# Patient Record
Sex: MALE | Race: WHITE | NOT HISPANIC OR LATINO | Employment: OTHER | URBAN - METROPOLITAN AREA
[De-identification: names, ages, dates, MRNs, and addresses within clinical notes are randomized per-mention and may not be internally consistent; named-entity substitution may affect disease eponyms.]

---

## 2018-07-31 ENCOUNTER — OFFICE VISIT (OUTPATIENT)
Dept: INTERNAL MEDICINE | Facility: CLINIC | Age: 73
End: 2018-07-31
Payer: MEDICARE

## 2018-07-31 VITALS
TEMPERATURE: 98 F | OXYGEN SATURATION: 97 % | HEIGHT: 68 IN | BODY MASS INDEX: 35.04 KG/M2 | DIASTOLIC BLOOD PRESSURE: 92 MMHG | RESPIRATION RATE: 16 BRPM | SYSTOLIC BLOOD PRESSURE: 140 MMHG | HEART RATE: 66 BPM | WEIGHT: 231.19 LBS

## 2018-07-31 DIAGNOSIS — I10 HYPERTENSION, UNSPECIFIED TYPE: Primary | ICD-10-CM

## 2018-07-31 DIAGNOSIS — E78.5 HYPERLIPIDEMIA, UNSPECIFIED HYPERLIPIDEMIA TYPE: ICD-10-CM

## 2018-07-31 LAB
ALBUMIN SERPL-MCNC: 4 G/DL (ref 3.1–4.7)
ALP SERPL-CCNC: 58 IU/L (ref 40–104)
ALT (SGPT): 18 IU/L (ref 3–33)
AST SERPL-CCNC: 20 IU/L (ref 10–40)
BILIRUB SERPL-MCNC: 1.1 MG/DL (ref 0.3–1)
BUN SERPL-MCNC: 20 MG/DL (ref 8–20)
CALCIUM SERPL-MCNC: 8.9 MG/DL (ref 7.7–10.4)
CHLORIDE: 102 MMOL/L (ref 98–110)
CO2 SERPL-SCNC: 28.7 MMOL/L (ref 22.8–31.6)
CREATININE: 1.46 MG/DL (ref 0.6–1.4)
GLUCOSE: 110 MG/DL (ref 70–99)
POTASSIUM SERPL-SCNC: 3.4 MMOL/L (ref 3.5–5)
PROT SERPL-MCNC: 7.3 G/DL (ref 6–8.2)
SODIUM: 141 MMOL/L (ref 134–144)

## 2018-07-31 PROCEDURE — 99203 OFFICE O/P NEW LOW 30 MIN: CPT | Mod: ,,, | Performed by: FAMILY MEDICINE

## 2018-07-31 RX ORDER — UBIQUINOL 100 MG
150 CAPSULE ORAL DAILY
COMMUNITY

## 2018-07-31 RX ORDER — MAGNESIUM 200 MG
TABLET ORAL ONCE
COMMUNITY
End: 2019-07-30

## 2018-07-31 RX ORDER — IBUPROFEN 100 MG/5ML
1000 SUSPENSION, ORAL (FINAL DOSE FORM) ORAL DAILY
COMMUNITY

## 2018-07-31 RX ORDER — DOXAZOSIN 8 MG/1
8 TABLET ORAL DAILY
COMMUNITY
End: 2018-09-05 | Stop reason: SDUPTHER

## 2018-07-31 RX ORDER — FUROSEMIDE 40 MG/1
40 TABLET ORAL DAILY
COMMUNITY
End: 2018-07-31

## 2018-07-31 RX ORDER — METOPROLOL SUCCINATE 25 MG/1
25 TABLET, EXTENDED RELEASE ORAL DAILY
COMMUNITY
End: 2018-07-31

## 2018-07-31 RX ORDER — CLOPIDOGREL BISULFATE 75 MG/1
75 TABLET ORAL DAILY
COMMUNITY
End: 2018-09-05 | Stop reason: SDUPTHER

## 2018-07-31 RX ORDER — ELECTROLYTES/DEXTROSE
SOLUTION, ORAL ORAL ONCE
COMMUNITY
End: 2018-09-05 | Stop reason: SDUPTHER

## 2018-07-31 RX ORDER — GARLIC 1000 MG
1 CAPSULE ORAL DAILY
COMMUNITY

## 2018-07-31 RX ORDER — PYRIDOXINE HCL (VITAMIN B6) 25 MG
25 TABLET ORAL DAILY
COMMUNITY
End: 2018-10-23

## 2018-07-31 RX ORDER — NITROGLYCERIN 0.4 MG/1
0.4 TABLET SUBLINGUAL EVERY 5 MIN PRN
COMMUNITY
End: 2019-04-30 | Stop reason: SDUPTHER

## 2018-07-31 RX ORDER — NAPROXEN SODIUM 220 MG/1
81 TABLET, FILM COATED ORAL DAILY
Status: ON HOLD | COMMUNITY
End: 2020-08-19 | Stop reason: HOSPADM

## 2018-07-31 RX ORDER — LISINOPRIL 20 MG/1
20 TABLET ORAL 2 TIMES DAILY
COMMUNITY
End: 2018-09-05 | Stop reason: SDUPTHER

## 2018-07-31 NOTE — PROGRESS NOTES
SUBJECTIVE:    Patient ID: Tong Ford is a 73 y.o. male.    Chief Complaint: Establish Care and Foot Swelling (bilateral. mostly in the evenings)    Mr. Brewer is a 73-year-old male new to this provider no past medical records with him, patient has no new complaints today and is not needing medication refills.  Past medical history significant for hypertension not very well controlled on 3 medications (Cardura lisinopril metoprolol) patient states he is not taking his lisinopril as directed, he is only taking 1 tablet a day he supposed to be taking 2 tablets a day. His blood pressure today is not very well controlled.  He has a history of coronary artery disease status post CABG, he has a cardiologist in [facility] we will request his last note, patient has discontinued his statin on his own (no side effects).  He also has a history of GERD and arthritis that both seem to be asymptomatic today.      Hypertension   This is a chronic problem. The current episode started more than 1 year ago. The problem has been waxing and waning since onset. The problem is uncontrolled. Pertinent negatives include no anxiety, blurred vision, chest pain, headaches, malaise/fatigue, neck pain, orthopnea, palpitations, peripheral edema, PND, shortness of breath or sweats. There are no associated agents to hypertension. Risk factors for coronary artery disease include dyslipidemia, male gender and sedentary lifestyle. Past treatments include ACE inhibitors and beta blockers. Compliance problems include exercise and diet.  Hypertensive end-organ damage includes CAD/MI.         Past Medical History:   Diagnosis Date    Arthritis     Cataract     Clotting disorder     Coronary artery disease     Encounter for blood transfusion     GERD (gastroesophageal reflux disease)     Hypertension     Neuromuscular disorder      Social History     Social History    Marital status:      Spouse name: N/A    Number of  children: N/A    Years of education: N/A     Occupational History    Not on file.     Social History Main Topics    Smoking status: Never Smoker    Smokeless tobacco: Never Used    Alcohol use Yes      Comment: rare social occasions    Drug use: No    Sexual activity: No     Other Topics Concern    Not on file     Social History Narrative    No narrative on file     Past Surgical History:   Procedure Laterality Date    COLONOSCOPY      CORONARY ARTERY BYPASS GRAFT      EYE SURGERY      VASECTOMY       Family History   Problem Relation Age of Onset    Cancer Mother     Cancer Father     Hypertension Father     Cancer Maternal Grandmother     COPD Paternal Grandmother      Current Outpatient Prescriptions   Medication Sig Dispense Refill    doxazosin (CARDURA) 8 MG Tab Take 8 mg by mouth once daily.      furosemide (LASIX) 40 MG tablet Take 40 mg by mouth once daily.      lisinopril (PRINIVIL,ZESTRIL) 20 MG tablet Take 20 mg by mouth 2 (two) times daily.      metoprolol succinate (TOPROL-XL) 25 MG 24 hr tablet Take 25 mg by mouth once daily.       No current facility-administered medications for this visit.      Review of patient's allergies indicates:  No Known Allergies    Review of Systems   Constitutional: Negative for activity change, appetite change, fatigue, fever and malaise/fatigue.   HENT: Negative for congestion, ear pain, hearing loss, postnasal drip, sinus pain, sinus pressure, sneezing and sore throat.    Eyes: Negative for blurred vision, photophobia and pain.   Respiratory: Negative for cough, chest tightness, shortness of breath and wheezing.    Cardiovascular: Negative for chest pain, palpitations, orthopnea, leg swelling and PND.   Gastrointestinal: Negative for abdominal distention, abdominal pain, blood in stool, constipation, diarrhea, nausea and vomiting.   Endocrine: Negative for cold intolerance, heat intolerance, polydipsia and polyuria.   Genitourinary: Negative for  "difficulty urinating, dysuria, flank pain, frequency, hematuria and urgency.   Musculoskeletal: Negative for arthralgias, back pain, joint swelling, myalgias and neck pain.   Skin: Negative for pallor and rash.   Allergic/Immunologic: Negative for environmental allergies and food allergies.   Neurological: Negative for dizziness, weakness, light-headedness and headaches.   Hematological: Does not bruise/bleed easily.   Psychiatric/Behavioral: Negative for confusion, decreased concentration and sleep disturbance. The patient is not nervous/anxious.           Blood pressure (!) 140/92, pulse 66, temperature 97.7 °F (36.5 °C), temperature source Oral, resp. rate 16, height 5' 8" (1.727 m), weight 104.9 kg (231 lb 3.2 oz), SpO2 97 %. Body mass index is 35.15 kg/m².   Objective:      Physical Exam   Constitutional: He is oriented to person, place, and time. He appears well-developed and well-nourished.   HENT:   Head: Normocephalic and atraumatic.   Eyes: EOM are normal. Pupils are equal, round, and reactive to light. Right eye exhibits no discharge. Left eye exhibits no discharge.   Cardiovascular: Normal rate, regular rhythm and normal heart sounds.    Pulmonary/Chest: Effort normal and breath sounds normal. No respiratory distress.   Neurological: He is alert and oriented to person, place, and time.           Assessment:       1. Hypertension, unspecified type    2. Hyperlipidemia, unspecified hyperlipidemia type         Plan:           Hypertension, unspecified type  -     Comprehensive metabolic panel; Future; Expected date: 07/31/2018  Patient with chronic hypertension not very well controlled today patient admittedly does not take his medication as prescribed instructed patient to take medications as prescribed, decrease his salt intake to less than 2500 mg, decrease alcohol intake, and begin regularly scheduled exercise. Patient has a history of coronary artery disease with CABG we'll attempt to get previous " cardiology notes with echocardiogram. I suspect he may have mild congestive heart failure due to his complaints of lower extremity edema and because someone has placed him on Lasix. I recommended that he begin daily weight checks to monitor his fluids. We'll check a comprehensive metabolic panel today to evaluate his potassium since he is on daily Lasix.     Hyperlipidemia, unspecified hyperlipidemia type  -     Lipid panel; Future; Expected date: 07/31/2018 patient has known cardiovascular disease with CABG he should be on a statin for life. We'll check cholesterol at this visit and try to encourage the patient to go back on a statin.

## 2018-09-06 RX ORDER — ELECTROLYTES/DEXTROSE
1 SOLUTION, ORAL ORAL ONCE
COMMUNITY
Start: 2018-09-06 | End: 2018-09-06

## 2018-09-06 RX ORDER — DOXAZOSIN 8 MG/1
8 TABLET ORAL DAILY
Qty: 90 TABLET | Refills: 1 | Status: SHIPPED | OUTPATIENT
Start: 2018-09-06 | End: 2019-04-02 | Stop reason: SDUPTHER

## 2018-09-06 RX ORDER — CLOPIDOGREL BISULFATE 75 MG/1
75 TABLET ORAL DAILY
Qty: 90 TABLET | Refills: 1 | Status: SHIPPED | OUTPATIENT
Start: 2018-09-06 | End: 2019-10-14 | Stop reason: SDUPTHER

## 2018-09-06 RX ORDER — LISINOPRIL 20 MG/1
20 TABLET ORAL 2 TIMES DAILY
Qty: 180 TABLET | Refills: 1 | Status: SHIPPED | OUTPATIENT
Start: 2018-09-06 | End: 2019-04-02 | Stop reason: SDUPTHER

## 2018-09-10 DIAGNOSIS — R60.9 EDEMA, UNSPECIFIED TYPE: Primary | ICD-10-CM

## 2018-09-10 RX ORDER — FUROSEMIDE 40 MG/1
40 TABLET ORAL 2 TIMES DAILY
Qty: 60 TABLET | Refills: 11 | Status: SHIPPED | OUTPATIENT
Start: 2018-09-10 | End: 2018-09-13 | Stop reason: SDUPTHER

## 2018-09-10 NOTE — TELEPHONE ENCOUNTER
Pt states he is still taking furosemide 40 mg but chart shows it was discontinued. He states he needs refills

## 2018-09-13 ENCOUNTER — OFFICE VISIT (OUTPATIENT)
Dept: FAMILY MEDICINE | Facility: CLINIC | Age: 73
End: 2018-09-13
Payer: MEDICARE

## 2018-09-13 VITALS
WEIGHT: 231.63 LBS | HEIGHT: 68 IN | DIASTOLIC BLOOD PRESSURE: 60 MMHG | BODY MASS INDEX: 35.11 KG/M2 | HEART RATE: 58 BPM | TEMPERATURE: 99 F | SYSTOLIC BLOOD PRESSURE: 130 MMHG | OXYGEN SATURATION: 95 %

## 2018-09-13 DIAGNOSIS — E03.9 HYPOTHYROIDISM, UNSPECIFIED TYPE: ICD-10-CM

## 2018-09-13 DIAGNOSIS — R60.9 EDEMA, UNSPECIFIED TYPE: ICD-10-CM

## 2018-09-13 DIAGNOSIS — I25.10 CORONARY ARTERY DISEASE, ANGINA PRESENCE UNSPECIFIED, UNSPECIFIED VESSEL OR LESION TYPE, UNSPECIFIED WHETHER NATIVE OR TRANSPLANTED HEART: ICD-10-CM

## 2018-09-13 DIAGNOSIS — I10 ESSENTIAL HYPERTENSION: Primary | ICD-10-CM

## 2018-09-13 PROCEDURE — 99213 OFFICE O/P EST LOW 20 MIN: CPT | Mod: ,,, | Performed by: FAMILY MEDICINE

## 2018-09-13 RX ORDER — METOPROLOL SUCCINATE 50 MG/1
50 TABLET, EXTENDED RELEASE ORAL DAILY
Qty: 90 TABLET | Refills: 1 | Status: SHIPPED | OUTPATIENT
Start: 2018-09-13 | End: 2019-04-02 | Stop reason: SDUPTHER

## 2018-09-13 RX ORDER — FUROSEMIDE 40 MG/1
40 TABLET ORAL 2 TIMES DAILY
Qty: 180 TABLET | Refills: 1 | Status: SHIPPED | OUTPATIENT
Start: 2018-09-13 | End: 2018-10-11 | Stop reason: SDUPTHER

## 2018-09-13 RX ORDER — METOPROLOL SUCCINATE 50 MG/1
50 TABLET, EXTENDED RELEASE ORAL DAILY
COMMUNITY
End: 2018-09-13 | Stop reason: SDUPTHER

## 2018-09-13 NOTE — PROGRESS NOTES
SUBJECTIVE:    Patient ID: Tong Ford is a 73 y.o. male.    Chief Complaint: potassium and off balance    HPI  73-year-old  male previously seen by me one month ago for  Hypertension, patient's blood pressure today is doing well. His a history of coronary artery disease patient does not want to go on a statin medication, he has 3 stents placed, was being followed by cardiology [facility] I reviewed the notes there he had echo in 2016 with ejection fraction of 66%. He's continued to need Lasix twice a day for continued lower shimmery swelling despite limiting his salt intake.    At last visit patient had elevated blood pressures his blood pressures are slightly improved this morning, he denies any chest discomfort suggestive of ischemia, patient denies any orthopnea PND, GOODWIN, but does have lower extremity edema.     Review of the [facility] notes it looks like his last ejection fraction was 66%, he does not have the diagnosis of congestive heart failure. He is a diagnosis of coronary artery disease status post CABG ×3 in 1988, hypertension, hypercholesterolemia, and mitral regurgitation nonrheumatic.    Patient was a decreased potassium 3.4 discussed with him he's not currently on a past potassium supplementation have asked him to increase foods that are rich in potassium DEUCE bananas Green leafy vegetables.    Patient's lipid panel with LDL of 223 not currently on statin patient does not want to take cholesterol medication, will have patient consult with cardiology. Patient has known coronary artery disease unsure why he will not take cholesterol medications. He is currently not establish with a cardiologist here in town his last cardiology visit was 2017 in [place].     110 Abnormally high      BUN, Bld 8 - 20 mg/dL 20    Creatinine 0.60 - 1.40 mg/dL 1.46 Abnormally high     Calcium 7.7 - 10.4 mg/dL 8.9    Sodium 134 - 144 mmol/L 141    Potassium 3.5 - 5.0 mmol/L 3.4  Abnormally low     Chloride 98 - 110 mmol/L 102    CO2 22.8 - 31.6 mmol/L 28.7    Albumin 3.1 - 4.7 g/dL 4.0    Total Bilirubin 0.3 - 1.0 mg/dL 1.1 Abnormally high     Alkaline Phosphatase 40 - 104 IU/L 58    Total Protein 6.0 - 8.2 g/dL 7.3    ALT (SGPT) 3 - 33 IU/L 18    AST 10 - 40 IU/L 20      Cholesterol                   287 H                       mg/dL       Triglycerides                 134                         mg/dL       HDL Cholesterol                37                        23-75  mg/dL       LDL Cholesterol               223 H                      0-100  mg/dL       VLDL Cholesterol               27                        12-27  mg/dL       Cholesterol Ratio            8.00                                             Past Medical History:   Diagnosis Date    Arthritis     Cataract     Clotting disorder     Coronary artery disease     Encounter for blood transfusion     GERD (gastroesophageal reflux disease)     Hypertension     Neuromuscular disorder      Social History     Socioeconomic History    Marital status:      Spouse name: Not on file    Number of children: Not on file    Years of education: Not on file    Highest education level: Not on file   Social Needs    Financial resource strain: Not on file    Food insecurity - worry: Not on file    Food insecurity - inability: Not on file    Transportation needs - medical: Not on file    Transportation needs - non-medical: Not on file   Occupational History    Not on file   Tobacco Use    Smoking status: Never Smoker    Smokeless tobacco: Never Used   Substance and Sexual Activity    Alcohol use: Yes     Comment: rare social occasions    Drug use: No    Sexual activity: No   Other Topics Concern    Not on file   Social History Narrative    Not on file     Past Surgical History:   Procedure Laterality Date    COLONOSCOPY      CORONARY ARTERY BYPASS GRAFT      EYE SURGERY      VASECTOMY       Family  History   Problem Relation Age of Onset    Cancer Mother     Cancer Father     Hypertension Father     Cancer Maternal Grandmother     COPD Paternal Grandmother      Current Outpatient Medications   Medication Sig Dispense Refill    ascorbic acid, vitamin C, (VITAMIN C) 1000 MG tablet Take 1,000 mg by mouth once daily.      aspirin 81 MG Chew Take 81 mg by mouth once daily.      clopidogrel (PLAVIX) 75 mg tablet Take 1 tablet (75 mg total) by mouth once daily. 90 tablet 1    coQ10, ubiquinol, 100 mg Cap Take 150 mg by mouth 2 (two) times daily.      doxazosin (CARDURA) 8 MG Tab Take 1 tablet (8 mg total) by mouth once daily. 90 tablet 1    ergocalciferol, vitamin D2, (VITAMIN D ORAL) Take 500 Units by mouth Daily.      furosemide (LASIX) 40 MG tablet Take 1 tablet (40 mg total) by mouth 2 (two) times daily. for 1 day 180 tablet 1    garlic 1,000 mg Cap Take by mouth.      krill-om-3-dha-epa-phospho-ast (KRILL OIL) 1,996-444-48-80 mg Cap Take by mouth.      lisinopril (PRINIVIL,ZESTRIL) 20 MG tablet Take 1 tablet (20 mg total) by mouth 2 (two) times daily. 180 tablet 1    magnesium 200 mg Tab Take by mouth once.      metoprolol succinate (TOPROL-XL) 50 MG 24 hr tablet Take 1 tablet (50 mg total) by mouth once daily. 90 tablet 1    nitroGLYCERIN (NITROSTAT) 0.4 MG SL tablet Place 0.4 mg under the tongue every 5 (five) minutes as needed for Chest pain.      pyridoxine, vitamin B6, (VITAMIN B-6) 25 MG Tab Take 25 mg by mouth once daily.      TURMERIC ROOT EXTRACT ORAL Take 1,200 mg by mouth Daily.      UNABLE TO FIND Benfotiamine 300mg      UNABLE TO FIND R-Alpha Lipoic Acid 150mg      UNABLE TO FIND Oat straw extract,passion flower, and skullcap root extract      UNABLE TO FIND Arterin      UNABLE TO FIND Niachinamide 250mg       No current facility-administered medications for this visit.      Review of patient's allergies indicates:  No Known Allergies    Review of Systems   Constitutional:  "Negative for activity change, appetite change, fatigue and fever.   HENT: Negative for congestion, ear pain, hearing loss, postnasal drip, sinus pressure, sinus pain, sneezing and sore throat.    Eyes: Negative for photophobia and pain.   Respiratory: Negative for cough, chest tightness, shortness of breath and wheezing.    Cardiovascular: Positive for leg swelling. Negative for chest pain and palpitations.   Gastrointestinal: Negative for abdominal distention, abdominal pain, blood in stool, constipation, diarrhea, nausea and vomiting.   Endocrine: Negative for cold intolerance, heat intolerance, polydipsia and polyuria.   Allergic/Immunologic: Negative for environmental allergies and food allergies.   Neurological: Negative for dizziness, weakness, light-headedness and headaches.   Hematological: Does not bruise/bleed easily.   Psychiatric/Behavioral: Negative for confusion, decreased concentration and sleep disturbance. The patient is not nervous/anxious.           Blood pressure 130/60, pulse (!) 58, temperature 98.7 °F (37.1 °C), height 5' 8" (1.727 m), weight 105.1 kg (231 lb 9.6 oz), SpO2 95 %. Body mass index is 35.21 kg/m².   Objective:      Physical Exam   Constitutional: He appears well-developed and well-nourished. No distress.   HENT:   Head: Normocephalic.   Eyes: Conjunctivae and EOM are normal. Pupils are equal, round, and reactive to light.   Neck: Normal range of motion.   Cardiovascular: Normal rate and regular rhythm.   Murmur heard.  Pulmonary/Chest: Effort normal and breath sounds normal. No respiratory distress.   Skin: He is not diaphoretic.           Assessment:       1. Essential hypertension    2. Edema, unspecified type    3. Coronary artery disease, angina presence unspecified, unspecified vessel or lesion type, unspecified whether native or transplanted heart    4. Hypothyroidism, unspecified type         Plan:           Essential hypertension  -     metoprolol succinate (TOPROL-XL) 50 " MG 24 hr tablet; Take 1 tablet (50 mg total) by mouth once daily.  Dispense: 90 tablet; Refill: 1  -     Ambulatory referral to Cardiology  Patient currently on metoprolol lisinopril and oxytocin, blood pressure within normal limits today will have patient follow with cardiology for the history of coronary artery disease with bypass. He's been chest pain-free he does have a bottle nitroglycerin but has not had the use any.    Edema, unspecified type  -     furosemide (LASIX) 40 MG tablet; Take 1 tablet (40 mg total) by mouth 2 (two) times daily. for 1 day  Dispense: 180 tablet; Refill: 1  Patient initially came to me with Lasix as a prescription, I do not see this as one of his medications from his cardiologist's note, he does not have a history of congestive heart failure, slightly decreased potassium at last lab will refer to cardiology for possible repeat echo to evaluate ejection fraction to determine if he needs to stay on Lasix    Coronary artery disease, angina presence unspecified, unspecified vessel or lesion type, unspecified whether native or transplanted heart  -     Ambulatory referral to Cardiology  Known coronary artery disease, not currently on statin has elevated cholesterol patient does not want to start any new medications have counseled extensively. Hoping cardiology can convince this patient is status probably the best appropriate therapy for him at this point.    Hypothyroidism, unspecified type  -     TSH; Future; Expected date: 09/13/2018  Issue of hypothyroidism last TSH is unknown currently on Synthroid will continue his current dose and check his TSH prior to our next visit December.

## 2018-09-17 LAB — TSH SERPL DL<=0.005 MIU/L-ACNC: 2.48 ULU/ML (ref 0.3–5.6)

## 2018-10-11 DIAGNOSIS — R60.9 EDEMA, UNSPECIFIED TYPE: ICD-10-CM

## 2018-10-11 RX ORDER — FUROSEMIDE 40 MG/1
40 TABLET ORAL 2 TIMES DAILY
Qty: 180 TABLET | Refills: 1 | Status: SHIPPED | OUTPATIENT
Start: 2018-10-11 | End: 2019-10-14 | Stop reason: SDUPTHER

## 2018-10-23 ENCOUNTER — TELEPHONE (OUTPATIENT)
Dept: PULMONOLOGY | Facility: CLINIC | Age: 73
End: 2018-10-23

## 2018-10-23 ENCOUNTER — DOCUMENTATION ONLY (OUTPATIENT)
Dept: PULMONOLOGY | Facility: CLINIC | Age: 73
End: 2018-10-23

## 2018-10-23 ENCOUNTER — OFFICE VISIT (OUTPATIENT)
Dept: PULMONOLOGY | Facility: CLINIC | Age: 73
End: 2018-10-23
Payer: MEDICARE

## 2018-10-23 VITALS
SYSTOLIC BLOOD PRESSURE: 120 MMHG | WEIGHT: 237 LBS | OXYGEN SATURATION: 97 % | HEIGHT: 68 IN | BODY MASS INDEX: 35.92 KG/M2 | DIASTOLIC BLOOD PRESSURE: 80 MMHG | HEART RATE: 57 BPM

## 2018-10-23 DIAGNOSIS — R06.02 SHORTNESS OF BREATH: ICD-10-CM

## 2018-10-23 DIAGNOSIS — G47.33 OSA (OBSTRUCTIVE SLEEP APNEA): Primary | ICD-10-CM

## 2018-10-23 PROCEDURE — 99204 OFFICE O/P NEW MOD 45 MIN: CPT | Mod: ,,, | Performed by: INTERNAL MEDICINE

## 2018-10-23 NOTE — PROGRESS NOTES
SUBJECTIVE:    Patient ID: Tong Ford is a 73 y.o. male.    Chief Complaint: Sleep Apnea (dr dowd referred )    HPIThe patient had a split night sleep study secondary to poor sleep with multiple episodes of nocturia a night.  He is fatigued during the day.  He has mild CESAR and responds to 10cm CPAP.  He doesn't have his equipment.  Past Medical History:   Diagnosis Date    Arthritis     Cataract     Coronary artery disease     Encounter for blood transfusion     Hypercholesterolemia     Hypertension      Past Surgical History:   Procedure Laterality Date    CORONARY ARTERY BYPASS GRAFT      EYE SURGERY      VASECTOMY       Social History     Tobacco Use    Smoking status: Never Smoker    Smokeless tobacco: Never Used   Substance Use Topics    Alcohol use: Yes     Comment: rare social occasions     Family History   Problem Relation Age of Onset    Cancer Mother     Cancer Father     Hypertension Father     Cancer Maternal Grandmother     COPD Paternal Grandmother       Review of patient's allergies indicates:  No Known Allergies    Current Outpatient Medications   Medication Sig Dispense Refill    ascorbic acid, vitamin C, (VITAMIN C) 1000 MG tablet Take 1,000 mg by mouth once daily.      aspirin 81 MG Chew Take 81 mg by mouth once daily.      clopidogrel (PLAVIX) 75 mg tablet Take 1 tablet (75 mg total) by mouth once daily. 90 tablet 1    coQ10, ubiquinol, 100 mg Cap Take 150 mg by mouth 2 (two) times daily.      doxazosin (CARDURA) 8 MG Tab Take 1 tablet (8 mg total) by mouth once daily. 90 tablet 1    ergocalciferol, vitamin D2, (VITAMIN D ORAL) Take 500 Units by mouth Daily.      furosemide (LASIX) 40 MG tablet Take 1 tablet (40 mg total) by mouth 2 (two) times daily. for 1 day 180 tablet 1    garlic 1,000 mg Cap Take by mouth.      krill-om-3-dha-epa-phospho-ast (KRILL OIL) 1,499-593-22-80 mg Cap Take by mouth.      lisinopril (PRINIVIL,ZESTRIL) 20 MG tablet Take 1 tablet (20 mg  "total) by mouth 2 (two) times daily. 180 tablet 1    magnesium 200 mg Tab Take by mouth once.      metoprolol succinate (TOPROL-XL) 50 MG 24 hr tablet Take 1 tablet (50 mg total) by mouth once daily. 90 tablet 1    nitroGLYCERIN (NITROSTAT) 0.4 MG SL tablet Place 0.4 mg under the tongue every 5 (five) minutes as needed for Chest pain.      TURMERIC ROOT EXTRACT ORAL Take 1,200 mg by mouth Daily.      UNABLE TO FIND R-Alpha Lipoic Acid 150mg      UNABLE TO FIND Oat straw extract,passion flower, and skullcap root extract      UNABLE TO FIND Arterin      UNABLE TO FIND Niachinamide 250mg       No current facility-administered medications for this visit.      Review of Systems  General: Feeling Well. Falls asleep doing anything.    Eyes: Wears glasses, has to.  ENT:  No sinusitis or pharyngitis.   Heart:: No chest pain or palpitations.  Lungs: Gets out of breath easily with exertion.  Doesn't know EF.  GI: No Nausea, vomiting, constipation, diarrhea, or reflux.  Skin: new skin cancer to R arm  Musculoskeletal: some arthritis noticed when outside working  Neuro: No headaches or neuropathy.  Lymph: No edema or adenopathy.  Psych: No anxiety or depression.  Endo: No weight change.    OBJECTIVE:      /80 (BP Location: Right arm, Patient Position: Sitting)   Pulse (!) 57   Ht 5' 8" (1.727 m)   Wt 107.5 kg (237 lb)   SpO2 97%   BMI 36.04 kg/m²     Physical Exam  GENERAL: Older patient in no distress.  HEENT: Pupils equal and reactive. Extraocular movements intact. Nose intact. Pharynx moist. Malampati 4. Neck circumference 18".  NECK: Supple.   HEART: Regular rate and rhythm. No murmur or gallop auscultated.  LUNGS: Clear to auscultation and percussion. Lung excursion symmetrical. No change in fremitus. No adventitial noises.  ABDOMEN: Bowel sounds present. Non-tender, no masses palpated. Obese.  EXTREMITIES: Normal muscle tone and joint movement, no cyanosis or clubbing.   LYMPHATICS: No adenopathy " palpated, no edema.  SKIN: Dry, intact, no lesions.   NEURO: Cranial nerves II-XII intact. Motor strength 5/5 bilaterally, upper and lower extremities.  PSYCH: Appropriate affect.    Assessment:       1. CESAR (obstructive sleep apnea)    2. Shortness of breath          Plan:       CESAR (obstructive sleep apnea)    Shortness of breath    Order CPAP equipment: CPAP 10cm with a medium Resmed F-20 full face mask and heated humdity and ramp time per patient preference.  Use CPAP nightly, call if you have any problems  Bring chip in for next visit  PFT's  CXR  Last Echo from Dr. Carmona     Follow-up in about 2 months (around 12/23/2018).

## 2018-10-23 NOTE — PATIENT INSTRUCTIONS
Obstructive Sleep Apnea  Obstructive sleep apnea is a condition that causes your air passages to become narrowed or blocked during sleep. As a result, breathing stops for short periods. Your body wakes up enough for breathing to begin again, though you don't remember it. The cycle of stopped breathing and brief awakenings can repeat dozens of times a night. This prevents the body from getting to the deeper stages of sleep that are needed for good rest and may cause your body's oxygen level to fall.  Signs of sleep apnea include loud snoring, noisy breathing, and gasping sounds during sleep. Daytime symptoms include waking up tired after a full night's sleep, waking up with headaches, feeling very sleepy or falling asleep during the day, and having problems with memory or concentration.  Risk factors for sleep apnea include:  · Being overweight  · Being a man, or a woman in menopause  · Smoking  · Using alcohol or sedating medicines  · Having enlarged structures in the nose or throat  Home care  Lifestyle changes that can help treat snoring and sleep apnea include the following:  · If you are overweight, lose weight. Talk to your healthcare provider about a weight-loss plan for you.  · Avoid alcohol for 3 to 4 hours before bedtime. Avoid sedating medications. Ask your healthcare provider about the medicines you take.  · If you smoke, talk to your healthcare provider about ways to quit.  · Sleep on your side. This can help prevent gravity from pulling relaxed throat tissues into your breathing passages.  · If you have allergies or sinus problems that block your nose, ask your healthcare provider for help.  Follow-up care  Follow up with your healthcare provider, or as advised. A diagnosis of sleep apnea is made with a sleep study. Your healthcare provider can tell you more about this test.  When to seek medical advice  Sleep apnea can make you more likely to have certain health problems. These include high blood  pressure, heart attack, stroke, and sexual dysfunction. If you have sleep apnea, talk to your healthcare provider about the best treatments for you.  Date Last Reviewed: 4/1/2017  © 4837-0236 [employer]. [address] [place] [address], [place] [address]. All rights reserved. This information is not intended as a substitute for professional medical care. Always follow your healthcare professional's instructions.

## 2018-10-25 ENCOUNTER — PATIENT MESSAGE (OUTPATIENT)
Dept: PULMONOLOGY | Facility: CLINIC | Age: 73
End: 2018-10-25

## 2018-11-05 ENCOUNTER — DOCUMENTATION ONLY (OUTPATIENT)
Dept: PULMONOLOGY | Facility: CLINIC | Age: 73
End: 2018-11-05

## 2018-11-05 NOTE — PROGRESS NOTES
Erica Thomson M.D., F.C.C.P.  Pulmonary & Critical Care Medicine    [address], [address]  [place] [address]  [phone]      PFT's / Spirometry Results    Patient Name: Tong Ford  YOB: 1945    Date of Study: 11/05/2018    Spirometry: No obstruction    Lung Volume: No restriction    Diffusion Capacity: No diffusion defect    Response to Bronchodilators: No bronchodilator used    Comments:     AUDI Burton

## 2018-11-14 ENCOUNTER — PATIENT MESSAGE (OUTPATIENT)
Dept: PULMONOLOGY | Facility: CLINIC | Age: 73
End: 2018-11-14

## 2018-11-14 ENCOUNTER — TELEPHONE (OUTPATIENT)
Dept: PULMONOLOGY | Facility: CLINIC | Age: 73
End: 2018-11-14

## 2018-11-14 NOTE — TELEPHONE ENCOUNTER
[facility] FAXED US ON 11/12/18 STATING THEY HAVE TRIED MULTIPLE TIMES TO CONTACT MR BELTRAN TO SET HIS CPAP UP BUT THEY ARE UNABLE TO REACH HIM STILL.

## 2018-11-29 ENCOUNTER — PATIENT MESSAGE (OUTPATIENT)
Dept: PULMONOLOGY | Facility: CLINIC | Age: 73
End: 2018-11-29

## 2018-12-26 ENCOUNTER — PATIENT MESSAGE (OUTPATIENT)
Dept: PULMONOLOGY | Facility: CLINIC | Age: 73
End: 2018-12-26

## 2018-12-31 ENCOUNTER — TELEPHONE (OUTPATIENT)
Dept: PULMONOLOGY | Facility: CLINIC | Age: 73
End: 2018-12-31

## 2018-12-31 NOTE — TELEPHONE ENCOUNTER
Received compliance report for patients CPAP.  He is only 57% compliant.  He needs to be sleeping on his CPAP every night for at least 4 hours.

## 2019-01-07 ENCOUNTER — OFFICE VISIT (OUTPATIENT)
Dept: PULMONOLOGY | Facility: CLINIC | Age: 74
End: 2019-01-07
Payer: MEDICARE

## 2019-01-07 VITALS
HEART RATE: 79 BPM | HEIGHT: 68 IN | OXYGEN SATURATION: 97 % | SYSTOLIC BLOOD PRESSURE: 140 MMHG | BODY MASS INDEX: 35.46 KG/M2 | DIASTOLIC BLOOD PRESSURE: 80 MMHG | WEIGHT: 234 LBS

## 2019-01-07 DIAGNOSIS — G47.33 OSA (OBSTRUCTIVE SLEEP APNEA): Primary | ICD-10-CM

## 2019-01-07 PROCEDURE — 99214 OFFICE O/P EST MOD 30 MIN: CPT | Mod: ,,, | Performed by: NURSE PRACTITIONER

## 2019-01-07 PROCEDURE — 99214 PR OFFICE/OUTPT VISIT, EST, LEVL IV, 30-39 MIN: ICD-10-PCS | Mod: ,,, | Performed by: NURSE PRACTITIONER

## 2019-01-07 RX ORDER — RANOLAZINE 500 MG/1
1 TABLET, FILM COATED, EXTENDED RELEASE ORAL DAILY
COMMUNITY
Start: 2018-12-18 | End: 2019-07-30

## 2019-01-07 RX ORDER — AMLODIPINE BESYLATE 10 MG/1
1 TABLET ORAL DAILY
COMMUNITY
Start: 2018-12-31 | End: 2019-07-30

## 2019-01-07 NOTE — PROGRESS NOTES
SUBJECTIVE:    Patient ID: Tong Ford is a 73 y.o. male.    Chief Complaint: Follow-up (pt said he is doing fine)    HPI.  Patient here today feeling well.  His PFTs were normal.  He feels his shortness of breath is related to deconditioning.  He is sleeping on his CPAP more.  His compliance was only at 57% at end of December.  He is aware that he needs to wear more now.  He has noticed a difference when he wears it.  He is not waking up all night to urinate, swelling is better and his is not falling asleep all day.    Past Medical History:   Diagnosis Date    Arthritis     Cataract     Coronary artery disease     Encounter for blood transfusion     Hypercholesterolemia     Hypertension      Past Surgical History:   Procedure Laterality Date    CORONARY ARTERY BYPASS GRAFT      EYE SURGERY      VASECTOMY       Social History     Tobacco Use    Smoking status: Never Smoker    Smokeless tobacco: Never Used   Substance Use Topics    Alcohol use: Yes     Comment: rare social occasions     Family History   Problem Relation Age of Onset    Cancer Mother     Cancer Father     Hypertension Father     Cancer Maternal Grandmother     COPD Paternal Grandmother       Review of patient's allergies indicates:  No Known Allergies    Current Outpatient Medications   Medication Sig Dispense Refill    ascorbic acid, vitamin C, (VITAMIN C) 1000 MG tablet Take 1,000 mg by mouth once daily.      aspirin 81 MG Chew Take 81 mg by mouth once daily.      clopidogrel (PLAVIX) 75 mg tablet Take 1 tablet (75 mg total) by mouth once daily. 90 tablet 1    coQ10, ubiquinol, 100 mg Cap Take 150 mg by mouth 2 (two) times daily.      doxazosin (CARDURA) 8 MG Tab Take 1 tablet (8 mg total) by mouth once daily. 90 tablet 1    ergocalciferol, vitamin D2, (VITAMIN D ORAL) Take 500 Units by mouth Daily.      furosemide (LASIX) 40 MG tablet Take 1 tablet (40 mg total) by mouth 2 (two) times daily. for 1 day 180 tablet 1     "garlic 1,000 mg Cap Take by mouth.      krill-om-3-dha-epa-phospho-ast (KRILL OIL) 1,533-440-30-80 mg Cap Take by mouth.      lisinopril (PRINIVIL,ZESTRIL) 20 MG tablet Take 1 tablet (20 mg total) by mouth 2 (two) times daily. 180 tablet 1    magnesium 200 mg Tab Take by mouth once.      metoprolol succinate (TOPROL-XL) 50 MG 24 hr tablet Take 1 tablet (50 mg total) by mouth once daily. 90 tablet 1    nitroGLYCERIN (NITROSTAT) 0.4 MG SL tablet Place 0.4 mg under the tongue every 5 (five) minutes as needed for Chest pain.      UNABLE TO FIND R-Alpha Lipoic Acid 150mg      UNABLE TO FIND Oat straw extract,passion flower, and skullcap root extract      UNABLE TO FIND Arterin      UNABLE TO FIND Niachinamide 250mg      amLODIPine (NORVASC) 10 MG tablet 1 tablet once daily.      RANEXA 500 mg Tb12 1 tablet once daily.       No current facility-administered medications for this visit.      Review of Systems  General: Feeling Well. Not sleeping all during the day   Eyes: Wears glasses, has to.  ENT:  No sinusitis or pharyngitis.   Heart:: No chest pain or palpitations.  Lungs: shortness of breath with exertion   GI: No Nausea, vomiting, constipation, diarrhea, or reflux.  Gu: not waking up as much to urinate at night   Skin: new skin cancer to R arm  Musculoskeletal: some arthritis noticed when outside working  Neuro: numbness and tingling to legs occasionally  Lymph: swelling better .  Psych: No anxiety or depression.  Endo: weight gain     OBJECTIVE:      BP (!) 140/80   Pulse 79   Ht 5' 8" (1.727 m)   Wt 106.1 kg (234 lb)   SpO2 97%   BMI 35.58 kg/m²     Physical Exam  GENERAL: Older patient in no distress.  HEENT: Pupils equal and reactive. Extraocular movements intact. Nose intact. Pharynx moist. Malampati 4. Neck circumference 18".  NECK: Supple.   HEART: Regular rate and rhythm. No murmur or gallop auscultated.  LUNGS: Clear to auscultation and percussion. Lung excursion symmetrical. No change in " fremitus. No adventitial noises.  ABDOMEN: Bowel sounds present. Non-tender, no masses palpated. Obese.  EXTREMITIES: Normal muscle tone and joint movement, no cyanosis or clubbing.   LYMPHATICS: left leg with +1 pitting edema, right leg with trace   SKIN: Dry, intact, no lesions.   NEURO: Cranial nerves II-XII intact. Motor strength 5/5 bilaterally, upper and lower extremities.  PSYCH: Appropriate affect.    Assessment:       1. CESAR (obstructive sleep apnea)          Plan:          Wear you CPAP every night for at least 4 hours a night  Less salt, elevated legs   Need new compliance down load in 3 week

## 2019-01-07 NOTE — PATIENT INSTRUCTIONS
Obstructive Sleep Apnea  Obstructive sleep apnea is a condition that causes your air passages to become narrowed or blocked during sleep. As a result, breathing stops for short periods. Your body wakes up enough for breathing to begin again, though you don't remember it. The cycle of stopped breathing and brief awakenings can repeat dozens of times a night. This prevents the body from getting to the deeper stages of sleep that are needed for good rest and may cause your body's oxygen level to fall.  Signs of sleep apnea include loud snoring, noisy breathing, and gasping sounds during sleep. Daytime symptoms include waking up tired after a full night's sleep, waking up with headaches, feeling very sleepy or falling asleep during the day, and having problems with memory or concentration.  Risk factors for sleep apnea include:  · Being overweight  · Being a man, or a woman in menopause  · Smoking  · Using alcohol or sedating medicines  · Having enlarged structures in the nose or throat  Home care  Lifestyle changes that can help treat snoring and sleep apnea include the following:  · If you are overweight, lose weight. Talk to your healthcare provider about a weight-loss plan for you.  · Avoid alcohol for 3 to 4 hours before bedtime. Avoid sedating medications. Ask your healthcare provider about the medicines you take.  · If you smoke, talk to your healthcare provider about ways to quit.  · Sleep on your side. This can help prevent gravity from pulling relaxed throat tissues into your breathing passages.  · If you have allergies or sinus problems that block your nose, ask your healthcare provider for help.  Follow-up care  Follow up with your healthcare provider, or as advised. A diagnosis of sleep apnea is made with a sleep study. Your healthcare provider can tell you more about this test.  When to seek medical advice  Sleep apnea can make you more likely to have certain health problems. These include high blood  pressure, heart attack, stroke, and sexual dysfunction. If you have sleep apnea, talk to your healthcare provider about the best treatments for you.  Date Last Reviewed: 4/1/2017  © 6932-4885 [employer]. [address] [place] [address], [place] [address]. All rights reserved. This information is not intended as a substitute for professional medical care. Always follow your healthcare professional's instructions.         Wear you CPAP every night for at least 4 hours a night  Less salt, elevated legs   Need new compliance down load in 3 week

## 2019-01-25 ENCOUNTER — TELEPHONE (OUTPATIENT)
Dept: PULMONOLOGY | Facility: CLINIC | Age: 74
End: 2019-01-25

## 2019-01-25 NOTE — TELEPHONE ENCOUNTER
Patient compliance report shows that he is 83% compliant with his CPAP.  He is sleeping on his device 6 hours a night on average.  His AHI is 5.0.

## 2019-01-31 ENCOUNTER — OFFICE VISIT (OUTPATIENT)
Dept: FAMILY MEDICINE | Facility: CLINIC | Age: 74
End: 2019-01-31
Payer: MEDICARE

## 2019-01-31 VITALS
HEIGHT: 68 IN | SYSTOLIC BLOOD PRESSURE: 120 MMHG | OXYGEN SATURATION: 99 % | RESPIRATION RATE: 18 BRPM | BODY MASS INDEX: 35.24 KG/M2 | TEMPERATURE: 98 F | WEIGHT: 232.5 LBS | HEART RATE: 58 BPM | DIASTOLIC BLOOD PRESSURE: 80 MMHG

## 2019-01-31 DIAGNOSIS — Z12.5 PROSTATE CANCER SCREENING: ICD-10-CM

## 2019-01-31 DIAGNOSIS — E55.9 VITAMIN D DEFICIENCY: ICD-10-CM

## 2019-01-31 DIAGNOSIS — R73.03 PREDIABETES: ICD-10-CM

## 2019-01-31 DIAGNOSIS — E03.8 OTHER SPECIFIED HYPOTHYROIDISM: ICD-10-CM

## 2019-01-31 DIAGNOSIS — I10 ESSENTIAL HYPERTENSION: Primary | ICD-10-CM

## 2019-01-31 DIAGNOSIS — Z13.5 SCREENING FOR EYE CONDITION: ICD-10-CM

## 2019-01-31 LAB
ALBUMIN SERPL-MCNC: 3.9 G/DL (ref 3.1–4.7)
ALP SERPL-CCNC: 64 IU/L (ref 40–104)
ALT (SGPT): 19 IU/L (ref 3–33)
AST SERPL-CCNC: 20 IU/L (ref 10–40)
BILIRUB SERPL-MCNC: 0.9 MG/DL (ref 0.3–1)
BUN SERPL-MCNC: 29 MG/DL (ref 8–20)
CALCIUM SERPL-MCNC: 9 MG/DL (ref 7.7–10.4)
CHLORIDE: 99 MMOL/L (ref 98–110)
CO2 SERPL-SCNC: 26.6 MMOL/L (ref 22.8–31.6)
COMPLEXED PSA SERPL-MCNC: 1.5 NG/ML (ref 0–3)
CREATININE: 1.71 MG/DL (ref 0.6–1.4)
GLUCOSE: 113 MG/DL (ref 70–99)
POTASSIUM SERPL-SCNC: 3.4 MMOL/L (ref 3.5–5)
PROT SERPL-MCNC: 7.1 G/DL (ref 6–8.2)
SODIUM: 137 MMOL/L (ref 134–144)
TSH SERPL DL<=0.005 MIU/L-ACNC: 4.1 ULU/ML (ref 0.3–5.6)
VITAMIN D, 1,25 (OH)2: 73.9 NG/ML (ref 30–100)

## 2019-01-31 PROCEDURE — 99214 OFFICE O/P EST MOD 30 MIN: CPT | Mod: ,,, | Performed by: FAMILY MEDICINE

## 2019-01-31 PROCEDURE — 99214 PR OFFICE/OUTPT VISIT, EST, LEVL IV, 30-39 MIN: ICD-10-PCS | Mod: ,,, | Performed by: FAMILY MEDICINE

## 2019-01-31 NOTE — PROGRESS NOTES
SUBJECTIVE:    Patient ID: Tong Ford is a 73 y.o. male.    Chief Complaint: Hypertension and Thyroid Problem    72 yo male presents today to follow up on his HTN, pt has a hx of CVD and has been referred to cardiology, I have been unable to get a copy of his last cardiology visit or cardiac testing. He fells well today his BP is WNL, he has a hx of hypothyroidism but at last check his TSH was normal while not taking medications.     Last 2 previous labs pt has had elevated blood glucose but not yet in the diabetic range.    Currently on medications for Vit D deficiency.    Pt has recently started an aerobic exercise program, and is doing well without complaints    Hypertension   This is a chronic problem. The current episode started more than 1 year ago. The problem is unchanged. The problem is controlled. Pertinent negatives include no anxiety, blurred vision, chest pain, headaches, malaise/fatigue, neck pain, orthopnea, palpitations, peripheral edema, PND, shortness of breath or sweats. Agents associated with hypertension include thyroid hormones. Past treatments include diuretics, beta blockers and calcium channel blockers. The current treatment provides moderate improvement. There are no compliance problems.  Hypertensive end-organ damage includes CAD/MI. Identifiable causes of hypertension include a thyroid problem.   Thyroid Problem   Presents for follow-up visit. Patient reports no anxiety, constipation, depressed mood, diarrhea, fatigue, hair loss, heat intolerance, hoarse voice, palpitations, weight gain or weight loss. The symptoms have been stable.         Past Medical History:   Diagnosis Date    Arthritis     Cataract     Coronary artery disease     Encounter for blood transfusion     Hypercholesterolemia     Hypertension      Social History     Socioeconomic History    Marital status:      Spouse name: Not on file    Number of children: Not on file    Years of education: Not on  file    Highest education level: Not on file   Social Needs    Financial resource strain: Not on file    Food insecurity - worry: Not on file    Food insecurity - inability: Not on file    Transportation needs - medical: Not on file    Transportation needs - non-medical: Not on file   Occupational History    Occupation:    Tobacco Use    Smoking status: Never Smoker    Smokeless tobacco: Never Used   Substance and Sexual Activity    Alcohol use: Yes     Comment: rare social occasions    Drug use: No    Sexual activity: No   Other Topics Concern    Not on file   Social History Narrative    Not on file     Past Surgical History:   Procedure Laterality Date    CORONARY ARTERY BYPASS GRAFT      EYE SURGERY      VASECTOMY       Family History   Problem Relation Age of Onset    Cancer Mother     Cancer Father     Hypertension Father     Cancer Maternal Grandmother     COPD Paternal Grandmother      Current Outpatient Medications   Medication Sig Dispense Refill    amLODIPine (NORVASC) 10 MG tablet 1 tablet once daily.      ascorbic acid, vitamin C, (VITAMIN C) 1000 MG tablet Take 1,000 mg by mouth once daily.      aspirin 81 MG Chew Take 81 mg by mouth once daily.      clopidogrel (PLAVIX) 75 mg tablet Take 1 tablet (75 mg total) by mouth once daily. 90 tablet 1    coQ10, ubiquinol, 100 mg Cap Take 150 mg by mouth 2 (two) times daily.      doxazosin (CARDURA) 8 MG Tab Take 1 tablet (8 mg total) by mouth once daily. 90 tablet 1    ergocalciferol, vitamin D2, (VITAMIN D ORAL) Take 500 Units by mouth Daily.      furosemide (LASIX) 40 MG tablet Take 1 tablet (40 mg total) by mouth 2 (two) times daily. for 1 day 180 tablet 1    garlic 1,000 mg Cap Take by mouth.      krill-om-3-dha-epa-phospho-ast (KRILL OIL) 1,160-903-83-80 mg Cap Take by mouth.      lisinopril (PRINIVIL,ZESTRIL) 20 MG tablet Take 1 tablet (20 mg total) by mouth 2 (two) times daily. 180 tablet 1    magnesium 200 mg  "Tab Take by mouth once.      metoprolol succinate (TOPROL-XL) 50 MG 24 hr tablet Take 1 tablet (50 mg total) by mouth once daily. 90 tablet 1    nitroGLYCERIN (NITROSTAT) 0.4 MG SL tablet Place 0.4 mg under the tongue every 5 (five) minutes as needed for Chest pain.      RANEXA 500 mg Tb12 1 tablet once daily.      UNABLE TO FIND R-Alpha Lipoic Acid 150mg      UNABLE TO FIND Oat straw extract,passion flower, and skullcap root extract      UNABLE TO FIND Arterin      UNABLE TO FIND Niachinamide 250mg       No current facility-administered medications for this visit.      Review of patient's allergies indicates:  No Known Allergies    Review of Systems   Constitutional: Negative for activity change, appetite change, fatigue, fever, malaise/fatigue, unexpected weight change, weight gain and weight loss.   HENT: Negative for congestion, ear pain, hearing loss, hoarse voice, rhinorrhea, sinus pressure and sinus pain.    Eyes: Positive for visual disturbance (Pt is having to sqint to read, difficulty with night driving.). Negative for blurred vision.   Respiratory: Negative for cough, chest tightness, shortness of breath and wheezing.    Cardiovascular: Negative for chest pain, palpitations, orthopnea, leg swelling and PND.   Gastrointestinal: Negative for abdominal pain, constipation, diarrhea, rectal pain and vomiting.   Endocrine: Negative for heat intolerance.   Genitourinary: Negative for dysuria, flank pain, frequency and urgency.   Musculoskeletal: Negative for neck pain.   Neurological: Negative for headaches.   Psychiatric/Behavioral: The patient is not nervous/anxious.           Blood pressure 120/80, pulse (!) 58, temperature 97.9 °F (36.6 °C), temperature source Oral, resp. rate 18, height 5' 8" (1.727 m), weight 105.5 kg (232 lb 8 oz), SpO2 99 %. Body mass index is 35.35 kg/m².   Objective:      Physical Exam   Constitutional: He is oriented to person, place, and time. He appears well-developed and " well-nourished. No distress.   HENT:   Head: Normocephalic and atraumatic.   Bilateral cerumin impaction.   Eyes: Conjunctivae and EOM are normal. Pupils are equal, round, and reactive to light. No scleral icterus.   Cardiovascular: Normal rate and regular rhythm.   Murmur heard.  Pulmonary/Chest: Effort normal and breath sounds normal. No respiratory distress. He has no wheezes.   Neurological: He is alert and oriented to person, place, and time.   Skin: Skin is warm and dry. Capillary refill takes less than 2 seconds. He is not diaphoretic.           Assessment:       1. Essential hypertension    2. Other specified hypothyroidism    3. Vitamin D deficiency    4. Prediabetes    5. Screening for eye condition    6. Prostate cancer screening         Plan:           Essential hypertension  -     Comprehensive metabolic panel; Future; Expected date: 01/31/2019  -     Lipid panel; Future; Expected date: 01/31/2019  Well controlled would like to get results from the cardiologist, to find out if he has had an echo and the results.    Other specified hypothyroidism  -     TSH; Future; Expected date: 01/31/2019  If his TSH normal for second lab will no longer consider him as having hypothyroidism.    Vitamin D deficiency  -     Vitamin D; Future; Expected date: 01/31/2019  Currently taking Vit D, will screen to make sure replacement is working.    Prediabetes  -     Comprehensive metabolic panel; Future; Expected date: 01/31/2019  Pt with fasting  at last check pt may need to start medications if >126.    Screening for eye condition  -     Ambulatory referral to Ophthalmology    Prostate cancer screening  -     PSA, Screening; Future; Expected date: 01/31/2019

## 2019-02-26 ENCOUNTER — TELEPHONE (OUTPATIENT)
Dept: PULMONOLOGY | Facility: CLINIC | Age: 74
End: 2019-02-26

## 2019-02-26 NOTE — TELEPHONE ENCOUNTER
Compliance report reviewed with patient. He is 100% compliant with his CPAP machine.  His AHI is 2.7.  He sleeps a minimum of 6 hours and 49 minutes a night on his machine.  He feels great benefit from the machine, feeling more well rested.

## 2019-04-02 DIAGNOSIS — I10 ESSENTIAL HYPERTENSION: ICD-10-CM

## 2019-04-02 RX ORDER — DOXAZOSIN 8 MG/1
TABLET ORAL
Qty: 90 TABLET | Refills: 1 | Status: SHIPPED | OUTPATIENT
Start: 2019-04-02 | End: 2019-10-14 | Stop reason: SDUPTHER

## 2019-04-02 RX ORDER — METOPROLOL SUCCINATE 50 MG/1
TABLET, EXTENDED RELEASE ORAL
Qty: 90 TABLET | Refills: 1 | Status: SHIPPED | OUTPATIENT
Start: 2019-04-02 | End: 2019-10-14 | Stop reason: SDUPTHER

## 2019-04-02 RX ORDER — LISINOPRIL 20 MG/1
TABLET ORAL
Qty: 180 TABLET | Refills: 1 | Status: SHIPPED | OUTPATIENT
Start: 2019-04-02 | End: 2020-09-10 | Stop reason: SDUPTHER

## 2019-04-30 ENCOUNTER — OFFICE VISIT (OUTPATIENT)
Dept: FAMILY MEDICINE | Facility: CLINIC | Age: 74
End: 2019-04-30
Payer: MEDICARE

## 2019-04-30 ENCOUNTER — PATIENT MESSAGE (OUTPATIENT)
Dept: FAMILY MEDICINE | Facility: CLINIC | Age: 74
End: 2019-04-30

## 2019-04-30 VITALS
TEMPERATURE: 98 F | OXYGEN SATURATION: 98 % | RESPIRATION RATE: 16 BRPM | DIASTOLIC BLOOD PRESSURE: 74 MMHG | SYSTOLIC BLOOD PRESSURE: 136 MMHG | HEIGHT: 68 IN | BODY MASS INDEX: 35.34 KG/M2 | HEART RATE: 56 BPM | WEIGHT: 233.19 LBS

## 2019-04-30 DIAGNOSIS — I10 ESSENTIAL HYPERTENSION: Primary | ICD-10-CM

## 2019-04-30 PROCEDURE — 99213 OFFICE O/P EST LOW 20 MIN: CPT | Mod: ,,, | Performed by: FAMILY MEDICINE

## 2019-04-30 PROCEDURE — 99213 PR OFFICE/OUTPT VISIT, EST, LEVL III, 20-29 MIN: ICD-10-PCS | Mod: ,,, | Performed by: FAMILY MEDICINE

## 2019-04-30 RX ORDER — NITROGLYCERIN 0.4 MG/1
0.4 TABLET SUBLINGUAL EVERY 5 MIN PRN
Qty: 30 TABLET | Refills: 1 | Status: SHIPPED | OUTPATIENT
Start: 2019-04-30 | End: 2019-07-30 | Stop reason: SDUPTHER

## 2019-04-30 NOTE — PROGRESS NOTES
SUBJECTIVE:    Patient ID: Tong Ford is a 73 y.o. male.    Chief Complaint: Hypertension    74 yo male presents to [facility] to follow up on his HTN, he is follow by Dr dowd for his heart disease, and has a follow up appt in June, he has been using his CPAP for > 4 hours a night.  His feet swelling has improved.    Hypertension   This is a chronic problem. The current episode started more than 1 year ago. The problem is unchanged. The problem is controlled. Associated symptoms include peripheral edema. Pertinent negatives include no anxiety, blurred vision, chest pain, headaches, malaise/fatigue, neck pain, orthopnea, palpitations, PND, shortness of breath or sweats. There are no associated agents to hypertension. Risk factors for coronary artery disease include dyslipidemia, male gender, obesity and sedentary lifestyle. Past treatments include alpha 1 blockers, calcium channel blockers and diuretics. The current treatment provides moderate improvement. Compliance problems include diet and exercise.          Past Medical History:   Diagnosis Date    Arthritis     Cataract     Coronary artery disease     Encounter for blood transfusion     Hypercholesterolemia     Hypertension      Social History     Socioeconomic History    Marital status:      Spouse name: Not on file    Number of children: Not on file    Years of education: Not on file    Highest education level: Not on file   Occupational History    Occupation: professor   Social Needs    Financial resource strain: Not on file    Food insecurity:     Worry: Not on file     Inability: Not on file    Transportation needs:     Medical: Not on file     Non-medical: Not on file   Tobacco Use    Smoking status: Never Smoker    Smokeless tobacco: Never Used   Substance and Sexual Activity    Alcohol use: Yes     Comment: rare social occasions    Drug use: No    Sexual activity: Never   Lifestyle    Physical activity:     Days per week: Not  on file     Minutes per session: Not on file    Stress: Only a little   Relationships    Social connections:     Talks on phone: Not on file     Gets together: Not on file     Attends [employer] service: Not on file     Active member of club or organization: Not on file     Attends meetings of clubs or organizations: Not on file     Relationship status: Not on file   Other Topics Concern    Not on file   Social History Narrative    Not on file     Past Surgical History:   Procedure Laterality Date    CORONARY ARTERY BYPASS GRAFT      EYE SURGERY      VASECTOMY       Family History   Problem Relation Age of Onset    Cancer Mother     Cancer Father     Hypertension Father     Cancer Maternal Grandmother     COPD Paternal Grandmother      Current Outpatient Medications   Medication Sig Dispense Refill    amLODIPine (NORVASC) 10 MG tablet 1 tablet once daily.      ascorbic acid, vitamin C, (VITAMIN C) 1000 MG tablet Take 1,000 mg by mouth once daily.      aspirin 81 MG Chew Take 81 mg by mouth once daily.      clopidogrel (PLAVIX) 75 mg tablet Take 1 tablet (75 mg total) by mouth once daily. 90 tablet 1    coQ10, ubiquinol, 100 mg Cap Take 150 mg by mouth 2 (two) times daily.      doxazosin (CARDURA) 8 MG Tab TAKE 1 TABLET DAILY 90 tablet 1    ergocalciferol, vitamin D2, (VITAMIN D ORAL) Take 500 Units by mouth Daily.      furosemide (LASIX) 40 MG tablet Take 1 tablet (40 mg total) by mouth 2 (two) times daily. for 1 day 180 tablet 1    garlic 1,000 mg Cap Take by mouth.      krill-om-3-dha-epa-phospho-ast (KRILL OIL) 1,943-575-40-80 mg Cap Take by mouth.      lisinopril (PRINIVIL,ZESTRIL) 20 MG tablet TAKE 1 TABLET TWICE A  tablet 1    magnesium 200 mg Tab Take by mouth once.      metoprolol succinate (TOPROL-XL) 50 MG 24 hr tablet TAKE 1 TABLET DAILY 90 tablet 1    nitroGLYCERIN (NITROSTAT) 0.4 MG SL tablet Place 1 tablet (0.4 mg total) under the tongue every 5 (five) minutes as needed  "for Chest pain. 30 tablet 1    RANEXA 500 mg Tb12 1 tablet once daily.      UNABLE TO FIND R-Alpha Lipoic Acid 150mg      UNABLE TO FIND Oat straw extract,passion flower, and skullcap root extract      UNABLE TO FIND Arterin      UNABLE TO FIND Niachinamide 250mg      UNABLE TO FIND Blood sugar ultra (supplement)       No current facility-administered medications for this visit.      Review of patient's allergies indicates:  No Known Allergies    Review of Systems   Constitutional: Negative for activity change, appetite change, diaphoresis, fatigue, fever, malaise/fatigue and unexpected weight change.   HENT: Negative for congestion, rhinorrhea, sinus pressure, sinus pain and sneezing.    Eyes: Negative for blurred vision.   Respiratory: Negative for cough, chest tightness, shortness of breath and wheezing.    Cardiovascular: Positive for leg swelling. Negative for chest pain, palpitations, orthopnea and PND.   Gastrointestinal: Negative for constipation, diarrhea, nausea and vomiting.   Musculoskeletal: Negative for neck pain.   Neurological: Negative for headaches.          Blood pressure 136/74, pulse (!) 56, temperature 97.7 °F (36.5 °C), temperature source Oral, resp. rate 16, height 5' 8" (1.727 m), weight 105.8 kg (233 lb 3.2 oz), SpO2 98 %. Body mass index is 35.46 kg/m².   Objective:      Physical Exam   Constitutional: He is oriented to person, place, and time. He appears well-developed and well-nourished. No distress.   HENT:   Head: Normocephalic and atraumatic.   Right Ear: External ear normal.   Left Ear: External ear normal.   Mouth/Throat: Oropharynx is clear and moist.   Eyes: Pupils are equal, round, and reactive to light. Conjunctivae are normal. Right eye exhibits no discharge. Left eye exhibits no discharge.   Cardiovascular: Normal rate, regular rhythm and normal heart sounds.   No murmur heard.  Pulmonary/Chest: Effort normal and breath sounds normal. No respiratory distress. He has no " wheezes.   Neurological: He is alert and oriented to person, place, and time.   Skin: Skin is warm and dry. Capillary refill takes less than 2 seconds. He is not diaphoretic. No pallor.           Assessment:       1. Essential hypertension         Plan:           Essential hypertension  -     nitroGLYCERIN (NITROSTAT) 0.4 MG SL tablet; Place 1 tablet (0.4 mg total) under the tongue every 5 (five) minutes as needed for Chest pain.  Dispense: 30 tablet; Refill: 1  -     Lipid panel; Future; Expected date: 04/30/2019  -     Comprehensive metabolic panel; Future; Expected date: 04/30/2019      Pt has follow up in 2 months with cardiology, he is currently on lasix daily, I have asked him to attempt to restrict his sodium as much as possible, and to increase his physical activity. Pt has had increasing Creatinine which I suspect this to be secondary to the lasix and lisinopril, I have asked that he bring this to the attention of his cardiologist.  Will refill his Nitro and repeat blood work before his next follow up.

## 2019-05-29 ENCOUNTER — TELEPHONE (OUTPATIENT)
Dept: FAMILY MEDICINE | Facility: CLINIC | Age: 74
End: 2019-05-29

## 2019-05-29 NOTE — TELEPHONE ENCOUNTER
Unable to speak with patient in regards about his colonoscopy. The phone was turned off. Left a message asking for a return call in regards to see if he plans on getting it, wants it or is refusing it.

## 2019-06-04 ENCOUNTER — TELEPHONE (OUTPATIENT)
Dept: FAMILY MEDICINE | Facility: CLINIC | Age: 74
End: 2019-06-04

## 2019-06-10 ENCOUNTER — TELEPHONE (OUTPATIENT)
Dept: PULMONOLOGY | Facility: CLINIC | Age: 74
End: 2019-06-10

## 2019-06-10 ENCOUNTER — PATIENT MESSAGE (OUTPATIENT)
Dept: FAMILY MEDICINE | Facility: CLINIC | Age: 74
End: 2019-06-10

## 2019-06-10 NOTE — TELEPHONE ENCOUNTER
Compliance report shows that patient is 100% compliant with his CPAP.  He sleeps an average of 5 hours and 21 minutes. AHI is 3.5

## 2019-07-08 ENCOUNTER — OFFICE VISIT (OUTPATIENT)
Dept: PULMONOLOGY | Facility: CLINIC | Age: 74
End: 2019-07-08
Payer: MEDICARE

## 2019-07-08 VITALS
OXYGEN SATURATION: 96 % | WEIGHT: 228 LBS | DIASTOLIC BLOOD PRESSURE: 90 MMHG | HEIGHT: 68 IN | SYSTOLIC BLOOD PRESSURE: 160 MMHG | HEART RATE: 59 BPM | BODY MASS INDEX: 34.56 KG/M2

## 2019-07-08 DIAGNOSIS — G47.33 OSA (OBSTRUCTIVE SLEEP APNEA): Primary | ICD-10-CM

## 2019-07-08 PROCEDURE — 99214 PR OFFICE/OUTPT VISIT, EST, LEVL IV, 30-39 MIN: ICD-10-PCS | Mod: ,,, | Performed by: NURSE PRACTITIONER

## 2019-07-08 PROCEDURE — 99214 OFFICE O/P EST MOD 30 MIN: CPT | Mod: ,,, | Performed by: NURSE PRACTITIONER

## 2019-07-08 NOTE — PATIENT INSTRUCTIONS
Obstructive Sleep Apnea  Obstructive sleep apnea is a condition that causes your air passages to become narrowed or blocked during sleep. As a result, breathing stops for short periods. Your body wakes up enough for breathing to begin again, though you don't remember it. The cycle of stopped breathing and brief awakenings can repeat dozens of times a night. This prevents the body from getting to the deeper stages of sleep that are needed for good rest and may cause your body's oxygen level to fall.  Signs of sleep apnea include loud snoring, noisy breathing, and gasping sounds during sleep. Daytime symptoms include waking up tired after a full night's sleep, waking up with headaches, feeling very sleepy or falling asleep during the day, and having problems with memory or concentration.  Risk factors for sleep apnea include:  · Being overweight  · Being a man, or a woman in menopause  · Smoking  · Using alcohol or sedating medicines  · Having enlarged structures in the nose or throat  Home care  Lifestyle changes that can help treat snoring and sleep apnea include the following:  · If you are overweight, lose weight. Talk to your healthcare provider about a weight-loss plan for you.  · Avoid alcohol for 3 to 4 hours before bedtime. Avoid sedating medications. Ask your healthcare provider about the medicines you take.  · If you smoke, talk to your healthcare provider about ways to quit.  · Sleep on your side. This can help prevent gravity from pulling relaxed throat tissues into your breathing passages.  · If you have allergies or sinus problems that block your nose, ask your healthcare provider for help.  Follow-up care  Follow up with your healthcare provider, or as advised. A diagnosis of sleep apnea is made with a sleep study. Your healthcare provider can tell you more about this test.  When to seek medical advice  Sleep apnea can make you more likely to have certain health problems. These include high blood  pressure, heart attack, stroke, and sexual dysfunction. If you have sleep apnea, talk to your healthcare provider about the best treatments for you.  Date Last Reviewed: 4/1/2017  © 9029-6104 [employer]. [address] [place] [address], [place] [address]. All rights reserved. This information is not intended as a substitute for professional medical care. Always follow your healthcare professional's instructions.      Talk to your PCP about the Norvasc and the swelling to your feet  Keep sleeping on your CPAP whenever you sleep  Decrease your salt intake  Follow up in 6 months

## 2019-07-08 NOTE — PROGRESS NOTES
SUBJECTIVE:    Patient ID: Tong Ford is a 74 y.o. male.    Chief Complaint: Apnea    HPI.  Patient here today sleeping on his CPAP every night.  His compliance report shows that he is 100% compliant.  He sleeps an average of 5 hours and 22 minutes a night.  His AHI is 2.1.  He has noticed a difference when he wears it.  He feels better in the mornings after wearing it.  He is not waking up all night to urinate, swelling is better and his is not falling asleep all day.    Past Medical History:   Diagnosis Date    Arthritis     Cataract     Coronary artery disease     Encounter for blood transfusion     Hypercholesterolemia     Hypertension      Past Surgical History:   Procedure Laterality Date    CORONARY ARTERY BYPASS GRAFT      EYE SURGERY      VASECTOMY       Social History     Tobacco Use    Smoking status: Never Smoker    Smokeless tobacco: Never Used   Substance Use Topics    Alcohol use: Yes     Comment: rare social occasions     Family History   Problem Relation Age of Onset    Cancer Mother     Cancer Father     Hypertension Father     Cancer Maternal Grandmother     COPD Paternal Grandmother       Review of patient's allergies indicates:  No Known Allergies    Current Outpatient Medications   Medication Sig Dispense Refill    amLODIPine (NORVASC) 10 MG tablet 1 tablet once daily.      ascorbic acid, vitamin C, (VITAMIN C) 1000 MG tablet Take 1,000 mg by mouth once daily.      aspirin 81 MG Chew Take 81 mg by mouth once daily.      clopidogrel (PLAVIX) 75 mg tablet Take 1 tablet (75 mg total) by mouth once daily. 90 tablet 1    coQ10, ubiquinol, 100 mg Cap Take 150 mg by mouth 2 (two) times daily.      doxazosin (CARDURA) 8 MG Tab TAKE 1 TABLET DAILY 90 tablet 1    ergocalciferol, vitamin D2, (VITAMIN D ORAL) Take 500 Units by mouth Daily.      furosemide (LASIX) 40 MG tablet Take 1 tablet (40 mg total) by mouth 2 (two) times daily. for 1 day 180 tablet 1    garlic 1,000 mg  "Cap Take by mouth.      krill-om-3-dha-epa-phospho-ast (KRILL OIL) 1,777-099-89-80 mg Cap Take by mouth.      lisinopril (PRINIVIL,ZESTRIL) 20 MG tablet TAKE 1 TABLET TWICE A  tablet 1    magnesium 200 mg Tab Take by mouth once.      metoprolol succinate (TOPROL-XL) 50 MG 24 hr tablet TAKE 1 TABLET DAILY 90 tablet 1    nitroGLYCERIN (NITROSTAT) 0.4 MG SL tablet Place 1 tablet (0.4 mg total) under the tongue every 5 (five) minutes as needed for Chest pain. 30 tablet 1    UNABLE TO FIND R-Alpha Lipoic Acid 150mg      UNABLE TO FIND Oat straw extract,passion flower, and skullcap root extract      UNABLE TO FIND Arterin      UNABLE TO FIND Niachinamide 250mg      RANEXA 500 mg Tb12 1 tablet once daily.      UNABLE TO FIND Blood sugar ultra (supplement)       No current facility-administered medications for this visit.      Review of Systems  General: Feeling Well. He does still feel fatigued but not like he used to    Eyes: Wears glasses, has to.  ENT:  No sinusitis or pharyngitis.   Heart:: No chest pain or palpitations.  Lungs: shortness of breath with exertion   GI: No Nausea, vomiting, constipation, diarrhea, or reflux.  Gu: not waking up frequently during the night to urinate   Skin: no issues at present   Musculoskeletal: some arthritis noticed when outside working  Neuro: neuropathy to feet   Lymph: swelling to legs   Psych: No anxiety or depression.  Endo: weight gain     OBJECTIVE:      BP (!) 160/90 (BP Location: Left arm, Patient Position: Sitting, BP Method: Medium (Manual))   Pulse (!) 59   Ht 5' 8" (1.727 m)   Wt 103.4 kg (228 lb)   SpO2 96%   BMI 34.67 kg/m²     Physical Exam  GENERAL: Older patient in no distress.  HEENT: Pupils equal and reactive. Extraocular movements intact. Nose intact. Pharynx moist.   NECK: Supple.   HEART: Regular rate and rhythm. No murmur or gallop auscultated.  LUNGS: Clear to auscultation and percussion. Lung excursion symmetrical. No change in fremitus. " No adventitial noises.  ABDOMEN: Bowel sounds present. Non-tender, no masses palpated. Obese.  EXTREMITIES: Normal muscle tone and joint movement, no cyanosis or clubbing.   LYMPHATICS: 2+ pitting edema to legs    SKIN: Dry, intact, no lesions.   NEURO: Cranial nerves II-XII intact. Motor strength 5/5 bilaterally, upper and lower extremities.  PSYCH: Appropriate affect.    Assessment:       1. CESAR (obstructive sleep apnea)          Plan:       Talk to your PCP about the Norvasc and the swelling to your feet  Keep sleeping on your CPAP whenever you sleep  Decrease your salt intake  Follow up in 6 months

## 2019-07-23 ENCOUNTER — TELEPHONE (OUTPATIENT)
Dept: FAMILY MEDICINE | Facility: CLINIC | Age: 74
End: 2019-07-23

## 2019-07-24 ENCOUNTER — PATIENT MESSAGE (OUTPATIENT)
Dept: FAMILY MEDICINE | Facility: CLINIC | Age: 74
End: 2019-07-24

## 2019-07-25 LAB
ALBUMIN SERPL-MCNC: 3.9 G/DL (ref 3.1–4.7)
ALP SERPL-CCNC: 67 IU/L (ref 40–104)
ALT (SGPT): 18 IU/L (ref 3–33)
AST SERPL-CCNC: 20 IU/L (ref 10–40)
BILIRUB SERPL-MCNC: 1.2 MG/DL (ref 0.3–1)
BUN SERPL-MCNC: 15 MG/DL (ref 8–20)
CALCIUM SERPL-MCNC: 8.8 MG/DL (ref 7.7–10.4)
CHLORIDE: 104 MMOL/L (ref 98–110)
CO2 SERPL-SCNC: 26.2 MMOL/L (ref 22.8–31.6)
CREATININE: 1.15 MG/DL (ref 0.6–1.4)
GLUCOSE: 105 MG/DL (ref 70–99)
POTASSIUM SERPL-SCNC: 3.7 MMOL/L (ref 3.5–5)
PROT SERPL-MCNC: 6.9 G/DL (ref 6–8.2)
SODIUM: 138 MMOL/L (ref 134–144)

## 2019-07-30 ENCOUNTER — OFFICE VISIT (OUTPATIENT)
Dept: FAMILY MEDICINE | Facility: CLINIC | Age: 74
End: 2019-07-30
Payer: MEDICARE

## 2019-07-30 VITALS
TEMPERATURE: 97 F | BODY MASS INDEX: 34.68 KG/M2 | DIASTOLIC BLOOD PRESSURE: 82 MMHG | SYSTOLIC BLOOD PRESSURE: 134 MMHG | WEIGHT: 228.81 LBS | RESPIRATION RATE: 18 BRPM | HEART RATE: 53 BPM | OXYGEN SATURATION: 96 % | HEIGHT: 68 IN

## 2019-07-30 DIAGNOSIS — N40.0 BENIGN PROSTATIC HYPERPLASIA, UNSPECIFIED WHETHER LOWER URINARY TRACT SYMPTOMS PRESENT: ICD-10-CM

## 2019-07-30 DIAGNOSIS — R53.82 CHRONIC FATIGUE: ICD-10-CM

## 2019-07-30 DIAGNOSIS — I10 ESSENTIAL HYPERTENSION: Primary | ICD-10-CM

## 2019-07-30 PROCEDURE — 99999 PR PBB SHADOW E&M-EST. PATIENT-LVL V: CPT | Mod: PBBFAC,,, | Performed by: FAMILY MEDICINE

## 2019-07-30 PROCEDURE — 99999 PR PBB SHADOW E&M-EST. PATIENT-LVL V: ICD-10-PCS | Mod: PBBFAC,,, | Performed by: FAMILY MEDICINE

## 2019-07-30 PROCEDURE — 99213 PR OFFICE/OUTPT VISIT, EST, LEVL III, 20-29 MIN: ICD-10-PCS | Mod: S$PBB,,, | Performed by: FAMILY MEDICINE

## 2019-07-30 PROCEDURE — 99213 OFFICE O/P EST LOW 20 MIN: CPT | Mod: S$PBB,,, | Performed by: FAMILY MEDICINE

## 2019-07-30 PROCEDURE — 99215 OFFICE O/P EST HI 40 MIN: CPT | Mod: PBBFAC | Performed by: FAMILY MEDICINE

## 2019-07-30 RX ORDER — NITROGLYCERIN 0.4 MG/1
0.4 TABLET SUBLINGUAL EVERY 5 MIN PRN
Qty: 90 TABLET | Refills: 1 | Status: SHIPPED | OUTPATIENT
Start: 2019-07-30 | End: 2020-01-06

## 2019-07-30 RX ORDER — AMLODIPINE BESYLATE 5 MG/1
5 TABLET ORAL DAILY
Qty: 90 TABLET | Refills: 11 | Status: ON HOLD | OUTPATIENT
Start: 2019-07-30 | End: 2020-08-19 | Stop reason: HOSPADM

## 2019-07-30 RX ORDER — TAMSULOSIN HYDROCHLORIDE 0.4 MG/1
0.4 CAPSULE ORAL DAILY
Qty: 90 CAPSULE | Refills: 11 | Status: SHIPPED | OUTPATIENT
Start: 2019-07-30 | End: 2019-10-23

## 2019-07-30 NOTE — PROGRESS NOTES
SUBJECTIVE:    Patient ID: Tong Ford is a 74 y.o. male.    Chief Complaint: Hypertension  75 yo male with known CAD presents to clinic to follow up on his HTN and hyperlipidemia, pt has known CAD and is not currently on a statin secondary to patient prefrence. We have discussed the recommendations that show improved patient outcomes when started on cholesterol lowering medication are added to patient with known CAD, pt does not agree. Pt has recently followed up with Dr Carmona: he had a ECHO and a Intravenous Lexiscan. The results appear normal.      Benign Prostatic Hypertrophy   Irritative symptoms include frequency, nocturia and urgency. Obstructive symptoms include incomplete emptying and a weak stream. Obstructive symptoms do not include straining. Pertinent negatives include no dysuria, hematuria or hesitancy. Nothing aggravates the symptoms. Past treatments include nothing.   Hypertension   This is a chronic problem. The current episode started more than 1 year ago. The problem is unchanged. The problem is controlled. Pertinent negatives include no chest pain, palpitations or shortness of breath. There are no associated agents to hypertension. Risk factors for coronary artery disease include obesity, sedentary lifestyle, male gender and dyslipidemia. Past treatments include ACE inhibitors, alpha 1 blockers, beta blockers and calcium channel blockers. Compliance problems include exercise.            Glucose 70 - 99 mg/dL 105High     BUN, Bld 8 - 20 mg/dL 15    Creatinine 0.60 - 1.40 mg/dL 1.15    Calcium 7.7 - 10.4 mg/dL 8.8    Sodium 134 - 144 mmol/L 138    Potassium 3.5 - 5.0 mmol/L 3.7    Chloride 98 - 110 mmol/L 104    CO2 22.8 - 31.6 mmol/L 26.2    Albumin 3.1 - 4.7 g/dL 3.9    Total Bilirubin 0.3 - 1.0 mg/dL 1.2High     Alkaline Phosphatase 40 - 104 IU/L 67    Total Protein 6.0 - 8.2 g/dL 6.9    ALT (SGPT) 3 - 33 IU/L 18    AST 10 - 40 IU/L 20      Cholesterol                   231 H                        mg/dL       Triglycerides                 104                         mg/dL       HDL Cholesterol                39                        23-75  mg/dL       LDL Cholesterol               171 H                      0-100  mg/dL       VLDL Cholesterol               21                        12-27  mg/dL       Cholesterol Ratio            6.00    Past Medical History:   Diagnosis Date    Arthritis     Cataract     Coronary artery disease     Encounter for blood transfusion     Hypercholesterolemia     Hypertension      Social History     Socioeconomic History    Marital status:      Spouse name: Not on file    Number of children: Not on file    Years of education: Not on file    Highest education level: Not on file   Occupational History    Occupation: professor   Social Needs    Financial resource strain: Not on file    Food insecurity:     Worry: Not on file     Inability: Not on file    Transportation needs:     Medical: Not on file     Non-medical: Not on file   Tobacco Use    Smoking status: Never Smoker    Smokeless tobacco: Never Used   Substance and Sexual Activity    Alcohol use: Yes     Comment: rare social occasions    Drug use: No    Sexual activity: Never   Lifestyle    Physical activity:     Days per week: Not on file     Minutes per session: Not on file    Stress: Only a little   Relationships    Social connections:     Talks on phone: Not on file     Gets together: Not on file     Attends [employer]oration[employer] service: Not on file     Active member of club or organization: Not on file     Attends meetings of clubs or organizations: Not on file     Relationship status: Not on file   Other Topics Concern    Not on file   Social History Narrative    Not on file     Past Surgical History:   Procedure Laterality Date    CORONARY ARTERY BYPASS GRAFT      EYE SURGERY      VASECTOMY       Family History   Problem Relation Age of Onset    Cancer Mother     Cancer  Father     Hypertension Father     Cancer Maternal Grandmother     COPD Paternal Grandmother      Current Outpatient Medications   Medication Sig Dispense Refill    ascorbic acid, vitamin C, (VITAMIN C) 1000 MG tablet Take 1,000 mg by mouth once daily.      aspirin 81 MG Chew Take 81 mg by mouth once daily.      clopidogrel (PLAVIX) 75 mg tablet Take 1 tablet (75 mg total) by mouth once daily. 90 tablet 1    coQ10, ubiquinol, 100 mg Cap Take 150 mg by mouth 2 (two) times daily.      doxazosin (CARDURA) 8 MG Tab TAKE 1 TABLET DAILY 90 tablet 1    ergocalciferol, vitamin D2, (VITAMIN D ORAL) Take 500 Units by mouth Daily.      furosemide (LASIX) 40 MG tablet Take 1 tablet (40 mg total) by mouth 2 (two) times daily. for 1 day 180 tablet 1    garlic 1,000 mg Cap Take by mouth.      krill-om-3-dha-epa-phospho-ast (KRILL OIL) 1,589-315-88-80 mg Cap Take by mouth.      lisinopril (PRINIVIL,ZESTRIL) 20 MG tablet TAKE 1 TABLET TWICE A  tablet 1    metoprolol succinate (TOPROL-XL) 50 MG 24 hr tablet TAKE 1 TABLET DAILY 90 tablet 1    nitroGLYCERIN (NITROSTAT) 0.4 MG SL tablet Place 1 tablet (0.4 mg total) under the tongue every 5 (five) minutes as needed for Chest pain. 90 tablet 1    UNABLE TO FIND R-Alpha Lipoic Acid 150mg      UNABLE TO FIND Oat straw extract,passion flower, and skullcap root extract      UNABLE TO FIND Arterin      UNABLE TO FIND Niachinamide 250mg      UNABLE TO FIND Blood sugar ultra (supplement)      amLODIPine (NORVASC) 5 MG tablet Take 1 tablet (5 mg total) by mouth once daily. 90 tablet 11    tamsulosin (FLOMAX) 0.4 mg Cap Take 1 capsule (0.4 mg total) by mouth once daily. 90 capsule 11     No current facility-administered medications for this visit.      Review of patient's allergies indicates:  No Known Allergies    Review of Systems   Constitutional: Negative for activity change, appetite change, diaphoresis, fatigue, fever and unexpected weight change.   Respiratory:  "Negative for cough, chest tightness, shortness of breath and wheezing.    Cardiovascular: Positive for leg swelling. Negative for chest pain and palpitations.   Gastrointestinal: Negative for abdominal distention, constipation, diarrhea and rectal pain.   Genitourinary: Positive for frequency, incomplete emptying, nocturia and urgency. Negative for dysuria, flank pain, hematuria and hesitancy.          Blood pressure 134/82, pulse (!) 53, temperature 97.4 °F (36.3 °C), temperature source Oral, resp. rate 18, height 5' 8" (1.727 m), weight 103.8 kg (228 lb 12.8 oz), SpO2 96 %. Body mass index is 34.79 kg/m².   Objective:      Physical Exam   Constitutional: He is oriented to person, place, and time. He appears well-developed and well-nourished. No distress.   HENT:   Head: Normocephalic and atraumatic.   Right Ear: External ear normal.   Left Ear: External ear normal.   Mouth/Throat: Oropharynx is clear and moist. No oropharyngeal exudate.   Eyes: Pupils are equal, round, and reactive to light. Conjunctivae and EOM are normal. No scleral icterus.   Cardiovascular: Normal rate and regular rhythm.   Murmur (systolic murmur) heard.  Pulmonary/Chest: Effort normal and breath sounds normal. No respiratory distress. He has no wheezes.   Neurological: He is alert and oriented to person, place, and time.   Skin: Skin is warm and dry. Capillary refill takes less than 2 seconds. Rash noted. He is not diaphoretic.           Assessment:       1. Essential hypertension    2. Benign prostatic hyperplasia, unspecified whether lower urinary tract symptoms present    3. Chronic fatigue         Plan:           Essential hypertension  -     amLODIPine (NORVASC) 5 MG tablet; Take 1 tablet (5 mg total) by mouth once daily.  Dispense: 90 tablet; Refill: 11  -     nitroGLYCERIN (NITROSTAT) 0.4 MG SL tablet; Place 1 tablet (0.4 mg total) under the tongue every 5 (five) minutes as needed for Chest pain.  Dispense: 90 tablet; Refill: " 1    Benign prostatic hyperplasia, unspecified whether lower urinary tract symptoms present  -     tamsulosin (FLOMAX) 0.4 mg Cap; Take 1 capsule (0.4 mg total) by mouth once daily.  Dispense: 90 capsule; Refill: 11    Chronic fatigue  -     CBC auto differential; Future; Expected date: 07/30/2019

## 2019-08-07 ENCOUNTER — PATIENT MESSAGE (OUTPATIENT)
Dept: FAMILY MEDICINE | Facility: CLINIC | Age: 74
End: 2019-08-07

## 2019-08-09 ENCOUNTER — PATIENT MESSAGE (OUTPATIENT)
Dept: FAMILY MEDICINE | Facility: CLINIC | Age: 74
End: 2019-08-09

## 2019-08-09 NOTE — TELEPHONE ENCOUNTER
You can go back to the Amlodipine 10mg, I will ask you to see if your cardiologist has any suggestions.    If I am correct you are currently taking  Amlodipine 5mg once a day  Lisinopril 20 mg twice a day  Doxazosin 8mg a day  Lasix 40 mg a day  Metoprolol 50mg a day    And your blood pressure is still not controlled, your pulse is too low to increase the Metoprolol, You are on the max dose of Lisinopril, you are on the max dose of Amlodipine, So maybe Dr Carmona might have a suggestion.

## 2019-08-26 ENCOUNTER — LAB VISIT (OUTPATIENT)
Dept: LAB | Facility: HOSPITAL | Age: 74
End: 2019-08-26
Attending: FAMILY MEDICINE
Payer: MEDICARE

## 2019-08-26 DIAGNOSIS — R53.82 CHRONIC FATIGUE: ICD-10-CM

## 2019-08-26 LAB
BASOPHILS # BLD AUTO: 0.08 K/UL (ref 0–0.2)
BASOPHILS NFR BLD: 0.9 % (ref 0–1.9)
DIFFERENTIAL METHOD: ABNORMAL
EOSINOPHIL # BLD AUTO: 0.2 K/UL (ref 0–0.5)
EOSINOPHIL NFR BLD: 2.2 % (ref 0–8)
ERYTHROCYTE [DISTWIDTH] IN BLOOD BY AUTOMATED COUNT: 14.6 % (ref 11.5–14.5)
HCT VFR BLD AUTO: 41.5 % (ref 40–54)
HGB BLD-MCNC: 13.8 G/DL (ref 14–18)
IMM GRANULOCYTES # BLD AUTO: 0.04 K/UL (ref 0–0.04)
IMM GRANULOCYTES NFR BLD AUTO: 0.4 % (ref 0–0.5)
LYMPHOCYTES # BLD AUTO: 3 K/UL (ref 1–4.8)
LYMPHOCYTES NFR BLD: 33.3 % (ref 18–48)
MCH RBC QN AUTO: 32.5 PG (ref 27–31)
MCHC RBC AUTO-ENTMCNC: 33.3 G/DL (ref 32–36)
MCV RBC AUTO: 98 FL (ref 82–98)
MONOCYTES # BLD AUTO: 0.8 K/UL (ref 0.3–1)
MONOCYTES NFR BLD: 9.1 % (ref 4–15)
NEUTROPHILS # BLD AUTO: 4.9 K/UL (ref 1.8–7.7)
NEUTROPHILS NFR BLD: 54.1 % (ref 38–73)
NRBC BLD-RTO: 0 /100 WBC
PLATELET # BLD AUTO: 236 K/UL (ref 150–350)
PMV BLD AUTO: 11.1 FL (ref 9.2–12.9)
RBC # BLD AUTO: 4.24 M/UL (ref 4.6–6.2)
WBC # BLD AUTO: 9.05 K/UL (ref 3.9–12.7)

## 2019-08-26 PROCEDURE — 85025 COMPLETE CBC W/AUTO DIFF WBC: CPT

## 2019-08-26 PROCEDURE — 36415 COLL VENOUS BLD VENIPUNCTURE: CPT

## 2019-08-29 ENCOUNTER — OFFICE VISIT (OUTPATIENT)
Dept: FAMILY MEDICINE | Facility: CLINIC | Age: 74
End: 2019-08-29
Payer: MEDICARE

## 2019-08-29 VITALS
HEART RATE: 59 BPM | TEMPERATURE: 98 F | WEIGHT: 227.38 LBS | SYSTOLIC BLOOD PRESSURE: 120 MMHG | OXYGEN SATURATION: 96 % | BODY MASS INDEX: 34.46 KG/M2 | RESPIRATION RATE: 18 BRPM | DIASTOLIC BLOOD PRESSURE: 70 MMHG | HEIGHT: 68 IN

## 2019-08-29 DIAGNOSIS — Z01.818 PRE-OP EXAM: Primary | ICD-10-CM

## 2019-08-29 PROCEDURE — 99213 PR OFFICE/OUTPT VISIT, EST, LEVL III, 20-29 MIN: ICD-10-PCS | Mod: S$PBB,,, | Performed by: FAMILY MEDICINE

## 2019-08-29 PROCEDURE — 99999 PR PBB SHADOW E&M-EST. PATIENT-LVL IV: ICD-10-PCS | Mod: PBBFAC,,, | Performed by: FAMILY MEDICINE

## 2019-08-29 PROCEDURE — 99214 OFFICE O/P EST MOD 30 MIN: CPT | Mod: PBBFAC | Performed by: FAMILY MEDICINE

## 2019-08-29 PROCEDURE — 99999 PR PBB SHADOW E&M-EST. PATIENT-LVL IV: CPT | Mod: PBBFAC,,, | Performed by: FAMILY MEDICINE

## 2019-08-29 PROCEDURE — 99213 OFFICE O/P EST LOW 20 MIN: CPT | Mod: S$PBB,,, | Performed by: FAMILY MEDICINE

## 2019-08-29 NOTE — PROGRESS NOTES
Preop Note      SUBJECTIVE:         Patient Id: Tong Ford is a 74 y.o. male who presents to the office today for a preoperative consultation at the request of  who plans on performing cataract surgery on September 4. This consultation is requested for routine preoperative evaluation.    Chief Complaint: Pre-op Exam    No Chest Pain   Mild Lower extremity edema   No Dyspnea   No Hx Claudication   No Wheezing   Exercise tolerance (ability to perform 4 METS of Exercise)  Yes Obstructive Sleep Apnea on CPAP  No  Chronic Cough Upper respiratory symptoms in the last 2 weeks  No Bleeding Disorders (personal or Family History)    Blood thinner use (e.g. Aspirin, NSAIDS, Warfarin) Anemia History (or took Iron Supplementation)  Stopped Asprin and Plavix 23Aug  No Anesthesia problems in the past  Can walk a mile with out stopping on flat level ground  Pt has had surgery in the past without difficulties with anesthesia    ECHO EF 70%  Stress test Oct 2018  No wall motion abnromalities noted on rest and stress images.  Stress test negative for ischemia by EKG criteria  HPI    Review of Systems   Constitutional: Negative for activity change, appetite change, fatigue, fever and unexpected weight change.   HENT: Negative.    Eyes: Negative.    Respiratory: Negative for cough, chest tightness, shortness of breath and wheezing.    Cardiovascular: Negative for chest pain and palpitations.   Gastrointestinal: Negative.    Genitourinary: Negative for difficulty urinating, flank pain, frequency and urgency.       Past Medical History:   Diagnosis Date    Arthritis     Cataract     Coronary artery disease     Encounter for blood transfusion     Hypercholesterolemia     Hypertension       Past Surgical History:   Procedure Laterality Date    CORONARY ARTERY BYPASS GRAFT      EYE SURGERY      VASECTOMY         Family History   Problem Relation Age of Onset    Cancer Mother     Cancer Father     Hypertension Father      Cancer Maternal Grandmother     COPD Paternal Grandmother        Social History     Socioeconomic History    Marital status:      Spouse name: Not on file    Number of children: Not on file    Years of education: Not on file    Highest education level: Not on file   Occupational History    Occupation: professor   Social Needs    Financial resource strain: Not on file    Food insecurity:     Worry: Not on file     Inability: Not on file    Transportation needs:     Medical: Not on file     Non-medical: Not on file   Tobacco Use    Smoking status: Never Smoker    Smokeless tobacco: Never Used   Substance and Sexual Activity    Alcohol use: Yes     Comment: rare social occasions    Drug use: No    Sexual activity: Never   Lifestyle    Physical activity:     Days per week: Not on file     Minutes per session: Not on file    Stress: Only a little   Relationships    Social connections:     Talks on phone: Not on file     Gets together: Not on file     Attends Latter day service: Not on file     Active member of club or organization: Not on file     Attends meetings of clubs or organizations: Not on file     Relationship status: Not on file   Other Topics Concern    Not on file   Social History Narrative    Not on file       Current Outpatient Medications   Medication Sig Dispense Refill    amLODIPine (NORVASC) 5 MG tablet Take 1 tablet (5 mg total) by mouth once daily. 90 tablet 11    ascorbic acid, vitamin C, (VITAMIN C) 1000 MG tablet Take 1,000 mg by mouth once daily.      aspirin 81 MG Chew Take 81 mg by mouth once daily.      clopidogrel (PLAVIX) 75 mg tablet Take 1 tablet (75 mg total) by mouth once daily. 90 tablet 1    coQ10, ubiquinol, 100 mg Cap Take 150 mg by mouth 2 (two) times daily.      doxazosin (CARDURA) 8 MG Tab TAKE 1 TABLET DAILY 90 tablet 1    ergocalciferol, vitamin D2, (VITAMIN D ORAL) Take 500 Units by mouth Daily.      furosemide (LASIX) 40 MG tablet Take 1  "tablet (40 mg total) by mouth 2 (two) times daily. for 1 day 180 tablet 1    garlic 1,000 mg Cap Take by mouth.      krill-om-3-dha-epa-phospho-ast (KRILL OIL) 1,290-193-03-80 mg Cap Take by mouth.      lisinopril (PRINIVIL,ZESTRIL) 20 MG tablet TAKE 1 TABLET TWICE A  tablet 1    metoprolol succinate (TOPROL-XL) 50 MG 24 hr tablet TAKE 1 TABLET DAILY 90 tablet 1    nitroGLYCERIN (NITROSTAT) 0.4 MG SL tablet Place 1 tablet (0.4 mg total) under the tongue every 5 (five) minutes as needed for Chest pain. 90 tablet 1    tamsulosin (FLOMAX) 0.4 mg Cap Take 1 capsule (0.4 mg total) by mouth once daily. 90 capsule 11    UNABLE TO FIND R-Alpha Lipoic Acid 150mg      UNABLE TO FIND Oat straw extract,passion flower, and skullcap root extract      UNABLE TO FIND Arterin      UNABLE TO FIND Niachinamide 250mg      UNABLE TO FIND Blood sugar ultra (supplement)       No current facility-administered medications for this visit.        Review of patient's allergies indicates:  No Known Allergies        OBJECTIVE:     Vital Signs (Most Recent)  Temp: 97.6 °F (36.4 °C) (08/29/19 1332)  Pulse: (!) 59 (08/29/19 1332)  Resp: 18 (08/29/19 1332)  BP: 120/70 (08/29/19 1332)  SpO2: 96 % (08/29/19 1332)       Blood pressure 120/70, pulse (!) 59, temperature 97.6 °F (36.4 °C), temperature source Oral, resp. rate 18, height 5' 8" (1.727 m), weight 103.1 kg (227 lb 6.4 oz), SpO2 96 %. Body mass index is 34.58 kg/m².   Physical Exam   Constitutional: He is oriented to person, place, and time. He appears well-developed and well-nourished. No distress.   HENT:   Head: Normocephalic and atraumatic.   Right Ear: External ear normal.   Left Ear: External ear normal.   Mouth/Throat: Oropharynx is clear and moist.   Eyes: Pupils are equal, round, and reactive to light. Conjunctivae and EOM are normal. No scleral icterus.   Cardiovascular: Normal rate, regular rhythm and normal heart sounds.   No murmur heard.  Pulmonary/Chest: Effort " normal and breath sounds normal. No respiratory distress. He has no wheezes.   Neurological: He is alert and oriented to person, place, and time.   Skin: Skin is warm and dry. He is not diaphoretic.           Diagnostic Results:  ECG: Reviewed  Sinus bradycardia with a first degree AV block      ASSESSMENT/PLAN:     Pre-op exam  -     POCT EKG 12-LEAD (NOT FOR [facility] USE); Future    Pt with first degree av block and bradycardia, hx of CHF but normal EF on his last echo, pt has already stopped his anticoagulants. His BP is well controlled today but has had a hx of difficult to control Blood Pressure. Pt cleared for this low risk surgery.    Junior Score      HISTORY  Age >70 years (5 points)  Myocardial infarction within 6 months (10 points).    CARDIAC EXAM  Signs of CHF: ventricular gallop or JVD (11 points)  Significant aortic stenosis (3 points)    EKG  Arrhythmia other than sinus or premature atrial contractions (7 points)  5 or more PVCs per minute (7 points)    GENERAL MED CONDITIONS  PO2 <60 mmHg; PCO2 >50 mmHg; K <3 mEq/L; HCO3 <20 mEq/L; BUN >50 mg/dL; Creatinine >3 mg/dL; elevated SGOT; chronic liver disease; bedridden (3 points)    OPERATION  Emergency (4 points)  Intraperitoneal, intrathoracic or aortic (3 points)        0 to 5 Points: Class I 1% Complications   6 to 12 Points: Class II 7% Complications   13 to 25 Points: Class III 14% Complications   26 to 53 Points: Class IV 78% Complications     Junior Score:       Surgery-Related Predictors for Risk of Perioperative Cardiac Complications  High risk   Emergency surgery   Anticipated increased blood loss   Aortic or peripheral vascular surgery     Intermediate risk   Abdominal or thoracic surgery   Head and neck surgery   Carotid endarterectomy   Orthopedic surgery   Prostate surgery     Low risk   Breast surgery   Cataract surgery   Superficial surgery   Endoscopy         Summary of Recommended Preoperative Laboratory Tests Depending on the History  and Physical Findings  Condition Indicated testing and other measures*   Healthy patient    ? 40 years Hemoglobin, urine screening for pregnancy in women of childbearing potential    > 40 years Add ECG and blood glucose (age ? 45 years)   Cardiovascular disease ECG, chest radiographs, hemoglobin, electrolytes, BUN, creatinine, glucose (age ? 45 years or history of diabetes)    Recent MI (?6 weeks), unstable angina, decompensated CHF, significant arrhythmias, severe valvular disease Cardiology consultation    Previous MI (> 6 weeks ago), mild stable angina, compensated CHF, diabetes mellitus Stress test if high-risk procedure or patient has low functional capacity; consider assessment of left ventricular function (i.e., echocardiography)    Rhythm other than normal sinus rhythm, abnormal ECG, history of stroke, advanced age, low functional capacity Stress test if high-risk procedure and patient has low functional capacity   Pulmonary disease Chest radiographs, hemoglobin, glucose (age ? 45 years), ECG (age > 40 years); provide patient with instructions for incentive spirometry or deep-breathing exercises    Asthma Pulmonary function testing or peak flow rate to assess disease status    COPD Consider pulmonary function testing and arterial blood gas analysis for assessment of disease severity    Cough Evaluate for etiology    Dyspnea Evaluate for etiology    Smoking  patient to stop smoking 4 to 8 weeks before surgery   Obesity Provide patient with instructions for incentive spirometry or deep-breathing exercises   Abdominal or thoracic surgery Provide patient with instructions for incentive spirometry or deep-breathing exercises   Malnutrition Laboratory tests based on primary disease, plus albumin and lymphocyte count; if malnutrition is severe, consider postponing surgery and providing preoperative supplementation          As with all procedures, there is inherent risk of multiple complications including  those related to bleeding, infectious, cardiac, and pulmonary    Please stop Aspirin and NSAIDs one week prior to surgery.      All smokers should quit smoking eight or more weeks prior to procedure to minimize complications associated with smoking. Reduction or cessation of smoking for less than four to eight weeks before surgery is of questionable benefit, and has actually been shown in some studies to result in higher complication rates.  Alcohol should be used in moderation.  Any pt with cardiopulmonary disease may warrant repeat examination prior to procedure along with directions for deep breathing exercises and incentive spirometry.    Patients at high risk for complications usually warrant cardiology consultation and possibly angiography. Cardiac stress testing should be performed in patients at intermediate risk and with poor functional capacity or who are undergoing high-risk procedures, such as vascular surgery. For patients with minor clinical predictors, only patients who have poor functional capacity and are undergoing a high-risk procedure require stress testing. Patients with positive stress test results warrant cardiology consultation before proceeding with surgery.    Predisposing risk factors for pulmonary complications include cough, dyspnea, smoking, a history of lung disease, obesity and abdominal or thoracic surgery.    Any pts > 70 years old may be at risk for delirium or cardiac complications.  Furthermore, diabetic patients may be at risk for infection or prolonged healing.      Preop Evaluation      8/29/2019 Kris Zavala MD  1:41 PM

## 2019-09-05 ENCOUNTER — PATIENT MESSAGE (OUTPATIENT)
Dept: FAMILY MEDICINE | Facility: CLINIC | Age: 74
End: 2019-09-05

## 2019-10-14 DIAGNOSIS — I10 ESSENTIAL HYPERTENSION: ICD-10-CM

## 2019-10-14 DIAGNOSIS — R60.9 EDEMA, UNSPECIFIED TYPE: ICD-10-CM

## 2019-10-18 ENCOUNTER — TELEPHONE (OUTPATIENT)
Dept: FAMILY MEDICINE | Facility: CLINIC | Age: 74
End: 2019-10-18

## 2019-10-18 ENCOUNTER — PATIENT MESSAGE (OUTPATIENT)
Dept: FAMILY MEDICINE | Facility: CLINIC | Age: 74
End: 2019-10-18

## 2019-10-23 RX ORDER — FUROSEMIDE 40 MG/1
TABLET ORAL
Qty: 180 TABLET | Refills: 4 | Status: SHIPPED | OUTPATIENT
Start: 2019-10-23 | End: 2020-10-27

## 2019-10-23 RX ORDER — METOPROLOL SUCCINATE 50 MG/1
TABLET, EXTENDED RELEASE ORAL
Qty: 90 TABLET | Refills: 4 | Status: ON HOLD | OUTPATIENT
Start: 2019-10-23 | End: 2021-01-29 | Stop reason: SDUPTHER

## 2019-10-23 RX ORDER — DOXAZOSIN 8 MG/1
TABLET ORAL
Qty: 90 TABLET | Refills: 4 | Status: ON HOLD | OUTPATIENT
Start: 2019-10-23 | End: 2021-04-02 | Stop reason: HOSPADM

## 2019-10-23 RX ORDER — CLOPIDOGREL BISULFATE 75 MG/1
TABLET ORAL
Qty: 90 TABLET | Refills: 4 | Status: ON HOLD | OUTPATIENT
Start: 2019-10-23 | End: 2020-08-19 | Stop reason: HOSPADM

## 2019-11-19 ENCOUNTER — PATIENT MESSAGE (OUTPATIENT)
Dept: PULMONOLOGY | Facility: CLINIC | Age: 74
End: 2019-11-19

## 2019-11-19 ENCOUNTER — TELEPHONE (OUTPATIENT)
Dept: PULMONOLOGY | Facility: CLINIC | Age: 74
End: 2019-11-19

## 2019-11-19 NOTE — TELEPHONE ENCOUNTER
----- Message from Zuleyka Shanks sent at 11/19/2019  9:50 AM CST -----  Going to [place]. He uses a Cpap and he can't fit in his carry on. Wants to know how bad it will mess him up with medicare if he does not use it for couple weeks. Please call

## 2020-01-06 DIAGNOSIS — I10 ESSENTIAL HYPERTENSION: ICD-10-CM

## 2020-01-06 RX ORDER — NITROGLYCERIN 0.4 MG/1
TABLET SUBLINGUAL
Qty: 75 TABLET | Refills: 15 | Status: SHIPPED | OUTPATIENT
Start: 2020-01-06 | End: 2021-03-02

## 2020-01-07 ENCOUNTER — TELEPHONE (OUTPATIENT)
Dept: FAMILY MEDICINE | Facility: CLINIC | Age: 75
End: 2020-01-07

## 2020-01-07 DIAGNOSIS — I10 ESSENTIAL HYPERTENSION: Primary | ICD-10-CM

## 2020-01-07 DIAGNOSIS — E03.8 OTHER SPECIFIED HYPOTHYROIDISM: ICD-10-CM

## 2020-01-07 DIAGNOSIS — R73.03 PREDIABETES: ICD-10-CM

## 2020-01-07 DIAGNOSIS — I25.10 CORONARY ARTERY DISEASE, ANGINA PRESENCE UNSPECIFIED, UNSPECIFIED VESSEL OR LESION TYPE, UNSPECIFIED WHETHER NATIVE OR TRANSPLANTED HEART: ICD-10-CM

## 2020-01-07 NOTE — TELEPHONE ENCOUNTER
----- Message from Jane Chery sent at 1/7/2020 11:18 AM CST -----  Regarding: Lab Orders prior to next appt  Contact: [phone]  Please send lab orders to the following lab prior to the PTs next appointment....    Lab: [facility]    Next Appt: 1/15

## 2020-01-07 NOTE — TELEPHONE ENCOUNTER
----- Message from Jane Chery sent at 1/7/2020 11:18 AM CST -----  Regarding: Lab Orders prior to next appt  Contact: [phone]  Please send lab orders to the following lab prior to the PTs next appointment....    Lab: [facility]    Next Appt: 1/15

## 2020-01-10 ENCOUNTER — LAB VISIT (OUTPATIENT)
Dept: LAB | Facility: HOSPITAL | Age: 75
End: 2020-01-10
Attending: FAMILY MEDICINE
Payer: MEDICARE

## 2020-01-10 DIAGNOSIS — I25.10 CORONARY ARTERY DISEASE, ANGINA PRESENCE UNSPECIFIED, UNSPECIFIED VESSEL OR LESION TYPE, UNSPECIFIED WHETHER NATIVE OR TRANSPLANTED HEART: ICD-10-CM

## 2020-01-10 DIAGNOSIS — E03.8 OTHER SPECIFIED HYPOTHYROIDISM: ICD-10-CM

## 2020-01-10 DIAGNOSIS — I10 ESSENTIAL HYPERTENSION: ICD-10-CM

## 2020-01-10 LAB
ALBUMIN SERPL BCP-MCNC: 3.7 G/DL (ref 3.5–5.2)
ALP SERPL-CCNC: 68 U/L (ref 55–135)
ALT SERPL W/O P-5'-P-CCNC: 18 U/L (ref 10–44)
ANION GAP SERPL CALC-SCNC: 9 MMOL/L (ref 8–16)
AST SERPL-CCNC: 21 U/L (ref 10–40)
BASOPHILS # BLD AUTO: 0.08 K/UL (ref 0–0.2)
BASOPHILS NFR BLD: 0.9 % (ref 0–1.9)
BILIRUB SERPL-MCNC: 1.2 MG/DL (ref 0.1–1)
BUN SERPL-MCNC: 17 MG/DL (ref 8–23)
CALCIUM SERPL-MCNC: 9.1 MG/DL (ref 8.7–10.5)
CHLORIDE SERPL-SCNC: 103 MMOL/L (ref 95–110)
CHOLEST SERPL-MCNC: 274 MG/DL (ref 120–199)
CHOLEST/HDLC SERPL: 6.1 {RATIO} (ref 2–5)
CO2 SERPL-SCNC: 28 MMOL/L (ref 23–29)
CREAT SERPL-MCNC: 1.1 MG/DL (ref 0.5–1.4)
DIFFERENTIAL METHOD: ABNORMAL
EOSINOPHIL # BLD AUTO: 0.4 K/UL (ref 0–0.5)
EOSINOPHIL NFR BLD: 4.4 % (ref 0–8)
ERYTHROCYTE [DISTWIDTH] IN BLOOD BY AUTOMATED COUNT: 14.9 % (ref 11.5–14.5)
EST. GFR  (AFRICAN AMERICAN): >60 ML/MIN/1.73 M^2
EST. GFR  (NON AFRICAN AMERICAN): >60 ML/MIN/1.73 M^2
GLUCOSE SERPL-MCNC: 109 MG/DL (ref 70–110)
HCT VFR BLD AUTO: 41.5 % (ref 40–54)
HDLC SERPL-MCNC: 45 MG/DL (ref 40–75)
HDLC SERPL: 16.4 % (ref 20–50)
HGB BLD-MCNC: 13.6 G/DL (ref 14–18)
IMM GRANULOCYTES # BLD AUTO: 0.03 K/UL (ref 0–0.04)
IMM GRANULOCYTES NFR BLD AUTO: 0.3 % (ref 0–0.5)
LDLC SERPL CALC-MCNC: 210 MG/DL (ref 63–159)
LYMPHOCYTES # BLD AUTO: 3 K/UL (ref 1–4.8)
LYMPHOCYTES NFR BLD: 32.1 % (ref 18–48)
MCH RBC QN AUTO: 32.2 PG (ref 27–31)
MCHC RBC AUTO-ENTMCNC: 32.8 G/DL (ref 32–36)
MCV RBC AUTO: 98 FL (ref 82–98)
MONOCYTES # BLD AUTO: 0.9 K/UL (ref 0.3–1)
MONOCYTES NFR BLD: 9.9 % (ref 4–15)
NEUTROPHILS # BLD AUTO: 4.8 K/UL (ref 1.8–7.7)
NEUTROPHILS NFR BLD: 52.4 % (ref 38–73)
NONHDLC SERPL-MCNC: 229 MG/DL
NRBC BLD-RTO: 0 /100 WBC
PLATELET # BLD AUTO: 249 K/UL (ref 150–350)
PMV BLD AUTO: 10.7 FL (ref 9.2–12.9)
POTASSIUM SERPL-SCNC: 4.1 MMOL/L (ref 3.5–5.1)
PROT SERPL-MCNC: 7.1 G/DL (ref 6–8.4)
RBC # BLD AUTO: 4.22 M/UL (ref 4.6–6.2)
SODIUM SERPL-SCNC: 140 MMOL/L (ref 136–145)
TRIGL SERPL-MCNC: 95 MG/DL (ref 30–150)
TSH SERPL DL<=0.005 MIU/L-ACNC: 4.58 UIU/ML (ref 0.34–5.6)
WBC # BLD AUTO: 9.19 K/UL (ref 3.9–12.7)

## 2020-01-10 PROCEDURE — 80053 COMPREHEN METABOLIC PANEL: CPT

## 2020-01-10 PROCEDURE — 80061 LIPID PANEL: CPT

## 2020-01-10 PROCEDURE — 36415 COLL VENOUS BLD VENIPUNCTURE: CPT

## 2020-01-10 PROCEDURE — 85025 COMPLETE CBC W/AUTO DIFF WBC: CPT

## 2020-01-10 PROCEDURE — 84443 ASSAY THYROID STIM HORMONE: CPT

## 2020-01-15 ENCOUNTER — OFFICE VISIT (OUTPATIENT)
Dept: PULMONOLOGY | Facility: CLINIC | Age: 75
End: 2020-01-15
Payer: MEDICARE

## 2020-01-15 VITALS
SYSTOLIC BLOOD PRESSURE: 130 MMHG | DIASTOLIC BLOOD PRESSURE: 89 MMHG | HEART RATE: 54 BPM | OXYGEN SATURATION: 97 % | BODY MASS INDEX: 35.43 KG/M2 | WEIGHT: 233 LBS

## 2020-01-15 DIAGNOSIS — G47.33 OSA (OBSTRUCTIVE SLEEP APNEA): Primary | ICD-10-CM

## 2020-01-15 PROCEDURE — 1126F PR PAIN SEVERITY QUANTIFIED, NO PAIN PRESENT: ICD-10-PCS | Mod: S$GLB,,, | Performed by: NURSE PRACTITIONER

## 2020-01-15 PROCEDURE — 99214 OFFICE O/P EST MOD 30 MIN: CPT | Mod: S$GLB,,, | Performed by: NURSE PRACTITIONER

## 2020-01-15 PROCEDURE — 1159F MED LIST DOCD IN RCRD: CPT | Mod: S$GLB,,, | Performed by: NURSE PRACTITIONER

## 2020-01-15 PROCEDURE — 99214 PR OFFICE/OUTPT VISIT, EST, LEVL IV, 30-39 MIN: ICD-10-PCS | Mod: S$GLB,,, | Performed by: NURSE PRACTITIONER

## 2020-01-15 PROCEDURE — 1126F AMNT PAIN NOTED NONE PRSNT: CPT | Mod: S$GLB,,, | Performed by: NURSE PRACTITIONER

## 2020-01-15 PROCEDURE — 1159F PR MEDICATION LIST DOCUMENTED IN MEDICAL RECORD: ICD-10-PCS | Mod: S$GLB,,, | Performed by: NURSE PRACTITIONER

## 2020-01-15 NOTE — PROGRESS NOTES
SUBJECTIVE:    Patient ID: Tong Ford is a 74 y.o. male.    Chief Complaint: Apnea (6 month follow up )      Patient here today feeling well.  He is sleeping on his CPAP every night. He did go out of town for 2 weeks and did not bring it.  His compliance report shows he is 83% compliant, sleeps an average of 5 hours and 51 minutes on it.  His AHI is 4.2.  He does feel the benefit of sleeping on his machine.    Past Medical History:   Diagnosis Date    Arthritis     Cataract     Coronary artery disease     Encounter for blood transfusion     Hypercholesterolemia     Hypertension      Past Surgical History:   Procedure Laterality Date    CORONARY ARTERY BYPASS GRAFT      EYE SURGERY      VASECTOMY       Social History     Tobacco Use    Smoking status: Never Smoker    Smokeless tobacco: Never Used   Substance Use Topics    Alcohol use: Yes     Comment: rare social occasions     Family History   Problem Relation Age of Onset    Cancer Mother     Cancer Father     Hypertension Father     Cancer Maternal Grandmother     COPD Paternal Grandmother       Review of patient's allergies indicates:  No Known Allergies    Current Outpatient Medications   Medication Sig Dispense Refill    amLODIPine (NORVASC) 5 MG tablet Take 1 tablet (5 mg total) by mouth once daily. 90 tablet 11    ascorbic acid, vitamin C, (VITAMIN C) 1000 MG tablet Take 1,000 mg by mouth once daily.      aspirin 81 MG Chew Take 81 mg by mouth once daily.      clopidogrel (PLAVIX) 75 mg tablet TAKE 1 TABLET DAILY 90 tablet 4    coQ10, ubiquinol, 100 mg Cap Take 150 mg by mouth 2 (two) times daily.      doxazosin (CARDURA) 8 MG Tab TAKE 1 TABLET DAILY 90 tablet 4    ergocalciferol, vitamin D2, (VITAMIN D ORAL) Take 500 Units by mouth Daily.      garlic 1,000 mg Cap Take by mouth.      krill-om-3-dha-epa-phospho-ast (KRILL OIL) 1,359-995-84-80 mg Cap Take by mouth.      lisinopril (PRINIVIL,ZESTRIL) 20 MG tablet TAKE 1 TABLET TWICE  A DAY (Patient taking differently: 40 mg. ) 180 tablet 1    metoprolol succinate (TOPROL-XL) 50 MG 24 hr tablet TAKE 1 TABLET DAILY 90 tablet 4    nitroGLYCERIN (NITROSTAT) 0.4 MG SL tablet DISSOLVE 1 TABLET UNDER THE TONGUE EVERY 5 MINUTES AS NEEDED FOR CHEST PAIN 75 tablet 15    UNABLE TO FIND R-Alpha Lipoic Acid 150mg      UNABLE TO FIND Oat straw extract,passion flower, and skullcap root extract      UNABLE TO FIND Arterin      UNABLE TO FIND Niachinamide 250mg      UNABLE TO FIND Blood sugar ultra (supplement)      furosemide (LASIX) 40 MG tablet TAKE 1 TABLET TWICE A DAY (Patient not taking: Reported on 1/15/2020) 180 tablet 4     No current facility-administered medications for this visit.      Review of Systems  General: Feeling Well.   Eyes: Wears glasses  ENT:  No sinusitis or pharyngitis.   Heart:: No chest pain or palpitations.  Lungs: shortness of breath with exertion   GI: No Nausea, vomiting, constipation, diarrhea, or reflux.  Gu: not waking up frequently during the night to urinate   Skin: no issues at present   Musculoskeletal:  arthritis   Neuro: neuropathy to feet   Lymph: swelling to legs   Psych: No anxiety or depression.  Endo: weight gain     OBJECTIVE:      /89 (BP Location: Left arm, Patient Position: Sitting)   Pulse (!) 54   Wt 105.7 kg (233 lb)   SpO2 97%   BMI 35.43 kg/m²     Physical Exam  GENERAL: Older patient in no distress.  HEENT: Pupils equal and reactive. Extraocular movements intact. Nose intact. Pharynx moist.   NECK: Supple.   HEART: Regular rate and rhythm. No murmur or gallop auscultated.  LUNGS: Clear to auscultation and percussion. Lung excursion symmetrical. No change in fremitus. No adventitial noises.  ABDOMEN: Bowel sounds present. Non-tender, no masses palpated. Obese.  EXTREMITIES: Normal muscle tone and joint movement, no cyanosis or clubbing.   LYMPHATICS: 1+ pitting edema to legs    SKIN: Dry, intact, no lesions.   NEURO: Cranial nerves II-XII  intact. Motor strength 5/5 bilaterally, upper and lower extremities.  PSYCH: Appropriate affect.    Assessment:       1. CESAR (obstructive sleep apnea)          Plan:         Keep sleeping on your CPAP whenever you sleep  Refuses flu shot   Follow up in a year

## 2020-01-15 NOTE — PATIENT INSTRUCTIONS
Obstructive Sleep Apnea  Obstructive sleep apnea is a condition that causes your air passages to become narrowed or blocked during sleep. As a result, breathing stops for short periods. Your body wakes up enough for breathing to begin again, though you don't remember it. The cycle of stopped breathing and brief awakenings can repeat dozens of times a night. This prevents the body from getting to the deeper stages of sleep that are needed for good rest and may cause your body's oxygen level to fall.  Signs of sleep apnea include loud snoring, noisy breathing, and gasping sounds during sleep. Daytime symptoms include waking up tired after a full night's sleep, waking up with headaches, feeling very sleepy or falling asleep during the day, and having problems with memory or concentration.  Risk factors for sleep apnea include:  · Being overweight  · Being a man, or a woman in menopause  · Smoking  · Using alcohol or sedating medicines  · Having enlarged structures in the nose or throat  Home care  Lifestyle changes that can help treat snoring and sleep apnea include the following:  · If you are overweight, lose weight. Talk to your healthcare provider about a weight-loss plan for you.  · Avoid alcohol for 3 to 4 hours before bedtime. Avoid sedating medications. Ask your healthcare provider about the medicines you take.  · If you smoke, talk to your healthcare provider about ways to quit.  · Sleep on your side. This can help prevent gravity from pulling relaxed throat tissues into your breathing passages.  · If you have allergies or sinus problems that block your nose, ask your healthcare provider for help.  Follow-up care  Follow up with your healthcare provider, or as advised. A diagnosis of sleep apnea is made with a sleep study. Your healthcare provider can tell you more about this test.  When to seek medical advice  Sleep apnea can make you more likely to have certain health problems. These include high blood  pressure, heart attack, stroke, and sexual dysfunction. If you have sleep apnea, talk to your healthcare provider about the best treatments for you.  Date Last Reviewed: 4/1/2017  © 3484-3005 [employer]. [address] [place] [address], [place] [address]. All rights reserved. This information is not intended as a substitute for professional medical care. Always follow your healthcare professional's instructions.      Keep sleeping on your CPAP whenever you sleep  Refuses flu shot   Follow up in a year

## 2020-01-16 ENCOUNTER — OFFICE VISIT (OUTPATIENT)
Dept: FAMILY MEDICINE | Facility: CLINIC | Age: 75
End: 2020-01-16
Payer: MEDICARE

## 2020-01-16 VITALS
SYSTOLIC BLOOD PRESSURE: 128 MMHG | HEART RATE: 62 BPM | BODY MASS INDEX: 34.61 KG/M2 | HEIGHT: 68 IN | RESPIRATION RATE: 18 BRPM | TEMPERATURE: 98 F | WEIGHT: 228.38 LBS | OXYGEN SATURATION: 98 % | DIASTOLIC BLOOD PRESSURE: 78 MMHG

## 2020-01-16 DIAGNOSIS — E78.49 OTHER HYPERLIPIDEMIA: ICD-10-CM

## 2020-01-16 DIAGNOSIS — L98.9 SKIN LESION: Primary | ICD-10-CM

## 2020-01-16 DIAGNOSIS — I10 ESSENTIAL HYPERTENSION: ICD-10-CM

## 2020-01-16 PROCEDURE — 99214 PR OFFICE/OUTPT VISIT, EST, LEVL IV, 30-39 MIN: ICD-10-PCS | Mod: S$PBB,,, | Performed by: FAMILY MEDICINE

## 2020-01-16 PROCEDURE — 1159F MED LIST DOCD IN RCRD: CPT | Mod: ,,, | Performed by: FAMILY MEDICINE

## 2020-01-16 PROCEDURE — 1126F AMNT PAIN NOTED NONE PRSNT: CPT | Mod: ,,, | Performed by: FAMILY MEDICINE

## 2020-01-16 PROCEDURE — 1159F PR MEDICATION LIST DOCUMENTED IN MEDICAL RECORD: ICD-10-PCS | Mod: ,,, | Performed by: FAMILY MEDICINE

## 2020-01-16 PROCEDURE — 1170F FXNL STATUS ASSESSED: CPT | Mod: ,,, | Performed by: FAMILY MEDICINE

## 2020-01-16 PROCEDURE — 99215 OFFICE O/P EST HI 40 MIN: CPT | Performed by: FAMILY MEDICINE

## 2020-01-16 PROCEDURE — 99214 OFFICE O/P EST MOD 30 MIN: CPT | Mod: S$PBB,,, | Performed by: FAMILY MEDICINE

## 2020-01-16 PROCEDURE — 1126F PR PAIN SEVERITY QUANTIFIED, NO PAIN PRESENT: ICD-10-PCS | Mod: ,,, | Performed by: FAMILY MEDICINE

## 2020-01-16 PROCEDURE — 1170F PR FUNCTIONAL STATUS ASSESSED: ICD-10-PCS | Mod: ,,, | Performed by: FAMILY MEDICINE

## 2020-01-16 RX ORDER — TAMSULOSIN HYDROCHLORIDE 0.4 MG/1
0.4 CAPSULE ORAL DAILY
COMMUNITY
End: 2020-04-13 | Stop reason: SDUPTHER

## 2020-01-16 RX ORDER — EZETIMIBE 10 MG/1
10 TABLET ORAL DAILY
Qty: 90 TABLET | Refills: 3 | Status: SHIPPED | OUTPATIENT
Start: 2020-01-16 | End: 2021-02-13

## 2020-01-16 NOTE — PROGRESS NOTES
SUBJECTIVE:    Patient ID: Tong Ford is a 74 y.o. male.    Chief Complaint: Hypertension  74 male male with several choric medical issues, he is doing well today. Recently had Cataract surgery without complications.    He has on new complaint, pt has a non-healing rash on the nape of his neck.    SPMHX:  CAD: Nitroglycerine, Plavix 75mg, Last echo  EF 70% normal stress test 2019.  Hyperlipidemia: Not on medications,   HTN: Amlodipine 5mg, Metoprolol XL 50mg, Doxazosin 8mg, Lisinopril 40mg, his blood pressure is well controlled today.  Arthritis:  BPH: On Tamsulsosin 0.4mg a day his symptoms have improved.  CESAR: On CPAP wearing 85%    Specialists:  Cardiology: Dr Carmona has seen in Dec  Pulmonary: Monserrat Teixeira    Smoke: None  ETOH: None  Exercise: None    Glucose 70 - 110 mg/dL 109      Creatinine 0.5 - 1.4 mg/dL 1.1        TSH 0.340 - 5.600 uIU/mL 4.580      Lipids  Chol: 274  Tri  HDL: 45  LDL: 210    eGFR if non African American >60 mL/min/1.73 m^2 >60.0      Hemoglobin 14.0 - 18.0 g/dL 13.6Low     Hematocrit 40.0 - 54.0 % 41.5          Hypertension   This is a chronic problem. The current episode started more than 1 year ago. The problem is unchanged. The problem is controlled. Pertinent negatives include no chest pain, neck pain, orthopnea, palpitations, PND or shortness of breath. There are no associated agents to hypertension. Risk factors for coronary artery disease include dyslipidemia, male gender, obesity and sedentary lifestyle. Past treatments include ACE inhibitors, alpha 1 blockers, calcium channel blockers and beta blockers. The current treatment provides moderate improvement. Compliance problems include exercise and diet.    Hyperlipidemia   This is a chronic problem. The problem is uncontrolled. Recent lipid tests were reviewed and are high. Exacerbating diseases include obesity. There are no known factors aggravating his hyperlipidemia. Pertinent negatives include no chest  pain or shortness of breath. Risk factors for coronary artery disease include dyslipidemia, hypertension, male sex and a sedentary lifestyle.   Rash   This is a recurrent problem. The current episode started more than 1 year ago. The problem has been waxing and waning since onset. The affected locations include the scalp. The rash is characterized by dryness, peeling, redness and scaling. He was exposed to nothing. Pertinent negatives include no congestion, cough, diarrhea, fatigue, rhinorrhea or shortness of breath. Past treatments include antibiotic cream. The treatment provided no relief.         Past Medical History:   Diagnosis Date    Arthritis     Cataract     Coronary artery disease     Encounter for blood transfusion     Hypercholesterolemia     Hypertension      Social History     Socioeconomic History    Marital status:      Spouse name: Not on file    Number of children: Not on file    Years of education: Not on file    Highest education level: Not on file   Occupational History    Occupation: professor   Social Needs    Financial resource strain: Not on file    Food insecurity:     Worry: Not on file     Inability: Not on file    Transportation needs:     Medical: Not on file     Non-medical: Not on file   Tobacco Use    Smoking status: Never Smoker    Smokeless tobacco: Never Used   Substance and Sexual Activity    Alcohol use: Yes     Comment: rare social occasions    Drug use: No    Sexual activity: Never   Lifestyle    Physical activity:     Days per week: Not on file     Minutes per session: Not on file    Stress: Only a little   Relationships    Social connections:     Talks on phone: Not on file     Gets together: Not on file     Attends [employer] service: Not on file     Active member of club or organization: Not on file     Attends meetings of clubs or organizations: Not on file     Relationship status: Not on file   Other Topics Concern    Not on file   Social  History Narrative    Not on file     Past Surgical History:   Procedure Laterality Date    CORONARY ARTERY BYPASS GRAFT      EYE SURGERY      VASECTOMY       Family History   Problem Relation Age of Onset    Cancer Mother     Cancer Father     Hypertension Father     Cancer Maternal Grandmother     COPD Paternal Grandmother      Current Outpatient Medications   Medication Sig Dispense Refill    amLODIPine (NORVASC) 5 MG tablet Take 1 tablet (5 mg total) by mouth once daily. 90 tablet 11    ascorbic acid, vitamin C, (VITAMIN C) 1000 MG tablet Take 1,000 mg by mouth once daily.      aspirin 81 MG Chew Take 81 mg by mouth once daily.      clopidogrel (PLAVIX) 75 mg tablet TAKE 1 TABLET DAILY 90 tablet 4    coQ10, ubiquinol, 100 mg Cap Take 150 mg by mouth 2 (two) times daily.      doxazosin (CARDURA) 8 MG Tab TAKE 1 TABLET DAILY 90 tablet 4    ergocalciferol, vitamin D2, (VITAMIN D ORAL) Take 500 Units by mouth Daily.      furosemide (LASIX) 40 MG tablet TAKE 1 TABLET TWICE A  tablet 4    garlic 1,000 mg Cap Take by mouth.      krill-om-3-dha-epa-phospho-ast (KRILL OIL) 1,565-100-97-80 mg Cap Take by mouth.      lisinopril (PRINIVIL,ZESTRIL) 20 MG tablet TAKE 1 TABLET TWICE A DAY (Patient taking differently: 40 mg. ) 180 tablet 1    metoprolol succinate (TOPROL-XL) 50 MG 24 hr tablet TAKE 1 TABLET DAILY 90 tablet 4    nitroGLYCERIN (NITROSTAT) 0.4 MG SL tablet DISSOLVE 1 TABLET UNDER THE TONGUE EVERY 5 MINUTES AS NEEDED FOR CHEST PAIN 75 tablet 15    tamsulosin (FLOMAX) 0.4 mg Cap Take 0.4 mg by mouth once daily.      UNABLE TO FIND R-Alpha Lipoic Acid 150mg      UNABLE TO FIND Oat straw extract,passion flower, and skullcap root extract      UNABLE TO FIND Arterin      UNABLE TO FIND Niachinamide 250mg      UNABLE TO FIND Blood sugar ultra (supplement)      ezetimibe (ZETIA) 10 mg tablet Take 1 tablet (10 mg total) by mouth once daily. 90 tablet 3     No current facility-administered  "medications for this visit.      Review of patient's allergies indicates:  No Known Allergies    Review of Systems   Constitutional: Negative for activity change, appetite change, diaphoresis and fatigue.   HENT: Negative for congestion, postnasal drip, rhinorrhea, sinus pressure and sinus pain.    Eyes: Negative.    Respiratory: Negative for cough, chest tightness, shortness of breath and wheezing.    Cardiovascular: Negative for chest pain, palpitations, orthopnea and PND.   Gastrointestinal: Negative for abdominal pain, anal bleeding, blood in stool, constipation and diarrhea.   Genitourinary: Negative for dysuria, flank pain, frequency and urgency.   Musculoskeletal: Negative for neck pain.   Skin: Positive for rash.          Blood pressure 128/78, pulse 62, temperature 98.1 °F (36.7 °C), temperature source Oral, resp. rate 18, height 5' 8" (1.727 m), weight 103.6 kg (228 lb 6.4 oz), SpO2 98 %. Body mass index is 34.73 kg/m².   Objective:      Physical Exam   Constitutional: He is oriented to person, place, and time. He appears well-developed and well-nourished.   HENT:   Head: Normocephalic and atraumatic.   Nose: Nose normal.   Mouth/Throat: Oropharynx is clear and moist.   Eyes: Pupils are equal, round, and reactive to light. Conjunctivae and EOM are normal. No scleral icterus.   Cardiovascular: Normal rate, regular rhythm and normal heart sounds.   No murmur heard.  Pulmonary/Chest: Effort normal and breath sounds normal. No respiratory distress. He has no wheezes.   Neurological: He is alert and oriented to person, place, and time.   Skin: He is not diaphoretic.   Irregularly shaped dry scaly flat lesion on his neck. (picture in media file)           Assessment:       1. Skin lesion    2. Essential hypertension    3. Other hyperlipidemia         Plan:           Skin lesion  -     Ambulatory referral to Dermatology  Due to appearance and location concerned for possible SCC, will place referral to " DERM    Essential hypertension  BP is well controlled on multiple medications, will continue and be hesitant about adjusting.    Other hyperlipidemia  -     ezetimibe (ZETIA) 10 mg tablet; Take 1 tablet (10 mg total) by mouth once daily.  Dispense: 90 tablet; Refill: 3  I have convienced pt to start medication to treat his cholesterol, I explained that his ASCVD risk was 33% risk of stroke or heart disease in the next 10 years.

## 2020-01-23 ENCOUNTER — OFFICE VISIT (OUTPATIENT)
Dept: DERMATOLOGY | Facility: CLINIC | Age: 75
End: 2020-01-23
Payer: MEDICARE

## 2020-01-23 VITALS — HEIGHT: 68 IN | BODY MASS INDEX: 34.56 KG/M2 | WEIGHT: 228 LBS

## 2020-01-23 DIAGNOSIS — L82.1 SK (SEBORRHEIC KERATOSIS): ICD-10-CM

## 2020-01-23 DIAGNOSIS — L57.0 AK (ACTINIC KERATOSIS): Primary | ICD-10-CM

## 2020-01-23 PROCEDURE — 17000 DESTRUCT PREMALG LESION: CPT | Mod: PBBFAC,PO | Performed by: DERMATOLOGY

## 2020-01-23 PROCEDURE — 1126F AMNT PAIN NOTED NONE PRSNT: CPT | Mod: ,,, | Performed by: DERMATOLOGY

## 2020-01-23 PROCEDURE — 17000 PR DESTRUCTION(LASER SURGERY,CRYOSURGERY,CHEMOSURGERY),PREMALIGNANT LESIONS,FIRST LESION: ICD-10-PCS | Mod: S$PBB,,, | Performed by: DERMATOLOGY

## 2020-01-23 PROCEDURE — 99999 PR PBB SHADOW E&M-EST. PATIENT-LVL III: CPT | Mod: PBBFAC,,, | Performed by: DERMATOLOGY

## 2020-01-23 PROCEDURE — 99213 OFFICE O/P EST LOW 20 MIN: CPT | Mod: PBBFAC,PO | Performed by: DERMATOLOGY

## 2020-01-23 PROCEDURE — 99999 PR PBB SHADOW E&M-EST. PATIENT-LVL III: ICD-10-PCS | Mod: PBBFAC,,, | Performed by: DERMATOLOGY

## 2020-01-23 PROCEDURE — 1159F PR MEDICATION LIST DOCUMENTED IN MEDICAL RECORD: ICD-10-PCS | Mod: ,,, | Performed by: DERMATOLOGY

## 2020-01-23 PROCEDURE — 1159F MED LIST DOCD IN RCRD: CPT | Mod: ,,, | Performed by: DERMATOLOGY

## 2020-01-23 PROCEDURE — 99202 PR OFFICE/OUTPT VISIT, NEW, LEVL II, 15-29 MIN: ICD-10-PCS | Mod: 25,S$PBB,, | Performed by: DERMATOLOGY

## 2020-01-23 PROCEDURE — 1126F PR PAIN SEVERITY QUANTIFIED, NO PAIN PRESENT: ICD-10-PCS | Mod: ,,, | Performed by: DERMATOLOGY

## 2020-01-23 PROCEDURE — 99202 OFFICE O/P NEW SF 15 MIN: CPT | Mod: 25,S$PBB,, | Performed by: DERMATOLOGY

## 2020-01-23 PROCEDURE — 17000 DESTRUCT PREMALG LESION: CPT | Mod: S$PBB,,, | Performed by: DERMATOLOGY

## 2020-01-23 NOTE — LETTER
January 23, 2020      Kris Zavala MD  [address] [place] [address]  [address]  [facility] [address]           [facility]  [address] [place] [address], [address]  [facility] [address]  Phone: [phone]          Patient: Tong Ford   MR Number: [id number]   YOB: 1945   Date of Visit: 1/23/2020       Dear Dr. Kris Zavala:    Thank you for referring Tong Ford to me for evaluation. Attached you will find relevant portions of my assessment and plan of care.    If you have questions, please do not hesitate to call me. I look forward to following Tong Ford along with you.    Sincerely,    Carrillo Hedrick MD    Enclosure  CC:  No Recipients    If you would like to receive this communication electronically, please contact [email or web address] or [phone] to request more information on [facility] access.    For providers and/or their staff who would like to refer a patient to [facility], please contact us through our one-stop-shop provider referral line, [facility], at [phone].    If you feel you have received this communication in error or would no longer like to receive these types of communications, please e-mail [email or web address]

## 2020-01-23 NOTE — PROGRESS NOTES
Subjective:       Patient ID:  Tong Ford is a 74 y.o. male who presents for   Chief Complaint   Patient presents with    Spot     back of neck     Patient complains of lesion(s)  Location: f head  Duration: months  Symptoms: bumpy  Relieving factors/Previous treatments: baking soda and apple cider vinegar        New patient  No phx or fhx of skin cancer    Patient presents today with a spot on the back of the neck, x 1 year, scaly and scabs off, applying neosporin    Review of Systems   Constitutional: Negative for fever, chills and fatigue.   Musculoskeletal: Negative for joint swelling and arthralgias.   Skin: Positive for dry skin. Negative for daily sunscreen use, activity-related sunscreen use and wears hat.   Hematologic/Lymphatic: Bruises/bleeds easily.        Objective:    Physical Exam   Constitutional: He appears well-developed and well-nourished. No distress.   HENT:   Mouth/Throat: Lips normal.    Eyes: No conjunctival no injection.   Neurological: He is alert and oriented to person, place, and time. He is not disoriented.   Psychiatric: He has a normal mood and affect.   Skin:   Areas Examined (abnormalities noted in diagram):   Scalp / Hair Palpated and Inspected  Head / Face Inspection Performed  Neck Inspection Performed  Nails and Digits Inspection Performed                   Diagram Legend     Erythematous scaling macule/papule c/w actinic keratosis       Vascular papule c/w angioma      Pigmented verrucoid papule/plaque c/w seborrheic keratosis      Yellow umbilicated papule c/w sebaceous hyperplasia      Irregularly shaped tan macule c/w lentigo     1-2 mm smooth white papules consistent with Milia      Movable subcutaneous cyst with punctum c/w epidermal inclusion cyst      Subcutaneous movable cyst c/w pilar cyst      Firm pink to brown papule c/w dermatofibroma      Pedunculated fleshy papule(s) c/w skin tag(s)      Evenly pigmented macule c/w junctional nevus     Mildly variegated  pigmented, slightly irregular-bordered macule c/w mildly atypical nevus      Flesh colored to evenly pigmented papule c/w intradermal nevus       Pink pearly papule/plaque c/w basal cell carcinoma      Erythematous hyperkeratotic cursted plaque c/w SCC      Surgical scar with no sign of skin cancer recurrence      Open and closed comedones      Inflammatory papules and pustules      Verrucoid papule consistent consistent with wart     Erythematous eczematous patches and plaques     Dystrophic onycholytic nail with subungual debris c/w onychomycosis     Umbilicated papule    Erythematous-base heme-crusted tan verrucoid plaque consistent with inflamed seborrheic keratosis     Erythematous Silvery Scaling Plaque c/w Psoriasis     See annotation      Assessment / Plan:        AK (actinic keratosis)  Discussed all of the following:  Actinic keratosis is a skin growth caused by sun damage. These growths are not cancer, but they may develop into skin cancer (precancerous). Many people get these growths, especially as they age. This is because of cumulative sun exposure over years. Multiple growths are called actinic keratoses.    Patient instructed in importance in daily sun protection. Sun avoidance and topical protection discussed.     Patient encouraged to wear hat for all outdoor exposure.     Also discussed sun protective clothing.  Discussed with patient the need to wear hats and keep covered from the sun, even on partly rina days.  Reviewed how ongoing sun exposure propagates actinic keratoses.  Cryosurgery Procedure Note    Verbal consent from the patient is obtained including, but not limited to, risk of hypopigmentation/hyperpigmentation, scar, recurrence of lesion. The patient is aware of the precancerous quality and need for treatment of these lesions. Liquid nitrogen cryosurgery is applied to the 1 actinic keratoses, as detailed in the physical exam, to produce a freeze injury. The patient is aware that  blisters may form and is instructed on wound care with gentle cleansing and use of vaseline ointment to keep moist until healed. The patient is supplied a handout on cryosurgery and is instructed to call if lesions do not completely resolve.    We will recheck the post neck at our follow up appointment.^^^^  Consider biopsy of any suspicious moles or lesions later.      SK (seborrheic keratosis)  Discussed with patient the benign nature of these lesions and that no treatment is indicated.    Discussed with patient the etiology and pathogenesis of the disease or skin lesion(s) and possible treatments and aggravators.               Follow up in about 6 weeks (around 3/5/2020).

## 2020-01-23 NOTE — PATIENT INSTRUCTIONS
CRYOSURGERY      Your doctor has used a method called cryosurgery to treat your skin condition. Cryosurgery refers to the use of very cold substances to treat a variety of skin conditions such as warts, pre-skin cancers, molluscum contagiosum, sun spots, and several benign growths. The substance we use in cryosurgery is liquid nitrogen and is so cold (-195 degrees Celsius) that is burns when administered.     Following treatment in the office, the skin may immediately burn and become red. You may find the area around the lesion is affected as well. It is sometimes necessary to treat not only the lesion, but a small area of the surrounding normal skin to achieve a good response.     A blister, and even a blood filled blister, may form after treatment.   This is a normal response. If the blister is painful, it is acceptable to sterilize a needle and with rubbing alcohol and gently pop the blister. It is important that you gently wash the area with soap and warm water as the blister fluid may contain wart virus if a wart was treated. Do no remove the roof of the blister.     The area treated can take anywhere from 1-3 weeks to heal. Healing time depends on the kind skin lesion treated, the location, and how aggressively the lesion was treated. It is recommended that the areas treated are covered with Vaseline or bacitracin ointment and a band-aid. If a band-aid is not practical, just ointment applied several times per day will do. Keeping these areas moist will speed the healing time.    Treatment with liquid nitrogen can leave a scar. In dark skin, it may be a light or dark scar, in light skin it may be a white or pink scar. These will generally fade with time, but may never go away completely.     If you have any concerns after your treatment, please feel free to call the office.       [address] [facility] [place] [address]/ [phone] FAX/ [email or web address]    SEBORRHEIC KERATOSES        What  causes seborrheic keratoses?    Seborrheic keratoses are harmless, common skin growths that first appear during adult life.  As time goes by, more growths appear.  Some persons have a very large number of them.  Seborrheic keratoses appear on both covered and uncovered parts of the body; they are not caused by sunlight.  The tendency to develop seborrheic keratoses is inherited.    Seborrheic keratoses are harmless and never become malignant.  They begin as slightly raised, light brown spots.  Gradually they thicken and take on a rough wartlike surface.  They slowly darken and may turn black.  These color changes are harmless.  Seborrheic keratoses are superficial and look as if they were stuck on the skin.  Persons who have had several seborrheic keratoses can usually recognize this type of benign growth.  However, if you are concerned or unsure about any growth, consult me.    Treatment    Seborrheic keratoses can easily be removed in the office.  The only reason for removing a seborrheic keratosis is your wish to get rid of it.

## 2020-03-05 ENCOUNTER — OFFICE VISIT (OUTPATIENT)
Dept: DERMATOLOGY | Facility: CLINIC | Age: 75
End: 2020-03-05
Payer: MEDICARE

## 2020-03-05 VITALS — BODY MASS INDEX: 34.56 KG/M2 | WEIGHT: 228 LBS | HEIGHT: 68 IN

## 2020-03-05 DIAGNOSIS — L82.1 SK (SEBORRHEIC KERATOSIS): ICD-10-CM

## 2020-03-05 DIAGNOSIS — L57.0 AK (ACTINIC KERATOSIS): ICD-10-CM

## 2020-03-05 DIAGNOSIS — L90.5 SCAR: Primary | ICD-10-CM

## 2020-03-05 PROCEDURE — 99212 PR OFFICE/OUTPT VISIT, EST, LEVL II, 10-19 MIN: ICD-10-PCS | Mod: S$PBB,,, | Performed by: DERMATOLOGY

## 2020-03-05 PROCEDURE — 99999 PR PBB SHADOW E&M-EST. PATIENT-LVL III: ICD-10-PCS | Mod: PBBFAC,,, | Performed by: DERMATOLOGY

## 2020-03-05 PROCEDURE — 99213 OFFICE O/P EST LOW 20 MIN: CPT | Mod: PBBFAC,PO | Performed by: DERMATOLOGY

## 2020-03-05 PROCEDURE — 99212 OFFICE O/P EST SF 10 MIN: CPT | Mod: S$PBB,,, | Performed by: DERMATOLOGY

## 2020-03-05 PROCEDURE — 99999 PR PBB SHADOW E&M-EST. PATIENT-LVL III: CPT | Mod: PBBFAC,,, | Performed by: DERMATOLOGY

## 2020-03-05 NOTE — PROGRESS NOTES
LOV 1/2020    AK (actinic keratosis)  Discussed all of the following:  Actinic keratosis is a skin growth caused by sun damage. These growths are not cancer, but they may develop into skin cancer (precancerous). Many people get these growths, especially as they age. This is because of cumulative sun exposure over years. Multiple growths are called actinic keratoses.     Patient instructed in importance in daily sun protection. Sun avoidance and topical protection discussed.      Patient encouraged to wear hat for all outdoor exposure.      Also discussed sun protective clothing.  Discussed with patient the need to wear hats and keep covered from the sun, even on partly rina days.  Reviewed how ongoing sun exposure propagates actinic keratoses.  Cryosurgery Procedure Note     Verbal consent from the patient is obtained including, but not limited to, risk of hypopigmentation/hyperpigmentation, scar, recurrence of lesion. The patient is aware of the precancerous quality and need for treatment of these lesions. Liquid nitrogen cryosurgery is applied to the 1 actinic keratoses, as detailed in the physical exam, to produce a freeze injury. The patient is aware that blisters may form and is instructed on wound care with gentle cleansing and use of vaseline ointment to keep moist until healed. The patient is supplied a handout on cryosurgery and is instructed to call if lesions do not completely resolve.     We will recheck the post neck at our follow up appointment.^^^^  Consider biopsy of any suspicious moles or lesions later.        SK (seborrheic keratosis)  Discussed with patient the benign nature of these lesions and that no treatment is indicated.     Discussed with patient the etiology and pathogenesis of the disease or skin lesion(s) and possible treatments and aggravators.                Follow up in about 6 weeks (around 3/5/2020).  Subjective:       Patient ID:  Tong Ford is a 74 y.o. male who presents for    Chief Complaint   Patient presents with    Actinic Keratosis     follow up     HPI    Review of Systems   Constitutional: Negative for fever, chills and fatigue.   Musculoskeletal: Negative for joint swelling and arthralgias.   Skin: Positive for dry skin. Negative for daily sunscreen use, activity-related sunscreen use and wears hat.   Hematologic/Lymphatic: Bruises/bleeds easily.        Objective:    Physical Exam   Constitutional: He appears well-developed and well-nourished. No distress.   HENT:   Mouth/Throat: Lips normal.    Eyes: No conjunctival no injection.   Neurological: He is alert and oriented to person, place, and time. He is not disoriented.   Psychiatric: He has a normal mood and affect.   Skin:   Areas Examined (abnormalities noted in diagram):   Scalp / Hair Palpated and Inspected  Head / Face Inspection Performed  Neck Inspection Performed  Nails and Digits Inspection Performed                   Diagram Legend     Erythematous scaling macule/papule c/w actinic keratosis       Vascular papule c/w angioma      Pigmented verrucoid papule/plaque c/w seborrheic keratosis      Yellow umbilicated papule c/w sebaceous hyperplasia      Irregularly shaped tan macule c/w lentigo     1-2 mm smooth white papules consistent with Milia      Movable subcutaneous cyst with punctum c/w epidermal inclusion cyst      Subcutaneous movable cyst c/w pilar cyst      Firm pink to brown papule c/w dermatofibroma      Pedunculated fleshy papule(s) c/w skin tag(s)      Evenly pigmented macule c/w junctional nevus     Mildly variegated pigmented, slightly irregular-bordered macule c/w mildly atypical nevus      Flesh colored to evenly pigmented papule c/w intradermal nevus       Pink pearly papule/plaque c/w basal cell carcinoma      Erythematous hyperkeratotic cursted plaque c/w SCC      Surgical scar with no sign of skin cancer recurrence      Open and closed comedones      Inflammatory papules and pustules       Verrucoid papule consistent consistent with wart     Erythematous eczematous patches and plaques     Dystrophic onycholytic nail with subungual debris c/w onychomycosis     Umbilicated papule    Erythematous-base heme-crusted tan verrucoid plaque consistent with inflamed seborrheic keratosis     Erythematous Silvery Scaling Plaque c/w Psoriasis     See annotation      Assessment / Plan:        Scar  Patient to watch for recurrence or flares or worsening and to call the clinic for a follow up appointment for such.  Discussed with patient the etiology and pathogenesis of the disease or skin lesion(s) and possible treatments and aggravators.    Discussed with patient the benign nature of these lesions and that no treatment is indicated.      AK (actinic keratosis)  Resolved.  Patient to contact us for earlier follow up if anything recurs.    SK (seborrheic keratosis)  Discussed with patient the benign nature of these lesions and that no treatment is indicated.               Follow up in about 6 months (around 9/5/2020).

## 2020-04-12 ENCOUNTER — PATIENT MESSAGE (OUTPATIENT)
Dept: FAMILY MEDICINE | Facility: CLINIC | Age: 75
End: 2020-04-12

## 2020-04-13 RX ORDER — TAMSULOSIN HYDROCHLORIDE 0.4 MG/1
0.4 CAPSULE ORAL DAILY
Qty: 90 CAPSULE | Refills: 3 | Status: SHIPPED | OUTPATIENT
Start: 2020-04-13 | End: 2020-07-17 | Stop reason: SDUPTHER

## 2020-05-25 ENCOUNTER — PATIENT MESSAGE (OUTPATIENT)
Dept: FAMILY MEDICINE | Facility: CLINIC | Age: 75
End: 2020-05-25

## 2020-06-07 ENCOUNTER — PATIENT MESSAGE (OUTPATIENT)
Dept: FAMILY MEDICINE | Facility: CLINIC | Age: 75
End: 2020-06-07

## 2020-06-07 DIAGNOSIS — Z20.828 VIRAL DISEASE EXPOSURE: Primary | ICD-10-CM

## 2020-07-17 ENCOUNTER — OFFICE VISIT (OUTPATIENT)
Dept: FAMILY MEDICINE | Facility: CLINIC | Age: 75
End: 2020-07-17
Payer: MEDICARE

## 2020-07-17 ENCOUNTER — LAB VISIT (OUTPATIENT)
Dept: LAB | Facility: HOSPITAL | Age: 75
End: 2020-07-17
Attending: FAMILY MEDICINE
Payer: MEDICARE

## 2020-07-17 VITALS
DIASTOLIC BLOOD PRESSURE: 78 MMHG | OXYGEN SATURATION: 96 % | SYSTOLIC BLOOD PRESSURE: 120 MMHG | HEIGHT: 68 IN | TEMPERATURE: 99 F | HEART RATE: 59 BPM | RESPIRATION RATE: 16 BRPM | WEIGHT: 210 LBS | BODY MASS INDEX: 31.83 KG/M2

## 2020-07-17 DIAGNOSIS — Z12.5 PROSTATE CANCER SCREENING: ICD-10-CM

## 2020-07-17 DIAGNOSIS — I10 ESSENTIAL HYPERTENSION: ICD-10-CM

## 2020-07-17 DIAGNOSIS — Z12.5 PROSTATE CANCER SCREENING: Primary | ICD-10-CM

## 2020-07-17 DIAGNOSIS — N40.0 BENIGN PROSTATIC HYPERPLASIA, UNSPECIFIED WHETHER LOWER URINARY TRACT SYMPTOMS PRESENT: ICD-10-CM

## 2020-07-17 DIAGNOSIS — Z20.828 VIRAL DISEASE EXPOSURE: ICD-10-CM

## 2020-07-17 LAB
ALBUMIN SERPL BCP-MCNC: 4 G/DL (ref 3.5–5.2)
ALP SERPL-CCNC: 69 U/L (ref 55–135)
ALT SERPL W/O P-5'-P-CCNC: 16 U/L (ref 10–44)
ANION GAP SERPL CALC-SCNC: 10 MMOL/L (ref 8–16)
AST SERPL-CCNC: 18 U/L (ref 10–40)
BILIRUB SERPL-MCNC: 1.3 MG/DL (ref 0.1–1)
BUN SERPL-MCNC: 17 MG/DL (ref 8–23)
CALCIUM SERPL-MCNC: 9.1 MG/DL (ref 8.7–10.5)
CHLORIDE SERPL-SCNC: 101 MMOL/L (ref 95–110)
CO2 SERPL-SCNC: 27 MMOL/L (ref 23–29)
COMPLEXED PSA SERPL-MCNC: 1.7 NG/ML (ref 0–4)
CREAT SERPL-MCNC: 1.4 MG/DL (ref 0.5–1.4)
EST. GFR  (AFRICAN AMERICAN): 55.6 ML/MIN/1.73 M^2
EST. GFR  (NON AFRICAN AMERICAN): 48.1 ML/MIN/1.73 M^2
GLUCOSE SERPL-MCNC: 96 MG/DL (ref 70–110)
POTASSIUM SERPL-SCNC: 3.1 MMOL/L (ref 3.5–5.1)
PROT SERPL-MCNC: 7.1 G/DL (ref 6–8.4)
SODIUM SERPL-SCNC: 138 MMOL/L (ref 136–145)

## 2020-07-17 PROCEDURE — 80053 COMPREHEN METABOLIC PANEL: CPT

## 2020-07-17 PROCEDURE — 99213 PR OFFICE/OUTPT VISIT, EST, LEVL III, 20-29 MIN: ICD-10-PCS | Mod: S$PBB,,, | Performed by: FAMILY MEDICINE

## 2020-07-17 PROCEDURE — 36415 COLL VENOUS BLD VENIPUNCTURE: CPT

## 2020-07-17 PROCEDURE — 99213 OFFICE O/P EST LOW 20 MIN: CPT | Mod: S$PBB,,, | Performed by: FAMILY MEDICINE

## 2020-07-17 PROCEDURE — 99214 OFFICE O/P EST MOD 30 MIN: CPT | Performed by: FAMILY MEDICINE

## 2020-07-17 PROCEDURE — 86769 SARS-COV-2 COVID-19 ANTIBODY: CPT

## 2020-07-17 PROCEDURE — 84153 ASSAY OF PSA TOTAL: CPT

## 2020-07-17 RX ORDER — TAMSULOSIN HYDROCHLORIDE 0.4 MG/1
0.4 CAPSULE ORAL DAILY
Qty: 90 CAPSULE | Refills: 3 | Status: SHIPPED | OUTPATIENT
Start: 2020-07-17 | End: 2021-08-10

## 2020-07-17 NOTE — PROGRESS NOTES
SUBJECTIVE:    Patient ID: Tong Ford is a 75 y.o. male.    Chief Complaint: Hypertension  74 male male here to follow up on his HTN,  pt states that he is doing well he has no specific complaints today. He has been getting all of his follow up appts with Dr Carmona, was recently seen by derm and Luis Enrique, and has been using his CPAP for his CESAR.     SPMHX:  CAD: Nitroglycerine, Plavix 75mg, Last echo Jun/2019 EF 70% normal stress test 2019.  Hyperlipidemia: Not on medications,   HTN: Amlodipine 5mg, Metoprolol XL 50mg, Doxazosin 8mg, Lisinopril 40mg, his blood pressure is well controlled today.  Arthritis:  BPH: On Tamsulsosin 0.4mg a day his symptoms have improved.  CESAR: On CPAP wearing 85%       Specialists:  Cardiology: Dr Carmona has seen in Dec  Pulmonary: Monserrat Vera  Derm: Dr Ryley Dudley: Dr Baron    Smoke: None  ETOH: None  Exercise: None    HPI      Past Medical History:   Diagnosis Date    Arthritis     Cataract     Coronary artery disease     Encounter for blood transfusion     Hypercholesterolemia     Hypertension      Social History     Socioeconomic History    Marital status:      Spouse name: Not on file    Number of children: Not on file    Years of education: Not on file    Highest education level: Not on file   Occupational History    Occupation: professor   Social Needs    Financial resource strain: Not on file    Food insecurity     Worry: Not on file     Inability: Not on file    Transportation needs     Medical: Not on file     Non-medical: Not on file   Tobacco Use    Smoking status: Never Smoker    Smokeless tobacco: Never Used   Substance and Sexual Activity    Alcohol use: Yes     Comment: rare social occasions    Drug use: No    Sexual activity: Never   Lifestyle    Physical activity     Days per week: Not on file     Minutes per session: Not on file    Stress: Only a little   Relationships    Social connections     Talks on phone: Not on file      Gets together: Not on file     Attends [employer] service: Not on file     Active member of club or organization: Not on file     Attends meetings of clubs or organizations: Not on file     Relationship status: Not on file   Other Topics Concern    Not on file   Social History Narrative    Not on file     Past Surgical History:   Procedure Laterality Date    CORONARY ARTERY BYPASS GRAFT      EYE SURGERY      VASECTOMY       Family History   Problem Relation Age of Onset    Cancer Mother     Cancer Father     Hypertension Father     Cancer Maternal Grandmother     COPD Paternal Grandmother      Current Outpatient Medications   Medication Sig Dispense Refill    amLODIPine (NORVASC) 5 MG tablet Take 1 tablet (5 mg total) by mouth once daily. 90 tablet 11    ascorbic acid, vitamin C, (VITAMIN C) 1000 MG tablet Take 1,000 mg by mouth once daily.      aspirin 81 MG Chew Take 81 mg by mouth once daily.      clopidogrel (PLAVIX) 75 mg tablet TAKE 1 TABLET DAILY 90 tablet 4    coQ10, ubiquinol, 100 mg Cap Take 150 mg by mouth 2 (two) times daily.      doxazosin (CARDURA) 8 MG Tab TAKE 1 TABLET DAILY 90 tablet 4    ergocalciferol, vitamin D2, (VITAMIN D ORAL) Take 500 Units by mouth Daily.      ezetimibe (ZETIA) 10 mg tablet Take 1 tablet (10 mg total) by mouth once daily. 90 tablet 3    furosemide (LASIX) 40 MG tablet TAKE 1 TABLET TWICE A  tablet 4    garlic 1,000 mg Cap Take by mouth.      krill-om-3-dha-epa-phospho-ast (KRILL OIL) 1,954-275-81-80 mg Cap Take by mouth.      lisinopril (PRINIVIL,ZESTRIL) 20 MG tablet TAKE 1 TABLET TWICE A DAY (Patient taking differently: 40 mg. ) 180 tablet 1    metoprolol succinate (TOPROL-XL) 50 MG 24 hr tablet TAKE 1 TABLET DAILY 90 tablet 4    nitroGLYCERIN (NITROSTAT) 0.4 MG SL tablet DISSOLVE 1 TABLET UNDER THE TONGUE EVERY 5 MINUTES AS NEEDED FOR CHEST PAIN 75 tablet 15    tamsulosin (FLOMAX) 0.4 mg Cap Take 1 capsule (0.4 mg total) by mouth once  "daily. 90 capsule 3    UNABLE TO FIND R-Alpha Lipoic Acid 150mg      UNABLE TO FIND Oat straw extract,passion flower, and skullcap root extract      UNABLE TO FIND Arterin      UNABLE TO FIND Niachinamide 250mg      UNABLE TO FIND Blood sugar ultra (supplement)       No current facility-administered medications for this visit.      Review of patient's allergies indicates:  No Known Allergies    Review of Systems   Constitutional: Negative for activity change, appetite change, diaphoresis, fatigue, fever and unexpected weight change.   HENT: Negative for congestion, sinus pressure, sinus pain and sore throat.    Respiratory: Negative for cough, chest tightness, shortness of breath and wheezing.    Cardiovascular: Negative for chest pain and palpitations.   Gastrointestinal: Negative for abdominal distention, abdominal pain, blood in stool, constipation, diarrhea and nausea.   Genitourinary: Negative for dysuria, flank pain, frequency and urgency.   Neurological: Negative for dizziness, syncope, speech difficulty and weakness.          Blood pressure 120/78, pulse (!) 59, temperature 98.6 °F (37 °C), temperature source Temporal, resp. rate 16, height 5' 8" (1.727 m), weight 95.3 kg (210 lb), SpO2 96 %. Body mass index is 31.93 kg/m².   Objective:      Physical Exam  Vitals signs reviewed.   Constitutional:       General: He is not in acute distress.     Appearance: Normal appearance. He is obese. He is not ill-appearing or toxic-appearing.   HENT:      Head: Normocephalic and atraumatic.      Right Ear: There is impacted cerumen.      Left Ear: Tympanic membrane, ear canal and external ear normal.      Nose: Nose normal. No congestion or rhinorrhea.   Cardiovascular:      Rate and Rhythm: Normal rate and regular rhythm.      Heart sounds: Normal heart sounds. No murmur.   Pulmonary:      Effort: Pulmonary effort is normal. No respiratory distress.      Breath sounds: Normal breath sounds. No wheezing.   Skin:     " General: Skin is warm and dry.   Neurological:      Mental Status: He is alert and oriented to person, place, and time.             Assessment:       1. Prostate cancer screening    2. Essential hypertension    3. Benign prostatic hyperplasia, unspecified whether lower urinary tract symptoms present         Plan:           Prostate cancer screening  -     PSA, Screening; Future; Expected date: 07/17/2020    Essential hypertension  -     Comprehensive metabolic panel; Future; Expected date: 07/17/2020    Benign prostatic hyperplasia, unspecified whether lower urinary tract symptoms present  -     tamsulosin (FLOMAX) 0.4 mg Cap; Take 1 capsule (0.4 mg total) by mouth once daily.  Dispense: 90 capsule; Refill: 3

## 2020-07-18 LAB — SARS-COV-2 IGG SERPLBLD QL IA.RAPID: NEGATIVE

## 2020-08-05 ENCOUNTER — OFFICE VISIT (OUTPATIENT)
Dept: FAMILY MEDICINE | Facility: CLINIC | Age: 75
End: 2020-08-05
Payer: MEDICARE

## 2020-08-05 VITALS
TEMPERATURE: 98 F | HEIGHT: 68 IN | SYSTOLIC BLOOD PRESSURE: 110 MMHG | HEART RATE: 68 BPM | OXYGEN SATURATION: 96 % | DIASTOLIC BLOOD PRESSURE: 70 MMHG | BODY MASS INDEX: 32.28 KG/M2 | WEIGHT: 213 LBS | RESPIRATION RATE: 16 BRPM

## 2020-08-05 DIAGNOSIS — S79.821A OTHER SPECIFIED INJURIES OF RIGHT THIGH, INITIAL ENCOUNTER: ICD-10-CM

## 2020-08-05 DIAGNOSIS — R10.31 RIGHT INGUINAL PAIN: Primary | ICD-10-CM

## 2020-08-05 PROCEDURE — 99213 OFFICE O/P EST LOW 20 MIN: CPT | Mod: S$PBB,,, | Performed by: FAMILY MEDICINE

## 2020-08-05 PROCEDURE — 99213 PR OFFICE/OUTPT VISIT, EST, LEVL III, 20-29 MIN: ICD-10-PCS | Mod: S$PBB,,, | Performed by: FAMILY MEDICINE

## 2020-08-05 PROCEDURE — 99215 OFFICE O/P EST HI 40 MIN: CPT | Performed by: FAMILY MEDICINE

## 2020-08-05 NOTE — PROGRESS NOTES
SUBJECTIVE:    Patient ID: Tong Ford is a 75 y.o. male.    Chief Complaint: Hernia (groin, only hurts when he is walking around and moving alot)  74 male male here complaining of right groin pain, pt is concerned that he has a hernia, he first noticed the pain 1 weeks ago.     SPMHX:  CAD: Nitroglycerine, Plavix 75mg, Last echo Jun/2019 EF 70% normal stress test 2019.  Hyperlipidemia: Not on medications,   HTN: Amlodipine 5mg, Metoprolol XL 50mg, Doxazosin 8mg, Lisinopril 40mg, his blood pressure is well controlled today.  Arthritis:  BPH: On Tamsulsosin 0.4mg a day his symptoms have improved.  CESAR: On CPAP wearing 85%        Specialists:  Cardiology: Dr Carmona has seen in Dec  Pulmonary: Monserrat Vera  Derm: Dr Ryley Dudley: Dr Baron     Smoke: None  ETOH: None  Exercise: None    Groin Pain  The patient's pertinent negatives include no genital injury, genital itching, genital lesions, pelvic pain, penile discharge, penile pain, priapism, scrotal swelling or testicular pain. This is a new problem. The current episode started in the past 7 days. The problem occurs daily. The problem has been unchanged. The pain is mild. Pertinent negatives include no abdominal pain, anorexia, chest pain, chills, constipation, coughing, diarrhea, discolored urine, dysuria, fever, flank pain, frequency, headaches, hematuria, hesitancy, joint pain, joint swelling, nausea, rash, shortness of breath, urgency or urinary retention.         Past Medical History:   Diagnosis Date    Arthritis     Cataract     Coronary artery disease     Encounter for blood transfusion     Hypercholesterolemia     Hypertension      Social History     Socioeconomic History    Marital status:      Spouse name: Not on file    Number of children: Not on file    Years of education: Not on file    Highest education level: Not on file   Occupational History    Occupation: professor   Social Needs    Financial resource strain: Not on  file    Food insecurity     Worry: Not on file     Inability: Not on file    Transportation needs     Medical: Not on file     Non-medical: Not on file   Tobacco Use    Smoking status: Never Smoker    Smokeless tobacco: Never Used   Substance and Sexual Activity    Alcohol use: Yes     Comment: rare social occasions    Drug use: No    Sexual activity: Never   Lifestyle    Physical activity     Days per week: Not on file     Minutes per session: Not on file    Stress: Only a little   Relationships    Social connections     Talks on phone: Not on file     Gets together: Not on file     Attends [employer] service: Not on file     Active member of club or organization: Not on file     Attends meetings of clubs or organizations: Not on file     Relationship status: Not on file   Other Topics Concern    Not on file   Social History Narrative    Not on file     Past Surgical History:   Procedure Laterality Date    CORONARY ARTERY BYPASS GRAFT      EYE SURGERY      VASECTOMY       Family History   Problem Relation Age of Onset    Cancer Mother     Cancer Father     Hypertension Father     Cancer Maternal Grandmother     COPD Paternal Grandmother      Current Outpatient Medications   Medication Sig Dispense Refill    amLODIPine (NORVASC) 5 MG tablet Take 1 tablet (5 mg total) by mouth once daily. 90 tablet 11    ascorbic acid, vitamin C, (VITAMIN C) 1000 MG tablet Take 1,000 mg by mouth once daily.      aspirin 81 MG Chew Take 81 mg by mouth once daily.      clopidogrel (PLAVIX) 75 mg tablet TAKE 1 TABLET DAILY 90 tablet 4    coQ10, ubiquinol, 100 mg Cap Take 150 mg by mouth 2 (two) times daily.      doxazosin (CARDURA) 8 MG Tab TAKE 1 TABLET DAILY 90 tablet 4    ergocalciferol, vitamin D2, (VITAMIN D ORAL) Take 500 Units by mouth Daily.      ezetimibe (ZETIA) 10 mg tablet Take 1 tablet (10 mg total) by mouth once daily. 90 tablet 3    furosemide (LASIX) 40 MG tablet TAKE 1 TABLET TWICE A   "tablet 4    garlic 1,000 mg Cap Take by mouth.      krill-om-3-dha-epa-phospho-ast (KRILL OIL) 1,339-596-80-80 mg Cap Take by mouth.      lisinopril (PRINIVIL,ZESTRIL) 20 MG tablet TAKE 1 TABLET TWICE A DAY (Patient taking differently: 40 mg. ) 180 tablet 1    metoprolol succinate (TOPROL-XL) 50 MG 24 hr tablet TAKE 1 TABLET DAILY 90 tablet 4    nitroGLYCERIN (NITROSTAT) 0.4 MG SL tablet DISSOLVE 1 TABLET UNDER THE TONGUE EVERY 5 MINUTES AS NEEDED FOR CHEST PAIN 75 tablet 15    tamsulosin (FLOMAX) 0.4 mg Cap Take 1 capsule (0.4 mg total) by mouth once daily. 90 capsule 3    UNABLE TO FIND R-Alpha Lipoic Acid 150mg      UNABLE TO FIND Oat straw extract,passion flower, and skullcap root extract      UNABLE TO FIND Arterin      UNABLE TO FIND Niachinamide 250mg      UNABLE TO FIND Blood sugar ultra (supplement)       No current facility-administered medications for this visit.      Review of patient's allergies indicates:  No Known Allergies    Review of Systems   Constitutional: Negative for activity change, chills, fatigue and fever.   Respiratory: Negative for cough and shortness of breath.    Cardiovascular: Negative for chest pain.   Gastrointestinal: Negative for abdominal pain, anorexia, constipation, diarrhea and nausea.   Genitourinary: Negative for discharge, dysuria, flank pain, frequency, hesitancy, pelvic pain, penile pain, scrotal swelling, testicular pain and urgency.   Musculoskeletal: Negative for joint pain.   Skin: Negative for rash.   Neurological: Negative for headaches.          Blood pressure 110/70, pulse 68, temperature 97.6 °F (36.4 °C), temperature source Temporal, resp. rate 16, height 5' 8" (1.727 m), weight 96.6 kg (213 lb), SpO2 96 %. Body mass index is 32.39 kg/m².   Objective:      Physical Exam  Vitals signs reviewed.   Constitutional:       General: He is not in acute distress.     Appearance: Normal appearance. He is obese. He is not ill-appearing or toxic-appearing. "   HENT:      Head: Normocephalic and atraumatic.   Cardiovascular:      Rate and Rhythm: Normal rate.   Abdominal:      Tenderness: There is no abdominal tenderness. There is no right CVA tenderness or guarding.      Hernia: No hernia is present.      Comments: No hernia was identified but exam was not adequate due to body habitus.   Skin:     Capillary Refill: Capillary refill takes more than 3 seconds.   Neurological:      Mental Status: He is alert and oriented to person, place, and time.             Assessment:       1. Right inguinal pain    2. Other specified injuries of right thigh, initial encounter          Plan:           Right inguinal pain  -     US Soft Tissue, Groin Right; Future; Expected date: 08/05/2020  A hernia was not identified on exam will get US to ensure there is no hernia.  Other specified injuries of right thigh, initial encounter   -     US Soft Tissue, Groin Right; Future; Expected date: 08/05/2020

## 2020-08-11 ENCOUNTER — TELEPHONE (OUTPATIENT)
Dept: DERMATOLOGY | Facility: CLINIC | Age: 75
End: 2020-08-11

## 2020-08-11 NOTE — TELEPHONE ENCOUNTER
Returned pt call and scheduled his 6 month f/u.    ----- Message from Norberto Jackson sent at 8/10/2020  4:53 PM CDT -----  Regarding: pt  Type: Needs Medical Advice    Who Called:  pt    Best Call Back Number: [phone]    Additional Information: pt got letter stating time for follow up. Did not have date or time or reason for follow up. Please call to let know if needs to be scheduled.

## 2020-08-13 ENCOUNTER — HOSPITAL ENCOUNTER (OUTPATIENT)
Dept: RADIOLOGY | Facility: HOSPITAL | Age: 75
Discharge: HOME | End: 2020-08-13
Attending: FAMILY MEDICINE
Payer: MEDICARE

## 2020-08-13 DIAGNOSIS — R10.31 RIGHT INGUINAL PAIN: ICD-10-CM

## 2020-08-13 DIAGNOSIS — S79.821A OTHER SPECIFIED INJURIES OF RIGHT THIGH, INITIAL ENCOUNTER: ICD-10-CM

## 2020-08-13 PROCEDURE — 76882 US LMTD JT/FCL EVL NVASC XTR: CPT | Mod: TC,PO,RT

## 2020-08-17 ENCOUNTER — HOSPITAL ENCOUNTER (OUTPATIENT)
Facility: HOSPITAL | Age: 75
Discharge: HOME | End: 2020-08-19
Attending: EMERGENCY MEDICINE | Admitting: HOSPITALIST
Payer: MEDICARE

## 2020-08-17 DIAGNOSIS — G45.9 TIA (TRANSIENT ISCHEMIC ATTACK): ICD-10-CM

## 2020-08-17 DIAGNOSIS — I48.91 A-FIB: ICD-10-CM

## 2020-08-17 DIAGNOSIS — R29.898 LEFT ARM WEAKNESS: Primary | ICD-10-CM

## 2020-08-17 DIAGNOSIS — I48.91 NEW ONSET A-FIB: ICD-10-CM

## 2020-08-17 DIAGNOSIS — I63.9 CVA (CEREBRAL VASCULAR ACCIDENT): ICD-10-CM

## 2020-08-17 DIAGNOSIS — R47.1 DYSARTHRIA: ICD-10-CM

## 2020-08-17 PROBLEM — E83.51 HYPOCALCEMIA: Status: ACTIVE | Noted: 2020-08-17

## 2020-08-17 PROBLEM — N18.9 CKD (CHRONIC KIDNEY DISEASE): Status: ACTIVE | Noted: 2020-08-17

## 2020-08-17 PROBLEM — E78.00 HYPERCHOLESTEREMIA: Status: ACTIVE | Noted: 2020-08-17

## 2020-08-17 LAB
ALBUMIN SERPL BCP-MCNC: 3.7 G/DL (ref 3.5–5.2)
ALP SERPL-CCNC: 72 U/L (ref 55–135)
ALT SERPL W/O P-5'-P-CCNC: 15 U/L (ref 10–44)
ANION GAP SERPL CALC-SCNC: 13 MMOL/L (ref 8–16)
AST SERPL-CCNC: 15 U/L (ref 10–40)
BASOPHILS # BLD AUTO: 0.08 K/UL (ref 0–0.2)
BASOPHILS NFR BLD: 0.9 % (ref 0–1.9)
BILIRUB SERPL-MCNC: 0.6 MG/DL (ref 0.1–1)
BILIRUB UR QL STRIP: NEGATIVE
BUN SERPL-MCNC: 13 MG/DL (ref 8–23)
CALCIUM SERPL-MCNC: 8.5 MG/DL (ref 8.7–10.5)
CHLORIDE SERPL-SCNC: 107 MMOL/L (ref 95–110)
CHOLEST SERPL-MCNC: 164 MG/DL (ref 120–199)
CHOLEST/HDLC SERPL: 4.8 {RATIO} (ref 2–5)
CLARITY UR: CLEAR
CO2 SERPL-SCNC: 22 MMOL/L (ref 23–29)
COLOR UR: YELLOW
CREAT SERPL-MCNC: 1.3 MG/DL (ref 0.5–1.4)
DIFFERENTIAL METHOD: ABNORMAL
EOSINOPHIL # BLD AUTO: 0.4 K/UL (ref 0–0.5)
EOSINOPHIL NFR BLD: 4.6 % (ref 0–8)
ERYTHROCYTE [DISTWIDTH] IN BLOOD BY AUTOMATED COUNT: 15.1 % (ref 11.5–14.5)
EST. GFR  (AFRICAN AMERICAN): >60 ML/MIN/1.73 M^2
EST. GFR  (NON AFRICAN AMERICAN): 53 ML/MIN/1.73 M^2
GLUCOSE SERPL-MCNC: 110 MG/DL (ref 70–110)
GLUCOSE UR QL STRIP: NEGATIVE
HCT VFR BLD AUTO: 38.6 % (ref 40–54)
HDLC SERPL-MCNC: 34 MG/DL (ref 40–75)
HDLC SERPL: 20.7 % (ref 20–50)
HGB BLD-MCNC: 12.9 G/DL (ref 14–18)
HGB UR QL STRIP: NEGATIVE
IMM GRANULOCYTES # BLD AUTO: 0.02 K/UL (ref 0–0.04)
IMM GRANULOCYTES NFR BLD AUTO: 0.2 % (ref 0–0.5)
KETONES UR QL STRIP: NEGATIVE
LDLC SERPL CALC-MCNC: 106.2 MG/DL (ref 63–159)
LEUKOCYTE ESTERASE UR QL STRIP: NEGATIVE
LYMPHOCYTES # BLD AUTO: 2 K/UL (ref 1–4.8)
LYMPHOCYTES NFR BLD: 23.2 % (ref 18–48)
MCH RBC QN AUTO: 32.6 PG (ref 27–31)
MCHC RBC AUTO-ENTMCNC: 33.4 G/DL (ref 32–36)
MCV RBC AUTO: 98 FL (ref 82–98)
MONOCYTES # BLD AUTO: 0.9 K/UL (ref 0.3–1)
MONOCYTES NFR BLD: 10.2 % (ref 4–15)
NEUTROPHILS # BLD AUTO: 5.3 K/UL (ref 1.8–7.7)
NEUTROPHILS NFR BLD: 60.9 % (ref 38–73)
NITRITE UR QL STRIP: NEGATIVE
NONHDLC SERPL-MCNC: 130 MG/DL
NRBC BLD-RTO: 0 /100 WBC
PH UR STRIP: 8 [PH] (ref 5–8)
PLATELET # BLD AUTO: 211 K/UL (ref 150–350)
PMV BLD AUTO: 10.8 FL (ref 9.2–12.9)
POCT GLUCOSE: 111 MG/DL (ref 70–110)
POTASSIUM SERPL-SCNC: 3.7 MMOL/L (ref 3.5–5.1)
PROT SERPL-MCNC: 6.9 G/DL (ref 6–8.4)
PROT UR QL STRIP: ABNORMAL
RBC # BLD AUTO: 3.96 M/UL (ref 4.6–6.2)
SARS-COV-2 RDRP RESP QL NAA+PROBE: NEGATIVE
SODIUM SERPL-SCNC: 142 MMOL/L (ref 136–145)
SP GR UR STRIP: 1.02 (ref 1–1.03)
TRIGL SERPL-MCNC: 119 MG/DL (ref 30–150)
TSH SERPL DL<=0.005 MIU/L-ACNC: 3.71 UIU/ML (ref 0.4–4)
URN SPEC COLLECT METH UR: ABNORMAL
UROBILINOGEN UR STRIP-ACNC: 1 EU/DL
WBC # BLD AUTO: 8.63 K/UL (ref 3.9–12.7)

## 2020-08-17 PROCEDURE — 80053 COMPREHEN METABOLIC PANEL: CPT

## 2020-08-17 PROCEDURE — G0378 HOSPITAL OBSERVATION PER HR: HCPCS

## 2020-08-17 PROCEDURE — U0002 COVID-19 LAB TEST NON-CDC: HCPCS

## 2020-08-17 PROCEDURE — 84443 ASSAY THYROID STIM HORMONE: CPT

## 2020-08-17 PROCEDURE — 84300 ASSAY OF URINE SODIUM: CPT

## 2020-08-17 PROCEDURE — 93005 ELECTROCARDIOGRAM TRACING: CPT

## 2020-08-17 PROCEDURE — 81003 URINALYSIS AUTO W/O SCOPE: CPT

## 2020-08-17 PROCEDURE — 82962 GLUCOSE BLOOD TEST: CPT

## 2020-08-17 PROCEDURE — 80061 LIPID PANEL: CPT

## 2020-08-17 PROCEDURE — 36415 COLL VENOUS BLD VENIPUNCTURE: CPT

## 2020-08-17 PROCEDURE — 99285 EMERGENCY DEPT VISIT HI MDM: CPT | Mod: 25

## 2020-08-17 PROCEDURE — 85025 COMPLETE CBC W/AUTO DIFF WBC: CPT

## 2020-08-17 PROCEDURE — 84156 ASSAY OF PROTEIN URINE: CPT

## 2020-08-17 PROCEDURE — 82570 ASSAY OF URINE CREATININE: CPT

## 2020-08-18 ENCOUNTER — ANESTHESIA EVENT (OUTPATIENT)
Dept: CARDIOLOGY | Facility: HOSPITAL | Age: 75
End: 2020-08-18
Payer: MEDICARE

## 2020-08-18 ENCOUNTER — ANESTHESIA (OUTPATIENT)
Dept: CARDIOLOGY | Facility: HOSPITAL | Age: 75
End: 2020-08-18
Payer: MEDICARE

## 2020-08-18 ENCOUNTER — TELEPHONE (OUTPATIENT)
Dept: DERMATOLOGY | Facility: CLINIC | Age: 75
End: 2020-08-18

## 2020-08-18 LAB
ALBUMIN SERPL BCP-MCNC: 3.5 G/DL (ref 3.5–5.2)
ALP SERPL-CCNC: 70 U/L (ref 55–135)
ALT SERPL W/O P-5'-P-CCNC: 14 U/L (ref 10–44)
ANION GAP SERPL CALC-SCNC: 9 MMOL/L (ref 8–16)
APTT BLDCRRT: 31.2 SEC (ref 21–32)
AST SERPL-CCNC: 15 U/L (ref 10–40)
BASOPHILS # BLD AUTO: 0.07 K/UL (ref 0–0.2)
BASOPHILS NFR BLD: 0.7 % (ref 0–1.9)
BILIRUB SERPL-MCNC: 0.6 MG/DL (ref 0.1–1)
BNP SERPL-MCNC: 320 PG/ML (ref 0–99)
BUN SERPL-MCNC: 12 MG/DL (ref 8–23)
CALCIUM SERPL-MCNC: 8.4 MG/DL (ref 8.7–10.5)
CHLORIDE SERPL-SCNC: 105 MMOL/L (ref 95–110)
CK MB SERPL-MCNC: 2.4 NG/ML (ref 0.1–6.5)
CK MB SERPL-RTO: 3.3 % (ref 0–5)
CK SERPL-CCNC: 72 U/L (ref 20–200)
CO2 SERPL-SCNC: 27 MMOL/L (ref 23–29)
CREAT SERPL-MCNC: 1.3 MG/DL (ref 0.5–1.4)
CREAT UR-MCNC: 61 MG/DL (ref 23–375)
DIFFERENTIAL METHOD: ABNORMAL
EOSINOPHIL # BLD AUTO: 0.6 K/UL (ref 0–0.5)
EOSINOPHIL NFR BLD: 6.5 % (ref 0–8)
ERYTHROCYTE [DISTWIDTH] IN BLOOD BY AUTOMATED COUNT: 15 % (ref 11.5–14.5)
EST. GFR  (AFRICAN AMERICAN): >60 ML/MIN/1.73 M^2
EST. GFR  (NON AFRICAN AMERICAN): 53 ML/MIN/1.73 M^2
ESTIMATED AVG GLUCOSE: 114 MG/DL (ref 68–131)
GLUCOSE SERPL-MCNC: 96 MG/DL (ref 70–110)
HBA1C MFR BLD HPLC: 5.6 % (ref 4–5.6)
HCT VFR BLD AUTO: 38.4 % (ref 40–54)
HGB BLD-MCNC: 12.8 G/DL (ref 14–18)
IMM GRANULOCYTES # BLD AUTO: 0.03 K/UL (ref 0–0.04)
IMM GRANULOCYTES NFR BLD AUTO: 0.3 % (ref 0–0.5)
INR PPP: 1.1 (ref 0.8–1.2)
LYMPHOCYTES # BLD AUTO: 3.2 K/UL (ref 1–4.8)
LYMPHOCYTES NFR BLD: 33.4 % (ref 18–48)
MAGNESIUM SERPL-MCNC: 2.4 MG/DL (ref 1.6–2.6)
MCH RBC QN AUTO: 32.6 PG (ref 27–31)
MCHC RBC AUTO-ENTMCNC: 33.3 G/DL (ref 32–36)
MCV RBC AUTO: 98 FL (ref 82–98)
MONOCYTES # BLD AUTO: 1 K/UL (ref 0.3–1)
MONOCYTES NFR BLD: 10.1 % (ref 4–15)
NEUTROPHILS # BLD AUTO: 4.7 K/UL (ref 1.8–7.7)
NEUTROPHILS NFR BLD: 49 % (ref 38–73)
NRBC BLD-RTO: 0 /100 WBC
PHOSPHATE SERPL-MCNC: 3.3 MG/DL (ref 2.7–4.5)
PLATELET # BLD AUTO: 185 K/UL (ref 150–350)
PMV BLD AUTO: 10.8 FL (ref 9.2–12.9)
POTASSIUM SERPL-SCNC: 3.5 MMOL/L (ref 3.5–5.1)
PROT SERPL-MCNC: 6.4 G/DL (ref 6–8.4)
PROT UR-MCNC: 23 MG/DL (ref 0–15)
PROTHROMBIN TIME: 11.8 SEC (ref 9–12.5)
RBC # BLD AUTO: 3.93 M/UL (ref 4.6–6.2)
SODIUM SERPL-SCNC: 141 MMOL/L (ref 136–145)
SODIUM UR-SCNC: 114 MMOL/L (ref 20–250)
TROPONIN I SERPL DL<=0.01 NG/ML-MCNC: 0.01 NG/ML (ref 0–0.03)
WBC # BLD AUTO: 9.57 K/UL (ref 3.9–12.7)

## 2020-08-18 PROCEDURE — 80053 COMPREHEN METABOLIC PANEL: CPT

## 2020-08-18 PROCEDURE — 92610 EVALUATE SWALLOWING FUNCTION: CPT

## 2020-08-18 PROCEDURE — 97802 MEDICAL NUTRITION INDIV IN: CPT

## 2020-08-18 PROCEDURE — 85730 THROMBOPLASTIN TIME PARTIAL: CPT

## 2020-08-18 PROCEDURE — G0378 HOSPITAL OBSERVATION PER HR: HCPCS | Mod: CS

## 2020-08-18 PROCEDURE — 83880 ASSAY OF NATRIURETIC PEPTIDE: CPT

## 2020-08-18 PROCEDURE — 83735 ASSAY OF MAGNESIUM: CPT

## 2020-08-18 PROCEDURE — 85025 COMPLETE CBC W/AUTO DIFF WBC: CPT

## 2020-08-18 PROCEDURE — 94761 N-INVAS EAR/PLS OXIMETRY MLT: CPT

## 2020-08-18 PROCEDURE — 99900039 HC SLP GENERIC THERAPY SCREENING (STAT)

## 2020-08-18 PROCEDURE — 84100 ASSAY OF PHOSPHORUS: CPT

## 2020-08-18 PROCEDURE — 97165 OT EVAL LOW COMPLEX 30 MIN: CPT

## 2020-08-18 PROCEDURE — 97161 PT EVAL LOW COMPLEX 20 MIN: CPT

## 2020-08-18 PROCEDURE — G0378 HOSPITAL OBSERVATION PER HR: HCPCS

## 2020-08-18 PROCEDURE — 82550 ASSAY OF CK (CPK): CPT

## 2020-08-18 PROCEDURE — 83036 HEMOGLOBIN GLYCOSYLATED A1C: CPT

## 2020-08-18 PROCEDURE — 25000003 PHARM REV CODE 250: Performed by: NURSE PRACTITIONER

## 2020-08-18 PROCEDURE — 97116 GAIT TRAINING THERAPY: CPT

## 2020-08-18 PROCEDURE — 82553 CREATINE MB FRACTION: CPT

## 2020-08-18 PROCEDURE — 84484 ASSAY OF TROPONIN QUANT: CPT

## 2020-08-18 PROCEDURE — 36415 COLL VENOUS BLD VENIPUNCTURE: CPT

## 2020-08-18 PROCEDURE — 85610 PROTHROMBIN TIME: CPT

## 2020-08-18 RX ORDER — SODIUM CHLORIDE 0.9 % (FLUSH) 0.9 %
10 SYRINGE (ML) INJECTION
Status: DISCONTINUED | OUTPATIENT
Start: 2020-08-18 | End: 2020-08-19 | Stop reason: HOSPADM

## 2020-08-18 RX ORDER — CHOLECALCIFEROL (VITAMIN D3) 25 MCG
1000 TABLET ORAL DAILY
Status: DISCONTINUED | OUTPATIENT
Start: 2020-08-18 | End: 2020-08-19 | Stop reason: HOSPADM

## 2020-08-18 RX ORDER — CLOPIDOGREL BISULFATE 75 MG/1
75 TABLET ORAL DAILY
Status: DISCONTINUED | OUTPATIENT
Start: 2020-08-18 | End: 2020-08-19 | Stop reason: HOSPADM

## 2020-08-18 RX ORDER — LABETALOL HYDROCHLORIDE 5 MG/ML
10 INJECTION, SOLUTION INTRAVENOUS
Status: DISCONTINUED | OUTPATIENT
Start: 2020-08-18 | End: 2020-08-19 | Stop reason: HOSPADM

## 2020-08-18 RX ORDER — EZETIMIBE 10 MG/1
10 TABLET ORAL DAILY
Status: DISCONTINUED | OUTPATIENT
Start: 2020-08-18 | End: 2020-08-19 | Stop reason: HOSPADM

## 2020-08-18 RX ORDER — ONDANSETRON 2 MG/ML
4 INJECTION INTRAMUSCULAR; INTRAVENOUS EVERY 12 HOURS PRN
Status: DISCONTINUED | OUTPATIENT
Start: 2020-08-18 | End: 2020-08-19 | Stop reason: HOSPADM

## 2020-08-18 RX ORDER — FAMOTIDINE 20 MG/1
20 TABLET, FILM COATED ORAL 2 TIMES DAILY
Status: DISCONTINUED | OUTPATIENT
Start: 2020-08-18 | End: 2020-08-19 | Stop reason: HOSPADM

## 2020-08-18 RX ORDER — ATORVASTATIN CALCIUM 40 MG/1
40 TABLET, FILM COATED ORAL DAILY
Status: DISCONTINUED | OUTPATIENT
Start: 2020-08-18 | End: 2020-08-19 | Stop reason: HOSPADM

## 2020-08-18 RX ORDER — TAMSULOSIN HYDROCHLORIDE 0.4 MG/1
0.4 CAPSULE ORAL DAILY
Status: DISCONTINUED | OUTPATIENT
Start: 2020-08-18 | End: 2020-08-19 | Stop reason: HOSPADM

## 2020-08-18 RX ORDER — NAPROXEN SODIUM 220 MG/1
81 TABLET, FILM COATED ORAL DAILY
Status: DISCONTINUED | OUTPATIENT
Start: 2020-08-18 | End: 2020-08-19 | Stop reason: HOSPADM

## 2020-08-18 RX ADMIN — FAMOTIDINE 20 MG: 20 TABLET ORAL at 10:08

## 2020-08-18 RX ADMIN — CHOLECALCIFEROL TAB 25 MCG (1000 UNIT) 1000 UNITS: 25 TAB at 10:08

## 2020-08-18 RX ADMIN — EZETIMIBE 10 MG: 10 TABLET ORAL at 10:08

## 2020-08-18 RX ADMIN — FAMOTIDINE 20 MG: 20 TABLET ORAL at 08:08

## 2020-08-18 RX ADMIN — CLOPIDOGREL BISULFATE 75 MG: 75 TABLET, FILM COATED ORAL at 10:08

## 2020-08-18 RX ADMIN — TAMSULOSIN HYDROCHLORIDE 0.4 MG: 0.4 CAPSULE ORAL at 10:08

## 2020-08-18 RX ADMIN — ASPIRIN 81 MG 81 MG: 81 TABLET ORAL at 10:08

## 2020-08-18 RX ADMIN — ATORVASTATIN CALCIUM 40 MG: 40 TABLET, FILM COATED ORAL at 10:08

## 2020-08-18 NOTE — TELEPHONE ENCOUNTER
Left detailed message that your appointment with Dr. Hedrick on 9/8/2020 has been cancelled. Dr. Hedrick will no longer be in our [place] office after 8/28/2020. If you choose to follow him to [place], your appointment can be rescheduled or can reschedule with another provider if you to stay in our [place] [facility]. Please call the Dermatology clinic at [phone]. Thank you. Marisela Monique RN-JERONIMO

## 2020-08-18 NOTE — ASSESSMENT & PLAN NOTE
CT head negative for acute process. Multiple remote lacunar infarcts. Findings compatible with microvascular ischemic changes.   Patient given ASA in ED, continue ASA daily.  MRI/MRA head ordered.  Carotid U/S ordered.  [facility] for evaluation of intra-cardiac thrombus or valvular heart disease.  Lipid panel and Hemoglobin A1C ordered.  Consult neurology.  Spoke with Dr. Steve Dotson who recommends waiting for MRI results before initiating anticoagulation.  Neuro checks q4 hrs.  Fall precautions.   Aspiration precautions.  SLP/PT/OT  NPO until bedside FLORENTIN passed or SLP evaulation

## 2020-08-18 NOTE — ASSESSMENT & PLAN NOTE
Appears patient likely has CKD based on previous lab values.  Urinalysis ordered.  Creatine stable for now. BMP reviewed- noted Estimated Creatinine Clearance: 53 mL/min (based on SCr of 1.3 mg/dL). according to latest data. Monitor UOP and serial BMP and adjust therapy as needed. Renally dose meds.

## 2020-08-18 NOTE — PT/OT/SLP EVAL
Physical Therapy Evaluation and Discharge Note    Patient Name:  Tong Ford   MRN:  [id number]    Recommendations:     Discharge Recommendations:  home   Discharge Equipment Recommendations: none   Barriers to discharge: None    Assessment:     Tong Ford is a 75 y.o. male admitted with a medical diagnosis of CVA (cerebral vascular accident). Patient is agreeable to participation with PT evaluation. He reports 100% resolution of symptoms today. He lives alone in [place], but his daughter lives in [place] and can help as needed. He does not use an AD for gait. Today he requires supervision for transfers. He ambulated 250' with no AD and SBA with no LOB. He reports gait is at baseline. At this time, patient is functioning at their prior level of function and does not require further acute PT services.     Recent Surgery: Procedure(s) (LRB):  Transesophageal echo ([facility]) intra-procedure log documentation (N/A)      Plan:     During this hospitalization, patient does not require further acute PT services.  Please re-consult if situation changes.      Subjective     Chief Complaint: needs to use bathroom  Patient/Family Comments/goals: use bathroom   Pain/Comfort:  · Pain Rating 1: 0/10    Patients cultural, spiritual, Ep[employer]alian conflicts given the current situation:      Living Environment:  Patient lives alone with 14 stairs to enter and holds on to the right wall   Prior to admission, patients level of function was independent. He reports a fall ~1 month ago stepping off of a boat on to land. He denies any recent PT. Equipment used at home: CPAP.  DME owned (not currently used): none.  Upon discharge, patient will have assistance from daughter.    Objective:     Communicated with GILDA Cano prior to session.  Patient found HOB elevated with telemetry, SCD upon PT entry to room.    General Precautions: Standard, fall, NPO   Orthopedic Precautions:N/A   Braces: N/A     Exams:  · Cognitive Exam:  Patient is oriented to  Person, Place, Time and Situation  · Fine Motor Coordination:    · -       Intact  Left hand, finger to nose, Right hand, finger to nose, Left hand thumb/finger opposition skills, Right hand thumb/finger opposition skills, Left hand, diadochokinesis skill  and Right hand, diadochokinesis skill   · RUE ROM: WFL  · LUE ROM: WFL  · RLE Strength: 4/5  · LLE Strength: 4/5    Functional Mobility:  · Bed Mobility:     · Supine to Sit: supervision  · Transfers:     · Sit to Stand:  supervision with no AD  · Gait: 250' no AD, SBA, no LOB  · Balance: Good    AM-PAC 6 CLICK MOBILITY  Total Score:24       Therapeutic Activities and Exercises:   Patient was educated on the importance of OOB activity and functional mobility to negate negative effects of prolonged bed rest during hospitalization, safe transfers and ambulation, and D/C planning     AM-PAC 6 CLICK MOBILITY  Total Score:24     Patient left up in chair with all lines intact, call button in reach, bed alarm on and RN notified.    GOALS:   Multidisciplinary Problems     Physical Therapy Goals     Not on file                History:     Past Medical History:   Diagnosis Date    Arthritis     Cataract     Coronary artery disease     Encounter for blood transfusion     Hypercholesterolemia     Hypertension        Past Surgical History:   Procedure Laterality Date    CORONARY ARTERY BYPASS GRAFT      EYE SURGERY      VASECTOMY         Time Tracking:     PT Received On: 08/18/20  PT Start Time: 1012     PT Stop Time: 1030  PT Total Time (min): 18 min     Billable Minutes: Evaluation 8 and Gait Training 10      Frida Mcclellan, PT  08/18/2020

## 2020-08-18 NOTE — PLAN OF CARE
Patient alert and oriented. Denies any needs at this time.      08/18/20 0929   Discharge Assessment   Assessment Type Discharge Planning Assessment   Confirmed/corrected address and phone number on facesheet? Yes   Assessment information obtained from? Patient   Expected Length of Stay (days) 2   Communicated expected length of stay with patient/caregiver yes   Prior to hospitilization cognitive status: Alert/Oriented   Prior to hospitalization functional status: Independent   Current cognitive status: Alert/Oriented   Current Functional Status: Independent   Lives With alone   Able to Return to Prior Arrangements yes   Is patient able to care for self after discharge? Unable to determine at this time (comments)   Who are your caregiver(s) and their phone number(s)? Daniella Mendoza, daughter [phone]   Patient's perception of discharge disposition home or selfcare   Readmission Within the Last 30 Days no previous admission in last 30 days   Patient currently being followed by outpatient case management? No   Patient currently receives any other outside agency services? No   Equipment Currently Used at Home CPAP   Do you have any problems affording any of your prescribed medications? No   Is the patient taking medications as prescribed? yes   Does the patient have transportation home? Yes   Transportation Anticipated family or friend will provide   Dialysis Name and Scheduled days na   Does the patient receive services at the [facility]mad[facility] Clinic? No   Discharge Plan A Home   Discharge Plan B Home

## 2020-08-18 NOTE — SUBJECTIVE & OBJECTIVE
Past Medical History:   Diagnosis Date    Arthritis     Cataract     Coronary artery disease     Encounter for blood transfusion     Hypercholesterolemia     Hypertension        Past Surgical History:   Procedure Laterality Date    CORONARY ARTERY BYPASS GRAFT      EYE SURGERY      VASECTOMY         Review of patient's allergies indicates:  No Known Allergies    No current facility-administered medications on file prior to encounter.      Current Outpatient Medications on File Prior to Encounter   Medication Sig    amLODIPine (NORVASC) 5 MG tablet Take 1 tablet (5 mg total) by mouth once daily.    ascorbic acid, vitamin C, (VITAMIN C) 1000 MG tablet Take 1,000 mg by mouth once daily.    aspirin 81 MG Chew Take 81 mg by mouth once daily.    clopidogrel (PLAVIX) 75 mg tablet TAKE 1 TABLET DAILY    coQ10, ubiquinol, 100 mg Cap Take 150 mg by mouth 2 (two) times daily.    doxazosin (CARDURA) 8 MG Tab TAKE 1 TABLET DAILY    ergocalciferol, vitamin D2, (VITAMIN D ORAL) Take 500 Units by mouth Daily.    ezetimibe (ZETIA) 10 mg tablet Take 1 tablet (10 mg total) by mouth once daily.    furosemide (LASIX) 40 MG tablet TAKE 1 TABLET TWICE A DAY    garlic 1,000 mg Cap Take by mouth.    krill-om-3-dha-epa-phospho-ast (KRILL OIL) 1,023-370-61-80 mg Cap Take by mouth.    lisinopril (PRINIVIL,ZESTRIL) 20 MG tablet TAKE 1 TABLET TWICE A DAY (Patient taking differently: 40 mg. )    metoprolol succinate (TOPROL-XL) 50 MG 24 hr tablet TAKE 1 TABLET DAILY    tamsulosin (FLOMAX) 0.4 mg Cap Take 1 capsule (0.4 mg total) by mouth once daily.    UNABLE TO FIND R-Alpha Lipoic Acid 150mg    UNABLE TO FIND Oat straw extract,passion flower, and skullcap root extract    UNABLE TO FIND Arterin    UNABLE TO FIND Niachinamide 250mg    UNABLE TO FIND Blood sugar ultra (supplement)    nitroGLYCERIN (NITROSTAT) 0.4 MG SL tablet DISSOLVE 1 TABLET UNDER THE TONGUE EVERY 5 MINUTES AS NEEDED FOR CHEST PAIN     Family History      Problem Relation (Age of Onset)    COPD Paternal Grandmother    Cancer Mother, Father, Maternal Grandmother    Hypertension Father        Tobacco Use    Smoking status: Never Smoker    Smokeless tobacco: Never Used   Substance and Sexual Activity    Alcohol use: Yes     Comment: rare social occasions    Drug use: No    Sexual activity: Never     Review of Systems   Constitutional: Negative for activity change, appetite change, chills, fatigue and fever.   HENT: Negative for congestion, ear pain, rhinorrhea, sneezing and sore throat.    Eyes: Negative for photophobia and visual disturbance.   Respiratory: Positive for shortness of breath (Chronic). Negative for cough and wheezing.    Cardiovascular: Positive for leg swelling. Negative for chest pain and palpitations.   Gastrointestinal: Negative for abdominal distention, abdominal pain, constipation, diarrhea, nausea and vomiting.   Genitourinary: Negative for dysuria, flank pain, frequency and hematuria.   Musculoskeletal: Negative for arthralgias, gait problem, myalgias and neck pain.   Skin: Negative for color change, rash and wound.   Neurological: Positive for speech difficulty, weakness and numbness ( tingling). Negative for dizziness, facial asymmetry and headaches.   Hematological: Bruises/bleeds easily.   Psychiatric/Behavioral: Negative for agitation, confusion and hallucinations. The patient is not nervous/anxious.      Objective:     Vital Signs (Most Recent):  Temp: 97.1 °F (36.2 °C) (08/18/20 0008)  Pulse: 64 (08/18/20 0008)  Resp: 20 (08/18/20 0008)  BP: (!) 166/81 (08/18/20 0008)  SpO2: 96 % (08/18/20 0008) Vital Signs (24h Range):  Temp:  [97.1 °F (36.2 °C)] 97.1 °F (36.2 °C)  Pulse:  [64-77] 64  Resp:  [16-20] 20  SpO2:  [95 %-97 %] 96 %  BP: (150-177)/(79-83) 166/81     Weight: 96.2 kg (212 lb 1.3 oz)  Body mass index is 32.25 kg/m².    Physical Exam  Vitals signs and nursing note reviewed.   Constitutional:       General: He is not in  acute distress.     Appearance: He is well-developed. He is not diaphoretic.   HENT:      Head: Normocephalic and atraumatic.      Right Ear: External ear normal.      Left Ear: External ear normal.   Eyes:      General: No visual field deficit.     Pupils: Pupils are equal, round, and reactive to light.   Neck:      Musculoskeletal: Normal range of motion.      Vascular: No JVD.   Cardiovascular:      Rate and Rhythm: Normal rate. Rhythm irregularly irregular.      Heart sounds: No murmur.   Pulmonary:      Effort: Pulmonary effort is normal. No respiratory distress.      Breath sounds: Normal breath sounds. No wheezing.   Abdominal:      General: Bowel sounds are normal. There is no distension.      Palpations: Abdomen is soft.      Tenderness: There is no abdominal tenderness.   Genitourinary:     Comments: Not examined  Musculoskeletal: Normal range of motion.         General: No deformity.      Right lower leg: Edema present.      Left lower leg: Edema present.   Skin:     General: Skin is warm and dry.      Capillary Refill: Capillary refill takes less than 2 seconds.   Neurological:      General: No focal deficit present.      Mental Status: He is alert and oriented to person, place, and time.      Cranial Nerves: No dysarthria or facial asymmetry.      Sensory: No sensory deficit.      Motor: No weakness, tremor or seizure activity.      Coordination: Coordination normal.      Comments: Patient following all commands   Psychiatric:         Mood and Affect: Mood normal.         Behavior: Behavior normal.         Thought Content: Thought content normal.         Judgment: Judgment normal.           CRANIAL NERVES     CN III, IV, VI   Pupils are equal, round, and reactive to light.       Significant Labs:   CBC:   Recent Labs   Lab 08/17/20  2155   WBC 8.63   HGB 12.9*   HCT 38.6*        CMP:   Recent Labs   Lab 08/17/20  2155      K 3.7      CO2 22*      BUN 13   CREATININE 1.3    CALCIUM 8.5*   PROT 6.9   ALBUMIN 3.7   BILITOT 0.6   ALKPHOS 72   AST 15   ALT 15   ANIONGAP 13   EGFRNONAA 53*     Lipid Panel:   Recent Labs   Lab 08/17/20  2155   CHOL 164   HDL 34*   LDLCALC 106.2   TRIG 119   CHOLHDL 20.7     Urine Studies:   Recent Labs   Lab 08/17/20  2330   COLORU Yellow   APPEARANCEUA Clear   PHUR 8.0   SPECGRAV 1.020   PROTEINUA Trace*   GLUCUA Negative   KETONESU Negative   BILIRUBINUA Negative   OCCULTUA Negative   NITRITE Negative   UROBILINOGEN 1.0   LEUKOCYTESUR Negative     All pertinent labs within the past 24 hours have been reviewed.    Significant Imaging: I have reviewed and interpreted all pertinent imaging results/findings within the past 24 hours.

## 2020-08-18 NOTE — ED NOTES
Assumed care:  Tong Ford is awake, alert and oriented x 3, skin warm and dry, in NAD.  Patient states that he was at home and bent over.  States that his left arm would not move and had weakness to left arm.  States that he was confused for a couple of minutes.  Patient states that all symptoms have resolved by the time EMS arrived.      Patient identifiers for Tong Ford checked and correct.  LOC:  Tong Ford is awake, alert, and aware of environment with an appropriate affect. He is oriented x 3 and speaking appropriately.  APPEARANCE:  He is resting comfortably and in no acute distress. He is clean and well groomed, patient's clothing is properly fastened.  SKIN:  The skin is warm and dry. He has normal skin turgor and moist mucus membranes. Skin is intact; no bruising or breakdown noted.  MUSCULOSKELETAL:  He is moving all extremities well, no obvious deformities noted. Pulses intact.   RESPIRATORY:  Airway is open and patent. Respirations are spontaneous and non-labored with normal effort and rate.  CARDIAC:  He has a normal rate and rhythm. No peripheral edema noted. Capillary refill < 3 seconds.  ABDOMEN:  No distention noted.  Soft and non-tender upon palpation.  NEUROLOGICAL:  PERRL. Facial expression is symmetrical. Hand grasps are equal bilaterally. Normal sensation in all extremities when touched with finger.  Allergies reported:  Review of patient's allergies indicates:  No Known Allergies  OTHER NOTES:

## 2020-08-18 NOTE — PLAN OF CARE
Problem: SLP Goal  Goal: SLP Goal  Description:   1) Participate in clinical swallow evaluation  Outcome: Ongoing, Progressing     Clinical swallow eval completed this afternoon. All PO trials consumed with no overt s/s swallow dysfunction. He presents AAOx4, speech clear and fluent. MoCA completed with pt achieving score of 26/30 suggesting cognitive linguistic skills WFL. No follow up indicated at this time.

## 2020-08-18 NOTE — CONSULTS
"  [facility]  Adult Nutrition  Consult Note    SUMMARY   Intervention: collaboration with care providers    Recommendation:    1) Advance PO diet to goal of cardiac   2) Weigh pt weekly   3) nutrition education and handouts given    Goals: 1) PO diet advanced in < 4 days  Nutrition Goal Status: new  Communication of RD Recs: (POC, sticky note, second sign)    Reason for Assessment    Reason For Assessment: consult  Diagnosis: (CVA)  Relevant Medical History: CKD, AFIB, HTN, CAD, arthritis, gout, hypercholesterolemia  Interdisciplinary Rounds: attended    General Information Comments: 76 y/o male admitted for stroke work up. NPO x 1 day. SLP rec. regular texture/thin liquids. NFPE done 20, no wasting seen. Eating well PTA. HDL depleted, educated pt on diet s/p stroke and ways to raise HDL. Insignificant wt loss per chart review.    Nutrition Discharge Planning: to be determined- cardiac diet    Nutrition Risk Screen    Nutrition Risk Screen: no indicators present    Nutrition/Diet History    Patient Reported Diet/Restrictions/Preferences: general  Food Preferences: uses olive oil to cook  Spiritual, Cultural Beliefs, Mormon Practices, Values that Affect Care: no  Food Allergies: NKFA  Factors Affecting Nutritional Intake: NPO    Anthropometrics    Temp: 98.3 °F (36.8 °C)  Height Method: Measured(office 20)  Height: 5' 8"  Height (inches): 68 in  Weight Method: Bed Scale  Weight: 96.2 kg (212 lb 1.3 oz)  Weight (lb): 212.08 lb  Ideal Body Weight (IBW), Male: 154 lb  % Ideal Body Weight, Male (lb): 137.71 %  BMI (Calculated): 32.3  BMI Grade: 30 - 34.9- obesity - grade I  Weight Loss: unintentional  Usual Body Weight (UBW), k.6 kg(20)  % Usual Body Weight: 93.05  % Weight Change From Usual Weight: -7.14 %       Lab/Procedures/Meds    Pertinent Labs Reviewed: reviewed  BMP  Lab Results   Component Value Date     2020    K 3.5 2020     2020    CO2 " 27 08/18/2020    BUN 12 08/18/2020    CREATININE 1.3 08/18/2020    CALCIUM 8.4 (L) 08/18/2020    ANIONGAP 9 08/18/2020    ESTGFRAFRICA >60 08/18/2020    EGFRNONAA 53 (A) 08/18/2020     Lab Results   Component Value Date    CHOL 164 08/17/2020    CHOL 274 (H) 01/10/2020     Lab Results   Component Value Date    HDL 34 (L) 08/17/2020    HDL 45 01/10/2020     Lab Results   Component Value Date    LDLCALC 106.2 08/17/2020    LDLCALC 210.0 (H) 01/10/2020     Lab Results   Component Value Date    TRIG 119 08/17/2020    TRIG 95 01/10/2020     Lab Results   Component Value Date    CHOLHDL 20.7 08/17/2020    CHOLHDL 16.4 (L) 01/10/2020     No results found for: IRON, TIBC, FERRITIN, SATURATEDIRO    Pertinent Medications Reviewed: reviewed  Pertinent Medications Comments: statin, Vitamin D, zofran    Estimated/Assessed Needs    Weight Used For Calorie Calculations: 77 kg (169 lb 12.1 oz)(NHANES standard wt)  Energy Calorie Requirements (kcal): 25 kcal/kg ( CKD vs. obesity) = 1925 kcal  Energy Need Method: Kcal/kg  Protein Requirements: 0.8-1.0 g protein/kg ( CKD) = 61-77 g protein  Weight Used For Protein Calculations: 77 kg (169 lb 12.1 oz)  Fluid Requirements (mL): 1900 ml or per MD  Estimated Fluid Requirement Method: RDA Method  CHO Requirement: N/A      Nutrition Prescription Ordered    Current Diet Order: NPO x 1 day    Evaluation of Received Nutrient/Fluid Intake    Energy Calories Required: not meeting needs  Protein Required: not meeting needs  Fluid Required: not meeting needs  Tolerance: other (see comments)(NPO)  % Intake of Estimated Energy Needs: 0%  % Meal Intake: 0%    Nutrition Risk    Level of Risk/Frequency of Follow-up: moderate 2 x weekly    Assessment and Plan    Inadequate energy intake  R/t NPO  As evidenced by PO intakes < 50% EEN x 1 day  Intervention: above  new     Monitor and Evaluation    Food and Nutrient Intake: energy intake, food and beverage intake  Food and Nutrient Adminstration: diet  order  Anthropometric Measurements: weight  Biochemical Data, Medical Tests and Procedures: electrolyte and renal panel, lipid profile  Nutrition-Focused Physical Findings: overall appearance     Malnutrition Assessment     Skin (Micronutrient): (Reagan = 22)   Micronutrient Evaluation: suspected deficiency  Micronutrient Evaluation Comments: check iron               Edema (Fluid Accumulation): 1-->trace(bilateral foot edema)   Subcutaneous Fat Loss (Final Summary): well nourished  Muscle Loss Evaluation (Final Summary): well nourished         Nutrition Follow-Up    RD Follow-up?: Yes

## 2020-08-18 NOTE — ASSESSMENT & PLAN NOTE
Atrial Fibrillation controlled currently with Beta Blocker. [id number] score 6. Anticoagulation indicated currently.  Will await MRI results before initiating anticoagulation.  Continue aspirin and Plavix for now.  Cardiology consulted. MACRINA ordered.  EKG reviewed.

## 2020-08-18 NOTE — ED PROVIDER NOTES
Encounter Date: 8/17/2020    SCRIBE #1 NOTE: I, Rosanna Noble, am scribing for, and in the presence of, Dr. Allen.       History     Chief Complaint   Patient presents with    Weakness     weakness and numbness to left arm with inability to raise that arm. Pt reports it lasting only a few minutes. Completely resolved by the time EMS arrived     8/17/2020  10:27 PM     The patient is a 75 y.o. male  who presents with weakness. Patient reports sudden onset of left arm weakness while reaching over to pick something off of the ground from his computer chair 90 minutes ago. Pt was unable to bring his left arm upwards from the ground nor was he able to  his left hand. Pt also had difficulty forming words and developed tingling sensation in the legs and feet. This lasted for 5-10 minutes. His weakness completely resolved by the time EMS arrived. He denies any headache, dizziness, chest pain, sob or cough. Pt takes a baby asa and plavix daily. PMHx of arthritis, CAD, HTN and hypercholesterolemia. SHx of AAA bypass in 1998, eye surgery, vasectomy.    The history is provided by the patient.     Review of patient's allergies indicates:  No Known Allergies  Past Medical History:   Diagnosis Date    Arthritis     Cataract     Coronary artery disease     Encounter for blood transfusion     Hypercholesterolemia     Hypertension      Past Surgical History:   Procedure Laterality Date    CORONARY ARTERY BYPASS GRAFT      EYE SURGERY      VASECTOMY       Family History   Problem Relation Age of Onset    Cancer Mother     Cancer Father     Hypertension Father     Cancer Maternal Grandmother     COPD Paternal Grandmother      Social History     Tobacco Use    Smoking status: Never Smoker    Smokeless tobacco: Never Used   Substance Use Topics    Alcohol use: Yes     Comment: rare social occasions    Drug use: No     Review of Systems   Constitutional: Negative for appetite change, chills and fever.   HENT:  Negative for congestion, rhinorrhea and sore throat.    Respiratory: Negative for cough and shortness of breath.    Cardiovascular: Negative for chest pain.   Gastrointestinal: Negative for abdominal pain, diarrhea, nausea and vomiting.   Genitourinary: Negative for dysuria.   Musculoskeletal: Negative for back pain and myalgias.   Skin: Negative for rash.   Neurological: Positive for speech difficulty and weakness. Negative for dizziness, numbness and headaches.   Hematological: Bruises/bleeds easily (on plavix).       Physical Exam     Initial Vitals   BP Pulse Resp Temp SpO2   08/17/20 2201 08/17/20 2152 08/17/20 2152 -- 08/17/20 2152   (!) 150/83 73 16  97 %      MAP       --                Physical Exam    Nursing note and vitals reviewed.  Constitutional: No distress.   HENT:   Head: Normocephalic and atraumatic.   Mouth/Throat: Mucous membranes are normal.   Eyes: EOM are normal. Pupils are equal, round, and reactive to light.   Neck: Normal range of motion. Carotid bruit is not present.   Cardiovascular: Normal rate, regular rhythm, intact distal pulses and normal pulses. Exam reveals no gallop and no friction rub.    Murmur heard.   Systolic murmur is present.  Pulses:       Radial pulses are 2+ on the right side and 2+ on the left side.   Pulmonary/Chest: Breath sounds normal. He has no wheezes. He has no rhonchi. He has no rales.   Abdominal: Soft. He exhibits no distension. There is no abdominal tenderness.   Musculoskeletal: Normal range of motion. Edema present.      Comments: + 2 pitting edema to the lower extremities bilaterally.    Neurological: He is alert and oriented to person, place, and time. He has normal strength.   Skin: Skin is dry. No rash noted. No erythema.   Psychiatric: He has a normal mood and affect.         ED Course   Procedures  Labs Reviewed   CBC W/ AUTO DIFFERENTIAL - Abnormal; Notable for the following components:       Result Value    RBC 3.96 (*)     Hemoglobin 12.9 (*)      Hematocrit 38.6 (*)     Mean Corpuscular Hemoglobin 32.6 (*)     RDW 15.1 (*)     All other components within normal limits   COMPREHENSIVE METABOLIC PANEL - Abnormal; Notable for the following components:    CO2 22 (*)     Calcium 8.5 (*)     eGFR if non  53 (*)     All other components within normal limits   LIPID PANEL - Abnormal; Notable for the following components:    HDL 34 (*)     All other components within normal limits   POCT GLUCOSE - Abnormal; Notable for the following components:    POCT Glucose 111 (*)     All other components within normal limits   TSH   URINALYSIS, REFLEX TO URINE CULTURE   SARS-COV-2 RNA AMPLIFICATION, QUAL     EKG Readings: (Independently Interpreted)   Atrial fibrillation with rate of 60. Left anterior fascicular block. Abnormal ECG. No STEMI.       Imaging Results          CT Head Without Contrast (In process)  Result time 08/17/20 23:07:58                 Medical Decision Making:   History:   Old Medical Records: I decided to obtain old medical records.  Initial Assessment:   75-year-old man with dysarthria, left arm weakness and left arm numbness that resolved just prior to arrival presents for evaluation.  Symptoms highly suspicious for TIA.  He is found to have new onset atrial fibrillation.  CT head negative. Discussed with Hospital Medicine who agrees to admit the patient for stroke/tia workup.  Independently Interpreted Test(s):   I have ordered and independently interpreted EKG Reading(s) - see prior notes  Clinical Tests:   Lab Tests: Reviewed and Ordered  Radiological Study: Reviewed and Ordered  Medical Tests: Reviewed and Ordered  Other:   I have discussed this case with another health care provider.       <> Summary of the Discussion: 11:06 - Zeenat APC to admit.            Isaiahibe Attestation:   Scribe #1: I performed the above scribed service and the documentation accurately describes the services I performed. I attest to the accuracy of the  note.     I, Alejandro Marsh, personally performed the services described in this documentation. All medical record entries made by the scribe were at my direction and in my presence.  I have reviewed the chart and agree that the record reflects my personal performance and is accurate and complete. Adolfo Allen MD.                      Clinical Impression:       ICD-10-CM ICD-9-CM   1. Left arm weakness  R29.898 729.89   2. Dysarthria  R47.1 784.51         Disposition:   Disposition: Admitted  Condition: Stable     ED Disposition Condition    Observation                           Adolfo Allen MD  08/18/20 0506

## 2020-08-18 NOTE — ASSESSMENT & PLAN NOTE
Chronic, controlled.  Latest blood pressure and vitals reviewed-   Pulse:  [73-74]   Resp:  [16]   BP: (150)/(83)   SpO2:  [97 %] .   Home meds for hypertension were reviewed and noted below. Hospital anti-hypertensive changes were made as shown below.  Hypertension Medications             amLODIPine (NORVASC) 5 MG tablet Take 1 tablet (5 mg total) by mouth once daily.    doxazosin (CARDURA) 8 MG Tab TAKE 1 TABLET DAILY    furosemide (LASIX) 40 MG tablet TAKE 1 TABLET TWICE A DAY    lisinopril (PRINIVIL,ZESTRIL) 20 MG tablet TAKE 1 TABLET TWICE A DAY    metoprolol succinate (TOPROL-XL) 50 MG 24 hr tablet TAKE 1 TABLET DAILY    nitroGLYCERIN (NITROSTAT) 0.4 MG SL tablet DISSOLVE 1 TABLET UNDER THE TONGUE EVERY 5 MINUTES AS NEEDED FOR CHEST PAIN      Hold home antihypertensives to allow permissive hypertension in the presence of possible CVA, will restart when clinically indicated.  Will utilize p.r.n. blood pressure medication only if patient's blood pressure greater than  180/110 and he develops symptoms such as worsening chest pain or shortness of breath.

## 2020-08-18 NOTE — ASSESSMENT & PLAN NOTE
Patient is chronically on statin.will continue for now. Monitor clinically. Last LDL was   Lab Results   Component Value Date    LDLCALC 106.2 08/17/2020

## 2020-08-18 NOTE — CONSULTS
[facility]  Neurology  Consult Note    Patient Name: Tong Ford  MRN: [id number]  Admission Date: 8/17/2020  Hospital Length of Stay: 0 days  Code Status: Full Code   Attending Provider: Dr Dial  Consulting Provider: Dr. Steve Dotson  Primary Care Physician: Kris Zavala MD  Principal Problem:CVA (cerebral vascular accident)      Subjective:     Chief Complaint:   Weakness       weakness and numbness to left arm with inability to raise that arm. Pt reports it lasting only a few minutes. Completely resolved by the time EMS arrived         HPI per EMR: Tong Ford is a 75 y.o. male with a PMHx of HTN, CAD, arthritis, gout, and hypercholesteremia who presents to the ED with complaints of left arm weakness and dysarthria.  The patient states around 8:30 or 9:00 p.m. he was at his computer and leaned down to pick something up when his left arm became weak and he was unable to move it or open and close his hand.  He states he wanted to call out for help but was unable to form words. He also endorses developing a tingling sensation in his legs and feet. He states the symptoms lasted about 5-10 min and resolved completely by the time EMS arrived. The patient currently denies any numbness/tingling, weakness, or difficulty speaking.  Patient denies any chest pain, abdominal pain, fevers/chills, nausea, vomiting, diarrhea, dysuria, dizziness, weakness, dysphagia, or headache. His ED workup is significant for EKG revealing new onset atrial fib with a rate of 62 and CT head showing no acute intracranial abnormality but multiple remote lacunar infarcts noted.  The patient will be placed in observation under Hospital Medicine for further workup and evaluation.  Neurological Consult:  Patient seen and examined with Dr. Steve Dotson.  Patient reports he was bending down to get some of the floor and had weakness to his left arm.  He was unable to move his left arm and could make sounds but not complete words to  speak.  Patient also reports numbness, tingling, and weakness to his left lower extremity.  Patient reports that lasted between 5-10 minutes, no more than 10 min.  The symptoms resolved.  Patient was taking aspirin and Plavix at home.  Currently his speech is clear, he moves all extremities, and he denies numbness and tingling.  Strength equal on both sides.  Stroke workup in progress.  Patient has scheduled MRI MRA brain.  He was ordered a MACRINA in the ED due to new onset AFib.  Patient also has a carotid ultrasound alert.  His CT head showed remote lacunar infarcts. NIHSS 0.    Past Medical History:   Diagnosis Date    Arthritis     Cataract     Coronary artery disease     Encounter for blood transfusion     Hypercholesterolemia     Hypertension        Past Surgical History:   Procedure Laterality Date    CORONARY ARTERY BYPASS GRAFT      EYE SURGERY      VASECTOMY         Review of patient's allergies indicates:  No Known Allergies    Current Neurological Medications:  Aspirin, Plavix    No current facility-administered medications on file prior to encounter.      Current Outpatient Medications on File Prior to Encounter   Medication Sig    amLODIPine (NORVASC) 5 MG tablet Take 1 tablet (5 mg total) by mouth once daily.    ascorbic acid, vitamin C, (VITAMIN C) 1000 MG tablet Take 1,000 mg by mouth once daily.    aspirin 81 MG Chew Take 81 mg by mouth once daily.    clopidogrel (PLAVIX) 75 mg tablet TAKE 1 TABLET DAILY    coQ10, ubiquinol, 100 mg Cap Take 150 mg by mouth 2 (two) times daily.    doxazosin (CARDURA) 8 MG Tab TAKE 1 TABLET DAILY    ergocalciferol, vitamin D2, (VITAMIN D ORAL) Take 500 Units by mouth Daily.    ezetimibe (ZETIA) 10 mg tablet Take 1 tablet (10 mg total) by mouth once daily.    furosemide (LASIX) 40 MG tablet TAKE 1 TABLET TWICE A DAY    garlic 1,000 mg Cap Take by mouth.    krill-om-3-dha-epa-phospho-ast (KRILL OIL) 1,155-519-50-80 mg Cap Take by mouth.    lisinopril  (PRINIVIL,ZESTRIL) 20 MG tablet TAKE 1 TABLET TWICE A DAY (Patient taking differently: 40 mg. )    metoprolol succinate (TOPROL-XL) 50 MG 24 hr tablet TAKE 1 TABLET DAILY    tamsulosin (FLOMAX) 0.4 mg Cap Take 1 capsule (0.4 mg total) by mouth once daily.    UNABLE TO FIND R-Alpha Lipoic Acid 150mg    UNABLE TO FIND Oat straw extract,passion flower, and skullcap root extract    UNABLE TO FIND Arterin    UNABLE TO FIND Niachinamide 250mg    UNABLE TO FIND Blood sugar ultra (supplement)    nitroGLYCERIN (NITROSTAT) 0.4 MG SL tablet DISSOLVE 1 TABLET UNDER THE TONGUE EVERY 5 MINUTES AS NEEDED FOR CHEST PAIN      Family History     Problem Relation (Age of Onset)    COPD Paternal Grandmother    Cancer Mother, Father, Maternal Grandmother    Hypertension Father        Tobacco Use    Smoking status: Never Smoker    Smokeless tobacco: Never Used   Substance and Sexual Activity    Alcohol use: Yes     Comment: rare social occasions    Drug use: No    Sexual activity: Never     Review of Systems   Constitutional: Negative.    HENT: Negative.    Eyes: Negative.    Respiratory: Negative.    Cardiovascular: Negative.    Endocrine: Negative.    Genitourinary: Negative.    Musculoskeletal: Negative.    Skin: Negative.    Allergic/Immunologic: Negative.    Neurological: Positive for speech difficulty, weakness and numbness.        Resolved   Hematological: Negative.    Psychiatric/Behavioral: Negative.      Objective:     Vital Signs (Most Recent):  Temp: 98.3 °F (36.8 °C) (08/18/20 1156)  Pulse: 73 (08/18/20 1156)  Resp: 18 (08/18/20 1156)  BP: (!) 182/87 (08/18/20 1156)  SpO2: 98 % (08/18/20 1156) Vital Signs (24h Range):  Temp:  [97.1 °F (36.2 °C)-98.3 °F (36.8 °C)] 98.3 °F (36.8 °C)  Pulse:  [59-77] 73  Resp:  [16-20] 18  SpO2:  [95 %-98 %] 98 %  BP: (141-182)/(72-87) 182/87     Weight: 96.2 kg (212 lb 1.3 oz)  Body mass index is 32.25 kg/m².    Physical Exam  HENT:      Head: Normocephalic.      Nose: Nose  normal.      Mouth/Throat:      Mouth: Mucous membranes are moist.   Eyes:      Extraocular Movements: Extraocular movements intact.      Pupils: Pupils are equal, round, and reactive to light.   Neck:      Musculoskeletal: Normal range of motion and neck supple.   Cardiovascular:      Rate and Rhythm: Normal rate.   Musculoskeletal: Normal range of motion.   Skin:     General: Skin is warm and dry.      Capillary Refill: Capillary refill takes less than 2 seconds.   Neurological:      General: No focal deficit present.      Mental Status: He is alert and oriented to person, place, and time.      GCS: GCS eye subscore is 4. GCS verbal subscore is 5.      Coordination: Finger-Nose-Finger Test normal.      Gait: Gait is intact.      Deep Tendon Reflexes: Strength normal.   Psychiatric:         Mood and Affect: Mood normal.         Speech: Speech normal.         Behavior: Behavior normal.         NEUROLOGICAL EXAMINATION:     MENTAL STATUS   Oriented to person, place, and time.   Follows 1 step commands.   Attention: normal. Concentration: normal.   Speech: speech is normal   Level of consciousness: alert  Able to name object. Able to repeat.     CRANIAL NERVES     CN II   Visual fields full to confrontation.   Visual acuity: normal with correction    CN III, IV, VI   Pupils are equal, round, and reactive to light.  Nystagmus: none     CN V   Facial sensation intact.     CN VII   Facial expression full, symmetric.     CN VIII   CN VIII normal.   Hearing: intact    CN IX, X   CN IX normal.   CN X normal.     CN XI   CN XI normal.     MOTOR EXAM   Muscle bulk: normal  Overall muscle tone: normal  Right arm tone: normal  Left arm tone: normal    Strength   Strength 5/5 throughout.     SENSORY EXAM   Light touch normal.     GAIT AND COORDINATION     Gait  Gait: normal     Coordination   Finger to nose coordination: normal    Tremor   Resting tremor: absent      Significant Labs:   Lab Results   Component Value Date    WBC  9.57 08/18/2020    HGB 12.8 (L) 08/18/2020    HCT 38.4 (L) 08/18/2020    MCV 98 08/18/2020     08/18/2020       CMP  Sodium   Date Value Ref Range Status   08/18/2020 141 136 - 145 mmol/L Final   07/25/2019 138 134 - 144 mmol/L      Potassium   Date Value Ref Range Status   08/18/2020 3.5 3.5 - 5.1 mmol/L Final     Chloride   Date Value Ref Range Status   08/18/2020 105 95 - 110 mmol/L Final   07/25/2019 104 98 - 110 mmol/L      CO2   Date Value Ref Range Status   08/18/2020 27 23 - 29 mmol/L Final     Glucose   Date Value Ref Range Status   08/18/2020 96 70 - 110 mg/dL Final   07/25/2019 105 (H) 70 - 99 mg/dL      BUN, Bld   Date Value Ref Range Status   08/18/2020 12 8 - 23 mg/dL Final     Creatinine   Date Value Ref Range Status   08/18/2020 1.3 0.5 - 1.4 mg/dL Final   07/25/2019 1.15 0.60 - 1.40 mg/dL      Calcium   Date Value Ref Range Status   08/18/2020 8.4 (L) 8.7 - 10.5 mg/dL Final     Total Protein   Date Value Ref Range Status   08/18/2020 6.4 6.0 - 8.4 g/dL Final     Albumin   Date Value Ref Range Status   08/18/2020 3.5 3.5 - 5.2 g/dL Final   07/25/2019 3.9 3.1 - 4.7 g/dL      Total Bilirubin   Date Value Ref Range Status   08/18/2020 0.6 0.1 - 1.0 mg/dL Final     Comment:     For infants and newborns, interpretation of results should be based  on gestational age, weight and in agreement with clinical  observations.  Premature Infant recommended reference ranges:  Up to 24 hours.............<8.0 mg/dL  Up to 48 hours............<12.0 mg/dL  3-5 days..................<15.0 mg/dL  6-29 days.................<15.0 mg/dL       Alkaline Phosphatase   Date Value Ref Range Status   08/18/2020 70 55 - 135 U/L Final     AST   Date Value Ref Range Status   08/18/2020 15 10 - 40 U/L Final     ALT   Date Value Ref Range Status   08/18/2020 14 10 - 44 U/L Final     Anion Gap   Date Value Ref Range Status   08/18/2020 9 8 - 16 mmol/L Final     eGFR if    Date Value Ref Range Status   08/18/2020  >60 >60 mL/min/1.73 m^2 Final     eGFR if non    Date Value Ref Range Status   08/18/2020 53 (A) >60 mL/min/1.73 m^2 Final     Comment:     Calculation used to obtain the estimated glomerular filtration  rate (eGFR) is the CKD-EPI equation.            Significant Imaging: MRA Brain without contrast  Narrative: EXAMINATION:  MRA BRAIN WITHOUT CONTRAST    CLINICAL HISTORY:  Stroke, follow up; .    TECHNIQUE:  Non-contrast 3-D time-of-flight intracranial MR angiography was performed through the [place] [facility] with MIP reformatting.    COMPARISON:  None.    FINDINGS:  The internal carotid arteries are of normal caliber and grossly patent.  The anterior and middle cerebral arteries are patent.  Anterior communicating artery is present.  The vertebral arteries are patent. The basilar artery is normal.  The P1 segment of the right posterior cerebral artery is hypoplastic or aplastic, a normal congenital variant.  A prominent right sided posterior communicating artery is present, which fills the P2 segment of the right PCA.  Mild multifocal irregularity of the intracranial vasculature without hemodynamically significant stenosis suggestive of atherosclerotic disease.  There is no aneurysm or vascular malformation identified.  Although MRA is a screening examination, catheter angiography remains the definitive study for small aneurysms, vasculitis, and other vascular abnormalities.  Impression: Intracranial atherosclerotic disease without evidence of a high-grade stenosis, large vessel occlusion, or aneurysm.    Electronically signed by: Kris Chan  Date:    08/18/2020  Time:    11:56  MRI BRAIN WITHOUT CONTRAST  Narrative: EXAMINATION:  MRI BRAIN WITHOUT CONTRAST    CLINICAL HISTORY:  Stroke, follow up;.    TECHNIQUE:  Multiplanar multisequence MR imaging of the brain was performed without the administration of intravenous contrast.    COMPARISON:  None.    FINDINGS:  Intracranial  compartment:    Prominence of the ventricles and sulci compatible with mild generalized cerebral volume loss.  No hydrocephalus. No extra-axial blood or fluid collections.    Confluent and scattered areas of T2/FLAIR hyperintense signal involving the periventricular and deep cerebral white matter, which are nonspecific and may represent a moderate degree of chronic microvascular ischemic change.  Small remote bilateral basal ganglia and left periventricular chronic lacunar-type infarcts again demonstrated.  Diffusion-weighted images demonstrate no evidence of an acute infarct.   Susceptibility weighted images demonstrate no evidence of acute or chronic hemorrhage.    Normal vascular flow voids are preserved.    Skull/extracranial contents (limited evaluation): Bone marrow signal intensity is normal. Trace right mastoid effusion.  Mild mucosal thickening within the ethmoid sinuses.  Orbits are unremarkable.  Impression: 1. No evidence of acute intracranial abnormality.  2.  Generalized cerebral volume loss with scattered areas of supratentorial white matter T2/FLAIR intensity, which are nonspecific and may represent a moderate degree of chronic microvascular ischemic change.    Electronically signed by: Kris Chan  Date:    08/18/2020  Time:    11:45  US Carotid Bilateral  Narrative: EXAMINATION:  US CAROTID BILATERAL    CLINICAL HISTORY:  Velocities evaluation;    TECHNIQUE:  Grayscale and color Doppler ultrasound examination of the carotid and vertebral artery systems bilaterally.  Stenosis estimates are per the NASCET measurement criteria.    COMPARISON:  None.    FINDINGS:  Right:    Internal Carotid Artery (ICA) peak systolic velocity 89 cm/sec    ICA/CCA peak systolic velocity ratio: 1.8    Plaque formation: Heterogenous    Vertebral artery: Antegrade flow and normal waveform.    Left:    Internal Carotid Artery (ICA)  peak systolic velocity 66 cm/sec    ICA/CCA peak systolic velocity ratio: 1.1    Plaque  formation: Homogeneous    Vertebral artery: Antegrade flow and normal waveform.  Impression: No evidence of a hemodynamically significant carotid bifurcation stenosis.  Atherosclerotic plaque is heterogenous on the right.  Abnormal cardiac rhythm.  Correlate with EKG.    Electronically signed by: Ab Stanford  Date:    08/18/2020  Time:    11:44        Assessment and Plan:    TIA  -CT head: negative acute  -MRI brain: negative acute; details above  -MRA brain: intracranial atherosclerotic disease w/o high grade stenosis, LVO, or aneurysm  -CUS:no significant stenosis  -MACRINA: due to new onset afib: ordered  -lipids:  Cholesterol 164, .2  -A1c: pending  -TSH:  3.706    New Onset Afib  -previously controlled with BB  -MACRINA scheduled for today 8/18  -[id number] score: 6    PLAN: new onset afib. MACRINA pending to evaluate heart for cardiac amboli. Continue asa, statin, and plavix secondary stroke prevention, as well as for his mild intracranial atherosclerosis.  Anticoagulation recommended due to TIA and [id number] score.       [phone] within 3 days from discharge.     Stroke education was provided including stroke risk factors modification and any acute neurological changes including weakness, confusion, visual changes to come straight to the ER.     All questions were answered.                                Active Diagnoses:    Diagnosis Date Noted POA    PRINCIPAL PROBLEM:  CVA (cerebral vascular accident) [I63.9] 08/17/2020 Yes    Hypercholesteremia [E78.00] 08/17/2020 Yes    New onset atrial fibrillation [I48.91] 08/17/2020 Yes    Hypocalcemia [E83.51] 08/17/2020 Yes    CKD (chronic kidney disease) [N18.9] 08/17/2020 Yes    Essential hypertension [I10] 01/31/2019 Yes      Problems Resolved During this Admission:       VTE Risk Mitigation (From admission, onward)         Ordered     Place sequential compression device  Until discontinued       08/18/20 0512     IP VTE HIGH RISK PATIENT  Once      08/18/20 0007     Reason for No Pharmacological VTE Prophylaxis  Once     Question:  Reasons:  Answer:  Physician Provided (leave comment)  Comment:  Neurology recs awaiting MRI brain    08/18/20 0007                Thank you for your consult. I will follow-up with patient. Please contact us if you have any additional questions.    Jennifer Perkins NP  Neurology  [facility]

## 2020-08-18 NOTE — PLAN OF CARE
I updated Mary with inpt rehab that the pt has a consult. However the pt walked 250 feet with supervision and therapy is recommending home . The pt will discharge home. Mallory Veronica LCSW     08/18/20 [id number]   Discharge Assessment   Assessment Type Discharge Planning Reassessment

## 2020-08-18 NOTE — PLAN OF CARE
Intervention: collaboration with care providers    Recommendation:    1) Advance PO diet to goal of cardiac   2) Weigh pt weekly   3) nutrition education and handouts given    Goals: 1) PO diet advanced in < 4 days  Nutrition Goal Status: new  Communication of RD Recs: (POC, sticky note, second sign)

## 2020-08-18 NOTE — H&P
[facility] Medicine  History & Physical    Patient Name: Tong Ford  MRN: [id number]  Admission Date: 8/17/2020  Attending Physician: Dory Perez MD   Primary Care Provider: Kris Zavala MD         Patient information was obtained from patient, past medical records and ER records.     Subjective:     Principal Problem:CVA (cerebral vascular accident)    Chief Complaint:   Chief Complaint   Patient presents with    Weakness     weakness and numbness to left arm with inability to raise that arm. Pt reports it lasting only a few minutes. Completely resolved by the time EMS arrived        HPI: Tong Ford is a 75 y.o. male with a PMHx of HTN, CAD, arthritis, gout, and hypercholesteremia who presents to the ED with complaints of left arm weakness and dysarthria.  The patient states around 8:30 or 9:00 p.m. he was at his computer and leaned down to pick something up when his left arm became weak and he was unable to move it or open and close his hand.  He states he wanted to call out for help but was unable to form words. He also endorses developing a tingling sensation in his legs and feet. He states the symptoms lasted about 5-10 min and resolved completely by the time EMS arrived. The patient currently denies any numbness/tingling, weakness, or difficulty speaking.  Patient denies any chest pain, abdominal pain, fevers/chills, nausea, vomiting, diarrhea, dysuria, dizziness, weakness, dysphagia, or headache. His ED workup is significant for EKG revealing new onset atrial fib with a rate of 62 and CT head showing no acute intracranial abnormality but multiple remote lacunar infarcts noted.  The patient will be placed in observation under Hospital Medicine for further workup and evaluation.    Past Medical History:   Diagnosis Date    Arthritis     Cataract     Coronary artery disease     Encounter for blood transfusion     Hypercholesterolemia     Hypertension        Past  Surgical History:   Procedure Laterality Date    CORONARY ARTERY BYPASS GRAFT      EYE SURGERY      VASECTOMY         Review of patient's allergies indicates:  No Known Allergies    No current facility-administered medications on file prior to encounter.      Current Outpatient Medications on File Prior to Encounter   Medication Sig    amLODIPine (NORVASC) 5 MG tablet Take 1 tablet (5 mg total) by mouth once daily.    ascorbic acid, vitamin C, (VITAMIN C) 1000 MG tablet Take 1,000 mg by mouth once daily.    aspirin 81 MG Chew Take 81 mg by mouth once daily.    clopidogrel (PLAVIX) 75 mg tablet TAKE 1 TABLET DAILY    coQ10, ubiquinol, 100 mg Cap Take 150 mg by mouth 2 (two) times daily.    doxazosin (CARDURA) 8 MG Tab TAKE 1 TABLET DAILY    ergocalciferol, vitamin D2, (VITAMIN D ORAL) Take 500 Units by mouth Daily.    ezetimibe (ZETIA) 10 mg tablet Take 1 tablet (10 mg total) by mouth once daily.    furosemide (LASIX) 40 MG tablet TAKE 1 TABLET TWICE A DAY    garlic 1,000 mg Cap Take by mouth.    krill-om-3-dha-epa-phospho-ast (KRILL OIL) 1,838-869-03-80 mg Cap Take by mouth.    lisinopril (PRINIVIL,ZESTRIL) 20 MG tablet TAKE 1 TABLET TWICE A DAY (Patient taking differently: 40 mg. )    metoprolol succinate (TOPROL-XL) 50 MG 24 hr tablet TAKE 1 TABLET DAILY    tamsulosin (FLOMAX) 0.4 mg Cap Take 1 capsule (0.4 mg total) by mouth once daily.    UNABLE TO FIND R-Alpha Lipoic Acid 150mg    UNABLE TO FIND Oat straw extract,passion flower, and skullcap root extract    UNABLE TO FIND Arterin    UNABLE TO FIND Niachinamide 250mg    UNABLE TO FIND Blood sugar ultra (supplement)    nitroGLYCERIN (NITROSTAT) 0.4 MG SL tablet DISSOLVE 1 TABLET UNDER THE TONGUE EVERY 5 MINUTES AS NEEDED FOR CHEST PAIN     Family History     Problem Relation (Age of Onset)    COPD Paternal Grandmother    Cancer Mother, Father, Maternal Grandmother    Hypertension Father        Tobacco Use    Smoking status: Never Smoker     Smokeless tobacco: Never Used   Substance and Sexual Activity    Alcohol use: Yes     Comment: rare social occasions    Drug use: No    Sexual activity: Never     Review of Systems   Constitutional: Negative for activity change, appetite change, chills, fatigue and fever.   HENT: Negative for congestion, ear pain, rhinorrhea, sneezing and sore throat.    Eyes: Negative for photophobia and visual disturbance.   Respiratory: Positive for shortness of breath (Chronic). Negative for cough and wheezing.    Cardiovascular: Positive for leg swelling. Negative for chest pain and palpitations.   Gastrointestinal: Negative for abdominal distention, abdominal pain, constipation, diarrhea, nausea and vomiting.   Genitourinary: Negative for dysuria, flank pain, frequency and hematuria.   Musculoskeletal: Negative for arthralgias, gait problem, myalgias and neck pain.   Skin: Negative for color change, rash and wound.   Neurological: Positive for speech difficulty, weakness and numbness ( tingling). Negative for dizziness, facial asymmetry and headaches.   Hematological: Bruises/bleeds easily.   Psychiatric/Behavioral: Negative for agitation, confusion and hallucinations. The patient is not nervous/anxious.      Objective:     Vital Signs (Most Recent):  Temp: 97.1 °F (36.2 °C) (08/18/20 0008)  Pulse: 64 (08/18/20 0008)  Resp: 20 (08/18/20 0008)  BP: (!) 166/81 (08/18/20 0008)  SpO2: 96 % (08/18/20 0008) Vital Signs (24h Range):  Temp:  [97.1 °F (36.2 °C)] 97.1 °F (36.2 °C)  Pulse:  [64-77] 64  Resp:  [16-20] 20  SpO2:  [95 %-97 %] 96 %  BP: (150-177)/(79-83) 166/81     Weight: 96.2 kg (212 lb 1.3 oz)  Body mass index is 32.25 kg/m².    Physical Exam  Vitals signs and nursing note reviewed.   Constitutional:       General: He is not in acute distress.     Appearance: He is well-developed. He is not diaphoretic.   HENT:      Head: Normocephalic and atraumatic.      Right Ear: External ear normal.      Left Ear: External ear  normal.   Eyes:      General: No visual field deficit.     Pupils: Pupils are equal, round, and reactive to light.   Neck:      Musculoskeletal: Normal range of motion.      Vascular: No JVD.   Cardiovascular:      Rate and Rhythm: Normal rate. Rhythm irregularly irregular.      Heart sounds: No murmur.   Pulmonary:      Effort: Pulmonary effort is normal. No respiratory distress.      Breath sounds: Normal breath sounds. No wheezing.   Abdominal:      General: Bowel sounds are normal. There is no distension.      Palpations: Abdomen is soft.      Tenderness: There is no abdominal tenderness.   Genitourinary:     Comments: Not examined  Musculoskeletal: Normal range of motion.         General: No deformity.      Right lower leg: Edema present.      Left lower leg: Edema present.   Skin:     General: Skin is warm and dry.      Capillary Refill: Capillary refill takes less than 2 seconds.   Neurological:      General: No focal deficit present.      Mental Status: He is alert and oriented to person, place, and time.      Cranial Nerves: No dysarthria or facial asymmetry.      Sensory: No sensory deficit.      Motor: No weakness, tremor or seizure activity.      Coordination: Coordination normal.      Comments: Patient following all commands   Psychiatric:         Mood and Affect: Mood normal.         Behavior: Behavior normal.         Thought Content: Thought content normal.         Judgment: Judgment normal.           CRANIAL NERVES     CN III, IV, VI   Pupils are equal, round, and reactive to light.       Significant Labs:   CBC:   Recent Labs   Lab 08/17/20  2155   WBC 8.63   HGB 12.9*   HCT 38.6*        CMP:   Recent Labs   Lab 08/17/20  2155      K 3.7      CO2 22*      BUN 13   CREATININE 1.3   CALCIUM 8.5*   PROT 6.9   ALBUMIN 3.7   BILITOT 0.6   ALKPHOS 72   AST 15   ALT 15   ANIONGAP 13   EGFRNONAA 53*     Lipid Panel:   Recent Labs   Lab 08/17/20  2155   CHOL 164   HDL 34*   LDLCALC  106.2   TRIG 119   CHOLHDL 20.7     Urine Studies:   Recent Labs   Lab 08/17/20  2330   COLORU Yellow   APPEARANCEUA Clear   PHUR 8.0   SPECGRAV 1.020   PROTEINUA Trace*   GLUCUA Negative   KETONESU Negative   BILIRUBINUA Negative   OCCULTUA Negative   NITRITE Negative   UROBILINOGEN 1.0   LEUKOCYTESUR Negative     All pertinent labs within the past 24 hours have been reviewed.    Significant Imaging: I have reviewed and interpreted all pertinent imaging results/findings within the past 24 hours.    Assessment/Plan:     * CVA (cerebral vascular accident)  CT head negative for acute process. Multiple remote lacunar infarcts. Findings compatible with microvascular ischemic changes.   Patient given ASA in ED, continue ASA daily.  MRI/MRA head ordered.  Carotid U/S ordered.  MACRINA for evaluation of intra-cardiac thrombus or valvular heart disease.  Lipid panel and Hemoglobin A1C ordered.  Consult neurology.  Spoke with Dr. Steve Dotson who recommends waiting for MRI results before initiating anticoagulation.  Neuro checks q4 hrs.  Fall precautions.   Aspiration precautions.  SLP/PT/OT  NPO until bedside FLORENTIN passed or SLP evaulation    CKD (chronic kidney disease)  Appears patient likely has CKD based on previous lab values.  Urinalysis ordered.  Creatine stable for now. BMP reviewed- noted Estimated Creatinine Clearance: 53 mL/min (based on SCr of 1.3 mg/dL). according to latest data. Monitor UOP and serial BMP and adjust therapy as needed. Renally dose meds.    Hypocalcemia  Corrected calcium 8.7.  Continue to monitor.    New onset atrial fibrillation  Atrial Fibrillation controlled currently with Beta Blocker. [id number] score 6. Anticoagulation indicated currently.  Will await MRI results before initiating anticoagulation.  Continue aspirin and Plavix for now.  Cardiology consulted. MACRINA ordered.  EKG reviewed.    Hypercholesteremia   Patient is chronically on statin.will continue for now. Monitor clinically. Last LDL was    Lab Results   Component Value Date    LDLCALC 106.2 08/17/2020       Essential hypertension  Chronic, controlled.  Latest blood pressure and vitals reviewed-   Pulse:  [73-74]   Resp:  [16]   BP: (150)/(83)   SpO2:  [97 %] .   Home meds for hypertension were reviewed and noted below. Hospital anti-hypertensive changes were made as shown below.  Hypertension Medications             amLODIPine (NORVASC) 5 MG tablet Take 1 tablet (5 mg total) by mouth once daily.    doxazosin (CARDURA) 8 MG Tab TAKE 1 TABLET DAILY    furosemide (LASIX) 40 MG tablet TAKE 1 TABLET TWICE A DAY    lisinopril (PRINIVIL,ZESTRIL) 20 MG tablet TAKE 1 TABLET TWICE A DAY    metoprolol succinate (TOPROL-XL) 50 MG 24 hr tablet TAKE 1 TABLET DAILY    nitroGLYCERIN (NITROSTAT) 0.4 MG SL tablet DISSOLVE 1 TABLET UNDER THE TONGUE EVERY 5 MINUTES AS NEEDED FOR CHEST PAIN      Hold home antihypertensives to allow permissive hypertension in the presence of possible CVA, will restart when clinically indicated.  Will utilize p.r.n. blood pressure medication only if patient's blood pressure greater than  180/110 and he develops symptoms such as worsening chest pain or shortness of breath.      VTE Risk Mitigation (From admission, onward)         Ordered     IP VTE HIGH RISK PATIENT  Once      08/18/20 0007     Place sequential compression device  Until discontinued      08/18/20 0007     Reason for No Pharmacological VTE Prophylaxis  Once     Question:  Reasons:  Answer:  Physician Provided (leave comment)  Comment:  Neurology recs awaiting MRI brain    08/18/20 0007                   Wendy Epperson NP  Department of Hospital Medicine   [facility]    Addendum:  Patient seen and examined. I agree with the findings, assessment and plan as outlined in the note by the nursing practitioner.  Patient is a pleasant 75-year-old  male with past medical history significant for hypertension, hyperlipidemia, coronary artery disease  status post coronary artery bypass graft surgery in 1989 who has been admitted to Hospital Medicine from [facility] Emergency Room with complaint of sudden onset left upper extremity weakness, numbness and difficulty speaking.  Patient states he was in usual state of health while sitting, when he attempted to  out object from the floor, his left upper extremity went limp with numbness and tingling and patient had difficulty speaking words.  Symptoms lasted between 5-10 minutes.  Patient denied any prior history of stroke, stroke-like symptom or any cardiac dysrhythmia.  In the emergency room patient was noted to be in atrial fibrillation which was rate controlled.    On exam, vital signs are stable.  Patient has no speech dysfunction.    Patient is alert oriented in times space and person.  Patient is answering questions appropriately.  No facial asymmetry present.  No sensory or motor deficits noted    On exam in upper and lower extremities.  No gait problems reported.    CT scan of the head did not report acute intracranial process.  Old multiple Lacunar infarcts reported.    Patient is scheduled for transesophageal echocardiography in a.m. by Dr. Serna who has been consulted from Cardiology for evaluation of new onset atrial fibrillation.  Decision regarding long-term anticoagulation in the setting of TIA is deferred to cardiology and neurology teams.  Continue aspirin and Plavix along with statin therapy with target LDL goal of under 70.  Patient to be evaluated by physical therapy, occupational therapy and speech therapy.  Observe fall incision precautions.  Continue patient's routine medications as before.  Maintain patient on gastric ulcer prophylaxis and deep venous thrombosis prophylaxis during this hospitalization.

## 2020-08-18 NOTE — PLAN OF CARE
Patient is alert and oriented, takes meds whole,no co of pain at.this time, able to walk to bathroom without difficulty, no deficits noted from TIA, dx,  continent of bowel and bladder, passed FLORENTIN/lees with no issues. Edema noted to bilateral feet, and lower legs, reminded to use call light, safety and neuro intact

## 2020-08-18 NOTE — PT/OT/SLP EVAL
Speech Language Pathology   Bedside Swallow Evaluation/DISCHARGE     Patient Name:  Tong Ford   MRN:  [id number]  Admitting Diagnosis: CVA (cerebral vascular accident)    Recommendations:                 General Recommendations:  Follow-up not indicated  Diet recommendations:  Regular, Thin   Aspiration Precautions: Standard aspiration precautions   General Precautions: Standard,    Communication strategies:  none    History:     Past Medical History:   Diagnosis Date    Arthritis     Cataract     Coronary artery disease     Encounter for blood transfusion     Hypercholesterolemia     Hypertension        Past Surgical History:   Procedure Laterality Date    CORONARY ARTERY BYPASS GRAFT      EYE SURGERY      VASECTOMY         Social History: Patient lives alone.    Prior Intubation HX:  None this admit    Modified Barium Swallow: None    Imaging:  MRA Brain without contrast   Final Result      Intracranial atherosclerotic disease without evidence of a high-grade stenosis, large vessel occlusion, or aneurysm.         Electronically signed by: Kris Chan   Date:    08/18/2020   Time:    11:56      MRI BRAIN WITHOUT CONTRAST   Final Result      1. No evidence of acute intracranial abnormality.   2.  Generalized cerebral volume loss with scattered areas of supratentorial white matter T2/FLAIR intensity, which are nonspecific and may represent a moderate degree of chronic microvascular ischemic change.         Electronically signed by: Kris Chan   Date:    08/18/2020   Time:    11:45      US Carotid Bilateral   Final Result      No evidence of a hemodynamically significant carotid bifurcation stenosis.  Atherosclerotic plaque is heterogenous on the right.  Abnormal cardiac rhythm.  Correlate with EKG.         Electronically signed by: Ab Stanford   Date:    08/18/2020   Time:    11:44      CT Head Without Contrast   Final Result      1. No acute intracranial abnormality.   2. Multiple remote lacunar  "infarcts.   3. Findings compatible with microvascular ischemic changes.         Electronically signed by: Kian Lezama   Date:    08/17/2020   Time:    23:07           Prior diet: Regular/thin.    Occupation/hobbies/homemaking: PLOF : independent with ADLs, ambulates without assistance. Manages own medication/finances. +Driving.     Subjective   "I've been instructed not to eat." (MACRINA)    Objective:   Pt seen for clinical swallow evaluation and administration of MoCA. Pt NPO this AM for MACRINA. Returned this afternoon to complete swallow evaluation. Pt is AAOx4, sitting up in chair. Speech is clear, appropriate and fluent.     Oral Musculature Evaluation  · Oral Musculature: WFL  · Dentition: present and adequate  · Secretion Management: adequate  · Mucosal Quality: good  · Mandibular Strength and Mobility: WFL  · Oral Labial Strength and Mobility: WFL  · Lingual Strength and Mobility: WFL  · Buccal Strength and Mobility: WFL  · Volitional Cough: adequate  · Volitional Swallow: able to palpate laryngeal rise  · Voice Prior to PO Intake: clear    Bedside Swallow Eval:   Consistencies Assessed:  · Thin liquids --via cup and straw  · Puree --applesauce  · Mixed consistencies --diced pears  · Solids --cookie     Oral Phase:   · WFL    Pharyngeal Phase:   · no overt clinical signs/symptoms of aspiration    Compensatory Strategies  · None    Treatment: MoCA (Version 8.2) administered with pt achieving a score of 26/30 suggesting cognitive-linguistic skills WFL. Pt earned 4 of 5 points on visuospatial/executive functions, 3 of 3 points on naming, 4 of 6 points for attention, 3 of 3 points for language, 2 of 2 points for abstraction, 4 of 5 points for delayed recall and 6 of 6 points for orientation.      Assessment:   Clinical swallow eval completed this afternoon. All PO trials consumed with no overt s/s swallow dysfunction. He presents AAOx4, speech clear and fluent. MoCA completed with pt achieving score of 26/30 " suggesting cognitive linguistic skills WFL. No follow up indicated at this time.       Plan:     · Patient to be seen:      · Plan of Care expires:     · Plan of Care reviewed with:  patient   · SLP Follow-Up:  No       Discharge recommendations:  home   Barriers to Discharge:  None    Time Tracking:     SLP Treatment Date:   08/18/20  Speech Start Time:  1035(1259)  Speech Stop Time:  1047(1307)     Speech Total Time (min):  12 min    Billable Minutes: Eval Swallow and Oral Function 8 and Total Time 20    Rach Dang CCC-SLP  08/18/2020

## 2020-08-18 NOTE — HPI
Tong Ford is a 75 y.o. male with a PMHx of HTN, CAD, arthritis, gout, and hypercholesteremia who presents to the ED with complaints of left arm weakness and dysarthria.  The patient states around 8:30 or 9:00 p.m. he was at his computer and leaned down to pick something up when his left arm became weak and he was unable to move it or open and close his hand.  He states he wanted to call out for help but was unable to form words. He also endorses developing a tingling sensation in his legs and feet. He states the symptoms lasted about 5-10 min and resolved completely by the time EMS arrived. The patient currently denies any numbness/tingling, weakness, or difficulty speaking.  Patient denies any chest pain, abdominal pain, fevers/chills, nausea, vomiting, diarrhea, dysuria, dizziness, weakness, dysphagia, or headache. His ED workup is significant for EKG revealing new onset atrial fib with a rate of 62 and CT head showing no acute intracranial abnormality but multiple remote lacunar infarcts noted.  The patient will be placed in observation under Hospital Medicine for further workup and evaluation.

## 2020-08-18 NOTE — PT/OT/SLP EVAL
"Occupational Therapy   Evaluation and Discharge Note    Name: Tong Ford  MRN: [id number]  Admitting Diagnosis:  CVA (cerebral vascular accident)      Recommendations:     Discharge Recommendations: home  Discharge Equipment Recommendations:  none  Barriers to discharge:  None    Assessment:     Tong Ford is a 75 y.o. male with a medical diagnosis of CVA (cerebral vascular accident). No neurological deficits were noted. B UE strength and coordination was WNL and sensation intact. At this time, patient is functioning at their prior level of function and does not require further acute OT services.     Plan:     During this hospitalization, patient does not require further acute OT services.  Please re-consult if situation changes.    · Plan of Care Reviewed with: patient    Subjective     Chief Complaint: " My left hand wasn't working yesterday."  Patient/Family Comments/goals: I am back to normal now. I hope I can leave soon.    Occupational Profile:  Living Environment: Pt lives alone in a raised house with 15 LONI.   Previous level of function: Patient was Independent with all I/ADL's at home and in the community and driving.  Roles and Routines: Pt stated that he has been spending too much time seated at his desk on the computer and needs to be more active.  Equipment Used at home:  CPAP  Assistance upon Discharge: Family will assist pt at home.    Pain/Comfort:  · Pain Rating 1: 0/10  · Pain Rating Post-Intervention 1: 0/10    Patients cultural, spiritual, Mormonism conflicts given the current situation:      Objective:     Communicated with: [place], charge nurse prior to session.  Patient found HOB elevated with telemetry, SCD, bed alarm upon OT entry to room.    General Precautions: Standard, fall   Orthopedic Precautions:N/A   Braces: N/A     Occupational Performance:    Bed Mobility:    · Patient completed Scooting/Bridging with independence  · Patient completed Supine to Sit with independence  · Patient " completed Sit to Supine with independence    Functional Mobility/Transfers:  · Patient completed Sit <> Stand Transfer with independence  with  no assistive device   · Patient completed Toilet Transfer Stand Pivot technique with independence with  no AD  · Functional Mobility: Pt walked independently in room and bathroom with good balance and safety without an assistive device.    Activities of Daily Living:  · Feeding:  independence    · Grooming: independence standing at sink    · Upper Body Dressing: independence    · Lower Body Dressing: independence to don/doff socks seated EOB  · Toileting: independence at toilet for all tasks    Cognitive/Visual Perceptual:  Cognitive/Psychosocial Skills:     -       Oriented to: Person, Place, Time and Situation   -       Follows Commands/attention:Follows multistep  commands  -       Communication: clear/fluent  -       Memory: No Deficits noted  -       Safety awareness/insight to disability: intact   -       Mood/Affect/Coping skills/emotional control: Appropriate to situation, Cooperative and Pleasant  Visual/Perceptual:      -Intact  tracking, acuity, R/L discrimination, visual field and motor planning praxis      Physical Exam:  Balance:    -       Normal dynamic and static standing  Postural examination/scapula alignment:    -       Rounded shoulders  -       Forward head  Skin integrity: Visible skin intact  Edema:  Mild B lower legs  Sensation:    -       Intact  Dominant hand:    -       right  Upper Extremity Range of Motion:     -       Right Upper Extremity: WFL  -       Left Upper Extremity: WFL  Upper Extremity Strength:    -       Right Upper Extremity: WNL  -       Left Upper Extremity: WNL   Strength:    -       Right Upper Extremity: WNL  -       Left Upper Extremity: WNL  Fine Motor Coordination:    -       Intact  Left hand, finger to nose, Right hand, finger to nose, Left hand thumb/finger opposition skills, Right hand thumb/finger opposition  skills, Left hand, manipulation of objects and Right hand, manipulation of objects  Gross motor coordination:   WFL    AMPAC 6 Click ADL:  AMPAC Total Score: 24    Treatment & Education:  OT ed pt on OT role & discharge recommendations.  OT ed pt on the importance of engaging in frequent activity to maintain his strength, endurance and ADL independence.  Pt verbalized understanding.  OT ed pt on fall risk and strongly advised pt to call for help for all OOB mobility.    Education:    Patient left supine with all lines intact, call button in reach and radiology technician present    GOALS:   Multidisciplinary Problems     Occupational Therapy Goals     Not on file                History:     Past Medical History:   Diagnosis Date    Arthritis     Cataract     Coronary artery disease     Encounter for blood transfusion     Hypercholesterolemia     Hypertension        Past Surgical History:   Procedure Laterality Date    CORONARY ARTERY BYPASS GRAFT      EYE SURGERY      VASECTOMY         Time Tracking:     OT Date of Treatment: 08/18/20  OT Start Time: 0930  OT Stop Time: 0952  OT Total Time (min): 22 min    Billable Minutes:Evaluation 22    LONG Matthews  8/18/2020

## 2020-08-18 NOTE — NURSING
Shift report given at bedside, pt was informed to not eat or drink anything for MACRINA today. Pt stated understanding. Whiteboard updated with this information.

## 2020-08-18 NOTE — NURSING
Cardiology torin ESCALANTE informed me that pt told her he ate 2 crackers and that MACIRNA would have to be pushed back.

## 2020-08-19 VITALS
OXYGEN SATURATION: 97 % | HEART RATE: 79 BPM | BODY MASS INDEX: 32.13 KG/M2 | WEIGHT: 212 LBS | RESPIRATION RATE: 18 BRPM | TEMPERATURE: 97 F | HEIGHT: 68 IN | DIASTOLIC BLOOD PRESSURE: 81 MMHG | SYSTOLIC BLOOD PRESSURE: 154 MMHG

## 2020-08-19 PROBLEM — G45.9 TIA (TRANSIENT ISCHEMIC ATTACK): Status: ACTIVE | Noted: 2020-08-19

## 2020-08-19 PROBLEM — D64.9 NORMOCYTIC ANEMIA: Status: ACTIVE | Noted: 2020-08-19

## 2020-08-19 PROBLEM — I77.9 BILATERAL CAROTID ARTERY DISEASE: Status: ACTIVE | Noted: 2020-08-19

## 2020-08-19 PROBLEM — Z86.73 HISTORY OF CVA IN ADULTHOOD: Status: ACTIVE | Noted: 2020-08-17

## 2020-08-19 PROBLEM — N18.2 STAGE 2 CHRONIC KIDNEY DISEASE: Status: ACTIVE | Noted: 2020-08-17

## 2020-08-19 LAB
ALBUMIN SERPL BCP-MCNC: 3.4 G/DL (ref 3.5–5.2)
ALP SERPL-CCNC: 79 U/L (ref 55–135)
ALT SERPL W/O P-5'-P-CCNC: 13 U/L (ref 10–44)
ANION GAP SERPL CALC-SCNC: 9 MMOL/L (ref 8–16)
AST SERPL-CCNC: 17 U/L (ref 10–40)
BASOPHILS # BLD AUTO: 0.08 K/UL (ref 0–0.2)
BASOPHILS NFR BLD: 0.7 % (ref 0–1.9)
BILIRUB SERPL-MCNC: 1 MG/DL (ref 0.1–1)
BSA FOR ECHO PROCEDURE: 2.15 M2
BUN SERPL-MCNC: 13 MG/DL (ref 8–23)
CALCIUM SERPL-MCNC: 8.5 MG/DL (ref 8.7–10.5)
CHLORIDE SERPL-SCNC: 106 MMOL/L (ref 95–110)
CO2 SERPL-SCNC: 24 MMOL/L (ref 23–29)
CREAT SERPL-MCNC: 1.2 MG/DL (ref 0.5–1.4)
DIFFERENTIAL METHOD: ABNORMAL
EOSINOPHIL # BLD AUTO: 0.4 K/UL (ref 0–0.5)
EOSINOPHIL NFR BLD: 3.7 % (ref 0–8)
ERYTHROCYTE [DISTWIDTH] IN BLOOD BY AUTOMATED COUNT: 14.8 % (ref 11.5–14.5)
EST. GFR  (AFRICAN AMERICAN): >60 ML/MIN/1.73 M^2
EST. GFR  (NON AFRICAN AMERICAN): 58 ML/MIN/1.73 M^2
GLUCOSE SERPL-MCNC: 95 MG/DL (ref 70–110)
HCT VFR BLD AUTO: 38.9 % (ref 40–54)
HGB BLD-MCNC: 12.8 G/DL (ref 14–18)
IMM GRANULOCYTES # BLD AUTO: 0.03 K/UL (ref 0–0.04)
IMM GRANULOCYTES NFR BLD AUTO: 0.3 % (ref 0–0.5)
LYMPHOCYTES # BLD AUTO: 2.1 K/UL (ref 1–4.8)
LYMPHOCYTES NFR BLD: 19.2 % (ref 18–48)
MCH RBC QN AUTO: 32.2 PG (ref 27–31)
MCHC RBC AUTO-ENTMCNC: 32.9 G/DL (ref 32–36)
MCV RBC AUTO: 98 FL (ref 82–98)
MONOCYTES # BLD AUTO: 1.1 K/UL (ref 0.3–1)
MONOCYTES NFR BLD: 10.2 % (ref 4–15)
NEUTROPHILS # BLD AUTO: 7.2 K/UL (ref 1.8–7.7)
NEUTROPHILS NFR BLD: 65.9 % (ref 38–73)
NRBC BLD-RTO: 0 /100 WBC
PLATELET # BLD AUTO: 200 K/UL (ref 150–350)
PMV BLD AUTO: 10.9 FL (ref 9.2–12.9)
POTASSIUM SERPL-SCNC: 3.5 MMOL/L (ref 3.5–5.1)
PROT SERPL-MCNC: 6.5 G/DL (ref 6–8.4)
RBC # BLD AUTO: 3.97 M/UL (ref 4.6–6.2)
SODIUM SERPL-SCNC: 139 MMOL/L (ref 136–145)
WBC # BLD AUTO: 10.94 K/UL (ref 3.9–12.7)

## 2020-08-19 PROCEDURE — 80053 COMPREHEN METABOLIC PANEL: CPT

## 2020-08-19 PROCEDURE — 37000008 HC ANESTHESIA 1ST 15 MINUTES: Performed by: SPECIALIST

## 2020-08-19 PROCEDURE — G0378 HOSPITAL OBSERVATION PER HR: HCPCS

## 2020-08-19 PROCEDURE — D9220A PRA ANESTHESIA: ICD-10-PCS | Mod: CRNA,,, | Performed by: NURSE ANESTHETIST, CERTIFIED REGISTERED

## 2020-08-19 PROCEDURE — D9220A PRA ANESTHESIA: ICD-10-PCS | Mod: ANES,,, | Performed by: ANESTHESIOLOGY

## 2020-08-19 PROCEDURE — 94760 N-INVAS EAR/PLS OXIMETRY 1: CPT

## 2020-08-19 PROCEDURE — 25000003 PHARM REV CODE 250: Performed by: NURSE PRACTITIONER

## 2020-08-19 PROCEDURE — 63600175 PHARM REV CODE 636 W HCPCS: Performed by: NURSE ANESTHETIST, CERTIFIED REGISTERED

## 2020-08-19 PROCEDURE — 85025 COMPLETE CBC W/AUTO DIFF WBC: CPT

## 2020-08-19 PROCEDURE — 37000009 HC ANESTHESIA EA ADD 15 MINS: Performed by: SPECIALIST

## 2020-08-19 PROCEDURE — D9220A PRA ANESTHESIA: Mod: ANES,,, | Performed by: ANESTHESIOLOGY

## 2020-08-19 PROCEDURE — 36415 COLL VENOUS BLD VENIPUNCTURE: CPT

## 2020-08-19 PROCEDURE — 25000003 PHARM REV CODE 250: Performed by: NURSE ANESTHETIST, CERTIFIED REGISTERED

## 2020-08-19 PROCEDURE — 94761 N-INVAS EAR/PLS OXIMETRY MLT: CPT

## 2020-08-19 PROCEDURE — D9220A PRA ANESTHESIA: Mod: CRNA,,, | Performed by: NURSE ANESTHETIST, CERTIFIED REGISTERED

## 2020-08-19 RX ORDER — POTASSIUM CHLORIDE 20 MEQ/1
20 TABLET, EXTENDED RELEASE ORAL ONCE
Status: COMPLETED | OUTPATIENT
Start: 2020-08-19 | End: 2020-08-19

## 2020-08-19 RX ORDER — FAMOTIDINE 20 MG/1
20 TABLET, FILM COATED ORAL 2 TIMES DAILY
Qty: 60 TABLET | Refills: 0 | Status: SHIPPED | OUTPATIENT
Start: 2020-08-19 | End: 2020-08-19

## 2020-08-19 RX ORDER — PROPOFOL 10 MG/ML
VIAL (ML) INTRAVENOUS
Status: DISCONTINUED | OUTPATIENT
Start: 2020-08-19 | End: 2020-08-19

## 2020-08-19 RX ORDER — KETAMINE HYDROCHLORIDE 100 MG/ML
INJECTION, SOLUTION INTRAMUSCULAR; INTRAVENOUS
Status: DISCONTINUED | OUTPATIENT
Start: 2020-08-19 | End: 2020-08-19

## 2020-08-19 RX ORDER — ASPIRIN 81 MG/1
81 TABLET ORAL DAILY
Qty: 30 TABLET | Refills: 0
Start: 2020-08-19 | End: 2021-04-06 | Stop reason: SDUPTHER

## 2020-08-19 RX ORDER — ASPIRIN 81 MG/1
81 TABLET ORAL DAILY
Qty: 30 TABLET | Refills: 0 | Status: SHIPPED | OUTPATIENT
Start: 2020-08-19 | End: 2020-08-19 | Stop reason: SDUPTHER

## 2020-08-19 RX ORDER — ATORVASTATIN CALCIUM 40 MG/1
40 TABLET, FILM COATED ORAL DAILY
Qty: 30 TABLET | Refills: 0 | Status: SHIPPED | OUTPATIENT
Start: 2020-08-20 | End: 2020-09-10 | Stop reason: SDUPTHER

## 2020-08-19 RX ORDER — SODIUM CHLORIDE 9 MG/ML
INJECTION, SOLUTION INTRAVENOUS CONTINUOUS PRN
Status: DISCONTINUED | OUTPATIENT
Start: 2020-08-19 | End: 2020-08-19

## 2020-08-19 RX ORDER — MIDAZOLAM HYDROCHLORIDE 1 MG/ML
INJECTION, SOLUTION INTRAMUSCULAR; INTRAVENOUS
Status: DISCONTINUED | OUTPATIENT
Start: 2020-08-19 | End: 2020-08-19

## 2020-08-19 RX ORDER — ATORVASTATIN CALCIUM 40 MG/1
40 TABLET, FILM COATED ORAL DAILY
Qty: 30 TABLET | Refills: 0 | Status: SHIPPED | OUTPATIENT
Start: 2020-08-20 | End: 2020-08-19

## 2020-08-19 RX ORDER — FAMOTIDINE 20 MG/1
20 TABLET, FILM COATED ORAL 2 TIMES DAILY
Qty: 60 TABLET | Refills: 0 | Status: SHIPPED | OUTPATIENT
Start: 2020-08-19 | End: 2020-10-21

## 2020-08-19 RX ORDER — LIDOCAINE HYDROCHLORIDE 20 MG/ML
INJECTION INTRAVENOUS
Status: DISCONTINUED | OUTPATIENT
Start: 2020-08-19 | End: 2020-08-19

## 2020-08-19 RX ADMIN — CHOLECALCIFEROL TAB 25 MCG (1000 UNIT) 1000 UNITS: 25 TAB at 01:08

## 2020-08-19 RX ADMIN — POTASSIUM CHLORIDE 20 MEQ: 1500 TABLET, EXTENDED RELEASE ORAL at 01:08

## 2020-08-19 RX ADMIN — FAMOTIDINE 20 MG: 20 TABLET ORAL at 01:08

## 2020-08-19 RX ADMIN — ATORVASTATIN CALCIUM 40 MG: 40 TABLET, FILM COATED ORAL at 01:08

## 2020-08-19 RX ADMIN — KETAMINE HYDROCHLORIDE 25 MG: 100 INJECTION, SOLUTION, CONCENTRATE INTRAMUSCULAR; INTRAVENOUS at 10:08

## 2020-08-19 RX ADMIN — PROPOFOL 20 MG: 10 INJECTION, EMULSION INTRAVENOUS at 10:08

## 2020-08-19 RX ADMIN — MIDAZOLAM HYDROCHLORIDE 2 MG: 1 INJECTION, SOLUTION INTRAMUSCULAR; INTRAVENOUS at 10:08

## 2020-08-19 RX ADMIN — CLOPIDOGREL BISULFATE 75 MG: 75 TABLET, FILM COATED ORAL at 01:08

## 2020-08-19 RX ADMIN — EZETIMIBE 10 MG: 10 TABLET ORAL at 01:08

## 2020-08-19 RX ADMIN — LIDOCAINE HYDROCHLORIDE 75 MG: 20 INJECTION, SOLUTION INTRAVENOUS at 10:08

## 2020-08-19 RX ADMIN — SODIUM CHLORIDE: 0.9 INJECTION, SOLUTION INTRAVENOUS at 10:08

## 2020-08-19 RX ADMIN — THERA TABS 1 TABLET: TAB at 01:08

## 2020-08-19 RX ADMIN — ASPIRIN 81 MG 81 MG: 81 TABLET ORAL at 01:08

## 2020-08-19 RX ADMIN — TAMSULOSIN HYDROCHLORIDE 0.4 MG: 0.4 CAPSULE ORAL at 01:08

## 2020-08-19 NOTE — DISCHARGE SUMMARY
[facility] Hospital Medicine  Discharge Summary    Patient Name: Tong Ford  MRN: [id number]  Admission Date: 8/17/2020  Hospital Length of Stay: 0 days  Discharge Date and Time: No discharge date for patient encounter.  Attending Physician: Natividad Dial MD   Discharging Provider: AUDI Vallejo  Primary Care Provider: Kris Zavala MD    HPI:   Tong Ford is a 75 y.o. male with a PMHx of HTN, CAD, arthritis, gout, and hypercholesteremia who presents to the ED with complaints of left arm weakness and dysarthria.  The patient states around 8:30 or 9:00 p.m. he was at his computer and leaned down to pick something up when his left arm became weak and he was unable to move it or open and close his hand.  He states he wanted to call out for help but was unable to form words. He also endorses developing a tingling sensation in his legs and feet. He states the symptoms lasted about 5-10 min and resolved completely by the time EMS arrived. The patient currently denies any numbness/tingling, weakness, or difficulty speaking.  Patient denies any chest pain, abdominal pain, fevers/chills, nausea, vomiting, diarrhea, dysuria, dizziness, weakness, dysphagia, or headache. His ED workup is significant for EKG revealing new onset atrial fib with a rate of 62 and CT head showing no acute intracranial abnormality but multiple remote lacunar infarcts noted.  The patient will be placed in observation under Hospital Medicine for further workup and evaluation.    Procedure(s) (LRB):  Transesophageal echo (MACRINA) intra-procedure log documentation (N/A)      Hospital Course:   The patient was monitored closely during his stay.  He was placed on continuous telemetry monitoring and neurochecks q.4 hours.  Initially he received aspirin, plavix, and statin.  Neurology was consulted.  Physical therapy, occupational therapy, and speech therapy were consulted.  Patient underwent CVA workup with imaging.  Patient was  found to have new onset of atrial fibrillation.  Cardiology was consulted.  Patient underwent a MACRINA and no clots were noted.  His Plavix was discontinued and he was started on Eliquis 5 mg p.o. b.i.d..  Acute CVA was excluded however imaging did show history of prior CVAs.  It was felt that the patient had a TIA.  His symptoms completely resolved and did not recur.  It was not felt that he required any further physical therapy, occupational therapy, or speech therapy.  The patient's overall condition remained stable and he was discharged to home once cleared by Neurology and Cardiology.     Consults:   Consults (From admission, onward)        Status Ordering Provider     Inpatient consult to Anesthesiology  Once     Provider:  Tomi Goodwin MD    Acknowledged IRON SERNA     Inpatient consult to Cardiology  Once     Provider:  Iron Serna MD    Acknowledged ALEX SCHWARTZ     Inpatient consult to Neurology  Once     Provider:  Trung Dotson MD    Acknowledged ALEX SCHWARTZ     Inpatient consult to Registered Dietitian/Nutritionist          Completed ALEX SCHWARTZ     IP consult to case management/social work        Completed ALEX SCHWARTZ          Final Active Diagnoses:    Diagnosis Date Noted POA    PRINCIPAL PROBLEM:  TIA (transient ischemic attack) [G45.9] 08/19/2020 Yes    Normocytic anemia [D64.9] 08/19/2020 Yes    Bilateral carotid artery disease [I73.9] 08/19/2020 Yes    Hypercholesteremia [E78.00] 08/17/2020 Yes    History of CVA in adulthood [Z86.73] 08/17/2020 Not Applicable    New onset atrial fibrillation [I48.91] 08/17/2020 Yes    Hypocalcemia [E83.51] 08/17/2020 Yes    Stage 2 chronic kidney disease [N18.2] 08/17/2020 Yes    Essential hypertension [I10] 01/31/2019 Yes      Problems Resolved During this Admission:       Discharged Condition: stable    Disposition: Home or Self Care    Follow Up:  Follow-up Information     Iron Serna MD In 2 weeks.     Specialties: Cardiovascular Disease, Cardiology  Why: Take blood pressure and pulse 2 times a day and keep log for follow-up  Contact information:  [address] [facility]  [address]  [place] [address]  [phone]             Trung Dotson MD On 8/27/2020.    Specialties: Vascular Neurology, Neurology  Why: 9:00 am [facility]  [place]  Contact information:  [address] [facility]  [address]  [place] [address]  [phone]             Kris Zavala MD.    Specialty: Family Medicine  Contact information:  [address] [facility]  [address]  [place] [address]  [phone]                 Patient Instructions:      Diet Cardiac     Notify your health care provider if you experience any of the following:  severe persistent headache     Notify your health care provider if you experience any of the following:  persistent dizziness, light-headedness, or visual disturbances     Notify your health care provider if you experience any of the following:  increased confusion or weakness     Notify your health care provider if you experience any of the following:   Order Comments: Any decline in condition     Activity as tolerated   Order Comments: No heavy lifting, no strenuous activity, no driving till cleared by Neurology       Significant Diagnostic Studies:    CMP   Recent Labs   Lab 08/17/20 2155 08/18/20  0512 08/19/20  0528    141 139   K 3.7 3.5 3.5    105 106   CO2 22* 27 24    96 95   BUN 13 12 13   CREATININE 1.3 1.3 1.2   CALCIUM 8.5* 8.4* 8.5*   PROT 6.9 6.4 6.5   ALBUMIN 3.7 3.5 3.4*   BILITOT 0.6 0.6 1.0   ALKPHOS 72 70 79   AST 15 15 17   ALT 15 14 13   ANIONGAP 13 9 9   ESTGFRAFRICA >60 >60 >60   EGFRNONAA 53* 53* 58*    CVA patient CBC   Recent Labs   Lab 08/17/20 2155 08/18/20  0512 08/19/20  0528   WBC 8.63 9.57 10.94   HGB 12.9* 12.8* 12.8*   HCT 38.6* 38.4* 38.9*    185 200   INR   Lab Results   Component Value Date    INR 1.1 08/18/2020     PTT 31.2    Phosphorus 3.3    Magnesium  2.4        CK 72  CK MB 2.4  MB% 3.3  Troponin 0.008    HGB A1c 3.706    COVID-19 negative    Contains abnormal data Urinalysis, Reflex to Urine Culture Urine, Clean Catch  Status:  Final result   Ref Range & Units 08/17/20 2330   Specimen UA  Urine, Clean Catch    Color, UA Yellow, Straw, Emma Yellow    Appearance, UA Clear Clear    pH, UA 5.0 - 8.0 8.0    Specific Gravity, UA 1.005 - 1.030 1.020    Protein, UA Negative TraceAbnormal     Comment: Recommend a 24 hour urine protein or a urine   protein/creatinine ratio if globulin induced proteinuria is   clinically suspected.    Glucose, UA Negative Negative    Ketones, UA Negative Negative    Bilirubin (UA) Negative Negative    Occult Blood UA Negative Negative    Nitrite, UA Negative Negative    Urobilinogen, UA <2.0 EU/dL 1.0    Leukocytes, UA Negative Negative    Resulting Agency  [facility]   Narrative    Specimen Source->Urine        Specimen Collected: 08/17/20 2330 Last Resulted: 08/17/20 2342         Urine random sodium 114    Urine random protein 23    Urine random creatinine 61.0    Lipid Panel   Lab Results   Component Value Date    CHOL 164 08/17/2020    HDL 34 (L) 08/17/2020    LDLCALC 106.2 08/17/2020    TRIG 119 08/17/2020    CHOLHDL 20.7 08/17/2020   , Troponin   Recent Labs   Lab 08/18/20  0512   TROPONINI 0.008    and A1C:   Recent Labs   Lab 08/18/20  0512   HGBA1C 5.6       Transesophageal echo (MACRINA) complete-  Order# [id number]  Reading physician: Iron Serna MD Ordering physician: Iron Serna MD Study date: 8/19/20   Reason for Exam  Priority: Routine  Dx: A-fib [I48.91 (ICD-10-CM)]   Comments:    Staff    Performing Sonographer   Quiana Willard    Vitals    Height Weight BMI (Calculated) BSA (Calculated - sq m) BP             Conclusion    · Normal left ventricular systolic function. The estimated ejection fraction is 60%.  · Normal right ventricular systolic function.  · No interatrial septal defect present. By bubble  study  · Normal appearing left atrial appendage. No thrombus is present in the appendage. Normal appendage velocities.  · Mild mitral regurgitation.  · Mild tricuspid regurgitation.  · No abnormal a cavitary echoes or clots within left atrium or left atrial appendage      Study Details    A limited echo was performed using limited 2D, color flow Doppler and limited spectral Doppler. During the study the esophageal view was captured. Saline (bubble) contrast was used during the study.   Echocardiography Findings    Left Ventricle Normal ejection fraction at 60%. The quantitatively dervived ejection fraction by 2D .  Normal left ventricular cavity size. Normal wall thickness observed.   Right Ventricle Normal cavity size, wall thickness and systolic function. Wall motion normal.   Left Atrium The left atrial volume index is normal. Patent foramen ovale not present. Bubble study for PFO negative No thrombus present. No mass present. No spontaneous echo contrast. No interatrial septal defect present. Normal appearing left atrial appendage. No thrombus is present in the appendage. Normal appendage velocities.   Right Atrium There is normal right atrial size.   Aortic Valve The valve is trileaflet.  Aortic valve sclerosis is mild.   Mitral Valve The mitral valve appears structurally normal. Mild regurgitation. Regurgitant jet between A2 and P2 No stenosis.   Tricuspid Valve The tricuspid valve appears structurally normal. Mild regurgitation.   Pulmonic Valve The pulmonic valve is structurally normal.   IVC/SVC Inferior vena cava is not well visualized. Inferior vena cava not visualized.   Result Image Hyperlink     Show images for Transesophageal echo (MACRINA) with possible cardioversion       Medications  (Filter: Administrations occurring from 08/19/20 1015 to 08/19/20 1023)  None   Signed    Electronically signed by Iron Serna MD on 8/19/20 at 1239 CDT       Radiology Results (last 7 days)    Procedure Component  Value Units Date/Time   MRA Brain without contrast [[id number]] Resulted: 08/18/20 1156   Order Status: Completed Updated: 08/18/20 1158   Narrative:     EXAMINATION:   MRA BRAIN WITHOUT CONTRAST-    CLINICAL HISTORY:   Stroke, follow up; .     TECHNIQUE:   Non-contrast 3-D time-of-flight intracranial MR angiography was performed through the [place] [employer] with MIP reformatting.     COMPARISON:   None.     FINDINGS:   The internal carotid arteries are of normal caliber and grossly patent.  The anterior and middle cerebral arteries are patent.  Anterior communicating artery is present.  The vertebral arteries are patent. The basilar artery is normal.  The P1 segment of the right posterior cerebral artery is hypoplastic or aplastic, a normal congenital variant.  A prominent right sided posterior communicating artery is present, which fills the P2 segment of the right PCA.  Mild multifocal irregularity of the intracranial vasculature without hemodynamically significant stenosis suggestive of atherosclerotic disease.  There is no aneurysm or vascular malformation identified.  Although MRA is a screening examination, catheter angiography remains the definitive study for small aneurysms, vasculitis, and other vascular abnormalities.    Impression:       Intracranial atherosclerotic disease without evidence of a high-grade stenosis, large vessel occlusion, or aneurysm.       Electronically signed by: Kris Chan   Date: 08/18/2020   Time: 11:56   MRI BRAIN WITHOUT CONTRAST [[id number]] Resulted: 08/18/20 1145   Order Status: Completed Updated: 08/18/20 1147   Narrative:     EXAMINATION:   MRI BRAIN WITHOUT CONTRAST     CLINICAL HISTORY:   Stroke, follow up;.     TECHNIQUE:   Multiplanar multisequence MR imaging of the brain was performed without the administration of intravenous contrast.     COMPARISON:   None.     FINDINGS:   Intracranial compartment:     Prominence of the ventricles and sulci compatible with mild  generalized cerebral volume loss.  No hydrocephalus. No extra-axial blood or fluid collections.     Confluent and scattered areas of T2/FLAIR hyperintense signal involving the periventricular and deep cerebral white matter, which are nonspecific and may represent a moderate degree of chronic microvascular ischemic change.  Small remote bilateral basal ganglia and left periventricular chronic lacunar-type infarcts again demonstrated.  Diffusion-weighted images demonstrate no evidence of an acute infarct.   Susceptibility weighted images demonstrate no evidence of acute or chronic hemorrhage.     Normal vascular flow voids are preserved.     Skull/extracranial contents (limited evaluation): Bone marrow signal intensity is normal. Trace right mastoid effusion.  Mild mucosal thickening within the ethmoid sinuses.  Orbits are unremarkable.    Impression:       1. No evidence of acute intracranial abnormality.   2.  Generalized cerebral volume loss with scattered areas of supratentorial white matter T2/FLAIR intensity, which are nonspecific and may represent a moderate degree of chronic microvascular ischemic change.       Electronically signed by: Kris Chan   Date: 08/18/2020   Time: 11:45   US Carotid Bilateral [[id number]] Resulted: 08/18/20 1144   Order Status: Completed Updated: 08/18/20 1147   Narrative:     EXAMINATION:   US CAROTID BILATERAL     CLINICAL HISTORY:   Velocities evaluation;     TECHNIQUE:   Grayscale and color Doppler ultrasound examination of the carotid and vertebral artery systems bilaterally.  Stenosis estimates are per the NASCET measurement criteria.     COMPARISON:   None.     FINDINGS:   Right:     Internal Carotid Artery (ICA) peak systolic velocity 89 cm/sec     ICA/CCA peak systolic velocity ratio: 1.8     Plaque formation: Heterogenous     Vertebral artery: Antegrade flow and normal waveform.     Left:     Internal Carotid Artery (ICA)  peak systolic velocity 66 cm/sec     ICA/CCA peak  systolic velocity ratio: 1.1     Plaque formation: Homogeneous     Vertebral artery: Antegrade flow and normal waveform.    Impression:       No evidence of a hemodynamically significant carotid bifurcation stenosis.  Atherosclerotic plaque is heterogenous on the right.  Abnormal cardiac rhythm.  Correlate with EKG.       Electronically signed by: Ab Stanford   Date: 08/18/2020   Time: 11:44   CT Head Without Contrast [[id number]] Resulted: 08/17/20 2307   Order Status: Completed Updated: 08/17/20 2310   Narrative:     EXAMINATION:   CT HEAD WITHOUT CONTRAST     CLINICAL HISTORY:   TIA, initial exam;     TECHNIQUE:   Low dose axial CT images obtained throughout the head without intravenous contrast. Sagittal and coronal reconstructions were performed.     COMPARISON:   None available.     FINDINGS:   Intracranial compartment: There is diffuse brain parenchymal volume loss and prominence of the CSF spaces.  There are remote lacunar infarcts within the bilateral basal ganglia and right anterior limb of the internal capsule.  There are hypodensities within the supratentorial white matter compatible with chronic microvascular ischemic changes. No extra-axial blood or fluid collections. No parenchymal mass, hemorrhage, edema or major vascular distribution infarct. There are calcifications of the carotid siphons.     Skull/extracranial contents: No calvarial fracture.  The scalp is unremarkable.  Mucosal thickening of a right posterior ethmoid air cell. Bilateral paranasal sinuses and mastoid air cells are clear.    Impression:       1. No acute intracranial abnormality.   2. Multiple remote lacunar infarcts.   3. Findings compatible with microvascular ischemic changes.       Electronically signed by: Kian Lezama   Date: 08/17/2020   Time: 23:07       Pending Diagnostic Studies:     Procedure Component Value Units Date/Time    Transesophageal echo (MACRINA) [[id number]]     Order Status: Sent Lab Status: No result           Medications:  Reconciled Home Medications:      Medication List      START taking these medications    apixaban 5 mg Tab  Commonly known as: ELIQUIS  Take 1 tablet (5 mg total) by mouth 2 (two) times daily.     aspirin 81 MG EC tablet  Commonly known as: ECOTRIN  Take 1 tablet (81 mg total) by mouth once daily.  Replaces: aspirin 81 MG Chew     atorvastatin 40 MG tablet  Commonly known as: LIPITOR  Take 1 tablet (40 mg total) by mouth once daily.  Start taking on: August 20, 2020     famotidine 20 MG tablet  Commonly known as: PEPCID  Take 1 tablet (20 mg total) by mouth 2 (two) times daily.     multivitamin Tab  Take 1 tablet by mouth once daily.  Start taking on: August 20, 2020        CHANGE how you take these medications    lisinopriL 20 MG tablet  Commonly known as: PRINIVIL,ZESTRIL  TAKE 1 TABLET TWICE A DAY  What changed:   · how much to take  · how to take this  · when to take this        CONTINUE taking these medications    coQ10 (ubiquinol) 100 mg Cap  Take 150 mg by mouth 2 (two) times daily.     doxazosin 8 MG Tab  Commonly known as: CARDURA  TAKE 1 TABLET DAILY     ezetimibe 10 mg tablet  Commonly known as: ZETIA  Take 1 tablet (10 mg total) by mouth once daily.     furosemide 40 MG tablet  Commonly known as: LASIX  TAKE 1 TABLET TWICE A DAY     garlic 1,000 mg Cap  Take by mouth.     KRILL OIL 1,272-566-20-80 mg Cap  Generic drug: krill-om-3-dha-epa-phospho-ast  Take by mouth.     metoprolol succinate 50 MG 24 hr tablet  Commonly known as: TOPROL-XL  TAKE 1 TABLET DAILY     nitroGLYCERIN 0.4 MG SL tablet  Commonly known as: NITROSTAT  DISSOLVE 1 TABLET UNDER THE TONGUE EVERY 5 MINUTES AS NEEDED FOR CHEST PAIN     tamsulosin 0.4 mg Cap  Commonly known as: FLOMAX  Take 1 capsule (0.4 mg total) by mouth once daily.     VITAMIN C 1000 MG tablet  Generic drug: ascorbic acid (vitamin C)  Take 1,000 mg by mouth once daily.     VITAMIN D ORAL  Take 500 Units by mouth Daily.        STOP taking these  medications    amLODIPine 5 MG tablet  Commonly known as: NORVASC     aspirin 81 MG Chew  Replaced by: aspirin 81 MG EC tablet     clopidogreL 75 mg tablet  Commonly known as: PLAVIX     UNABLE TO FIND            Indwelling Lines/Drains at time of discharge:   Lines/Drains/Airways     None                 Time spent on the discharge of patient: 58 minutes  Patient was seen and examined on the date of discharge and determined to be suitable for discharge.      Shira Salguero, AUDI  Department of Hospital Medicine  [facility]

## 2020-08-19 NOTE — PROGRESS NOTES
[facility]  Neurology  Consult Note    Patient Name: Tong Ford  MRN: [id number]  Admission Date: 8/17/2020  Hospital Length of Stay: 0 days  Code Status: Full Code   Attending Provider: Dr Dial  Consulting Provider: Dr. Steve Dotson  Primary Care Physician: Kris Zavala MD  Principal Problem:CVA (cerebral vascular accident)      Subjective:     Chief Complaint:   Weakness       weakness and numbness to left arm with inability to raise that arm. Pt reports it lasting only a few minutes. Completely resolved by the time EMS arrived         HPI per EMR: Tong Ford is a 75 y.o. male with a PMHx of HTN, CAD, arthritis, gout, and hypercholesteremia who presents to the ED with complaints of left arm weakness and dysarthria.  The patient states around 8:30 or 9:00 p.m. he was at his computer and leaned down to pick something up when his left arm became weak and he was unable to move it or open and close his hand.  He states he wanted to call out for help but was unable to form words. He also endorses developing a tingling sensation in his legs and feet. He states the symptoms lasted about 5-10 min and resolved completely by the time EMS arrived. The patient currently denies any numbness/tingling, weakness, or difficulty speaking.  Patient denies any chest pain, abdominal pain, fevers/chills, nausea, vomiting, diarrhea, dysuria, dizziness, weakness, dysphagia, or headache. His ED workup is significant for EKG revealing new onset atrial fib with a rate of 62 and CT head showing no acute intracranial abnormality but multiple remote lacunar infarcts noted.  The patient will be placed in observation under Hospital Medicine for further workup and evaluation.  Neurological Consult:  Patient seen and examined with Dr. Steve Dotson.  Patient reports he was bending down to get some of the floor and had weakness to his left arm.  He was unable to move his left arm and could make sounds but not complete words to  "speak.  Patient also reports numbness, tingling, and weakness to his left lower extremity.  Patient reports that lasted between 5-10 minutes, no more than 10 min.  The symptoms resolved.  Patient was taking aspirin and Plavix at home.  Currently his speech is clear, he moves all extremities, and he denies numbness and tingling.  Strength equal on both sides.  Stroke workup in progress.  Patient has scheduled MRI MRA brain.  He was ordered a MACRINA in the ED due to new onset AFib.  Patient also has a carotid ultrasound alert.  His CT head showed remote lacunar infarcts. NIHSS 0.    8/19:  Pt awake, alert. POC discussed with Dr GABBY Dotson. No focal neuro deficits on exam. Pt oriented, follows command.s Reports mild tension frontal HA, that is "barely there". Pt awaiting MACRINA this AM.     Past Medical History:   Diagnosis Date    Arthritis     Cataract     Coronary artery disease     Encounter for blood transfusion     Hypercholesterolemia     Hypertension        Past Surgical History:   Procedure Laterality Date    CORONARY ARTERY BYPASS GRAFT      EYE SURGERY      VASECTOMY         Review of patient's allergies indicates:  No Known Allergies    Current Neurological Medications:  Aspirin, Plavix    No current facility-administered medications on file prior to encounter.      Current Outpatient Medications on File Prior to Encounter   Medication Sig    amLODIPine (NORVASC) 5 MG tablet Take 1 tablet (5 mg total) by mouth once daily.    ascorbic acid, vitamin C, (VITAMIN C) 1000 MG tablet Take 1,000 mg by mouth once daily.    aspirin 81 MG Chew Take 81 mg by mouth once daily.    clopidogrel (PLAVIX) 75 mg tablet TAKE 1 TABLET DAILY    coQ10, ubiquinol, 100 mg Cap Take 150 mg by mouth 2 (two) times daily.    doxazosin (CARDURA) 8 MG Tab TAKE 1 TABLET DAILY    ergocalciferol, vitamin D2, (VITAMIN D ORAL) Take 500 Units by mouth Daily.    ezetimibe (ZETIA) 10 mg tablet Take 1 tablet (10 mg total) by mouth once " daily.    furosemide (LASIX) 40 MG tablet TAKE 1 TABLET TWICE A DAY    garlic 1,000 mg Cap Take by mouth.    krill-om-3-dha-epa-phospho-ast (KRILL OIL) 1,227-815-12-80 mg Cap Take by mouth.    lisinopril (PRINIVIL,ZESTRIL) 20 MG tablet TAKE 1 TABLET TWICE A DAY (Patient taking differently: 40 mg. )    metoprolol succinate (TOPROL-XL) 50 MG 24 hr tablet TAKE 1 TABLET DAILY    tamsulosin (FLOMAX) 0.4 mg Cap Take 1 capsule (0.4 mg total) by mouth once daily.    UNABLE TO FIND R-Alpha Lipoic Acid 150mg    UNABLE TO FIND Oat straw extract,passion flower, and skullcap root extract    UNABLE TO FIND Arterin    UNABLE TO FIND Niachinamide 250mg    UNABLE TO FIND Blood sugar ultra (supplement)    nitroGLYCERIN (NITROSTAT) 0.4 MG SL tablet DISSOLVE 1 TABLET UNDER THE TONGUE EVERY 5 MINUTES AS NEEDED FOR CHEST PAIN      Family History     Problem Relation (Age of Onset)    COPD Paternal Grandmother    Cancer Mother, Father, Maternal Grandmother    Hypertension Father        Tobacco Use    Smoking status: Never Smoker    Smokeless tobacco: Never Used   Substance and Sexual Activity    Alcohol use: Yes     Comment: rare social occasions    Drug use: No    Sexual activity: Never     Review of Systems   Constitutional: Negative.    HENT: Negative.    Eyes: Negative.    Respiratory: Negative.    Cardiovascular: Negative.    Endocrine: Negative.    Genitourinary: Negative.    Musculoskeletal: Negative.    Skin: Negative.    Allergic/Immunologic: Negative.    Neurological:        Resolved   Hematological: Negative.    Psychiatric/Behavioral: Negative.      Objective:     Vital Signs (Most Recent):  Temp: 97.8 °F (36.6 °C) (08/19/20 0805)  Pulse: 71 (08/19/20 1052)  Resp: 18 (08/19/20 1052)  BP: (!) 158/74 (08/19/20 1052)  SpO2: 98 % (08/19/20 1052) Vital Signs (24h Range):  Temp:  [97.7 °F (36.5 °C)-98.5 °F (36.9 °C)] 97.8 °F (36.6 °C)  Pulse:  [64-82] 71  Resp:  [17-18] 18  SpO2:  [95 %-98 %] 98 %  BP:  (141-191)/(73-90) 158/74     Weight: 96.2 kg (212 lb)  Body mass index is 32.23 kg/m².    Physical Exam  HENT:      Head: Normocephalic.      Nose: Nose normal.      Mouth/Throat:      Mouth: Mucous membranes are moist.   Eyes:      Extraocular Movements: Extraocular movements intact.      Pupils: Pupils are equal, round, and reactive to light.   Neck:      Musculoskeletal: Normal range of motion and neck supple.   Cardiovascular:      Rate and Rhythm: Normal rate.   Musculoskeletal: Normal range of motion.   Skin:     General: Skin is warm and dry.      Capillary Refill: Capillary refill takes less than 2 seconds.   Neurological:      General: No focal deficit present.      Mental Status: He is alert and oriented to person, place, and time.      GCS: GCS eye subscore is 4. GCS verbal subscore is 5.      Coordination: Finger-Nose-Finger Test normal.      Gait: Gait is intact.      Deep Tendon Reflexes: Strength normal.   Psychiatric:         Mood and Affect: Mood normal.         Speech: Speech normal.         Behavior: Behavior normal.         NEUROLOGICAL EXAMINATION:     MENTAL STATUS   Oriented to person, place, and time.   Follows 1 step commands.   Attention: normal. Concentration: normal.   Speech: speech is normal   Level of consciousness: alert  Able to name object. Able to repeat.     CRANIAL NERVES     CN II   Visual fields full to confrontation.   Visual acuity: normal with correction    CN III, IV, VI   Pupils are equal, round, and reactive to light.  Nystagmus: none     CN V   Facial sensation intact.     CN VII   Facial expression full, symmetric.     CN VIII   CN VIII normal.   Hearing: intact    CN IX, X   CN IX normal.   CN X normal.     CN XI   CN XI normal.     MOTOR EXAM   Muscle bulk: normal  Overall muscle tone: normal  Right arm tone: normal  Left arm tone: normal    Strength   Strength 5/5 throughout.     SENSORY EXAM   Light touch normal.     GAIT AND COORDINATION     Gait  Gait: normal      Coordination   Finger to nose coordination: normal    Tremor   Resting tremor: absent      Significant Labs:   Lab Results   Component Value Date    WBC 10.94 08/19/2020    HGB 12.8 (L) 08/19/2020    HCT 38.9 (L) 08/19/2020    MCV 98 08/19/2020     08/19/2020       CMP  Sodium   Date Value Ref Range Status   08/19/2020 139 136 - 145 mmol/L Final   07/25/2019 138 134 - 144 mmol/L      Potassium   Date Value Ref Range Status   08/19/2020 3.5 3.5 - 5.1 mmol/L Final     Chloride   Date Value Ref Range Status   08/19/2020 106 95 - 110 mmol/L Final   07/25/2019 104 98 - 110 mmol/L      CO2   Date Value Ref Range Status   08/19/2020 24 23 - 29 mmol/L Final     Glucose   Date Value Ref Range Status   08/19/2020 95 70 - 110 mg/dL Final   07/25/2019 105 (H) 70 - 99 mg/dL      BUN, Bld   Date Value Ref Range Status   08/19/2020 13 8 - 23 mg/dL Final     Creatinine   Date Value Ref Range Status   08/19/2020 1.2 0.5 - 1.4 mg/dL Final   07/25/2019 1.15 0.60 - 1.40 mg/dL      Calcium   Date Value Ref Range Status   08/19/2020 8.5 (L) 8.7 - 10.5 mg/dL Final     Total Protein   Date Value Ref Range Status   08/19/2020 6.5 6.0 - 8.4 g/dL Final     Albumin   Date Value Ref Range Status   08/19/2020 3.4 (L) 3.5 - 5.2 g/dL Final   07/25/2019 3.9 3.1 - 4.7 g/dL      Total Bilirubin   Date Value Ref Range Status   08/19/2020 1.0 0.1 - 1.0 mg/dL Final     Comment:     For infants and newborns, interpretation of results should be based  on gestational age, weight and in agreement with clinical  observations.  Premature Infant recommended reference ranges:  Up to 24 hours.............<8.0 mg/dL  Up to 48 hours............<12.0 mg/dL  3-5 days..................<15.0 mg/dL  6-29 days.................<15.0 mg/dL       Alkaline Phosphatase   Date Value Ref Range Status   08/19/2020 79 55 - 135 U/L Final     AST   Date Value Ref Range Status   08/19/2020 17 10 - 40 U/L Final     ALT   Date Value Ref Range Status   08/19/2020 13 10 - 44 U/L  Final     Anion Gap   Date Value Ref Range Status   08/19/2020 9 8 - 16 mmol/L Final     eGFR if    Date Value Ref Range Status   08/19/2020 >60 >60 mL/min/1.73 m^2 Final     eGFR if non    Date Value Ref Range Status   08/19/2020 58 (A) >60 mL/min/1.73 m^2 Final     Comment:     Calculation used to obtain the estimated glomerular filtration  rate (eGFR) is the CKD-EPI equation.            Significant Imaging: MRA Brain without contrast  Narrative: EXAMINATION:  MRA BRAIN WITHOUT CONTRAST    CLINICAL HISTORY:  Stroke, follow up; .    TECHNIQUE:  Non-contrast 3-D time-of-flight intracranial MR angiography was performed through the [facility] with MIP reformatting.    COMPARISON:  None.    FINDINGS:  The internal carotid arteries are of normal caliber and grossly patent.  The anterior and middle cerebral arteries are patent.  Anterior communicating artery is present.  The vertebral arteries are patent. The basilar artery is normal.  The P1 segment of the right posterior cerebral artery is hypoplastic or aplastic, a normal congenital variant.  A prominent right sided posterior communicating artery is present, which fills the P2 segment of the right PCA.  Mild multifocal irregularity of the intracranial vasculature without hemodynamically significant stenosis suggestive of atherosclerotic disease.  There is no aneurysm or vascular malformation identified.  Although MRA is a screening examination, catheter angiography remains the definitive study for small aneurysms, vasculitis, and other vascular abnormalities.  Impression: Intracranial atherosclerotic disease without evidence of a high-grade stenosis, large vessel occlusion, or aneurysm.    Electronically signed by: Kris Chan  Date:    08/18/2020  Time:    11:56  MRI BRAIN WITHOUT CONTRAST  Narrative: EXAMINATION:  MRI BRAIN WITHOUT CONTRAST    CLINICAL HISTORY:  Stroke, follow up;.    TECHNIQUE:  Multiplanar multisequence MR  imaging of the brain was performed without the administration of intravenous contrast.    COMPARISON:  None.    FINDINGS:  Intracranial compartment:    Prominence of the ventricles and sulci compatible with mild generalized cerebral volume loss.  No hydrocephalus. No extra-axial blood or fluid collections.    Confluent and scattered areas of T2/FLAIR hyperintense signal involving the periventricular and deep cerebral white matter, which are nonspecific and may represent a moderate degree of chronic microvascular ischemic change.  Small remote bilateral basal ganglia and left periventricular chronic lacunar-type infarcts again demonstrated.  Diffusion-weighted images demonstrate no evidence of an acute infarct.   Susceptibility weighted images demonstrate no evidence of acute or chronic hemorrhage.    Normal vascular flow voids are preserved.    Skull/extracranial contents (limited evaluation): Bone marrow signal intensity is normal. Trace right mastoid effusion.  Mild mucosal thickening within the ethmoid sinuses.  Orbits are unremarkable.  Impression: 1. No evidence of acute intracranial abnormality.  2.  Generalized cerebral volume loss with scattered areas of supratentorial white matter T2/FLAIR intensity, which are nonspecific and may represent a moderate degree of chronic microvascular ischemic change.    Electronically signed by: Kris Chan  Date:    08/18/2020  Time:    11:45  US Carotid Bilateral  Narrative: EXAMINATION:  US CAROTID BILATERAL    CLINICAL HISTORY:  Velocities evaluation;    TECHNIQUE:  Grayscale and color Doppler ultrasound examination of the carotid and vertebral artery systems bilaterally.  Stenosis estimates are per the NASCET measurement criteria.    COMPARISON:  None.    FINDINGS:  Right:    Internal Carotid Artery (ICA) peak systolic velocity 89 cm/sec    ICA/CCA peak systolic velocity ratio: 1.8    Plaque formation: Heterogenous    Vertebral artery: Antegrade flow and normal  waveform.    Left:    Internal Carotid Artery (ICA)  peak systolic velocity 66 cm/sec    ICA/CCA peak systolic velocity ratio: 1.1    Plaque formation: Homogeneous    Vertebral artery: Antegrade flow and normal waveform.  Impression: No evidence of a hemodynamically significant carotid bifurcation stenosis.  Atherosclerotic plaque is heterogenous on the right.  Abnormal cardiac rhythm.  Correlate with EKG.    Electronically signed by: Ab Stanford  Date:    08/18/2020  Time:    11:44        Assessment and Plan:    TIA  -CT head: negative acute  -MRI brain: negative acute; details above  -MRA brain: intracranial atherosclerotic disease w/o high grade stenosis, LVO, or aneurysm  -CUS:no significant stenosis  -MACRINA: due to new onset afib: ordered  -lipids:  Cholesterol 164, .2  -A1c: pending  -TSH:  3.706  -currently taking asa, plavix, statin    New Onset Afib  -previously controlled with BB  -MACRINA scheduled for today 8/18  -[id number] score: 6    PLAN: new onset afib. MACRINA pending to evaluate heart for cardiac amboli. Preliminary MACRINA report negative for PFO, negative for clots, left atrial appendage ok.  Anticoagulation with Eliquis recommended due to TIA and [id number] score.       [phone] 3 days from discharge.     Stroke education was provided including stroke risk factors modification and any acute neurological changes including weakness, confusion, visual changes to come straight to the ER.     All questions were answered.                                Active Diagnoses:    Diagnosis Date Noted POA    PRINCIPAL PROBLEM:  CVA (cerebral vascular accident) [I63.9] 08/17/2020 Yes    Normocytic anemia [D64.9] 08/19/2020 Yes    Bilateral carotid artery disease [I73.9] 08/19/2020 Yes    Hypercholesteremia [E78.00] 08/17/2020 Yes    New onset atrial fibrillation [I48.91] 08/17/2020 Yes    Hypocalcemia [E83.51] 08/17/2020 Yes    Stage 2  chronic kidney disease [N18.2] 08/17/2020 Yes    Essential hypertension [I10] 01/31/2019 Yes      Problems Resolved During this Admission:       VTE Risk Mitigation (From admission, onward)         Ordered     Place sequential compression device  Until discontinued      08/18/20 0512     IP VTE HIGH RISK PATIENT  Once      08/18/20 0007     Reason for No Pharmacological VTE Prophylaxis  Once     Question:  Reasons:  Answer:  Physician Provided (leave comment)  Comment:  Neurology recs awaiting MRI brain    08/18/20 0007                Thank you for your consult. I will follow-up with patient. Please contact us if you have any additional questions.    Jennifer Perkins NP  Neurology  [facility]

## 2020-08-19 NOTE — PLAN OF CARE
POC reviewed with patient, patient verbalized understanding, AAOX4, VSS, RA, no c/o pain or sob, NPO since MN for MACRINA, Q4 neuro checks maintained, VTE maintained,  tele and safety maintained, bed in lowest position, side rails up X2, call light and table within reach, no distress noted, will continue to monitor.

## 2020-08-19 NOTE — CONSULTS
[facility]  Cardiology  Consult Note    Patient Name: Tong Ford  MRN: [id number]  Admission Date: 8/17/2020  Hospital Length of Stay: 0 days  Code Status: Full Code   Attending Provider: Natividad Dial MD   Consulting Provider: Iron Serna MD  Primary Care Physician: Kris Zavala MD  Principal Problem:CVA (cerebral vascular accident)    Patient information was obtained from patient and ER records.     Consults  Subjective:     Chief Complaint:  Weakness left arm      HPI: pt had weakness left arm 8/17 but symptoms improved by time he got to  ER   He has hx  cabg 1980 and stents 2005 and has been on  plavix since - he was told by his cardiologist several weeks or months ago that  He might have a fib - he has not had syncope-  Just recently some mild chest discomfort \  He stopped statin > 1 year ago   No  DM + leg swelling while on  amlodipine and doxazosin     Past Medical History:   Diagnosis Date    Arthritis     Cataract     Coronary artery disease     Encounter for blood transfusion     Hypercholesterolemia     Hypertension        Past Surgical History:   Procedure Laterality Date    CORONARY ARTERY BYPASS GRAFT      EYE SURGERY      VASECTOMY         Review of patient's allergies indicates:  No Known Allergies    No current facility-administered medications on file prior to encounter.      Current Outpatient Medications on File Prior to Encounter   Medication Sig    amLODIPine (NORVASC) 5 MG tablet Take 1 tablet (5 mg total) by mouth once daily.    ascorbic acid, vitamin C, (VITAMIN C) 1000 MG tablet Take 1,000 mg by mouth once daily.    coQ10, ubiquinol, 100 mg Cap Take 150 mg by mouth 2 (two) times daily.    doxazosin (CARDURA) 8 MG Tab TAKE 1 TABLET DAILY    ergocalciferol, vitamin D2, (VITAMIN D ORAL) Take 500 Units by mouth Daily.    ezetimibe (ZETIA) 10 mg tablet Take 1 tablet (10 mg total) by mouth once daily.    furosemide (LASIX) 40 MG tablet TAKE 1 TABLET TWICE  A DAY    garlic 1,000 mg Cap Take by mouth.    krill-om-3-dha-epa-phospho-ast (KRILL OIL) 1,889-584-85-80 mg Cap Take by mouth.    lisinopril (PRINIVIL,ZESTRIL) 20 MG tablet TAKE 1 TABLET TWICE A DAY (Patient taking differently: 40 mg. )    metoprolol succinate (TOPROL-XL) 50 MG 24 hr tablet TAKE 1 TABLET DAILY    tamsulosin (FLOMAX) 0.4 mg Cap Take 1 capsule (0.4 mg total) by mouth once daily.    [DISCONTINUED] aspirin 81 MG Chew Take 81 mg by mouth once daily.    [DISCONTINUED] clopidogrel (PLAVIX) 75 mg tablet TAKE 1 TABLET DAILY    [DISCONTINUED] UNABLE TO FIND R-Alpha Lipoic Acid 150mg    [DISCONTINUED] UNABLE TO FIND Oat straw extract,passion flower, and skullcap root extract    [DISCONTINUED] UNABLE TO FIND Arterin    [DISCONTINUED] UNABLE TO FIND Niachinamide 250mg    [DISCONTINUED] UNABLE TO FIND Blood sugar ultra (supplement)    nitroGLYCERIN (NITROSTAT) 0.4 MG SL tablet DISSOLVE 1 TABLET UNDER THE TONGUE EVERY 5 MINUTES AS NEEDED FOR CHEST PAIN     Family History     Problem Relation (Age of Onset)    COPD Paternal Grandmother    Cancer Mother, Father, Maternal Grandmother    Hypertension Father        Tobacco Use    Smoking status: Never Smoker    Smokeless tobacco: Never Used   Substance and Sexual Activity    Alcohol use: Yes     Comment: rare social occasions    Drug use: No    Sexual activity: Never     Review of Systems   Constitution: Negative.   HENT: Negative.    Eyes:        Has had significant taste change lately but covid -    Cardiovascular: Positive for chest pain.   Respiratory: Positive for shortness of breath.    Skin: Negative.    Musculoskeletal: Negative.    Neurological:        No  Prior hx cva      Objective:     Vital Signs (Most Recent):  Temp: 96.7 °F (35.9 °C) (08/19/20 1125)  Pulse: 79 (08/19/20 1125)  Resp: 18 (08/19/20 1125)  BP: (!) 154/81 (08/19/20 1125)  SpO2: 97 % (08/19/20 1125) Vital Signs (24h Range):  Temp:  [96.7 °F (35.9 °C)-98.5 °F (36.9 °C)] 96.7  °F (35.9 °C)  Pulse:  [64-82] 79  Resp:  [17-18] 18  SpO2:  [95 %-98 %] 97 %  BP: (141-191)/(73-90) 154/81     Weight: 96.2 kg (212 lb)  Body mass index is 32.23 kg/m².    SpO2: 97 %  O2 Device (Oxygen Therapy): nasal cannula      Intake/Output Summary (Last 24 hours) at 8/19/2020 1403  Last data filed at 8/19/2020 1025  Gross per 24 hour   Intake 100 ml   Output 1175 ml   Net -1075 ml       Lines/Drains/Airways     Peripheral Intravenous Line                 Peripheral IV - Single Lumen 08/17/20 Distal;Left;Posterior Forearm 2 days                Physical Exam 150/81   p 70  Lungs clear    Cor irreg irreg 2,6 LEANN  ao area   abd ok  Legs good pulses   + 1 edema     Significant Labs:   CMP   Recent Labs   Lab 08/17/20 2155 08/18/20  0512 08/19/20  0528    141 139   K 3.7 3.5 3.5    105 106   CO2 22* 27 24    96 95   BUN 13 12 13   CREATININE 1.3 1.3 1.2   CALCIUM 8.5* 8.4* 8.5*   PROT 6.9 6.4 6.5   ALBUMIN 3.7 3.5 3.4*   BILITOT 0.6 0.6 1.0   ALKPHOS 72 70 79   AST 15 15 17   ALT 15 14 13   ANIONGAP 13 9 9   ESTGFRAFRICA >60 >60 >60   EGFRNONAA 53* 53* 58*   , CBC   Recent Labs   Lab 08/17/20 2155 08/18/20  0512 08/19/20  0528   WBC 8.63 9.57 10.94   HGB 12.9* 12.8* 12.8*   HCT 38.6* 38.4* 38.9*    185 200    and Troponin   Recent Labs   Lab 08/18/20  0512   TROPONINI 0.008       · Significant Imaging:Normal left ventricular systolic function. The estimated ejection fraction is 60%.  · Normal right ventricular systolic function.  · No interatrial septal defect present. By bubble study  · Normal appearing left atrial appendage. No thrombus is present in the appendage. Normal appendage velocities.  · Mild mitral regurgitation.  · Mild tricuspid regurgitation.  No abnormal a cavitary echoes or clots within left atrium  Assessment and Plan:   1) tia - while on   plavix and afib at least several weeks old present   rec -  Dc plavix,  rx eliquis 5 bid + asa and possible attempt at cardiovert in  4-6 weeks , rec trying to  Get back on lipitor 10 qd with zetia     2) edema-  Dc amlodopine and stay on  BB +  Lisinopril as at home   F/u  With  Dr Carmona or ill be glad to  See  In several weeks     Active Diagnoses:    Diagnosis Date Noted POA    PRINCIPAL PROBLEM:  CVA (cerebral vascular accident) [I63.9] 08/17/2020 Yes    Normocytic anemia [D64.9] 08/19/2020 Yes    Bilateral carotid artery disease [I73.9] 08/19/2020 Yes    Hypercholesteremia [E78.00] 08/17/2020 Yes    New onset atrial fibrillation [I48.91] 08/17/2020 Yes    Hypocalcemia [E83.51] 08/17/2020 Yes    Stage 2 chronic kidney disease [N18.2] 08/17/2020 Yes    Essential hypertension [I10] 01/31/2019 Yes      Problems Resolved During this Admission:   I personally reviewed chart and imaging studies , performed a detailed review of systems ,examined patient, and discussed with the patient her illness and treatment including diet . This consult was prolonged and required approximately 50% time for review and 50% time examining patient and writing /dictating notes and orders       VTE Risk Mitigation (From admission, onward)         Ordered     Place sequential compression device  Until discontinued      08/18/20 0512     IP VTE HIGH RISK PATIENT  Once      08/18/20 0007     Reason for No Pharmacological VTE Prophylaxis  Once     Question:  Reasons:  Answer:  Physician Provided (leave comment)  Comment:  Neurology recs awaiting MRI brain    08/18/20 0007                Thank you for your consult.     Iron Serna MD  Cardiology   [facility]

## 2020-08-19 NOTE — PROGRESS NOTES
Patient discussed with Jennifer Perkins, Neurology NP and it was felt the patient had had a TIA.  Imaging also showed history of prior CVAs.  Patient to be discharged home on aspirin and Eliquis.

## 2020-08-19 NOTE — PLAN OF CARE
Alert, oriented, npo for stormy today. Fall prevention ongoing. Bp management ongoing. Safety maintained

## 2020-08-19 NOTE — ANESTHESIA PREPROCEDURE EVALUATION
08/19/2020  Tong Ford is a 75 y.o., male.    Anesthesia Evaluation    I have reviewed the Patient Summary Reports.    I have reviewed the Nursing Notes.    I have reviewed the Medications.     Review of Systems  Anesthesia Hx:  No problems with previous Anesthesia    Social:  Non-Smoker    Hematology/Oncology:         -- Anemia:   Cardiovascular:   Hypertension, well controlled CAD asymptomatic Dysrhythmias atrial fibrillation hyperlipidemia    Pulmonary:   Shortness of breath Sleep Apnea    Education provided regarding risk of obstructive sleep apnea     Renal/:   Chronic Renal Disease, CRI    Musculoskeletal:   Arthritis     Neurological:   CVA    Endocrine:   Hypothyroidism        Physical Exam  General:  Well nourished, Obesity    Airway/Jaw/Neck:  Airway Findings: Mouth Opening: Normal Tongue: Normal  General Airway Assessment: Adult  Oropharynx Findings:  Mallampati: II  Jaw/Neck Findings:  Neck ROM: Normal ROM     Eyes/Ears/Nose:  Eyes/Ears/Nose Findings:    Dental:  Dental Findings:   Chest/Lungs:  Chest/Lungs Findings: Normal Respiratory Rate     Heart/Vascular:  Heart Findings: Rate: Normal  Rhythm: Regular Rhythm        Mental Status:  Mental Status Findings:  Cooperative, Alert and Oriented         Anesthesia Plan  Type of Anesthesia, risks & benefits discussed:  Anesthesia Type:  general  Patient's Preference:   Intra-op Monitoring Plan: standard ASA monitors  Intra-op Monitoring Plan Comments:   Post Op Pain Control Plan: multimodal analgesia  Post Op Pain Control Plan Comments:   Induction:   IV  Beta Blocker:  Patient is on a Beta-Blocker and has received one dose within the past 24 hours (No further documentation required).       Informed Consent: Patient understands risks and agrees with Anesthesia plan.  Questions answered. Anesthesia consent signed with patient.  ASA Score: 3     Day  of Surgery Review of History & Physical:            Ready For Surgery From Anesthesia Perspective.

## 2020-08-19 NOTE — TRANSFER OF CARE
"Anesthesia Transfer of Care Note    Patient: Tong Ford    Procedure(s) Performed: Procedure(s) (LRB):  Transesophageal echo (MACRINA) intra-procedure log documentation (N/A)    Patient location: PACU    Anesthesia Type: general    Transport from OR: Transported from OR on 2-3 L/min O2 by NC with adequate spontaneous ventilation    Post pain: adequate analgesia    Post assessment: no apparent anesthetic complications and tolerated procedure well    Post vital signs: stable    Level of consciousness: awake, alert and oriented    Nausea/Vomiting: no nausea/vomiting    Complications: none    Transfer of care protocol was followed      Last vitals:   Visit Vitals  BP (!) 176/86 (BP Location: Left arm, Patient Position: Lying)   Pulse 77   Temp 36.6 °C (97.8 °F) (Oral)   Resp 18   Ht 5' 8" (1.727 m)   Wt 96.2 kg (212 lb 1.3 oz)   SpO2 97%   BMI 32.25 kg/m²     "

## 2020-08-19 NOTE — HOSPITAL COURSE
The patient was monitored closely during his stay.  He was placed on continuous telemetry monitoring and neurochecks q.4 hours.  Initially he received aspirin, plavix, and statin.  Neurology was consulted.  Physical therapy, occupational therapy, and speech therapy were consulted.  Patient underwent CVA workup with imaging.  Patient was found to have new onset of atrial fibrillation.  Cardiology was consulted.  Patient underwent a MACRINA and no clots were noted.  His Plavix was discontinued and he was started on Eliquis 5 mg p.o. b.i.d..  Acute CVA was excluded however imaging did show history of prior CVAs.  It was felt that the patient had a TIA.  His symptoms completely resolved and did not recur.  It was not felt that he required any further physical therapy, occupational therapy, or speech therapy.  The patient's overall condition remained stable and he was discharged to home once cleared by Neurology and Cardiology.

## 2020-08-19 NOTE — PROCEDURES
"Tong Ford is a 75 y.o. male patient.    Temp: 97.8 °F (36.6 °C) (08/19/20 0805)  Pulse: 77 (08/19/20 0805)  Resp: 18 (08/19/20 0805)  BP: (!) 176/86 (08/19/20 0805)  SpO2: 97 % (08/19/20 0805)  Weight: 96.2 kg (212 lb 1.3 oz) (08/18/20 1429)  Height: 5' 8" (172.7 cm) (08/18/20 1433)       Procedures  M'allampati score *2  Patient's  throat was sprayed  with  Hurricane ( Benzocaine) and then patient received conscious sedation with   anasthesia   MACRINA probe then passed  via  transesophageal route and  imaging performed in the  standard 3 views ie  Upper esophageal, ,mid esophageal, and transgastric views . Saline bubbles were injected IV for PFO imaging of   Intraatrial septum.   At end of procedure, MACRINA probe removed. The patient tolerated procedure well .      Preliminary-  No   Clots in   Left atrium  Or abnormalities left atrial appendage ok    No  PFO     Iron Serna  8/19/2020  "

## 2020-08-19 NOTE — ANESTHESIA POSTPROCEDURE EVALUATION
Anesthesia Post Evaluation    Patient: Tong Ford    Procedure(s) Performed: Procedure(s) (LRB):  Transesophageal echo (MACRINA) intra-procedure log documentation (N/A)    Final Anesthesia Type: general    Patient location during evaluation: PACU  Patient participation: Yes- Able to Participate  Level of consciousness: awake and alert and oriented  Post-procedure vital signs: reviewed and stable  Pain management: adequate  Airway patency: patent    PONV status at discharge: No PONV  Anesthetic complications: no      Cardiovascular status: blood pressure returned to baseline and stable  Respiratory status: unassisted and spontaneous ventilation  Hydration status: euvolemic  Follow-up not needed.          Vitals Value Taken Time   /81 08/19/20 1125   Temp 35.9 °C (96.7 °F) 08/19/20 1125   Pulse 79 08/19/20 1125   Resp 18 08/19/20 1125   SpO2 97 % 08/19/20 1125         No case tracking events are documented in the log.      Pain/Evelyne Score: No data recorded

## 2020-08-19 NOTE — PLAN OF CARE
I spoke to Rach with Dr. Steve Dotson's office at [phone] and made the pt a 2 week follow up in the [place] office for 9:00 am on 8/26/20. Pts AVS updated. Mallory Veronica, JOSE GUADALUPEW

## 2020-08-19 NOTE — PLAN OF CARE
The pt is discharging home and is clear for discharge from case management. Mallory Veronica, [facility]     08/19/20 141   Final Note   Assessment Type Final Discharge Note   Anticipated Discharge Disposition Home   Hospital Follow Up  Appt(s) scheduled? Yes

## 2020-08-19 NOTE — PLAN OF CARE
08/19/20 1420   Final Note   Assessment Type Final Discharge Note   Anticipated Discharge Disposition Home     Left message for 2 week follow-up appointment with Dr. Serna. His office staff are out for training.

## 2020-08-19 NOTE — PROGRESS NOTES
Patient discussed with Dr. Serna.  Plan to discontinue Plavix and discharged home on aspirin and Eliquis.  Patient to follow-up with Dr. Serna as outpatient.

## 2020-08-19 NOTE — CARE UPDATE
08/19/20 0806   Patient Assessment/Suction   Level of Consciousness (AVPU) alert   PRE-TX-O2   O2 Device (Oxygen Therapy) room air   SpO2 97 %   Pulse Oximetry Type Intermittent   $ Pulse Oximetry - Multiple Charge Pulse Oximetry - Multiple   Pulse 76   Resp 18

## 2020-08-20 ENCOUNTER — TELEPHONE (OUTPATIENT)
Dept: MEDSURG UNIT | Facility: HOSPITAL | Age: 75
End: 2020-08-20

## 2020-09-08 ENCOUNTER — PATIENT MESSAGE (OUTPATIENT)
Dept: FAMILY MEDICINE | Facility: CLINIC | Age: 75
End: 2020-09-08

## 2020-09-11 RX ORDER — LISINOPRIL 20 MG/1
20 TABLET ORAL 2 TIMES DAILY
Qty: 180 TABLET | Refills: 1 | Status: ON HOLD | OUTPATIENT
Start: 2020-09-11 | End: 2021-04-01 | Stop reason: HOSPADM

## 2020-09-15 RX ORDER — ATORVASTATIN CALCIUM 40 MG/1
40 TABLET, FILM COATED ORAL DAILY
Qty: 90 TABLET | Refills: 1 | Status: SHIPPED | OUTPATIENT
Start: 2020-09-15 | End: 2020-09-21 | Stop reason: SDUPTHER

## 2020-09-21 ENCOUNTER — PATIENT MESSAGE (OUTPATIENT)
Dept: CARDIOLOGY | Facility: CLINIC | Age: 75
End: 2020-09-21

## 2020-09-21 RX ORDER — ATORVASTATIN CALCIUM 40 MG/1
40 TABLET, FILM COATED ORAL DAILY
Qty: 90 TABLET | Refills: 1 | Status: SHIPPED | OUTPATIENT
Start: 2020-09-21 | End: 2020-09-29 | Stop reason: SDUPTHER

## 2020-09-29 ENCOUNTER — OFFICE VISIT (OUTPATIENT)
Dept: CARDIOLOGY | Facility: CLINIC | Age: 75
End: 2020-09-29
Payer: MEDICARE

## 2020-09-29 VITALS
WEIGHT: 204 LBS | HEART RATE: 72 BPM | HEIGHT: 68 IN | DIASTOLIC BLOOD PRESSURE: 70 MMHG | SYSTOLIC BLOOD PRESSURE: 130 MMHG | BODY MASS INDEX: 30.92 KG/M2 | OXYGEN SATURATION: 97 %

## 2020-09-29 DIAGNOSIS — I63.9 CEREBROVASCULAR ACCIDENT (CVA), UNSPECIFIED MECHANISM: ICD-10-CM

## 2020-09-29 DIAGNOSIS — I25.10 ASCVD (ARTERIOSCLEROTIC CARDIOVASCULAR DISEASE): Primary | ICD-10-CM

## 2020-09-29 DIAGNOSIS — E78.49 OTHER HYPERLIPIDEMIA: ICD-10-CM

## 2020-09-29 DIAGNOSIS — I50.32 CHRONIC HEART FAILURE WITH PRESERVED EJECTION FRACTION: ICD-10-CM

## 2020-09-29 DIAGNOSIS — E03.9 HYPOTHYROIDISM, UNSPECIFIED TYPE: ICD-10-CM

## 2020-09-29 DIAGNOSIS — G45.9 TIA (TRANSIENT ISCHEMIC ATTACK): ICD-10-CM

## 2020-09-29 DIAGNOSIS — I10 ESSENTIAL HYPERTENSION: ICD-10-CM

## 2020-09-29 DIAGNOSIS — R60.9 EDEMA, UNSPECIFIED TYPE: ICD-10-CM

## 2020-09-29 PROCEDURE — 99214 OFFICE O/P EST MOD 30 MIN: CPT | Mod: S$GLB,,, | Performed by: SPECIALIST

## 2020-09-29 PROCEDURE — 99214 PR OFFICE/OUTPT VISIT, EST, LEVL IV, 30-39 MIN: ICD-10-PCS | Mod: S$GLB,,, | Performed by: SPECIALIST

## 2020-09-29 RX ORDER — EZETIMIBE 10 MG/1
10 TABLET ORAL DAILY
Qty: 90 TABLET | Refills: 3 | Status: SHIPPED | OUTPATIENT
Start: 2020-09-29 | End: 2020-10-21 | Stop reason: SDUPTHER

## 2020-09-29 RX ORDER — ATORVASTATIN CALCIUM 40 MG/1
40 TABLET, FILM COATED ORAL DAILY
Qty: 10 TABLET | Refills: 0 | Status: SHIPPED | OUTPATIENT
Start: 2020-09-29 | End: 2021-03-04 | Stop reason: SDUPTHER

## 2020-09-29 NOTE — PROGRESS NOTES
Subjective:    Patient ID:  Tong Ford is a 75 y.o. male who presents for   Hospital Follow Up, Atrial Fibrillation, Hypertension, Hyperlipidemia, and Carotid Artery Disease    HPI  8/19 =  afib and  cva ( aphasia)  sppech ok   Prior  Stented  Cor in  2005 ( 3 placed then ) episodes of chest pain relieved with nitroglycerin  He has had no recent treadmills    Review of patient's allergies indicates:  No Known Allergies    Past Medical History:   Diagnosis Date    Arthritis     Atrial fibrillation     Carotid artery occlusion     bilateral    Cataract     CKD (chronic kidney disease), stage II     Coronary artery disease     Encounter for blood transfusion     Hypercholesterolemia     Hypertension     Normocytic anemia     Sleep apnea     CESAR    TIA (transient ischemic attack)      Past Surgical History:   Procedure Laterality Date    CORONARY ARTERY BYPASS GRAFT      EYE SURGERY      VASECTOMY       Social History     Tobacco Use    Smoking status: Never Smoker    Smokeless tobacco: Never Used   Substance Use Topics    Alcohol use: Yes     Comment: rare social occasions    Drug use: No        Review of Systems     Review of Systems   Constitution: Negative for fever and weight loss.   HENT: Positive for congestion.         Few mos ago  Lost taste-  Found covid martinez    Cardiovascular: Positive for irregular heartbeat and leg swelling. Negative for chest pain.        Last week  Little cp-  tng relieved     If less than  80   Mg  Lasix qd - legs swell       1989   cabg    3 vessel   Respiratory: Positive for shortness of breath.    Skin: Negative.    Musculoskeletal: Positive for arthritis and back pain.        Sciatica  With walk    Gastrointestinal: Negative for abdominal pain, constipation and diarrhea.   Genitourinary: Negative.         Bph  Symptoms    Neurological: Positive for focal weakness and loss of balance.   Psychiatric/Behavioral: Negative.            Objective:        Vitals:     09/29/20 1418   BP: 130/70   Pulse: 72       Lab Results   Component Value Date    WBC 10.94 08/19/2020    HGB 12.8 (L) 08/19/2020    HCT 38.9 (L) 08/19/2020     08/19/2020    CHOL 164 08/17/2020    TRIG 119 08/17/2020    HDL 34 (L) 08/17/2020    ALT 13 08/19/2020    AST 17 08/19/2020     08/19/2020    K 3.5 08/19/2020     08/19/2020    CREATININE 1.2 08/19/2020    BUN 13 08/19/2020    CO2 24 08/19/2020    TSH 3.706 08/17/2020    PSA 1.7 07/17/2020    INR 1.1 08/18/2020    HGBA1C 5.6 08/18/2020        ECHOCARDIOGRAM RESULTS  Results for orders placed during the hospital encounter of 08/17/20   Transesophageal echo (MACRINA) with possible cardioversion    Narrative · Normal left ventricular systolic function. The estimated ejection   fraction is 60%.  · Normal right ventricular systolic function.  · No interatrial septal defect present. By bubble study  · Normal appearing left atrial appendage. No thrombus is present in the   appendage. Normal appendage velocities.  · Mild mitral regurgitation.  · Mild tricuspid regurgitation.  · No abnormal a cavitary echoes or clots within left atrium or left atrial   appendage          CURRENT/PREVIOUS VISIT EKG  Results for orders placed or performed during the hospital encounter of 08/17/20   EKG 12-lead    Collection Time: 08/17/20 10:54 PM    Narrative    Test Reason : R29.898,    Vent. Rate : 060 BPM     Atrial Rate : 250 BPM     P-R Int : 000 ms          QRS Dur : 114 ms      QT Int : 432 ms       P-R-T Axes : 000 -55 006 degrees     QTc Int : 432 ms    Atrial fibrillation  Left anterior fascicular block  Abnormal ECG  No previous ECGs available  Confirmed by Kareem BAIG, Iron CROUCH ([id number]) on 8/18/2020 9:28:26 PM    Referred By: DEON   SELF           Confirmed By:Iron Serna MD     No results found for this or any previous visit.  No results found for this or any previous visit.    PHYSICAL EXAM    Physical Exam blood1 10/80 pulse is 70  Head neck no  carotid bruit  Lungs are clear  Cardiac irregular regular  Abdomen is okay  Extremities minimal edema    Medication List with Changes/Refills   Current Medications    APIXABAN (ELIQUIS) 5 MG TAB    Take 1 tablet (5 mg total) by mouth 2 (two) times daily.    ASCORBIC ACID, VITAMIN C, (VITAMIN C) 1000 MG TABLET    Take 1,000 mg by mouth once daily.    ASPIRIN (ECOTRIN) 81 MG EC TABLET    Take 1 tablet (81 mg total) by mouth once daily.    ATORVASTATIN (LIPITOR) 40 MG TABLET    Take 1 tablet (40 mg total) by mouth once daily.    COQ10, UBIQUINOL, 100 MG CAP    Take 150 mg by mouth 2 (two) times daily.    DOXAZOSIN (CARDURA) 8 MG TAB    TAKE 1 TABLET DAILY    ERGOCALCIFEROL, VITAMIN D2, (VITAMIN D ORAL)    Take 500 Units by mouth Daily.    EZETIMIBE (ZETIA) 10 MG TABLET    Take 1 tablet (10 mg total) by mouth once daily.    FAMOTIDINE (PEPCID) 20 MG TABLET    Take 1 tablet (20 mg total) by mouth 2 (two) times daily.    FUROSEMIDE (LASIX) 40 MG TABLET    TAKE 1 TABLET TWICE A DAY    GARLIC 1,000 MG CAP    Take by mouth.    KRILL-OM-3-DHA-EPA-PHOSPHO-AST (KRILL OIL) 1,006-946-86-80 MG CAP    Take by mouth.    LISINOPRIL (PRINIVIL,ZESTRIL) 20 MG TABLET    Take 1 tablet (20 mg total) by mouth 2 (two) times daily.    METOPROLOL SUCCINATE (TOPROL-XL) 50 MG 24 HR TABLET    TAKE 1 TABLET DAILY    MULTIVITAMIN TAB    Take 1 tablet by mouth once daily.    NITROGLYCERIN (NITROSTAT) 0.4 MG SL TABLET    DISSOLVE 1 TABLET UNDER THE TONGUE EVERY 5 MINUTES AS NEEDED FOR CHEST PAIN    TAMSULOSIN (FLOMAX) 0.4 MG CAP    Take 1 capsule (0.4 mg total) by mouth once daily.           Assessment:     is atrial fibrillation with small expressive aphasia due to stroke patient is on Eliquis  2.  Status post CABG in the 1990s and 3 stents 2005 with no cardiac evaluation the past 15 years       Plan:   Lab nuclear stress thyroid echo  Return to clinic approximately 4 weeks  Consider MACRINA cardioversion at that time    Problem List Items Addressed  This Visit     None           No follow-ups on file.

## 2020-09-29 NOTE — LETTER
September 29, 2020      Kris Zavala MD  [address]  [address]  [place] [address]           [facility] [place] [address] [address]  [place] [address]  Phone: [phone]  Fax: [phone]          Patient: Tong Ford   MR Number: [id number]   YOB: 1945   Date of Visit: 9/29/2020       Dear Dr. Kris Zavala:    Thank you for referring Tong Ford to me for evaluation. Attached you will find relevant portions of my assessment and plan of care.    If you have questions, please do not hesitate to call me. I look forward to following Tong Ford along with you.    Sincerely,    Iron Serna MD    Enclosure  CC:  No Recipients    If you would like to receive this communication electronically, please contact [email or web address] or [phone] to request more information on [employer] Link access.    For providers and/or their staff who would like to refer a patient to [facility], please contact us through our one-stop-shop provider referral line, [facility], at [phone].    If you feel you have received this communication in error or would no longer like to receive these types of communications, please e-mail [email or web address]

## 2020-10-14 ENCOUNTER — LAB VISIT (OUTPATIENT)
Dept: LAB | Facility: HOSPITAL | Age: 75
End: 2020-10-14
Attending: SPECIALIST
Payer: MEDICARE

## 2020-10-14 DIAGNOSIS — E03.9 HYPOTHYROIDISM, UNSPECIFIED TYPE: ICD-10-CM

## 2020-10-14 DIAGNOSIS — G45.9 TIA (TRANSIENT ISCHEMIC ATTACK): ICD-10-CM

## 2020-10-14 LAB — TSH SERPL DL<=0.005 MIU/L-ACNC: 2.72 UIU/ML (ref 0.34–5.6)

## 2020-10-14 PROCEDURE — 36415 COLL VENOUS BLD VENIPUNCTURE: CPT

## 2020-10-14 PROCEDURE — 84443 ASSAY THYROID STIM HORMONE: CPT

## 2020-10-19 ENCOUNTER — HOSPITAL ENCOUNTER (OUTPATIENT)
Dept: CARDIOLOGY | Facility: CLINIC | Age: 75
Discharge: HOME | End: 2020-10-19
Attending: SPECIALIST
Payer: MEDICARE

## 2020-10-19 ENCOUNTER — HOSPITAL ENCOUNTER (OUTPATIENT)
Dept: RADIOLOGY | Facility: CLINIC | Age: 75
Discharge: HOME | End: 2020-10-19
Attending: SPECIALIST
Payer: MEDICARE

## 2020-10-19 ENCOUNTER — PATIENT MESSAGE (OUTPATIENT)
Dept: CARDIOLOGY | Facility: CLINIC | Age: 75
End: 2020-10-19

## 2020-10-19 ENCOUNTER — PATIENT MESSAGE (OUTPATIENT)
Dept: FAMILY MEDICINE | Facility: CLINIC | Age: 75
End: 2020-10-19

## 2020-10-19 VITALS — HEIGHT: 68 IN | WEIGHT: 204 LBS | BODY MASS INDEX: 30.92 KG/M2

## 2020-10-19 DIAGNOSIS — R60.9 EDEMA, UNSPECIFIED TYPE: ICD-10-CM

## 2020-10-19 DIAGNOSIS — I25.10 ASCVD (ARTERIOSCLEROTIC CARDIOVASCULAR DISEASE): ICD-10-CM

## 2020-10-19 PROCEDURE — 78452 STRESS TEST WITH MYOCARDIAL PERFUSION (CUPID ONLY): ICD-10-PCS | Mod: S$GLB,,, | Performed by: SPECIALIST

## 2020-10-19 PROCEDURE — 93306 TTE W/DOPPLER COMPLETE: CPT | Mod: S$GLB,,, | Performed by: SPECIALIST

## 2020-10-19 PROCEDURE — 78452 HT MUSCLE IMAGE SPECT MULT: CPT | Mod: S$GLB,,, | Performed by: SPECIALIST

## 2020-10-19 PROCEDURE — 93015 STRESS TEST WITH MYOCARDIAL PERFUSION (CUPID ONLY): ICD-10-PCS | Mod: S$GLB,,, | Performed by: SPECIALIST

## 2020-10-19 PROCEDURE — A9502 STRESS TEST WITH MYOCARDIAL PERFUSION (CUPID ONLY): ICD-10-PCS | Mod: S$GLB,,, | Performed by: SPECIALIST

## 2020-10-19 PROCEDURE — A9502 TC99M TETROFOSMIN: HCPCS | Mod: S$GLB,,, | Performed by: SPECIALIST

## 2020-10-19 PROCEDURE — 93306 ECHO (CUPID ONLY): ICD-10-PCS | Mod: S$GLB,,, | Performed by: SPECIALIST

## 2020-10-19 PROCEDURE — 93015 CV STRESS TEST SUPVJ I&R: CPT | Mod: S$GLB,,, | Performed by: SPECIALIST

## 2020-10-20 ENCOUNTER — PATIENT MESSAGE (OUTPATIENT)
Dept: CARDIOLOGY | Facility: CLINIC | Age: 75
End: 2020-10-20

## 2020-10-21 ENCOUNTER — OFFICE VISIT (OUTPATIENT)
Dept: FAMILY MEDICINE | Facility: CLINIC | Age: 75
End: 2020-10-21
Payer: MEDICARE

## 2020-10-21 VITALS
BODY MASS INDEX: 30.37 KG/M2 | WEIGHT: 200.38 LBS | TEMPERATURE: 98 F | HEIGHT: 68 IN | SYSTOLIC BLOOD PRESSURE: 140 MMHG | DIASTOLIC BLOOD PRESSURE: 88 MMHG | OXYGEN SATURATION: 98 % | HEART RATE: 51 BPM

## 2020-10-21 DIAGNOSIS — L03.116 CELLULITIS OF LEFT LEG: Primary | ICD-10-CM

## 2020-10-21 DIAGNOSIS — Z23 IMMUNIZATION DUE: ICD-10-CM

## 2020-10-21 DIAGNOSIS — T14.8XXA BRUISING: ICD-10-CM

## 2020-10-21 PROCEDURE — 99213 OFFICE O/P EST LOW 20 MIN: CPT | Mod: S$PBB,,, | Performed by: NURSE PRACTITIONER

## 2020-10-21 PROCEDURE — 99215 OFFICE O/P EST HI 40 MIN: CPT | Mod: 25 | Performed by: NURSE PRACTITIONER

## 2020-10-21 PROCEDURE — 90471 IMMUNIZATION ADMIN: CPT | Mod: PBBFAC | Performed by: NURSE PRACTITIONER

## 2020-10-21 PROCEDURE — 99213 PR OFFICE/OUTPT VISIT, EST, LEVL III, 20-29 MIN: ICD-10-PCS | Mod: S$PBB,,, | Performed by: NURSE PRACTITIONER

## 2020-10-21 RX ORDER — CEPHALEXIN 500 MG/1
500 CAPSULE ORAL 4 TIMES DAILY
Qty: 28 CAPSULE | Refills: 0 | Status: SHIPPED | OUTPATIENT
Start: 2020-10-21 | End: 2020-10-28

## 2020-10-21 NOTE — PATIENT INSTRUCTIONS
Discharge Instructions for Cellulitis  You have been diagnosed with cellulitis. This is an infection in the deepest layer of the skin. In some cases, the infection also affects the muscle. Cellulitis is caused by bacteria. The bacteria can enter the body through broken skin. This can happen with a cut, scratch, animal bite, or an insect bite that has been scratched. You may have been treated in the hospital with antibiotics and fluids. You will likely be given a prescription for antibiotics to take at home. This sheet will help you take care of yourself at home.  Home care  When you are home:  · Take the prescribed antibiotic medicine you are given as directed until it is gone. Take it even if you feel better. It treats the infection and stops it from returning. Not taking all the medicine can make future infections hard to treat.  · Keep the infected area clean.  · When possible, raise the infected area above the level of your heart. This helps keep swelling down.  · Talk with your healthcare provider if you are in pain. Ask what kind of over-the-counter medicine you can take for pain.  · Apply clean bandages as advised.  · Take your temperature once a day for a week.  · Wash your hands often to prevent spreading the infection.  In the future, wash your hands before and after you touch cuts, scratches, or bandages. This will help prevent infection.   When to call your healthcare provider  Call your healthcare provider immediately if you have any of the following:  · Difficulty or pain when moving the joints above or below the infected area  · Discharge or pus draining from the area  · Fever of 100.4°F (38°C) or higher, or as directed by your healthcare provider  · Pain that gets worse in or around the infected   · Redness that gets worse in or around the infected area, particularly if the area of redness expands to a wider area  · Shaking chills  · Swelling of the infected area  · Vomiting   Date Last Reviewed:  8/1/2016 © 2000-2017 [employer]. [address] [place] [address], [place] [address]. All rights reserved. This information is not intended as a substitute for professional medical care. Always follow your healthcare professional's instructions.        Cellulitis  Cellulitis is an infection of the deep layers of skin. A break in the skin, such as a cut or scratch, can let bacteria under the skin. If the bacteria get to deep layers of the skin, it can be serious. If not treated, cellulitis can get into the bloodstream and lymph nodes. The infection can then spread throughout the body. This causes serious illness.  Cellulitis causes the affected skin to become red, swollen, warm, and sore. The reddened areas have a visible border. An open sore may leak fluid (pus). You may have a fever, chills, and pain.  Cellulitis is treated with antibiotics taken for 7 to 10 days. An open sore may be cleaned and covered with cool wet gauze. Symptoms should get better 1 to 2 days after treatment is started. Make sure to take all the antibiotics for the full number of days until they are gone. Keep taking the medicine even if your symptoms go away.  Home care  Follow these tips:  · Limit the use of the part of your body with cellulitis.   · If the infection is on your leg, keep your leg raised while sitting. This will help to reduce swelling.  · Take all of the antibiotic medicine exactly as directed until it is gone. Do not miss any doses, especially during the first 7 days. Dont stop taking the medicine when your symptoms get better.  · Keep the affected area clean and dry.  · Wash your hands with soap and warm water before and after touching your skin. Anyone else who touches your skin should also wash his or her hands. Don't share towels.  Follow-up care  Follow up with your healthcare provider, or as advised. If your infection does not go away on the first antibiotic, your healthcare provider will prescribe a different  one.  When to seek medical advice  Call your healthcare provider right away if any of these occur:  · Red areas that spread  · Swelling or pain that gets worse  · Fluid leaking from the skin (pus)  · Fever higher of 100.4º F (38.0º C) or higher after 2 days on antibiotics  Date Last Reviewed: 9/1/2016  © 1971-9448 [employer]. [address] [place] [address], [place] [address]. All rights reserved. This information is not intended as a substitute for professional medical care. Always follow your healthcare professional's instructions.

## 2020-10-21 NOTE — PROGRESS NOTES
SUBJECTIVE:      Patient ID: Tong Ford is a 75 y.o. male.    Chief Complaint: Leg Pain (left ankle bruising swollen tender noticed sunday night)    Established patient of Dr. Zavala here for complaints of left lower leg bruising, warmth, and slight redness over the last 3 days.  The symptoms seem to be worsening and he felt like he needed to be checked out.  He denies any known trauma or injury, but states he is very clumsy and it is possible he bumped his leg and did not realize it at the time.  He denies any fever or chills.  He denies any calf pain- he has a hx of edema in lower extremities and is taking Lasix at this time for this.     Leg Pain   The incident occurred 3 to 5 days ago. The injury mechanism is unknown. The pain is present in the left leg. The quality of the pain is described as aching. The pain is mild. The pain has been fluctuating since onset. Pertinent negatives include no inability to bear weight, loss of motion, loss of sensation, muscle weakness, numbness or tingling. He reports no foreign bodies present. The symptoms are aggravated by weight bearing. He has tried rest for the symptoms. The treatment provided no relief.       Family History   Problem Relation Age of Onset    Cancer Mother     Cancer Father     Hypertension Father     Cancer Maternal Grandmother     COPD Paternal Grandmother       Social History     Socioeconomic History    Marital status:      Spouse name: Not on file    Number of children: Not on file    Years of education: Not on file    Highest education level: Not on file   Occupational History    Occupation: professor   Social Needs    Financial resource strain: Not on file    Food insecurity     Worry: Not on file     Inability: Not on file    Transportation needs     Medical: Not on file     Non-medical: Not on file   Tobacco Use    Smoking status: Never Smoker    Smokeless tobacco: Never Used   Substance and Sexual Activity    Alcohol  use: Yes     Comment: rare social occasions    Drug use: No    Sexual activity: Never   Lifestyle    Physical activity     Days per week: Not on file     Minutes per session: Not on file    Stress: Only a little   Relationships    Social connections     Talks on phone: Not on file     Gets together: Not on file     Attends Confucianism service: Not on file     Active member of club or organization: Not on file     Attends meetings of clubs or organizations: Not on file     Relationship status: Not on file   Other Topics Concern    Not on file   Social History Narrative    Not on file     Current Outpatient Medications   Medication Sig Dispense Refill    apixaban (ELIQUIS) 5 mg Tab Take 1 tablet (5 mg total) by mouth 2 (two) times daily. 20 tablet 0    ascorbic acid, vitamin C, (VITAMIN C) 1000 MG tablet Take 1,000 mg by mouth once daily.      aspirin (ECOTRIN) 81 MG EC tablet Take 1 tablet (81 mg total) by mouth once daily. 30 tablet 0    atorvastatin (LIPITOR) 40 MG tablet Take 1 tablet (40 mg total) by mouth once daily. 10 tablet 0    coQ10, ubiquinol, 100 mg Cap Take 150 mg by mouth 2 (two) times daily.      doxazosin (CARDURA) 8 MG Tab TAKE 1 TABLET DAILY 90 tablet 4    ergocalciferol, vitamin D2, (VITAMIN D ORAL) Take 500 Units by mouth Daily.      ezetimibe (ZETIA) 10 mg tablet Take 1 tablet (10 mg total) by mouth once daily. 90 tablet 3    furosemide (LASIX) 40 MG tablet TAKE 1 TABLET TWICE A  tablet 4    garlic 1,000 mg Cap Take by mouth.      lisinopriL (PRINIVIL,ZESTRIL) 20 MG tablet Take 1 tablet (20 mg total) by mouth 2 (two) times daily. 180 tablet 1    metoprolol succinate (TOPROL-XL) 50 MG 24 hr tablet TAKE 1 TABLET DAILY 90 tablet 4    multivitamin Tab Take 1 tablet by mouth once daily.      nitroGLYCERIN (NITROSTAT) 0.4 MG SL tablet DISSOLVE 1 TABLET UNDER THE TONGUE EVERY 5 MINUTES AS NEEDED FOR CHEST PAIN 75 tablet 15    tamsulosin (FLOMAX) 0.4 mg Cap Take 1 capsule (0.4  "mg total) by mouth once daily. 90 capsule 3    cephALEXin (KEFLEX) 500 MG capsule Take 1 capsule (500 mg total) by mouth 4 (four) times daily. for 7 days 28 capsule 0    krill-om-3-dha-epa-phospho-ast (KRILL OIL) 1,904-680-55-80 mg Cap Take by mouth.       No current facility-administered medications for this visit.      Review of patient's allergies indicates:  No Known Allergies   Past Medical History:   Diagnosis Date    Arthritis     Atrial fibrillation     Carotid artery occlusion     bilateral    Cataract     CKD (chronic kidney disease), stage II     Coronary artery disease     Encounter for blood transfusion     Hypercholesterolemia     Hypertension     Normocytic anemia     Sleep apnea     CESAR    TIA (transient ischemic attack)      Past Surgical History:   Procedure Laterality Date    CORONARY ARTERY BYPASS GRAFT      EYE SURGERY      VASECTOMY         Review of Systems   Constitutional: Negative for appetite change, chills, diaphoresis and fever.   HENT: Negative for congestion and sore throat.    Respiratory: Negative for cough and shortness of breath.    Cardiovascular: Positive for leg swelling. Negative for chest pain and palpitations.   Gastrointestinal: Negative for abdominal pain, diarrhea, nausea and vomiting.   Genitourinary: Negative for dysuria and frequency.   Musculoskeletal: Positive for arthralgias. Negative for back pain, gait problem, joint swelling and myalgias.   Skin: Positive for color change. Negative for pallor, rash and wound.   Neurological: Negative for dizziness, tingling, numbness and headaches.   Hematological: Bruises/bleeds easily.      OBJECTIVE:      Vitals:    10/21/20 0927   BP: (!) 140/88   BP Location: Left arm   Patient Position: Sitting   BP Method: Large (Manual)   Pulse: (!) 51   Temp: 98 °F (36.7 °C)   TempSrc: Oral   SpO2: 98%   Weight: 90.9 kg (200 lb 6.4 oz)   Height: 5' 8" (1.727 m)     Physical Exam  Vitals signs and nursing note reviewed. "   Constitutional:       General: He is not in acute distress.     Appearance: Normal appearance. He is not diaphoretic.   HENT:      Head: Normocephalic.      Mouth/Throat:      Mouth: Mucous membranes are moist.   Eyes:      Conjunctiva/sclera: Conjunctivae normal.      Pupils: Pupils are equal, round, and reactive to light.   Neck:      Musculoskeletal: Normal range of motion.   Cardiovascular:      Rate and Rhythm: Normal rate and regular rhythm.      Pulses: Normal pulses.      Heart sounds: Normal heart sounds.   Pulmonary:      Effort: Pulmonary effort is normal.      Breath sounds: Normal breath sounds.   Abdominal:      General: Bowel sounds are normal.      Palpations: Abdomen is soft.   Musculoskeletal: Normal range of motion.         General: No deformity.      Right lower leg: No edema.      Left lower leg: He exhibits no tenderness, no bony tenderness, no swelling, no deformity and no laceration. Edema (1+ at sockline down to foot ) present.      Comments: Neg Jessica's sign LLE    Skin:     General: Skin is warm and dry.      Coloration: Skin is not jaundiced or pale.      Findings: Bruising and erythema present. No lesion or rash (slight erythema and warmth with bruising-see pic  ).   Neurological:      Mental Status: He is alert and oriented to person, place, and time.   Psychiatric:         Mood and Affect: Mood normal.         Behavior: Behavior normal.            Assessment:       1. Cellulitis of left leg    2. Bruising    3. Immunization due        Plan:       Cellulitis of left leg  -     cephALEXin (KEFLEX) 500 MG capsule; Take 1 capsule (500 mg total) by mouth 4 (four) times daily. for 7 days  Dispense: 28 capsule; Refill: 0    Bruising    Immunization due  -     Tdap Vaccine    * advised antibiotics as prescribed, elevation, and ice p.r.n. at home; may need ultrasound if no improvement in 2-3 days; patient instructed to contact office or proceed to urgent care for worsening symptoms    Follow  up if symptoms worsen or fail to improve.      10/21/2020 CHERI Song, FNP

## 2020-10-22 LAB
CV PHARM DOSE: 0.4 MG
CV STRESS BASE HR: 60 BPM
DIASTOLIC BLOOD PRESSURE: 82 MMHG
NUC STRESS EJECTION FRACTION: 69 %
OHS CV CPX 1 MINUTE RECOVERY HEART RATE: 69 BPM
OHS CV CPX 85 PERCENT MAX PREDICTED HEART RATE MALE: 122
OHS CV CPX MAX PREDICTED HEART RATE: 143
OHS CV CPX PATIENT IS FEMALE: 0
OHS CV CPX PATIENT IS MALE: 1
OHS CV CPX PEAK DIASTOLIC BLOOD PRESSURE: 82 MMHG
OHS CV CPX PEAK HEAR RATE: 81 BPM
OHS CV CPX PEAK RATE PRESSURE PRODUCT: NORMAL
OHS CV CPX PEAK SYSTOLIC BLOOD PRESSURE: 134 MMHG
OHS CV CPX PERCENT MAX PREDICTED HEART RATE ACHIEVED: 57
OHS CV CPX RATE PRESSURE PRODUCT PRESENTING: 7200
STRESS ECHO POST EXERCISE DUR MIN: 3 MINUTES
STRESS ECHO POST EXERCISE DUR SEC: 0 SECONDS
SYSTOLIC BLOOD PRESSURE: 120 MMHG

## 2020-10-23 LAB
AORTIC ROOT ANNULUS: 3.3 CM
AORTIC VALVE CUSP SEPERATION: 1.3 CM
AV INDEX (PROSTH): 0.39
AV MEAN GRADIENT: 7 MMHG
AV PEAK GRADIENT: 12 MMHG
AV REGURGITATION PRESSURE HALF TIME: 602 MS
AV VALVE AREA: 1.63 CM2
AV VELOCITY RATIO: 0.39
BSA FOR ECHO PROCEDURE: 2.11 M2
CV ECHO LV RWT: 0.47 CM
DOP CALC AO PEAK VEL: 1.76 M/S
DOP CALC AO VTI: 39.6 CM
DOP CALC LVOT AREA: 4.2 CM2
DOP CALC LVOT DIAMETER: 2.3 CM
DOP CALC LVOT PEAK VEL: 0.69 M/S
DOP CALC LVOT STROKE VOLUME: 64.37 CM3
DOP CALCLVOT PEAK VEL VTI: 15.5 CM
E WAVE DECELERATION TIME: 253 MS
E/E' RATIO: 14.44 M/S
ECHO LV POSTERIOR WALL: 1.15 CM (ref 0.6–1.1)
FRACTIONAL SHORTENING: 33 % (ref 28–44)
INTERVENTRICULAR SEPTUM: 1.16 CM (ref 0.6–1.1)
IVRT: 95 MS
LA MAJOR: 4.8 CM
LEFT ATRIUM SIZE: 5.6 CM
LEFT INTERNAL DIMENSION IN SYSTOLE: 3.29 CM (ref 2.1–4)
LEFT VENTRICLE MASS INDEX: 103 G/M2
LEFT VENTRICULAR INTERNAL DIMENSION IN DIASTOLE: 4.88 CM (ref 3.5–6)
LEFT VENTRICULAR MASS: 213.16 G
LV LATERAL E/E' RATIO: 13 M/S
LV SEPTAL E/E' RATIO: 16.25 M/S
MV PEAK E VEL: 1.3 M/S
MV STENOSIS PRESSURE HALF TIME: 68 MS
MV VALVE AREA P 1/2 METHOD: 3.24 CM2
PISA TR MAX VEL: 2.24 M/S
RA PRESSURE: 3 MMHG
RIGHT VENTRICULAR END-DIASTOLIC DIMENSION: 2.93 CM
TDI LATERAL: 0.1 M/S
TDI SEPTAL: 0.08 M/S
TDI: 0.09 M/S
TR MAX PG: 20 MMHG
TV REST PULMONARY ARTERY PRESSURE: 23 MMHG

## 2020-10-27 ENCOUNTER — OFFICE VISIT (OUTPATIENT)
Dept: CARDIOLOGY | Facility: CLINIC | Age: 75
End: 2020-10-27
Payer: MEDICARE

## 2020-10-27 VITALS
HEART RATE: 73 BPM | DIASTOLIC BLOOD PRESSURE: 80 MMHG | WEIGHT: 200 LBS | SYSTOLIC BLOOD PRESSURE: 130 MMHG | HEIGHT: 68 IN | BODY MASS INDEX: 30.31 KG/M2 | OXYGEN SATURATION: 98 %

## 2020-10-27 DIAGNOSIS — R07.9 CHEST PAIN, UNSPECIFIED TYPE: ICD-10-CM

## 2020-10-27 DIAGNOSIS — R60.9 EDEMA, UNSPECIFIED TYPE: ICD-10-CM

## 2020-10-27 DIAGNOSIS — I48.91 NEW ONSET ATRIAL FIBRILLATION: Primary | ICD-10-CM

## 2020-10-27 DIAGNOSIS — I25.10 ASCVD (ARTERIOSCLEROTIC CARDIOVASCULAR DISEASE): ICD-10-CM

## 2020-10-27 DIAGNOSIS — I35.0 AORTIC VALVE STENOSIS, ETIOLOGY OF CARDIAC VALVE DISEASE UNSPECIFIED: ICD-10-CM

## 2020-10-27 PROCEDURE — 99214 OFFICE O/P EST MOD 30 MIN: CPT | Mod: S$GLB,,, | Performed by: SPECIALIST

## 2020-10-27 PROCEDURE — 99214 PR OFFICE/OUTPT VISIT, EST, LEVL IV, 30-39 MIN: ICD-10-PCS | Mod: S$GLB,,, | Performed by: SPECIALIST

## 2020-10-27 RX ORDER — FUROSEMIDE 40 MG/1
60 TABLET ORAL DAILY
Qty: 180 TABLET | Refills: 4 | Status: ON HOLD | OUTPATIENT
Start: 2020-10-27 | End: 2021-04-01 | Stop reason: HOSPADM

## 2020-10-27 NOTE — PROGRESS NOTES
Subjective:    Patient ID:  Tong Ford is a 75 y.o. male who presents for   Results (stress & echo), Coronary Artery Disease, Carotid Artery Disease, and Atrial Fibrillation    HPI  cabg 1989   Stented post cabg    Just recently getting cp  Again  ,  No  Sob or sycope   2005 stents  3.5- 25 x 2 in  rca  3.5- 25 in   cx   svg closed  patient has had atrial fibrillation for least 3 years P he has never been cardioverted  He has some mild shortness of breath and has had 1 or 2 episodes a nitroglycerin relieved chest pain he does have some swelling in the left  He has been taking Lasix 60 mg a day instead of 80  Has chronic kidney disease 2  Stress test showed some attenuation anteriorly  Echo demonstrates LVH and moderate aortic stenosis  Review of patient's allergies indicates:  No Known Allergies    Past Medical History:   Diagnosis Date    Arthritis     Atrial fibrillation     Carotid artery occlusion     bilateral    Cataract     CKD (chronic kidney disease), stage II     Coronary artery disease     Encounter for blood transfusion     Hypercholesterolemia     Hypertension     Normocytic anemia     Sleep apnea     CESAR    TIA (transient ischemic attack)      Past Surgical History:   Procedure Laterality Date    CORONARY ARTERY BYPASS GRAFT      EYE SURGERY      VASECTOMY       Social History     Tobacco Use    Smoking status: Never Smoker    Smokeless tobacco: Never Used   Substance Use Topics    Alcohol use: Yes     Comment: rare social occasions    Drug use: No        Review of Systems     Review of Systems   HENT: Negative.    Eyes: Positive for blurred vision.   Cardiovascular: Positive for chest pain. Negative for dyspnea on exertion.   Respiratory: Positive for shortness of breath and sleep disturbances due to breathing.         + cesar            Objective:        Vitals:    10/27/20 1428   BP: 130/80   Pulse: 73       Lab Results   Component Value Date    WBC 10.94 08/19/2020    HGB 12.8  (L) 08/19/2020    HCT 38.9 (L) 08/19/2020     08/19/2020    CHOL 164 08/17/2020    TRIG 119 08/17/2020    HDL 34 (L) 08/17/2020    ALT 13 08/19/2020    AST 17 08/19/2020     08/19/2020    K 3.5 08/19/2020     08/19/2020    CREATININE 1.2 08/19/2020    BUN 13 08/19/2020    CO2 24 08/19/2020    TSH 2.720 10/14/2020    PSA 1.7 07/17/2020    INR 1.1 08/18/2020    HGBA1C 5.6 08/18/2020        ECHOCARDIOGRAM RESULTS  Results for orders placed during the hospital encounter of 10/19/20   Echo Color Flow Doppler? Yes; Bubble Contrast? No    Narrative · There is mild left ventricular concentric hypertrophy.  · The left ventricle is normal in size with normal systolic function. The   estimated ejection fraction is 65%.  · Atrial fibrillation observed with restrictive filling pattern present.  · With normal left atrial pressure.  · Normal right ventricular systolic function.  · Moderate left atrial enlargement.  · Mild right atrial enlargement.  · Moderate aortic valve stenosis.  · Aortic valve area is 1.63 cm2; peak velocity is 1.76 m/s; mean gradient   is 7 mmHg.  · Moderate mitral regurgitation.  · No pulmonary hypertension  · Mild tricuspid regurgitation.  · Mild pulmonic regurgitation.  · Normal central venous pressure (3 mmHg).  · The estimated PA systolic pressure is 23 mmHg.     Left ventricle hypertrophy, moderate calcific aortic valve stenosis   plainimetered d valve area is 1.6 centimeters squared  Plus two mitral regurgitation  No pulmonary hypertension         CURRENT/PREVIOUS VISIT EKG  Results for orders placed or performed during the hospital encounter of 08/17/20   EKG 12-lead    Collection Time: 08/17/20 10:54 PM    Narrative    Test Reason : R29.898,    Vent. Rate : 060 BPM     Atrial Rate : 250 BPM     P-R Int : 000 ms          QRS Dur : 114 ms      QT Int : 432 ms       P-R-T Axes : 000 -55 006 degrees     QTc Int : 432 ms    Atrial fibrillation  Left anterior fascicular block  Abnormal  ECG  No previous ECGs available  Confirmed by Kareem BAIG, Iron CROUCH ([id number]) on 8/18/2020 9:28:26 PM    Referred By: [email or web address]   SELF           Confirmed By:Iron Serna MD     Results for orders placed during the hospital encounter of 10/19/20   Echo Color Flow Doppler? Yes; Bubble Contrast? No    Narrative · There is mild left ventricular concentric hypertrophy.  · The left ventricle is normal in size with normal systolic function. The   estimated ejection fraction is 65%.  · Atrial fibrillation observed with restrictive filling pattern present.  · With normal left atrial pressure.  · Normal right ventricular systolic function.  · Moderate left atrial enlargement.  · Mild right atrial enlargement.  · Moderate aortic valve stenosis.  · Aortic valve area is 1.63 cm2; peak velocity is 1.76 m/s; mean gradient   is 7 mmHg.  · Moderate mitral regurgitation.  · No pulmonary hypertension  · Mild tricuspid regurgitation.  · Mild pulmonic regurgitation.  · Normal central venous pressure (3 mmHg).  · The estimated PA systolic pressure is 23 mmHg.     Left ventricle hypertrophy, moderate calcific aortic valve stenosis   plainimetered d valve area is 1.6 centimeters squared  Plus two mitral regurgitation  No pulmonary hypertension       Results for orders placed during the hospital encounter of 10/19/20   Nuclear Stress - Cardiology Interpreted    Narrative   There is a trivial intensity defect in the  wall of the left ventricle,   secondary to breast attenuation.    Gated perfusion images showed an ejection fraction of % at rest and 69%   post stress. Normal ejection fraction is greater than %.    There is normal wall motion at rest and post stress.    LV cavity size is normal at rest and normal at stress.    The EKG portion of this study is negative for ischemia.    Arrhythmias during stress: atrial fibrillation.    No Evidence of myocardial ischemia       PHYSICAL EXAM    Physical Exam   Blood pressure is  130/80  Pulses  Lungs clear  Cardiac irregular rate 1/6 systolic murmur  Abdomen is okay  Legs reveal +1 edema  Medication List with Changes/Refills   Current Medications    APIXABAN (ELIQUIS) 5 MG TAB    Take 1 tablet (5 mg total) by mouth 2 (two) times daily.    ASCORBIC ACID, VITAMIN C, (VITAMIN C) 1000 MG TABLET    Take 1,000 mg by mouth once daily.    ASPIRIN (ECOTRIN) 81 MG EC TABLET    Take 1 tablet (81 mg total) by mouth once daily.    ATORVASTATIN (LIPITOR) 40 MG TABLET    Take 1 tablet (40 mg total) by mouth once daily.    CEPHALEXIN (KEFLEX) 500 MG CAPSULE    Take 1 capsule (500 mg total) by mouth 4 (four) times daily. for 7 days    COQ10, UBIQUINOL, 100 MG CAP    Take 150 mg by mouth 2 (two) times daily.    DOXAZOSIN (CARDURA) 8 MG TAB    TAKE 1 TABLET DAILY    ERGOCALCIFEROL, VITAMIN D2, (VITAMIN D ORAL)    Take 500 Units by mouth Daily.    EZETIMIBE (ZETIA) 10 MG TABLET    Take 1 tablet (10 mg total) by mouth once daily.    FUROSEMIDE (LASIX) 40 MG TABLET    TAKE 1 TABLET TWICE A DAY    GARLIC 1,000 MG CAP    Take by mouth.    KRILL-OM-3-DHA-EPA-PHOSPHO-AST (KRILL OIL) 1,581-134-24-80 MG CAP    Take by mouth.    LISINOPRIL (PRINIVIL,ZESTRIL) 20 MG TABLET    Take 1 tablet (20 mg total) by mouth 2 (two) times daily.    METOPROLOL SUCCINATE (TOPROL-XL) 50 MG 24 HR TABLET    TAKE 1 TABLET DAILY    MULTIVITAMIN TAB    Take 1 tablet by mouth once daily.    NITROGLYCERIN (NITROSTAT) 0.4 MG SL TABLET    DISSOLVE 1 TABLET UNDER THE TONGUE EVERY 5 MINUTES AS NEEDED FOR CHEST PAIN    TAMSULOSIN (FLOMAX) 0.4 MG CAP    Take 1 capsule (0.4 mg total) by mouth once daily.           Assessment:     chronic atrial fibrillation  Moderate aortic stenosis with mild heart failure preserved ejection fraction and LVH       Plan:   Discussed with patient possibility of cardioversion and because there may not be a lot of benefit to be gained from and at this point wants to continue on the current course  Continue to treat  medically  11/2 he elects to stay rate controlled refill meds return in 2 months  Problem List Items Addressed This Visit     None           No follow-ups on file.

## 2020-11-24 ENCOUNTER — TELEPHONE (OUTPATIENT)
Dept: CARDIOLOGY | Facility: CLINIC | Age: 75
End: 2020-11-24

## 2020-11-24 DIAGNOSIS — I25.10 ASCVD (ARTERIOSCLEROTIC CARDIOVASCULAR DISEASE): ICD-10-CM

## 2020-11-24 DIAGNOSIS — I50.32 CHRONIC HEART FAILURE WITH PRESERVED EJECTION FRACTION: Primary | ICD-10-CM

## 2020-11-24 NOTE — TELEPHONE ENCOUNTER
----- Message from Jessica Ventura sent at 11/24/2020 10:58 AM CST -----  Regarding: lab orders  please call patient he need lab orders put in for his upcoming appt [phone]

## 2020-12-03 ENCOUNTER — PATIENT MESSAGE (OUTPATIENT)
Dept: FAMILY MEDICINE | Facility: CLINIC | Age: 75
End: 2020-12-03

## 2020-12-05 ENCOUNTER — PATIENT MESSAGE (OUTPATIENT)
Dept: FAMILY MEDICINE | Facility: CLINIC | Age: 75
End: 2020-12-05

## 2020-12-23 ENCOUNTER — LAB VISIT (OUTPATIENT)
Dept: LAB | Facility: HOSPITAL | Age: 75
End: 2020-12-23
Attending: SPECIALIST
Payer: MEDICARE

## 2020-12-23 DIAGNOSIS — I25.10 ASCVD (ARTERIOSCLEROTIC CARDIOVASCULAR DISEASE): ICD-10-CM

## 2020-12-23 DIAGNOSIS — I50.32 CHRONIC HEART FAILURE WITH PRESERVED EJECTION FRACTION: ICD-10-CM

## 2020-12-23 LAB
ALBUMIN SERPL BCP-MCNC: 3.7 G/DL (ref 3.5–5.2)
ALP SERPL-CCNC: 72 U/L (ref 55–135)
ALT SERPL W/O P-5'-P-CCNC: 20 U/L (ref 10–44)
ANION GAP SERPL CALC-SCNC: 12 MMOL/L (ref 8–16)
AST SERPL-CCNC: 21 U/L (ref 10–40)
BILIRUB SERPL-MCNC: 1.2 MG/DL (ref 0.1–1)
BNP SERPL-MCNC: 380 PG/ML (ref 0–99)
BUN SERPL-MCNC: 22 MG/DL (ref 8–23)
CALCIUM SERPL-MCNC: 9 MG/DL (ref 8.7–10.5)
CHLORIDE SERPL-SCNC: 101 MMOL/L (ref 95–110)
CO2 SERPL-SCNC: 29 MMOL/L (ref 23–29)
CREAT SERPL-MCNC: 1.5 MG/DL (ref 0.5–1.4)
EST. GFR  (AFRICAN AMERICAN): 51.2 ML/MIN/1.73 M^2
EST. GFR  (NON AFRICAN AMERICAN): 44.3 ML/MIN/1.73 M^2
GLUCOSE SERPL-MCNC: 104 MG/DL (ref 70–110)
POTASSIUM SERPL-SCNC: 3.5 MMOL/L (ref 3.5–5.1)
PROT SERPL-MCNC: 7 G/DL (ref 6–8.4)
SODIUM SERPL-SCNC: 142 MMOL/L (ref 136–145)

## 2020-12-23 PROCEDURE — 80053 COMPREHEN METABOLIC PANEL: CPT

## 2020-12-23 PROCEDURE — 36415 COLL VENOUS BLD VENIPUNCTURE: CPT

## 2020-12-23 PROCEDURE — 83880 ASSAY OF NATRIURETIC PEPTIDE: CPT

## 2020-12-29 ENCOUNTER — OFFICE VISIT (OUTPATIENT)
Dept: CARDIOLOGY | Facility: CLINIC | Age: 75
End: 2020-12-29
Payer: MEDICARE

## 2020-12-29 VITALS
HEART RATE: 69 BPM | BODY MASS INDEX: 31.67 KG/M2 | DIASTOLIC BLOOD PRESSURE: 70 MMHG | OXYGEN SATURATION: 98 % | WEIGHT: 209 LBS | SYSTOLIC BLOOD PRESSURE: 120 MMHG | HEIGHT: 68 IN

## 2020-12-29 DIAGNOSIS — I48.11 LONGSTANDING PERSISTENT ATRIAL FIBRILLATION: ICD-10-CM

## 2020-12-29 DIAGNOSIS — N18.31 STAGE 3A CHRONIC KIDNEY DISEASE: ICD-10-CM

## 2020-12-29 DIAGNOSIS — I10 ESSENTIAL HYPERTENSION: Primary | ICD-10-CM

## 2020-12-29 DIAGNOSIS — I35.0 AORTIC VALVE STENOSIS, ETIOLOGY OF CARDIAC VALVE DISEASE UNSPECIFIED: ICD-10-CM

## 2020-12-29 DIAGNOSIS — I50.30 HEART FAILURE WITH PRESERVED EJECTION FRACTION, UNSPECIFIED HF CHRONICITY: ICD-10-CM

## 2020-12-29 DIAGNOSIS — R60.9 EDEMA, UNSPECIFIED TYPE: ICD-10-CM

## 2020-12-29 PROCEDURE — 99214 OFFICE O/P EST MOD 30 MIN: CPT | Mod: S$GLB,,, | Performed by: SPECIALIST

## 2020-12-29 PROCEDURE — 99214 PR OFFICE/OUTPT VISIT, EST, LEVL IV, 30-39 MIN: ICD-10-PCS | Mod: S$GLB,,, | Performed by: SPECIALIST

## 2020-12-29 RX ORDER — SPIRONOLACTONE 25 MG/1
12.5 TABLET ORAL DAILY
Qty: 90 TABLET | Refills: 3 | Status: ON HOLD | OUTPATIENT
Start: 2020-12-29 | End: 2021-04-01 | Stop reason: HOSPADM

## 2020-12-29 NOTE — PROGRESS NOTES
Subjective:    Patient ID:  Tong Ford is a 75 y.o. male who presents for   Atrial Fibrillation, Carotid Artery Disease, Hyperlipidemia, Coronary Artery Disease, and Results (labs)    160/80     bp   Lower here    no  Sob  No  palplatations  Asymptomatic   lasix makes urinate a lot ;  80  Lasix qd now -  Pt wants more to  Get rid feet swell   flomax and cardura  Hs   Review of patient's allergies indicates:  No Known Allergies    Past Medical History:   Diagnosis Date    Arthritis     Atrial fibrillation     Carotid artery occlusion     bilateral    Cataract     CKD (chronic kidney disease), stage II     Coronary artery disease     Encounter for blood transfusion     Hypercholesterolemia     Hypertension     Normocytic anemia     Sleep apnea     CESAR    TIA (transient ischemic attack)      Past Surgical History:   Procedure Laterality Date    CORONARY ARTERY BYPASS GRAFT      EYE SURGERY      VASECTOMY       Social History     Tobacco Use    Smoking status: Never Smoker    Smokeless tobacco: Never Used   Substance Use Topics    Alcohol use: Yes     Comment: rare social occasions    Drug use: No        Review of Systems     Review of Systems   Constitution: Positive for weight gain. Negative for decreased appetite, malaise/fatigue and weight loss.   HENT: Negative for congestion, hearing loss and tinnitus.    Eyes: Negative for blurred vision, vision loss in left eye, vision loss in right eye, visual disturbance and visual halos.   Cardiovascular: Positive for dyspnea on exertion, irregular heartbeat, leg swelling, orthopnea and palpitations. Negative for claudication, near-syncope, paroxysmal nocturnal dyspnea and syncope.   Respiratory: Positive for shortness of breath. Negative for cough, hemoptysis, sleep disturbances due to breathing, snoring, sputum production and wheezing.    Endocrine: Negative for polydipsia, polyphagia and polyuria.   Hematologic/Lymphatic: Negative for adenopathy. Does  not bruise/bleed easily.   Skin: Negative for dry skin, itching, poor wound healing, rash, skin cancer and suspicious lesions.   Musculoskeletal: Positive for arthritis. Negative for back pain, falls, gout, joint pain, joint swelling, muscle cramps, muscle weakness, neck pain and stiffness.   Gastrointestinal: Negative for abdominal pain, change in bowel habit, constipation, diarrhea, heartburn, hematemesis, hemorrhoids, melena, nausea and vomiting.   Genitourinary: Negative for bladder incontinence, dysuria, flank pain, frequency, hematuria, nocturia and non-menstrual bleeding.   Neurological: Negative for brief paralysis, difficulty with concentration, disturbances in coordination, dizziness, focal weakness, headaches, loss of balance, numbness, paresthesias and tremors.   Psychiatric/Behavioral: Negative for altered mental status, depression, hallucinations, memory loss, substance abuse, suicidal ideas and thoughts of violence. The patient does not have insomnia and is not nervous/anxious.    Allergic/Immunologic: Negative for environmental allergies and hives.           Objective:        Vitals:    12/29/20 1413   BP: 120/70   Pulse: 69       Lab Results   Component Value Date    WBC 10.94 08/19/2020    HGB 12.8 (L) 08/19/2020    HCT 38.9 (L) 08/19/2020     08/19/2020    CHOL 164 08/17/2020    TRIG 119 08/17/2020    HDL 34 (L) 08/17/2020    ALT 20 12/23/2020    AST 21 12/23/2020     12/23/2020    K 3.5 12/23/2020     12/23/2020    CREATININE 1.5 (H) 12/23/2020    BUN 22 12/23/2020    CO2 29 12/23/2020    TSH 2.720 10/14/2020    PSA 1.7 07/17/2020    INR 1.1 08/18/2020    HGBA1C 5.6 08/18/2020        ECHOCARDIOGRAM RESULTS  Results for orders placed during the hospital encounter of 10/19/20   Echo Color Flow Doppler? Yes; Bubble Contrast? No    Narrative · There is mild left ventricular concentric hypertrophy.  · The left ventricle is normal in size with normal systolic function. The    estimated ejection fraction is 65%.  · Atrial fibrillation observed with restrictive filling pattern present.  · With normal left atrial pressure.  · Normal right ventricular systolic function.  · Moderate left atrial enlargement.  · Mild right atrial enlargement.  · Moderate aortic valve stenosis.  · Aortic valve area is 1.63 cm2; peak velocity is 1.76 m/s; mean gradient   is 7 mmHg.  · Moderate mitral regurgitation.  · No pulmonary hypertension  · Mild tricuspid regurgitation.  · Mild pulmonic regurgitation.  · Normal central venous pressure (3 mmHg).  · The estimated PA systolic pressure is 23 mmHg.     Left ventricle hypertrophy, moderate calcific aortic valve stenosis   plainimetered d valve area is 1.6 centimeters squared  Plus two mitral regurgitation  No pulmonary hypertension         CURRENT/PREVIOUS VISIT EKG  Results for orders placed or performed during the hospital encounter of 08/17/20   EKG 12-lead    Collection Time: 08/17/20 10:54 PM    Narrative    Test Reason : R29.898,    Vent. Rate : 060 BPM     Atrial Rate : 250 BPM     P-R Int : 000 ms          QRS Dur : 114 ms      QT Int : 432 ms       P-R-T Axes : 000 -55 006 degrees     QTc Int : 432 ms    Atrial fibrillation  Left anterior fascicular block  Abnormal ECG  No previous ECGs available  Confirmed by Kareem BAIG, Iron CROUCH ([id number]) on 8/18/2020 9:28:26 PM    Referred By: [email or web address]   SELF           Confirmed By:Iron Serna MD     Results for orders placed during the hospital encounter of 10/19/20   Echo Color Flow Doppler? Yes; Bubble Contrast? No    Narrative · There is mild left ventricular concentric hypertrophy.  · The left ventricle is normal in size with normal systolic function. The   estimated ejection fraction is 65%.  · Atrial fibrillation observed with restrictive filling pattern present.  · With normal left atrial pressure.  · Normal right ventricular systolic function.  · Moderate left atrial enlargement.  · Mild right atrial  enlargement.  · Moderate aortic valve stenosis.  · Aortic valve area is 1.63 cm2; peak velocity is 1.76 m/s; mean gradient   is 7 mmHg.  · Moderate mitral regurgitation.  · No pulmonary hypertension  · Mild tricuspid regurgitation.  · Mild pulmonic regurgitation.  · Normal central venous pressure (3 mmHg).  · The estimated PA systolic pressure is 23 mmHg.     Left ventricle hypertrophy, moderate calcific aortic valve stenosis   plainimetered d valve area is 1.6 centimeters squared  Plus two mitral regurgitation  No pulmonary hypertension       Results for orders placed during the hospital encounter of 10/19/20   Nuclear Stress - Cardiology Interpreted    Narrative   There is a trivial intensity defect in the  wall of the left ventricle,   secondary to breast attenuation.    Gated perfusion images showed an ejection fraction of % at rest and 69%   post stress. Normal ejection fraction is greater than %.    There is normal wall motion at rest and post stress.    LV cavity size is normal at rest and normal at stress.    The EKG portion of this study is negative for ischemia.    Arrhythmias during stress: atrial fibrillation.    No Evidence of myocardial ischemia       PHYSICAL EXAM    Physical Exam blood pressure is 120/80  Lungs are clear  Cardiac 3/6 systolic ejection murmur  Abdomen slight enlarged  Legs reveal mild edema    Medication List with Changes/Refills   Current Medications    APIXABAN (ELIQUIS) 5 MG TAB    Take 1 tablet (5 mg total) by mouth 2 (two) times daily.    ASCORBIC ACID, VITAMIN C, (VITAMIN C) 1000 MG TABLET    Take 1,000 mg by mouth once daily.    ASPIRIN (ECOTRIN) 81 MG EC TABLET    Take 1 tablet (81 mg total) by mouth once daily.    ATORVASTATIN (LIPITOR) 40 MG TABLET    Take 1 tablet (40 mg total) by mouth once daily.    COQ10, UBIQUINOL, 100 MG CAP    Take 150 mg by mouth 2 (two) times daily.    DOXAZOSIN (CARDURA) 8 MG TAB    TAKE 1 TABLET DAILY    ERGOCALCIFEROL, VITAMIN D2, (VITAMIN  D ORAL)    Take 500 Units by mouth Daily.    EZETIMIBE (ZETIA) 10 MG TABLET    Take 1 tablet (10 mg total) by mouth once daily.    FUROSEMIDE (LASIX) 40 MG TABLET    Take 1.5 tablets (60 mg total) by mouth once daily.    GARLIC 1,000 MG CAP    Take by mouth.    KRILL-OM-3-DHA-EPA-PHOSPHO-AST (KRILL OIL) 1,886-925-29-80 MG CAP    Take by mouth.    LISINOPRIL (PRINIVIL,ZESTRIL) 20 MG TABLET    Take 1 tablet (20 mg total) by mouth 2 (two) times daily.    METOPROLOL SUCCINATE (TOPROL-XL) 50 MG 24 HR TABLET    TAKE 1 TABLET DAILY    MULTIVITAMIN TAB    Take 1 tablet by mouth once daily.    NITROGLYCERIN (NITROSTAT) 0.4 MG SL TABLET    DISSOLVE 1 TABLET UNDER THE TONGUE EVERY 5 MINUTES AS NEEDED FOR CHEST PAIN    TAMSULOSIN (FLOMAX) 0.4 MG CAP    Take 1 capsule (0.4 mg total) by mouth once daily.           Assessment:     a fib   Edema  chf hfpef - add aldactone 12.5   No diagnosis found.   rtc 1/18    For cardioversion   Plan:   Of will see patient back January 18th and set him up for cardioverter  Going to checked on 12 and half q.d. instead of increasing Lasix  Renal function panel 1 month  Problem List Items Addressed This Visit     None           No follow-ups on file.

## 2021-01-01 ENCOUNTER — CLINICAL SUPPORT (OUTPATIENT)
Dept: REHABILITATION | Facility: HOSPITAL | Age: 76
End: 2021-01-01
Payer: MEDICARE

## 2021-01-01 ENCOUNTER — PATIENT MESSAGE (OUTPATIENT)
Dept: FAMILY MEDICINE | Facility: CLINIC | Age: 76
End: 2021-01-01
Payer: MEDICARE

## 2021-01-01 ENCOUNTER — TELEPHONE (OUTPATIENT)
Dept: SURGERY | Facility: CLINIC | Age: 76
End: 2021-01-01
Payer: MEDICARE

## 2021-01-01 ENCOUNTER — TELEPHONE (OUTPATIENT)
Dept: REHABILITATION | Facility: HOSPITAL | Age: 76
End: 2021-01-01
Payer: MEDICARE

## 2021-01-01 ENCOUNTER — PATIENT MESSAGE (OUTPATIENT)
Dept: SURGERY | Facility: CLINIC | Age: 76
End: 2021-01-01
Payer: MEDICARE

## 2021-01-01 ENCOUNTER — LAB VISIT (OUTPATIENT)
Dept: LAB | Facility: HOSPITAL | Age: 76
End: 2021-01-01
Attending: INTERNAL MEDICINE
Payer: MEDICARE

## 2021-01-01 ENCOUNTER — OFFICE VISIT (OUTPATIENT)
Dept: CARDIOLOGY | Facility: CLINIC | Age: 76
End: 2021-01-01
Payer: MEDICARE

## 2021-01-01 ENCOUNTER — PATIENT MESSAGE (OUTPATIENT)
Dept: CARDIOLOGY | Facility: CLINIC | Age: 76
End: 2021-01-01

## 2021-01-01 ENCOUNTER — OFFICE VISIT (OUTPATIENT)
Dept: SURGERY | Facility: CLINIC | Age: 76
End: 2021-01-01
Payer: MEDICARE

## 2021-01-01 ENCOUNTER — PATIENT MESSAGE (OUTPATIENT)
Dept: CARDIOLOGY | Facility: CLINIC | Age: 76
End: 2021-01-01
Payer: MEDICARE

## 2021-01-01 ENCOUNTER — TELEPHONE (OUTPATIENT)
Dept: CARDIOLOGY | Facility: CLINIC | Age: 76
End: 2021-01-01
Payer: MEDICARE

## 2021-01-01 ENCOUNTER — OFFICE VISIT (OUTPATIENT)
Dept: FAMILY MEDICINE | Facility: CLINIC | Age: 76
End: 2021-01-01
Payer: MEDICARE

## 2021-01-01 VITALS
HEIGHT: 68 IN | HEART RATE: 65 BPM | WEIGHT: 193 LBS | DIASTOLIC BLOOD PRESSURE: 80 MMHG | OXYGEN SATURATION: 98 % | SYSTOLIC BLOOD PRESSURE: 130 MMHG | BODY MASS INDEX: 29.25 KG/M2

## 2021-01-01 VITALS
BODY MASS INDEX: 30.21 KG/M2 | SYSTOLIC BLOOD PRESSURE: 130 MMHG | DIASTOLIC BLOOD PRESSURE: 76 MMHG | HEART RATE: 67 BPM | TEMPERATURE: 98 F | HEIGHT: 68 IN | WEIGHT: 199.31 LBS

## 2021-01-01 VITALS
TEMPERATURE: 98 F | BODY MASS INDEX: 30.29 KG/M2 | OXYGEN SATURATION: 99 % | WEIGHT: 199.19 LBS | HEART RATE: 62 BPM | RESPIRATION RATE: 16 BRPM | DIASTOLIC BLOOD PRESSURE: 82 MMHG | SYSTOLIC BLOOD PRESSURE: 130 MMHG

## 2021-01-01 VITALS
OXYGEN SATURATION: 100 % | BODY MASS INDEX: 29.4 KG/M2 | DIASTOLIC BLOOD PRESSURE: 96 MMHG | HEIGHT: 68 IN | WEIGHT: 194 LBS | HEART RATE: 68 BPM | SYSTOLIC BLOOD PRESSURE: 170 MMHG

## 2021-01-01 DIAGNOSIS — M54.50 CHRONIC BILATERAL LOW BACK PAIN WITHOUT SCIATICA: Primary | ICD-10-CM

## 2021-01-01 DIAGNOSIS — I48.19 PERSISTENT ATRIAL FIBRILLATION: ICD-10-CM

## 2021-01-01 DIAGNOSIS — I10 ESSENTIAL HYPERTENSION: ICD-10-CM

## 2021-01-01 DIAGNOSIS — G89.29 CHRONIC BILATERAL LOW BACK PAIN WITHOUT SCIATICA: Primary | ICD-10-CM

## 2021-01-01 DIAGNOSIS — I50.33 ACUTE ON CHRONIC HEART FAILURE WITH PRESERVED EJECTION FRACTION: ICD-10-CM

## 2021-01-01 DIAGNOSIS — I50.32 CHRONIC HEART FAILURE WITH PRESERVED EJECTION FRACTION: Chronic | ICD-10-CM

## 2021-01-01 DIAGNOSIS — G89.29 CHRONIC BILATERAL LOW BACK PAIN WITHOUT SCIATICA: ICD-10-CM

## 2021-01-01 DIAGNOSIS — E78.00 HYPERCHOLESTEREMIA: ICD-10-CM

## 2021-01-01 DIAGNOSIS — M54.50 CHRONIC BILATERAL LOW BACK PAIN WITHOUT SCIATICA: ICD-10-CM

## 2021-01-01 DIAGNOSIS — Z95.0 S/P PLACEMENT OF CARDIAC PACEMAKER: ICD-10-CM

## 2021-01-01 DIAGNOSIS — I77.9 BILATERAL CAROTID ARTERY DISEASE, UNSPECIFIED TYPE: ICD-10-CM

## 2021-01-01 DIAGNOSIS — I50.33 ACUTE ON CHRONIC HEART FAILURE WITH PRESERVED EJECTION FRACTION: Chronic | ICD-10-CM

## 2021-01-01 DIAGNOSIS — K40.90 RIGHT INGUINAL HERNIA: ICD-10-CM

## 2021-01-01 DIAGNOSIS — K40.90 RIGHT INGUINAL HERNIA: Primary | ICD-10-CM

## 2021-01-01 DIAGNOSIS — E03.8 OTHER SPECIFIED HYPOTHYROIDISM: ICD-10-CM

## 2021-01-01 DIAGNOSIS — E07.9 THYROID DISEASE: ICD-10-CM

## 2021-01-01 DIAGNOSIS — I50.33 ACUTE ON CHRONIC HEART FAILURE WITH PRESERVED EJECTION FRACTION: Primary | ICD-10-CM

## 2021-01-01 DIAGNOSIS — I25.10 CORONARY ARTERY DISEASE INVOLVING NATIVE CORONARY ARTERY OF NATIVE HEART WITHOUT ANGINA PECTORIS: ICD-10-CM

## 2021-01-01 DIAGNOSIS — E89.0 POSTABLATIVE HYPOTHYROIDISM: ICD-10-CM

## 2021-01-01 LAB
ANION GAP SERPL CALC-SCNC: 8 MMOL/L (ref 8–16)
ANION GAP SERPL CALC-SCNC: 9 MMOL/L (ref 8–16)
BNP SERPL-MCNC: 461 PG/ML (ref 0–99)
BNP SERPL-MCNC: 532 PG/ML (ref 0–99)
BUN SERPL-MCNC: 22 MG/DL (ref 8–23)
BUN SERPL-MCNC: 35 MG/DL (ref 8–23)
CALCIUM SERPL-MCNC: 8.7 MG/DL (ref 8.7–10.5)
CALCIUM SERPL-MCNC: 9 MG/DL (ref 8.7–10.5)
CHLORIDE SERPL-SCNC: 104 MMOL/L (ref 95–110)
CHLORIDE SERPL-SCNC: 104 MMOL/L (ref 95–110)
CO2 SERPL-SCNC: 26 MMOL/L (ref 23–29)
CO2 SERPL-SCNC: 29 MMOL/L (ref 23–29)
CREAT SERPL-MCNC: 1.4 MG/DL (ref 0.5–1.4)
CREAT SERPL-MCNC: 1.8 MG/DL (ref 0.5–1.4)
ERYTHROCYTE [DISTWIDTH] IN BLOOD BY AUTOMATED COUNT: 15.3 % (ref 11.5–14.5)
EST. GFR  (AFRICAN AMERICAN): 40.8 ML/MIN/1.73 M^2
EST. GFR  (AFRICAN AMERICAN): 55.2 ML/MIN/1.73 M^2
EST. GFR  (NON AFRICAN AMERICAN): 35.3 ML/MIN/1.73 M^2
EST. GFR  (NON AFRICAN AMERICAN): 47.8 ML/MIN/1.73 M^2
GLUCOSE SERPL-MCNC: 117 MG/DL (ref 70–110)
GLUCOSE SERPL-MCNC: 94 MG/DL (ref 70–110)
HCT VFR BLD AUTO: 37.8 % (ref 40–54)
HGB BLD-MCNC: 12.4 G/DL (ref 14–18)
MCH RBC QN AUTO: 32.2 PG (ref 27–31)
MCHC RBC AUTO-ENTMCNC: 32.8 G/DL (ref 32–36)
MCV RBC AUTO: 98 FL (ref 82–98)
PLATELET # BLD AUTO: 228 K/UL (ref 150–450)
PMV BLD AUTO: 10.1 FL (ref 9.2–12.9)
POTASSIUM SERPL-SCNC: 3.6 MMOL/L (ref 3.5–5.1)
POTASSIUM SERPL-SCNC: 4.6 MMOL/L (ref 3.5–5.1)
RBC # BLD AUTO: 3.85 M/UL (ref 4.6–6.2)
SODIUM SERPL-SCNC: 138 MMOL/L (ref 136–145)
SODIUM SERPL-SCNC: 142 MMOL/L (ref 136–145)
TSH SERPL DL<=0.005 MIU/L-ACNC: 2.85 UIU/ML (ref 0.34–5.6)
WBC # BLD AUTO: 9.07 K/UL (ref 3.9–12.7)

## 2021-01-01 PROCEDURE — 97110 THERAPEUTIC EXERCISES: CPT | Mod: PN,CQ

## 2021-01-01 PROCEDURE — 97112 NEUROMUSCULAR REEDUCATION: CPT | Mod: PN

## 2021-01-01 PROCEDURE — 97110 THERAPEUTIC EXERCISES: CPT | Mod: PN

## 2021-01-01 PROCEDURE — 99203 PR OFFICE/OUTPT VISIT, NEW, LEVL III, 30-44 MIN: ICD-10-PCS | Mod: S$GLB,,, | Performed by: SURGERY

## 2021-01-01 PROCEDURE — 83880 ASSAY OF NATRIURETIC PEPTIDE: CPT | Performed by: INTERNAL MEDICINE

## 2021-01-01 PROCEDURE — 99214 PR OFFICE/OUTPT VISIT, EST, LEVL IV, 30-39 MIN: ICD-10-PCS | Mod: S$GLB,,, | Performed by: INTERNAL MEDICINE

## 2021-01-01 PROCEDURE — 99213 PR OFFICE/OUTPT VISIT, EST, LEVL III, 20-29 MIN: ICD-10-PCS | Mod: S$PBB,,, | Performed by: FAMILY MEDICINE

## 2021-01-01 PROCEDURE — 99214 OFFICE O/P EST MOD 30 MIN: CPT | Mod: S$GLB,,, | Performed by: INTERNAL MEDICINE

## 2021-01-01 PROCEDURE — 99213 OFFICE O/P EST LOW 20 MIN: CPT | Mod: S$PBB,,, | Performed by: FAMILY MEDICINE

## 2021-01-01 PROCEDURE — 36415 COLL VENOUS BLD VENIPUNCTURE: CPT | Performed by: INTERNAL MEDICINE

## 2021-01-01 PROCEDURE — 97010 HOT OR COLD PACKS THERAPY: CPT | Mod: PN

## 2021-01-01 PROCEDURE — 84443 ASSAY THYROID STIM HORMONE: CPT | Performed by: INTERNAL MEDICINE

## 2021-01-01 PROCEDURE — 99203 OFFICE O/P NEW LOW 30 MIN: CPT | Mod: S$GLB,,, | Performed by: SURGERY

## 2021-01-01 PROCEDURE — 80048 BASIC METABOLIC PNL TOTAL CA: CPT | Performed by: INTERNAL MEDICINE

## 2021-01-01 PROCEDURE — 99214 OFFICE O/P EST MOD 30 MIN: CPT | Performed by: FAMILY MEDICINE

## 2021-01-01 PROCEDURE — 85027 COMPLETE CBC AUTOMATED: CPT | Performed by: INTERNAL MEDICINE

## 2021-01-01 RX ORDER — METOPROLOL SUCCINATE 50 MG/1
50 TABLET, EXTENDED RELEASE ORAL DAILY
Qty: 90 TABLET | Refills: 3 | Status: ON HOLD | OUTPATIENT
Start: 2021-01-01 | End: 2022-01-01 | Stop reason: HOSPADM

## 2021-01-01 RX ORDER — AMLODIPINE BESYLATE 2.5 MG/1
2.5 TABLET ORAL NIGHTLY
Qty: 90 TABLET | Refills: 2 | Status: SHIPPED | OUTPATIENT
Start: 2021-01-01 | End: 2021-01-01 | Stop reason: SDUPTHER

## 2021-01-01 RX ORDER — SPIRONOLACTONE 25 MG/1
25 TABLET ORAL DAILY
Qty: 90 TABLET | Refills: 3 | Status: SHIPPED | OUTPATIENT
Start: 2021-01-01 | End: 2022-01-01

## 2021-01-01 RX ORDER — AMIODARONE HYDROCHLORIDE 200 MG/1
200 TABLET ORAL DAILY
Qty: 90 TABLET | Refills: 3 | Status: SHIPPED | OUTPATIENT
Start: 2021-01-01 | End: 2022-01-01 | Stop reason: SDUPTHER

## 2021-01-01 RX ORDER — LEVOTHYROXINE SODIUM 100 UG/1
100 TABLET ORAL
Qty: 90 TABLET | Refills: 2 | Status: SHIPPED | OUTPATIENT
Start: 2021-01-01 | End: 2022-01-01 | Stop reason: SDUPTHER

## 2021-01-01 RX ORDER — AMLODIPINE BESYLATE 5 MG/1
5 TABLET ORAL NIGHTLY
Qty: 90 TABLET | Refills: 2 | Status: ON HOLD | OUTPATIENT
Start: 2021-01-01 | End: 2022-01-01 | Stop reason: HOSPADM

## 2021-01-01 RX ORDER — FUROSEMIDE 40 MG/1
60 TABLET ORAL DAILY
Qty: 135 TABLET | Refills: 2 | Status: ON HOLD | OUTPATIENT
Start: 2021-01-01 | End: 2022-01-01 | Stop reason: SDUPTHER

## 2021-01-11 ENCOUNTER — OFFICE VISIT (OUTPATIENT)
Dept: PULMONOLOGY | Facility: CLINIC | Age: 76
End: 2021-01-11
Payer: MEDICARE

## 2021-01-11 VITALS
HEART RATE: 71 BPM | HEIGHT: 68 IN | WEIGHT: 202 LBS | TEMPERATURE: 97 F | BODY MASS INDEX: 30.62 KG/M2 | SYSTOLIC BLOOD PRESSURE: 120 MMHG | DIASTOLIC BLOOD PRESSURE: 80 MMHG | OXYGEN SATURATION: 98 %

## 2021-01-11 DIAGNOSIS — G47.33 OSA (OBSTRUCTIVE SLEEP APNEA): Primary | ICD-10-CM

## 2021-01-11 PROCEDURE — 99213 OFFICE O/P EST LOW 20 MIN: CPT | Mod: S$GLB,,, | Performed by: NURSE PRACTITIONER

## 2021-01-11 PROCEDURE — 99213 PR OFFICE/OUTPT VISIT, EST, LEVL III, 20-29 MIN: ICD-10-PCS | Mod: S$GLB,,, | Performed by: NURSE PRACTITIONER

## 2021-01-19 ENCOUNTER — TELEPHONE (OUTPATIENT)
Dept: CARDIOLOGY | Facility: CLINIC | Age: 76
End: 2021-01-19

## 2021-01-19 DIAGNOSIS — N18.31 STAGE 3A CHRONIC KIDNEY DISEASE: Primary | ICD-10-CM

## 2021-01-20 ENCOUNTER — PATIENT MESSAGE (OUTPATIENT)
Dept: FAMILY MEDICINE | Facility: CLINIC | Age: 76
End: 2021-01-20

## 2021-01-22 ENCOUNTER — LAB VISIT (OUTPATIENT)
Dept: LAB | Facility: HOSPITAL | Age: 76
End: 2021-01-22
Attending: SPECIALIST
Payer: MEDICARE

## 2021-01-22 DIAGNOSIS — N18.31 STAGE 3A CHRONIC KIDNEY DISEASE: ICD-10-CM

## 2021-01-22 LAB
ALBUMIN SERPL BCP-MCNC: 3.8 G/DL (ref 3.5–5.2)
ANION GAP SERPL CALC-SCNC: 9 MMOL/L (ref 8–16)
BUN SERPL-MCNC: 24 MG/DL (ref 8–23)
CALCIUM SERPL-MCNC: 8.9 MG/DL (ref 8.7–10.5)
CHLORIDE SERPL-SCNC: 103 MMOL/L (ref 95–110)
CO2 SERPL-SCNC: 30 MMOL/L (ref 23–29)
CREAT SERPL-MCNC: 1.5 MG/DL (ref 0.5–1.4)
EST. GFR  (AFRICAN AMERICAN): 51.2 ML/MIN/1.73 M^2
EST. GFR  (NON AFRICAN AMERICAN): 44.3 ML/MIN/1.73 M^2
GLUCOSE SERPL-MCNC: 96 MG/DL (ref 70–110)
PHOSPHATE SERPL-MCNC: 4.5 MG/DL (ref 2.7–4.5)
POTASSIUM SERPL-SCNC: 4.3 MMOL/L (ref 3.5–5.1)
SODIUM SERPL-SCNC: 142 MMOL/L (ref 136–145)

## 2021-01-22 PROCEDURE — 36415 COLL VENOUS BLD VENIPUNCTURE: CPT

## 2021-01-22 PROCEDURE — 80069 RENAL FUNCTION PANEL: CPT

## 2021-01-26 ENCOUNTER — OFFICE VISIT (OUTPATIENT)
Dept: FAMILY MEDICINE | Facility: CLINIC | Age: 76
End: 2021-01-26
Payer: MEDICARE

## 2021-01-26 ENCOUNTER — OFFICE VISIT (OUTPATIENT)
Dept: CARDIOLOGY | Facility: CLINIC | Age: 76
End: 2021-01-26
Payer: MEDICARE

## 2021-01-26 VITALS
TEMPERATURE: 98 F | HEART RATE: 70 BPM | HEIGHT: 68 IN | SYSTOLIC BLOOD PRESSURE: 122 MMHG | DIASTOLIC BLOOD PRESSURE: 82 MMHG | OXYGEN SATURATION: 99 % | RESPIRATION RATE: 17 BRPM | BODY MASS INDEX: 31.14 KG/M2 | WEIGHT: 205.5 LBS

## 2021-01-26 VITALS
WEIGHT: 206 LBS | HEART RATE: 78 BPM | SYSTOLIC BLOOD PRESSURE: 122 MMHG | HEIGHT: 68 IN | DIASTOLIC BLOOD PRESSURE: 70 MMHG | BODY MASS INDEX: 31.22 KG/M2 | OXYGEN SATURATION: 100 %

## 2021-01-26 DIAGNOSIS — L60.0 INGROWING NAIL, LEFT GREAT TOE: ICD-10-CM

## 2021-01-26 DIAGNOSIS — R35.0 BENIGN PROSTATIC HYPERPLASIA WITH URINARY FREQUENCY: Primary | ICD-10-CM

## 2021-01-26 DIAGNOSIS — I48.19 PERSISTENT ATRIAL FIBRILLATION: Primary | ICD-10-CM

## 2021-01-26 DIAGNOSIS — Z01.818 PREOP EXAMINATION: ICD-10-CM

## 2021-01-26 DIAGNOSIS — I50.30 HEART FAILURE WITH PRESERVED EJECTION FRACTION, UNSPECIFIED HF CHRONICITY: ICD-10-CM

## 2021-01-26 DIAGNOSIS — N40.1 BENIGN PROSTATIC HYPERPLASIA WITH URINARY FREQUENCY: Primary | ICD-10-CM

## 2021-01-26 DIAGNOSIS — I10 ESSENTIAL HYPERTENSION: ICD-10-CM

## 2021-01-26 PROBLEM — I48.91 ATRIAL FIBRILLATION: Status: ACTIVE | Noted: 2021-01-26

## 2021-01-26 PROCEDURE — 99213 OFFICE O/P EST LOW 20 MIN: CPT | Mod: S$PBB,,, | Performed by: FAMILY MEDICINE

## 2021-01-26 PROCEDURE — 99215 OFFICE O/P EST HI 40 MIN: CPT | Performed by: FAMILY MEDICINE

## 2021-01-26 PROCEDURE — 99213 OFFICE O/P EST LOW 20 MIN: CPT | Mod: S$GLB,,, | Performed by: SPECIALIST

## 2021-01-26 PROCEDURE — 99213 PR OFFICE/OUTPT VISIT, EST, LEVL III, 20-29 MIN: ICD-10-PCS | Mod: S$PBB,,, | Performed by: FAMILY MEDICINE

## 2021-01-26 PROCEDURE — 99213 PR OFFICE/OUTPT VISIT, EST, LEVL III, 20-29 MIN: ICD-10-PCS | Mod: S$GLB,,, | Performed by: SPECIALIST

## 2021-01-27 ENCOUNTER — HOSPITAL ENCOUNTER (OUTPATIENT)
Dept: RADIOLOGY | Facility: HOSPITAL | Age: 76
Discharge: HOME | End: 2021-01-27
Attending: SPECIALIST
Payer: MEDICARE

## 2021-01-27 ENCOUNTER — PATIENT MESSAGE (OUTPATIENT)
Dept: CARDIOLOGY | Facility: CLINIC | Age: 76
End: 2021-01-27

## 2021-01-27 ENCOUNTER — TELEPHONE (OUTPATIENT)
Dept: CARDIOLOGY | Facility: CLINIC | Age: 76
End: 2021-01-27

## 2021-01-27 ENCOUNTER — HOSPITAL ENCOUNTER (OUTPATIENT)
Dept: PREADMISSION TESTING | Facility: HOSPITAL | Age: 76
Discharge: HOME | End: 2021-01-27
Attending: SPECIALIST
Payer: MEDICARE

## 2021-01-27 VITALS — WEIGHT: 207 LBS | HEIGHT: 68 IN | BODY MASS INDEX: 31.37 KG/M2

## 2021-01-27 DIAGNOSIS — I48.19 PERSISTENT ATRIAL FIBRILLATION: ICD-10-CM

## 2021-01-27 DIAGNOSIS — Z01.818 PREOP EXAMINATION: ICD-10-CM

## 2021-01-27 DIAGNOSIS — I50.30 HEART FAILURE WITH PRESERVED EJECTION FRACTION, UNSPECIFIED HF CHRONICITY: ICD-10-CM

## 2021-01-27 DIAGNOSIS — Z01.818 PRE-OP TESTING: ICD-10-CM

## 2021-01-27 LAB
ALBUMIN SERPL BCP-MCNC: 3.7 G/DL (ref 3.5–5.2)
ALP SERPL-CCNC: 62 U/L (ref 55–135)
ALT SERPL W/O P-5'-P-CCNC: 17 U/L (ref 10–44)
ANION GAP SERPL CALC-SCNC: 8 MMOL/L (ref 8–16)
AST SERPL-CCNC: 16 U/L (ref 10–40)
BASOPHILS # BLD AUTO: 0.06 K/UL (ref 0–0.2)
BASOPHILS NFR BLD: 0.8 % (ref 0–1.9)
BILIRUB SERPL-MCNC: 1.4 MG/DL (ref 0.1–1)
BILIRUB UR QL STRIP: NEGATIVE
BUN SERPL-MCNC: 28 MG/DL (ref 8–23)
CALCIUM SERPL-MCNC: 8.7 MG/DL (ref 8.7–10.5)
CHLORIDE SERPL-SCNC: 104 MMOL/L (ref 95–110)
CLARITY UR: CLEAR
CO2 SERPL-SCNC: 28 MMOL/L (ref 23–29)
COLOR UR: YELLOW
CREAT SERPL-MCNC: 1.5 MG/DL (ref 0.5–1.4)
DIFFERENTIAL METHOD: ABNORMAL
EOSINOPHIL # BLD AUTO: 0.5 K/UL (ref 0–0.5)
EOSINOPHIL NFR BLD: 5.8 % (ref 0–8)
ERYTHROCYTE [DISTWIDTH] IN BLOOD BY AUTOMATED COUNT: 15.1 % (ref 11.5–14.5)
EST. GFR  (AFRICAN AMERICAN): 51.2 ML/MIN/1.73 M^2
EST. GFR  (NON AFRICAN AMERICAN): 44.3 ML/MIN/1.73 M^2
GLUCOSE SERPL-MCNC: 142 MG/DL (ref 70–110)
GLUCOSE UR QL STRIP: NEGATIVE
HCT VFR BLD AUTO: 38.9 % (ref 40–54)
HGB BLD-MCNC: 13.1 G/DL (ref 14–18)
HGB UR QL STRIP: NEGATIVE
IMM GRANULOCYTES # BLD AUTO: 0.02 K/UL (ref 0–0.04)
IMM GRANULOCYTES NFR BLD AUTO: 0.3 % (ref 0–0.5)
INR PPP: 1.3
KETONES UR QL STRIP: NEGATIVE
LEUKOCYTE ESTERASE UR QL STRIP: NEGATIVE
LYMPHOCYTES # BLD AUTO: 2.5 K/UL (ref 1–4.8)
LYMPHOCYTES NFR BLD: 31 % (ref 18–48)
MCH RBC QN AUTO: 32.8 PG (ref 27–31)
MCHC RBC AUTO-ENTMCNC: 33.7 G/DL (ref 32–36)
MCV RBC AUTO: 98 FL (ref 82–98)
MONOCYTES # BLD AUTO: 0.6 K/UL (ref 0.3–1)
MONOCYTES NFR BLD: 8.1 % (ref 4–15)
NEUTROPHILS # BLD AUTO: 4.3 K/UL (ref 1.8–7.7)
NEUTROPHILS NFR BLD: 54 % (ref 38–73)
NITRITE UR QL STRIP: NEGATIVE
NRBC BLD-RTO: 0 /100 WBC
PH UR STRIP: 7 [PH] (ref 5–8)
PLATELET # BLD AUTO: 213 K/UL (ref 150–350)
PMV BLD AUTO: 10.3 FL (ref 9.2–12.9)
POTASSIUM SERPL-SCNC: 4 MMOL/L (ref 3.5–5.1)
PROT SERPL-MCNC: 6.9 G/DL (ref 6–8.4)
PROT UR QL STRIP: ABNORMAL
PROTHROMBIN TIME: 15.1 SEC (ref 10.6–14.8)
RBC # BLD AUTO: 3.99 M/UL (ref 4.6–6.2)
SODIUM SERPL-SCNC: 140 MMOL/L (ref 136–145)
SP GR UR STRIP: 1.02 (ref 1–1.03)
URN SPEC COLLECT METH UR: ABNORMAL
UROBILINOGEN UR STRIP-ACNC: ABNORMAL EU/DL
WBC # BLD AUTO: 7.94 K/UL (ref 3.9–12.7)

## 2021-01-27 PROCEDURE — 85610 PROTHROMBIN TIME: CPT

## 2021-01-27 PROCEDURE — 85025 COMPLETE CBC W/AUTO DIFF WBC: CPT

## 2021-01-27 PROCEDURE — 80053 COMPREHEN METABOLIC PANEL: CPT

## 2021-01-27 PROCEDURE — U0003 INFECTIOUS AGENT DETECTION BY NUCLEIC ACID (DNA OR RNA); SEVERE ACUTE RESPIRATORY SYNDROME CORONAVIRUS 2 (SARS-COV-2) (CORONAVIRUS DISEASE [COVID-19]), AMPLIFIED PROBE TECHNIQUE, MAKING USE OF HIGH THROUGHPUT TECHNOLOGIES AS DESCRIBED BY CMS-2020-01-R: HCPCS

## 2021-01-27 PROCEDURE — 71046 X-RAY EXAM CHEST 2 VIEWS: CPT | Mod: TC

## 2021-01-27 PROCEDURE — 36415 COLL VENOUS BLD VENIPUNCTURE: CPT

## 2021-01-27 PROCEDURE — 81003 URINALYSIS AUTO W/O SCOPE: CPT

## 2021-01-28 LAB — SARS-COV-2 RNA RESP QL NAA+PROBE: NOT DETECTED

## 2021-01-29 ENCOUNTER — ANESTHESIA (OUTPATIENT)
Dept: CARDIOLOGY | Facility: HOSPITAL | Age: 76
End: 2021-01-29
Payer: MEDICARE

## 2021-01-29 ENCOUNTER — HOSPITAL ENCOUNTER (OUTPATIENT)
Facility: HOSPITAL | Age: 76
Discharge: HOME | End: 2021-01-29
Attending: SPECIALIST | Admitting: SPECIALIST
Payer: MEDICARE

## 2021-01-29 ENCOUNTER — CLINICAL SUPPORT (OUTPATIENT)
Dept: CARDIOLOGY | Facility: HOSPITAL | Age: 76
End: 2021-01-29
Attending: SPECIALIST
Payer: MEDICARE

## 2021-01-29 ENCOUNTER — ANESTHESIA EVENT (OUTPATIENT)
Dept: CARDIOLOGY | Facility: HOSPITAL | Age: 76
End: 2021-01-29
Payer: MEDICARE

## 2021-01-29 VITALS — HEIGHT: 68 IN | WEIGHT: 207 LBS | BODY MASS INDEX: 31.37 KG/M2

## 2021-01-29 VITALS
SYSTOLIC BLOOD PRESSURE: 142 MMHG | HEART RATE: 68 BPM | DIASTOLIC BLOOD PRESSURE: 71 MMHG | TEMPERATURE: 98 F | OXYGEN SATURATION: 97 % | RESPIRATION RATE: 19 BRPM

## 2021-01-29 DIAGNOSIS — I48.91 A-FIB: ICD-10-CM

## 2021-01-29 DIAGNOSIS — I10 ESSENTIAL HYPERTENSION: ICD-10-CM

## 2021-01-29 DIAGNOSIS — I48.91 ATRIAL FIBRILLATION, UNSPECIFIED TYPE: ICD-10-CM

## 2021-01-29 DIAGNOSIS — I48.19 PERSISTENT ATRIAL FIBRILLATION: ICD-10-CM

## 2021-01-29 PROBLEM — G47.33 OSA (OBSTRUCTIVE SLEEP APNEA): Status: RESOLVED | Noted: 2018-10-23 | Resolved: 2021-01-29

## 2021-01-29 LAB — BSA FOR ECHO PROCEDURE: 2.12 M2

## 2021-01-29 PROCEDURE — 93321 PR DOPPLER ECHO HEART,LIMITED,F/U: ICD-10-PCS | Mod: 26,,, | Performed by: SPECIALIST

## 2021-01-29 PROCEDURE — 27000284 HC CANNULA NASAL: Performed by: STUDENT IN AN ORGANIZED HEALTH CARE EDUCATION/TRAINING PROGRAM

## 2021-01-29 PROCEDURE — 92960 CARDIOVERSION ELECTRIC EXT: CPT | Mod: ,,, | Performed by: SPECIALIST

## 2021-01-29 PROCEDURE — 37000008 HC ANESTHESIA 1ST 15 MINUTES: Performed by: SPECIALIST

## 2021-01-29 PROCEDURE — 63600175 PHARM REV CODE 636 W HCPCS: Performed by: NURSE ANESTHETIST, CERTIFIED REGISTERED

## 2021-01-29 PROCEDURE — 96365 THER/PROPH/DIAG IV INF INIT: CPT | Mod: 59 | Performed by: SPECIALIST

## 2021-01-29 PROCEDURE — 63600175 PHARM REV CODE 636 W HCPCS: Performed by: SPECIALIST

## 2021-01-29 PROCEDURE — 93325 DOPPLER ECHO COLOR FLOW MAPG: CPT | Mod: 26,,, | Performed by: SPECIALIST

## 2021-01-29 PROCEDURE — 93312 ECHO TRANSESOPHAGEAL: CPT | Mod: 26,,, | Performed by: SPECIALIST

## 2021-01-29 PROCEDURE — 93325 PR DOPPLER COLOR FLOW VELOCITY MAP: ICD-10-PCS | Mod: 26,,, | Performed by: SPECIALIST

## 2021-01-29 PROCEDURE — 92960 CARDIOVERSION ELECTRIC EXT: CPT

## 2021-01-29 PROCEDURE — 92960 ELECTRICAL CARDIOVERSION: ICD-10-PCS | Mod: ,,, | Performed by: SPECIALIST

## 2021-01-29 PROCEDURE — 93321 DOPPLER ECHO F-UP/LMTD STD: CPT | Mod: 26,,, | Performed by: SPECIALIST

## 2021-01-29 PROCEDURE — 93312 PR ECHO HEART,TRANSESOPHAGEAL: ICD-10-PCS | Mod: 26,,, | Performed by: SPECIALIST

## 2021-01-29 PROCEDURE — 96366 THER/PROPH/DIAG IV INF ADDON: CPT | Performed by: SPECIALIST

## 2021-01-29 PROCEDURE — 37000009 HC ANESTHESIA EA ADD 15 MINS: Performed by: SPECIALIST

## 2021-01-29 PROCEDURE — 25000003 PHARM REV CODE 250: Performed by: NURSE ANESTHETIST, CERTIFIED REGISTERED

## 2021-01-29 RX ORDER — PROPOFOL 10 MG/ML
VIAL (ML) INTRAVENOUS
Status: DISCONTINUED | OUTPATIENT
Start: 2021-01-29 | End: 2021-01-29

## 2021-01-29 RX ORDER — METOPROLOL SUCCINATE 50 MG/1
50 TABLET, EXTENDED RELEASE ORAL DAILY
Qty: 90 TABLET | Refills: 4 | Status: SHIPPED | OUTPATIENT
Start: 2021-01-29 | End: 2021-02-08 | Stop reason: SDUPTHER

## 2021-01-29 RX ORDER — AMIODARONE HYDROCHLORIDE 200 MG/1
400 TABLET ORAL 2 TIMES DAILY
Qty: 120 TABLET | Refills: 11 | Status: ON HOLD | OUTPATIENT
Start: 2021-01-29 | End: 2021-04-02 | Stop reason: HOSPADM

## 2021-01-29 RX ADMIN — PROPOFOL 150 MG: 10 INJECTION, EMULSION INTRAVENOUS at 08:01

## 2021-01-29 RX ADMIN — AMIODARONE HYDROCHLORIDE 150 MG: 1.5 INJECTION, SOLUTION INTRAVENOUS at 07:01

## 2021-01-29 RX ADMIN — SODIUM CHLORIDE: 0.9 INJECTION, SOLUTION INTRAVENOUS at 08:01

## 2021-01-29 RX ADMIN — AMIODARONE HYDROCHLORIDE 1 MG/MIN: 1.8 INJECTION, SOLUTION INTRAVENOUS at 08:01

## 2021-02-03 ENCOUNTER — OFFICE VISIT (OUTPATIENT)
Dept: PODIATRY | Facility: CLINIC | Age: 76
End: 2021-02-03
Payer: MEDICARE

## 2021-02-03 VITALS — BODY MASS INDEX: 31.22 KG/M2 | HEIGHT: 68 IN | WEIGHT: 206 LBS

## 2021-02-03 DIAGNOSIS — B35.1 OM (ONYCHOMYCOSIS): Primary | ICD-10-CM

## 2021-02-03 DIAGNOSIS — L60.0 INGROWING NAIL, LEFT GREAT TOE: ICD-10-CM

## 2021-02-03 DIAGNOSIS — M79.675 TOE PAIN, LEFT: ICD-10-CM

## 2021-02-03 PROCEDURE — 99203 OFFICE O/P NEW LOW 30 MIN: CPT | Mod: S$GLB,,, | Performed by: PODIATRIST

## 2021-02-03 PROCEDURE — 99203 PR OFFICE/OUTPT VISIT, NEW, LEVL III, 30-44 MIN: ICD-10-PCS | Mod: S$GLB,,, | Performed by: PODIATRIST

## 2021-02-08 ENCOUNTER — PATIENT MESSAGE (OUTPATIENT)
Dept: CARDIOLOGY | Facility: CLINIC | Age: 76
End: 2021-02-08

## 2021-02-08 DIAGNOSIS — I10 ESSENTIAL HYPERTENSION: ICD-10-CM

## 2021-02-08 RX ORDER — METOPROLOL SUCCINATE 50 MG/1
25 TABLET, EXTENDED RELEASE ORAL DAILY
Qty: 45 TABLET | Refills: 4 | Status: SHIPPED | OUTPATIENT
Start: 2021-02-08 | End: 2021-07-14 | Stop reason: DRUGHIGH

## 2021-02-13 ENCOUNTER — PATIENT MESSAGE (OUTPATIENT)
Dept: CARDIOLOGY | Facility: CLINIC | Age: 76
End: 2021-02-13

## 2021-02-13 DIAGNOSIS — E78.49 OTHER HYPERLIPIDEMIA: ICD-10-CM

## 2021-02-17 RX ORDER — EZETIMIBE 10 MG/1
10 TABLET ORAL DAILY
Qty: 90 TABLET | Refills: 3 | Status: ON HOLD | OUTPATIENT
Start: 2021-02-17 | End: 2021-04-02

## 2021-03-04 ENCOUNTER — PATIENT MESSAGE (OUTPATIENT)
Dept: CARDIOLOGY | Facility: CLINIC | Age: 76
End: 2021-03-04

## 2021-03-04 DIAGNOSIS — I25.10 ASCVD (ARTERIOSCLEROTIC CARDIOVASCULAR DISEASE): ICD-10-CM

## 2021-03-05 RX ORDER — ATORVASTATIN CALCIUM 40 MG/1
40 TABLET, FILM COATED ORAL DAILY
Qty: 10 TABLET | Refills: 0 | Status: SHIPPED | OUTPATIENT
Start: 2021-03-05 | End: 2021-03-08 | Stop reason: SDUPTHER

## 2021-03-08 ENCOUNTER — PATIENT MESSAGE (OUTPATIENT)
Dept: CARDIOLOGY | Facility: CLINIC | Age: 76
End: 2021-03-08

## 2021-03-08 DIAGNOSIS — I25.10 ASCVD (ARTERIOSCLEROTIC CARDIOVASCULAR DISEASE): ICD-10-CM

## 2021-03-09 ENCOUNTER — PATIENT MESSAGE (OUTPATIENT)
Dept: CARDIOLOGY | Facility: CLINIC | Age: 76
End: 2021-03-09

## 2021-03-09 RX ORDER — ATORVASTATIN CALCIUM 40 MG/1
40 TABLET, FILM COATED ORAL DAILY
Qty: 90 TABLET | Refills: 3 | Status: SHIPPED | OUTPATIENT
Start: 2021-03-09 | End: 2022-01-01 | Stop reason: SDUPTHER

## 2021-03-16 ENCOUNTER — OFFICE VISIT (OUTPATIENT)
Dept: CARDIOLOGY | Facility: CLINIC | Age: 76
End: 2021-03-16
Payer: MEDICARE

## 2021-03-16 VITALS
BODY MASS INDEX: 30.46 KG/M2 | WEIGHT: 201 LBS | HEART RATE: 42 BPM | OXYGEN SATURATION: 97 % | SYSTOLIC BLOOD PRESSURE: 112 MMHG | HEIGHT: 68 IN | DIASTOLIC BLOOD PRESSURE: 60 MMHG

## 2021-03-16 DIAGNOSIS — E03.2 HYPOTHYROIDISM DUE TO MEDICATION: ICD-10-CM

## 2021-03-16 DIAGNOSIS — I49.5 SSS (SICK SINUS SYNDROME): ICD-10-CM

## 2021-03-16 DIAGNOSIS — I48.91 ATRIAL FIBRILLATION, UNSPECIFIED TYPE: Primary | ICD-10-CM

## 2021-03-16 DIAGNOSIS — R53.1 WEAKNESS: ICD-10-CM

## 2021-03-16 DIAGNOSIS — Z01.810 PREOP CARDIOVASCULAR EXAM: ICD-10-CM

## 2021-03-16 DIAGNOSIS — R00.1 BRADYCARDIA: ICD-10-CM

## 2021-03-16 PROCEDURE — 93000 ELECTROCARDIOGRAM COMPLETE: CPT | Mod: S$GLB,,, | Performed by: SPECIALIST

## 2021-03-16 PROCEDURE — 99214 PR OFFICE/OUTPT VISIT, EST, LEVL IV, 30-39 MIN: ICD-10-PCS | Mod: S$GLB,,, | Performed by: SPECIALIST

## 2021-03-16 PROCEDURE — 93000 EKG 12-LEAD: ICD-10-PCS | Mod: S$GLB,,, | Performed by: SPECIALIST

## 2021-03-16 PROCEDURE — 99214 OFFICE O/P EST MOD 30 MIN: CPT | Mod: S$GLB,,, | Performed by: SPECIALIST

## 2021-03-17 ENCOUNTER — LAB VISIT (OUTPATIENT)
Dept: LAB | Facility: HOSPITAL | Age: 76
End: 2021-03-17
Attending: SPECIALIST
Payer: MEDICARE

## 2021-03-17 DIAGNOSIS — E03.2 IATROGENIC HYPOTHYROIDISM: Primary | ICD-10-CM

## 2021-03-17 DIAGNOSIS — I48.91 ATRIAL FIBRILLATION, UNSPECIFIED TYPE: ICD-10-CM

## 2021-03-17 LAB
ALBUMIN SERPL BCP-MCNC: 3.8 G/DL (ref 3.5–5.2)
ALP SERPL-CCNC: 69 U/L (ref 55–135)
ALT SERPL W/O P-5'-P-CCNC: 25 U/L (ref 10–44)
ANION GAP SERPL CALC-SCNC: 7 MMOL/L (ref 8–16)
AST SERPL-CCNC: 22 U/L (ref 10–40)
BILIRUB SERPL-MCNC: 1.2 MG/DL (ref 0.1–1)
BUN SERPL-MCNC: 43 MG/DL (ref 8–23)
CALCIUM SERPL-MCNC: 8.8 MG/DL (ref 8.7–10.5)
CHLORIDE SERPL-SCNC: 104 MMOL/L (ref 95–110)
CO2 SERPL-SCNC: 28 MMOL/L (ref 23–29)
CREAT SERPL-MCNC: 2.4 MG/DL (ref 0.5–1.4)
EST. GFR  (AFRICAN AMERICAN): 29 ML/MIN/1.73 M^2
EST. GFR  (NON AFRICAN AMERICAN): 25.1 ML/MIN/1.73 M^2
GLUCOSE SERPL-MCNC: 99 MG/DL (ref 70–110)
POTASSIUM SERPL-SCNC: 4.8 MMOL/L (ref 3.5–5.1)
PROT SERPL-MCNC: 6.9 G/DL (ref 6–8.4)
SODIUM SERPL-SCNC: 139 MMOL/L (ref 136–145)
T4 FREE SERPL-MCNC: 0.68 NG/DL (ref 0.71–1.51)
TSH SERPL DL<=0.005 MIU/L-ACNC: 12.92 UIU/ML (ref 0.34–5.6)

## 2021-03-17 PROCEDURE — 84443 ASSAY THYROID STIM HORMONE: CPT | Performed by: SPECIALIST

## 2021-03-17 PROCEDURE — 80053 COMPREHEN METABOLIC PANEL: CPT | Performed by: SPECIALIST

## 2021-03-17 PROCEDURE — 84439 ASSAY OF FREE THYROXINE: CPT | Performed by: SPECIALIST

## 2021-03-17 PROCEDURE — 36415 COLL VENOUS BLD VENIPUNCTURE: CPT | Performed by: SPECIALIST

## 2021-03-19 ENCOUNTER — PATIENT MESSAGE (OUTPATIENT)
Dept: CARDIOLOGY | Facility: CLINIC | Age: 76
End: 2021-03-19

## 2021-03-22 ENCOUNTER — PATIENT MESSAGE (OUTPATIENT)
Dept: CARDIOLOGY | Facility: CLINIC | Age: 76
End: 2021-03-22

## 2021-03-24 ENCOUNTER — PATIENT MESSAGE (OUTPATIENT)
Dept: CARDIOLOGY | Facility: CLINIC | Age: 76
End: 2021-03-24

## 2021-03-27 ENCOUNTER — PATIENT MESSAGE (OUTPATIENT)
Dept: CARDIOLOGY | Facility: CLINIC | Age: 76
End: 2021-03-27

## 2021-03-28 ENCOUNTER — PATIENT MESSAGE (OUTPATIENT)
Dept: CARDIOLOGY | Facility: CLINIC | Age: 76
End: 2021-03-28

## 2021-03-30 ENCOUNTER — HOSPITAL ENCOUNTER (OUTPATIENT)
Dept: PREADMISSION TESTING | Facility: HOSPITAL | Age: 76
Discharge: HOME | End: 2021-03-30
Attending: INTERNAL MEDICINE
Payer: MEDICARE

## 2021-03-30 ENCOUNTER — HOSPITAL ENCOUNTER (OUTPATIENT)
Dept: RADIOLOGY | Facility: HOSPITAL | Age: 76
Discharge: HOME | End: 2021-03-30
Attending: SPECIALIST
Payer: MEDICARE

## 2021-03-30 VITALS — BODY MASS INDEX: 29.55 KG/M2 | HEIGHT: 68 IN | WEIGHT: 195 LBS

## 2021-03-30 DIAGNOSIS — Z01.818 PRE-OP TESTING: ICD-10-CM

## 2021-03-30 DIAGNOSIS — Z01.818 PREOP EXAMINATION: ICD-10-CM

## 2021-03-30 DIAGNOSIS — I50.30 HEART FAILURE WITH PRESERVED EJECTION FRACTION, UNSPECIFIED HF CHRONICITY: ICD-10-CM

## 2021-03-30 DIAGNOSIS — I49.5 SSS (SICK SINUS SYNDROME): ICD-10-CM

## 2021-03-30 DIAGNOSIS — I48.19 PERSISTENT ATRIAL FIBRILLATION: ICD-10-CM

## 2021-03-30 DIAGNOSIS — Z01.810 PRE-PROCEDURAL CARDIOVASCULAR EXAMINATION: Primary | ICD-10-CM

## 2021-03-30 DIAGNOSIS — Z01.810 PREOP CARDIOVASCULAR EXAM: ICD-10-CM

## 2021-03-30 LAB
ALBUMIN SERPL BCP-MCNC: 4.2 G/DL (ref 3.5–5.2)
ALP SERPL-CCNC: 63 U/L (ref 55–135)
ALT SERPL W/O P-5'-P-CCNC: 31 U/L (ref 10–44)
ANION GAP SERPL CALC-SCNC: 9 MMOL/L (ref 8–16)
AST SERPL-CCNC: 28 U/L (ref 10–40)
BASOPHILS # BLD AUTO: 0.08 K/UL (ref 0–0.2)
BASOPHILS NFR BLD: 0.9 % (ref 0–1.9)
BILIRUB SERPL-MCNC: 1.4 MG/DL (ref 0.1–1)
BILIRUB UR QL STRIP: NEGATIVE
BUN SERPL-MCNC: 47 MG/DL (ref 8–23)
CALCIUM SERPL-MCNC: 8.8 MG/DL (ref 8.7–10.5)
CHLORIDE SERPL-SCNC: 102 MMOL/L (ref 95–110)
CLARITY UR: CLEAR
CO2 SERPL-SCNC: 25 MMOL/L (ref 23–29)
COLOR UR: YELLOW
CREAT SERPL-MCNC: 2.4 MG/DL (ref 0.5–1.4)
DIFFERENTIAL METHOD: ABNORMAL
EOSINOPHIL # BLD AUTO: 0.4 K/UL (ref 0–0.5)
EOSINOPHIL NFR BLD: 4.8 % (ref 0–8)
ERYTHROCYTE [DISTWIDTH] IN BLOOD BY AUTOMATED COUNT: 15.3 % (ref 11.5–14.5)
EST. GFR  (AFRICAN AMERICAN): 29 ML/MIN/1.73 M^2
EST. GFR  (NON AFRICAN AMERICAN): 25.1 ML/MIN/1.73 M^2
GLUCOSE SERPL-MCNC: 102 MG/DL (ref 70–110)
GLUCOSE UR QL STRIP: NEGATIVE
HCT VFR BLD AUTO: 36.8 % (ref 40–54)
HGB BLD-MCNC: 12.7 G/DL (ref 14–18)
HGB UR QL STRIP: NEGATIVE
IMM GRANULOCYTES # BLD AUTO: 0.02 K/UL (ref 0–0.04)
IMM GRANULOCYTES NFR BLD AUTO: 0.2 % (ref 0–0.5)
INR PPP: 1.5
KETONES UR QL STRIP: NEGATIVE
LEUKOCYTE ESTERASE UR QL STRIP: NEGATIVE
LYMPHOCYTES # BLD AUTO: 2.2 K/UL (ref 1–4.8)
LYMPHOCYTES NFR BLD: 24.5 % (ref 18–48)
MCH RBC QN AUTO: 33.2 PG (ref 27–31)
MCHC RBC AUTO-ENTMCNC: 34.5 G/DL (ref 32–36)
MCV RBC AUTO: 96 FL (ref 82–98)
MONOCYTES # BLD AUTO: 0.7 K/UL (ref 0.3–1)
MONOCYTES NFR BLD: 7.9 % (ref 4–15)
MRSA SCREEN BY PCR: NEGATIVE
NEUTROPHILS # BLD AUTO: 5.6 K/UL (ref 1.8–7.7)
NEUTROPHILS NFR BLD: 61.7 % (ref 38–73)
NITRITE UR QL STRIP: NEGATIVE
NRBC BLD-RTO: 0 /100 WBC
PH UR STRIP: 7 [PH] (ref 5–8)
PLATELET # BLD AUTO: 223 K/UL (ref 150–450)
PMV BLD AUTO: 10.6 FL (ref 9.2–12.9)
POTASSIUM SERPL-SCNC: 4.9 MMOL/L (ref 3.5–5.1)
PROT SERPL-MCNC: 7.4 G/DL (ref 6–8.4)
PROT UR QL STRIP: NEGATIVE
PROTHROMBIN TIME: 17.2 SEC (ref 10.6–14.8)
RBC # BLD AUTO: 3.83 M/UL (ref 4.6–6.2)
SODIUM SERPL-SCNC: 136 MMOL/L (ref 136–145)
SP GR UR STRIP: 1.01 (ref 1–1.03)
URN SPEC COLLECT METH UR: NORMAL
UROBILINOGEN UR STRIP-ACNC: NEGATIVE EU/DL
WBC # BLD AUTO: 9.09 K/UL (ref 3.9–12.7)

## 2021-03-30 PROCEDURE — U0003 INFECTIOUS AGENT DETECTION BY NUCLEIC ACID (DNA OR RNA); SEVERE ACUTE RESPIRATORY SYNDROME CORONAVIRUS 2 (SARS-COV-2) (CORONAVIRUS DISEASE [COVID-19]), AMPLIFIED PROBE TECHNIQUE, MAKING USE OF HIGH THROUGHPUT TECHNOLOGIES AS DESCRIBED BY CMS-2020-01-R: HCPCS | Performed by: SPECIALIST

## 2021-03-30 PROCEDURE — 85025 COMPLETE CBC W/AUTO DIFF WBC: CPT | Performed by: SPECIALIST

## 2021-03-30 PROCEDURE — 87641 MR-STAPH DNA AMP PROBE: CPT | Performed by: INTERNAL MEDICINE

## 2021-03-30 PROCEDURE — U0005 INFEC AGEN DETEC AMPLI PROBE: HCPCS | Performed by: SPECIALIST

## 2021-03-30 PROCEDURE — 36415 COLL VENOUS BLD VENIPUNCTURE: CPT | Performed by: SPECIALIST

## 2021-03-30 PROCEDURE — 81003 URINALYSIS AUTO W/O SCOPE: CPT | Performed by: SPECIALIST

## 2021-03-30 PROCEDURE — 80053 COMPREHEN METABOLIC PANEL: CPT | Performed by: SPECIALIST

## 2021-03-30 PROCEDURE — 93010 ELECTROCARDIOGRAM REPORT: CPT | Mod: ,,, | Performed by: INTERNAL MEDICINE

## 2021-03-30 PROCEDURE — 93010 EKG 12-LEAD: ICD-10-PCS | Mod: ,,, | Performed by: INTERNAL MEDICINE

## 2021-03-30 PROCEDURE — 71046 X-RAY EXAM CHEST 2 VIEWS: CPT | Mod: TC

## 2021-03-30 PROCEDURE — 85610 PROTHROMBIN TIME: CPT | Performed by: SPECIALIST

## 2021-03-30 PROCEDURE — 93005 ELECTROCARDIOGRAM TRACING: CPT | Performed by: INTERNAL MEDICINE

## 2021-03-31 ENCOUNTER — PATIENT MESSAGE (OUTPATIENT)
Dept: CARDIOLOGY | Facility: CLINIC | Age: 76
End: 2021-03-31

## 2021-03-31 LAB — SARS-COV-2 RNA RESP QL NAA+PROBE: NOT DETECTED

## 2021-04-01 ENCOUNTER — HOSPITAL ENCOUNTER (OUTPATIENT)
Facility: HOSPITAL | Age: 76
Discharge: HOME | End: 2021-04-02
Attending: INTERNAL MEDICINE | Admitting: INTERNAL MEDICINE
Payer: MEDICARE

## 2021-04-01 DIAGNOSIS — I48.91 ATRIAL FIBRILLATION, UNSPECIFIED TYPE: ICD-10-CM

## 2021-04-01 DIAGNOSIS — I49.5 SSS (SICK SINUS SYNDROME): ICD-10-CM

## 2021-04-01 DIAGNOSIS — I10 ESSENTIAL HYPERTENSION: ICD-10-CM

## 2021-04-01 DIAGNOSIS — I49.9 ARRHYTHMIA: ICD-10-CM

## 2021-04-01 DIAGNOSIS — I48.19 PERSISTENT ATRIAL FIBRILLATION: ICD-10-CM

## 2021-04-01 DIAGNOSIS — I48.91 NEW ONSET ATRIAL FIBRILLATION: ICD-10-CM

## 2021-04-01 DIAGNOSIS — Z95.0 S/P PLACEMENT OF CARDIAC PACEMAKER: Primary | ICD-10-CM

## 2021-04-01 DIAGNOSIS — N18.2 STAGE 2 CHRONIC KIDNEY DISEASE: ICD-10-CM

## 2021-04-01 PROCEDURE — 33208 INSRT HEART PM ATRIAL & VENT: CPT | Mod: KX,,, | Performed by: INTERNAL MEDICINE

## 2021-04-01 PROCEDURE — 33208 INSRT HEART PM ATRIAL & VENT: CPT | Performed by: INTERNAL MEDICINE

## 2021-04-01 PROCEDURE — 25000003 PHARM REV CODE 250: Performed by: INTERNAL MEDICINE

## 2021-04-01 PROCEDURE — 99153 MOD SED SAME PHYS/QHP EA: CPT | Performed by: INTERNAL MEDICINE

## 2021-04-01 PROCEDURE — 63600175 PHARM REV CODE 636 W HCPCS: Performed by: INTERNAL MEDICINE

## 2021-04-01 PROCEDURE — 33208 PR INSER HART PACER XVENOUS ATR/VENTR: ICD-10-PCS | Mod: KX,,, | Performed by: INTERNAL MEDICINE

## 2021-04-01 PROCEDURE — C1785 PMKR, DUAL, RATE-RESP: HCPCS | Performed by: INTERNAL MEDICINE

## 2021-04-01 PROCEDURE — 93005 ELECTROCARDIOGRAM TRACING: CPT | Performed by: GENERAL PRACTICE

## 2021-04-01 PROCEDURE — C1894 INTRO/SHEATH, NON-LASER: HCPCS | Performed by: INTERNAL MEDICINE

## 2021-04-01 PROCEDURE — 99152 MOD SED SAME PHYS/QHP 5/>YRS: CPT | Mod: ,,, | Performed by: INTERNAL MEDICINE

## 2021-04-01 PROCEDURE — 99152 PR MOD CONSCIOUS SEDATION, SAME PHYS, 5+ YRS, FIRST 15 MIN: ICD-10-PCS | Mod: ,,, | Performed by: INTERNAL MEDICINE

## 2021-04-01 PROCEDURE — C1898 LEAD, PMKR, OTHER THAN TRANS: HCPCS | Performed by: INTERNAL MEDICINE

## 2021-04-01 PROCEDURE — 99152 MOD SED SAME PHYS/QHP 5/>YRS: CPT | Performed by: INTERNAL MEDICINE

## 2021-04-01 DEVICE — PACEMAKER DUAL DHMBR AZURE XTDR MRI: Type: IMPLANTABLE DEVICE | Site: CHEST | Status: FUNCTIONAL

## 2021-04-01 DEVICE — LEAD 407645 CAPSUREFIX NOVUS US MRI
Type: IMPLANTABLE DEVICE | Site: CHEST | Status: FUNCTIONAL
Brand: CAPSUREFIX NOVUS MRI™ SURESCAN™

## 2021-04-01 DEVICE — LEAD 5076-52 MRI US RCMCRD
Type: IMPLANTABLE DEVICE | Site: CHEST | Status: FUNCTIONAL
Brand: CAPSUREFIX NOVUS MRI™ SURESCAN®

## 2021-04-01 RX ORDER — ATORVASTATIN CALCIUM 40 MG/1
40 TABLET, FILM COATED ORAL DAILY
Status: DISCONTINUED | OUTPATIENT
Start: 2021-04-02 | End: 2021-04-02 | Stop reason: HOSPADM

## 2021-04-01 RX ORDER — AMIODARONE HYDROCHLORIDE 200 MG/1
400 TABLET ORAL 2 TIMES DAILY
Status: DISCONTINUED | OUTPATIENT
Start: 2021-04-01 | End: 2021-04-02 | Stop reason: HOSPADM

## 2021-04-01 RX ORDER — CEFAZOLIN SODIUM 2 G/50ML
2 SOLUTION INTRAVENOUS
Status: COMPLETED | OUTPATIENT
Start: 2021-04-02 | End: 2021-04-02

## 2021-04-01 RX ORDER — FENTANYL CITRATE 50 UG/ML
INJECTION, SOLUTION INTRAMUSCULAR; INTRAVENOUS
Status: DISCONTINUED | OUTPATIENT
Start: 2021-04-01 | End: 2021-04-01 | Stop reason: HOSPADM

## 2021-04-01 RX ORDER — CEFAZOLIN SODIUM 1 G/3ML
INJECTION, POWDER, FOR SOLUTION INTRAMUSCULAR; INTRAVENOUS
Status: DISCONTINUED | OUTPATIENT
Start: 2021-04-01 | End: 2021-04-01 | Stop reason: HOSPADM

## 2021-04-01 RX ORDER — METOPROLOL SUCCINATE 25 MG/1
25 TABLET, EXTENDED RELEASE ORAL DAILY
Status: DISCONTINUED | OUTPATIENT
Start: 2021-04-02 | End: 2021-04-02 | Stop reason: HOSPADM

## 2021-04-01 RX ORDER — LIDOCAINE HYDROCHLORIDE 10 MG/ML
INJECTION, SOLUTION EPIDURAL; INFILTRATION; INTRACAUDAL; PERINEURAL
Status: DISCONTINUED | OUTPATIENT
Start: 2021-04-01 | End: 2021-04-01 | Stop reason: HOSPADM

## 2021-04-01 RX ORDER — ACETAMINOPHEN 325 MG/1
650 TABLET ORAL EVERY 4 HOURS PRN
Status: DISCONTINUED | OUTPATIENT
Start: 2021-04-01 | End: 2021-04-02 | Stop reason: HOSPADM

## 2021-04-01 RX ORDER — ASPIRIN 81 MG/1
81 TABLET ORAL DAILY
Status: DISCONTINUED | OUTPATIENT
Start: 2021-04-02 | End: 2021-04-02 | Stop reason: HOSPADM

## 2021-04-01 RX ORDER — EZETIMIBE 10 MG/1
10 TABLET ORAL DAILY
Status: DISCONTINUED | OUTPATIENT
Start: 2021-04-02 | End: 2021-04-02 | Stop reason: HOSPADM

## 2021-04-01 RX ORDER — TAMSULOSIN HYDROCHLORIDE 0.4 MG/1
0.4 CAPSULE ORAL DAILY
Status: DISCONTINUED | OUTPATIENT
Start: 2021-04-02 | End: 2021-04-02 | Stop reason: HOSPADM

## 2021-04-01 RX ORDER — MIDAZOLAM HYDROCHLORIDE 1 MG/ML
INJECTION INTRAMUSCULAR; INTRAVENOUS
Status: DISCONTINUED | OUTPATIENT
Start: 2021-04-01 | End: 2021-04-01 | Stop reason: HOSPADM

## 2021-04-01 RX ADMIN — AMIODARONE HYDROCHLORIDE 400 MG: 200 TABLET ORAL at 09:04

## 2021-04-02 VITALS
RESPIRATION RATE: 15 BRPM | HEART RATE: 63 BPM | DIASTOLIC BLOOD PRESSURE: 76 MMHG | SYSTOLIC BLOOD PRESSURE: 131 MMHG | OXYGEN SATURATION: 91 % | TEMPERATURE: 98 F

## 2021-04-02 PROBLEM — Z95.0 S/P PLACEMENT OF CARDIAC PACEMAKER: Status: ACTIVE | Noted: 2021-04-02

## 2021-04-02 LAB
ANION GAP SERPL CALC-SCNC: 7 MMOL/L (ref 8–16)
BUN SERPL-MCNC: 41 MG/DL (ref 8–23)
CALCIUM SERPL-MCNC: 8.5 MG/DL (ref 8.7–10.5)
CHLORIDE SERPL-SCNC: 106 MMOL/L (ref 95–110)
CO2 SERPL-SCNC: 23 MMOL/L (ref 23–29)
CREAT SERPL-MCNC: 2 MG/DL (ref 0.5–1.4)
ERYTHROCYTE [DISTWIDTH] IN BLOOD BY AUTOMATED COUNT: 15.2 % (ref 11.5–14.5)
EST. GFR  (AFRICAN AMERICAN): 36.1 ML/MIN/1.73 M^2
EST. GFR  (NON AFRICAN AMERICAN): 31.3 ML/MIN/1.73 M^2
GLUCOSE SERPL-MCNC: 86 MG/DL (ref 70–110)
HCT VFR BLD AUTO: 36.6 % (ref 40–54)
HGB BLD-MCNC: 12.6 G/DL (ref 14–18)
MCH RBC QN AUTO: 33.2 PG (ref 27–31)
MCHC RBC AUTO-ENTMCNC: 34.4 G/DL (ref 32–36)
MCV RBC AUTO: 96 FL (ref 82–98)
PLATELET # BLD AUTO: 211 K/UL (ref 150–450)
PMV BLD AUTO: 10.8 FL (ref 9.2–12.9)
POTASSIUM SERPL-SCNC: 4.4 MMOL/L (ref 3.5–5.1)
RBC # BLD AUTO: 3.8 M/UL (ref 4.6–6.2)
SODIUM SERPL-SCNC: 136 MMOL/L (ref 136–145)
WBC # BLD AUTO: 10.31 K/UL (ref 3.9–12.7)

## 2021-04-02 PROCEDURE — 94761 N-INVAS EAR/PLS OXIMETRY MLT: CPT

## 2021-04-02 PROCEDURE — 99900035 HC TECH TIME PER 15 MIN (STAT)

## 2021-04-02 PROCEDURE — 99217 PR OBSERVATION CARE DISCHARGE: ICD-10-PCS | Mod: ,,, | Performed by: INTERNAL MEDICINE

## 2021-04-02 PROCEDURE — 85027 COMPLETE CBC AUTOMATED: CPT | Performed by: INTERNAL MEDICINE

## 2021-04-02 PROCEDURE — 99900031 HC PATIENT EDUCATION (STAT)

## 2021-04-02 PROCEDURE — 80048 BASIC METABOLIC PNL TOTAL CA: CPT | Performed by: INTERNAL MEDICINE

## 2021-04-02 PROCEDURE — 63600175 PHARM REV CODE 636 W HCPCS: Performed by: INTERNAL MEDICINE

## 2021-04-02 PROCEDURE — 36415 COLL VENOUS BLD VENIPUNCTURE: CPT | Performed by: INTERNAL MEDICINE

## 2021-04-02 PROCEDURE — 25000003 PHARM REV CODE 250: Performed by: INTERNAL MEDICINE

## 2021-04-02 PROCEDURE — 99217 PR OBSERVATION CARE DISCHARGE: CPT | Mod: ,,, | Performed by: INTERNAL MEDICINE

## 2021-04-02 RX ORDER — AMIODARONE HYDROCHLORIDE 200 MG/1
200 TABLET ORAL DAILY
Qty: 30 TABLET | Refills: 11
Start: 2021-04-02 | End: 2021-06-15

## 2021-04-02 RX ORDER — EZETIMIBE 10 MG/1
10 TABLET ORAL DAILY
Qty: 90 TABLET | Refills: 3 | Status: SHIPPED | OUTPATIENT
Start: 2021-04-03 | End: 2022-01-01

## 2021-04-02 RX ADMIN — CEFAZOLIN SODIUM 2 G: 2 SOLUTION INTRAVENOUS at 09:04

## 2021-04-02 RX ADMIN — ASPIRIN 81 MG: 81 TABLET, DELAYED RELEASE ORAL at 09:04

## 2021-04-02 RX ADMIN — ATORVASTATIN CALCIUM 40 MG: 40 TABLET, FILM COATED ORAL at 09:04

## 2021-04-02 RX ADMIN — METOPROLOL SUCCINATE 25 MG: 25 TABLET, FILM COATED, EXTENDED RELEASE ORAL at 09:04

## 2021-04-02 RX ADMIN — TAMSULOSIN HYDROCHLORIDE 0.4 MG: 0.4 CAPSULE ORAL at 09:04

## 2021-04-02 RX ADMIN — CEFAZOLIN SODIUM 2 G: 2 SOLUTION INTRAVENOUS at 12:04

## 2021-04-05 ENCOUNTER — PATIENT MESSAGE (OUTPATIENT)
Dept: CARDIOLOGY | Facility: CLINIC | Age: 76
End: 2021-04-05

## 2021-04-05 ENCOUNTER — TELEPHONE (OUTPATIENT)
Dept: CARDIOLOGY | Facility: CLINIC | Age: 76
End: 2021-04-05

## 2021-04-06 ENCOUNTER — CLINICAL SUPPORT (OUTPATIENT)
Dept: CARDIOLOGY | Facility: CLINIC | Age: 76
End: 2021-04-06
Payer: MEDICARE

## 2021-04-06 ENCOUNTER — HOSPITAL ENCOUNTER (EMERGENCY)
Facility: HOSPITAL | Age: 76
Discharge: HOME | End: 2021-04-06
Attending: EMERGENCY MEDICINE
Payer: MEDICARE

## 2021-04-06 ENCOUNTER — PATIENT MESSAGE (OUTPATIENT)
Dept: CARDIOLOGY | Facility: CLINIC | Age: 76
End: 2021-04-06

## 2021-04-06 VITALS
DIASTOLIC BLOOD PRESSURE: 78 MMHG | HEIGHT: 68 IN | OXYGEN SATURATION: 99 % | RESPIRATION RATE: 18 BRPM | WEIGHT: 190 LBS | BODY MASS INDEX: 28.79 KG/M2 | TEMPERATURE: 98 F | HEART RATE: 68 BPM | SYSTOLIC BLOOD PRESSURE: 163 MMHG

## 2021-04-06 DIAGNOSIS — Z95.0 S/P PLACEMENT OF CARDIAC PACEMAKER: ICD-10-CM

## 2021-04-06 DIAGNOSIS — Z48.89 ENCOUNTER FOR POSTOPERATIVE WOUND CHECK: Primary | ICD-10-CM

## 2021-04-06 DIAGNOSIS — N18.32 STAGE 3B CHRONIC KIDNEY DISEASE: ICD-10-CM

## 2021-04-06 PROBLEM — N18.30 STAGE 3 CHRONIC KIDNEY DISEASE: Status: ACTIVE | Noted: 2021-04-06

## 2021-04-06 PROCEDURE — 99281 EMR DPT VST MAYX REQ PHY/QHP: CPT

## 2021-04-06 PROCEDURE — 99212 PR OFFICE/OUTPT VISIT, EST, LEVL II, 10-19 MIN: ICD-10-PCS | Mod: S$GLB,,, | Performed by: INTERNAL MEDICINE

## 2021-04-06 PROCEDURE — 99212 OFFICE O/P EST SF 10 MIN: CPT | Mod: S$GLB,,, | Performed by: INTERNAL MEDICINE

## 2021-04-06 RX ORDER — ASPIRIN 81 MG/1
81 TABLET ORAL DAILY
Qty: 30 TABLET | Refills: 0
Start: 2021-04-06 | End: 2021-04-07 | Stop reason: SDUPTHER

## 2021-04-06 RX ORDER — CEPHALEXIN 500 MG/1
500 CAPSULE ORAL EVERY 8 HOURS
Qty: 30 CAPSULE | Refills: 0 | Status: SHIPPED | OUTPATIENT
Start: 2021-04-06 | End: 2021-06-29

## 2021-04-07 ENCOUNTER — PATIENT MESSAGE (OUTPATIENT)
Dept: CARDIOLOGY | Facility: CLINIC | Age: 76
End: 2021-04-07

## 2021-04-07 RX ORDER — ASPIRIN 81 MG/1
81 TABLET ORAL DAILY
Qty: 90 TABLET | Refills: 2
Start: 2021-04-07 | End: 2021-04-20 | Stop reason: SDUPTHER

## 2021-04-08 ENCOUNTER — PATIENT MESSAGE (OUTPATIENT)
Dept: CARDIOLOGY | Facility: CLINIC | Age: 76
End: 2021-04-08

## 2021-04-09 ENCOUNTER — LAB VISIT (OUTPATIENT)
Dept: LAB | Facility: HOSPITAL | Age: 76
End: 2021-04-09
Attending: INTERNAL MEDICINE
Payer: MEDICARE

## 2021-04-09 ENCOUNTER — TELEPHONE (OUTPATIENT)
Dept: CARDIOLOGY | Facility: CLINIC | Age: 76
End: 2021-04-09

## 2021-04-09 ENCOUNTER — PATIENT MESSAGE (OUTPATIENT)
Dept: CARDIOLOGY | Facility: CLINIC | Age: 76
End: 2021-04-09

## 2021-04-09 DIAGNOSIS — N18.32 STAGE 3B CHRONIC KIDNEY DISEASE: ICD-10-CM

## 2021-04-09 DIAGNOSIS — Z95.0 S/P PLACEMENT OF CARDIAC PACEMAKER: ICD-10-CM

## 2021-04-09 LAB
ANION GAP SERPL CALC-SCNC: 8 MMOL/L (ref 8–16)
BUN SERPL-MCNC: 24 MG/DL (ref 8–23)
CALCIUM SERPL-MCNC: 8.9 MG/DL (ref 8.7–10.5)
CHLORIDE SERPL-SCNC: 102 MMOL/L (ref 95–110)
CO2 SERPL-SCNC: 24 MMOL/L (ref 23–29)
CREAT SERPL-MCNC: 1.6 MG/DL (ref 0.5–1.4)
ERYTHROCYTE [DISTWIDTH] IN BLOOD BY AUTOMATED COUNT: 15.5 % (ref 11.5–14.5)
EST. GFR  (AFRICAN AMERICAN): 47.3 ML/MIN/1.73 M^2
EST. GFR  (NON AFRICAN AMERICAN): 40.9 ML/MIN/1.73 M^2
GLUCOSE SERPL-MCNC: 92 MG/DL (ref 70–110)
HCT VFR BLD AUTO: 35 % (ref 40–54)
HGB BLD-MCNC: 11.8 G/DL (ref 14–18)
MCH RBC QN AUTO: 33 PG (ref 27–31)
MCHC RBC AUTO-ENTMCNC: 33.7 G/DL (ref 32–36)
MCV RBC AUTO: 98 FL (ref 82–98)
PLATELET # BLD AUTO: 199 K/UL (ref 150–450)
PMV BLD AUTO: 11.4 FL (ref 9.2–12.9)
POTASSIUM SERPL-SCNC: 4.9 MMOL/L (ref 3.5–5.1)
RBC # BLD AUTO: 3.58 M/UL (ref 4.6–6.2)
SODIUM SERPL-SCNC: 134 MMOL/L (ref 136–145)
WBC # BLD AUTO: 9.92 K/UL (ref 3.9–12.7)

## 2021-04-09 PROCEDURE — 85027 COMPLETE CBC AUTOMATED: CPT | Performed by: INTERNAL MEDICINE

## 2021-04-09 PROCEDURE — 80048 BASIC METABOLIC PNL TOTAL CA: CPT | Performed by: INTERNAL MEDICINE

## 2021-04-09 PROCEDURE — 36415 COLL VENOUS BLD VENIPUNCTURE: CPT | Performed by: INTERNAL MEDICINE

## 2021-04-12 ENCOUNTER — PATIENT MESSAGE (OUTPATIENT)
Dept: CARDIOLOGY | Facility: CLINIC | Age: 76
End: 2021-04-12

## 2021-04-13 ENCOUNTER — CLINICAL SUPPORT (OUTPATIENT)
Dept: CARDIOLOGY | Facility: CLINIC | Age: 76
End: 2021-04-13
Payer: MEDICARE

## 2021-04-19 ENCOUNTER — PATIENT MESSAGE (OUTPATIENT)
Dept: FAMILY MEDICINE | Facility: CLINIC | Age: 76
End: 2021-04-19

## 2021-04-19 ENCOUNTER — PATIENT MESSAGE (OUTPATIENT)
Dept: CARDIOLOGY | Facility: CLINIC | Age: 76
End: 2021-04-19

## 2021-04-20 RX ORDER — ASPIRIN 81 MG/1
81 TABLET ORAL DAILY
Qty: 90 TABLET | Refills: 2 | Status: ON HOLD
Start: 2021-04-20 | End: 2021-07-01 | Stop reason: HOSPADM

## 2021-04-28 ENCOUNTER — PATIENT MESSAGE (OUTPATIENT)
Dept: CARDIOLOGY | Facility: CLINIC | Age: 76
End: 2021-04-28

## 2021-04-28 DIAGNOSIS — I50.32 CHRONIC HEART FAILURE WITH PRESERVED EJECTION FRACTION: ICD-10-CM

## 2021-04-28 DIAGNOSIS — Z95.0 S/P PLACEMENT OF CARDIAC PACEMAKER: Primary | ICD-10-CM

## 2021-04-29 ENCOUNTER — PATIENT MESSAGE (OUTPATIENT)
Dept: CARDIOLOGY | Facility: CLINIC | Age: 76
End: 2021-04-29

## 2021-05-03 ENCOUNTER — LAB VISIT (OUTPATIENT)
Dept: LAB | Facility: HOSPITAL | Age: 76
End: 2021-05-03
Attending: NURSE PRACTITIONER
Payer: MEDICARE

## 2021-05-03 DIAGNOSIS — I50.32 CHRONIC HEART FAILURE WITH PRESERVED EJECTION FRACTION: ICD-10-CM

## 2021-05-03 DIAGNOSIS — Z95.0 S/P PLACEMENT OF CARDIAC PACEMAKER: ICD-10-CM

## 2021-05-03 LAB
ANION GAP SERPL CALC-SCNC: 7 MMOL/L (ref 8–16)
BNP SERPL-MCNC: 1251 PG/ML (ref 0–99)
BUN SERPL-MCNC: 19 MG/DL (ref 8–23)
CALCIUM SERPL-MCNC: 8.7 MG/DL (ref 8.7–10.5)
CHLORIDE SERPL-SCNC: 106 MMOL/L (ref 95–110)
CO2 SERPL-SCNC: 26 MMOL/L (ref 23–29)
CREAT SERPL-MCNC: 1.5 MG/DL (ref 0.5–1.4)
ERYTHROCYTE [DISTWIDTH] IN BLOOD BY AUTOMATED COUNT: 16.9 % (ref 11.5–14.5)
EST. GFR  (AFRICAN AMERICAN): 51.2 ML/MIN/1.73 M^2
EST. GFR  (NON AFRICAN AMERICAN): 44.3 ML/MIN/1.73 M^2
GLUCOSE SERPL-MCNC: 138 MG/DL (ref 70–110)
HCT VFR BLD AUTO: 36.4 % (ref 40–54)
HGB BLD-MCNC: 12.2 G/DL (ref 14–18)
MCH RBC QN AUTO: 34 PG (ref 27–31)
MCHC RBC AUTO-ENTMCNC: 33.5 G/DL (ref 32–36)
MCV RBC AUTO: 101 FL (ref 82–98)
PLATELET # BLD AUTO: 196 K/UL (ref 150–450)
PMV BLD AUTO: 11.4 FL (ref 9.2–12.9)
POTASSIUM SERPL-SCNC: 4.2 MMOL/L (ref 3.5–5.1)
RBC # BLD AUTO: 3.59 M/UL (ref 4.6–6.2)
SODIUM SERPL-SCNC: 139 MMOL/L (ref 136–145)
WBC # BLD AUTO: 8.66 K/UL (ref 3.9–12.7)

## 2021-05-03 PROCEDURE — 80048 BASIC METABOLIC PNL TOTAL CA: CPT | Performed by: NURSE PRACTITIONER

## 2021-05-03 PROCEDURE — 83880 ASSAY OF NATRIURETIC PEPTIDE: CPT | Performed by: NURSE PRACTITIONER

## 2021-05-03 PROCEDURE — 85027 COMPLETE CBC AUTOMATED: CPT | Performed by: NURSE PRACTITIONER

## 2021-05-03 PROCEDURE — 36415 COLL VENOUS BLD VENIPUNCTURE: CPT | Performed by: NURSE PRACTITIONER

## 2021-05-04 ENCOUNTER — CLINICAL SUPPORT (OUTPATIENT)
Dept: CARDIOLOGY | Facility: CLINIC | Age: 76
End: 2021-05-04
Payer: MEDICARE

## 2021-05-04 VITALS
HEIGHT: 68 IN | BODY MASS INDEX: 30.62 KG/M2 | WEIGHT: 202 LBS | DIASTOLIC BLOOD PRESSURE: 80 MMHG | HEART RATE: 86 BPM | OXYGEN SATURATION: 96 % | SYSTOLIC BLOOD PRESSURE: 140 MMHG

## 2021-05-04 DIAGNOSIS — I50.32 CHRONIC HEART FAILURE WITH PRESERVED EJECTION FRACTION: Chronic | ICD-10-CM

## 2021-05-04 DIAGNOSIS — N18.32 STAGE 3B CHRONIC KIDNEY DISEASE: ICD-10-CM

## 2021-05-04 PROBLEM — I50.30 (HFPEF) HEART FAILURE WITH PRESERVED EJECTION FRACTION: Chronic | Status: ACTIVE | Noted: 2021-05-04

## 2021-05-04 PROCEDURE — 99024 POSTOP FOLLOW-UP VISIT: CPT | Mod: S$GLB,,, | Performed by: INTERNAL MEDICINE

## 2021-05-04 PROCEDURE — 99024 PR POST-OP FOLLOW-UP VISIT: ICD-10-PCS | Mod: S$GLB,,, | Performed by: INTERNAL MEDICINE

## 2021-05-12 ENCOUNTER — PATIENT MESSAGE (OUTPATIENT)
Dept: RESEARCH | Facility: HOSPITAL | Age: 76
End: 2021-05-12

## 2021-05-13 ENCOUNTER — PATIENT MESSAGE (OUTPATIENT)
Dept: CARDIOLOGY | Facility: CLINIC | Age: 76
End: 2021-05-13

## 2021-05-21 ENCOUNTER — PATIENT MESSAGE (OUTPATIENT)
Dept: FAMILY MEDICINE | Facility: CLINIC | Age: 76
End: 2021-05-21

## 2021-05-21 ENCOUNTER — PATIENT MESSAGE (OUTPATIENT)
Dept: CARDIOLOGY | Facility: CLINIC | Age: 76
End: 2021-05-21

## 2021-06-04 ENCOUNTER — PATIENT MESSAGE (OUTPATIENT)
Dept: CARDIOLOGY | Facility: CLINIC | Age: 76
End: 2021-06-04

## 2021-06-08 ENCOUNTER — TELEPHONE (OUTPATIENT)
Dept: CARDIOLOGY | Facility: CLINIC | Age: 76
End: 2021-06-08

## 2021-06-08 DIAGNOSIS — I48.91 ATRIAL FIBRILLATION, UNSPECIFIED TYPE: ICD-10-CM

## 2021-06-08 DIAGNOSIS — I49.5 SSS (SICK SINUS SYNDROME): Primary | ICD-10-CM

## 2021-06-09 ENCOUNTER — LAB VISIT (OUTPATIENT)
Dept: LAB | Facility: HOSPITAL | Age: 76
End: 2021-06-09
Attending: INTERNAL MEDICINE
Payer: MEDICARE

## 2021-06-09 DIAGNOSIS — N18.32 STAGE 3B CHRONIC KIDNEY DISEASE: ICD-10-CM

## 2021-06-09 DIAGNOSIS — I50.32 CHRONIC HEART FAILURE WITH PRESERVED EJECTION FRACTION: Chronic | ICD-10-CM

## 2021-06-09 LAB
ANION GAP SERPL CALC-SCNC: 8 MMOL/L (ref 8–16)
BNP SERPL-MCNC: 764 PG/ML (ref 0–99)
BUN SERPL-MCNC: 26 MG/DL (ref 8–23)
CALCIUM SERPL-MCNC: 8.7 MG/DL (ref 8.7–10.5)
CHLORIDE SERPL-SCNC: 104 MMOL/L (ref 95–110)
CO2 SERPL-SCNC: 29 MMOL/L (ref 23–29)
CREAT SERPL-MCNC: 1.6 MG/DL (ref 0.5–1.4)
ERYTHROCYTE [DISTWIDTH] IN BLOOD BY AUTOMATED COUNT: 15.4 % (ref 11.5–14.5)
EST. GFR  (AFRICAN AMERICAN): 47.3 ML/MIN/1.73 M^2
EST. GFR  (NON AFRICAN AMERICAN): 40.9 ML/MIN/1.73 M^2
GLUCOSE SERPL-MCNC: 92 MG/DL (ref 70–110)
HCT VFR BLD AUTO: 37 % (ref 40–54)
HGB BLD-MCNC: 12.5 G/DL (ref 14–18)
MCH RBC QN AUTO: 34.2 PG (ref 27–31)
MCHC RBC AUTO-ENTMCNC: 33.8 G/DL (ref 32–36)
MCV RBC AUTO: 101 FL (ref 82–98)
PLATELET # BLD AUTO: 189 K/UL (ref 150–450)
PMV BLD AUTO: 11.6 FL (ref 9.2–12.9)
POTASSIUM SERPL-SCNC: 3.9 MMOL/L (ref 3.5–5.1)
RBC # BLD AUTO: 3.65 M/UL (ref 4.6–6.2)
SODIUM SERPL-SCNC: 141 MMOL/L (ref 136–145)
WBC # BLD AUTO: 7.94 K/UL (ref 3.9–12.7)

## 2021-06-09 PROCEDURE — 80048 BASIC METABOLIC PNL TOTAL CA: CPT | Performed by: INTERNAL MEDICINE

## 2021-06-09 PROCEDURE — 36415 COLL VENOUS BLD VENIPUNCTURE: CPT | Performed by: INTERNAL MEDICINE

## 2021-06-09 PROCEDURE — 85027 COMPLETE CBC AUTOMATED: CPT | Performed by: INTERNAL MEDICINE

## 2021-06-09 PROCEDURE — 83880 ASSAY OF NATRIURETIC PEPTIDE: CPT | Performed by: INTERNAL MEDICINE

## 2021-06-15 ENCOUNTER — TELEPHONE (OUTPATIENT)
Dept: CARDIOLOGY | Facility: CLINIC | Age: 76
End: 2021-06-15

## 2021-06-15 ENCOUNTER — HOSPITAL ENCOUNTER (OUTPATIENT)
Dept: CARDIOLOGY | Facility: CLINIC | Age: 76
Discharge: HOME | End: 2021-06-15
Attending: INTERNAL MEDICINE
Payer: MEDICARE

## 2021-06-15 ENCOUNTER — HOSPITAL ENCOUNTER (OUTPATIENT)
Dept: RADIOLOGY | Facility: HOSPITAL | Age: 76
Discharge: HOME | End: 2021-06-15
Attending: INTERNAL MEDICINE
Payer: MEDICARE

## 2021-06-15 ENCOUNTER — OFFICE VISIT (OUTPATIENT)
Dept: CARDIOLOGY | Facility: CLINIC | Age: 76
End: 2021-06-15
Payer: MEDICARE

## 2021-06-15 VITALS
HEIGHT: 68 IN | WEIGHT: 198 LBS | OXYGEN SATURATION: 99 % | DIASTOLIC BLOOD PRESSURE: 80 MMHG | SYSTOLIC BLOOD PRESSURE: 120 MMHG | HEART RATE: 51 BPM | BODY MASS INDEX: 30.01 KG/M2

## 2021-06-15 DIAGNOSIS — T82.110A PACEMAKER LEAD MALFUNCTION, INITIAL ENCOUNTER: Primary | ICD-10-CM

## 2021-06-15 DIAGNOSIS — Z95.0 S/P PLACEMENT OF CARDIAC PACEMAKER: ICD-10-CM

## 2021-06-15 DIAGNOSIS — Z95.0 S/P PLACEMENT OF CARDIAC PACEMAKER: Primary | ICD-10-CM

## 2021-06-15 DIAGNOSIS — I50.32 CHRONIC HEART FAILURE WITH PRESERVED EJECTION FRACTION: Chronic | ICD-10-CM

## 2021-06-15 DIAGNOSIS — T82.110A PACEMAKER LEAD MALFUNCTION, INITIAL ENCOUNTER: ICD-10-CM

## 2021-06-15 DIAGNOSIS — E78.00 HYPERCHOLESTEREMIA: ICD-10-CM

## 2021-06-15 DIAGNOSIS — I49.5 SSS (SICK SINUS SYNDROME): ICD-10-CM

## 2021-06-15 DIAGNOSIS — I48.91 ATRIAL FIBRILLATION, UNSPECIFIED TYPE: ICD-10-CM

## 2021-06-15 DIAGNOSIS — Z01.810 PREOP CARDIOVASCULAR EXAM: ICD-10-CM

## 2021-06-15 DIAGNOSIS — E89.0 POSTABLATIVE HYPOTHYROIDISM: ICD-10-CM

## 2021-06-15 DIAGNOSIS — I10 ESSENTIAL HYPERTENSION: ICD-10-CM

## 2021-06-15 PROCEDURE — 99214 PR OFFICE/OUTPT VISIT, EST, LEVL IV, 30-39 MIN: ICD-10-PCS | Mod: 25,S$GLB,, | Performed by: INTERNAL MEDICINE

## 2021-06-15 PROCEDURE — 93280 CARDIAC DEVICE CHECK - IN CLINIC & HOSPITAL: ICD-10-PCS | Mod: S$GLB,,, | Performed by: INTERNAL MEDICINE

## 2021-06-15 PROCEDURE — 71046 X-RAY EXAM CHEST 2 VIEWS: CPT | Mod: TC

## 2021-06-15 PROCEDURE — 93280 PM DEVICE PROGR EVAL DUAL: CPT | Mod: S$GLB,,, | Performed by: INTERNAL MEDICINE

## 2021-06-15 PROCEDURE — 99214 OFFICE O/P EST MOD 30 MIN: CPT | Mod: 25,S$GLB,, | Performed by: INTERNAL MEDICINE

## 2021-06-15 RX ORDER — LEVOTHYROXINE SODIUM 25 UG/1
25 TABLET ORAL
Qty: 30 TABLET | Refills: 11 | Status: SHIPPED | OUTPATIENT
Start: 2021-06-15 | End: 2021-07-13 | Stop reason: SDUPTHER

## 2021-06-17 ENCOUNTER — PATIENT MESSAGE (OUTPATIENT)
Dept: CARDIOLOGY | Facility: HOSPITAL | Age: 76
End: 2021-06-17

## 2021-06-17 DIAGNOSIS — I49.5 SSS (SICK SINUS SYNDROME): Primary | ICD-10-CM

## 2021-06-17 DIAGNOSIS — Z95.0 S/P PLACEMENT OF CARDIAC PACEMAKER: ICD-10-CM

## 2021-06-22 ENCOUNTER — PATIENT MESSAGE (OUTPATIENT)
Dept: CARDIOLOGY | Facility: HOSPITAL | Age: 76
End: 2021-06-22

## 2021-06-22 LAB
IMPEDANCE RA LEAD (NATIVE): 513 OHMS
IMPEDANCE RA LEAD: 456 OHMS
P/R-WAVE RA LEAD (NATIVE): >20 MV
THRESHOLD MS RA LEAD: 0.4 MS
THRESHOLD V RA LEAD: 0.75 V

## 2021-06-24 ENCOUNTER — PATIENT MESSAGE (OUTPATIENT)
Dept: CARDIOLOGY | Facility: HOSPITAL | Age: 76
End: 2021-06-24

## 2021-06-24 PROBLEM — E89.0 POSTABLATIVE HYPOTHYROIDISM: Status: ACTIVE | Noted: 2021-06-24

## 2021-06-24 RX ORDER — LISINOPRIL 20 MG/1
20 TABLET ORAL NIGHTLY
Qty: 90 TABLET | Refills: 3 | Status: SHIPPED | OUTPATIENT
Start: 2021-06-24 | End: 2021-09-13 | Stop reason: SDUPTHER

## 2021-06-25 ENCOUNTER — PATIENT MESSAGE (OUTPATIENT)
Dept: CARDIOLOGY | Facility: HOSPITAL | Age: 76
End: 2021-06-25

## 2021-06-29 ENCOUNTER — HOSPITAL ENCOUNTER (OUTPATIENT)
Dept: PREADMISSION TESTING | Facility: HOSPITAL | Age: 76
Discharge: HOME | End: 2021-06-29
Attending: INTERNAL MEDICINE
Payer: MEDICARE

## 2021-06-29 VITALS — WEIGHT: 190 LBS | HEIGHT: 68 IN | BODY MASS INDEX: 28.79 KG/M2

## 2021-06-29 DIAGNOSIS — T82.110A PACEMAKER LEAD MALFUNCTION, INITIAL ENCOUNTER: ICD-10-CM

## 2021-06-29 DIAGNOSIS — I49.5 SSS (SICK SINUS SYNDROME): ICD-10-CM

## 2021-06-29 DIAGNOSIS — Z95.0 S/P PLACEMENT OF CARDIAC PACEMAKER: ICD-10-CM

## 2021-06-29 DIAGNOSIS — Z01.810 PREOP CARDIOVASCULAR EXAM: ICD-10-CM

## 2021-06-29 LAB
ALBUMIN SERPL BCP-MCNC: 4.1 G/DL (ref 3.5–5.2)
ALP SERPL-CCNC: 64 U/L (ref 55–135)
ALT SERPL W/O P-5'-P-CCNC: 34 U/L (ref 10–44)
ANION GAP SERPL CALC-SCNC: 10 MMOL/L (ref 8–16)
AST SERPL-CCNC: 31 U/L (ref 10–40)
BASOPHILS # BLD AUTO: 0.07 K/UL (ref 0–0.2)
BASOPHILS NFR BLD: 1 % (ref 0–1.9)
BILIRUB SERPL-MCNC: 1.5 MG/DL (ref 0.1–1)
BUN SERPL-MCNC: 23 MG/DL (ref 8–23)
CALCIUM SERPL-MCNC: 8.9 MG/DL (ref 8.7–10.5)
CHLORIDE SERPL-SCNC: 102 MMOL/L (ref 95–110)
CO2 SERPL-SCNC: 30 MMOL/L (ref 23–29)
CREAT SERPL-MCNC: 1.4 MG/DL (ref 0.5–1.4)
DIFFERENTIAL METHOD: ABNORMAL
EOSINOPHIL # BLD AUTO: 0.4 K/UL (ref 0–0.5)
EOSINOPHIL NFR BLD: 4.9 % (ref 0–8)
ERYTHROCYTE [DISTWIDTH] IN BLOOD BY AUTOMATED COUNT: 15.1 % (ref 11.5–14.5)
EST. GFR  (AFRICAN AMERICAN): 55.2 ML/MIN/1.73 M^2
EST. GFR  (NON AFRICAN AMERICAN): 47.8 ML/MIN/1.73 M^2
GLUCOSE SERPL-MCNC: 108 MG/DL (ref 70–110)
HCT VFR BLD AUTO: 39.3 % (ref 40–54)
HGB BLD-MCNC: 13 G/DL (ref 14–18)
IMM GRANULOCYTES # BLD AUTO: 0.01 K/UL (ref 0–0.04)
IMM GRANULOCYTES NFR BLD AUTO: 0.1 % (ref 0–0.5)
INR PPP: 1.5
LYMPHOCYTES # BLD AUTO: 1.7 K/UL (ref 1–4.8)
LYMPHOCYTES NFR BLD: 22.9 % (ref 18–48)
MCH RBC QN AUTO: 33.4 PG (ref 27–31)
MCHC RBC AUTO-ENTMCNC: 33.1 G/DL (ref 32–36)
MCV RBC AUTO: 101 FL (ref 82–98)
MONOCYTES # BLD AUTO: 0.7 K/UL (ref 0.3–1)
MONOCYTES NFR BLD: 9.7 % (ref 4–15)
NEUTROPHILS # BLD AUTO: 4.5 K/UL (ref 1.8–7.7)
NEUTROPHILS NFR BLD: 61.4 % (ref 38–73)
NRBC BLD-RTO: 0 /100 WBC
PLATELET # BLD AUTO: 186 K/UL (ref 150–450)
PMV BLD AUTO: 10.7 FL (ref 9.2–12.9)
POTASSIUM SERPL-SCNC: 3.9 MMOL/L (ref 3.5–5.1)
PROT SERPL-MCNC: 7 G/DL (ref 6–8.4)
PROTHROMBIN TIME: 17.6 SEC (ref 11.8–14.3)
RBC # BLD AUTO: 3.89 M/UL (ref 4.6–6.2)
SARS-COV-2 RNA RESP QL NAA+PROBE: NOT DETECTED
SODIUM SERPL-SCNC: 142 MMOL/L (ref 136–145)
WBC # BLD AUTO: 7.33 K/UL (ref 3.9–12.7)

## 2021-06-29 PROCEDURE — 80053 COMPREHEN METABOLIC PANEL: CPT | Performed by: INTERNAL MEDICINE

## 2021-06-29 PROCEDURE — 93010 ELECTROCARDIOGRAM REPORT: CPT | Mod: ,,, | Performed by: INTERNAL MEDICINE

## 2021-06-29 PROCEDURE — 85610 PROTHROMBIN TIME: CPT | Performed by: INTERNAL MEDICINE

## 2021-06-29 PROCEDURE — U0003 INFECTIOUS AGENT DETECTION BY NUCLEIC ACID (DNA OR RNA); SEVERE ACUTE RESPIRATORY SYNDROME CORONAVIRUS 2 (SARS-COV-2) (CORONAVIRUS DISEASE [COVID-19]), AMPLIFIED PROBE TECHNIQUE, MAKING USE OF HIGH THROUGHPUT TECHNOLOGIES AS DESCRIBED BY CMS-2020-01-R: HCPCS | Performed by: INTERNAL MEDICINE

## 2021-06-29 PROCEDURE — 93005 ELECTROCARDIOGRAM TRACING: CPT | Performed by: INTERNAL MEDICINE

## 2021-06-29 PROCEDURE — 36415 COLL VENOUS BLD VENIPUNCTURE: CPT | Performed by: INTERNAL MEDICINE

## 2021-06-29 PROCEDURE — 85025 COMPLETE CBC W/AUTO DIFF WBC: CPT | Performed by: INTERNAL MEDICINE

## 2021-06-29 PROCEDURE — 93010 EKG 12-LEAD: ICD-10-PCS | Mod: ,,, | Performed by: INTERNAL MEDICINE

## 2021-06-29 PROCEDURE — U0005 INFEC AGEN DETEC AMPLI PROBE: HCPCS | Performed by: INTERNAL MEDICINE

## 2021-07-01 ENCOUNTER — HOSPITAL ENCOUNTER (OUTPATIENT)
Facility: HOSPITAL | Age: 76
Discharge: HOME | End: 2021-07-01
Attending: INTERNAL MEDICINE | Admitting: INTERNAL MEDICINE
Payer: MEDICARE

## 2021-07-01 DIAGNOSIS — T82.110A PACEMAKER LEAD MALFUNCTION, INITIAL ENCOUNTER: ICD-10-CM

## 2021-07-01 DIAGNOSIS — I49.9 ARRHYTHMIA: ICD-10-CM

## 2021-07-01 LAB — MRSA SCREEN BY PCR: NEGATIVE

## 2021-07-01 PROCEDURE — 33215 REPOSITION PACING-DEFIB LEAD: CPT | Performed by: INTERNAL MEDICINE

## 2021-07-01 PROCEDURE — 27800903 OPTIME MED/SURG SUP & DEVICES OTHER IMPLANTS: Performed by: INTERNAL MEDICINE

## 2021-07-01 PROCEDURE — 99152 MOD SED SAME PHYS/QHP 5/>YRS: CPT | Mod: ,,, | Performed by: INTERNAL MEDICINE

## 2021-07-01 PROCEDURE — 99152 MOD SED SAME PHYS/QHP 5/>YRS: CPT | Performed by: INTERNAL MEDICINE

## 2021-07-01 PROCEDURE — 93010 EKG 12-LEAD: ICD-10-PCS | Mod: ,,, | Performed by: INTERNAL MEDICINE

## 2021-07-01 PROCEDURE — 87641 MR-STAPH DNA AMP PROBE: CPT | Performed by: INTERNAL MEDICINE

## 2021-07-01 PROCEDURE — 25000003 PHARM REV CODE 250: Performed by: INTERNAL MEDICINE

## 2021-07-01 PROCEDURE — 99152 PR MOD CONSCIOUS SEDATION, SAME PHYS, 5+ YRS, FIRST 15 MIN: ICD-10-PCS | Mod: ,,, | Performed by: INTERNAL MEDICINE

## 2021-07-01 PROCEDURE — 96374 THER/PROPH/DIAG INJ IV PUSH: CPT | Performed by: INTERNAL MEDICINE

## 2021-07-01 PROCEDURE — 33215 REPOSITION PACING-DEFIB LEAD: CPT | Mod: ,,, | Performed by: INTERNAL MEDICINE

## 2021-07-01 PROCEDURE — 63600175 PHARM REV CODE 636 W HCPCS: Performed by: INTERNAL MEDICINE

## 2021-07-01 PROCEDURE — 93005 ELECTROCARDIOGRAM TRACING: CPT | Performed by: INTERNAL MEDICINE

## 2021-07-01 PROCEDURE — 93010 ELECTROCARDIOGRAM REPORT: CPT | Mod: ,,, | Performed by: INTERNAL MEDICINE

## 2021-07-01 PROCEDURE — 99153 MOD SED SAME PHYS/QHP EA: CPT | Performed by: INTERNAL MEDICINE

## 2021-07-01 PROCEDURE — 27201423 OPTIME MED/SURG SUP & DEVICES STERILE SUPPLY: Performed by: INTERNAL MEDICINE

## 2021-07-01 PROCEDURE — 33215 PR REPOSITION TRANSVENOUS ELECTRODE/LEAD: ICD-10-PCS | Mod: ,,, | Performed by: INTERNAL MEDICINE

## 2021-07-01 DEVICE — IMPLANTABLE DEVICE: Type: IMPLANTABLE DEVICE | Site: CHEST | Status: FUNCTIONAL

## 2021-07-01 RX ORDER — ACETAMINOPHEN 325 MG/1
650 TABLET ORAL EVERY 4 HOURS PRN
Status: DISCONTINUED | OUTPATIENT
Start: 2021-07-01 | End: 2021-07-01 | Stop reason: HOSPADM

## 2021-07-01 RX ORDER — HYDRALAZINE HYDROCHLORIDE 20 MG/ML
10 INJECTION INTRAMUSCULAR; INTRAVENOUS ONCE
Status: COMPLETED | OUTPATIENT
Start: 2021-07-01 | End: 2021-07-01

## 2021-07-01 RX ORDER — CEFAZOLIN SODIUM 1 G/50ML
1 SOLUTION INTRAVENOUS
Status: DISCONTINUED | OUTPATIENT
Start: 2021-07-01 | End: 2021-07-01 | Stop reason: HOSPADM

## 2021-07-01 RX ORDER — MIDAZOLAM HYDROCHLORIDE 1 MG/ML
INJECTION INTRAMUSCULAR; INTRAVENOUS
Status: DISCONTINUED | OUTPATIENT
Start: 2021-07-01 | End: 2021-07-01 | Stop reason: HOSPADM

## 2021-07-01 RX ORDER — FENTANYL CITRATE 50 UG/ML
INJECTION, SOLUTION INTRAMUSCULAR; INTRAVENOUS
Status: DISCONTINUED | OUTPATIENT
Start: 2021-07-01 | End: 2021-07-01 | Stop reason: HOSPADM

## 2021-07-01 RX ORDER — CEFAZOLIN SODIUM 1 G/3ML
INJECTION, POWDER, FOR SOLUTION INTRAMUSCULAR; INTRAVENOUS
Status: DISCONTINUED | OUTPATIENT
Start: 2021-07-01 | End: 2021-07-01 | Stop reason: HOSPADM

## 2021-07-01 RX ORDER — METOPROLOL TARTRATE 1 MG/ML
INJECTION, SOLUTION INTRAVENOUS
Status: DISCONTINUED | OUTPATIENT
Start: 2021-07-01 | End: 2021-07-01 | Stop reason: HOSPADM

## 2021-07-01 RX ADMIN — CEFAZOLIN SODIUM 1 G: 1 SOLUTION INTRAVENOUS at 01:07

## 2021-07-01 RX ADMIN — HYDRALAZINE HYDROCHLORIDE 10 MG: 20 INJECTION INTRAMUSCULAR; INTRAVENOUS at 09:07

## 2021-07-02 ENCOUNTER — PATIENT MESSAGE (OUTPATIENT)
Dept: CARDIOLOGY | Facility: CLINIC | Age: 76
End: 2021-07-02

## 2021-07-02 VITALS
RESPIRATION RATE: 20 BRPM | HEART RATE: 60 BPM | SYSTOLIC BLOOD PRESSURE: 149 MMHG | OXYGEN SATURATION: 98 % | DIASTOLIC BLOOD PRESSURE: 72 MMHG

## 2021-07-05 ENCOUNTER — PATIENT MESSAGE (OUTPATIENT)
Dept: PODIATRY | Facility: CLINIC | Age: 76
End: 2021-07-05

## 2021-07-05 ENCOUNTER — PATIENT MESSAGE (OUTPATIENT)
Dept: CARDIOLOGY | Facility: CLINIC | Age: 76
End: 2021-07-05

## 2021-07-05 ENCOUNTER — PATIENT MESSAGE (OUTPATIENT)
Dept: FAMILY MEDICINE | Facility: CLINIC | Age: 76
End: 2021-07-05

## 2021-07-06 ENCOUNTER — TELEPHONE (OUTPATIENT)
Dept: PODIATRY | Facility: CLINIC | Age: 76
End: 2021-07-06

## 2021-07-06 ENCOUNTER — PATIENT MESSAGE (OUTPATIENT)
Dept: PODIATRY | Facility: CLINIC | Age: 76
End: 2021-07-06

## 2021-07-07 ENCOUNTER — OFFICE VISIT (OUTPATIENT)
Dept: PODIATRY | Facility: CLINIC | Age: 76
End: 2021-07-07
Payer: MEDICARE

## 2021-07-07 VITALS
BODY MASS INDEX: 28.79 KG/M2 | DIASTOLIC BLOOD PRESSURE: 70 MMHG | WEIGHT: 190 LBS | OXYGEN SATURATION: 98 % | HEIGHT: 68 IN | HEART RATE: 62 BPM | SYSTOLIC BLOOD PRESSURE: 138 MMHG | RESPIRATION RATE: 18 BRPM

## 2021-07-07 DIAGNOSIS — L84 PRE-ULCERATIVE CORN OR CALLOUS: ICD-10-CM

## 2021-07-07 DIAGNOSIS — B35.3 TINEA PEDIS OF BOTH FEET: ICD-10-CM

## 2021-07-07 DIAGNOSIS — B35.1 OM (ONYCHOMYCOSIS): Primary | ICD-10-CM

## 2021-07-07 PROCEDURE — 99213 OFFICE O/P EST LOW 20 MIN: CPT | Mod: S$GLB,,, | Performed by: PODIATRIST

## 2021-07-07 PROCEDURE — 99213 PR OFFICE/OUTPT VISIT, EST, LEVL III, 20-29 MIN: ICD-10-PCS | Mod: S$GLB,,, | Performed by: PODIATRIST

## 2021-07-07 RX ORDER — FAMOTIDINE 40 MG/1
TABLET, FILM COATED ORAL
COMMUNITY
End: 2021-08-10

## 2021-07-07 RX ORDER — FUROSEMIDE 40 MG/1
60 TABLET ORAL DAILY
COMMUNITY
End: 2021-01-01 | Stop reason: SDUPTHER

## 2021-07-07 RX ORDER — ASPIRIN 81 MG/1
TABLET ORAL
COMMUNITY
End: 2022-01-01

## 2021-07-12 ENCOUNTER — PATIENT MESSAGE (OUTPATIENT)
Dept: CARDIOLOGY | Facility: CLINIC | Age: 76
End: 2021-07-12

## 2021-07-13 ENCOUNTER — OFFICE VISIT (OUTPATIENT)
Dept: CARDIOLOGY | Facility: CLINIC | Age: 76
End: 2021-07-13
Payer: MEDICARE

## 2021-07-13 ENCOUNTER — TELEPHONE (OUTPATIENT)
Dept: CARDIOLOGY | Facility: CLINIC | Age: 76
End: 2021-07-13

## 2021-07-13 VITALS
WEIGHT: 191 LBS | SYSTOLIC BLOOD PRESSURE: 120 MMHG | DIASTOLIC BLOOD PRESSURE: 62 MMHG | BODY MASS INDEX: 28.95 KG/M2 | HEIGHT: 68 IN | HEART RATE: 64 BPM | RESPIRATION RATE: 16 BRPM | OXYGEN SATURATION: 97 %

## 2021-07-13 DIAGNOSIS — Z95.0 S/P PLACEMENT OF CARDIAC PACEMAKER: ICD-10-CM

## 2021-07-13 DIAGNOSIS — I10 ESSENTIAL HYPERTENSION: ICD-10-CM

## 2021-07-13 DIAGNOSIS — E89.0 POSTABLATIVE HYPOTHYROIDISM: Primary | ICD-10-CM

## 2021-07-13 PROCEDURE — 99024 POSTOP FOLLOW-UP VISIT: CPT | Mod: S$GLB,,, | Performed by: INTERNAL MEDICINE

## 2021-07-13 PROCEDURE — 99024 PR POST-OP FOLLOW-UP VISIT: ICD-10-PCS | Mod: S$GLB,,, | Performed by: INTERNAL MEDICINE

## 2021-07-13 RX ORDER — AMIODARONE HYDROCHLORIDE 200 MG/1
200 TABLET ORAL DAILY
COMMUNITY
End: 2021-01-01 | Stop reason: SDUPTHER

## 2021-07-13 RX ORDER — METOPROLOL SUCCINATE 50 MG/1
50 TABLET, EXTENDED RELEASE ORAL DAILY
Qty: 30 TABLET | Refills: 11 | Status: SHIPPED | OUTPATIENT
Start: 2021-07-13 | End: 2021-07-14 | Stop reason: SDUPTHER

## 2021-07-13 RX ORDER — CEPHALEXIN 500 MG/1
500 CAPSULE ORAL EVERY 8 HOURS
Qty: 21 CAPSULE | Refills: 0 | Status: SHIPPED | OUTPATIENT
Start: 2021-07-13 | End: 2021-08-10

## 2021-07-13 RX ORDER — LEVOTHYROXINE SODIUM 25 UG/1
25 TABLET ORAL
Qty: 90 TABLET | Refills: 3 | Status: SHIPPED | OUTPATIENT
Start: 2021-07-13 | End: 2021-08-10

## 2021-07-14 ENCOUNTER — PATIENT MESSAGE (OUTPATIENT)
Dept: PODIATRY | Facility: CLINIC | Age: 76
End: 2021-07-14

## 2021-07-14 ENCOUNTER — PATIENT MESSAGE (OUTPATIENT)
Dept: CARDIOLOGY | Facility: CLINIC | Age: 76
End: 2021-07-14

## 2021-07-14 RX ORDER — METOPROLOL SUCCINATE 50 MG/1
50 TABLET, EXTENDED RELEASE ORAL DAILY
Qty: 90 TABLET | Refills: 3 | Status: SHIPPED | OUTPATIENT
Start: 2021-07-14 | End: 2021-01-01 | Stop reason: SDUPTHER

## 2021-07-15 ENCOUNTER — PATIENT MESSAGE (OUTPATIENT)
Dept: CARDIOLOGY | Facility: CLINIC | Age: 76
End: 2021-07-15

## 2021-07-20 ENCOUNTER — PATIENT MESSAGE (OUTPATIENT)
Dept: CARDIOLOGY | Facility: CLINIC | Age: 76
End: 2021-07-20

## 2021-07-23 ENCOUNTER — PATIENT MESSAGE (OUTPATIENT)
Dept: CARDIOLOGY | Facility: CLINIC | Age: 76
End: 2021-07-23

## 2021-07-27 ENCOUNTER — PATIENT MESSAGE (OUTPATIENT)
Dept: FAMILY MEDICINE | Facility: CLINIC | Age: 76
End: 2021-07-27

## 2021-08-03 ENCOUNTER — PATIENT MESSAGE (OUTPATIENT)
Dept: CARDIOLOGY | Facility: CLINIC | Age: 76
End: 2021-08-03

## 2021-08-05 ENCOUNTER — LAB VISIT (OUTPATIENT)
Dept: LAB | Facility: HOSPITAL | Age: 76
End: 2021-08-05
Attending: INTERNAL MEDICINE
Payer: MEDICARE

## 2021-08-05 DIAGNOSIS — I10 ESSENTIAL HYPERTENSION: ICD-10-CM

## 2021-08-05 DIAGNOSIS — Z95.0 S/P PLACEMENT OF CARDIAC PACEMAKER: ICD-10-CM

## 2021-08-05 DIAGNOSIS — E89.0 POSTABLATIVE HYPOTHYROIDISM: ICD-10-CM

## 2021-08-05 LAB
ANION GAP SERPL CALC-SCNC: 8 MMOL/L (ref 8–16)
BUN SERPL-MCNC: 18 MG/DL (ref 8–23)
CALCIUM SERPL-MCNC: 8.4 MG/DL (ref 8.7–10.5)
CHLORIDE SERPL-SCNC: 100 MMOL/L (ref 95–110)
CO2 SERPL-SCNC: 30 MMOL/L (ref 23–29)
CREAT SERPL-MCNC: 1.3 MG/DL (ref 0.5–1.4)
ERYTHROCYTE [DISTWIDTH] IN BLOOD BY AUTOMATED COUNT: 15.6 % (ref 11.5–14.5)
EST. GFR  (AFRICAN AMERICAN): >60 ML/MIN/1.73 M^2
EST. GFR  (NON AFRICAN AMERICAN): 52.3 ML/MIN/1.73 M^2
GLUCOSE SERPL-MCNC: 84 MG/DL (ref 70–110)
HCT VFR BLD AUTO: 37.2 % (ref 40–54)
HGB BLD-MCNC: 12.5 G/DL (ref 14–18)
MCH RBC QN AUTO: 33.3 PG (ref 27–31)
MCHC RBC AUTO-ENTMCNC: 33.6 G/DL (ref 32–36)
MCV RBC AUTO: 99 FL (ref 82–98)
PLATELET # BLD AUTO: 185 K/UL (ref 150–450)
PMV BLD AUTO: 10.9 FL (ref 9.2–12.9)
POTASSIUM SERPL-SCNC: 3.7 MMOL/L (ref 3.5–5.1)
RBC # BLD AUTO: 3.75 M/UL (ref 4.6–6.2)
SODIUM SERPL-SCNC: 138 MMOL/L (ref 136–145)
T4 FREE SERPL-MCNC: 0.55 NG/DL (ref 0.71–1.51)
TSH SERPL DL<=0.005 MIU/L-ACNC: 32.91 UIU/ML (ref 0.34–5.6)
WBC # BLD AUTO: 8.5 K/UL (ref 3.9–12.7)

## 2021-08-05 PROCEDURE — 36415 COLL VENOUS BLD VENIPUNCTURE: CPT | Performed by: INTERNAL MEDICINE

## 2021-08-05 PROCEDURE — 80048 BASIC METABOLIC PNL TOTAL CA: CPT | Performed by: INTERNAL MEDICINE

## 2021-08-05 PROCEDURE — 84439 ASSAY OF FREE THYROXINE: CPT | Performed by: INTERNAL MEDICINE

## 2021-08-05 PROCEDURE — 84443 ASSAY THYROID STIM HORMONE: CPT | Performed by: INTERNAL MEDICINE

## 2021-08-05 PROCEDURE — 85027 COMPLETE CBC AUTOMATED: CPT | Performed by: INTERNAL MEDICINE

## 2021-08-10 ENCOUNTER — OFFICE VISIT (OUTPATIENT)
Dept: CARDIOLOGY | Facility: CLINIC | Age: 76
End: 2021-08-10
Payer: MEDICARE

## 2021-08-10 VITALS
HEIGHT: 68 IN | BODY MASS INDEX: 29.86 KG/M2 | WEIGHT: 197 LBS | SYSTOLIC BLOOD PRESSURE: 150 MMHG | HEART RATE: 66 BPM | DIASTOLIC BLOOD PRESSURE: 80 MMHG | OXYGEN SATURATION: 99 %

## 2021-08-10 DIAGNOSIS — E03.8 OTHER SPECIFIED HYPOTHYROIDISM: ICD-10-CM

## 2021-08-10 DIAGNOSIS — Z95.0 S/P PLACEMENT OF CARDIAC PACEMAKER: ICD-10-CM

## 2021-08-10 DIAGNOSIS — I50.33 ACUTE ON CHRONIC HEART FAILURE WITH PRESERVED EJECTION FRACTION: Chronic | ICD-10-CM

## 2021-08-10 PROCEDURE — 99024 PR POST-OP FOLLOW-UP VISIT: ICD-10-PCS | Mod: S$GLB,,, | Performed by: INTERNAL MEDICINE

## 2021-08-10 PROCEDURE — 99024 POSTOP FOLLOW-UP VISIT: CPT | Mod: S$GLB,,, | Performed by: INTERNAL MEDICINE

## 2021-08-10 RX ORDER — AMLODIPINE BESYLATE 2.5 MG/1
2.5 TABLET ORAL NIGHTLY
Qty: 30 TABLET | Refills: 11 | Status: SHIPPED | OUTPATIENT
Start: 2021-08-10 | End: 2021-01-01 | Stop reason: SDUPTHER

## 2021-08-10 RX ORDER — LEVOTHYROXINE SODIUM 100 UG/1
100 TABLET ORAL
Qty: 30 TABLET | Refills: 11 | Status: SHIPPED | OUTPATIENT
Start: 2021-08-10 | End: 2021-01-01 | Stop reason: SDUPTHER

## 2021-08-11 ENCOUNTER — PATIENT MESSAGE (OUTPATIENT)
Dept: CARDIOLOGY | Facility: CLINIC | Age: 76
End: 2021-08-11

## 2021-08-17 ENCOUNTER — PATIENT MESSAGE (OUTPATIENT)
Dept: CARDIOLOGY | Facility: CLINIC | Age: 76
End: 2021-08-17

## 2021-08-19 ENCOUNTER — PATIENT MESSAGE (OUTPATIENT)
Dept: CARDIOLOGY | Facility: CLINIC | Age: 76
End: 2021-08-19

## 2021-08-21 ENCOUNTER — PATIENT MESSAGE (OUTPATIENT)
Dept: CARDIOLOGY | Facility: CLINIC | Age: 76
End: 2021-08-21

## 2021-08-24 ENCOUNTER — PATIENT MESSAGE (OUTPATIENT)
Dept: CARDIOLOGY | Facility: CLINIC | Age: 76
End: 2021-08-24

## 2021-08-24 ENCOUNTER — TELEPHONE (OUTPATIENT)
Dept: CARDIOLOGY | Facility: CLINIC | Age: 76
End: 2021-08-24

## 2021-09-01 ENCOUNTER — OFFICE VISIT (OUTPATIENT)
Dept: FAMILY MEDICINE | Facility: CLINIC | Age: 76
End: 2021-09-01
Payer: MEDICARE

## 2021-09-01 VITALS
HEIGHT: 68 IN | RESPIRATION RATE: 16 BRPM | TEMPERATURE: 98 F | WEIGHT: 189.31 LBS | BODY MASS INDEX: 28.69 KG/M2 | SYSTOLIC BLOOD PRESSURE: 122 MMHG | DIASTOLIC BLOOD PRESSURE: 78 MMHG | OXYGEN SATURATION: 98 % | HEART RATE: 86 BPM

## 2021-09-01 DIAGNOSIS — E03.8 OTHER SPECIFIED HYPOTHYROIDISM: Primary | ICD-10-CM

## 2021-09-01 DIAGNOSIS — M54.50 CHRONIC BILATERAL LOW BACK PAIN WITHOUT SCIATICA: ICD-10-CM

## 2021-09-01 DIAGNOSIS — I10 ESSENTIAL HYPERTENSION: ICD-10-CM

## 2021-09-01 DIAGNOSIS — G89.29 CHRONIC BILATERAL LOW BACK PAIN WITHOUT SCIATICA: ICD-10-CM

## 2021-09-01 DIAGNOSIS — I25.10 CORONARY ARTERY DISEASE, ANGINA PRESENCE UNSPECIFIED, UNSPECIFIED VESSEL OR LESION TYPE, UNSPECIFIED WHETHER NATIVE OR TRANSPLANTED HEART: ICD-10-CM

## 2021-09-01 PROCEDURE — 99215 OFFICE O/P EST HI 40 MIN: CPT | Performed by: FAMILY MEDICINE

## 2021-09-01 PROCEDURE — 99214 OFFICE O/P EST MOD 30 MIN: CPT | Mod: S$PBB,,, | Performed by: FAMILY MEDICINE

## 2021-09-01 PROCEDURE — 99214 PR OFFICE/OUTPT VISIT, EST, LEVL IV, 30-39 MIN: ICD-10-PCS | Mod: S$PBB,,, | Performed by: FAMILY MEDICINE

## 2021-09-07 ENCOUNTER — HOSPITAL ENCOUNTER (EMERGENCY)
Facility: HOSPITAL | Age: 76
Discharge: HOME | End: 2021-09-07
Attending: EMERGENCY MEDICINE
Payer: MEDICARE

## 2021-09-07 VITALS
HEART RATE: 83 BPM | DIASTOLIC BLOOD PRESSURE: 86 MMHG | SYSTOLIC BLOOD PRESSURE: 137 MMHG | BODY MASS INDEX: 28.04 KG/M2 | RESPIRATION RATE: 18 BRPM | OXYGEN SATURATION: 99 % | TEMPERATURE: 98 F | HEIGHT: 68 IN | WEIGHT: 185 LBS

## 2021-09-07 DIAGNOSIS — T14.8XXA CRUSH INJURY: ICD-10-CM

## 2021-09-07 DIAGNOSIS — S61.112A LACERATION OF LEFT THUMB WITHOUT FOREIGN BODY WITH DAMAGE TO NAIL, INITIAL ENCOUNTER: Primary | ICD-10-CM

## 2021-09-07 PROCEDURE — 99283 EMERGENCY DEPT VISIT LOW MDM: CPT

## 2021-09-07 PROCEDURE — 25000003 PHARM REV CODE 250: Performed by: EMERGENCY MEDICINE

## 2021-09-07 PROCEDURE — 12001 RPR S/N/AX/GEN/TRNK 2.5CM/<: CPT

## 2021-09-07 RX ORDER — MUPIROCIN 20 MG/G
1 OINTMENT TOPICAL
Status: DISCONTINUED | OUTPATIENT
Start: 2021-09-07 | End: 2021-09-07

## 2021-09-07 RX ORDER — LIDOCAINE HYDROCHLORIDE 10 MG/ML
10 INJECTION, SOLUTION EPIDURAL; INFILTRATION; INTRACAUDAL; PERINEURAL
Status: COMPLETED | OUTPATIENT
Start: 2021-09-07 | End: 2021-09-07

## 2021-09-07 RX ORDER — CEPHALEXIN 500 MG/1
500 CAPSULE ORAL EVERY 12 HOURS
Qty: 20 CAPSULE | Refills: 0 | Status: SHIPPED | OUTPATIENT
Start: 2021-09-07 | End: 2021-09-12

## 2021-09-07 RX ORDER — MUPIROCIN 20 MG/G
OINTMENT TOPICAL
Status: COMPLETED | OUTPATIENT
Start: 2021-09-07 | End: 2021-09-07

## 2021-09-07 RX ADMIN — LIDOCAINE HYDROCHLORIDE 100 MG: 10 INJECTION, SOLUTION EPIDURAL; INFILTRATION; INTRACAUDAL; PERINEURAL at 10:09

## 2021-09-07 RX ADMIN — MUPIROCIN: 20 OINTMENT TOPICAL at 10:09

## 2021-09-08 ENCOUNTER — PATIENT MESSAGE (OUTPATIENT)
Dept: CARDIOLOGY | Facility: CLINIC | Age: 76
End: 2021-09-08

## 2021-09-08 ENCOUNTER — PATIENT MESSAGE (OUTPATIENT)
Dept: FAMILY MEDICINE | Facility: CLINIC | Age: 76
End: 2021-09-08

## 2021-09-09 ENCOUNTER — OFFICE VISIT (OUTPATIENT)
Dept: FAMILY MEDICINE | Facility: CLINIC | Age: 76
End: 2021-09-09
Payer: MEDICARE

## 2021-09-09 VITALS
HEIGHT: 68 IN | OXYGEN SATURATION: 98 % | HEART RATE: 63 BPM | DIASTOLIC BLOOD PRESSURE: 72 MMHG | BODY MASS INDEX: 28.39 KG/M2 | WEIGHT: 187.31 LBS | SYSTOLIC BLOOD PRESSURE: 122 MMHG

## 2021-09-09 DIAGNOSIS — S61.012D LACERATION OF LEFT THUMB WITHOUT FOREIGN BODY, NAIL DAMAGE STATUS UNSPECIFIED, SUBSEQUENT ENCOUNTER: Primary | ICD-10-CM

## 2021-09-09 DIAGNOSIS — T14.8XXA CRUSH INJURY: ICD-10-CM

## 2021-09-09 PROCEDURE — 99215 OFFICE O/P EST HI 40 MIN: CPT | Performed by: NURSE PRACTITIONER

## 2021-09-09 PROCEDURE — 99213 OFFICE O/P EST LOW 20 MIN: CPT | Mod: S$PBB,,, | Performed by: NURSE PRACTITIONER

## 2021-09-09 PROCEDURE — 99213 PR OFFICE/OUTPT VISIT, EST, LEVL III, 20-29 MIN: ICD-10-PCS | Mod: S$PBB,,, | Performed by: NURSE PRACTITIONER

## 2021-09-09 RX ORDER — ERGOCALCIFEROL (VITAMIN D2) 200 MCG/ML
500 DROPS ORAL
COMMUNITY
End: 2021-09-09

## 2021-09-12 ENCOUNTER — PATIENT MESSAGE (OUTPATIENT)
Dept: CARDIOLOGY | Facility: CLINIC | Age: 76
End: 2021-09-12

## 2021-09-13 RX ORDER — LISINOPRIL 20 MG/1
20 TABLET ORAL 2 TIMES DAILY
Qty: 180 TABLET | Refills: 3 | Status: ON HOLD | OUTPATIENT
Start: 2021-09-13 | End: 2022-01-01 | Stop reason: HOSPADM

## 2021-09-20 ENCOUNTER — PATIENT MESSAGE (OUTPATIENT)
Dept: FAMILY MEDICINE | Facility: CLINIC | Age: 76
End: 2021-09-20

## 2021-09-22 ENCOUNTER — CLINICAL SUPPORT (OUTPATIENT)
Dept: REHABILITATION | Facility: HOSPITAL | Age: 76
End: 2021-09-22
Payer: MEDICARE

## 2021-09-22 DIAGNOSIS — G89.29 CHRONIC BILATERAL LOW BACK PAIN WITHOUT SCIATICA: ICD-10-CM

## 2021-09-22 DIAGNOSIS — M54.50 CHRONIC BILATERAL LOW BACK PAIN WITHOUT SCIATICA: ICD-10-CM

## 2021-09-22 PROCEDURE — 97161 PT EVAL LOW COMPLEX 20 MIN: CPT | Mod: PN

## 2021-09-22 PROCEDURE — 97110 THERAPEUTIC EXERCISES: CPT | Mod: PN

## 2021-09-28 ENCOUNTER — CLINICAL SUPPORT (OUTPATIENT)
Dept: REHABILITATION | Facility: HOSPITAL | Age: 76
End: 2021-09-28
Payer: MEDICARE

## 2021-09-28 DIAGNOSIS — M54.50 CHRONIC BILATERAL LOW BACK PAIN WITHOUT SCIATICA: Primary | ICD-10-CM

## 2021-09-28 DIAGNOSIS — G89.29 CHRONIC BILATERAL LOW BACK PAIN WITHOUT SCIATICA: Primary | ICD-10-CM

## 2021-09-28 PROCEDURE — 97110 THERAPEUTIC EXERCISES: CPT | Mod: PN,CQ

## 2021-10-12 PROBLEM — E07.9 THYROID DISEASE: Status: ACTIVE | Noted: 2019-01-31

## 2021-10-25 PROBLEM — K40.90 RIGHT INGUINAL HERNIA: Status: ACTIVE | Noted: 2021-01-01

## 2021-12-14 NOTE — TELEPHONE ENCOUNTER
Tried calling patient to see if wanted to schedule his surgery - Robotic RIH repair.  No answer -

## 2022-01-01 ENCOUNTER — PATIENT MESSAGE (OUTPATIENT)
Dept: FAMILY MEDICINE | Facility: CLINIC | Age: 77
End: 2022-01-01
Payer: MEDICARE

## 2022-01-01 ENCOUNTER — PATIENT MESSAGE (OUTPATIENT)
Dept: FAMILY MEDICINE | Facility: CLINIC | Age: 77
End: 2022-01-01

## 2022-01-01 ENCOUNTER — HOSPITAL ENCOUNTER (OUTPATIENT)
Dept: RADIOLOGY | Facility: HOSPITAL | Age: 77
Discharge: HOME | End: 2022-03-08
Attending: SURGERY
Payer: MEDICARE

## 2022-01-01 ENCOUNTER — HOSPITAL ENCOUNTER (OUTPATIENT)
Facility: HOSPITAL | Age: 77
Discharge: HOME | End: 2022-03-11
Attending: SURGERY | Admitting: SURGERY
Payer: MEDICARE

## 2022-01-01 ENCOUNTER — HOSPITAL ENCOUNTER (EMERGENCY)
Facility: HOSPITAL | Age: 77
Discharge: HOME | End: 2022-04-23
Attending: EMERGENCY MEDICINE
Payer: MEDICARE

## 2022-01-01 ENCOUNTER — HOSPITAL ENCOUNTER (OUTPATIENT)
Facility: HOSPITAL | Age: 77
Discharge: HOME | End: 2022-06-20
Attending: EMERGENCY MEDICINE | Admitting: INTERNAL MEDICINE
Payer: MEDICARE

## 2022-01-01 ENCOUNTER — OUTPATIENT CASE MANAGEMENT (OUTPATIENT)
Dept: ADMINISTRATIVE | Facility: OTHER | Age: 77
End: 2022-01-01
Payer: MEDICARE

## 2022-01-01 ENCOUNTER — TELEPHONE (OUTPATIENT)
Dept: SURGERY | Facility: CLINIC | Age: 77
End: 2022-01-01
Payer: MEDICARE

## 2022-01-01 ENCOUNTER — PATIENT MESSAGE (OUTPATIENT)
Dept: CARDIOLOGY | Facility: CLINIC | Age: 77
End: 2022-01-01
Payer: MEDICARE

## 2022-01-01 ENCOUNTER — CLINICAL SUPPORT (OUTPATIENT)
Dept: REHABILITATION | Facility: HOSPITAL | Age: 77
End: 2022-01-01
Payer: MEDICARE

## 2022-01-01 ENCOUNTER — OFFICE VISIT (OUTPATIENT)
Dept: SURGERY | Facility: CLINIC | Age: 77
End: 2022-01-01
Payer: MEDICARE

## 2022-01-01 ENCOUNTER — NURSE TRIAGE (OUTPATIENT)
Dept: ADMINISTRATIVE | Facility: CLINIC | Age: 77
End: 2022-01-01
Payer: MEDICARE

## 2022-01-01 ENCOUNTER — PATIENT MESSAGE (OUTPATIENT)
Dept: ADMINISTRATIVE | Facility: OTHER | Age: 77
End: 2022-01-01
Payer: MEDICARE

## 2022-01-01 ENCOUNTER — PATIENT MESSAGE (OUTPATIENT)
Dept: PODIATRY | Facility: CLINIC | Age: 77
End: 2022-01-01
Payer: MEDICARE

## 2022-01-01 ENCOUNTER — HOSPITAL ENCOUNTER (EMERGENCY)
Facility: HOSPITAL | Age: 77
Discharge: HOME | End: 2022-05-08
Attending: EMERGENCY MEDICINE
Payer: MEDICARE

## 2022-01-01 ENCOUNTER — TELEPHONE (OUTPATIENT)
Dept: FAMILY MEDICINE | Facility: CLINIC | Age: 77
End: 2022-01-01

## 2022-01-01 ENCOUNTER — PATIENT OUTREACH (OUTPATIENT)
Dept: ADMINISTRATIVE | Facility: OTHER | Age: 77
End: 2022-01-01
Payer: MEDICARE

## 2022-01-01 ENCOUNTER — TELEPHONE (OUTPATIENT)
Dept: MEDSURG UNIT | Facility: HOSPITAL | Age: 77
End: 2022-01-01
Payer: MEDICARE

## 2022-01-01 ENCOUNTER — OFFICE VISIT (OUTPATIENT)
Dept: FAMILY MEDICINE | Facility: CLINIC | Age: 77
End: 2022-01-01
Payer: MEDICARE

## 2022-01-01 ENCOUNTER — LAB VISIT (OUTPATIENT)
Dept: LAB | Facility: HOSPITAL | Age: 77
End: 2022-01-01
Attending: FAMILY MEDICINE
Payer: MEDICARE

## 2022-01-01 ENCOUNTER — OFFICE VISIT (OUTPATIENT)
Dept: CARDIOLOGY | Facility: CLINIC | Age: 77
End: 2022-01-01
Payer: MEDICARE

## 2022-01-01 ENCOUNTER — ANESTHESIA (OUTPATIENT)
Dept: SURGERY | Facility: HOSPITAL | Age: 77
End: 2022-01-01
Payer: MEDICARE

## 2022-01-01 ENCOUNTER — HOSPITAL ENCOUNTER (OUTPATIENT)
Dept: PREADMISSION TESTING | Facility: HOSPITAL | Age: 77
Discharge: HOME | End: 2022-03-08
Attending: SURGERY
Payer: MEDICARE

## 2022-01-01 ENCOUNTER — LAB VISIT (OUTPATIENT)
Dept: PRIMARY CARE CLINIC | Facility: CLINIC | Age: 77
End: 2022-01-01
Payer: MEDICARE

## 2022-01-01 ENCOUNTER — PATIENT MESSAGE (OUTPATIENT)
Dept: SURGERY | Facility: HOSPITAL | Age: 77
End: 2022-01-01
Payer: MEDICARE

## 2022-01-01 ENCOUNTER — LAB VISIT (OUTPATIENT)
Dept: LAB | Facility: HOSPITAL | Age: 77
End: 2022-01-01
Attending: INTERNAL MEDICINE
Payer: MEDICARE

## 2022-01-01 ENCOUNTER — HOSPITAL ENCOUNTER (EMERGENCY)
Facility: HOSPITAL | Age: 77
End: 2022-09-19
Attending: EMERGENCY MEDICINE
Payer: MEDICARE

## 2022-01-01 ENCOUNTER — TELEPHONE (OUTPATIENT)
Dept: CARDIOLOGY | Facility: CLINIC | Age: 77
End: 2022-01-01
Payer: MEDICARE

## 2022-01-01 ENCOUNTER — HOSPITAL ENCOUNTER (EMERGENCY)
Facility: HOSPITAL | Age: 77
Discharge: HOME | End: 2022-08-30
Attending: EMERGENCY MEDICINE
Payer: MEDICARE

## 2022-01-01 ENCOUNTER — LAB VISIT (OUTPATIENT)
Dept: LAB | Facility: HOSPITAL | Age: 77
End: 2022-01-01
Payer: MEDICARE

## 2022-01-01 ENCOUNTER — ANESTHESIA EVENT (OUTPATIENT)
Dept: SURGERY | Facility: HOSPITAL | Age: 77
End: 2022-01-01
Payer: MEDICARE

## 2022-01-01 ENCOUNTER — OFFICE VISIT (OUTPATIENT)
Dept: PULMONOLOGY | Facility: CLINIC | Age: 77
End: 2022-01-01
Payer: MEDICARE

## 2022-01-01 ENCOUNTER — HOSPITAL ENCOUNTER (EMERGENCY)
Facility: HOSPITAL | Age: 77
Discharge: HOME | End: 2022-07-09
Attending: EMERGENCY MEDICINE
Payer: MEDICARE

## 2022-01-01 ENCOUNTER — HOSPITAL ENCOUNTER (EMERGENCY)
Facility: HOSPITAL | Age: 77
Discharge: HOME | End: 2022-04-02
Attending: EMERGENCY MEDICINE
Payer: MEDICARE

## 2022-01-01 ENCOUNTER — HOSPITAL ENCOUNTER (INPATIENT)
Facility: HOSPITAL | Age: 77
LOS: 9 days | Discharge: HOSPICE | DRG: 871 | End: 2022-09-28
Attending: STUDENT IN AN ORGANIZED HEALTH CARE EDUCATION/TRAINING PROGRAM | Admitting: HOSPITALIST
Payer: MEDICARE

## 2022-01-01 ENCOUNTER — PATIENT MESSAGE (OUTPATIENT)
Dept: SURGERY | Facility: CLINIC | Age: 77
End: 2022-01-01
Payer: MEDICARE

## 2022-01-01 ENCOUNTER — HOSPITAL ENCOUNTER (EMERGENCY)
Facility: HOSPITAL | Age: 77
Discharge: HOME | End: 2022-07-21
Attending: EMERGENCY MEDICINE
Payer: MEDICARE

## 2022-01-01 VITALS
HEIGHT: 68 IN | HEART RATE: 71 BPM | SYSTOLIC BLOOD PRESSURE: 114 MMHG | DIASTOLIC BLOOD PRESSURE: 66 MMHG | WEIGHT: 195 LBS | BODY MASS INDEX: 29.55 KG/M2 | TEMPERATURE: 98 F

## 2022-01-01 VITALS
BODY MASS INDEX: 29.86 KG/M2 | SYSTOLIC BLOOD PRESSURE: 124 MMHG | WEIGHT: 197 LBS | DIASTOLIC BLOOD PRESSURE: 70 MMHG | HEIGHT: 68 IN | OXYGEN SATURATION: 98 % | HEART RATE: 72 BPM

## 2022-01-01 VITALS
RESPIRATION RATE: 16 BRPM | OXYGEN SATURATION: 98 % | BODY MASS INDEX: 30.31 KG/M2 | DIASTOLIC BLOOD PRESSURE: 60 MMHG | TEMPERATURE: 98 F | HEIGHT: 68 IN | SYSTOLIC BLOOD PRESSURE: 128 MMHG | HEART RATE: 60 BPM | WEIGHT: 200 LBS

## 2022-01-01 VITALS
WEIGHT: 225 LBS | RESPIRATION RATE: 16 BRPM | TEMPERATURE: 98 F | DIASTOLIC BLOOD PRESSURE: 60 MMHG | BODY MASS INDEX: 34.1 KG/M2 | HEART RATE: 60 BPM | SYSTOLIC BLOOD PRESSURE: 137 MMHG | HEIGHT: 68 IN | OXYGEN SATURATION: 100 %

## 2022-01-01 VITALS
DIASTOLIC BLOOD PRESSURE: 74 MMHG | RESPIRATION RATE: 16 BRPM | OXYGEN SATURATION: 100 % | HEART RATE: 60 BPM | TEMPERATURE: 98 F | HEIGHT: 68 IN | BODY MASS INDEX: 28.04 KG/M2 | SYSTOLIC BLOOD PRESSURE: 166 MMHG | WEIGHT: 185 LBS

## 2022-01-01 VITALS
HEART RATE: 79 BPM | BODY MASS INDEX: 29.86 KG/M2 | WEIGHT: 197 LBS | SYSTOLIC BLOOD PRESSURE: 130 MMHG | OXYGEN SATURATION: 97 % | DIASTOLIC BLOOD PRESSURE: 80 MMHG | RESPIRATION RATE: 18 BRPM | HEIGHT: 68 IN

## 2022-01-01 VITALS
SYSTOLIC BLOOD PRESSURE: 106 MMHG | OXYGEN SATURATION: 97 % | BODY MASS INDEX: 27.92 KG/M2 | TEMPERATURE: 98 F | DIASTOLIC BLOOD PRESSURE: 74 MMHG | HEIGHT: 68 IN | WEIGHT: 195 LBS | HEART RATE: 72 BPM | DIASTOLIC BLOOD PRESSURE: 52 MMHG | SYSTOLIC BLOOD PRESSURE: 138 MMHG | HEART RATE: 59 BPM | HEIGHT: 68 IN | OXYGEN SATURATION: 97 % | BODY MASS INDEX: 29.55 KG/M2 | TEMPERATURE: 98 F | WEIGHT: 184.19 LBS

## 2022-01-01 VITALS
DIASTOLIC BLOOD PRESSURE: 73 MMHG | HEIGHT: 68 IN | HEART RATE: 62 BPM | RESPIRATION RATE: 20 BRPM | BODY MASS INDEX: 28.79 KG/M2 | SYSTOLIC BLOOD PRESSURE: 151 MMHG | WEIGHT: 190 LBS | OXYGEN SATURATION: 98 % | TEMPERATURE: 98 F

## 2022-01-01 VITALS
OXYGEN SATURATION: 98 % | HEART RATE: 65 BPM | DIASTOLIC BLOOD PRESSURE: 82 MMHG | DIASTOLIC BLOOD PRESSURE: 69 MMHG | SYSTOLIC BLOOD PRESSURE: 138 MMHG | SYSTOLIC BLOOD PRESSURE: 152 MMHG | HEIGHT: 68 IN | BODY MASS INDEX: 28.13 KG/M2 | WEIGHT: 182.19 LBS | WEIGHT: 185.63 LBS | HEIGHT: 68 IN | HEART RATE: 66 BPM | OXYGEN SATURATION: 95 % | RESPIRATION RATE: 18 BRPM | TEMPERATURE: 98 F | BODY MASS INDEX: 27.61 KG/M2 | TEMPERATURE: 98 F | RESPIRATION RATE: 17 BRPM

## 2022-01-01 VITALS
OXYGEN SATURATION: 98 % | BODY MASS INDEX: 31.22 KG/M2 | BODY MASS INDEX: 31.7 KG/M2 | WEIGHT: 209.13 LBS | TEMPERATURE: 98 F | OXYGEN SATURATION: 95 % | WEIGHT: 206 LBS | DIASTOLIC BLOOD PRESSURE: 60 MMHG | SYSTOLIC BLOOD PRESSURE: 132 MMHG | HEIGHT: 68 IN | SYSTOLIC BLOOD PRESSURE: 120 MMHG | DIASTOLIC BLOOD PRESSURE: 70 MMHG | HEIGHT: 68 IN | HEART RATE: 65 BPM | HEART RATE: 62 BPM

## 2022-01-01 VITALS
SYSTOLIC BLOOD PRESSURE: 144 MMHG | HEIGHT: 68 IN | SYSTOLIC BLOOD PRESSURE: 142 MMHG | DIASTOLIC BLOOD PRESSURE: 80 MMHG | BODY MASS INDEX: 29.55 KG/M2 | DIASTOLIC BLOOD PRESSURE: 67 MMHG | HEART RATE: 70 BPM | RESPIRATION RATE: 11 BRPM | BODY MASS INDEX: 30.62 KG/M2 | WEIGHT: 195 LBS | OXYGEN SATURATION: 97 % | OXYGEN SATURATION: 99 % | HEIGHT: 68 IN | HEART RATE: 60 BPM | WEIGHT: 202 LBS | TEMPERATURE: 98 F

## 2022-01-01 VITALS
RESPIRATION RATE: 18 BRPM | HEIGHT: 68 IN | WEIGHT: 180.75 LBS | HEART RATE: 62 BPM | OXYGEN SATURATION: 93 % | SYSTOLIC BLOOD PRESSURE: 158 MMHG | BODY MASS INDEX: 27.39 KG/M2 | TEMPERATURE: 98 F | DIASTOLIC BLOOD PRESSURE: 66 MMHG

## 2022-01-01 VITALS
SYSTOLIC BLOOD PRESSURE: 119 MMHG | TEMPERATURE: 98 F | HEIGHT: 68 IN | WEIGHT: 198 LBS | RESPIRATION RATE: 18 BRPM | HEART RATE: 60 BPM | BODY MASS INDEX: 30.01 KG/M2 | DIASTOLIC BLOOD PRESSURE: 56 MMHG | OXYGEN SATURATION: 96 %

## 2022-01-01 VITALS
HEIGHT: 68 IN | DIASTOLIC BLOOD PRESSURE: 80 MMHG | OXYGEN SATURATION: 99 % | HEART RATE: 69 BPM | BODY MASS INDEX: 29.55 KG/M2 | SYSTOLIC BLOOD PRESSURE: 122 MMHG | WEIGHT: 195 LBS

## 2022-01-01 VITALS
RESPIRATION RATE: 18 BRPM | TEMPERATURE: 99 F | HEIGHT: 68 IN | HEART RATE: 68 BPM | OXYGEN SATURATION: 97 % | DIASTOLIC BLOOD PRESSURE: 72 MMHG | WEIGHT: 195 LBS | BODY MASS INDEX: 29.55 KG/M2 | SYSTOLIC BLOOD PRESSURE: 134 MMHG

## 2022-01-01 VITALS
HEART RATE: 63 BPM | BODY MASS INDEX: 32.96 KG/M2 | HEIGHT: 67 IN | WEIGHT: 210 LBS | OXYGEN SATURATION: 97 % | SYSTOLIC BLOOD PRESSURE: 95 MMHG | TEMPERATURE: 101 F | RESPIRATION RATE: 20 BRPM | DIASTOLIC BLOOD PRESSURE: 53 MMHG

## 2022-01-01 VITALS
DIASTOLIC BLOOD PRESSURE: 70 MMHG | SYSTOLIC BLOOD PRESSURE: 130 MMHG | TEMPERATURE: 98 F | WEIGHT: 201.88 LBS | RESPIRATION RATE: 18 BRPM | HEIGHT: 68 IN | HEART RATE: 78 BPM | OXYGEN SATURATION: 99 % | BODY MASS INDEX: 30.6 KG/M2

## 2022-01-01 VITALS
OXYGEN SATURATION: 98 % | WEIGHT: 204.81 LBS | BODY MASS INDEX: 31.04 KG/M2 | DIASTOLIC BLOOD PRESSURE: 60 MMHG | HEIGHT: 68 IN | SYSTOLIC BLOOD PRESSURE: 130 MMHG | TEMPERATURE: 98 F | HEART RATE: 68 BPM

## 2022-01-01 VITALS — TEMPERATURE: 98 F | HEART RATE: 68 BPM | SYSTOLIC BLOOD PRESSURE: 134 MMHG | DIASTOLIC BLOOD PRESSURE: 78 MMHG

## 2022-01-01 VITALS — WEIGHT: 200 LBS | HEIGHT: 68 IN | BODY MASS INDEX: 30.31 KG/M2

## 2022-01-01 DIAGNOSIS — N18.2 STAGE 2 CHRONIC KIDNEY DISEASE: ICD-10-CM

## 2022-01-01 DIAGNOSIS — R06.02 SHORTNESS OF BREATH: ICD-10-CM

## 2022-01-01 DIAGNOSIS — I89.0 LYMPHEDEMA: ICD-10-CM

## 2022-01-01 DIAGNOSIS — B02.9 HERPES ZOSTER WITHOUT COMPLICATION: Primary | ICD-10-CM

## 2022-01-01 DIAGNOSIS — G47.33 OBSTRUCTIVE SLEEP APNEA: ICD-10-CM

## 2022-01-01 DIAGNOSIS — G60.9 NEUROPATHY, PERIPHERAL, IDIOPATHIC: ICD-10-CM

## 2022-01-01 DIAGNOSIS — I10 ESSENTIAL HYPERTENSION: ICD-10-CM

## 2022-01-01 DIAGNOSIS — R60.9 EDEMA: ICD-10-CM

## 2022-01-01 DIAGNOSIS — I89.0 LYMPHEDEMA: Primary | ICD-10-CM

## 2022-01-01 DIAGNOSIS — G47.33 OSA (OBSTRUCTIVE SLEEP APNEA): Primary | ICD-10-CM

## 2022-01-01 DIAGNOSIS — G47.33 OSA (OBSTRUCTIVE SLEEP APNEA): ICD-10-CM

## 2022-01-01 DIAGNOSIS — D53.9 MACROCYTIC ANEMIA: ICD-10-CM

## 2022-01-01 DIAGNOSIS — B02.29 POST HERPETIC NEURALGIA: Primary | ICD-10-CM

## 2022-01-01 DIAGNOSIS — I50.32 CHRONIC HEART FAILURE WITH PRESERVED EJECTION FRACTION: Chronic | ICD-10-CM

## 2022-01-01 DIAGNOSIS — B02.29 POSTHERPETIC NEURALGIA: Primary | ICD-10-CM

## 2022-01-01 DIAGNOSIS — I25.10 CORONARY ARTERY DISEASE INVOLVING NATIVE CORONARY ARTERY OF NATIVE HEART WITHOUT ANGINA PECTORIS: ICD-10-CM

## 2022-01-01 DIAGNOSIS — Z86.73 HISTORY OF CVA IN ADULTHOOD: ICD-10-CM

## 2022-01-01 DIAGNOSIS — Z95.0 S/P PLACEMENT OF CARDIAC PACEMAKER: ICD-10-CM

## 2022-01-01 DIAGNOSIS — E89.0 POSTABLATIVE HYPOTHYROIDISM: ICD-10-CM

## 2022-01-01 DIAGNOSIS — L03.90 CELLULITIS, UNSPECIFIED CELLULITIS SITE: ICD-10-CM

## 2022-01-01 DIAGNOSIS — E87.79 VOLUME OVERLOAD STATE OF HEART: ICD-10-CM

## 2022-01-01 DIAGNOSIS — I50.32 CHRONIC HEART FAILURE WITH PRESERVED EJECTION FRACTION: ICD-10-CM

## 2022-01-01 DIAGNOSIS — S00.83XA FACIAL CONTUSION, INITIAL ENCOUNTER: ICD-10-CM

## 2022-01-01 DIAGNOSIS — Z09 HOSPITAL DISCHARGE FOLLOW-UP: Primary | ICD-10-CM

## 2022-01-01 DIAGNOSIS — R53.1 GENERAL WEAKNESS: ICD-10-CM

## 2022-01-01 DIAGNOSIS — I49.9 IRREGULAR CARDIAC RHYTHM: ICD-10-CM

## 2022-01-01 DIAGNOSIS — R79.89 ELEVATED TROPONIN: ICD-10-CM

## 2022-01-01 DIAGNOSIS — B02.29 POSTHERPETIC NEURALGIA: ICD-10-CM

## 2022-01-01 DIAGNOSIS — B02.29 POST HERPETIC NEURALGIA: ICD-10-CM
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
## 2022-01-01 DIAGNOSIS — R60.0 EDEMA OF BOTH LOWER LEGS DUE TO PERIPHERAL VENOUS INSUFFICIENCY: ICD-10-CM

## 2022-01-01 DIAGNOSIS — K40.90 RIGHT INGUINAL HERNIA: Primary | ICD-10-CM

## 2022-01-01 DIAGNOSIS — R42 DIZZINESSES: ICD-10-CM

## 2022-01-01 DIAGNOSIS — Z01.818 PREOP TESTING: Primary | ICD-10-CM

## 2022-01-01 DIAGNOSIS — I48.19 PERSISTENT ATRIAL FIBRILLATION: Primary | ICD-10-CM

## 2022-01-01 DIAGNOSIS — R60.0 PERIPHERAL EDEMA: Primary | ICD-10-CM

## 2022-01-01 DIAGNOSIS — I73.9 PAD (PERIPHERAL ARTERY DISEASE): ICD-10-CM

## 2022-01-01 DIAGNOSIS — E03.8 OTHER SPECIFIED HYPOTHYROIDISM: ICD-10-CM

## 2022-01-01 DIAGNOSIS — I87.8 VENOUS STASIS: ICD-10-CM

## 2022-01-01 DIAGNOSIS — K40.90 RIGHT INGUINAL HERNIA: ICD-10-CM

## 2022-01-01 DIAGNOSIS — I10 ESSENTIAL HYPERTENSION: Primary | ICD-10-CM

## 2022-01-01 DIAGNOSIS — M53.3 SACROILIAC JOINT DYSFUNCTION: Primary | ICD-10-CM

## 2022-01-01 DIAGNOSIS — E86.0 DEHYDRATION: ICD-10-CM

## 2022-01-01 DIAGNOSIS — R55 SYNCOPE: ICD-10-CM

## 2022-01-01 DIAGNOSIS — M62.830 MUSCLE SPASM OF BACK: ICD-10-CM

## 2022-01-01 DIAGNOSIS — R89.9 ABNORMAL PLEURAL FLUID: Primary | ICD-10-CM

## 2022-01-01 DIAGNOSIS — I50.9 CHF (CONGESTIVE HEART FAILURE): ICD-10-CM

## 2022-01-01 DIAGNOSIS — I50.32 CHRONIC HEART FAILURE WITH PRESERVED EJECTION FRACTION: Primary | ICD-10-CM

## 2022-01-01 DIAGNOSIS — R60.0 PERIPHERAL EDEMA: ICD-10-CM

## 2022-01-01 DIAGNOSIS — N17.9 AKI (ACUTE KIDNEY INJURY): ICD-10-CM

## 2022-01-01 DIAGNOSIS — I50.32 CHRONIC HEART FAILURE WITH PRESERVED EJECTION FRACTION: Primary | Chronic | ICD-10-CM

## 2022-01-01 DIAGNOSIS — E78.00 HYPERCHOLESTEREMIA: ICD-10-CM

## 2022-01-01 DIAGNOSIS — B35.3 TINEA PEDIS OF RIGHT FOOT: Primary | ICD-10-CM

## 2022-01-01 DIAGNOSIS — Z98.890 POST-OPERATIVE STATE: Primary | ICD-10-CM

## 2022-01-01 DIAGNOSIS — N18.30 STAGE 3 CHRONIC KIDNEY DISEASE, UNSPECIFIED WHETHER STAGE 3A OR 3B CKD: Primary | ICD-10-CM

## 2022-01-01 DIAGNOSIS — J15.20 STAPHYLOCOCCAL PNEUMONIA: ICD-10-CM

## 2022-01-01 DIAGNOSIS — I21.4 NSTEMI (NON-ST ELEVATED MYOCARDIAL INFARCTION): ICD-10-CM

## 2022-01-01 DIAGNOSIS — Z01.818 PREOP TESTING: ICD-10-CM

## 2022-01-01 DIAGNOSIS — I50.9 ACUTE ON CHRONIC CONGESTIVE HEART FAILURE, UNSPECIFIED HEART FAILURE TYPE: Primary | ICD-10-CM

## 2022-01-01 DIAGNOSIS — I48.19 PERSISTENT ATRIAL FIBRILLATION: ICD-10-CM

## 2022-01-01 DIAGNOSIS — S09.90XA INJURY OF HEAD, INITIAL ENCOUNTER: Primary | ICD-10-CM

## 2022-01-01 DIAGNOSIS — I95.1 ORTHOSTATIC HYPOTENSION: ICD-10-CM

## 2022-01-01 DIAGNOSIS — I50.33 ACUTE ON CHRONIC DIASTOLIC CONGESTIVE HEART FAILURE: Primary | ICD-10-CM

## 2022-01-01 DIAGNOSIS — I48.91 ATRIAL FIBRILLATION, UNSPECIFIED TYPE: ICD-10-CM

## 2022-01-01 DIAGNOSIS — R53.81 PHYSICAL DECONDITIONING: ICD-10-CM

## 2022-01-01 DIAGNOSIS — E88.09 HYPOALBUMINEMIA: Primary | ICD-10-CM

## 2022-01-01 DIAGNOSIS — I25.10 ASCVD (ARTERIOSCLEROTIC CARDIOVASCULAR DISEASE): ICD-10-CM

## 2022-01-01 DIAGNOSIS — M54.6 ACUTE LEFT-SIDED THORACIC BACK PAIN: Primary | ICD-10-CM

## 2022-01-01 DIAGNOSIS — I87.2 EDEMA OF BOTH LOWER LEGS DUE TO PERIPHERAL VENOUS INSUFFICIENCY: ICD-10-CM

## 2022-01-01 DIAGNOSIS — R42 DIZZINESS: ICD-10-CM

## 2022-01-01 DIAGNOSIS — N17.9 AKI (ACUTE KIDNEY INJURY): Primary | ICD-10-CM

## 2022-01-01 DIAGNOSIS — N18.9 CHRONIC KIDNEY DISEASE, UNSPECIFIED CKD STAGE: ICD-10-CM

## 2022-01-01 DIAGNOSIS — I48.91 NEW ONSET ATRIAL FIBRILLATION: ICD-10-CM

## 2022-01-01 DIAGNOSIS — R77.0 ABNORMAL ALBUMIN: ICD-10-CM

## 2022-01-01 LAB
ACANTHOCYTES BLD QL SMEAR: PRESENT
ALBUMIN SERPL BCP-MCNC: 2.6 G/DL (ref 3.5–5.2)
ALBUMIN SERPL BCP-MCNC: 2.8 G/DL (ref 3.5–5.2)
ALBUMIN SERPL BCP-MCNC: 2.9 G/DL (ref 3.5–5.2)
ALBUMIN SERPL BCP-MCNC: 2.9 G/DL (ref 3.5–5.2)
ALBUMIN SERPL BCP-MCNC: 3 G/DL (ref 3.5–5.2)
ALBUMIN SERPL BCP-MCNC: 3 G/DL (ref 3.5–5.2)
ALBUMIN SERPL BCP-MCNC: 3.1 G/DL (ref 3.5–5.2)
ALBUMIN SERPL BCP-MCNC: 3.2 G/DL (ref 3.5–5.2)
ALBUMIN SERPL BCP-MCNC: 3.4 G/DL (ref 3.5–5.2)
ALBUMIN SERPL BCP-MCNC: 3.4 G/DL (ref 3.5–5.2)
ALBUMIN SERPL BCP-MCNC: 3.5 G/DL (ref 3.5–5.2)
ALBUMIN SERPL BCP-MCNC: 3.6 G/DL (ref 3.5–5.2)
ALBUMIN SERPL BCP-MCNC: 3.7 G/DL (ref 3.5–5.2)
ALBUMIN SERPL BCP-MCNC: 3.9 G/DL (ref 3.5–5.2)
ALBUMIN SERPL BCP-MCNC: 3.9 G/DL (ref 3.5–5.2)
ALBUMIN SERPL ELPH-MCNC: 2.81 G/DL (ref 3.35–5.55)
ALLENS TEST: ABNORMAL
ALP SERPL-CCNC: 103 U/L (ref 55–135)
ALP SERPL-CCNC: 70 U/L (ref 55–135)
ALP SERPL-CCNC: 71 U/L (ref 55–135)
ALP SERPL-CCNC: 72 U/L (ref 55–135)
ALP SERPL-CCNC: 73 U/L (ref 55–135)
ALP SERPL-CCNC: 73 U/L (ref 55–135)
ALP SERPL-CCNC: 76 U/L (ref 55–135)
ALP SERPL-CCNC: 77 U/L (ref 55–135)
ALP SERPL-CCNC: 80 U/L (ref 55–135)
ALP SERPL-CCNC: 88 U/L (ref 55–135)
ALP SERPL-CCNC: 90 U/L (ref 55–135)
ALPHA1 GLOB SERPL ELPH-MCNC: 0.25 G/DL (ref 0.17–0.41)
ALPHA2 GLOB SERPL ELPH-MCNC: 0.61 G/DL (ref 0.43–0.99)
ALT SERPL W/O P-5'-P-CCNC: 20 U/L (ref 10–44)
ALT SERPL W/O P-5'-P-CCNC: 21 U/L (ref 10–44)
ALT SERPL W/O P-5'-P-CCNC: 22 U/L (ref 10–44)
ALT SERPL W/O P-5'-P-CCNC: 23 U/L (ref 10–44)
ALT SERPL W/O P-5'-P-CCNC: 25 U/L (ref 10–44)
ALT SERPL W/O P-5'-P-CCNC: 26 U/L (ref 10–44)
ALT SERPL W/O P-5'-P-CCNC: 27 U/L (ref 10–44)
ALT SERPL W/O P-5'-P-CCNC: 28 U/L (ref 10–44)
ALT SERPL W/O P-5'-P-CCNC: 29 U/L (ref 10–44)
ALT SERPL W/O P-5'-P-CCNC: 30 U/L (ref 10–44)
ALT SERPL W/O P-5'-P-CCNC: 31 U/L (ref 10–44)
ALT SERPL W/O P-5'-P-CCNC: 45 U/L (ref 10–44)
ALT SERPL W/O P-5'-P-CCNC: 46 U/L (ref 10–44)
ANA SER QL IF: NORMAL
ANION GAP SERPL CALC-SCNC: 10 MMOL/L (ref 8–16)
ANION GAP SERPL CALC-SCNC: 11 MMOL/L (ref 8–16)
ANION GAP SERPL CALC-SCNC: 11 MMOL/L (ref 8–16)
ANION GAP SERPL CALC-SCNC: 12 MMOL/L (ref 8–16)
ANION GAP SERPL CALC-SCNC: 13 MMOL/L (ref 8–16)
ANION GAP SERPL CALC-SCNC: 13 MMOL/L (ref 8–16)
ANION GAP SERPL CALC-SCNC: 15 MMOL/L (ref 8–16)
ANION GAP SERPL CALC-SCNC: 15 MMOL/L (ref 8–16)
ANION GAP SERPL CALC-SCNC: 7 MMOL/L (ref 8–16)
ANION GAP SERPL CALC-SCNC: 7 MMOL/L (ref 8–16)
ANION GAP SERPL CALC-SCNC: 8 MMOL/L (ref 8–16)
ANION GAP SERPL CALC-SCNC: 8 MMOL/L (ref 8–16)
ANION GAP SERPL CALC-SCNC: 9 MMOL/L (ref 8–16)
ANISOCYTOSIS BLD QL SMEAR: SLIGHT
APTT BLDCRRT: 28.6 SEC (ref 21–32)
AST SERPL-CCNC: 20 U/L (ref 10–40)
AST SERPL-CCNC: 20 U/L (ref 10–40)
AST SERPL-CCNC: 21 U/L (ref 10–40)
AST SERPL-CCNC: 22 U/L (ref 10–40)
AST SERPL-CCNC: 22 U/L (ref 10–40)
AST SERPL-CCNC: 23 U/L (ref 10–40)
AST SERPL-CCNC: 24 U/L (ref 10–40)
AST SERPL-CCNC: 24 U/L (ref 10–40)
AST SERPL-CCNC: 25 U/L (ref 10–40)
AST SERPL-CCNC: 29 U/L (ref 10–40)
AST SERPL-CCNC: 30 U/L (ref 10–40)
AST SERPL-CCNC: 81 U/L (ref 10–40)
AST SERPL-CCNC: 83 U/L (ref 10–40)
AV INDEX (PROSTH): 0.34
AV MEAN GRADIENT: 16 MMHG
AV PEAK GRADIENT: 27 MMHG
AV VALVE AREA: 1.55 CM2
AV VELOCITY RATIO: 0.35
B-GLOBULIN SERPL ELPH-MCNC: 0.77 G/DL (ref 0.5–1.1)
BACTERIA #/AREA URNS HPF: ABNORMAL /HPF
BACTERIA BLD CULT: NORMAL
BACTERIA BLD CULT: NORMAL
BACTERIA SPEC AEROBE CULT: ABNORMAL
BACTERIA UR CULT: NO GROWTH
BASOPHILS # BLD AUTO: 0.02 K/UL (ref 0–0.2)
BASOPHILS # BLD AUTO: 0.04 K/UL (ref 0–0.2)
BASOPHILS # BLD AUTO: 0.05 K/UL (ref 0–0.2)
BASOPHILS # BLD AUTO: 0.05 K/UL (ref 0–0.2)
BASOPHILS # BLD AUTO: 0.06 K/UL (ref 0–0.2)
BASOPHILS # BLD AUTO: 0.08 K/UL (ref 0–0.2)
BASOPHILS # BLD AUTO: 0.08 K/UL (ref 0–0.2)
BASOPHILS NFR BLD: 0 % (ref 0–1.9)
BASOPHILS NFR BLD: 0.1 % (ref 0–1.9)
BASOPHILS NFR BLD: 0.1 % (ref 0–1.9)
BASOPHILS NFR BLD: 0.2 % (ref 0–1.9)
BASOPHILS NFR BLD: 0.3 % (ref 0–1.9)
BASOPHILS NFR BLD: 0.6 % (ref 0–1.9)
BASOPHILS NFR BLD: 0.7 % (ref 0–1.9)
BASOPHILS NFR BLD: 0.7 % (ref 0–1.9)
BASOPHILS NFR BLD: 0.8 % (ref 0–1.9)
BASOPHILS NFR BLD: 0.9 % (ref 0–1.9)
BASOPHILS NFR BLD: 0.9 % (ref 0–1.9)
BASOPHILS NFR BLD: 1 % (ref 0–1.9)
BILIRUB DIRECT SERPL-MCNC: 0.3 MG/DL (ref 0.1–0.3)
BILIRUB SERPL-MCNC: 0.4 MG/DL (ref 0.1–1)
BILIRUB SERPL-MCNC: 0.5 MG/DL (ref 0.1–1)
BILIRUB SERPL-MCNC: 0.6 MG/DL (ref 0.1–1)
BILIRUB SERPL-MCNC: 0.7 MG/DL (ref 0.1–1)
BILIRUB SERPL-MCNC: 0.8 MG/DL (ref 0.1–1)
BILIRUB SERPL-MCNC: 0.8 MG/DL (ref 0.1–1)
BILIRUB SERPL-MCNC: 1 MG/DL (ref 0.1–1)
BILIRUB SERPL-MCNC: 1.1 MG/DL (ref 0.1–1)
BILIRUB SERPL-MCNC: 1.2 MG/DL (ref 0.1–1)
BILIRUB SERPL-MCNC: 1.2 MG/DL (ref 0.1–1)
BILIRUB SERPL-MCNC: 1.3 MG/DL (ref 0.1–1)
BILIRUB UR QL STRIP: NEGATIVE
BNP SERPL-MCNC: 1795 PG/ML (ref 0–99)
BNP SERPL-MCNC: 551 PG/ML (ref 0–99)
BNP SERPL-MCNC: 591 PG/ML (ref 0–99)
BNP SERPL-MCNC: 723 PG/ML (ref 0–99)
BNP SERPL-MCNC: 821 PG/ML (ref 0–99)
BNP SERPL-MCNC: 920 PG/ML (ref 0–99)
BNP SERPL-MCNC: 995 PG/ML (ref 0–99)
BSA FOR ECHO PROCEDURE: 2.15 M2
BUN SERPL-MCNC: 104 MG/DL (ref 8–23)
BUN SERPL-MCNC: 109 MG/DL (ref 8–23)
BUN SERPL-MCNC: 18 MG/DL (ref 8–23)
BUN SERPL-MCNC: 22 MG/DL (ref 8–23)
BUN SERPL-MCNC: 30 MG/DL (ref 8–23)
BUN SERPL-MCNC: 30 MG/DL (ref 8–23)
BUN SERPL-MCNC: 31 MG/DL (ref 8–23)
BUN SERPL-MCNC: 34 MG/DL (ref 8–23)
BUN SERPL-MCNC: 35 MG/DL (ref 8–23)
BUN SERPL-MCNC: 37 MG/DL (ref 8–23)
BUN SERPL-MCNC: 39 MG/DL (ref 8–23)
BUN SERPL-MCNC: 41 MG/DL (ref 8–23)
BUN SERPL-MCNC: 44 MG/DL (ref 8–23)
BUN SERPL-MCNC: 54 MG/DL (ref 8–23)
BUN SERPL-MCNC: 56 MG/DL (ref 8–23)
BUN SERPL-MCNC: 71 MG/DL (ref 8–23)
BUN SERPL-MCNC: 71 MG/DL (ref 8–23)
BUN SERPL-MCNC: 72 MG/DL (ref 8–23)
BUN SERPL-MCNC: 76 MG/DL (ref 8–23)
BUN SERPL-MCNC: 77 MG/DL (ref 8–23)
BUN SERPL-MCNC: 79 MG/DL (ref 8–23)
BUN SERPL-MCNC: 81 MG/DL (ref 8–23)
BUN SERPL-MCNC: 82 MG/DL (ref 8–23)
BUN SERPL-MCNC: 88 MG/DL (ref 8–23)
BURR CELLS BLD QL SMEAR: ABNORMAL
C3 SERPL-MCNC: 67 MG/DL (ref 50–180)
C4 SERPL-MCNC: 31 MG/DL (ref 11–44)
CALCIUM SERPL-MCNC: 8.4 MG/DL (ref 8.7–10.5)
CALCIUM SERPL-MCNC: 8.5 MG/DL (ref 8.7–10.5)
CALCIUM SERPL-MCNC: 8.6 MG/DL (ref 8.7–10.5)
CALCIUM SERPL-MCNC: 8.7 MG/DL (ref 8.7–10.5)
CALCIUM SERPL-MCNC: 8.8 MG/DL (ref 8.7–10.5)
CALCIUM SERPL-MCNC: 8.9 MG/DL (ref 8.7–10.5)
CALCIUM SERPL-MCNC: 9.1 MG/DL (ref 8.7–10.5)
CALCIUM SERPL-MCNC: 9.1 MG/DL (ref 8.7–10.5)
CHLORIDE SERPL-SCNC: 103 MMOL/L (ref 95–110)
CHLORIDE SERPL-SCNC: 104 MMOL/L (ref 95–110)
CHLORIDE SERPL-SCNC: 104 MMOL/L (ref 95–110)
CHLORIDE SERPL-SCNC: 105 MMOL/L (ref 95–110)
CHLORIDE SERPL-SCNC: 106 MMOL/L (ref 95–110)
CHLORIDE SERPL-SCNC: 107 MMOL/L (ref 95–110)
CHLORIDE SERPL-SCNC: 108 MMOL/L (ref 95–110)
CHLORIDE SERPL-SCNC: 94 MMOL/L (ref 95–110)
CHLORIDE SERPL-SCNC: 95 MMOL/L (ref 95–110)
CHLORIDE SERPL-SCNC: 96 MMOL/L (ref 95–110)
CHLORIDE SERPL-SCNC: 99 MMOL/L (ref 95–110)
CHOLEST SERPL-MCNC: 125 MG/DL (ref 120–199)
CHOLEST/HDLC SERPL: 2.8 {RATIO} (ref 2–5)
CK SERPL-CCNC: 1225 U/L (ref 20–200)
CK SERPL-CCNC: 2607 U/L (ref 20–200)
CK SERPL-CCNC: 60 U/L (ref 20–200)
CLARITY UR: ABNORMAL
CLARITY UR: CLEAR
CO2 SERPL-SCNC: 19 MMOL/L (ref 23–29)
CO2 SERPL-SCNC: 21 MMOL/L (ref 23–29)
CO2 SERPL-SCNC: 23 MMOL/L (ref 23–29)
CO2 SERPL-SCNC: 24 MMOL/L (ref 23–29)
CO2 SERPL-SCNC: 25 MMOL/L (ref 23–29)
CO2 SERPL-SCNC: 27 MMOL/L (ref 23–29)
CO2 SERPL-SCNC: 27 MMOL/L (ref 23–29)
CO2 SERPL-SCNC: 28 MMOL/L (ref 23–29)
CO2 SERPL-SCNC: 29 MMOL/L (ref 23–29)
CO2 SERPL-SCNC: 29 MMOL/L (ref 23–29)
CO2 SERPL-SCNC: 33 MMOL/L (ref 23–29)
CO2 SERPL-SCNC: 37 MMOL/L (ref 23–29)
CO2 SERPL-SCNC: 38 MMOL/L (ref 23–29)
CO2 SERPL-SCNC: 39 MMOL/L (ref 23–29)
COLOR UR: YELLOW
CREAT SERPL-MCNC: 1.4 MG/DL (ref 0.5–1.4)
CREAT SERPL-MCNC: 1.5 MG/DL (ref 0.5–1.4)
CREAT SERPL-MCNC: 1.5 MG/DL (ref 0.5–1.4)
CREAT SERPL-MCNC: 1.7 MG/DL (ref 0.5–1.4)
CREAT SERPL-MCNC: 1.8 MG/DL (ref 0.5–1.4)
CREAT SERPL-MCNC: 2 MG/DL (ref 0.5–1.4)
CREAT SERPL-MCNC: 2.2 MG/DL (ref 0.5–1.4)
CREAT SERPL-MCNC: 2.3 MG/DL (ref 0.5–1.4)
CREAT SERPL-MCNC: 2.3 MG/DL (ref 0.5–1.4)
CREAT SERPL-MCNC: 2.4 MG/DL (ref 0.5–1.4)
CREAT SERPL-MCNC: 2.5 MG/DL (ref 0.5–1.4)
CREAT SERPL-MCNC: 2.6 MG/DL (ref 0.5–1.4)
CREAT SERPL-MCNC: 2.7 MG/DL (ref 0.5–1.4)
CREAT SERPL-MCNC: 2.8 MG/DL (ref 0.5–1.4)
CREAT SERPL-MCNC: 2.9 MG/DL (ref 0.5–1.4)
CREAT SERPL-MCNC: 3 MG/DL (ref 0.5–1.4)
CREAT SERPL-MCNC: 3 MG/DL (ref 0.5–1.4)
CREAT SERPL-MCNC: 3.1 MG/DL (ref 0.5–1.4)
CREAT SERPL-MCNC: 3.2 MG/DL (ref 0.5–1.4)
CREAT UR-MCNC: 62.7 MG/DL (ref 23–375)
CV ECHO LV RWT: 0.3 CM
DELSYS: ABNORMAL
DIFFERENTIAL METHOD: ABNORMAL
DOP CALC AO PEAK VEL: 2.6 M/S
DOP CALC AO VTI: 59.3 CM
DOP CALC LVOT AREA: 4.5 CM2
DOP CALC LVOT DIAMETER: 2.4 CM
DOP CALC LVOT PEAK VEL: 0.92 M/S
DOP CALC LVOT STROKE VOLUME: 91.79 CM3
DOP CALC MV VTI: 51.5 CM
DOP CALCLVOT PEAK VEL VTI: 20.3 CM
E WAVE DECELERATION TIME: 228.37 MSEC
E/A RATIO: 1.72
ECHO LV POSTERIOR WALL: 0.84 CM (ref 0.6–1.1)
EJECTION FRACTION: 55 %
EOSINOPHIL # BLD AUTO: 0 K/UL (ref 0–0.5)
EOSINOPHIL # BLD AUTO: 0.1 K/UL (ref 0–0.5)
EOSINOPHIL # BLD AUTO: 0.2 K/UL (ref 0–0.5)
EOSINOPHIL # BLD AUTO: 0.3 K/UL (ref 0–0.5)
EOSINOPHIL # BLD AUTO: 0.4 K/UL (ref 0–0.5)
EOSINOPHIL # BLD AUTO: 0.5 K/UL (ref 0–0.5)
EOSINOPHIL NFR BLD: 0 % (ref 0–8)
EOSINOPHIL NFR BLD: 0.1 % (ref 0–8)
EOSINOPHIL NFR BLD: 0.7 % (ref 0–8)
EOSINOPHIL NFR BLD: 0.7 % (ref 0–8)
EOSINOPHIL NFR BLD: 1 % (ref 0–8)
EOSINOPHIL NFR BLD: 1.2 % (ref 0–8)
EOSINOPHIL NFR BLD: 1.6 % (ref 0–8)
EOSINOPHIL NFR BLD: 1.7 % (ref 0–8)
EOSINOPHIL NFR BLD: 2 % (ref 0–8)
EOSINOPHIL NFR BLD: 2.3 % (ref 0–8)
EOSINOPHIL NFR BLD: 2.8 % (ref 0–8)
EOSINOPHIL NFR BLD: 3 % (ref 0–8)
EOSINOPHIL NFR BLD: 3.3 % (ref 0–8)
EOSINOPHIL NFR BLD: 3.4 % (ref 0–8)
EOSINOPHIL NFR BLD: 4.2 % (ref 0–8)
EOSINOPHIL NFR BLD: 5.1 % (ref 0–8)
EOSINOPHIL NFR BLD: 5.2 % (ref 0–8)
EOSINOPHIL NFR BLD: 5.6 % (ref 0–8)
EOSINOPHIL NFR BLD: 6.1 % (ref 0–8)
EOSINOPHIL NFR BLD: 6.5 % (ref 0–8)
EP: 5
ERYTHROCYTE [DISTWIDTH] IN BLOOD BY AUTOMATED COUNT: 14.5 % (ref 11.5–14.5)
ERYTHROCYTE [DISTWIDTH] IN BLOOD BY AUTOMATED COUNT: 15 % (ref 11.5–14.5)
ERYTHROCYTE [DISTWIDTH] IN BLOOD BY AUTOMATED COUNT: 15.2 % (ref 11.5–14.5)
ERYTHROCYTE [DISTWIDTH] IN BLOOD BY AUTOMATED COUNT: 15.3 % (ref 11.5–14.5)
ERYTHROCYTE [DISTWIDTH] IN BLOOD BY AUTOMATED COUNT: 15.4 % (ref 11.5–14.5)
ERYTHROCYTE [DISTWIDTH] IN BLOOD BY AUTOMATED COUNT: 15.4 % (ref 11.5–14.5)
ERYTHROCYTE [DISTWIDTH] IN BLOOD BY AUTOMATED COUNT: 15.5 % (ref 11.5–14.5)
ERYTHROCYTE [DISTWIDTH] IN BLOOD BY AUTOMATED COUNT: 15.6 % (ref 11.5–14.5)
ERYTHROCYTE [DISTWIDTH] IN BLOOD BY AUTOMATED COUNT: 15.7 % (ref 11.5–14.5)
ERYTHROCYTE [DISTWIDTH] IN BLOOD BY AUTOMATED COUNT: 15.8 % (ref 11.5–14.5)
ERYTHROCYTE [DISTWIDTH] IN BLOOD BY AUTOMATED COUNT: 16 % (ref 11.5–14.5)
ERYTHROCYTE [DISTWIDTH] IN BLOOD BY AUTOMATED COUNT: 16.7 % (ref 11.5–14.5)
ERYTHROCYTE [DISTWIDTH] IN BLOOD BY AUTOMATED COUNT: 17.5 % (ref 11.5–14.5)
ERYTHROCYTE [DISTWIDTH] IN BLOOD BY AUTOMATED COUNT: 17.8 % (ref 11.5–14.5)
ERYTHROCYTE [DISTWIDTH] IN BLOOD BY AUTOMATED COUNT: 17.9 % (ref 11.5–14.5)
ERYTHROCYTE [DISTWIDTH] IN BLOOD BY AUTOMATED COUNT: 18.2 % (ref 11.5–14.5)
ERYTHROCYTE [DISTWIDTH] IN BLOOD BY AUTOMATED COUNT: 18.3 % (ref 11.5–14.5)
ERYTHROCYTE [SEDIMENTATION RATE] IN BLOOD BY WESTERGREN METHOD: 18 MM/H
EST. GFR  (AFRICAN AMERICAN): 23 ML/MIN/1.73 M^2
EST. GFR  (AFRICAN AMERICAN): 27 ML/MIN/1.73 M^2
EST. GFR  (AFRICAN AMERICAN): 36 ML/MIN/1.73 M^2
EST. GFR  (AFRICAN AMERICAN): 40.8 ML/MIN/1.73 M^2
EST. GFR  (AFRICAN AMERICAN): 43 ML/MIN/1.73 M^2
EST. GFR  (AFRICAN AMERICAN): 43.4 ML/MIN/1.73 M^2
EST. GFR  (AFRICAN AMERICAN): 43.7 ML/MIN/1.73 M^2
EST. GFR  (AFRICAN AMERICAN): 50 ML/MIN/1.73 M^2
EST. GFR  (AFRICAN AMERICAN): 50.8 ML/MIN/1.73 M^2
EST. GFR  (AFRICAN AMERICAN): 55.2 ML/MIN/1.73 M^2
EST. GFR  (NO RACE VARIABLE): 19 ML/MIN/1.73 M^2
EST. GFR  (NO RACE VARIABLE): 20 ML/MIN/1.73 M^2
EST. GFR  (NO RACE VARIABLE): 22 ML/MIN/1.73 M^2
EST. GFR  (NO RACE VARIABLE): 23 ML/MIN/1.73 M^2
EST. GFR  (NO RACE VARIABLE): 24 ML/MIN/1.73 M^2
EST. GFR  (NO RACE VARIABLE): 25 ML/MIN/1.73 M^2
EST. GFR  (NO RACE VARIABLE): 27 ML/MIN/1.73 M^2
EST. GFR  (NO RACE VARIABLE): 28 ML/MIN/1.73 M^2
EST. GFR  (NO RACE VARIABLE): 28.2 ML/MIN/1.73 M^2
EST. GFR  (NO RACE VARIABLE): 29.7 ML/MIN/1.73 M^2
EST. GFR  (NON AFRICAN AMERICAN): 20 ML/MIN/1.73 M^2
EST. GFR  (NON AFRICAN AMERICAN): 24 ML/MIN/1.73 M^2
EST. GFR  (NON AFRICAN AMERICAN): 31 ML/MIN/1.73 M^2
EST. GFR  (NON AFRICAN AMERICAN): 35.3 ML/MIN/1.73 M^2
EST. GFR  (NON AFRICAN AMERICAN): 37.5 ML/MIN/1.73 M^2
EST. GFR  (NON AFRICAN AMERICAN): 37.8 ML/MIN/1.73 M^2
EST. GFR  (NON AFRICAN AMERICAN): 38 ML/MIN/1.73 M^2
EST. GFR  (NON AFRICAN AMERICAN): 44 ML/MIN/1.73 M^2
EST. GFR  (NON AFRICAN AMERICAN): 44 ML/MIN/1.73 M^2
EST. GFR  (NON AFRICAN AMERICAN): 47.8 ML/MIN/1.73 M^2
ESTIMATED AVG GLUCOSE: 105 MG/DL (ref 68–131)
FERRITIN SERPL-MCNC: 293 NG/ML (ref 20–300)
FIO2: 60
FRACTIONAL SHORTENING: 31 % (ref 28–44)
GAMMA GLOB SERPL ELPH-MCNC: 0.75 G/DL (ref 0.67–1.58)
GLUCOSE SERPL-MCNC: 100 MG/DL (ref 70–110)
GLUCOSE SERPL-MCNC: 101 MG/DL (ref 70–110)
GLUCOSE SERPL-MCNC: 104 MG/DL (ref 70–110)
GLUCOSE SERPL-MCNC: 104 MG/DL (ref 70–110)
GLUCOSE SERPL-MCNC: 105 MG/DL (ref 70–110)
GLUCOSE SERPL-MCNC: 106 MG/DL (ref 70–110)
GLUCOSE SERPL-MCNC: 108 MG/DL (ref 70–110)
GLUCOSE SERPL-MCNC: 108 MG/DL (ref 70–110)
GLUCOSE SERPL-MCNC: 110 MG/DL (ref 70–110)
GLUCOSE SERPL-MCNC: 111 MG/DL (ref 70–110)
GLUCOSE SERPL-MCNC: 113 MG/DL (ref 70–110)
GLUCOSE SERPL-MCNC: 115 MG/DL (ref 70–110)
GLUCOSE SERPL-MCNC: 118 MG/DL (ref 70–110)
GLUCOSE SERPL-MCNC: 125 MG/DL (ref 70–110)
GLUCOSE SERPL-MCNC: 152 MG/DL (ref 70–110)
GLUCOSE SERPL-MCNC: 155 MG/DL (ref 70–110)
GLUCOSE SERPL-MCNC: 79 MG/DL (ref 70–110)
GLUCOSE SERPL-MCNC: 82 MG/DL (ref 70–110)
GLUCOSE SERPL-MCNC: 91 MG/DL (ref 70–110)
GLUCOSE SERPL-MCNC: 92 MG/DL (ref 70–110)
GLUCOSE SERPL-MCNC: 92 MG/DL (ref 70–110)
GLUCOSE SERPL-MCNC: 94 MG/DL (ref 70–110)
GLUCOSE SERPL-MCNC: 96 MG/DL (ref 70–110)
GLUCOSE SERPL-MCNC: 97 MG/DL (ref 70–110)
GLUCOSE SERPL-MCNC: 98 MG/DL (ref 70–110)
GLUCOSE SERPL-MCNC: 99 MG/DL (ref 70–110)
GLUCOSE UR QL STRIP: NEGATIVE
GRAM STN SPEC: ABNORMAL
HBA1C MFR BLD: 5.3 % (ref 4–5.6)
HCO3 UR-SCNC: 27.4 MMOL/L (ref 24–28)
HCT VFR BLD AUTO: 26.1 % (ref 40–54)
HCT VFR BLD AUTO: 26.9 % (ref 40–54)
HCT VFR BLD AUTO: 27 % (ref 40–54)
HCT VFR BLD AUTO: 27.5 % (ref 40–54)
HCT VFR BLD AUTO: 27.9 % (ref 40–54)
HCT VFR BLD AUTO: 28.3 % (ref 40–54)
HCT VFR BLD AUTO: 28.8 % (ref 40–54)
HCT VFR BLD AUTO: 28.9 % (ref 40–54)
HCT VFR BLD AUTO: 29.2 % (ref 40–54)
HCT VFR BLD AUTO: 30.8 % (ref 40–54)
HCT VFR BLD AUTO: 31.2 % (ref 40–54)
HCT VFR BLD AUTO: 31.4 % (ref 40–54)
HCT VFR BLD AUTO: 31.5 % (ref 40–54)
HCT VFR BLD AUTO: 31.7 % (ref 40–54)
HCT VFR BLD AUTO: 31.7 % (ref 40–54)
HCT VFR BLD AUTO: 32.9 % (ref 40–54)
HCT VFR BLD AUTO: 33.1 % (ref 40–54)
HCT VFR BLD AUTO: 33.8 % (ref 40–54)
HCT VFR BLD AUTO: 35.3 % (ref 40–54)
HCT VFR BLD AUTO: 37.4 % (ref 40–54)
HCV AB SERPL QL IA: NORMAL
HDLC SERPL-MCNC: 45 MG/DL (ref 40–75)
HDLC SERPL: 36 % (ref 20–50)
HGB BLD-MCNC: 10.1 G/DL (ref 14–18)
HGB BLD-MCNC: 10.3 G/DL (ref 14–18)
HGB BLD-MCNC: 10.6 G/DL (ref 14–18)
HGB BLD-MCNC: 10.7 G/DL (ref 14–18)
HGB BLD-MCNC: 10.8 G/DL (ref 14–18)
HGB BLD-MCNC: 10.9 G/DL (ref 14–18)
HGB BLD-MCNC: 11.7 G/DL (ref 14–18)
HGB BLD-MCNC: 11.7 G/DL (ref 14–18)
HGB BLD-MCNC: 12.2 G/DL (ref 14–18)
HGB BLD-MCNC: 8.9 G/DL (ref 14–18)
HGB BLD-MCNC: 9.3 G/DL (ref 14–18)
HGB BLD-MCNC: 9.3 G/DL (ref 14–18)
HGB BLD-MCNC: 9.4 G/DL (ref 14–18)
HGB BLD-MCNC: 9.5 G/DL (ref 14–18)
HGB BLD-MCNC: 9.6 G/DL (ref 14–18)
HGB BLD-MCNC: 9.7 G/DL (ref 14–18)
HGB BLD-MCNC: 9.7 G/DL (ref 14–18)
HGB BLD-MCNC: 9.9 G/DL (ref 14–18)
HGB UR QL STRIP: ABNORMAL
HGB UR QL STRIP: ABNORMAL
HGB UR QL STRIP: NEGATIVE
HIV 1+2 AB+HIV1 P24 AG SERPL QL IA: NORMAL
HYALINE CASTS #/AREA URNS LPF: 4 /LPF
IMM GRANULOCYTES # BLD AUTO: 0.01 K/UL (ref 0–0.04)
IMM GRANULOCYTES # BLD AUTO: 0.02 K/UL (ref 0–0.04)
IMM GRANULOCYTES # BLD AUTO: 0.03 K/UL (ref 0–0.04)
IMM GRANULOCYTES # BLD AUTO: 0.06 K/UL (ref 0–0.04)
IMM GRANULOCYTES # BLD AUTO: 0.07 K/UL (ref 0–0.04)
IMM GRANULOCYTES # BLD AUTO: 0.08 K/UL (ref 0–0.04)
IMM GRANULOCYTES # BLD AUTO: 0.09 K/UL (ref 0–0.04)
IMM GRANULOCYTES # BLD AUTO: 0.1 K/UL (ref 0–0.04)
IMM GRANULOCYTES # BLD AUTO: 0.11 K/UL (ref 0–0.04)
IMM GRANULOCYTES # BLD AUTO: 0.16 K/UL (ref 0–0.04)
IMM GRANULOCYTES # BLD AUTO: 0.21 K/UL (ref 0–0.04)
IMM GRANULOCYTES # BLD AUTO: ABNORMAL K/UL
IMM GRANULOCYTES # BLD AUTO: ABNORMAL K/UL (ref 0–0.04)
IMM GRANULOCYTES NFR BLD AUTO: 0.1 % (ref 0–0.5)
IMM GRANULOCYTES NFR BLD AUTO: 0.2 % (ref 0–0.5)
IMM GRANULOCYTES NFR BLD AUTO: 0.2 % (ref 0–0.5)
IMM GRANULOCYTES NFR BLD AUTO: 0.3 % (ref 0–0.5)
IMM GRANULOCYTES NFR BLD AUTO: 0.4 % (ref 0–0.5)
IMM GRANULOCYTES NFR BLD AUTO: 0.4 % (ref 0–0.5)
IMM GRANULOCYTES NFR BLD AUTO: 0.5 % (ref 0–0.5)
IMM GRANULOCYTES NFR BLD AUTO: 0.6 % (ref 0–0.5)
IMM GRANULOCYTES NFR BLD AUTO: 0.6 % (ref 0–0.5)
IMM GRANULOCYTES NFR BLD AUTO: 0.8 % (ref 0–0.5)
IMM GRANULOCYTES NFR BLD AUTO: 0.9 % (ref 0–0.5)
IMM GRANULOCYTES NFR BLD AUTO: 1 % (ref 0–0.5)
IMM GRANULOCYTES NFR BLD AUTO: 1.2 % (ref 0–0.5)
IMM GRANULOCYTES NFR BLD AUTO: ABNORMAL %
IMM GRANULOCYTES NFR BLD AUTO: ABNORMAL % (ref 0–0.5)
INFLUENZA A, MOLECULAR: NEGATIVE
INFLUENZA B, MOLECULAR: NEGATIVE
INR PPP: 1.1 (ref 0.8–1.2)
INR PPP: 1.8
INTERVENTRICULAR SEPTUM: 0.64 CM (ref 0.6–1.1)
IP: 10
IRON SERPL-MCNC: 65 UG/DL (ref 45–160)
IVC DIAMETER: 2.4 CM
KETONES UR QL STRIP: NEGATIVE
LA MAJOR: 6.28 CM
LA MINOR: 6.02 CM
LACTATE SERPL-SCNC: 1.5 MMOL/L (ref 0.5–2.2)
LDLC SERPL CALC-MCNC: 70.6 MG/DL (ref 63–159)
LEFT ATRIUM SIZE: 5.02 CM
LEFT ATRIUM VOLUME INDEX MOD: 36.1 ML/M2
LEFT ATRIUM VOLUME MOD: 75.88 CM3
LEFT INTERNAL DIMENSION IN SYSTOLE: 3.93 CM (ref 2.1–4)
LEFT VENTRICLE DIASTOLIC VOLUME INDEX: 75.9 ML/M2
LEFT VENTRICLE DIASTOLIC VOLUME: 159.38 ML
LEFT VENTRICLE MASS INDEX: 73 G/M2
LEFT VENTRICLE SYSTOLIC VOLUME INDEX: 31.9 ML/M2
LEFT VENTRICLE SYSTOLIC VOLUME: 67.09 ML
LEFT VENTRICULAR INTERNAL DIMENSION IN DIASTOLE: 5.69 CM (ref 3.5–6)
LEFT VENTRICULAR MASS: 154.01 G
LEUKOCYTE ESTERASE UR QL STRIP: ABNORMAL
LEUKOCYTE ESTERASE UR QL STRIP: NEGATIVE
LVOT MG: 2.03 MMHG
LVOT MV: 0.69 CM/S
LYMPHOCYTES # BLD AUTO: 0.8 K/UL (ref 1–4.8)
LYMPHOCYTES # BLD AUTO: 1.1 K/UL (ref 1–4.8)
LYMPHOCYTES # BLD AUTO: 1.2 K/UL (ref 1–4.8)
LYMPHOCYTES # BLD AUTO: 1.2 K/UL (ref 1–4.8)
LYMPHOCYTES # BLD AUTO: 1.5 K/UL (ref 1–4.8)
LYMPHOCYTES # BLD AUTO: 1.7 K/UL (ref 1–4.8)
LYMPHOCYTES # BLD AUTO: 1.7 K/UL (ref 1–4.8)
LYMPHOCYTES # BLD AUTO: 1.8 K/UL (ref 1–4.8)
LYMPHOCYTES # BLD AUTO: 1.8 K/UL (ref 1–4.8)
LYMPHOCYTES # BLD AUTO: 1.9 K/UL (ref 1–4.8)
LYMPHOCYTES # BLD AUTO: 1.9 K/UL (ref 1–4.8)
LYMPHOCYTES # BLD AUTO: 2.1 K/UL (ref 1–4.8)
LYMPHOCYTES # BLD AUTO: 2.3 K/UL (ref 1–4.8)
LYMPHOCYTES # BLD AUTO: 2.3 K/UL (ref 1–4.8)
LYMPHOCYTES # BLD AUTO: 2.4 K/UL (ref 1–4.8)
LYMPHOCYTES # BLD AUTO: 2.5 K/UL (ref 1–4.8)
LYMPHOCYTES # BLD AUTO: 2.7 K/UL (ref 1–4.8)
LYMPHOCYTES # BLD AUTO: 2.7 K/UL (ref 1–4.8)
LYMPHOCYTES NFR BLD: 10.5 % (ref 18–48)
LYMPHOCYTES NFR BLD: 10.7 % (ref 18–48)
LYMPHOCYTES NFR BLD: 11 % (ref 18–48)
LYMPHOCYTES NFR BLD: 12.9 % (ref 18–48)
LYMPHOCYTES NFR BLD: 13.9 % (ref 18–48)
LYMPHOCYTES NFR BLD: 20.2 % (ref 18–48)
LYMPHOCYTES NFR BLD: 23.4 % (ref 18–48)
LYMPHOCYTES NFR BLD: 25.3 % (ref 18–48)
LYMPHOCYTES NFR BLD: 25.6 % (ref 18–48)
LYMPHOCYTES NFR BLD: 26.6 % (ref 18–48)
LYMPHOCYTES NFR BLD: 26.9 % (ref 18–48)
LYMPHOCYTES NFR BLD: 27.2 % (ref 18–48)
LYMPHOCYTES NFR BLD: 28.5 % (ref 18–48)
LYMPHOCYTES NFR BLD: 3 % (ref 18–48)
LYMPHOCYTES NFR BLD: 31 % (ref 18–48)
LYMPHOCYTES NFR BLD: 31.4 % (ref 18–48)
LYMPHOCYTES NFR BLD: 5.9 % (ref 18–48)
LYMPHOCYTES NFR BLD: 7.1 % (ref 18–48)
LYMPHOCYTES NFR BLD: 7.9 % (ref 18–48)
LYMPHOCYTES NFR BLD: 9.6 % (ref 18–48)
MAGNESIUM SERPL-MCNC: 2.3 MG/DL (ref 1.6–2.6)
MAGNESIUM SERPL-MCNC: 2.4 MG/DL (ref 1.6–2.6)
MAGNESIUM SERPL-MCNC: 2.5 MG/DL (ref 1.6–2.6)
MAGNESIUM SERPL-MCNC: 2.6 MG/DL (ref 1.6–2.6)
MAGNESIUM SERPL-MCNC: 2.8 MG/DL (ref 1.6–2.6)
MAGNESIUM SERPL-MCNC: 3.1 MG/DL (ref 1.6–2.6)
MCH RBC QN AUTO: 31.6 PG (ref 27–31)
MCH RBC QN AUTO: 31.9 PG (ref 27–31)
MCH RBC QN AUTO: 32.3 PG (ref 27–31)
MCH RBC QN AUTO: 32.3 PG (ref 27–31)
MCH RBC QN AUTO: 32.4 PG (ref 27–31)
MCH RBC QN AUTO: 32.5 PG (ref 27–31)
MCH RBC QN AUTO: 32.7 PG (ref 27–31)
MCH RBC QN AUTO: 32.7 PG (ref 27–31)
MCH RBC QN AUTO: 33 PG (ref 27–31)
MCH RBC QN AUTO: 33.1 PG (ref 27–31)
MCH RBC QN AUTO: 33.3 PG (ref 27–31)
MCH RBC QN AUTO: 33.3 PG (ref 27–31)
MCH RBC QN AUTO: 33.5 PG (ref 27–31)
MCH RBC QN AUTO: 33.6 PG (ref 27–31)
MCH RBC QN AUTO: 34.3 PG (ref 27–31)
MCH RBC QN AUTO: 34.6 PG (ref 27–31)
MCHC RBC AUTO-ENTMCNC: 32 G/DL (ref 32–36)
MCHC RBC AUTO-ENTMCNC: 32.6 G/DL (ref 32–36)
MCHC RBC AUTO-ENTMCNC: 32.7 G/DL (ref 32–36)
MCHC RBC AUTO-ENTMCNC: 32.8 G/DL (ref 32–36)
MCHC RBC AUTO-ENTMCNC: 33.1 G/DL (ref 32–36)
MCHC RBC AUTO-ENTMCNC: 33.1 G/DL (ref 32–36)
MCHC RBC AUTO-ENTMCNC: 33.4 G/DL (ref 32–36)
MCHC RBC AUTO-ENTMCNC: 33.6 G/DL (ref 32–36)
MCHC RBC AUTO-ENTMCNC: 33.7 G/DL (ref 32–36)
MCHC RBC AUTO-ENTMCNC: 33.7 G/DL (ref 32–36)
MCHC RBC AUTO-ENTMCNC: 33.9 G/DL (ref 32–36)
MCHC RBC AUTO-ENTMCNC: 33.9 G/DL (ref 32–36)
MCHC RBC AUTO-ENTMCNC: 34 G/DL (ref 32–36)
MCHC RBC AUTO-ENTMCNC: 34.1 G/DL (ref 32–36)
MCHC RBC AUTO-ENTMCNC: 34.4 G/DL (ref 32–36)
MCHC RBC AUTO-ENTMCNC: 34.5 G/DL (ref 32–36)
MCHC RBC AUTO-ENTMCNC: 34.6 G/DL (ref 32–36)
MCHC RBC AUTO-ENTMCNC: 34.6 G/DL (ref 32–36)
MCV RBC AUTO: 100 FL (ref 82–98)
MCV RBC AUTO: 100 FL (ref 82–98)
MCV RBC AUTO: 101 FL (ref 82–98)
MCV RBC AUTO: 102 FL (ref 82–98)
MCV RBC AUTO: 104 FL (ref 82–98)
MCV RBC AUTO: 94 FL (ref 82–98)
MCV RBC AUTO: 95 FL (ref 82–98)
MCV RBC AUTO: 95 FL (ref 82–98)
MCV RBC AUTO: 96 FL (ref 82–98)
MCV RBC AUTO: 97 FL (ref 82–98)
MCV RBC AUTO: 97 FL (ref 82–98)
MCV RBC AUTO: 98 FL (ref 82–98)
MCV RBC AUTO: 99 FL (ref 82–98)
MCV RBC AUTO: 99 FL (ref 82–98)
MICROSCOPIC COMMENT: ABNORMAL
MODE: ABNORMAL
MONOCYTES # BLD AUTO: 0.7 K/UL (ref 0.3–1)
MONOCYTES # BLD AUTO: 0.8 K/UL (ref 0.3–1)
MONOCYTES # BLD AUTO: 0.9 K/UL (ref 0.3–1)
MONOCYTES # BLD AUTO: 1 K/UL (ref 0.3–1)
MONOCYTES # BLD AUTO: 1.1 K/UL (ref 0.3–1)
MONOCYTES # BLD AUTO: 1.2 K/UL (ref 0.3–1)
MONOCYTES # BLD AUTO: 1.3 K/UL (ref 0.3–1)
MONOCYTES # BLD AUTO: 1.3 K/UL (ref 0.3–1)
MONOCYTES # BLD AUTO: 1.4 K/UL (ref 0.3–1)
MONOCYTES NFR BLD: 10 % (ref 4–15)
MONOCYTES NFR BLD: 10.4 % (ref 4–15)
MONOCYTES NFR BLD: 10.9 % (ref 4–15)
MONOCYTES NFR BLD: 11 % (ref 4–15)
MONOCYTES NFR BLD: 11 % (ref 4–15)
MONOCYTES NFR BLD: 11.2 % (ref 4–15)
MONOCYTES NFR BLD: 11.2 % (ref 4–15)
MONOCYTES NFR BLD: 11.5 % (ref 4–15)
MONOCYTES NFR BLD: 11.7 % (ref 4–15)
MONOCYTES NFR BLD: 12.3 % (ref 4–15)
MONOCYTES NFR BLD: 5.9 % (ref 4–15)
MONOCYTES NFR BLD: 6.1 % (ref 4–15)
MONOCYTES NFR BLD: 7 % (ref 4–15)
MONOCYTES NFR BLD: 7.2 % (ref 4–15)
MONOCYTES NFR BLD: 7.3 % (ref 4–15)
MONOCYTES NFR BLD: 7.6 % (ref 4–15)
MONOCYTES NFR BLD: 8.2 % (ref 4–15)
MONOCYTES NFR BLD: 8.4 % (ref 4–15)
MONOCYTES NFR BLD: 8.8 % (ref 4–15)
MONOCYTES NFR BLD: 9.2 % (ref 4–15)
MV MEAN GRADIENT: 4 MMHG
MV PEAK A VEL: 0.96 M/S
MV PEAK E VEL: 1.65 M/S
MV PEAK GRADIENT: 11 MMHG
MV VALVE AREA BY CONTINUITY EQUATION: 1.78 CM2
NEUTROPHILS # BLD AUTO: 10.8 K/UL (ref 1.8–7.7)
NEUTROPHILS # BLD AUTO: 11.1 K/UL (ref 1.8–7.7)
NEUTROPHILS # BLD AUTO: 11.4 K/UL (ref 1.8–7.7)
NEUTROPHILS # BLD AUTO: 12.5 K/UL (ref 1.8–7.7)
NEUTROPHILS # BLD AUTO: 13.8 K/UL (ref 1.8–7.7)
NEUTROPHILS # BLD AUTO: 14.3 K/UL (ref 1.8–7.7)
NEUTROPHILS # BLD AUTO: 4 K/UL (ref 1.8–7.7)
NEUTROPHILS # BLD AUTO: 4.1 K/UL (ref 1.8–7.7)
NEUTROPHILS # BLD AUTO: 4.3 K/UL (ref 1.8–7.7)
NEUTROPHILS # BLD AUTO: 4.6 K/UL (ref 1.8–7.7)
NEUTROPHILS # BLD AUTO: 4.6 K/UL (ref 1.8–7.7)
NEUTROPHILS # BLD AUTO: 4.7 K/UL (ref 1.8–7.7)
NEUTROPHILS # BLD AUTO: 5.2 K/UL (ref 1.8–7.7)
NEUTROPHILS # BLD AUTO: 5.3 K/UL (ref 1.8–7.7)
NEUTROPHILS # BLD AUTO: 5.7 K/UL (ref 1.8–7.7)
NEUTROPHILS # BLD AUTO: 5.8 K/UL (ref 1.8–7.7)
NEUTROPHILS # BLD AUTO: 8.7 K/UL (ref 1.8–7.7)
NEUTROPHILS # BLD AUTO: 9.2 K/UL (ref 1.8–7.7)
NEUTROPHILS NFR BLD: 50.2 % (ref 38–73)
NEUTROPHILS NFR BLD: 52.9 % (ref 38–73)
NEUTROPHILS NFR BLD: 53.1 % (ref 38–73)
NEUTROPHILS NFR BLD: 56.7 % (ref 38–73)
NEUTROPHILS NFR BLD: 56.7 % (ref 38–73)
NEUTROPHILS NFR BLD: 57.7 % (ref 38–73)
NEUTROPHILS NFR BLD: 58 % (ref 38–73)
NEUTROPHILS NFR BLD: 59.5 % (ref 38–73)
NEUTROPHILS NFR BLD: 64.9 % (ref 38–73)
NEUTROPHILS NFR BLD: 65 % (ref 38–73)
NEUTROPHILS NFR BLD: 73.1 % (ref 38–73)
NEUTROPHILS NFR BLD: 78.9 % (ref 38–73)
NEUTROPHILS NFR BLD: 79.5 % (ref 38–73)
NEUTROPHILS NFR BLD: 79.6 % (ref 38–73)
NEUTROPHILS NFR BLD: 80.9 % (ref 38–73)
NEUTROPHILS NFR BLD: 81 % (ref 38–73)
NEUTROPHILS NFR BLD: 81.4 % (ref 38–73)
NEUTROPHILS NFR BLD: 81.7 % (ref 38–73)
NEUTROPHILS NFR BLD: 82 % (ref 38–73)
NEUTROPHILS NFR BLD: 84.8 % (ref 38–73)
NEUTS BAND NFR BLD MANUAL: 1 %
NEUTS BAND NFR BLD MANUAL: 1 %
NITRITE UR QL STRIP: NEGATIVE
NONHDLC SERPL-MCNC: 80 MG/DL
NRBC BLD-RTO: 0 /100 WBC
OVALOCYTES BLD QL SMEAR: ABNORMAL
PATHOLOGIST INTERPRETATION SPE: NORMAL
PATHOLOGIST INTERPRETATION UPE: NORMAL
PCO2 BLDA: 38.6 MMHG (ref 35–45)
PH SMN: 7.46 [PH] (ref 7.35–7.45)
PH UR STRIP: 5 [PH] (ref 5–8)
PH UR STRIP: 6 [PH] (ref 5–8)
PH UR STRIP: 7 [PH] (ref 5–8)
PHOSPHATE SERPL-MCNC: 3.2 MG/DL (ref 2.7–4.5)
PHOSPHATE SERPL-MCNC: 3.4 MG/DL (ref 2.7–4.5)
PHOSPHATE SERPL-MCNC: 3.5 MG/DL (ref 2.7–4.5)
PHOSPHATE SERPL-MCNC: 3.6 MG/DL (ref 2.7–4.5)
PHOSPHATE SERPL-MCNC: 3.8 MG/DL (ref 2.7–4.5)
PHOSPHATE SERPL-MCNC: 3.9 MG/DL (ref 2.7–4.5)
PHOSPHATE SERPL-MCNC: 4 MG/DL (ref 2.7–4.5)
PHOSPHATE SERPL-MCNC: 4 MG/DL (ref 2.7–4.5)
PHOSPHATE SERPL-MCNC: 4.1 MG/DL (ref 2.7–4.5)
PHOSPHATE SERPL-MCNC: 4.6 MG/DL (ref 2.7–4.5)
PHOSPHATE SERPL-MCNC: 4.6 MG/DL (ref 2.7–4.5)
PISA AR MAX VEL: 3.24 M/S
PISA MRMAX VEL: 5.17 M/S
PISA TR MAX VEL: 3.75 M/S
PLATELET # BLD AUTO: 175 K/UL (ref 150–450)
PLATELET # BLD AUTO: 191 K/UL (ref 150–450)
PLATELET # BLD AUTO: 193 K/UL (ref 150–450)
PLATELET # BLD AUTO: 200 K/UL (ref 150–450)
PLATELET # BLD AUTO: 201 K/UL (ref 150–450)
PLATELET # BLD AUTO: 202 K/UL (ref 150–450)
PLATELET # BLD AUTO: 203 K/UL (ref 150–450)
PLATELET # BLD AUTO: 204 K/UL (ref 150–450)
PLATELET # BLD AUTO: 207 K/UL (ref 150–450)
PLATELET # BLD AUTO: 211 K/UL (ref 150–450)
PLATELET # BLD AUTO: 228 K/UL (ref 150–450)
PLATELET # BLD AUTO: 228 K/UL (ref 150–450)
PLATELET # BLD AUTO: 229 K/UL (ref 150–450)
PLATELET # BLD AUTO: 234 K/UL (ref 150–450)
PLATELET # BLD AUTO: 239 K/UL (ref 150–450)
PLATELET # BLD AUTO: 261 K/UL (ref 150–450)
PLATELET # BLD AUTO: 285 K/UL (ref 150–450)
PLATELET # BLD AUTO: 289 K/UL (ref 150–450)
PLATELET # BLD AUTO: 311 K/UL (ref 150–450)
PLATELET # BLD AUTO: 365 K/UL (ref 150–450)
PLATELET BLD QL SMEAR: ABNORMAL
PMV BLD AUTO: 10 FL (ref 9.2–12.9)
PMV BLD AUTO: 10.1 FL (ref 9.2–12.9)
PMV BLD AUTO: 10.4 FL (ref 9.2–12.9)
PMV BLD AUTO: 10.5 FL (ref 9.2–12.9)
PMV BLD AUTO: 10.6 FL (ref 9.2–12.9)
PMV BLD AUTO: 10.7 FL (ref 9.2–12.9)
PMV BLD AUTO: 10.7 FL (ref 9.2–12.9)
PMV BLD AUTO: 10.8 FL (ref 9.2–12.9)
PMV BLD AUTO: 10.8 FL (ref 9.2–12.9)
PMV BLD AUTO: 11.1 FL (ref 9.2–12.9)
PMV BLD AUTO: 11.1 FL (ref 9.2–12.9)
PMV BLD AUTO: 11.2 FL (ref 9.2–12.9)
PMV BLD AUTO: 11.3 FL (ref 9.2–12.9)
PMV BLD AUTO: 11.4 FL (ref 9.2–12.9)
PMV BLD AUTO: 11.5 FL (ref 9.2–12.9)
PMV BLD AUTO: 11.6 FL (ref 9.2–12.9)
PO2 BLDA: 64 MMHG (ref 80–100)
POC BE: 4 MMOL/L
POC SATURATED O2: 93 % (ref 95–100)
POC TCO2: 29 MMOL/L (ref 23–27)
POCT GLUCOSE: 103 MG/DL (ref 70–110)
POCT GLUCOSE: 118 MG/DL (ref 70–110)
POCT GLUCOSE: 121 MG/DL (ref 70–110)
POCT GLUCOSE: 144 MG/DL (ref 70–110)
POIKILOCYTOSIS BLD QL SMEAR: SLIGHT
POTASSIUM SERPL-SCNC: 3.3 MMOL/L (ref 3.5–5.1)
POTASSIUM SERPL-SCNC: 3.3 MMOL/L (ref 3.5–5.1)
POTASSIUM SERPL-SCNC: 3.4 MMOL/L (ref 3.5–5.1)
POTASSIUM SERPL-SCNC: 3.4 MMOL/L (ref 3.5–5.1)
POTASSIUM SERPL-SCNC: 3.5 MMOL/L (ref 3.5–5.1)
POTASSIUM SERPL-SCNC: 3.6 MMOL/L (ref 3.5–5.1)
POTASSIUM SERPL-SCNC: 3.7 MMOL/L (ref 3.5–5.1)
POTASSIUM SERPL-SCNC: 3.8 MMOL/L (ref 3.5–5.1)
POTASSIUM SERPL-SCNC: 3.8 MMOL/L (ref 3.5–5.1)
POTASSIUM SERPL-SCNC: 3.9 MMOL/L (ref 3.5–5.1)
POTASSIUM SERPL-SCNC: 4 MMOL/L (ref 3.5–5.1)
POTASSIUM SERPL-SCNC: 4.1 MMOL/L (ref 3.5–5.1)
POTASSIUM SERPL-SCNC: 4.1 MMOL/L (ref 3.5–5.1)
POTASSIUM SERPL-SCNC: 4.2 MMOL/L (ref 3.5–5.1)
POTASSIUM SERPL-SCNC: 4.5 MMOL/L (ref 3.5–5.1)
POTASSIUM SERPL-SCNC: 4.7 MMOL/L (ref 3.5–5.1)
POTASSIUM SERPL-SCNC: 4.8 MMOL/L (ref 3.5–5.1)
POTASSIUM SERPL-SCNC: 4.9 MMOL/L (ref 3.5–5.1)
POTASSIUM SERPL-SCNC: 4.9 MMOL/L (ref 3.5–5.1)
POTASSIUM SERPL-SCNC: 5.2 MMOL/L (ref 3.5–5.1)
PROCALCITONIN SERPL IA-MCNC: 1.3 NG/ML
PROT PATTERN UR ELPH-IMP: NORMAL
PROT SERPL-MCNC: 5.2 G/DL (ref 6–8.4)
PROT SERPL-MCNC: 5.3 G/DL (ref 6–8.4)
PROT SERPL-MCNC: 5.5 G/DL (ref 6–8.4)
PROT SERPL-MCNC: 5.6 G/DL (ref 6–8.4)
PROT SERPL-MCNC: 6 G/DL (ref 6–8.4)
PROT SERPL-MCNC: 6.1 G/DL (ref 6–8.4)
PROT SERPL-MCNC: 6.2 G/DL (ref 6–8.4)
PROT SERPL-MCNC: 6.2 G/DL (ref 6–8.4)
PROT SERPL-MCNC: 6.3 G/DL (ref 6–8.4)
PROT SERPL-MCNC: 6.5 G/DL (ref 6–8.4)
PROT SERPL-MCNC: 6.8 G/DL (ref 6–8.4)
PROT SERPL-MCNC: 6.8 G/DL (ref 6–8.4)
PROT SERPL-MCNC: 7 G/DL (ref 6–8.4)
PROT SERPL-MCNC: 7 G/DL (ref 6–8.4)
PROT UR QL STRIP: NEGATIVE
PROT UR-MCNC: <7 MG/DL (ref 0–15)
PROTHROMBIN TIME: 11.7 SEC (ref 9–12.5)
PROTHROMBIN TIME: 19.9 SEC (ref 11.4–13.7)
PTH-INTACT SERPL-MCNC: 83.5 PG/ML (ref 9–77)
PV PEAK VELOCITY: 0.88 CM/S
RA MAJOR: 5.98 CM
RA PRESSURE: 15 MMHG
RBC # BLD AUTO: 2.72 M/UL (ref 4.6–6.2)
RBC # BLD AUTO: 2.78 M/UL (ref 4.6–6.2)
RBC # BLD AUTO: 2.86 M/UL (ref 4.6–6.2)
RBC # BLD AUTO: 2.89 M/UL (ref 4.6–6.2)
RBC # BLD AUTO: 2.93 M/UL (ref 4.6–6.2)
RBC # BLD AUTO: 2.94 M/UL (ref 4.6–6.2)
RBC # BLD AUTO: 2.94 M/UL (ref 4.6–6.2)
RBC # BLD AUTO: 3 M/UL (ref 4.6–6.2)
RBC # BLD AUTO: 3.01 M/UL (ref 4.6–6.2)
RBC # BLD AUTO: 3.09 M/UL (ref 4.6–6.2)
RBC # BLD AUTO: 3.1 M/UL (ref 4.6–6.2)
RBC # BLD AUTO: 3.12 M/UL (ref 4.6–6.2)
RBC # BLD AUTO: 3.12 M/UL (ref 4.6–6.2)
RBC # BLD AUTO: 3.16 M/UL (ref 4.6–6.2)
RBC # BLD AUTO: 3.16 M/UL (ref 4.6–6.2)
RBC # BLD AUTO: 3.18 M/UL (ref 4.6–6.2)
RBC # BLD AUTO: 3.45 M/UL (ref 4.6–6.2)
RBC # BLD AUTO: 3.55 M/UL (ref 4.6–6.2)
RBC # BLD AUTO: 3.6 M/UL (ref 4.6–6.2)
RBC # BLD AUTO: 3.76 M/UL (ref 4.6–6.2)
RBC #/AREA URNS HPF: >100 /HPF (ref 0–4)
RV TISSUE DOPPLER FREE WALL SYSTOLIC VELOCITY 1 (APICAL 4 CHAMBER VIEW): 0.01 CM/S
SAMPLE: ABNORMAL
SARS-COV-2 RDRP RESP QL NAA+PROBE: NEGATIVE
SARS-COV-2 RNA RESP QL NAA+PROBE: NOT DETECTED
SARS-COV-2- CYCLE NUMBER: NORMAL
SATURATED IRON: 28 % (ref 20–50)
SITE: ABNORMAL
SODIUM SERPL-SCNC: 138 MMOL/L (ref 136–145)
SODIUM SERPL-SCNC: 138 MMOL/L (ref 136–145)
SODIUM SERPL-SCNC: 139 MMOL/L (ref 136–145)
SODIUM SERPL-SCNC: 140 MMOL/L (ref 136–145)
SODIUM SERPL-SCNC: 141 MMOL/L (ref 136–145)
SODIUM SERPL-SCNC: 142 MMOL/L (ref 136–145)
SODIUM SERPL-SCNC: 143 MMOL/L (ref 136–145)
SODIUM SERPL-SCNC: 144 MMOL/L (ref 136–145)
SODIUM SERPL-SCNC: 145 MMOL/L (ref 136–145)
SODIUM SERPL-SCNC: 146 MMOL/L (ref 136–145)
SODIUM SERPL-SCNC: 146 MMOL/L (ref 136–145)
SP GR UR STRIP: 1.01 (ref 1–1.03)
SPECIMEN SOURCE: NORMAL
SQUAMOUS #/AREA URNS HPF: 0 /HPF
T4 FREE SERPL-MCNC: 0.88 NG/DL (ref 0.71–1.51)
TOTAL IRON BINDING CAPACITY: 235 UG/DL (ref 250–450)
TR MAX PG: 56 MMHG
TRANSFERRIN SERPL-MCNC: 159 MG/DL (ref 200–375)
TRIGL SERPL-MCNC: 47 MG/DL (ref 30–150)
TROPONIN I SERPL DL<=0.01 NG/ML-MCNC: 0.01 NG/ML (ref 0–0.03)
TROPONIN I SERPL DL<=0.01 NG/ML-MCNC: 0.02 NG/ML (ref 0–0.03)
TROPONIN I SERPL DL<=0.01 NG/ML-MCNC: 0.97 NG/ML (ref 0–0.03)
TROPONIN I SERPL DL<=0.01 NG/ML-MCNC: 1.03 NG/ML (ref 0–0.03)
TROPONIN I SERPL DL<=0.01 NG/ML-MCNC: <0.03 NG/ML
TSH SERPL DL<=0.005 MIU/L-ACNC: 4.18 UIU/ML (ref 0.4–4)
TSH SERPL DL<=0.005 MIU/L-ACNC: 5.3 UIU/ML (ref 0.34–5.6)
TV REST PULMONARY ARTERY PRESSURE: 71 MMHG
UNIDENT CRYS URNS QL MICRO: 3
URATE SERPL-MCNC: 9 MG/DL (ref 3.4–7)
URN SPEC COLLECT METH UR: ABNORMAL
URN SPEC COLLECT METH UR: ABNORMAL
URN SPEC COLLECT METH UR: NORMAL
UROBILINOGEN UR STRIP-ACNC: NEGATIVE EU/DL
VANCOMYCIN SERPL-MCNC: 16.1 UG/ML
VANCOMYCIN SERPL-MCNC: 17.7 UG/ML
VANCOMYCIN SERPL-MCNC: 18.9 UG/ML
VANCOMYCIN SERPL-MCNC: 26.2 UG/ML
VIT B1 BLD-MCNC: 74 UG/L (ref 38–122)
VIT B12 SERPL-MCNC: 1783 PG/ML (ref 210–950)
WBC # BLD AUTO: 11.3 K/UL (ref 3.9–12.7)
WBC # BLD AUTO: 11.96 K/UL (ref 3.9–12.7)
WBC # BLD AUTO: 13.17 K/UL (ref 3.9–12.7)
WBC # BLD AUTO: 14.06 K/UL (ref 3.9–12.7)
WBC # BLD AUTO: 14.34 K/UL (ref 3.9–12.7)
WBC # BLD AUTO: 15.58 K/UL (ref 3.9–12.7)
WBC # BLD AUTO: 15.66 K/UL (ref 3.9–12.7)
WBC # BLD AUTO: 16.27 K/UL (ref 3.9–12.7)
WBC # BLD AUTO: 17.68 K/UL (ref 3.9–12.7)
WBC # BLD AUTO: 20.71 K/UL (ref 3.9–12.7)
WBC # BLD AUTO: 6.96 K/UL (ref 3.9–12.7)
WBC # BLD AUTO: 7.27 K/UL (ref 3.9–12.7)
WBC # BLD AUTO: 7.72 K/UL (ref 3.9–12.7)
WBC # BLD AUTO: 8.03 K/UL (ref 3.9–12.7)
WBC # BLD AUTO: 8.62 K/UL (ref 3.9–12.7)
WBC # BLD AUTO: 8.65 K/UL (ref 3.9–12.7)
WBC # BLD AUTO: 8.96 K/UL (ref 3.9–12.7)
WBC # BLD AUTO: 9.13 K/UL (ref 3.9–12.7)
WBC # BLD AUTO: 9.14 K/UL (ref 3.9–12.7)
WBC # BLD AUTO: 9.5 K/UL (ref 3.9–12.7)
WBC #/AREA URNS HPF: 27 /HPF (ref 0–5)

## 2022-01-01 PROCEDURE — 94761 N-INVAS EAR/PLS OXIMETRY MLT: CPT

## 2022-01-01 PROCEDURE — 96365 THER/PROPH/DIAG IV INF INIT: CPT

## 2022-01-01 PROCEDURE — 84484 ASSAY OF TROPONIN QUANT: CPT | Mod: 91 | Performed by: HOSPITALIST

## 2022-01-01 PROCEDURE — 83880 ASSAY OF NATRIURETIC PEPTIDE: CPT | Performed by: PHYSICIAN ASSISTANT

## 2022-01-01 PROCEDURE — 99283 EMERGENCY DEPT VISIT LOW MDM: CPT

## 2022-01-01 PROCEDURE — 99213 PR OFFICE/OUTPT VISIT, EST, LEVL III, 20-29 MIN: ICD-10-PCS | Mod: S$GLB,,, | Performed by: SURGERY

## 2022-01-01 PROCEDURE — G0378 HOSPITAL OBSERVATION PER HR: HCPCS

## 2022-01-01 PROCEDURE — 63600175 PHARM REV CODE 636 W HCPCS: Performed by: EMERGENCY MEDICINE

## 2022-01-01 PROCEDURE — 83735 ASSAY OF MAGNESIUM: CPT

## 2022-01-01 PROCEDURE — 80048 BASIC METABOLIC PNL TOTAL CA: CPT | Performed by: NURSE PRACTITIONER

## 2022-01-01 PROCEDURE — 27000221 HC OXYGEN, UP TO 24 HOURS

## 2022-01-01 PROCEDURE — 25000003 PHARM REV CODE 250: Performed by: NURSE ANESTHETIST, CERTIFIED REGISTERED

## 2022-01-01 PROCEDURE — 63600175 PHARM REV CODE 636 W HCPCS: Performed by: HOSPITALIST

## 2022-01-01 PROCEDURE — 97530 THERAPEUTIC ACTIVITIES: CPT

## 2022-01-01 PROCEDURE — 83735 ASSAY OF MAGNESIUM: CPT | Performed by: EMERGENCY MEDICINE

## 2022-01-01 PROCEDURE — 96376 TX/PRO/DX INJ SAME DRUG ADON: CPT

## 2022-01-01 PROCEDURE — 27000190 HC CPAP FULL FACE MASK W/VALVE

## 2022-01-01 PROCEDURE — 83880 ASSAY OF NATRIURETIC PEPTIDE: CPT | Performed by: NURSE PRACTITIONER

## 2022-01-01 PROCEDURE — 51702 INSERT TEMP BLADDER CATH: CPT

## 2022-01-01 PROCEDURE — 85025 COMPLETE CBC W/AUTO DIFF WBC: CPT

## 2022-01-01 PROCEDURE — 99213 OFFICE O/P EST LOW 20 MIN: CPT | Mod: S$PBB,AQ,, | Performed by: FAMILY MEDICINE

## 2022-01-01 PROCEDURE — 97140 MANUAL THERAPY 1/> REGIONS: CPT | Mod: PN

## 2022-01-01 PROCEDURE — U0005 INFEC AGEN DETEC AMPLI PROBE: HCPCS | Performed by: SURGERY

## 2022-01-01 PROCEDURE — 63600175 PHARM REV CODE 636 W HCPCS: Performed by: INTERNAL MEDICINE

## 2022-01-01 PROCEDURE — 93010 ELECTROCARDIOGRAM REPORT: CPT | Mod: ,,, | Performed by: INTERNAL MEDICINE

## 2022-01-01 PROCEDURE — 96360 HYDRATION IV INFUSION INIT: CPT

## 2022-01-01 PROCEDURE — 82570 ASSAY OF URINE CREATININE: CPT | Performed by: INTERNAL MEDICINE

## 2022-01-01 PROCEDURE — 83880 ASSAY OF NATRIURETIC PEPTIDE: CPT | Performed by: EMERGENCY MEDICINE

## 2022-01-01 PROCEDURE — 85025 COMPLETE CBC W/AUTO DIFF WBC: CPT | Performed by: NURSE PRACTITIONER

## 2022-01-01 PROCEDURE — 97535 SELF CARE MNGMENT TRAINING: CPT

## 2022-01-01 PROCEDURE — 97116 GAIT TRAINING THERAPY: CPT

## 2022-01-01 PROCEDURE — 96375 TX/PRO/DX INJ NEW DRUG ADDON: CPT

## 2022-01-01 PROCEDURE — 86038 ANTINUCLEAR ANTIBODIES: CPT | Performed by: INTERNAL MEDICINE

## 2022-01-01 PROCEDURE — 84550 ASSAY OF BLOOD/URIC ACID: CPT | Performed by: NURSE PRACTITIONER

## 2022-01-01 PROCEDURE — 99233 PR SUBSEQUENT HOSPITAL CARE,LEVL III: ICD-10-PCS | Mod: ,,, | Performed by: INTERNAL MEDICINE

## 2022-01-01 PROCEDURE — 99214 PR OFFICE/OUTPT VISIT, EST, LEVL IV, 30-39 MIN: ICD-10-PCS | Mod: S$PBB,AQ,, | Performed by: FAMILY MEDICINE

## 2022-01-01 PROCEDURE — 36600 WITHDRAWAL OF ARTERIAL BLOOD: CPT

## 2022-01-01 PROCEDURE — 11000001 HC ACUTE MED/SURG PRIVATE ROOM

## 2022-01-01 PROCEDURE — 36415 COLL VENOUS BLD VENIPUNCTURE: CPT | Performed by: INTERNAL MEDICINE

## 2022-01-01 PROCEDURE — 84100 ASSAY OF PHOSPHORUS: CPT | Performed by: NURSE PRACTITIONER

## 2022-01-01 PROCEDURE — 99233 SBSQ HOSP IP/OBS HIGH 50: CPT | Mod: ,,, | Performed by: INTERNAL MEDICINE

## 2022-01-01 PROCEDURE — 25000003 PHARM REV CODE 250: Performed by: NURSE PRACTITIONER

## 2022-01-01 PROCEDURE — 27100171 HC OXYGEN HIGH FLOW UP TO 24 HOURS

## 2022-01-01 PROCEDURE — 99233 SBSQ HOSP IP/OBS HIGH 50: CPT | Mod: ,,, | Performed by: NURSE PRACTITIONER

## 2022-01-01 PROCEDURE — 36415 COLL VENOUS BLD VENIPUNCTURE: CPT | Performed by: EMERGENCY MEDICINE

## 2022-01-01 PROCEDURE — 93005 ELECTROCARDIOGRAM TRACING: CPT

## 2022-01-01 PROCEDURE — 36415 COLL VENOUS BLD VENIPUNCTURE: CPT | Performed by: HOSPITALIST

## 2022-01-01 PROCEDURE — 99900035 HC TECH TIME PER 15 MIN (STAT)

## 2022-01-01 PROCEDURE — 85027 COMPLETE CBC AUTOMATED: CPT | Performed by: NURSE PRACTITIONER

## 2022-01-01 PROCEDURE — 99215 OFFICE O/P EST HI 40 MIN: CPT | Performed by: NURSE PRACTITIONER

## 2022-01-01 PROCEDURE — 36415 COLL VENOUS BLD VENIPUNCTURE: CPT | Performed by: NURSE PRACTITIONER

## 2022-01-01 PROCEDURE — D9220A PRA ANESTHESIA: Mod: ANES,,, | Performed by: ANESTHESIOLOGY

## 2022-01-01 PROCEDURE — 84484 ASSAY OF TROPONIN QUANT: CPT | Performed by: PHYSICIAN ASSISTANT

## 2022-01-01 PROCEDURE — 80202 ASSAY OF VANCOMYCIN: CPT | Performed by: INTERNAL MEDICINE

## 2022-01-01 PROCEDURE — 99214 PR OFFICE/OUTPT VISIT, EST, LEVL IV, 30-39 MIN: ICD-10-PCS | Mod: S$GLB,,, | Performed by: INTERNAL MEDICINE

## 2022-01-01 PROCEDURE — 85025 COMPLETE CBC W/AUTO DIFF WBC: CPT | Performed by: PHYSICIAN ASSISTANT

## 2022-01-01 PROCEDURE — 97165 OT EVAL LOW COMPLEX 30 MIN: CPT

## 2022-01-01 PROCEDURE — 99223 1ST HOSP IP/OBS HIGH 75: CPT | Mod: ,,,

## 2022-01-01 PROCEDURE — 86160 COMPLEMENT ANTIGEN: CPT | Mod: 59 | Performed by: INTERNAL MEDICINE

## 2022-01-01 PROCEDURE — 80069 RENAL FUNCTION PANEL: CPT | Performed by: INTERNAL MEDICINE

## 2022-01-01 PROCEDURE — 84443 ASSAY THYROID STIM HORMONE: CPT | Performed by: NURSE PRACTITIONER

## 2022-01-01 PROCEDURE — 85025 COMPLETE CBC W/AUTO DIFF WBC: CPT | Performed by: EMERGENCY MEDICINE

## 2022-01-01 PROCEDURE — 87186 SC STD MICRODIL/AGAR DIL: CPT | Performed by: NURSE PRACTITIONER

## 2022-01-01 PROCEDURE — 80048 BASIC METABOLIC PNL TOTAL CA: CPT | Performed by: EMERGENCY MEDICINE

## 2022-01-01 PROCEDURE — 20000000 HC ICU ROOM

## 2022-01-01 PROCEDURE — 25000003 PHARM REV CODE 250: Performed by: HOSPITALIST

## 2022-01-01 PROCEDURE — 80048 BASIC METABOLIC PNL TOTAL CA: CPT | Mod: 91 | Performed by: INTERNAL MEDICINE

## 2022-01-01 PROCEDURE — 93010 ELECTROCARDIOGRAM REPORT: CPT | Mod: ,,, | Performed by: GENERAL PRACTICE

## 2022-01-01 PROCEDURE — U0003 INFECTIOUS AGENT DETECTION BY NUCLEIC ACID (DNA OR RNA); SEVERE ACUTE RESPIRATORY SYNDROME CORONAVIRUS 2 (SARS-COV-2) (CORONAVIRUS DISEASE [COVID-19]), AMPLIFIED PROBE TECHNIQUE, MAKING USE OF HIGH THROUGHPUT TECHNOLOGIES AS DESCRIBED BY CMS-2020-01-R: HCPCS | Performed by: SURGERY

## 2022-01-01 PROCEDURE — 99214 OFFICE O/P EST MOD 30 MIN: CPT | Mod: S$GLB,,, | Performed by: INTERNAL MEDICINE

## 2022-01-01 PROCEDURE — 99213 PR OFFICE/OUTPT VISIT, EST, LEVL III, 20-29 MIN: ICD-10-PCS | Mod: S$GLB,,, | Performed by: NURSE PRACTITIONER

## 2022-01-01 PROCEDURE — 87086 URINE CULTURE/COLONY COUNT: CPT | Performed by: INTERNAL MEDICINE

## 2022-01-01 PROCEDURE — 93010 EKG 12-LEAD: ICD-10-PCS | Mod: ,,, | Performed by: SPECIALIST

## 2022-01-01 PROCEDURE — 84165 PROTEIN E-PHORESIS SERUM: CPT | Mod: 26,,, | Performed by: PATHOLOGY

## 2022-01-01 PROCEDURE — 81003 URINALYSIS AUTO W/O SCOPE: CPT | Performed by: NURSE PRACTITIONER

## 2022-01-01 PROCEDURE — 99214 PR OFFICE/OUTPT VISIT, EST, LEVL IV, 30-39 MIN: ICD-10-PCS | Mod: S$PBB,,, | Performed by: NURSE PRACTITIONER

## 2022-01-01 PROCEDURE — 81003 URINALYSIS AUTO W/O SCOPE: CPT | Performed by: EMERGENCY MEDICINE

## 2022-01-01 PROCEDURE — 25000003 PHARM REV CODE 250: Performed by: INTERNAL MEDICINE

## 2022-01-01 PROCEDURE — 80053 COMPREHEN METABOLIC PANEL: CPT

## 2022-01-01 PROCEDURE — 99024 POSTOP FOLLOW-UP VISIT: CPT | Mod: S$GLB,POP,, | Performed by: PHYSICIAN ASSISTANT

## 2022-01-01 PROCEDURE — 86160 COMPLEMENT ANTIGEN: CPT | Performed by: INTERNAL MEDICINE

## 2022-01-01 PROCEDURE — 71046 XR CHEST PA AND LATERAL: ICD-10-PCS | Mod: 26,,, | Performed by: RADIOLOGY

## 2022-01-01 PROCEDURE — 80061 LIPID PANEL: CPT | Performed by: FAMILY MEDICINE

## 2022-01-01 PROCEDURE — 93010 EKG 12-LEAD: ICD-10-PCS | Mod: ,,, | Performed by: INTERNAL MEDICINE

## 2022-01-01 PROCEDURE — 94799 UNLISTED PULMONARY SVC/PX: CPT

## 2022-01-01 PROCEDURE — 82550 ASSAY OF CK (CPK): CPT | Performed by: NURSE PRACTITIONER

## 2022-01-01 PROCEDURE — 99213 OFFICE O/P EST LOW 20 MIN: CPT | Performed by: NURSE PRACTITIONER

## 2022-01-01 PROCEDURE — 84166 PATHOLOGIST INTERPRETATION UPE: ICD-10-PCS | Mod: 26,,, | Performed by: PATHOLOGY

## 2022-01-01 PROCEDURE — 87502 INFLUENZA DNA AMP PROBE: CPT | Performed by: PHYSICIAN ASSISTANT

## 2022-01-01 PROCEDURE — 63600175 PHARM REV CODE 636 W HCPCS: Performed by: NURSE PRACTITIONER

## 2022-01-01 PROCEDURE — 99024 PR POST-OP FOLLOW-UP VISIT: ICD-10-PCS | Mod: S$GLB,POP,, | Performed by: PHYSICIAN ASSISTANT

## 2022-01-01 PROCEDURE — 99900103 DSU ONLY-NO CHARGE-INITIAL HR (STAT): Performed by: SURGERY

## 2022-01-01 PROCEDURE — 87040 BLOOD CULTURE FOR BACTERIA: CPT | Mod: 59 | Performed by: NURSE PRACTITIONER

## 2022-01-01 PROCEDURE — 97164 PT RE-EVAL EST PLAN CARE: CPT

## 2022-01-01 PROCEDURE — 84443 ASSAY THYROID STIM HORMONE: CPT | Performed by: FAMILY MEDICINE

## 2022-01-01 PROCEDURE — 49650 LAP ING HERNIA REPAIR INIT: CPT | Mod: RT,,, | Performed by: SURGERY

## 2022-01-01 PROCEDURE — 99900103 DSU ONLY-NO CHARGE-INITIAL HR (STAT)

## 2022-01-01 PROCEDURE — 36000711: Performed by: SURGERY

## 2022-01-01 PROCEDURE — 71000033 HC RECOVERY, INTIAL HOUR: Performed by: SURGERY

## 2022-01-01 PROCEDURE — 71000039 HC RECOVERY, EACH ADD'L HOUR: Performed by: SURGERY

## 2022-01-01 PROCEDURE — 80069 RENAL FUNCTION PANEL: CPT | Performed by: NURSE PRACTITIONER

## 2022-01-01 PROCEDURE — 99900104 DSU ONLY-NO CHARGE-EA ADD'L HR (STAT)

## 2022-01-01 PROCEDURE — 97530 THERAPEUTIC ACTIVITIES: CPT | Performed by: PHYSICAL THERAPIST

## 2022-01-01 PROCEDURE — 94640 AIRWAY INHALATION TREATMENT: CPT

## 2022-01-01 PROCEDURE — 93010 EKG 12-LEAD: ICD-10-PCS | Mod: ,,, | Performed by: GENERAL PRACTICE

## 2022-01-01 PROCEDURE — 82728 ASSAY OF FERRITIN: CPT | Performed by: NURSE PRACTITIONER

## 2022-01-01 PROCEDURE — 71000015 HC POSTOP RECOV 1ST HR: Performed by: SURGERY

## 2022-01-01 PROCEDURE — 99497 ADVNCD CARE PLAN 30 MIN: CPT | Mod: 25,,,

## 2022-01-01 PROCEDURE — 94660 CPAP INITIATION&MGMT: CPT

## 2022-01-01 PROCEDURE — 63600175 PHARM REV CODE 636 W HCPCS: Performed by: NURSE ANESTHETIST, CERTIFIED REGISTERED

## 2022-01-01 PROCEDURE — 97161 PT EVAL LOW COMPLEX 20 MIN: CPT

## 2022-01-01 PROCEDURE — 83880 ASSAY OF NATRIURETIC PEPTIDE: CPT | Performed by: INTERNAL MEDICINE

## 2022-01-01 PROCEDURE — 71000016 HC POSTOP RECOV ADDL HR: Performed by: SURGERY

## 2022-01-01 PROCEDURE — 99213 PR OFFICE/OUTPT VISIT, EST, LEVL III, 20-29 MIN: ICD-10-PCS | Mod: S$PBB,,, | Performed by: NURSE PRACTITIONER

## 2022-01-01 PROCEDURE — 99233 PR SUBSEQUENT HOSPITAL CARE,LEVL III: ICD-10-PCS | Mod: ,,, | Performed by: NURSE PRACTITIONER

## 2022-01-01 PROCEDURE — 36415 COLL VENOUS BLD VENIPUNCTURE: CPT

## 2022-01-01 PROCEDURE — 80053 COMPREHEN METABOLIC PANEL: CPT | Performed by: INTERNAL MEDICINE

## 2022-01-01 PROCEDURE — 83735 ASSAY OF MAGNESIUM: CPT | Performed by: NURSE PRACTITIONER

## 2022-01-01 PROCEDURE — 90833 PR PSYCHOTHERAPY W/PATIENT W/E&M, 30 MIN (ADD ON): ICD-10-PCS | Mod: ,,, | Performed by: PSYCHIATRY & NEUROLOGY

## 2022-01-01 PROCEDURE — 99213 OFFICE O/P EST LOW 20 MIN: CPT | Mod: S$PBB,,, | Performed by: NURSE PRACTITIONER

## 2022-01-01 PROCEDURE — 99214 OFFICE O/P EST MOD 30 MIN: CPT | Mod: S$PBB,,, | Performed by: NURSE PRACTITIONER

## 2022-01-01 PROCEDURE — 80202 ASSAY OF VANCOMYCIN: CPT | Performed by: HOSPITALIST

## 2022-01-01 PROCEDURE — 84466 ASSAY OF TRANSFERRIN: CPT | Performed by: NURSE PRACTITIONER

## 2022-01-01 PROCEDURE — 96367 TX/PROPH/DG ADDL SEQ IV INF: CPT

## 2022-01-01 PROCEDURE — 94760 N-INVAS EAR/PLS OXIMETRY 1: CPT

## 2022-01-01 PROCEDURE — 84484 ASSAY OF TROPONIN QUANT: CPT | Performed by: EMERGENCY MEDICINE

## 2022-01-01 PROCEDURE — 80053 COMPREHEN METABOLIC PANEL: CPT | Performed by: NURSE PRACTITIONER

## 2022-01-01 PROCEDURE — 84156 ASSAY OF PROTEIN URINE: CPT | Performed by: INTERNAL MEDICINE

## 2022-01-01 PROCEDURE — 25000242 PHARM REV CODE 250 ALT 637 W/ HCPCS: Performed by: HOSPITALIST

## 2022-01-01 PROCEDURE — 80053 COMPREHEN METABOLIC PANEL: CPT | Performed by: EMERGENCY MEDICINE

## 2022-01-01 PROCEDURE — 82962 GLUCOSE BLOOD TEST: CPT

## 2022-01-01 PROCEDURE — 25000003 PHARM REV CODE 250

## 2022-01-01 PROCEDURE — 99285 EMERGENCY DEPT VISIT HI MDM: CPT | Mod: 25

## 2022-01-01 PROCEDURE — U0002 COVID-19 LAB TEST NON-CDC: HCPCS | Performed by: EMERGENCY MEDICINE

## 2022-01-01 PROCEDURE — 85025 COMPLETE CBC W/AUTO DIFF WBC: CPT | Performed by: FAMILY MEDICINE

## 2022-01-01 PROCEDURE — 83735 ASSAY OF MAGNESIUM: CPT | Mod: 91 | Performed by: INTERNAL MEDICINE

## 2022-01-01 PROCEDURE — 99213 PR OFFICE/OUTPT VISIT, EST, LEVL III, 20-29 MIN: ICD-10-PCS | Mod: S$PBB,AQ,, | Performed by: FAMILY MEDICINE

## 2022-01-01 PROCEDURE — 25000003 PHARM REV CODE 250: Performed by: SURGERY

## 2022-01-01 PROCEDURE — 82550 ASSAY OF CK (CPK): CPT | Performed by: HOSPITALIST

## 2022-01-01 PROCEDURE — 36000710: Performed by: SURGERY

## 2022-01-01 PROCEDURE — 82803 BLOOD GASES ANY COMBINATION: CPT

## 2022-01-01 PROCEDURE — 84484 ASSAY OF TROPONIN QUANT: CPT | Performed by: NURSE PRACTITIONER

## 2022-01-01 PROCEDURE — 37000009 HC ANESTHESIA EA ADD 15 MINS: Performed by: SURGERY

## 2022-01-01 PROCEDURE — 25000003 PHARM REV CODE 250: Performed by: ANESTHESIOLOGY

## 2022-01-01 PROCEDURE — 99215 OFFICE O/P EST HI 40 MIN: CPT | Performed by: FAMILY MEDICINE

## 2022-01-01 PROCEDURE — D9220A PRA ANESTHESIA: ICD-10-PCS | Mod: CRNA,,, | Performed by: NURSE ANESTHETIST, CERTIFIED REGISTERED

## 2022-01-01 PROCEDURE — 99282 EMERGENCY DEPT VISIT SF MDM: CPT

## 2022-01-01 PROCEDURE — 99223 PR INITIAL HOSPITAL CARE,LEVL III: ICD-10-PCS | Mod: ,,, | Performed by: PSYCHIATRY & NEUROLOGY

## 2022-01-01 PROCEDURE — 87389 HIV-1 AG W/HIV-1&-2 AB AG IA: CPT | Performed by: EMERGENCY MEDICINE

## 2022-01-01 PROCEDURE — 86803 HEPATITIS C AB TEST: CPT | Performed by: EMERGENCY MEDICINE

## 2022-01-01 PROCEDURE — 84165 PATHOLOGIST INTERPRETATION SPE: ICD-10-PCS | Mod: 26,,, | Performed by: PATHOLOGY

## 2022-01-01 PROCEDURE — 49650 PR LAP,INGUINAL HERNIA REPR,INITIAL: ICD-10-PCS | Mod: RT,,, | Performed by: SURGERY

## 2022-01-01 PROCEDURE — 84100 ASSAY OF PHOSPHORUS: CPT

## 2022-01-01 PROCEDURE — 63600150 PHARM REV CODE 636: Performed by: INTERNAL MEDICINE

## 2022-01-01 PROCEDURE — 99223 1ST HOSP IP/OBS HIGH 75: CPT | Mod: ,,, | Performed by: PSYCHIATRY & NEUROLOGY

## 2022-01-01 PROCEDURE — 83970 ASSAY OF PARATHORMONE: CPT | Performed by: INTERNAL MEDICINE

## 2022-01-01 PROCEDURE — 84145 PROCALCITONIN (PCT): CPT | Performed by: NURSE PRACTITIONER

## 2022-01-01 PROCEDURE — 80053 COMPREHEN METABOLIC PANEL: CPT | Performed by: PHYSICIAN ASSISTANT

## 2022-01-01 PROCEDURE — 99223 1ST HOSP IP/OBS HIGH 75: CPT | Mod: FS,,, | Performed by: INTERNAL MEDICINE

## 2022-01-01 PROCEDURE — 84166 PROTEIN E-PHORESIS/URINE/CSF: CPT | Mod: 26,,, | Performed by: PATHOLOGY

## 2022-01-01 PROCEDURE — 99213 OFFICE O/P EST LOW 20 MIN: CPT | Mod: S$GLB,,, | Performed by: NURSE PRACTITIONER

## 2022-01-01 PROCEDURE — 90833 PSYTX W PT W E/M 30 MIN: CPT | Mod: ,,, | Performed by: PSYCHIATRY & NEUROLOGY

## 2022-01-01 PROCEDURE — 99214 OFFICE O/P EST MOD 30 MIN: CPT | Mod: S$PBB,AQ,, | Performed by: FAMILY MEDICINE

## 2022-01-01 PROCEDURE — 99213 OFFICE O/P EST LOW 20 MIN: CPT | Mod: S$GLB,,, | Performed by: SURGERY

## 2022-01-01 PROCEDURE — 36415 COLL VENOUS BLD VENIPUNCTURE: CPT | Performed by: FAMILY MEDICINE

## 2022-01-01 PROCEDURE — 80076 HEPATIC FUNCTION PANEL: CPT

## 2022-01-01 PROCEDURE — 85610 PROTHROMBIN TIME: CPT | Performed by: ANESTHESIOLOGY

## 2022-01-01 PROCEDURE — 93005 ELECTROCARDIOGRAM TRACING: CPT | Performed by: GENERAL PRACTICE

## 2022-01-01 PROCEDURE — 99285 EMERGENCY DEPT VISIT HI MDM: CPT | Mod: 25,CS

## 2022-01-01 PROCEDURE — 84165 PROTEIN E-PHORESIS SERUM: CPT | Performed by: INTERNAL MEDICINE

## 2022-01-01 PROCEDURE — 99223 PR INITIAL HOSPITAL CARE,LEVL III: ICD-10-PCS | Mod: ,,,

## 2022-01-01 PROCEDURE — 87070 CULTURE OTHR SPECIMN AEROBIC: CPT | Performed by: NURSE PRACTITIONER

## 2022-01-01 PROCEDURE — 25000003 PHARM REV CODE 250: Performed by: EMERGENCY MEDICINE

## 2022-01-01 PROCEDURE — 82607 VITAMIN B-12: CPT

## 2022-01-01 PROCEDURE — 99285 EMERGENCY DEPT VISIT HI MDM: CPT

## 2022-01-01 PROCEDURE — 36415 COLL VENOUS BLD VENIPUNCTURE: CPT | Performed by: ANESTHESIOLOGY

## 2022-01-01 PROCEDURE — D9220A PRA ANESTHESIA: Mod: CRNA,,, | Performed by: NURSE ANESTHETIST, CERTIFIED REGISTERED

## 2022-01-01 PROCEDURE — 25000003 PHARM REV CODE 250: Performed by: STUDENT IN AN ORGANIZED HEALTH CARE EDUCATION/TRAINING PROGRAM

## 2022-01-01 PROCEDURE — 63600175 PHARM REV CODE 636 W HCPCS: Performed by: SURGERY

## 2022-01-01 PROCEDURE — 97166 OT EVAL MOD COMPLEX 45 MIN: CPT

## 2022-01-01 PROCEDURE — 96374 THER/PROPH/DIAG INJ IV PUSH: CPT

## 2022-01-01 PROCEDURE — 81003 URINALYSIS AUTO W/O SCOPE: CPT | Performed by: HOSPITALIST

## 2022-01-01 PROCEDURE — 71046 X-RAY EXAM CHEST 2 VIEWS: CPT | Mod: TC,FY

## 2022-01-01 PROCEDURE — 97165 OT EVAL LOW COMPLEX 30 MIN: CPT | Mod: PN

## 2022-01-01 PROCEDURE — D9220A PRA ANESTHESIA: ICD-10-PCS | Mod: ANES,,, | Performed by: ANESTHESIOLOGY

## 2022-01-01 PROCEDURE — 87205 SMEAR GRAM STAIN: CPT | Performed by: NURSE PRACTITIONER

## 2022-01-01 PROCEDURE — 85610 PROTHROMBIN TIME: CPT | Performed by: PHYSICIAN ASSISTANT

## 2022-01-01 PROCEDURE — 99497 PR ADVNCD CARE PLAN 30 MIN: ICD-10-PCS | Mod: 25,,,

## 2022-01-01 PROCEDURE — 97110 THERAPEUTIC EXERCISES: CPT

## 2022-01-01 PROCEDURE — 85730 THROMBOPLASTIN TIME PARTIAL: CPT | Performed by: ANESTHESIOLOGY

## 2022-01-01 PROCEDURE — 99900104 DSU ONLY-NO CHARGE-EA ADD'L HR (STAT): Performed by: SURGERY

## 2022-01-01 PROCEDURE — 37000008 HC ANESTHESIA 1ST 15 MINUTES: Performed by: SURGERY

## 2022-01-01 PROCEDURE — 85007 BL SMEAR W/DIFF WBC COUNT: CPT | Performed by: NURSE PRACTITIONER

## 2022-01-01 PROCEDURE — 25000242 PHARM REV CODE 250 ALT 637 W/ HCPCS: Performed by: NURSE PRACTITIONER

## 2022-01-01 PROCEDURE — 81000 URINALYSIS NONAUTO W/SCOPE: CPT | Performed by: INTERNAL MEDICINE

## 2022-01-01 PROCEDURE — 83036 HEMOGLOBIN GLYCOSYLATED A1C: CPT

## 2022-01-01 PROCEDURE — U0002 COVID-19 LAB TEST NON-CDC: HCPCS | Performed by: PHYSICIAN ASSISTANT

## 2022-01-01 PROCEDURE — 93010 ELECTROCARDIOGRAM REPORT: CPT | Mod: ,,, | Performed by: SPECIALIST

## 2022-01-01 PROCEDURE — 84425 ASSAY OF VITAMIN B-1: CPT

## 2022-01-01 PROCEDURE — C1781 MESH (IMPLANTABLE): HCPCS | Performed by: SURGERY

## 2022-01-01 PROCEDURE — 96361 HYDRATE IV INFUSION ADD-ON: CPT

## 2022-01-01 PROCEDURE — 27201423 OPTIME MED/SURG SUP & DEVICES STERILE SUPPLY: Performed by: SURGERY

## 2022-01-01 PROCEDURE — 93005 ELECTROCARDIOGRAM TRACING: CPT | Performed by: INTERNAL MEDICINE

## 2022-01-01 PROCEDURE — 80048 BASIC METABOLIC PNL TOTAL CA: CPT | Mod: XB | Performed by: HOSPITALIST

## 2022-01-01 PROCEDURE — 71046 X-RAY EXAM CHEST 2 VIEWS: CPT | Mod: 26,,, | Performed by: RADIOLOGY

## 2022-01-01 PROCEDURE — 84166 PROTEIN E-PHORESIS/URINE/CSF: CPT | Performed by: INTERNAL MEDICINE

## 2022-01-01 PROCEDURE — 87077 CULTURE AEROBIC IDENTIFY: CPT | Performed by: NURSE PRACTITIONER

## 2022-01-01 PROCEDURE — 83605 ASSAY OF LACTIC ACID: CPT | Performed by: NURSE PRACTITIONER

## 2022-01-01 PROCEDURE — 84439 ASSAY OF FREE THYROXINE: CPT | Performed by: NURSE PRACTITIONER

## 2022-01-01 PROCEDURE — 99223 PR INITIAL HOSPITAL CARE,LEVL III: ICD-10-PCS | Mod: FS,,, | Performed by: INTERNAL MEDICINE

## 2022-01-01 PROCEDURE — 80053 COMPREHEN METABOLIC PANEL: CPT | Performed by: FAMILY MEDICINE

## 2022-01-01 PROCEDURE — 82550 ASSAY OF CK (CPK): CPT | Performed by: EMERGENCY MEDICINE

## 2022-01-01 DEVICE — MESH PROGRIP LAP 12X16CM RIGHT: Type: IMPLANTABLE DEVICE | Site: INGUINAL | Status: FUNCTIONAL

## 2022-01-01 RX ORDER — GLUCAGON 1 MG
1 KIT INJECTION
Status: DISCONTINUED | OUTPATIENT
Start: 2022-01-01 | End: 2022-01-01 | Stop reason: HOSPADM

## 2022-01-01 RX ORDER — POTASSIUM CHLORIDE 20 MEQ/1
40 TABLET, EXTENDED RELEASE ORAL
Status: COMPLETED | OUTPATIENT
Start: 2022-01-01 | End: 2022-01-01

## 2022-01-01 RX ORDER — CEFAZOLIN SODIUM 2 G/50ML
2 SOLUTION INTRAVENOUS
Status: COMPLETED | OUTPATIENT
Start: 2022-01-01 | End: 2022-01-01

## 2022-01-01 RX ORDER — ONDANSETRON 2 MG/ML
4 INJECTION INTRAMUSCULAR; INTRAVENOUS EVERY 6 HOURS PRN
Status: DISCONTINUED | OUTPATIENT
Start: 2022-01-01 | End: 2022-01-01 | Stop reason: HOSPADM

## 2022-01-01 RX ORDER — GABAPENTIN 300 MG/1
300 CAPSULE ORAL 2 TIMES DAILY
Qty: 180 CAPSULE | Refills: 1 | Status: SHIPPED | OUTPATIENT
Start: 2022-01-01 | End: 2022-01-01 | Stop reason: ALTCHOICE

## 2022-01-01 RX ORDER — CYANOCOBALAMIN 1000 UG/ML
1000 INJECTION, SOLUTION INTRAMUSCULAR; SUBCUTANEOUS
Qty: 1 ML | Refills: 3 | Status: SHIPPED | OUTPATIENT
Start: 2022-01-01 | End: 2022-01-01 | Stop reason: SDUPTHER

## 2022-01-01 RX ORDER — GABAPENTIN 300 MG/1
300 CAPSULE ORAL 2 TIMES DAILY
Status: DISCONTINUED | OUTPATIENT
Start: 2022-01-01 | End: 2022-01-01

## 2022-01-01 RX ORDER — OXYCODONE AND ACETAMINOPHEN 5; 325 MG/1; MG/1
1 TABLET ORAL EVERY 6 HOURS PRN
Status: DISCONTINUED | OUTPATIENT
Start: 2022-01-01 | End: 2022-01-01 | Stop reason: HOSPADM

## 2022-01-01 RX ORDER — METOLAZONE 2.5 MG/1
5 TABLET ORAL DAILY
Status: DISCONTINUED | OUTPATIENT
Start: 2022-01-01 | End: 2022-01-01

## 2022-01-01 RX ORDER — OXYMETAZOLINE HCL 0.05 %
2 SPRAY, NON-AEROSOL (ML) NASAL 2 TIMES DAILY
Status: DISCONTINUED | OUTPATIENT
Start: 2022-01-01 | End: 2022-01-01 | Stop reason: HOSPADM

## 2022-01-01 RX ORDER — BISACODYL 10 MG
10 SUPPOSITORY, RECTAL RECTAL DAILY PRN
Status: DISCONTINUED | OUTPATIENT
Start: 2022-01-01 | End: 2022-01-01 | Stop reason: HOSPADM

## 2022-01-01 RX ORDER — ONDANSETRON HYDROCHLORIDE 2 MG/ML
INJECTION, SOLUTION INTRAMUSCULAR; INTRAVENOUS
Status: DISCONTINUED | OUTPATIENT
Start: 2022-01-01 | End: 2022-01-01

## 2022-01-01 RX ORDER — GABAPENTIN 400 MG/1
400 CAPSULE ORAL 2 TIMES DAILY
Status: DISCONTINUED | OUTPATIENT
Start: 2022-01-01 | End: 2022-01-01

## 2022-01-01 RX ORDER — NAPROXEN SODIUM 220 MG/1
81 TABLET, FILM COATED ORAL DAILY
Status: DISCONTINUED | OUTPATIENT
Start: 2022-01-01 | End: 2022-01-01 | Stop reason: HOSPADM

## 2022-01-01 RX ORDER — OXYCODONE HYDROCHLORIDE 5 MG/1
5 TABLET ORAL ONCE AS NEEDED
Status: COMPLETED | OUTPATIENT
Start: 2022-01-01 | End: 2022-01-01

## 2022-01-01 RX ORDER — METOLAZONE 2.5 MG/1
5 TABLET ORAL
Status: DISCONTINUED | OUTPATIENT
Start: 2022-01-01 | End: 2022-01-01

## 2022-01-01 RX ORDER — GABAPENTIN 600 MG/1
600 TABLET ORAL 2 TIMES DAILY
Qty: 60 TABLET | Refills: 11 | Status: SHIPPED | OUTPATIENT
Start: 2022-01-01 | End: 2023-08-08

## 2022-01-01 RX ORDER — TRAMADOL HYDROCHLORIDE 50 MG/1
50 TABLET ORAL EVERY 6 HOURS PRN
Qty: 30 EACH | Refills: 0 | Status: SHIPPED | OUTPATIENT
Start: 2022-01-01 | End: 2022-01-01 | Stop reason: SDUPTHER

## 2022-01-01 RX ORDER — AMIODARONE HYDROCHLORIDE 200 MG/1
200 TABLET ORAL DAILY
Status: DISCONTINUED | OUTPATIENT
Start: 2022-01-01 | End: 2022-01-01 | Stop reason: HOSPADM

## 2022-01-01 RX ORDER — SPIRONOLACTONE 25 MG/1
25 TABLET ORAL EVERY OTHER DAY
Qty: 15 TABLET | Refills: 11 | Status: ON HOLD | OUTPATIENT
Start: 2022-01-01 | End: 2022-01-01 | Stop reason: HOSPADM

## 2022-01-01 RX ORDER — GABAPENTIN 300 MG/1
300 CAPSULE ORAL 2 TIMES DAILY
Qty: 60 CAPSULE | Refills: 0 | Status: SHIPPED | OUTPATIENT
Start: 2022-01-01 | End: 2022-01-01

## 2022-01-01 RX ORDER — BUPIVACAINE HYDROCHLORIDE AND EPINEPHRINE 2.5; 5 MG/ML; UG/ML
INJECTION, SOLUTION EPIDURAL; INFILTRATION; INTRACAUDAL; PERINEURAL
Status: DISCONTINUED | OUTPATIENT
Start: 2022-01-01 | End: 2022-01-01 | Stop reason: HOSPADM

## 2022-01-01 RX ORDER — ASCORBIC ACID 500 MG
1000 TABLET ORAL DAILY
Status: DISCONTINUED | OUTPATIENT
Start: 2022-01-01 | End: 2022-01-01 | Stop reason: HOSPADM

## 2022-01-01 RX ORDER — LIDOCAINE HYDROCHLORIDE 10 MG/ML
1 INJECTION, SOLUTION EPIDURAL; INFILTRATION; INTRACAUDAL; PERINEURAL ONCE
Status: DISCONTINUED | OUTPATIENT
Start: 2022-01-01 | End: 2022-01-01 | Stop reason: HOSPADM

## 2022-01-01 RX ORDER — LEVOTHYROXINE SODIUM 100 UG/1
100 TABLET ORAL
Status: DISCONTINUED | OUTPATIENT
Start: 2022-01-01 | End: 2022-01-01 | Stop reason: HOSPADM

## 2022-01-01 RX ORDER — DOXYCYCLINE 100 MG/1
100 CAPSULE ORAL 2 TIMES DAILY
Qty: 20 CAPSULE | Refills: 0 | Status: SHIPPED | OUTPATIENT
Start: 2022-01-01 | End: 2022-01-01

## 2022-01-01 RX ORDER — SODIUM CHLORIDE 0.9 % (FLUSH) 0.9 %
10 SYRINGE (ML) INJECTION
Status: DISCONTINUED | OUTPATIENT
Start: 2022-01-01 | End: 2022-01-01 | Stop reason: HOSPADM

## 2022-01-01 RX ORDER — FUROSEMIDE 10 MG/ML
60 INJECTION INTRAMUSCULAR; INTRAVENOUS EVERY 8 HOURS
Status: DISCONTINUED | OUTPATIENT
Start: 2022-01-01 | End: 2022-01-01 | Stop reason: HOSPADM

## 2022-01-01 RX ORDER — AMLODIPINE BESYLATE 5 MG/1
5 TABLET ORAL NIGHTLY
Status: DISCONTINUED | OUTPATIENT
Start: 2022-01-01 | End: 2022-01-01 | Stop reason: HOSPADM

## 2022-01-01 RX ORDER — ALBUTEROL SULFATE 2.5 MG/.5ML
2.5 SOLUTION RESPIRATORY (INHALATION)
Status: DISCONTINUED | OUTPATIENT
Start: 2022-01-01 | End: 2022-01-01 | Stop reason: HOSPADM

## 2022-01-01 RX ORDER — DOXAZOSIN 8 MG/1
8 TABLET ORAL DAILY
Status: DISCONTINUED | OUTPATIENT
Start: 2022-01-01 | End: 2022-01-01 | Stop reason: HOSPADM

## 2022-01-01 RX ORDER — AMOXICILLIN AND CLAVULANATE POTASSIUM 875; 125 MG/1; MG/1
1 TABLET, FILM COATED ORAL 2 TIMES DAILY
Qty: 14 TABLET | Refills: 0 | Status: SHIPPED | OUTPATIENT
Start: 2022-01-01 | End: 2022-10-05

## 2022-01-01 RX ORDER — KETOCONAZOLE 20 MG/G
CREAM TOPICAL DAILY
Qty: 30 G | Refills: 1 | Status: SHIPPED | OUTPATIENT
Start: 2022-01-01 | End: 2022-01-01 | Stop reason: SDUPTHER

## 2022-01-01 RX ORDER — OXYCODONE HYDROCHLORIDE 5 MG/1
5 TABLET ORAL ONCE
Status: COMPLETED | OUTPATIENT
Start: 2022-01-01 | End: 2022-01-01

## 2022-01-01 RX ORDER — METOLAZONE 5 MG/1
5 TABLET ORAL
Qty: 12 TABLET | Refills: 11 | Status: SHIPPED | OUTPATIENT
Start: 2022-01-01 | End: 2023-09-28

## 2022-01-01 RX ORDER — NALOXONE HCL 0.4 MG/ML
0.02 VIAL (ML) INJECTION
Status: DISCONTINUED | OUTPATIENT
Start: 2022-01-01 | End: 2022-01-01 | Stop reason: HOSPADM

## 2022-01-01 RX ORDER — TRAMADOL HYDROCHLORIDE 50 MG/1
50 TABLET ORAL EVERY 6 HOURS PRN
Qty: 30 EACH | Refills: 0 | Status: SHIPPED | OUTPATIENT
Start: 2022-01-01

## 2022-01-01 RX ORDER — POTASSIUM CHLORIDE 20 MEQ/1
40 TABLET, EXTENDED RELEASE ORAL ONCE
Status: COMPLETED | OUTPATIENT
Start: 2022-01-01 | End: 2022-01-01

## 2022-01-01 RX ORDER — LEVOTHYROXINE SODIUM 50 UG/1
100 TABLET ORAL
Status: DISCONTINUED | OUTPATIENT
Start: 2022-01-01 | End: 2022-01-01 | Stop reason: HOSPADM

## 2022-01-01 RX ORDER — IBUPROFEN 200 MG
24 TABLET ORAL
Status: DISCONTINUED | OUTPATIENT
Start: 2022-01-01 | End: 2022-01-01 | Stop reason: HOSPADM

## 2022-01-01 RX ORDER — ALBUTEROL SULFATE 2.5 MG/.5ML
10 SOLUTION RESPIRATORY (INHALATION)
Status: COMPLETED | OUTPATIENT
Start: 2022-01-01 | End: 2022-01-01

## 2022-01-01 RX ORDER — HYDROCODONE BITARTRATE AND ACETAMINOPHEN 5; 325 MG/1; MG/1
1 TABLET ORAL EVERY 4 HOURS PRN
Qty: 12 TABLET | Refills: 0 | Status: SHIPPED | OUTPATIENT
Start: 2022-01-01 | End: 2022-01-01 | Stop reason: SDUPTHER

## 2022-01-01 RX ORDER — GABAPENTIN 300 MG/1
300 CAPSULE ORAL 2 TIMES DAILY
Status: DISCONTINUED | OUTPATIENT
Start: 2022-01-01 | End: 2022-01-01 | Stop reason: HOSPADM

## 2022-01-01 RX ORDER — ACETAMINOPHEN 10 MG/ML
INJECTION, SOLUTION INTRAVENOUS
Status: DISCONTINUED | OUTPATIENT
Start: 2022-01-01 | End: 2022-01-01

## 2022-01-01 RX ORDER — AMIODARONE HYDROCHLORIDE 200 MG/1
200 TABLET ORAL
Status: DISCONTINUED | OUTPATIENT
Start: 2022-01-01 | End: 2022-01-01

## 2022-01-01 RX ORDER — PHENYLEPHRINE HYDROCHLORIDE 10 MG/ML
INJECTION INTRAVENOUS
Status: DISCONTINUED | OUTPATIENT
Start: 2022-01-01 | End: 2022-01-01

## 2022-01-01 RX ORDER — FUROSEMIDE 10 MG/ML
60 INJECTION INTRAMUSCULAR; INTRAVENOUS EVERY 8 HOURS
Status: DISCONTINUED | OUTPATIENT
Start: 2022-01-01 | End: 2022-01-01

## 2022-01-01 RX ORDER — AMIODARONE HYDROCHLORIDE 200 MG/1
200 TABLET ORAL DAILY
Qty: 90 TABLET | Refills: 3 | Status: SHIPPED | OUTPATIENT
Start: 2022-01-01

## 2022-01-01 RX ORDER — METOPROLOL SUCCINATE 50 MG/1
50 TABLET, EXTENDED RELEASE ORAL DAILY
Status: DISCONTINUED | OUTPATIENT
Start: 2022-01-01 | End: 2022-01-01 | Stop reason: HOSPADM

## 2022-01-01 RX ORDER — CEFEPIME HYDROCHLORIDE 1 G/50ML
1 INJECTION, SOLUTION INTRAVENOUS
Status: DISCONTINUED | OUTPATIENT
Start: 2022-01-01 | End: 2022-01-01

## 2022-01-01 RX ORDER — SODIUM CHLORIDE 9 MG/ML
1000 INJECTION, SOLUTION INTRAVENOUS
Status: COMPLETED | OUTPATIENT
Start: 2022-01-01 | End: 2022-01-01

## 2022-01-01 RX ORDER — DOXYCYCLINE HYCLATE 100 MG
100 TABLET ORAL EVERY 12 HOURS
Status: DISCONTINUED | OUTPATIENT
Start: 2022-01-01 | End: 2022-01-01

## 2022-01-01 RX ORDER — SODIUM CHLORIDE FOR INHALATION 3 %
4 VIAL, NEBULIZER (ML) INHALATION
Status: DISCONTINUED | OUTPATIENT
Start: 2022-01-01 | End: 2022-01-01

## 2022-01-01 RX ORDER — LIDOCAINE HCL/PF 100 MG/5ML
SYRINGE (ML) INTRAVENOUS
Status: DISCONTINUED | OUTPATIENT
Start: 2022-01-01 | End: 2022-01-01

## 2022-01-01 RX ORDER — EZETIMIBE 10 MG/1
10 TABLET ORAL DAILY
Status: DISCONTINUED | OUTPATIENT
Start: 2022-01-01 | End: 2022-01-01 | Stop reason: HOSPADM

## 2022-01-01 RX ORDER — TRAMADOL HYDROCHLORIDE 50 MG/1
50 TABLET ORAL ONCE
Status: COMPLETED | OUTPATIENT
Start: 2022-01-01 | End: 2022-01-01

## 2022-01-01 RX ORDER — FUROSEMIDE 20 MG/1
20 TABLET ORAL
Status: COMPLETED | OUTPATIENT
Start: 2022-01-01 | End: 2022-01-01

## 2022-01-01 RX ORDER — VALACYCLOVIR HYDROCHLORIDE 1 G/1
1000 TABLET, FILM COATED ORAL 2 TIMES DAILY
Qty: 14 TABLET | Refills: 0 | Status: SHIPPED | OUTPATIENT
Start: 2022-01-01 | End: 2022-01-01

## 2022-01-01 RX ORDER — TAMSULOSIN HYDROCHLORIDE 0.4 MG/1
CAPSULE ORAL
COMMUNITY
End: 2022-01-01

## 2022-01-01 RX ORDER — CEFAZOLIN SODIUM 2 G/50ML
2 SOLUTION INTRAVENOUS
Status: DISCONTINUED | OUTPATIENT
Start: 2022-01-01 | End: 2022-01-01 | Stop reason: HOSPADM

## 2022-01-01 RX ORDER — DOXAZOSIN 8 MG/1
8 TABLET ORAL DAILY
Qty: 90 TABLET | Refills: 0 | Status: SHIPPED | OUTPATIENT
Start: 2022-01-01 | End: 2022-01-01 | Stop reason: SDUPTHER

## 2022-01-01 RX ORDER — FUROSEMIDE 40 MG/1
80 TABLET ORAL 2 TIMES DAILY
Qty: 135 TABLET | Refills: 2 | Status: SHIPPED | OUTPATIENT
Start: 2022-01-01

## 2022-01-01 RX ORDER — FUROSEMIDE 10 MG/ML
80 INJECTION INTRAMUSCULAR; INTRAVENOUS 3 TIMES DAILY
Status: DISCONTINUED | OUTPATIENT
Start: 2022-01-01 | End: 2022-01-01

## 2022-01-01 RX ORDER — ACETAMINOPHEN 325 MG/1
650 TABLET ORAL EVERY 4 HOURS PRN
Status: DISCONTINUED | OUTPATIENT
Start: 2022-01-01 | End: 2022-01-01 | Stop reason: HOSPADM

## 2022-01-01 RX ORDER — HYDROCODONE BITARTRATE AND ACETAMINOPHEN 5; 325 MG/1; MG/1
1 TABLET ORAL
Status: DISCONTINUED | OUTPATIENT
Start: 2022-01-01 | End: 2022-01-01 | Stop reason: HOSPADM

## 2022-01-01 RX ORDER — HYDROCODONE BITARTRATE AND ACETAMINOPHEN 10; 325 MG/1; MG/1
1 TABLET ORAL
Qty: 10 TABLET | Refills: 0 | Status: SHIPPED | OUTPATIENT
Start: 2022-01-01 | End: 2022-01-01

## 2022-01-01 RX ORDER — CAPSAICIN 0.03 G/100G
CREAM TOPICAL 2 TIMES DAILY
Qty: 50 G | Refills: 1 | Status: SHIPPED | OUTPATIENT
Start: 2022-01-01 | End: 2022-01-01

## 2022-01-01 RX ORDER — SPIRONOLACTONE 25 MG/1
25 TABLET ORAL EVERY OTHER DAY
Qty: 15 TABLET | Refills: 11 | Status: SHIPPED | OUTPATIENT
Start: 2022-01-01 | End: 2022-01-01 | Stop reason: SDUPTHER

## 2022-01-01 RX ORDER — KETOCONAZOLE 20 MG/G
CREAM TOPICAL DAILY
Qty: 30 G | Refills: 1 | Status: SHIPPED | OUTPATIENT
Start: 2022-01-01 | End: 2022-10-11

## 2022-01-01 RX ORDER — VALACYCLOVIR HYDROCHLORIDE 1 G/1
1000 TABLET, FILM COATED ORAL 2 TIMES DAILY
Qty: 14 TABLET | Refills: 0 | Status: SHIPPED | OUTPATIENT
Start: 2022-01-01 | End: 2022-01-01 | Stop reason: SDUPTHER

## 2022-01-01 RX ORDER — DOXYCYCLINE 100 MG/1
100 CAPSULE ORAL 2 TIMES DAILY
Qty: 20 CAPSULE | Refills: 0 | Status: SHIPPED | OUTPATIENT
Start: 2022-01-01 | End: 2022-01-01 | Stop reason: SDUPTHER

## 2022-01-01 RX ORDER — AMOXICILLIN 250 MG
1 CAPSULE ORAL 2 TIMES DAILY
Status: DISCONTINUED | OUTPATIENT
Start: 2022-01-01 | End: 2022-01-01 | Stop reason: HOSPADM

## 2022-01-01 RX ORDER — SUCCINYLCHOLINE CHLORIDE 20 MG/ML
INJECTION INTRAMUSCULAR; INTRAVENOUS
Status: DISCONTINUED | OUTPATIENT
Start: 2022-01-01 | End: 2022-01-01

## 2022-01-01 RX ORDER — ATORVASTATIN CALCIUM 40 MG/1
40 TABLET, FILM COATED ORAL DAILY
Status: DISCONTINUED | OUTPATIENT
Start: 2022-01-01 | End: 2022-01-01 | Stop reason: HOSPADM

## 2022-01-01 RX ORDER — HYDROCODONE BITARTRATE AND ACETAMINOPHEN 10; 325 MG/1; MG/1
1 TABLET ORAL EVERY 6 HOURS PRN
Qty: 10 TABLET | Refills: 0 | Status: SHIPPED | OUTPATIENT
Start: 2022-01-01 | End: 2022-01-01 | Stop reason: SDUPTHER

## 2022-01-01 RX ORDER — HYDROCODONE BITARTRATE AND ACETAMINOPHEN 5; 325 MG/1; MG/1
1 TABLET ORAL ONCE
Status: COMPLETED | OUTPATIENT
Start: 2022-01-01 | End: 2022-01-01

## 2022-01-01 RX ORDER — VALACYCLOVIR HYDROCHLORIDE 500 MG/1
1000 TABLET, FILM COATED ORAL 2 TIMES DAILY
Status: DISCONTINUED | OUTPATIENT
Start: 2022-01-01 | End: 2022-01-01 | Stop reason: HOSPADM

## 2022-01-01 RX ORDER — IBUPROFEN 200 MG
16 TABLET ORAL
Status: DISCONTINUED | OUTPATIENT
Start: 2022-01-01 | End: 2022-01-01 | Stop reason: HOSPADM

## 2022-01-01 RX ORDER — CYANOCOBALAMIN 1000 UG/ML
1000 INJECTION, SOLUTION INTRAMUSCULAR; SUBCUTANEOUS
Qty: 3 ML | Refills: 1 | Status: SHIPPED | OUTPATIENT
Start: 2022-01-01

## 2022-01-01 RX ORDER — TALC
6 POWDER (GRAM) TOPICAL NIGHTLY PRN
Status: DISCONTINUED | OUTPATIENT
Start: 2022-01-01 | End: 2022-01-01 | Stop reason: HOSPADM

## 2022-01-01 RX ORDER — HYDROMORPHONE HYDROCHLORIDE 2 MG/ML
0.2 INJECTION, SOLUTION INTRAMUSCULAR; INTRAVENOUS; SUBCUTANEOUS EVERY 5 MIN PRN
Status: DISCONTINUED | OUTPATIENT
Start: 2022-01-01 | End: 2022-01-01 | Stop reason: HOSPADM

## 2022-01-01 RX ORDER — FUROSEMIDE 10 MG/ML
40 INJECTION INTRAMUSCULAR; INTRAVENOUS
Status: COMPLETED | OUTPATIENT
Start: 2022-01-01 | End: 2022-01-01

## 2022-01-01 RX ORDER — METOLAZONE 2.5 MG/1
5 TABLET ORAL DAILY
Status: DISCONTINUED | OUTPATIENT
Start: 2022-01-01 | End: 2022-01-01 | Stop reason: HOSPADM

## 2022-01-01 RX ORDER — SODIUM CHLORIDE 9 MG/ML
INJECTION, SOLUTION INTRAVENOUS ONCE
Status: COMPLETED | OUTPATIENT
Start: 2022-01-01 | End: 2022-01-01

## 2022-01-01 RX ORDER — SODIUM CHLORIDE 0.9 % (FLUSH) 0.9 %
3 SYRINGE (ML) INJECTION
Status: DISCONTINUED | OUTPATIENT
Start: 2022-01-01 | End: 2022-01-01 | Stop reason: HOSPADM

## 2022-01-01 RX ORDER — TALC
9 POWDER (GRAM) TOPICAL NIGHTLY PRN
Status: DISCONTINUED | OUTPATIENT
Start: 2022-01-01 | End: 2022-01-01 | Stop reason: HOSPADM

## 2022-01-01 RX ORDER — ACETAMINOPHEN 325 MG/1
650 TABLET ORAL EVERY 6 HOURS PRN
Status: DISCONTINUED | OUTPATIENT
Start: 2022-01-01 | End: 2022-01-01 | Stop reason: HOSPADM

## 2022-01-01 RX ORDER — FUROSEMIDE 10 MG/ML
80 INJECTION INTRAMUSCULAR; INTRAVENOUS 2 TIMES DAILY
Status: DISCONTINUED | OUTPATIENT
Start: 2022-01-01 | End: 2022-01-01

## 2022-01-01 RX ORDER — AMIODARONE HYDROCHLORIDE 100 MG/1
200 TABLET ORAL DAILY
Status: DISCONTINUED | OUTPATIENT
Start: 2022-01-01 | End: 2022-01-01 | Stop reason: HOSPADM

## 2022-01-01 RX ORDER — METHOCARBAMOL 750 MG/1
750 TABLET, FILM COATED ORAL 3 TIMES DAILY PRN
Qty: 30 TABLET | Refills: 0 | Status: SHIPPED | OUTPATIENT
Start: 2022-01-01 | End: 2022-01-01

## 2022-01-01 RX ORDER — GABAPENTIN 100 MG/1
100 CAPSULE ORAL 2 TIMES DAILY
Qty: 60 CAPSULE | Refills: 0 | Status: SHIPPED | OUTPATIENT
Start: 2022-01-01 | End: 2022-01-01

## 2022-01-01 RX ORDER — CYANOCOBALAMIN 1000 UG/ML
1000 INJECTION, SOLUTION INTRAMUSCULAR; SUBCUTANEOUS
Qty: 3 ML | Refills: 1 | Status: SHIPPED | OUTPATIENT
Start: 2022-01-01 | End: 2022-01-01 | Stop reason: SDUPTHER

## 2022-01-01 RX ORDER — AZITHROMYCIN 250 MG/1
250 TABLET, FILM COATED ORAL DAILY
Status: DISCONTINUED | OUTPATIENT
Start: 2022-01-01 | End: 2022-01-01

## 2022-01-01 RX ORDER — POLYETHYLENE GLYCOL 3350 17 G/17G
17 POWDER, FOR SOLUTION ORAL DAILY
Status: DISCONTINUED | OUTPATIENT
Start: 2022-01-01 | End: 2022-01-01 | Stop reason: HOSPADM

## 2022-01-01 RX ORDER — CHOLECALCIFEROL (VITAMIN D3) 25 MCG
1000 TABLET ORAL DAILY
COMMUNITY

## 2022-01-01 RX ORDER — PROPOFOL 10 MG/ML
VIAL (ML) INTRAVENOUS
Status: DISCONTINUED | OUTPATIENT
Start: 2022-01-01 | End: 2022-01-01

## 2022-01-01 RX ORDER — HEPARIN SODIUM 5000 [USP'U]/ML
5000 INJECTION, SOLUTION INTRAVENOUS; SUBCUTANEOUS EVERY 8 HOURS
Status: DISCONTINUED | OUTPATIENT
Start: 2022-01-01 | End: 2022-01-01

## 2022-01-01 RX ORDER — FENTANYL CITRATE 50 UG/ML
25 INJECTION, SOLUTION INTRAMUSCULAR; INTRAVENOUS EVERY 5 MIN PRN
Status: DISCONTINUED | OUTPATIENT
Start: 2022-01-01 | End: 2022-01-01 | Stop reason: HOSPADM

## 2022-01-01 RX ORDER — LEVOTHYROXINE SODIUM 100 UG/1
100 TABLET ORAL
Qty: 90 TABLET | Refills: 2 | Status: SHIPPED | OUTPATIENT
Start: 2022-01-01 | End: 2023-08-07

## 2022-01-01 RX ORDER — HYDROCODONE BITARTRATE AND ACETAMINOPHEN 5; 325 MG/1; MG/1
1 TABLET ORAL EVERY 6 HOURS PRN
Qty: 20 TABLET | Refills: 0 | Status: SHIPPED | OUTPATIENT
Start: 2022-01-01 | End: 2022-01-01

## 2022-01-01 RX ORDER — FAMOTIDINE 20 MG/1
20 TABLET, FILM COATED ORAL DAILY
Status: DISCONTINUED | OUTPATIENT
Start: 2022-01-01 | End: 2022-01-01 | Stop reason: HOSPADM

## 2022-01-01 RX ORDER — MUPIROCIN 20 MG/G
OINTMENT TOPICAL 2 TIMES DAILY
Status: DISPENSED | OUTPATIENT
Start: 2022-01-01 | End: 2022-01-01

## 2022-01-01 RX ORDER — NEOSTIGMINE METHYLSULFATE 1 MG/ML
INJECTION, SOLUTION INTRAVENOUS
Status: DISCONTINUED | OUTPATIENT
Start: 2022-01-01 | End: 2022-01-01

## 2022-01-01 RX ORDER — FAMOTIDINE 20 MG/1
20 TABLET, FILM COATED ORAL 2 TIMES DAILY
Status: DISCONTINUED | OUTPATIENT
Start: 2022-01-01 | End: 2022-01-01

## 2022-01-01 RX ORDER — ACETAMINOPHEN 325 MG/1
650 TABLET ORAL
Status: COMPLETED | OUTPATIENT
Start: 2022-01-01 | End: 2022-01-01

## 2022-01-01 RX ORDER — HYDROCODONE BITARTRATE AND ACETAMINOPHEN 5; 325 MG/1; MG/1
1 TABLET ORAL EVERY 4 HOURS PRN
Qty: 20 TABLET | Refills: 0 | Status: SHIPPED | OUTPATIENT
Start: 2022-01-01 | End: 2022-01-01

## 2022-01-01 RX ORDER — ATORVASTATIN CALCIUM 40 MG/1
40 TABLET, FILM COATED ORAL DAILY
Qty: 90 TABLET | Refills: 3 | Status: SHIPPED | OUTPATIENT
Start: 2022-01-01

## 2022-01-01 RX ORDER — FUROSEMIDE 40 MG/1
40 TABLET ORAL DAILY
Qty: 135 TABLET | Refills: 2 | Status: ON HOLD
Start: 2022-01-01 | End: 2022-01-01 | Stop reason: SDUPTHER

## 2022-01-01 RX ORDER — ONDANSETRON 2 MG/ML
4 INJECTION INTRAMUSCULAR; INTRAVENOUS EVERY 8 HOURS PRN
Status: DISCONTINUED | OUTPATIENT
Start: 2022-01-01 | End: 2022-01-01 | Stop reason: HOSPADM

## 2022-01-01 RX ORDER — DOXAZOSIN 8 MG/1
8 TABLET ORAL DAILY
Qty: 90 TABLET | Refills: 0 | Status: SHIPPED | OUTPATIENT
Start: 2022-01-01 | End: 2022-01-01

## 2022-01-01 RX ORDER — FENTANYL CITRATE 50 UG/ML
INJECTION, SOLUTION INTRAMUSCULAR; INTRAVENOUS
Status: DISCONTINUED | OUTPATIENT
Start: 2022-01-01 | End: 2022-01-01

## 2022-01-01 RX ORDER — OXYCODONE HYDROCHLORIDE 5 MG/1
5 TABLET ORAL
Status: DISCONTINUED | OUTPATIENT
Start: 2022-01-01 | End: 2022-01-01 | Stop reason: HOSPADM

## 2022-01-01 RX ORDER — CEFAZOLIN SODIUM 1 G/50ML
1 SOLUTION INTRAVENOUS
Status: DISCONTINUED | OUTPATIENT
Start: 2022-01-01 | End: 2022-01-01

## 2022-01-01 RX ORDER — DEXAMETHASONE SODIUM PHOSPHATE 4 MG/ML
INJECTION, SOLUTION INTRA-ARTICULAR; INTRALESIONAL; INTRAMUSCULAR; INTRAVENOUS; SOFT TISSUE
Status: DISCONTINUED | OUTPATIENT
Start: 2022-01-01 | End: 2022-01-01

## 2022-01-01 RX ORDER — ROCURONIUM BROMIDE 10 MG/ML
INJECTION, SOLUTION INTRAVENOUS
Status: DISCONTINUED | OUTPATIENT
Start: 2022-01-01 | End: 2022-01-01

## 2022-01-01 RX ORDER — LIDOCAINE 50 MG/G
1 PATCH TOPICAL DAILY
Qty: 30 PATCH | Refills: 0 | Status: SHIPPED | OUTPATIENT
Start: 2022-01-01

## 2022-01-01 RX ADMIN — VANCOMYCIN HYDROCHLORIDE 1500 MG: 1.5 INJECTION, POWDER, LYOPHILIZED, FOR SOLUTION INTRAVENOUS at 05:09

## 2022-01-01 RX ADMIN — GLYCOPYRROLATE 0.4 MG: 0.2 INJECTION, SOLUTION INTRAMUSCULAR; INTRAVENOUS at 08:03

## 2022-01-01 RX ADMIN — MUPIROCIN: 20 OINTMENT TOPICAL at 09:09

## 2022-01-01 RX ADMIN — FUROSEMIDE 80 MG: 10 INJECTION, SOLUTION INTRAMUSCULAR; INTRAVENOUS at 08:09

## 2022-01-01 RX ADMIN — AMIODARONE HYDROCHLORIDE 200 MG: 200 TABLET ORAL at 08:09

## 2022-01-01 RX ADMIN — GABAPENTIN 300 MG: 300 CAPSULE ORAL at 08:09

## 2022-01-01 RX ADMIN — METOLAZONE 5 MG: 2.5 TABLET ORAL at 08:09

## 2022-01-01 RX ADMIN — CEFAZOLIN SODIUM 1 G: 1 SOLUTION INTRAVENOUS at 12:09

## 2022-01-01 RX ADMIN — LEVOTHYROXINE SODIUM 100 MCG: 100 TABLET ORAL at 05:09

## 2022-01-01 RX ADMIN — DOXAZOSIN 8 MG: 8 TABLET ORAL at 09:06

## 2022-01-01 RX ADMIN — APIXABAN 5 MG: 2.5 TABLET, FILM COATED ORAL at 08:06

## 2022-01-01 RX ADMIN — EZETIMIBE 10 MG: 10 TABLET ORAL at 09:06

## 2022-01-01 RX ADMIN — APIXABAN 5 MG: 5 TABLET, FILM COATED ORAL at 09:09

## 2022-01-01 RX ADMIN — MUPIROCIN: 20 OINTMENT TOPICAL at 08:09

## 2022-01-01 RX ADMIN — ATORVASTATIN CALCIUM 40 MG: 40 TABLET, FILM COATED ORAL at 09:09

## 2022-01-01 RX ADMIN — ACETAMINOPHEN 1000 MG: 10 INJECTION, SOLUTION INTRAVENOUS at 08:03

## 2022-01-01 RX ADMIN — AMLODIPINE BESYLATE 5 MG: 5 TABLET ORAL at 09:06

## 2022-01-01 RX ADMIN — APIXABAN 5 MG: 5 TABLET, FILM COATED ORAL at 08:09

## 2022-01-01 RX ADMIN — DEXAMETHASONE SODIUM PHOSPHATE 4 MG: 4 INJECTION, SOLUTION INTRA-ARTICULAR; INTRALESIONAL; INTRAMUSCULAR; INTRAVENOUS; SOFT TISSUE at 07:03

## 2022-01-01 RX ADMIN — GABAPENTIN 300 MG: 300 CAPSULE ORAL at 10:06

## 2022-01-01 RX ADMIN — TRAMADOL HYDROCHLORIDE 50 MG: 50 TABLET, COATED ORAL at 06:09

## 2022-01-01 RX ADMIN — ACETAMINOPHEN 650 MG: 325 TABLET, FILM COATED ORAL at 08:09

## 2022-01-01 RX ADMIN — FAMOTIDINE 20 MG: 20 TABLET ORAL at 08:09

## 2022-01-01 RX ADMIN — ATORVASTATIN CALCIUM 40 MG: 40 TABLET, FILM COATED ORAL at 09:06

## 2022-01-01 RX ADMIN — OXYMETAZOLINE HYDROCHLORIDE 2 SPRAY: 0.05 SPRAY NASAL at 09:09

## 2022-01-01 RX ADMIN — GABAPENTIN 400 MG: 400 CAPSULE ORAL at 09:09

## 2022-01-01 RX ADMIN — ATORVASTATIN CALCIUM 40 MG: 40 TABLET, FILM COATED ORAL at 08:09

## 2022-01-01 RX ADMIN — LIDOCAINE HYDROCHLORIDE 75 MG: 20 INJECTION INTRAVENOUS at 07:03

## 2022-01-01 RX ADMIN — GABAPENTIN 300 MG: 300 CAPSULE ORAL at 10:09

## 2022-01-01 RX ADMIN — CEFEPIME 1 G: 1 INJECTION, POWDER, FOR SOLUTION INTRAMUSCULAR; INTRAVENOUS at 11:09

## 2022-01-01 RX ADMIN — LEVOTHYROXINE SODIUM 100 MCG: 100 TABLET ORAL at 06:09

## 2022-01-01 RX ADMIN — FUROSEMIDE 60 MG: 10 INJECTION, SOLUTION INTRAMUSCULAR; INTRAVENOUS at 05:09

## 2022-01-01 RX ADMIN — OXYCODONE HYDROCHLORIDE 5 MG: 5 TABLET ORAL at 09:09

## 2022-01-01 RX ADMIN — POLYETHYLENE GLYCOL 3350 17 G: 17 POWDER, FOR SOLUTION ORAL at 08:09

## 2022-01-01 RX ADMIN — SODIUM CHLORIDE, SODIUM GLUCONATE, SODIUM ACETATE, POTASSIUM CHLORIDE, MAGNESIUM CHLORIDE, SODIUM PHOSPHATE, DIBASIC, AND POTASSIUM PHOSPHATE: .53; .5; .37; .037; .03; .012; .00082 INJECTION, SOLUTION INTRAVENOUS at 06:03

## 2022-01-01 RX ADMIN — ALBUTEROL SULFATE 10 MG: 2.5 SOLUTION RESPIRATORY (INHALATION) at 05:09

## 2022-01-01 RX ADMIN — CEFTRIAXONE 1 G: 1 INJECTION, SOLUTION INTRAVENOUS at 05:09

## 2022-01-01 RX ADMIN — FUROSEMIDE 20 MG/HR: 10 INJECTION, SOLUTION INTRAMUSCULAR; INTRAVENOUS at 03:09

## 2022-01-01 RX ADMIN — DOXYCYCLINE HYCLATE 100 MG: 100 TABLET, COATED ORAL at 09:09

## 2022-01-01 RX ADMIN — ONDANSETRON 4 MG: 2 INJECTION, SOLUTION INTRAMUSCULAR; INTRAVENOUS at 08:03

## 2022-01-01 RX ADMIN — LEVOTHYROXINE SODIUM 100 MCG: 100 TABLET ORAL at 06:06

## 2022-01-01 RX ADMIN — FUROSEMIDE 60 MG: 10 INJECTION, SOLUTION INTRAMUSCULAR; INTRAVENOUS at 02:09

## 2022-01-01 RX ADMIN — CEFEPIME 1 G: 1 INJECTION, POWDER, FOR SOLUTION INTRAMUSCULAR; INTRAVENOUS at 08:09

## 2022-01-01 RX ADMIN — POTASSIUM CHLORIDE 40 MEQ: 1500 TABLET, EXTENDED RELEASE ORAL at 07:09

## 2022-01-01 RX ADMIN — OXYCODONE HYDROCHLORIDE AND ACETAMINOPHEN 1 TABLET: 5; 325 TABLET ORAL at 01:09

## 2022-01-01 RX ADMIN — ACETAMINOPHEN 650 MG: 325 TABLET, FILM COATED ORAL at 05:09

## 2022-01-01 RX ADMIN — PSYLLIUM HUSK 1 PACKET: 3.4 POWDER ORAL at 08:09

## 2022-01-01 RX ADMIN — ATORVASTATIN CALCIUM 40 MG: 40 TABLET, FILM COATED ORAL at 10:09

## 2022-01-01 RX ADMIN — GABAPENTIN 300 MG: 300 CAPSULE ORAL at 08:06

## 2022-01-01 RX ADMIN — GABAPENTIN 400 MG: 400 CAPSULE ORAL at 08:09

## 2022-01-01 RX ADMIN — FUROSEMIDE 80 MG: 10 INJECTION, SOLUTION INTRAMUSCULAR; INTRAVENOUS at 09:09

## 2022-01-01 RX ADMIN — CEFAZOLIN SODIUM 2 G: 2 SOLUTION INTRAVENOUS at 12:09

## 2022-01-01 RX ADMIN — DOCUSATE SODIUM AND SENNOSIDES 1 TABLET: 8.6; 5 TABLET, FILM COATED ORAL at 09:06

## 2022-01-01 RX ADMIN — POTASSIUM CHLORIDE 40 MEQ: 1500 TABLET, EXTENDED RELEASE ORAL at 08:09

## 2022-01-01 RX ADMIN — FUROSEMIDE 5 MG/HR: 10 INJECTION, SOLUTION INTRAMUSCULAR; INTRAVENOUS at 12:09

## 2022-01-01 RX ADMIN — FUROSEMIDE 60 MG: 10 INJECTION, SOLUTION INTRAMUSCULAR; INTRAVENOUS at 01:09

## 2022-01-01 RX ADMIN — OXYCODONE HYDROCHLORIDE AND ACETAMINOPHEN 1000 MG: 500 TABLET ORAL at 09:06

## 2022-01-01 RX ADMIN — APIXABAN 5 MG: 2.5 TABLET, FILM COATED ORAL at 09:06

## 2022-01-01 RX ADMIN — AZITHROMYCIN MONOHYDRATE 500 MG: 500 INJECTION, POWDER, LYOPHILIZED, FOR SOLUTION INTRAVENOUS at 06:09

## 2022-01-01 RX ADMIN — DOCUSATE SODIUM AND SENNOSIDES 1 TABLET: 8.6; 5 TABLET, FILM COATED ORAL at 01:06

## 2022-01-01 RX ADMIN — CEFAZOLIN SODIUM 2 G: 2 SOLUTION INTRAVENOUS at 01:09

## 2022-01-01 RX ADMIN — METOPROLOL SUCCINATE 50 MG: 50 TABLET, EXTENDED RELEASE ORAL at 10:06

## 2022-01-01 RX ADMIN — FAMOTIDINE 20 MG: 20 TABLET ORAL at 09:09

## 2022-01-01 RX ADMIN — METOLAZONE 5 MG: 2.5 TABLET ORAL at 11:09

## 2022-01-01 RX ADMIN — GABAPENTIN 300 MG: 300 CAPSULE ORAL at 09:09

## 2022-01-01 RX ADMIN — OXYCODONE 5 MG: 5 TABLET ORAL at 09:03

## 2022-01-01 RX ADMIN — SODIUM ZIRCONIUM CYCLOSILICATE 10 G: 10 POWDER, FOR SUSPENSION ORAL at 05:09

## 2022-01-01 RX ADMIN — METOPROLOL SUCCINATE 50 MG: 50 TABLET, EXTENDED RELEASE ORAL at 09:06

## 2022-01-01 RX ADMIN — FUROSEMIDE 10 MG/HR: 10 INJECTION, SOLUTION INTRAMUSCULAR; INTRAVENOUS at 09:09

## 2022-01-01 RX ADMIN — VANCOMYCIN HYDROCHLORIDE 500 MG: 500 INJECTION, POWDER, LYOPHILIZED, FOR SOLUTION INTRAVENOUS at 08:09

## 2022-01-01 RX ADMIN — SODIUM CHLORIDE: 0.9 INJECTION, SOLUTION INTRAVENOUS at 01:06

## 2022-01-01 RX ADMIN — HYDROCODONE BITARTRATE AND ACETAMINOPHEN 1 TABLET: 5; 325 TABLET ORAL at 06:09

## 2022-01-01 RX ADMIN — PSYLLIUM HUSK 1 PACKET: 3.4 POWDER ORAL at 09:09

## 2022-01-01 RX ADMIN — CEFAZOLIN SODIUM 2 G: 2 SOLUTION INTRAVENOUS at 07:03

## 2022-01-01 RX ADMIN — METOLAZONE 5 MG: 2.5 TABLET ORAL at 09:09

## 2022-01-01 RX ADMIN — OXYCODONE HYDROCHLORIDE AND ACETAMINOPHEN 1 TABLET: 5; 325 TABLET ORAL at 06:09

## 2022-01-01 RX ADMIN — FENTANYL CITRATE 50 MCG: 50 INJECTION, SOLUTION INTRAMUSCULAR; INTRAVENOUS at 07:03

## 2022-01-01 RX ADMIN — FUROSEMIDE 60 MG: 10 INJECTION, SOLUTION INTRAMUSCULAR; INTRAVENOUS at 06:09

## 2022-01-01 RX ADMIN — FUROSEMIDE 60 MG: 10 INJECTION, SOLUTION INTRAMUSCULAR; INTRAVENOUS at 08:09

## 2022-01-01 RX ADMIN — SUCCINYLCHOLINE CHLORIDE 120 MG: 20 INJECTION, SOLUTION INTRAMUSCULAR; INTRAVENOUS; PARENTERAL at 07:03

## 2022-01-01 RX ADMIN — ACETAMINOPHEN 650 MG: 325 TABLET ORAL at 09:09

## 2022-01-01 RX ADMIN — POTASSIUM CHLORIDE 40 MEQ: 1500 TABLET, EXTENDED RELEASE ORAL at 06:09

## 2022-01-01 RX ADMIN — LEVOTHYROXINE SODIUM 100 MCG: 100 TABLET ORAL at 05:06

## 2022-01-01 RX ADMIN — GABAPENTIN 300 MG: 300 CAPSULE ORAL at 03:09

## 2022-01-01 RX ADMIN — VANCOMYCIN HYDROCHLORIDE 2000 MG: 500 INJECTION, POWDER, LYOPHILIZED, FOR SOLUTION INTRAVENOUS at 09:09

## 2022-01-01 RX ADMIN — OXYCODONE HYDROCHLORIDE AND ACETAMINOPHEN 1 TABLET: 5; 325 TABLET ORAL at 04:09

## 2022-01-01 RX ADMIN — GABAPENTIN 300 MG: 300 CAPSULE ORAL at 05:09

## 2022-01-01 RX ADMIN — SODIUM CHLORIDE 1000 ML: 0.9 INJECTION, SOLUTION INTRAVENOUS at 04:05

## 2022-01-01 RX ADMIN — FUROSEMIDE 80 MG: 10 INJECTION, SOLUTION INTRAMUSCULAR; INTRAVENOUS at 03:09

## 2022-01-01 RX ADMIN — POTASSIUM CHLORIDE 40 MEQ: 1500 TABLET, EXTENDED RELEASE ORAL at 10:09

## 2022-01-01 RX ADMIN — FUROSEMIDE 40 MG: 10 INJECTION, SOLUTION INTRAVENOUS at 05:07

## 2022-01-01 RX ADMIN — AMIODARONE HYDROCHLORIDE 200 MG: 100 TABLET ORAL at 10:06

## 2022-01-01 RX ADMIN — DOXYCYCLINE HYCLATE 100 MG: 100 TABLET, COATED ORAL at 08:09

## 2022-01-01 RX ADMIN — FUROSEMIDE 40 MG: 10 INJECTION, SOLUTION INTRAMUSCULAR; INTRAVENOUS at 03:09

## 2022-01-01 RX ADMIN — OXYCODONE HYDROCHLORIDE AND ACETAMINOPHEN 1 TABLET: 5; 325 TABLET ORAL at 09:09

## 2022-01-01 RX ADMIN — ACETAMINOPHEN 650 MG: 325 TABLET, FILM COATED ORAL at 01:09

## 2022-01-01 RX ADMIN — GABAPENTIN 300 MG: 300 CAPSULE ORAL at 09:06

## 2022-01-01 RX ADMIN — POTASSIUM BICARBONATE 50 MEQ: 978 TABLET, EFFERVESCENT ORAL at 12:09

## 2022-01-01 RX ADMIN — POLYETHYLENE GLYCOL 3350 17 G: 17 POWDER, FOR SOLUTION ORAL at 09:09

## 2022-01-01 RX ADMIN — AMIODARONE HYDROCHLORIDE 200 MG: 200 TABLET ORAL at 09:09

## 2022-01-01 RX ADMIN — PROPOFOL 150 MG: 10 INJECTION, EMULSION INTRAVENOUS at 07:03

## 2022-01-01 RX ADMIN — DOCUSATE SODIUM AND SENNOSIDES 1 TABLET: 8.6; 5 TABLET, FILM COATED ORAL at 08:06

## 2022-01-01 RX ADMIN — ROCURONIUM BROMIDE 5 MG: 10 INJECTION, SOLUTION INTRAVENOUS at 07:03

## 2022-01-01 RX ADMIN — VANCOMYCIN HYDROCHLORIDE 500 MG: 500 INJECTION, POWDER, LYOPHILIZED, FOR SOLUTION INTRAVENOUS at 05:09

## 2022-01-01 RX ADMIN — FUROSEMIDE 10 MG/HR: 10 INJECTION, SOLUTION INTRAMUSCULAR; INTRAVENOUS at 06:09

## 2022-01-01 RX ADMIN — AMIODARONE HYDROCHLORIDE 200 MG: 100 TABLET ORAL at 09:06

## 2022-01-01 RX ADMIN — BISACODYL 10 MG: 10 SUPPOSITORY RECTAL at 06:09

## 2022-01-01 RX ADMIN — OXYCODONE HYDROCHLORIDE AND ACETAMINOPHEN 1 TABLET: 5; 325 TABLET ORAL at 05:09

## 2022-01-01 RX ADMIN — ACETAMINOPHEN 650 MG: 325 TABLET, FILM COATED ORAL at 12:09

## 2022-01-01 RX ADMIN — NEOSTIGMINE METHYLSULFATE 3 MG: 1 INJECTION INTRAVENOUS at 09:03

## 2022-01-01 RX ADMIN — ALBUTEROL SULFATE 2.5 MG: 2.5 SOLUTION RESPIRATORY (INHALATION) at 08:09

## 2022-01-01 RX ADMIN — FUROSEMIDE 20 MG: 20 TABLET ORAL at 04:07

## 2022-01-01 RX ADMIN — ROCURONIUM BROMIDE 30 MG: 10 INJECTION, SOLUTION INTRAVENOUS at 07:03

## 2022-01-01 RX ADMIN — AMIODARONE HYDROCHLORIDE 200 MG: 200 TABLET ORAL at 10:09

## 2022-01-01 RX ADMIN — FUROSEMIDE 5 MG/HR: 10 INJECTION, SOLUTION INTRAMUSCULAR; INTRAVENOUS at 06:09

## 2022-01-01 RX ADMIN — POTASSIUM CHLORIDE 40 MEQ: 1500 TABLET, EXTENDED RELEASE ORAL at 04:09

## 2022-01-01 RX ADMIN — ATORVASTATIN CALCIUM 40 MG: 40 TABLET, FILM COATED ORAL at 10:06

## 2022-01-01 RX ADMIN — Medication 6 MG: at 03:09

## 2022-01-01 RX ADMIN — FUROSEMIDE 60 MG: 10 INJECTION, SOLUTION INTRAMUSCULAR; INTRAVENOUS at 09:09

## 2022-01-01 RX ADMIN — OXYCODONE HYDROCHLORIDE AND ACETAMINOPHEN 1 TABLET: 5; 325 TABLET ORAL at 12:09

## 2022-01-01 RX ADMIN — PHENYLEPHRINE HYDROCHLORIDE 200 MCG: 10 INJECTION INTRAVENOUS at 07:03

## 2022-01-01 RX ADMIN — OXYCODONE 5 MG: 5 TABLET ORAL at 10:03

## 2022-01-01 RX ADMIN — OXYCODONE HYDROCHLORIDE 5 MG: 5 TABLET ORAL at 05:09

## 2022-01-01 RX ADMIN — CEFEPIME HYDROCHLORIDE 1 G: 1 INJECTION, SOLUTION INTRAVENOUS at 11:09

## 2022-01-11 NOTE — PROGRESS NOTES
SUBJECTIVE:    Patient ID: Tong Ford is a 76 y.o. male.    Chief Complaint: Apnea (Pt stopped using CPAP machine months ago do to it being an inconvenience )      Patient here today feeling well.  He is still not sleeping on his CPAP. States he feels fine sleeping without it.  He has lost weight and feels he does not need to sleep on his CPAP. He is aware that sleeping on his CPAP would make his heart and brain a lot happier.  He has been sleeping on the floor because of back pain.  He is still waking up 3 times a night to urinate and does feel fatigued during the day.    Past Medical History:   Diagnosis Date    Arthritis     Atrial fibrillation     Carotid artery occlusion     bilateral    Cataract     CKD (chronic kidney disease), stage II     Coronary artery disease     Encounter for blood transfusion     Hypercholesterolemia     Hypertension     Normocytic anemia     Sleep apnea     CESAR    Thyroid disease     TIA (transient ischemic attack)      Past Surgical History:   Procedure Laterality Date    A-V CARDIAC PACEMAKER INSERTION N/A 4/1/2021    Procedure: INSERTION, CARDIAC PACEMAKER, DUAL CHAMBER;  Surgeon: Clfiford Sevilla MD;  Location: [facility] CATH/EP LAB;  Service: Cardiology;  Laterality: N/A;  [employer]    CORONARY ANGIOPLASTY WITH STENT PLACEMENT      CORONARY ARTERY BYPASS GRAFT      EYE SURGERY      INSERTION OF PACEMAKER      REVISION OF IMPLANTABLE CARDIOVERTER-DEFIBRILLATOR (ICD) ELECTRODE LEAD PLACEMENT Left 7/1/2021    Procedure: REVISION, INSERTION, ELECTRODE LEAD,pacemaker;  Surgeon: Clifford Sevilla MD;  Location: [facility] CATH/EP LAB;  Service: Cardiology;  Laterality: Left;    TREATMENT OF CARDIAC ARRHYTHMIA N/A 1/29/2021    Procedure: CARDIOVERSION;  Surgeon: Iron Serna MD;  Location: [facility] CATH/EP LAB;  Service: Cardiology;  Laterality: N/A;    VASECTOMY       Social History     Tobacco Use    Smoking status: Never Smoker    Smokeless tobacco: Never Used    Substance Use Topics    Alcohol use: Not Currently     Family History   Problem Relation Age of Onset    Cancer Mother     Cancer Father     Hypertension Father     Cancer Maternal Grandmother     COPD Paternal Grandmother       Review of patient's allergies indicates:  No Known Allergies    Current Outpatient Medications   Medication Sig Dispense Refill    amiodarone (PACERONE) 200 MG Tab Take 1 tablet (200 mg total) by mouth once daily. 90 tablet 3    amLODIPine (NORVASC) 5 MG tablet Take 1 tablet (5 mg total) by mouth every evening. 90 tablet 2    apixaban (ELIQUIS) 5 mg Tab Take 1 tablet (5 mg total) by mouth 2 (two) times daily. 180 tablet 3    ascorbic acid, vitamin C, (VITAMIN C) 1000 MG tablet Take 1,000 mg by mouth once daily.      atorvastatin (LIPITOR) 40 MG tablet Take 1 tablet (40 mg total) by mouth once daily. 90 tablet 3    coQ10, ubiquinol, 100 mg Cap Take 150 mg by mouth once daily.       doxazosin (CARDURA XL) 8 mg 24 hr tablet Take 1 tablet (8 mg total) by mouth daily with breakfast. 90 tablet 3    ergocalciferol, vitamin D2, (VITAMIN D ORAL) Take 500 Units by mouth Daily.      ezetimibe (ZETIA) 10 mg tablet Take 1 tablet (10 mg total) by mouth once daily. 90 tablet 3    furosemide (LASIX) 40 MG tablet Take 1.5 tablets (60 mg total) by mouth once daily. 135 tablet 2    garlic 1,000 mg Cap Take 1 capsule by mouth once daily.       levothyroxine (SYNTHROID) 100 MCG tablet Take 1 tablet (100 mcg total) by mouth before breakfast. 90 tablet 2    lisinopriL (PRINIVIL,ZESTRIL) 20 MG tablet Take 1 tablet (20 mg total) by mouth 2 (two) times a day. 180 tablet 3    metoprolol succinate (TOPROL-XL) 50 MG 24 hr tablet Take 1 tablet (50 mg total) by mouth once daily. 90 tablet 3    multivitamin Tab Take 1 tablet by mouth once daily.      nitroGLYCERIN (NITROSTAT) 0.4 MG SL tablet Place 1 tablet (0.4 mg total) under the tongue every 5 (five) minutes as needed for Chest pain. DO NOT CRUSH  "OR CHEW; DISSOLVE UNDER TONGUE; MAXIMUM OF 3 DOSES IN 15 MINUTES 20 tablet 0    tamsulosin (FLOMAX) 0.4 mg Cap 1 capsule      aspirin (ECOTRIN) 81 MG EC tablet 1 tablet       No current facility-administered medications for this visit.     Review of Systems  General: Feeling Well. Chronic fatigue  Eyes: Wears glasses  ENT:  Lost his taste  Heart:: atrial fib   Lungs: shortness of breath with exertion   GI: No Nausea, vomiting, constipation, diarrhea, or reflux.  Gu: wakes up throughout the night because he takes lasix at night   Skin: no issues at present   Musculoskeletal:  arthritis   Neuro: neuropathy to feet  Lymph: swelling to legs   Psych: No anxiety or depression.  Endo: weight loss     OBJECTIVE:      /70 (BP Location: Left arm, Patient Position: Sitting, BP Method: Medium (Automatic))   Pulse 72   Ht 5' 8" (1.727 m)   Wt 89.4 kg (197 lb)   SpO2 98%   BMI 29.95 kg/m²     Physical Exam  GENERAL: Older patient in no distress.  HEENT: Pupils equal and reactive. Extraocular movements intact. Nose intact. Pharynx moist.   NECK: Supple.   HEART: irregularly irregular,  murmur   LUNGS: Clear to auscultation and percussion. Lung excursion symmetrical. No change in fremitus. No adventitial noises.  ABDOMEN: Bowel sounds present. Non-tender, no masses palpated. Obese.  EXTREMITIES: Normal muscle tone and joint movement, no cyanosis or clubbing.   LYMPHATICS:trace edema   SKIN: Dry, intact, no lesions.   NEURO: Cranial nerves II-XII intact. Motor strength 5/5 bilaterally, upper and lower extremities.  PSYCH: Appropriate affect.    Assessment:       1. CESAR (obstructive sleep apnea)          Plan:         You should be sleeping on your CPAP  If you are not going to sleep on your CPAP you need to be sleeping elevated.                       "

## 2022-01-11 NOTE — PROGRESS NOTES
Subjective:    Patient ID:  Tong Ford is a 76 y.o. male patient here for evaluation Congestive Heart Failure, Hypertension, and Atrial Fibrillation      History of Present Illness:   Mr. Brewer is here today for a 4 week follow-up.  Last time I saw him he had a bump in his creatinine from 1.4-1.8 and we discontinued the Aldactone to see if this alleviated this concern.  Repeat CMP showed his creatinine was back down to 1.4.  We will stay off of the Aldactone, as he is a HFpF patient and his blood pressure is well controlled without.  He also mentioned to me last visit that he had to use nitro for early relief of chest pain twice in the past several months.  I offered him stress testing and he deferred at that time.  During our visit today he says that he has not had to use the nitro anymore.  We will continue to monitor his chest pains and if this occurs again we will elect to undergo cardiac stress testing.     He is feeling great.     Review of patient's allergies indicates:  No Known Allergies    Past Medical History:   Diagnosis Date    Arthritis     Atrial fibrillation     Carotid artery occlusion     bilateral    Cataract     CKD (chronic kidney disease), stage II     Coronary artery disease     Encounter for blood transfusion     Hypercholesterolemia     Hypertension     Normocytic anemia     Sleep apnea     CESAR    Thyroid disease     TIA (transient ischemic attack)      Past Surgical History:   Procedure Laterality Date    A-V CARDIAC PACEMAKER INSERTION N/A 4/1/2021    Procedure: INSERTION, CARDIAC PACEMAKER, DUAL CHAMBER;  Surgeon: Clifford Sevilla MD;  Location: [facility] CATH/EP LAB;  Service: Cardiology;  Laterality: N/A;  [employer]    CORONARY ANGIOPLASTY WITH STENT PLACEMENT      CORONARY ARTERY BYPASS GRAFT      EYE SURGERY      INSERTION OF PACEMAKER      REVISION OF IMPLANTABLE CARDIOVERTER-DEFIBRILLATOR (ICD) ELECTRODE LEAD PLACEMENT Left 7/1/2021    Procedure: REVISION,  INSERTION, ELECTRODE LEAD,pacemaker;  Surgeon: Clifford Sevilla MD;  Location: [facility] CATH/EP LAB;  Service: Cardiology;  Laterality: Left;    TREATMENT OF CARDIAC ARRHYTHMIA N/A 1/29/2021    Procedure: CARDIOVERSION;  Surgeon: Iron Serna MD;  Location: [facility] CATH/EP LAB;  Service: Cardiology;  Laterality: N/A;    VASECTOMY       Social History     Tobacco Use    Smoking status: Never Smoker    Smokeless tobacco: Never Used   Substance Use Topics    Alcohol use: Not Currently    Drug use: No        Review of Systems:    As noted in HPI in addition      REVIEW OF SYSTEMS  CARDIOVASCULAR: No recent chest pain, palpitations, arm, neck, or jaw pain  RESPIRATORY: No recent fever, cough chills, SOB or congestion  : No blood in the urine  GI: No Nausea, vomiting, constipation, diarrhea, blood, or reflux.  MUSCULOSKELETAL: No myalgias  NEURO: No lightheadedness or dizziness  EYES: No Double vision, blurry, vision or headache              Objective        Vitals:    01/11/22 1510   BP: 122/80   Pulse: 69       LIPIDS - LAST 2   Lab Results   Component Value Date    CHOL 164 08/17/2020    CHOL 274 (H) 01/10/2020    HDL 34 (L) 08/17/2020    HDL 45 01/10/2020    LDLCALC 106.2 08/17/2020    LDLCALC 210.0 (H) 01/10/2020    TRIG 119 08/17/2020    TRIG 95 01/10/2020    CHOLHDL 20.7 08/17/2020    CHOLHDL 16.4 (L) 01/10/2020       CBC - LAST 2  Lab Results   Component Value Date    WBC 9.07 12/13/2021    WBC 8.50 08/05/2021    RBC 3.85 (L) 12/13/2021    RBC 3.75 (L) 08/05/2021    HGB 12.4 (L) 12/13/2021    HGB 12.5 (L) 08/05/2021    HCT 37.8 (L) 12/13/2021    HCT 37.2 (L) 08/05/2021    MCV 98 12/13/2021    MCV 99 (H) 08/05/2021    MCH 32.2 (H) 12/13/2021    MCH 33.3 (H) 08/05/2021    MCHC 32.8 12/13/2021    MCHC 33.6 08/05/2021    RDW 15.3 (H) 12/13/2021    RDW 15.6 (H) 08/05/2021     12/13/2021     08/05/2021    MPV 10.1 12/13/2021    MPV 10.9 08/05/2021    GRAN 4.5 06/29/2021    GRAN 61.4 06/29/2021     LYMPH 1.7 06/29/2021    LYMPH 22.9 06/29/2021    MONO 0.7 06/29/2021    MONO 9.7 06/29/2021    BASO 0.07 06/29/2021    BASO 0.08 03/30/2021    NRBC 0 06/29/2021    NRBC 0 03/30/2021       CHEMISTRY & LIVER FUNCTION - LAST 2  Lab Results   Component Value Date     01/11/2022     12/13/2021    K 3.9 01/11/2022    K 4.6 12/13/2021     01/11/2022     12/13/2021    CO2 29 01/11/2022    CO2 26 12/13/2021    ANIONGAP 8 01/11/2022    ANIONGAP 8 12/13/2021    BUN 22 01/11/2022    BUN 35 (H) 12/13/2021    CREATININE 1.4 01/11/2022    CREATININE 1.8 (H) 12/13/2021     01/11/2022     (H) 12/13/2021    CALCIUM 8.6 (L) 01/11/2022    CALCIUM 9.0 12/13/2021    MG 2.4 08/18/2020    ALBUMIN 3.9 01/11/2022    ALBUMIN 4.1 06/29/2021    PROT 7.0 01/11/2022    PROT 7.0 06/29/2021    ALKPHOS 76 01/11/2022    ALKPHOS 64 06/29/2021    ALT 25 01/11/2022    ALT 34 06/29/2021    AST 24 01/11/2022    AST 31 06/29/2021    BILITOT 1.1 (H) 01/11/2022    BILITOT 1.5 (H) 06/29/2021        CARDIAC PROFILE - LAST 2  Lab Results   Component Value Date     (H) 12/13/2021     (H) 10/06/2021    CPK 72 08/18/2020    CPKMB 2.4 08/18/2020    TROPONINI 0.008 08/18/2020        COAGULATION - LAST 2  Lab Results   Component Value Date    LABPT 17.6 (H) 06/29/2021    LABPT 17.2 (H) 03/30/2021    INR 1.5 06/29/2021    INR 1.5 03/30/2021    APTT 31.2 08/18/2020       ENDOCRINE & PSA - LAST 2  Lab Results   Component Value Date    HGBA1C 5.6 08/18/2020    TSH 2.850 10/06/2021    TSH 32.910 (H) 08/05/2021    PSA 1.7 07/17/2020    PSA 1.5 01/31/2019        ECHOCARDIOGRAM RESULTS  Results for orders placed during the hospital encounter of 01/29/21    Transesophageal echo (MACRINA) with possible cardioversion    Interpretation Summary  · The left ventricle is normal in size with concentric remodeling and normal systolic function. The estimated ejection fraction is 60%  · Normal right ventricular size with normal right  ventricular systolic function.  · Mild left atrial enlargement.  · No ASD or PFO closure device in interatrial septum.  · Normal appearing left atrial appendage. No thrombus is present in the appendage. ROXY occluder is absent. Abnormal appendage velocities.  · There is moderate aortic valve stenosis.  · Mild mitral regurgitation.  · Aortic valve area 01.38 centimeters squared by planimetry      CURRENT/PREVIOUS VISIT EKG  Results for orders placed or performed during the hospital encounter of 07/01/21   EKG 12-lead    Collection Time: 07/01/21  9:44 AM    Narrative    Test Reason : T82.110A,    Vent. Rate : 060 BPM     Atrial Rate : 060 BPM     P-R Int : 188 ms          QRS Dur : 194 ms      QT Int : 588 ms       P-R-T Axes : 084 -88 076 degrees     QTc Int : 588 ms    AV dual-paced rhythm  Abnormal ECG  When compared with ECG of 29-JUN-2021 10:10,  Premature supraventricular complexes are no longer Present  RI interval has decreased  Vent. rate has increased BY   8 BPM  Confirmed by Albert Frias MD ([id number]) on 7/10/2021 2:51:31 PM    Referred By:             Confirmed By:Albert Frias MD     No valid procedures specified.   Results for orders placed during the hospital encounter of 10/19/20    Nuclear Stress - Cardiology Interpreted    Interpretation Summary    There is a trivial intensity defect in the  wall of the left ventricle, secondary to breast attenuation.    Gated perfusion images showed an ejection fraction of % at rest and 69% post stress. Normal ejection fraction is greater than %.    There is normal wall motion at rest and post stress.    LV cavity size is normal at rest and normal at stress.    The EKG portion of this study is negative for ischemia.    Arrhythmias during stress: atrial fibrillation.    No Evidence of myocardial ischemia    No valid procedures specified.    PHYSICAL EXAM  CONSTITUTIONAL: Well built, well nourished in no apparent distress  NECK: no carotid bruit, no JVD  LUNGS:  CTA  CHEST WALL: no tenderness  HEART: regular rate and rhythm, S1, S2 normal, systolic ejection murmur   ABDOMEN: soft, non-tender; bowel sounds normal; no masses,  no organomegaly  EXTREMITIES: Extremities normal, no edema, no calf tenderness noted  NEURO: AAO X 3    I HAVE REVIEWED :    The vital signs, nurses notes, and all the pertinent radiology and labs.      Current Outpatient Medications   Medication Instructions    amiodarone (PACERONE) 200 mg, Oral, Daily    amLODIPine (NORVASC) 5 mg, Oral, Nightly    apixaban (ELIQUIS) 5 mg, Oral, 2 times daily    ascorbic acid (vitamin C) (VITAMIN C) 1,000 mg, Oral, Daily    aspirin (ECOTRIN) 81 MG EC tablet 1 tablet    atorvastatin (LIPITOR) 40 mg, Oral, Daily    coQ10 (ubiquinol) 150 mg, Oral, Daily    doxazosin (CARDURA XL) 8 mg, Oral, With breakfast    ergocalciferol, vitamin D2, (VITAMIN D ORAL) 500 Units, Oral, Daily    ezetimibe (ZETIA) 10 mg, Oral, Daily    furosemide (LASIX) 60 mg, Oral, Daily    garlic 1,000 mg Cap 1 capsule, Oral, Daily    levothyroxine (SYNTHROID) 100 mcg, Oral, Before breakfast    lisinopriL (PRINIVIL,ZESTRIL) 20 mg, Oral, 2 times daily    metoprolol succinate (TOPROL-XL) 50 mg, Oral, Daily    multivitamin Tab 1 tablet, Oral, Daily    nitroGLYCERIN (NITROSTAT) 0.4 mg, Sublingual, Every 5 min PRN, DO NOT CRUSH OR CHEW; DISSOLVE UNDER TONGUE; MAXIMUM OF 3 DOSES IN 15 MINUTES    tamsulosin (FLOMAX) 0.4 mg Cap 1 capsule          Assessment & Plan     Essential hypertension  /80   Patient reports good readings at home   Continue amlodipine 5 mg   Continue doxazosin 8 mg   Cont lisinopril 20 mg BID     Remain off aldactone due to correction of elevated Cr with drug holiday       Obstructive sleep apnea  Just got back on cpap per pulmonology   Continue the same     S/P placement of cardiac pacemaker  Device function WNL     Atrial fibrillation  Stable on elquis 5 mg BID   No bleeding tendencies noted     Coronary artery  disease  He has not had to use nitro in the past month   If the chest pain and nitro use reoccurs we will under go stress test   Continue ASA and statin     (HFpEF) heart failure with preserved ejection fraction  Clinically stable at this time   Continue current therapies     Right inguinal hernia  He has elected to defer the procedure for now           Follow up in about 3 months (around 4/11/2022).

## 2022-01-11 NOTE — ASSESSMENT & PLAN NOTE
/80   Patient reports good readings at home   Continue amlodipine 5 mg   Continue doxazosin 8 mg   Cont lisinopril 20 mg BID     Remain off aldactone due to correction of elevated Cr with drug holiday

## 2022-01-11 NOTE — ASSESSMENT & PLAN NOTE
He has not had to use nitro in the past month   If the chest pain and nitro use reoccurs we will under go stress test   Continue ASA and statin

## 2022-01-11 NOTE — PATIENT INSTRUCTIONS
You should be sleeping on your CPAP  If you are not going to sleep on your CPAP you need to be sleeping elevated.

## 2022-01-20 NOTE — TELEPHONE ENCOUNTER
Patient called to request appt to see Dr. Aguilera --- not sure if he needs surgery or not.  Would like to discuss with Dr. Aguilera at an appt.      Appt made for patient to come see Dr. Aguilera.

## 2022-02-08 NOTE — PROGRESS NOTES
Subjective:       Patient ID: Tong Ford is a 76 y.o. male.    Chief Complaint: Hernia      HPI:    Patient is 76-year-old male originally referred to the office with a right inguinal hernia.  First noticed the hernia about a year ago. He has no obstructive symptoms.  He has some discomfort with certain positions and with activity. He also has started to have some left groin discomfort. Cannot feel a bulge like the right.  He has no history of prior hernia repair.  He was originally scheduled in January.  He had to postpone.  He is ready to reschedule surgery.    Past Medical History:   Diagnosis Date    Arthritis     Atrial fibrillation     Carotid artery occlusion     bilateral    Cataract     CKD (chronic kidney disease), stage II     Coronary artery disease     Encounter for blood transfusion     Hypercholesterolemia     Hypertension     Normocytic anemia     Sleep apnea     CESAR    Thyroid disease     TIA (transient ischemic attack)      Past Surgical History:   Procedure Laterality Date    A-V CARDIAC PACEMAKER INSERTION N/A 4/1/2021    Procedure: INSERTION, CARDIAC PACEMAKER, DUAL CHAMBER;  Surgeon: Clifford Sevilla MD;  Location: [facility] CATH/EP LAB;  Service: Cardiology;  Laterality: N/A;  [employer]    CORONARY ANGIOPLASTY WITH STENT PLACEMENT      CORONARY ARTERY BYPASS GRAFT      EYE SURGERY      INSERTION OF PACEMAKER      REVISION OF IMPLANTABLE CARDIOVERTER-DEFIBRILLATOR (ICD) ELECTRODE LEAD PLACEMENT Left 7/1/2021    Procedure: REVISION, INSERTION, ELECTRODE LEAD,pacemaker;  Surgeon: Clifford Sevilla MD;  Location: [facility] CATH/EP LAB;  Service: Cardiology;  Laterality: Left;    TREATMENT OF CARDIAC ARRHYTHMIA N/A 1/29/2021    Procedure: CARDIOVERSION;  Surgeon: Iron Serna MD;  Location: [facility] CATH/EP LAB;  Service: Cardiology;  Laterality: N/A;    VASECTOMY       Review of patient's allergies indicates:  No Known Allergies  Medication List with Changes/Refills   Current  Medications    AMIODARONE (PACERONE) 200 MG TAB    Take 1 tablet (200 mg total) by mouth once daily.    AMLODIPINE (NORVASC) 5 MG TABLET    Take 1 tablet (5 mg total) by mouth every evening.    APIXABAN (ELIQUIS) 5 MG TAB    Take 1 tablet (5 mg total) by mouth 2 (two) times daily.    ASCORBIC ACID, VITAMIN C, (VITAMIN C) 1000 MG TABLET    Take 1,000 mg by mouth once daily.    ASPIRIN (ECOTRIN) 81 MG EC TABLET    1 tablet    ATORVASTATIN (LIPITOR) 40 MG TABLET    Take 1 tablet (40 mg total) by mouth once daily.    COQ10, UBIQUINOL, 100 MG CAP    Take 150 mg by mouth once daily.     DOXAZOSIN (CARDURA XL) 8 MG 24 HR TABLET    Take 1 tablet (8 mg total) by mouth daily with breakfast.    DOXAZOSIN (CARDURA) 8 MG TAB    Take 1 tablet (8 mg total) by mouth once daily.    ERGOCALCIFEROL, VITAMIN D2, (VITAMIN D ORAL)    Take 500 Units by mouth Daily.    EZETIMIBE (ZETIA) 10 MG TABLET    Take 1 tablet (10 mg total) by mouth once daily.    FUROSEMIDE (LASIX) 40 MG TABLET    Take 1.5 tablets (60 mg total) by mouth once daily.    GARLIC 1,000 MG CAP    Take 1 capsule by mouth once daily.     LEVOTHYROXINE (SYNTHROID) 100 MCG TABLET    Take 1 tablet (100 mcg total) by mouth before breakfast.    LISINOPRIL (PRINIVIL,ZESTRIL) 20 MG TABLET    Take 1 tablet (20 mg total) by mouth 2 (two) times a day.    METOPROLOL SUCCINATE (TOPROL-XL) 50 MG 24 HR TABLET    Take 1 tablet (50 mg total) by mouth once daily.    MULTIVITAMIN TAB    Take 1 tablet by mouth once daily.    NITROGLYCERIN (NITROSTAT) 0.4 MG SL TABLET    Place 1 tablet (0.4 mg total) under the tongue every 5 (five) minutes as needed for Chest pain. DO NOT CRUSH OR CHEW; DISSOLVE UNDER TONGUE; MAXIMUM OF 3 DOSES IN 15 MINUTES    TAMSULOSIN (FLOMAX) 0.4 MG CAP    1 capsule     Family History   Problem Relation Age of Onset    Cancer Mother     Cancer Father     Hypertension Father     Cancer Maternal Grandmother     COPD Paternal Grandmother      Social History      Socioeconomic History    Marital status:    Occupational History    Occupation:    Tobacco Use    Smoking status: Never Smoker    Smokeless tobacco: Never Used   Substance and Sexual Activity    Alcohol use: Not Currently    Drug use: No         Review of Systems   Constitutional: Negative for appetite change, chills, fever and unexpected weight change.   HENT: Negative for hearing loss, rhinorrhea, sore throat and voice change.    Eyes: Negative for photophobia and visual disturbance.   Respiratory: Negative for cough, choking and shortness of breath.    Cardiovascular: Negative for chest pain, palpitations and leg swelling.   Gastrointestinal: Negative for abdominal pain, blood in stool, constipation, diarrhea, nausea and vomiting.   Endocrine: Negative for cold intolerance, heat intolerance, polydipsia and polyuria.   Musculoskeletal: Negative for arthralgias, back pain, joint swelling and neck stiffness.   Skin: Negative for color change, pallor and rash.   Neurological: Negative for dizziness, seizures, syncope and headaches.   Hematological: Negative for adenopathy. Does not bruise/bleed easily.   Psychiatric/Behavioral: Negative for agitation, behavioral problems and confusion.       Objective:      Physical Exam  Constitutional:       General: He is not in acute distress.     Appearance: Normal appearance. He is well-developed. He is not toxic-appearing.   HENT:      Head: Normocephalic and atraumatic. No abrasion or laceration.      Right Ear: External ear normal.      Left Ear: External ear normal.      Nose: Nose normal.   Eyes:      Pupils: Pupils are equal, round, and reactive to light.   Neck:      Trachea: Trachea normal. No tracheal deviation.   Cardiovascular:      Rate and Rhythm: Normal rate and regular rhythm.   Pulmonary:      Effort: Pulmonary effort is normal. No tachypnea, accessory muscle usage or respiratory distress.   Abdominal:      General: There is no  distension.      Palpations: Abdomen is soft.      Tenderness: There is no abdominal tenderness. There is no guarding or rebound.      Hernia: A hernia is present. Hernia is present in the right inguinal area.          Comments: Did not appreciate a definite LIH.    Musculoskeletal:      Cervical back: Neck supple. Normal range of motion.   Lymphadenopathy:      Cervical: No cervical adenopathy.      Lower Body: No right inguinal adenopathy. No left inguinal adenopathy.   Skin:     General: Skin is warm.   Neurological:      Mental Status: He is alert and oriented to person, place, and time.      Coordination: Coordination normal.      Gait: Gait normal.   Psychiatric:         Speech: Speech normal.         Behavior: Behavior normal.         Assessment/Plan:   Tong was seen today for hernia.    Diagnoses and all orders for this visit:    Right inguinal hernia  -     Case Request Operating Room: ROBOTIC REPAIR, HERNIA, INGUINAL  -     Basic metabolic panel; Future  -     CBC auto differential; Future  -     EKG 12-lead; Future  -     X-Ray Chest PA And Lateral; Future    Other orders  -     Full code; Standing  -     Insert peripheral IV; Standing  -     Full code  -     Insert peripheral IV  The following orders have not been finalized:  -     ceFAZolin (ANCEF) 2 g in dextrose 5 % 50 mL IVPB        Will reschedule robotic inguinal hernia repair March 11th.  Will also evaluate the left side.  If there is a hernia on the left, will also perform repair.  Patient is also on Eliquis.  He will need to stop at 72 hours prior to surgery.      I discussed the proposed procedures with the patient including risks, benefits, indications, alternatives and special concerns.  The patient appears to understand and agrees to go ahead with surgery.  I have made no promises, warranties or verbal agreements beyond what was discussed above.

## 2022-02-09 NOTE — TELEPHONE ENCOUNTER
----- Message from Wesly Stevens sent at 2/9/2022  9:40 AM CST -----  Contact: Pascale  Type:  Pharmacy Calling to Clarify an RX  Name of Caller: Pascale    Pharmacy Name:  [facility] - [address]  Phone:  [phone]  Fax:  [phone]  Prescription Name:    What do they need to clarify?: clarification on a form for invoice # [id number]  Best Call Back Number:  [phone]  Additional Information:  Pascale with [facility] requesting a call back from provider office.

## 2022-02-10 NOTE — TELEPHONE ENCOUNTER
Attempted to call x 2 no answer.  LVM for patient to call back if further assistance is needed.     Reason for Disposition   Second attempt to contact caller AND no contact made. Phone number verified.    Protocols used: NO CONTACT OR DUPLICATE CONTACT CALL-A-AH

## 2022-03-02 NOTE — PROGRESS NOTES
SUBJECTIVE:    Patient ID: Tong Ford is a 76 y.o. male.    Chief Complaint: Follow-up and Hypertension  76 male male here  here today to follow-up on his hypertension, his blood pressure today is well controlled.    Pt has been following up with physical therapy for his back and it is improving. He has been following up with general surgery for his hernia. He has been following up with cardiology for his heart issues. (Afib, HF PREF)       SP[facility]X:  CAD: Nitroglycerine, Last echo Jun/2019 EF 70% normal stress test 2019.  Hyperlipidemia:  Atorvastatin 40 mg, Zetia 10 mg  HTN:Metoprolol XL 50mg, Doxazosin 8mg, Amlodipine 5mg mg Lisinopril 20mg b.i.d., his blood pressure is well controlled today.  Hypothyroidism:  Levothyroxine 100 mcg  Arthritis:  BPH:  Flomax 0.4 mg.  CESAR: On CPAP quit using it  Atrial fib: Amiodarone 200mg  Eliquis 5 mg,  Pace Maker:    Heart failure with preserved ejection fraction:  Diuretics Lasix 60 mg a day      Specialists:  Cardiology: Dr Jones  Pulmonary: Monserrat Teixeira   Derm: Dr Rylye Villarrealo: Dr Baron    Neurology: Dr Steve Dotson  Urology (pt referred to Dr Kaur has not made appointment.)      Smoke: None  ETOH: None  Exercise: walking most days.    Creatinine 0.5 - 1.4 mg/dL 1.5 High      GFR:  44    TSH 0.340 - 5.600 uIU/mL 5.300      Lipids  Cholesterol:  125  Triglycerides:  47  HDL:  45  LDL:  70      Hypertension  This is a chronic problem. The current episode started more than 1 year ago. The problem is unchanged. The problem is controlled. Pertinent negatives include no anxiety, blurred vision, chest pain, headaches, malaise/fatigue, neck pain, orthopnea, palpitations, peripheral edema, PND, shortness of breath or sweats. Agents associated with hypertension include decongestants. Risk factors for coronary artery disease include sedentary lifestyle, obesity, male gender and dyslipidemia. Past treatments include beta blockers, calcium channel blockers, ACE inhibitors and  alpha 1 blockers. The current treatment provides moderate improvement. Compliance problems include exercise and diet.  Identifiable causes of hypertension include a thyroid problem.   Thyroid Problem  Presents for follow-up visit. Symptoms include fatigue and weight gain. Patient reports no anxiety, constipation, diarrhea, heat intolerance, menstrual problem, palpitations or weight loss. The symptoms have been stable. His past medical history is significant for hyperlipidemia.   Hyperlipidemia  This is a chronic problem. The current episode started more than 1 year ago. The problem is controlled. Recent lipid tests were reviewed and are normal. Exacerbating diseases include hypothyroidism and obesity. Factors aggravating his hyperlipidemia include fatty foods and beta blockers. Pertinent negatives include no chest pain or shortness of breath. Current antihyperlipidemic treatment includes statins. Compliance problems include adherence to exercise and adherence to diet.             Past Medical History:   Diagnosis Date    Arthritis     Atrial fibrillation     Carotid artery occlusion     bilateral    Cataract     CKD (chronic kidney disease), stage II     Coronary artery disease     Encounter for blood transfusion     Hypercholesterolemia     Hypertension     Normocytic anemia     Sleep apnea     CESAR    Thyroid disease     TIA (transient ischemic attack)      Social History     Socioeconomic History    Marital status:    Occupational History    Occupation: professor   Tobacco Use    Smoking status: Never Smoker    Smokeless tobacco: Never Used   Substance and Sexual Activity    Alcohol use: Not Currently    Drug use: No     Social Determinants of Health     Physical Activity: Inactive    Days of Exercise per Week: 0 days    Minutes of Exercise per Session: 0 min   Stress: No Stress Concern Present    Feeling of Stress : Not at all     Past Surgical History:   Procedure Laterality Date     A-V CARDIAC PACEMAKER INSERTION N/A 4/1/2021    Procedure: INSERTION, CARDIAC PACEMAKER, DUAL CHAMBER;  Surgeon: Clifford Sevilla MD;  Location: [facility] CATH/EP LAB;  Service: Cardiology;  Laterality: N/A;  [employer]    CORONARY ANGIOPLASTY WITH STENT PLACEMENT      CORONARY ARTERY BYPASS GRAFT      EYE SURGERY      INSERTION OF PACEMAKER      REVISION OF IMPLANTABLE CARDIOVERTER-DEFIBRILLATOR (ICD) ELECTRODE LEAD PLACEMENT Left 7/1/2021    Procedure: REVISION, INSERTION, ELECTRODE LEAD,pacemaker;  Surgeon: Clifford Sevilla MD;  Location: [facility] CATH/EP LAB;  Service: Cardiology;  Laterality: Left;    TREATMENT OF CARDIAC ARRHYTHMIA N/A 1/29/2021    Procedure: CARDIOVERSION;  Surgeon: Iron Serna MD;  Location: [facility] CATH/EP LAB;  Service: Cardiology;  Laterality: N/A;    VASECTOMY       Family History   Problem Relation Age of Onset    Cancer Mother     Cancer Father     Hypertension Father     Cancer Maternal Grandmother     COPD Paternal Grandmother      Current Outpatient Medications   Medication Sig Dispense Refill    amiodarone (PACERONE) 200 MG Tab Take 1 tablet (200 mg total) by mouth once daily. 90 tablet 3    amLODIPine (NORVASC) 5 MG tablet Take 1 tablet (5 mg total) by mouth every evening. 90 tablet 2    apixaban (ELIQUIS) 5 mg Tab Take 1 tablet (5 mg total) by mouth 2 (two) times daily. 180 tablet 3    ascorbic acid, vitamin C, (VITAMIN C) 1000 MG tablet Take 1,000 mg by mouth once daily.      aspirin (ECOTRIN) 81 MG EC tablet 1 tablet      atorvastatin (LIPITOR) 40 MG tablet Take 1 tablet (40 mg total) by mouth once daily. 90 tablet 3    coQ10, ubiquinol, 100 mg Cap Take 150 mg by mouth once daily.       doxazosin (CARDURA XL) 8 mg 24 hr tablet Take 1 tablet (8 mg total) by mouth daily with breakfast. 90 tablet 3    doxazosin (CARDURA) 8 MG Tab Take 1 tablet (8 mg total) by mouth once daily. 90 tablet 0    ergocalciferol, vitamin D2, (VITAMIN D ORAL) Take 500 Units by mouth  Daily.      ezetimibe (ZETIA) 10 mg tablet Take 1 tablet (10 mg total) by mouth once daily. 90 tablet 3    furosemide (LASIX) 40 MG tablet Take 1.5 tablets (60 mg total) by mouth once daily. 135 tablet 2    garlic 1,000 mg Cap Take 1 capsule by mouth once daily.       levothyroxine (SYNTHROID) 100 MCG tablet Take 1 tablet (100 mcg total) by mouth before breakfast. 90 tablet 2    lisinopriL (PRINIVIL,ZESTRIL) 20 MG tablet Take 1 tablet (20 mg total) by mouth 2 (two) times a day. 180 tablet 3    metoprolol succinate (TOPROL-XL) 50 MG 24 hr tablet Take 1 tablet (50 mg total) by mouth once daily. 90 tablet 3    multivitamin Tab Take 1 tablet by mouth once daily.      nitroGLYCERIN (NITROSTAT) 0.4 MG SL tablet Place 1 tablet (0.4 mg total) under the tongue every 5 (five) minutes as needed for Chest pain. DO NOT CRUSH OR CHEW; DISSOLVE UNDER TONGUE; MAXIMUM OF 3 DOSES IN 15 MINUTES 20 tablet 0    tamsulosin (FLOMAX) 0.4 mg Cap 1 capsule      cyanocobalamin 1,000 mcg/mL injection Inject 1 mL (1,000 mcg total) into the muscle every 30 days. 1 mL 3     No current facility-administered medications for this visit.     Review of patient's allergies indicates:  No Known Allergies    Review of Systems   Constitutional: Positive for activity change, fatigue and weight gain. Negative for malaise/fatigue, unexpected weight change and weight loss.   HENT: Negative for congestion, hearing loss, rhinorrhea, sinus pressure, sinus pain and trouble swallowing.    Eyes: Negative for blurred vision, discharge and visual disturbance.   Respiratory: Negative for chest tightness, shortness of breath and wheezing.    Cardiovascular: Negative for chest pain, palpitations, orthopnea and PND.   Gastrointestinal: Negative for blood in stool, constipation, diarrhea and vomiting.   Endocrine: Negative for heat intolerance, polydipsia and polyuria.   Genitourinary: Negative for difficulty urinating, hematuria, menstrual problem and urgency.  "  Musculoskeletal: Positive for arthralgias. Negative for joint swelling and neck pain.   Neurological: Positive for weakness. Negative for headaches.   Psychiatric/Behavioral: Negative for confusion and dysphoric mood. The patient is not nervous/anxious.           Blood pressure 130/80, pulse 79, resp. rate 18, height 5' 8" (1.727 m), weight 89.4 kg (197 lb), SpO2 97 %. Body mass index is 29.95 kg/m².   Objective:      Physical Exam  Constitutional:       General: He is not in acute distress.     Appearance: Normal appearance. He is obese. He is not ill-appearing, toxic-appearing or diaphoretic.   HENT:      Head: Normocephalic and atraumatic.      Mouth/Throat:      Mouth: Mucous membranes are moist.      Pharynx: Oropharynx is clear. No oropharyngeal exudate or posterior oropharyngeal erythema.   Eyes:      General: No scleral icterus.        Right eye: No discharge.         Left eye: No discharge.      Conjunctiva/sclera: Conjunctivae normal.      Pupils: Pupils are equal, round, and reactive to light.   Cardiovascular:      Rate and Rhythm: Normal rate and regular rhythm.      Heart sounds: Murmur heard.   Pulmonary:      Effort: Pulmonary effort is normal. No respiratory distress.      Breath sounds: Normal breath sounds. No wheezing.   Skin:     General: Skin is warm and dry.      Capillary Refill: Capillary refill takes less than 2 seconds.   Neurological:      Mental Status: He is alert and oriented to person, place, and time.             Assessment:       1. Essential hypertension    2. Hypercholesteremia    3. Postablative hypothyroidism    4. Macrocytic anemia         Plan:           Essential hypertension  HTN is well controlled, will continue on his current combination of medications.    Hypercholesteremia  Lipids are well controlled    Postablative hypothyroidism  TSH WNL     Macrocytic anemia  -     cyanocobalamin 1,000 mcg/mL injection; Inject 1 mL (1,000 mcg total) into the muscle every 30 days.  " Dispense: 1 mL; Refill: 3

## 2022-03-08 NOTE — DISCHARGE INSTRUCTIONS
To confirm, Your doctor has instructed you that surgery is scheduled for: 3/11/22   Maureen  [phone]    Please report to [facility] the morning of surgery. You must check-in and receive a wristband before going to your procedure.    Pre-Op will call the afternoon prior to surgery between 1:00 and 6:00 PM with the final arrival time.  Phone number: [phone]    PLEASE NOTE:  The surgery schedule has many variables which may affect the time of your surgery case.  Family members should be available if your surgery time changes.  Plan to be here the day of your procedure between 4-6 hours.    MEDICATIONS:  TAKE ONLY THESE MEDICATIONS WITH A SMALL SIP OF WATER THE MORNING OF YOUR PROCEDURE:    See LIst    DO NOT TAKE THESE MEDICATIONS 5-7 DAYS PRIOR to your procedure or per your surgeon's request:   ASPIRIN, ALEVE, ADVIL, IBUPROFEN, FISH OIL VITAMIN E, HERBALS  (May take Tylenol)                                       ELIQUIS - last dose  3/8/22    ONLY if you are prescribed any types of blood thinners such as:  Aspirin, Coumadin, Plavix, Pradaxa, Xarelto, Aggrenox, Effient, Eliquis, Savasya, Brilinta, or any other, ask your surgeon whether you should stop taking them and how long before surgery you should stop.  You may also need to verify with the prescribing physician if it is ok to stop your medication.      INSTRUCTIONS IMPORTANT!!  Do not eat or drink anything between midnight and the time of your procedure- this includes gum, mints, and candy.  Do not smoke or drink alcoholic beverages 24 hours prior to your procedure.  Shower the night before AND the morning of your procedure with a Chlorhexidine wash such as Hibiclens or Dial antibacterial soap from the neck down.  Do not get it on your face or in your eyes.  You may use your own shampoo and face wash. This helps your skin to be as bacteria free as possible.    If you wear contact lenses, dentures, hearing aids or  glasses, bring a container to put them in during surgery and give to a family member for safe keeping.  Please leave all jewelry, piercing's and valuables at home.   DO NOT remove hair from the surgery site.  Do not shave the incision site unless you are given specific instructions to do so.    ONLY if you have been diagnosed with sleep apnea please bring your C-PAP machine.  ONLY if you wear home oxygen please bring your portable oxygen tank the day of your procedure.  ONLY if you have a history of OPEN HEART SURGERY you will need a clearance from your Cardiologist per Anesthesia.      ONLY for patients requiring bowel prep, written instructions will be given by your doctor's office.  ONLY if you have a neuro stimulator, please bring the controller with you the morning of surgery  ONLY if a type and screen test is needed before surgery, please return:  If your doctor has scheduled you for an overnight stay, bring a small overnight bag with any personal items you need.  Make arrangements in advance for transportation home by a responsible adult.  It is not safe to drive a vehicle during the 24 hours after anesthesia.         Procedural/Surgical areas (regardless of time of day): Patients with procedures can have visitors as outlined below pre and  post procedure. The support person may remain with the patient prior to their surgery/procedure and must then wait in a  socially distant manner until a member of the medical team provides an update at the conclusion of the procedure/surgery. If  the patient is being admitted post procedure/surgery visitors will adhere to the current visitation policy and hours of visitation.     Adult patients may have one adult support person, pre and post procedure   Minor patients will be allowed to have both parents/legal guardians accompany them to and from the procedural area  Inpatient units (including NICU)     COVID-19 Negative Adult Patients: Patients are allowed to have two  visitors per day, with one allowed 24/7.   COVID-19 Negative Pediatric Patients: Patients are allowed to have two visitors at a time, with one allowed 24/7.   COVID-19 Positive Patients and COVID Units: Patients are allowed to have one visitor per day between the hours of. 9  a.m. to 6 p.m. Inpatient visitors must remain in the patients room at all times.    All [facility] facilities and properties are tobacco free.  Smoking is NOT allowed.   If you have any questions about these instructions, call Pre-Op Admit  Nursing at [phone] or the Pre-Op Day Surgery Unit at [phone].

## 2022-03-11 NOTE — DISCHARGE INSTRUCTIONS
"Discharge Instructions: After Your Surgery/Procedure  Youve just had surgery. During surgery you were given medicine called anesthesia to keep you relaxed and free of pain. After surgery you may have some pain or nausea. This is common. Here are some tips for feeling better and getting well after surgery.     Stay on schedule with your medication.   Going home  Your doctor or nurse will show you how to take care of yourself when you go home. He or she will also answer your questions. Have an adult family member or friend drive you home.      For your safety we recommend these precaution for the first 24 hours after your procedure:  Do not drive or use heavy equipment.  Do not make important decisions or sign legal papers.  Do not drink alcohol.  Have someone stay with you, if needed. He or she can watch for problems and help keep you safe.  Your concentration, balance, coordination, and judgement may be impaired for many hours after anesthesia.  Use caution when ambulating or standing up.     You may feel weak and "washed out" after anesthesia and surgery.      Subtle residual effects of general anesthesia or sedation with regional / local anesthesia can last more than 24 hours.  Rest for the remainder of the day or longer if your Doctor/Surgeon has advised you to do so.  Although you may feel normal within the first 24 hours, your reflexes and mental ability may be impaired without you realizing it.  You may feel dizzy, lightheaded or sleepy for 24 hours or longer.      Be sure to go to all follow-up visits with your doctor. And rest after your surgery for as long as your doctor tells you to.  Coping with pain  If you have pain after surgery, pain medicine will help you feel better. Take it as told, before pain becomes severe. Also, ask your doctor or pharmacist about other ways to control pain. This might be with heat, ice, or relaxation. And follow any other instructions your surgeon or nurse gives you.  Tips " for taking pain medicine  To get the best relief possible, remember these points:  Pain medicines can upset your stomach. Taking them with a little food may help.  Most pain relievers taken by mouth need at least 20 to 30 minutes to start to work.  Taking medicine on a schedule can help you remember to take it. Try to time your medicine so that you can take it before starting an activity. This might be before you get dressed, go for a walk, or sit down for dinner.  Constipation is a common side effect of pain medicines. Call your doctor before taking any medicines such as laxatives or stool softeners to help ease constipation. Also ask if you should skip any foods. Drinking lots of fluids and eating foods such as fruits and vegetables that are high in fiber can also help. Remember, do not take laxatives unless your surgeon has prescribed them.  Drinking alcohol and taking pain medicine can cause dizziness and slow your breathing. It can even be deadly. Do not drink alcohol while taking pain medicine.  Pain medicine can make you react more slowly to things. Do not drive or run machinery while taking pain medicine.  Your health care provider may tell you to take acetaminophen to help ease your pain. Ask him or her how much you are supposed to take each day. Acetaminophen or other pain relievers may interact with your prescription medicines or other over-the-counter (OTC) drugs. Some prescription medicines have acetaminophen and other ingredients. Using both prescription and OTC acetaminophen for pain can cause you to overdose. Read the labels on your OTC medicines with care. This will help you to clearly know the list of ingredients, how much to take, and any warnings. It may also help you not take too much acetaminophen. If you have questions or do not understand the information, ask your pharmacist or health care provider to explain it to you before you take the OTC medicine.  Managing nausea  Some people have an  upset stomach after surgery. This is often because of anesthesia, pain, or pain medicine, or the stress of surgery. These tips will help you handle nausea and eat healthy foods as you get better. If you were on a special food plan before surgery, ask your doctor if you should follow it while you get better. These tips may help:  Do not push yourself to eat. Your body will tell you when to eat and how much.  Start off with clear liquids and soup. They are easier to digest.  Next try semi-solid foods, such as mashed potatoes, applesauce, and gelatin, as you feel ready.  Slowly move to solid foods. Dont eat fatty, rich, or spicy foods at first.  Do not force yourself to have 3 large meals a day. Instead eat smaller amounts more often.  Take pain medicines with a small amount of solid food, such as crackers or toast, to avoid nausea.     Call your surgeon if  You still have pain an hour after taking medicine. The medicine may not be strong enough.  You feel too sleepy, dizzy, or groggy. The medicine may be too strong.  You have side effects like nausea, vomiting, or skin changes, such as rash, itching, or hives.       If you have obstructive sleep apnea  You were given anesthesia medicine during surgery to keep you comfortable and free of pain. After surgery, you may have more apnea spells because of this medicine and other medicines you were given. The spells may last longer than usual.   At home:  Keep using the continuous positive airway pressure (CPAP) device when you sleep. Unless your health care provider tells you not to, use it when you sleep, day or night. CPAP is a common device used to treat obstructive sleep apnea.  Talk with your provider before taking any pain medicine, muscle relaxants, or sedatives. Your provider will tell you about the possible dangers of taking these medicines.  © 4515-5986 [employer]. [address] [place] [address], [place] [address]. All rights reserved. This information is  not intended as a substitute for professional medical care. Always follow your healthcare professional's instructions.     Post op instructions for prevention of DVT  What is deep vein thrombosis?  Deep vein thrombosis (DVT) is the medical term for blood clots in the deep veins of the leg.  These blood clots can be dangerous.  A DVT can block a blood vessel and keep blood from getting where it needs to go.  Another problem is that the clot can travel to other parts of the body such as the lungs.  A clot that travels to the lungs is called a pulmonary embolus (PE) and can cause serious problems with breathing which can lead to death.  Am I at risk for DVT/PE?  If you are not very active, you are at risk of DVT.  Anyone confined to bed, sitting for long periods of time, recovering from surgery, etc. increases the risk of DVT.  Other risk factors are cancer diagnosis, certain medications, estrogen replacement in any form,older age, obesity, pregnancy, smoking, history of clotting disorders, and dehydration.  How will I know if I have a DVT?  Swelling in the lower leg  Pain  Warmth, redness, hardness or bulging of the vein  If you have any of these symptoms, call your doctors office right away.  Some people will not have any symptoms until the clot moves to the lungs.  What are the symptoms of a PE?  Panting, shortness of breath, or trouble breathing  Sharp, knife-like chest pain when you breathe  Coughing or coughing up blood  Rapid heartbeat  If you have any of these symptoms or get worse quickly, call 911 for emergency treatment.  How can I prevent a DVT?  Avoid long periods of inactivity and dont cross your legs--get up and walk around every hour or so.  Stay active--walking after surgery is highly encouraged.  This means you should get out of the house and walk in the neighborhood.  Going up and down stairs will not impair healing (unless advised against such activity by your doctor).    Drink plenty of  noncaffeinated, nonalcoholic fluids each day to prevent dehydration.  Wear special support stockings, if they have been advised by your doctor.  If you travel, stop at least once an hour and walk around.  Avoid smoking (assistance with stopping is available through your healthcare provider)  Always notify your doctor if you are not able to follow the post operative instructions that are given to you at the time of discharge.  It may be necessary to prescribe one of the medications available to prevent DVT.   Using an Incentive Spirometer    An incentive spirometer is a device that helps you do deep breathing exercises. These exercises expand your lungs, aid in circulation, and help prevent pneumonia. Deep breathing exercises also help you breathe better and improve the function of your lungs by:  Keeping your lungs clear  Strengthening your breathing muscles  Helping prevent respiratory complications or problems  The incentive spirometer gives you a way to take an active part in recover. A nurse or therapist will teach you breathing exercises. To do these exercises, you will breathe in through your mouth and not your nose. The incentive spirometer only works correctly if you breathe in through your mouth.  Steps to clear lungs  Step 1. Exhale normally. Then, inhale normally.  Relax and breathe out.  Step 2. Place your lips tightly around the mouthpiece.  Make sure the device is upright and not tilted.  Step 3. Inhale as much air as you can through the mouthpiece (don't breath through your nose).  Inhale slowly and deeply.  Hold your breath long enough to keep the balls or disk raised for at least 3 to 5 seconds, or as instructed by your healthcare provider.  Some spirometers have an indicator to let you know that you are breathing in too fast. If the indicator goes off, breathe in more slowly.  Step 4. Repeat the exercise regularly.  Do this exercise every hour while you're awake, or as instructed by your healthcare  provider.  If you were taught deep breathing and coughing exercises, do them regularly as instructed by your healthcare provider.          We hope your stay was comfortable as you heal now, mend and rest.    For we have enjoyed taking care of you by giving your our best.    And as you get better, by regaining your health and strength;   We count it as a privilege to have served you and hope your time at [facility] was well spent.      Thank  You!!!

## 2022-03-11 NOTE — DISCHARGE SUMMARY
Discharge Summary  General Surgery      Admit Date: 3/11/2022    Discharge Date :3/11/2022    Attending Physician: Melo Aguilera III     Discharge Physician: Melo Aguilera III    Discharged Condition: good    Discharge Diagnosis: Right inguinal hernia [K40.90]    Treatments/Procedures: Procedure(s) (LRB):  ROBOTIC REPAIR, HERNIA, INGUINAL (Right)    Hospital Course: Uneventful; Discharged home from Recovery    Significant Diagnostic Studies: none    Disposition: Home or Self Care    Diet: Regular    Follow up: Office 10-14 days    Activity: No heavy lifting till seen in office.    Patient Instructions:   Current Discharge Medication List      START taking these medications    Details   HYDROcodone-acetaminophen (NORCO) 5-325 mg per tablet Take 1 tablet by mouth every 6 (six) hours as needed for Pain.  Qty: 20 tablet, Refills: 0    Comments: Quantity prescribed more than 7 day supply? No         CONTINUE these medications which have NOT CHANGED    Details   amiodarone (PACERONE) 200 MG Tab Take 1 tablet (200 mg total) by mouth once daily.  Qty: 90 tablet, Refills: 3      amLODIPine (NORVASC) 5 MG tablet Take 1 tablet (5 mg total) by mouth every evening.  Qty: 90 tablet, Refills: 2    Comments: .      ascorbic acid, vitamin C, (VITAMIN C) 1000 MG tablet Take 1,000 mg by mouth once daily.      atorvastatin (LIPITOR) 40 MG tablet Take 1 tablet (40 mg total) by mouth once daily.  Qty: 90 tablet, Refills: 3    Associated Diagnoses: ASCVD (arteriosclerotic cardiovascular disease)      coQ10, ubiquinol, 100 mg Cap Take 150 mg by mouth once daily.       doxazosin (CARDURA) 8 MG Tab Take 1 tablet (8 mg total) by mouth once daily.  Qty: 90 tablet, Refills: 0    Comments: This correct. No need to call for clarification      ergocalciferol, vitamin D2, (VITAMIN D ORAL) Take 500 Units by mouth Daily.      ezetimibe (ZETIA) 10 mg tablet Take 1 tablet (10 mg total) by mouth once daily.  Qty: 90 tablet, Refills: 3       furosemide (LASIX) 40 MG tablet Take 1.5 tablets (60 mg total) by mouth once daily.  Qty: 135 tablet, Refills: 2      garlic 1,000 mg Cap Take 1 capsule by mouth once daily.       levothyroxine (SYNTHROID) 100 MCG tablet Take 1 tablet (100 mcg total) by mouth before breakfast.  Qty: 90 tablet, Refills: 2      lisinopriL (PRINIVIL,ZESTRIL) 20 MG tablet Take 1 tablet (20 mg total) by mouth 2 (two) times a day.  Qty: 180 tablet, Refills: 3    Comments: .      metoprolol succinate (TOPROL-XL) 50 MG 24 hr tablet Take 1 tablet (50 mg total) by mouth once daily.  Qty: 90 tablet, Refills: 3    Comments: .      multivitamin Tab Take 1 tablet by mouth once daily.  Qty:        apixaban (ELIQUIS) 5 mg Tab Take 1 tablet (5 mg total) by mouth 2 (two) times daily.  Qty: 180 tablet, Refills: 3      aspirin (ECOTRIN) 81 MG EC tablet 1 tablet      cyanocobalamin 1,000 mcg/mL injection Inject 1 mL (1,000 mcg total) into the muscle every 30 days.  Qty: 1 mL, Refills: 3    Associated Diagnoses: Macrocytic anemia      nitroGLYCERIN (NITROSTAT) 0.4 MG SL tablet Place 1 tablet (0.4 mg total) under the tongue every 5 (five) minutes as needed for Chest pain. DO NOT CRUSH OR CHEW; DISSOLVE UNDER TONGUE; MAXIMUM OF 3 DOSES IN 15 MINUTES  Qty: 20 tablet, Refills: 0    Associated Diagnoses: Essential hypertension             Discharge Procedure Orders   Diet Adult Regular     Lifting restrictions   Order Comments: No lifting over twenty pounds for six weeks     Remove dressing in 48 hours

## 2022-03-11 NOTE — PLAN OF CARE
Meets criteria for discharge. Discharged to home with daughter. Denies nausea. Tolerating fluids. Pain tolerable. Steady gait. Voiding without difficulty. Discharge instructions reviewed and printed handout given. Verbalized understanding.

## 2022-03-11 NOTE — ANESTHESIA PROCEDURE NOTES
Intubation    Date/Time: 3/11/2022 7:37 AM  Performed by: Drew Lopez CRNA  Authorized by: Lawrence Oliva MD     Intubation:     Induction:  Intravenous    Intubated:  Postinduction    Mask Ventilation:  Easy mask    Attempts:  1    Attempted By:  Student (JARED Hahn)    Method of Intubation:  Direct    Blade:  Sandip 3    Laryngeal View Grade: Grade I - full view of cords      Difficult Airway Encountered?: No      Complications:  None    Airway Device:  Oral endotracheal tube    Airway Device Size:  7.5    Style/Cuff Inflation:  Cuffed    Inflation Amount (mL):  5    Tube secured:  22    Secured at:  The lips    Placement Verified By:  Capnometry    Complicating Factors:  None    Findings Post-Intubation:  BS equal bilateral

## 2022-03-11 NOTE — BRIEF OP NOTE
[facility]  Brief Operative Note    SUMMARY     Surgery Date: 3/11/2022     Surgeon(s) and Role:     * Melo Aguilera III, MD - Primary    Assist Yenni Machado     Pre-op Diagnosis:  Right inguinal hernia [K40.90]    Post-op Diagnosis:  Post-Op Diagnosis Codes:     * Right inguinal hernia [K40.90]    Procedure(s) (LRB):  ROBOTIC REPAIR, HERNIA, INGUINAL (Right) - rTAPP    Anesthesia: General    Operative Findings: Patient brought to the OR and transferred to the OR table in supine position. He was given general anesthesia and intubated. Abdomen was prepped and draped.  I 1st entered the abdomen using a 8 mm robotic Visiport.  A small incision was made in the left upper quadrant. Abdomen was insufflated with a Veress needle. Visiport was visualized going through the layers of the abdominal wall into the abdominal cavity. The camera port was placed 20 cm from the pubic symphysis in the upper midline.  Arm 1 was placed in the right lateral abdomen 10 cm lateral to the camera port. The robot was docked to our ports.  I then scrubbed out and went to the surgeon's console. Patient had a large right indirect inguinal hernia.  There were no incarcerated bowel within the hernia.  I started the procedure with fenestrated bipolar forceps in my left arm and scissors hooked to monopolar cord in my right arm.  I turned my attention to the right inguinal hernia.  I created a peritoneal flap 8 cm superior to the hernia defect going medial to lateral.  The medial border of the flap was the medial umbilical ligament in the lateral border was about 8 cm superior to the anterior superior iliac spine.  The monopolar scissors were used to create the flap.  I then dissected the peritoneum off the underlying transversalis muscle.  I turned my attention to the medial aspect of my dissection.  The peritoneum was dissected off the rectus.  This was carried down inferiorly The pubic symphysis was located and  visualized. Deepak's ligament was identified.  Dissection was carried several cm inferior to the pubic symphysis and then little past midline. That completed this aspect of the dissection.  I then turned my attention to the lateral aspect of my peritoneal flap.  The peritoneum was taken off the transversalis muscle inferiorly down to the plane of my medial flap dissection.  I then turned my attention to the hernia defect and cord structures at the center of the dissection. I dissected the peritoneum inferiorly toward the hernia sac. The sac was very elongated. It was slowly dissected away from the cord structures and reduced out of the hernia defect. Dissection was carried inferiorly over the cord structures and the triangle of doom staying close to the peritoneum until I met the plane of dissection of the medial and lateral plane. There was good hemostasis.  The preperitoneal flap was completed at this time.  The 16 x 12 cm Pro  mesh was placed into the abdomen.  It was deployed over the myopectineal orifice covering the hernia defect.  The medial aspect of the mesh was at the pubic symphysis.  The peritoneum was approximated with a running V lock to cover the mesh completely.  The needles were removed.   After this procedure was finished.  Instruments were removed.  The robot was undocked. Skin sites were closed with 4-0 Monocryl.  Patient was extubated and brought to recovery room stable condition.  All instrument counts were correct.  All laparotomy pad counts were correct.    complications none   Estimated Blood Loss: 1 mL    Estimated Blood Loss has been documented.         Specimens:   Specimen (24h ago, onward)            None          [id number]

## 2022-03-11 NOTE — PLAN OF CARE
Pt transferred to phase II. VSS, pain tolerable, pain pills given, steri-strips to abdomen cdi, tolerated clear liquids without N/V, due to void. Report given to GILDA Ramos.

## 2022-03-11 NOTE — H&P
Patient ID: Tong Ford is a 76 y.o. male.     Chief Complaint: Hernia        HPI:update H and P. No change to H and P.     Patient is 76-year-old male originally referred to the office with a right inguinal hernia.  First noticed the hernia about a year ago. He has no obstructive symptoms.  He has some discomfort with certain positions and with activity. He also has started to have some left groin discomfort. Cannot feel a bulge like the right.  He has no history of prior hernia repair.  He was originally scheduled in January.  He had to postpone.  He is ready to reschedule surgery.          Past Medical History:   Diagnosis Date    Arthritis      Atrial fibrillation      Carotid artery occlusion       bilateral    Cataract      CKD (chronic kidney disease), stage II      Coronary artery disease      Encounter for blood transfusion      Hypercholesterolemia      Hypertension      Normocytic anemia      Sleep apnea       CESAR    Thyroid disease      TIA (transient ischemic attack)              Past Surgical History:   Procedure Laterality Date    A-V CARDIAC PACEMAKER INSERTION N/A 4/1/2021     Procedure: INSERTION, CARDIAC PACEMAKER, DUAL CHAMBER;  Surgeon: Clifford Sevilla MD;  Location: [facility] CATH/EP LAB;  Service: Cardiology;  Laterality: N/A;  [employer]    CORONARY ANGIOPLASTY WITH STENT PLACEMENT        CORONARY ARTERY BYPASS GRAFT        EYE SURGERY        INSERTION OF PACEMAKER        REVISION OF IMPLANTABLE CARDIOVERTER-DEFIBRILLATOR (ICD) ELECTRODE LEAD PLACEMENT Left 7/1/2021     Procedure: REVISION, INSERTION, ELECTRODE LEAD,pacemaker;  Surgeon: Clifford Sevilla MD;  Location: [facility] CATH/EP LAB;  Service: Cardiology;  Laterality: Left;    TREATMENT OF CARDIAC ARRHYTHMIA N/A 1/29/2021     Procedure: CARDIOVERSION;  Surgeon: Iron Serna MD;  Location: [facility] CATH/EP LAB;  Service: Cardiology;  Laterality: N/A;    VASECTOMY          Review of patient's allergies indicates:  No  Known Allergies       Medication List with Changes/Refills   Current Medications     AMIODARONE (PACERONE) 200 MG TAB    Take 1 tablet (200 mg total) by mouth once daily.     AMLODIPINE (NORVASC) 5 MG TABLET    Take 1 tablet (5 mg total) by mouth every evening.     APIXABAN (ELIQUIS) 5 MG TAB    Take 1 tablet (5 mg total) by mouth 2 (two) times daily.     ASCORBIC ACID, VITAMIN C, (VITAMIN C) 1000 MG TABLET    Take 1,000 mg by mouth once daily.     ASPIRIN (ECOTRIN) 81 MG EC TABLET    1 tablet     ATORVASTATIN (LIPITOR) 40 MG TABLET    Take 1 tablet (40 mg total) by mouth once daily.     COQ10, UBIQUINOL, 100 MG CAP    Take 150 mg by mouth once daily.      DOXAZOSIN (CARDURA XL) 8 MG 24 HR TABLET    Take 1 tablet (8 mg total) by mouth daily with breakfast.     DOXAZOSIN (CARDURA) 8 MG TAB    Take 1 tablet (8 mg total) by mouth once daily.     ERGOCALCIFEROL, VITAMIN D2, (VITAMIN D ORAL)    Take 500 Units by mouth Daily.     EZETIMIBE (ZETIA) 10 MG TABLET    Take 1 tablet (10 mg total) by mouth once daily.     FUROSEMIDE (LASIX) 40 MG TABLET    Take 1.5 tablets (60 mg total) by mouth once daily.     GARLIC 1,000 MG CAP    Take 1 capsule by mouth once daily.      LEVOTHYROXINE (SYNTHROID) 100 MCG TABLET    Take 1 tablet (100 mcg total) by mouth before breakfast.     LISINOPRIL (PRINIVIL,ZESTRIL) 20 MG TABLET    Take 1 tablet (20 mg total) by mouth 2 (two) times a day.     METOPROLOL SUCCINATE (TOPROL-XL) 50 MG 24 HR TABLET    Take 1 tablet (50 mg total) by mouth once daily.     MULTIVITAMIN TAB    Take 1 tablet by mouth once daily.     NITROGLYCERIN (NITROSTAT) 0.4 MG SL TABLET    Place 1 tablet (0.4 mg total) under the tongue every 5 (five) minutes as needed for Chest pain. DO NOT CRUSH OR CHEW; DISSOLVE UNDER TONGUE; MAXIMUM OF 3 DOSES IN 15 MINUTES     TAMSULOSIN (FLOMAX) 0.4 MG CAP    1 capsule            Family History   Problem Relation Age of Onset    Cancer Mother      Cancer Father      Hypertension Father       Cancer Maternal Grandmother      COPD Paternal Grandmother        Social History           Socioeconomic History    Marital status:    Occupational History    Occupation: professor   Tobacco Use    Smoking status: Never Smoker    Smokeless tobacco: Never Used   Substance and Sexual Activity    Alcohol use: Not Currently    Drug use: No            Review of Systems   Constitutional: Negative for appetite change, chills, fever and unexpected weight change.   HENT: Negative for hearing loss, rhinorrhea, sore throat and voice change.    Eyes: Negative for photophobia and visual disturbance.   Respiratory: Negative for cough, choking and shortness of breath.    Cardiovascular: Negative for chest pain, palpitations and leg swelling.   Gastrointestinal: Negative for abdominal pain, blood in stool, constipation, diarrhea, nausea and vomiting.   Endocrine: Negative for cold intolerance, heat intolerance, polydipsia and polyuria.   Musculoskeletal: Negative for arthralgias, back pain, joint swelling and neck stiffness.   Skin: Negative for color change, pallor and rash.   Neurological: Negative for dizziness, seizures, syncope and headaches.   Hematological: Negative for adenopathy. Does not bruise/bleed easily.   Psychiatric/Behavioral: Negative for agitation, behavioral problems and confusion.       Objective:   Physical Exam  Constitutional:       General: He is not in acute distress.     Appearance: Normal appearance. He is well-developed. He is not toxic-appearing.   HENT:      Head: Normocephalic and atraumatic. No abrasion or laceration.      Right Ear: External ear normal.      Left Ear: External ear normal.      Nose: Nose normal.   Eyes:      Pupils: Pupils are equal, round, and reactive to light.   Neck:      Trachea: Trachea normal. No tracheal deviation.   Cardiovascular:      Rate and Rhythm: Normal rate and regular rhythm.   Pulmonary:      Effort: Pulmonary effort is normal. No tachypnea,  accessory muscle usage or respiratory distress.   Abdominal:      General: There is no distension.      Palpations: Abdomen is soft.      Tenderness: There is no abdominal tenderness. There is no guarding or rebound.      Hernia: A hernia is present. Hernia is present in the right inguinal area.          Comments: Did not appreciate a definite LIH.    Musculoskeletal:      Cervical back: Neck supple. Normal range of motion.   Lymphadenopathy:      Cervical: No cervical adenopathy.      Lower Body: No right inguinal adenopathy. No left inguinal adenopathy.   Skin:     General: Skin is warm.   Neurological:      Mental Status: He is alert and oriented to person, place, and time.      Coordination: Coordination normal.      Gait: Gait normal.   Psychiatric:         Speech: Speech normal.         Behavior: Behavior normal.          Assessment/Plan:   Tong was seen today for hernia.     Diagnoses and all orders for this visit:     Right inguinal hernia  -     Case Request Operating Room: ROBOTIC REPAIR, HERNIA, INGUINAL  -     Basic metabolic panel; Future  -     CBC auto differential; Future  -     EKG 12-lead; Future  -     X-Ray Chest PA And Lateral; Future     Other orders  -     Full code; Standing  -     Insert peripheral IV; Standing  -     Full code  -     Insert peripheral IV  The following orders have not been finalized:  -     ceFAZolin (ANCEF) 2 g in dextrose 5 % 50 mL IVPB           Will reschedule robotic inguinal hernia repair March 11th.  Will also evaluate the left side.  If there is a hernia on the left, will also perform repair.  Patient is also on Eliquis.  He will need to stop at 72 hours prior to surgery.        I discussed the proposed procedures with the patient including risks, benefits, indications, alternatives and special concerns.  The patient appears to understand and agrees to go ahead with surgery.  I have made no promises, warranties or verbal agreements beyond what was discussed  above.

## 2022-03-11 NOTE — ANESTHESIA PREPROCEDURE EVALUATION
03/11/2022  Tong Ford is a 76 y.o., male.      Pre-op Assessment    I have reviewed the Patient Summary Reports.     I have reviewed the Nursing Notes. I have reviewed the NPO Status.   I have reviewed the Medications.     Review of Systems  Anesthesia Hx:  Denies Family Hx of Anesthesia complications.   Denies Personal Hx of Anesthesia complications.   Cardiovascular:   Hypertension CAD asymptomatic CABG/stent Dysrhythmias atrial fibrillation ECG has been reviewed.    Pulmonary:   Shortness of breath Sleep Apnea    Renal/:   Chronic Renal Disease, CRI    Neurological:   TIA,    Endocrine:   Hypothyroidism        Physical Exam  General: Cooperative and Alert    Airway:  Mallampati: III / II  Mouth Opening: Normal  TM Distance: Normal  Tongue: Normal  Neck ROM: Normal ROM    Dental:  Intact    Chest/Lungs:  Normal Respiratory Rate    Heart:  Rate: Normal  Rhythm: Regular Rhythm        Anesthesia Plan  Type of Anesthesia, risks & benefits discussed:    Anesthesia Type: Gen ETT  Intra-op Monitoring Plan: Standard ASA Monitors  Post Op Pain Control Plan: multimodal analgesia  Induction:  IV  Airway Plan: , Post-Induction  Informed Consent: Informed consent signed with the Patient and all parties understand the risks and agree with anesthesia plan.  All questions answered.   ASA Score: 3  Day of Surgery Review of History & Physical: H&P Update referred to the surgeon/provider.    Ready For Surgery From Anesthesia Perspective.     .

## 2022-03-11 NOTE — ANESTHESIA POSTPROCEDURE EVALUATION
Anesthesia Post Evaluation    Patient: Tong Ford    Procedure(s) Performed: Procedure(s) (LRB):  ROBOTIC REPAIR, HERNIA, INGUINAL (Right)    Final Anesthesia Type: general      Patient location during evaluation: PACU  Patient participation: Yes- Able to Participate  Level of consciousness: awake and alert  Post-procedure vital signs: reviewed and stable  Pain management: adequate  Airway patency: patent    PONV status at discharge: No PONV  Anesthetic complications: no      Cardiovascular status: blood pressure returned to baseline  Respiratory status: unassisted  Hydration status: euvolemic  Follow-up not needed.          Vitals Value Taken Time   /55 03/11/22 1030   Temp 36.6 °C (97.9 °F) 03/11/22 0925   Pulse 60 03/11/22 1031   Resp 18 03/11/22 1031   SpO2 93 % 03/11/22 1031   Vitals shown include unvalidated device data.      No case tracking events are documented in the log.      Pain/Evelyne Score: Pain Rating Prior to Med Admin: 5 (3/11/2022  9:55 AM)  Pain Rating Post Med Admin: 5 (3/11/2022 10:20 AM)  Evelyne Score: 10 (3/11/2022 10:20 AM)

## 2022-03-11 NOTE — TRANSFER OF CARE
"Anesthesia Transfer of Care Note    Patient: Tong Ford    Procedure(s) Performed: Procedure(s) (LRB):  ROBOTIC REPAIR, HERNIA, INGUINAL (Right)    Patient location: PACU    Anesthesia Type: general    Transport from OR: Transported from OR on 2-3 L/min O2 by NC with adequate spontaneous ventilation    Post pain: adequate analgesia    Post assessment: no apparent anesthetic complications and tolerated procedure well    Post vital signs: stable    Level of consciousness: sedated and responds to stimulation    Nausea/Vomiting: no nausea/vomiting    Complications: none    Transfer of care protocol was followed      Last vitals:   Visit Vitals  BP (!) 144/62 (BP Location: Left arm, Patient Position: Sitting)   Pulse 60   Temp 36.9 °C (98.4 °F) (Temporal)   Resp 16   Ht 5' 8" (1.727 m)   Wt 89.8 kg (198 lb)   SpO2 99%   BMI 30.11 kg/m²     "

## 2022-03-24 NOTE — PROGRESS NOTES
Tong Ford is status post robotic inguinal hernia repair and presents today for follow-up care.  He has the following systemic symptoms or complaints: no complaints.     PE: Abdomen is soft, non-tender, non-distended.  Incisions clean, dry, and intact. No signs of infection.    Pathology: na     A/P:  Normal post-operative course.    The patient is instructed to avoid heavy lifting until 6 weeks after the surgery date.  Return to clinic as needed

## 2022-04-02 NOTE — ED PROVIDER NOTES
Encounter Date: 4/2/2022       History     Chief Complaint   Patient presents with    Rash     Rash to R upper leg for  a week     Patient here with reported rash to right upper leg with associated burning discomfort to the area no other rashes reported no fever chills patient states that he recently did have a hernia repair she has been healing from he does report he has had shingles once in the past on his face he did not receive the vaccine        Review of patient's allergies indicates:  No Known Allergies  Past Medical History:   Diagnosis Date    Arthritis     Atrial fibrillation     Carotid artery occlusion     bilateral    Cataract     CKD (chronic kidney disease), stage II     Coronary artery disease     3 Vessel CABG AND 3 STENTS    Encounter for blood transfusion     Hypercholesterolemia     Hypertension     Normocytic anemia     Sleep apnea     CESAR - NOT USING C-PAP    Thyroid disease     TIA (transient ischemic attack)      Past Surgical History:   Procedure Laterality Date    A-V CARDIAC PACEMAKER INSERTION N/A 4/1/2021    Procedure: INSERTION, CARDIAC PACEMAKER, DUAL CHAMBER;  Surgeon: Clifford Sevilla MD;  Location: [facility] CATH/EP LAB;  Service: Cardiology;  Laterality: N/A;  [employer]    CORONARY ANGIOPLASTY WITH STENT PLACEMENT      CORONARY ARTERY BYPASS GRAFT      CORONARY ARTERY BYPASS GRAFT (CABG)      3 vessel    EYE SURGERY      bILATERAL CATARACS    INSERTION OF PACEMAKER      REVISION OF IMPLANTABLE CARDIOVERTER-DEFIBRILLATOR (ICD) ELECTRODE LEAD PLACEMENT Left 7/1/2021    Procedure: REVISION, INSERTION, ELECTRODE LEAD,pacemaker;  Surgeon: Clifford Sevilla MD;  Location: [facility] CATH/EP LAB;  Service: Cardiology;  Laterality: Left;    ROBOT-ASSISTED LAPAROSCOPIC REPAIR OF INGUINAL HERNIA Right 3/11/2022    Procedure: ROBOTIC REPAIR, HERNIA, INGUINAL;  Surgeon: Melo Aguilera III, MD;  Location: [facility];  Service: General;  Laterality: Right;  LB case     TREATMENT OF CARDIAC ARRHYTHMIA N/A 1/29/2021    Procedure: CARDIOVERSION;  Surgeon: Iron Serna MD;  Location: [facility] CATH/EP LAB;  Service: Cardiology;  Laterality: N/A;    VASECTOMY       Family History   Problem Relation Age of Onset    Cancer Mother     Cancer Father     Hypertension Father     Cancer Maternal Grandmother     COPD Paternal Grandmother      Social History     Tobacco Use    Smoking status: Never Smoker    Smokeless tobacco: Never Used   Substance Use Topics    Alcohol use: Not Currently    Drug use: No     Review of Systems   Skin: Positive for rash.   All other systems reviewed and are negative.      Physical Exam     Initial Vitals [04/02/22 1245]   BP Pulse Resp Temp SpO2   137/74 67 20 97.3 °F (36.3 °C) 99 %      MAP       --         Physical Exam    Constitutional: He appears well-developed and well-nourished. No distress.   HENT:   Head: Normocephalic and atraumatic.   Eyes: Pupils are equal, round, and reactive to light.   Cardiovascular: Normal rate, regular rhythm and intact distal pulses.   Pulmonary/Chest: No respiratory distress.     Neurological: He is alert and oriented to person, place, and time.   Skin: Skin is warm and dry. Capillary refill takes less than 2 seconds. Rash noted.   Rash consistent with shingles in a single dermatome on the right upper thigh         ED Course   Procedures  Labs Reviewed - No data to display       Imaging Results    None          Medications   valACYclovir tablet 1,000 mg (has no administration in time range)   HYDROcodone-acetaminophen 5-325 mg per tablet 1 tablet (has no administration in time range)     Medical Decision Making:   ED Management:  Will treat with Valtrex 1st dose in the emergency department [place] for pain control                      Clinical Impression:   Final diagnoses:  [B02.9] Herpes zoster without complication (Primary)          ED Disposition Condition    Discharge Stable        ED Prescriptions     Medication  Sig Dispense Start Date End Date Auth. Provider    valACYclovir (VALTREX) 1000 MG tablet Take 1 tablet (1,000 mg total) by mouth 2 (two) times daily. 14 tablet 4/2/2022 4/2/2023 Teo Puga MD    HYDROcodone-acetaminophen (NORCO) 5-325 mg per tablet Take 1 tablet by mouth every 4 (four) hours as needed for Pain. 12 tablet 4/2/2022  Teo Puga MD        Follow-up Information     Follow up With Specialties Details Why Contact Info    Kris Zavala MD Family Medicine In 1 day for re-examination of your symptoms [address] [facility]  [address]  [facility] [address]  [phone]             Teo Puga MD  04/02/22 1400

## 2022-04-12 NOTE — PROGRESS NOTES
SUBJECTIVE:      Patient ID: Tong Ford is a 76 y.o. male.    Chief Complaint: Herpes Zoster    Tong is a patient of Dr. Zavala here for c/o continued pain in his right upper leg and groin from shingles outbreak. He was seen 4-5 days after symptoms of pain, burning then rash/vesicles occurred in the ER. Was diagnosed with shingles and given Valtrex and Hydrocodone- unable to take NSAIDS d/t anticoagulation with Eliquis. He has finsihed the prescriptions but is still c/o deep pain in his leg. Vesicles are drying up and rash is scabbed/resolving.       Family History   Problem Relation Age of Onset    Cancer Mother     Cancer Father     Hypertension Father     Cancer Maternal Grandmother     COPD Paternal Grandmother       Social History     Socioeconomic History    Marital status:    Occupational History    Occupation: professor   Tobacco Use    Smoking status: Never Smoker    Smokeless tobacco: Never Used   Substance and Sexual Activity    Alcohol use: Not Currently    Drug use: No     Social Determinants of Health     Physical Activity: Inactive    Days of Exercise per Week: 0 days    Minutes of Exercise per Session: 0 min   Stress: No Stress Concern Present    Feeling of Stress : Not at all     Current Outpatient Medications   Medication Sig Dispense Refill    amiodarone (PACERONE) 200 MG Tab Take 1 tablet (200 mg total) by mouth once daily. 90 tablet 3    amLODIPine (NORVASC) 5 MG tablet Take 1 tablet (5 mg total) by mouth every evening. 90 tablet 2    apixaban (ELIQUIS) 5 mg Tab Take 1 tablet (5 mg total) by mouth 2 (two) times daily. 180 tablet 3    ascorbic acid, vitamin C, (VITAMIN C) 1000 MG tablet Take 1,000 mg by mouth once daily.      aspirin (ECOTRIN) 81 MG EC tablet 1 tablet      atorvastatin (LIPITOR) 40 MG tablet Take 1 tablet (40 mg total) by mouth once daily. 90 tablet 3    coQ10, ubiquinol, 100 mg Cap Take 150 mg by mouth once daily.       cyanocobalamin 1,000  mcg/mL injection Inject 1 mL (1,000 mcg total) into the muscle every 30 days. 1 mL 3    doxazosin (CARDURA) 8 MG Tab Take 1 tablet (8 mg total) by mouth once daily. 90 tablet 0    ergocalciferol, vitamin D2, (VITAMIN D ORAL) Take 500 Units by mouth Daily.      furosemide (LASIX) 40 MG tablet Take 1.5 tablets (60 mg total) by mouth once daily. 135 tablet 2    garlic 1,000 mg Cap Take 1 capsule by mouth once daily.       HYDROcodone-acetaminophen (NORCO) 5-325 mg per tablet Take 1 tablet by mouth every 6 (six) hours as needed for Pain. 20 tablet 0    HYDROcodone-acetaminophen (NORCO) 5-325 mg per tablet Take 1 tablet by mouth every 4 (four) hours as needed for Pain. 20 tablet 0    levothyroxine (SYNTHROID) 100 MCG tablet Take 1 tablet (100 mcg total) by mouth before breakfast. 90 tablet 2    lisinopriL (PRINIVIL,ZESTRIL) 20 MG tablet Take 1 tablet (20 mg total) by mouth 2 (two) times a day. 180 tablet 3    metoprolol succinate (TOPROL-XL) 50 MG 24 hr tablet Take 1 tablet (50 mg total) by mouth once daily. 90 tablet 3    multivitamin Tab Take 1 tablet by mouth once daily.      nitroGLYCERIN (NITROSTAT) 0.4 MG SL tablet Place 1 tablet (0.4 mg total) under the tongue every 5 (five) minutes as needed for Chest pain. DO NOT CRUSH OR CHEW; DISSOLVE UNDER TONGUE; MAXIMUM OF 3 DOSES IN 15 MINUTES 20 tablet 0    valACYclovir (VALTREX) 1000 MG tablet Take 1 tablet (1,000 mg total) by mouth 2 (two) times daily. 14 tablet 0    ezetimibe (ZETIA) 10 mg tablet Take 1 tablet (10 mg total) by mouth once daily. 90 tablet 3    gabapentin (NEURONTIN) 100 MG capsule Take 1 capsule (100 mg total) by mouth 2 (two) times daily. 60 capsule 0     No current facility-administered medications for this visit.     Review of patient's allergies indicates:  No Known Allergies   Past Medical History:   Diagnosis Date    Arthritis     Atrial fibrillation     Carotid artery occlusion     bilateral    Cataract     CKD (chronic kidney  disease), stage II     Coronary artery disease     3 Vessel CABG AND 3 STENTS    Encounter for blood transfusion     Hypercholesterolemia     Hypertension     Normocytic anemia     Sleep apnea     CESAR - NOT USING C-PAP    Thyroid disease     TIA (transient ischemic attack)      Past Surgical History:   Procedure Laterality Date    A-V CARDIAC PACEMAKER INSERTION N/A 4/1/2021    Procedure: INSERTION, CARDIAC PACEMAKER, DUAL CHAMBER;  Surgeon: Clifford Sevilla MD;  Location: [facility] CATH/EP LAB;  Service: Cardiology;  Laterality: N/A;  [employer]    CORONARY ANGIOPLASTY WITH STENT PLACEMENT      CORONARY ARTERY BYPASS GRAFT      CORONARY ARTERY BYPASS GRAFT (CABG)      3 vessel    EYE SURGERY      bILATERAL CATARACS    INSERTION OF PACEMAKER      REVISION OF IMPLANTABLE CARDIOVERTER-DEFIBRILLATOR (ICD) ELECTRODE LEAD PLACEMENT Left 7/1/2021    Procedure: REVISION, INSERTION, ELECTRODE LEAD,pacemaker;  Surgeon: Clifford Sevilla MD;  Location: [facility] CATH/EP LAB;  Service: Cardiology;  Laterality: Left;    ROBOT-ASSISTED LAPAROSCOPIC REPAIR OF INGUINAL HERNIA Right 3/11/2022    Procedure: ROBOTIC REPAIR, HERNIA, INGUINAL;  Surgeon: Melo Aguilera III, MD;  Location: [facility] OR;  Service: General;  Laterality: Right;  LB case    TREATMENT OF CARDIAC ARRHYTHMIA N/A 1/29/2021    Procedure: CARDIOVERSION;  Surgeon: Iron Serna MD;  Location: [facility] CATH/EP LAB;  Service: Cardiology;  Laterality: N/A;    VASECTOMY         Review of Systems   Constitutional: Negative for appetite change, chills, fever and unexpected weight change.   Respiratory: Negative for cough and shortness of breath.    Cardiovascular: Negative for chest pain and leg swelling.   Gastrointestinal: Negative for abdominal pain, diarrhea, nausea and vomiting.   Genitourinary: Negative for dysuria.   Musculoskeletal: Positive for arthralgias. Negative for gait problem and joint swelling.   Skin: Positive for rash.   Neurological:  "Positive for numbness (right thigh ). Negative for weakness.   Hematological: Negative for adenopathy. Bruises/bleeds easily.      OBJECTIVE:      Vitals:    04/12/22 0758   BP: 130/70   BP Location: Right arm   Patient Position: Sitting   BP Method: Medium (Manual)   Pulse: 78   Resp: 18   Temp: 98.1 °F (36.7 °C)   SpO2: 99%   Weight: 91.6 kg (201 lb 14.4 oz)   Height: 5' 8" (1.727 m)     Physical Exam  Vitals and nursing note reviewed.   Constitutional:       General: He is not in acute distress.     Appearance: Normal appearance. He is obese. He is not ill-appearing, toxic-appearing or diaphoretic.   HENT:      Head: Normocephalic and atraumatic.   Eyes:      General: No scleral icterus.     Pupils: Pupils are equal, round, and reactive to light.   Cardiovascular:      Rate and Rhythm: Normal rate and regular rhythm.      Heart sounds: Murmur heard.   Pulmonary:      Effort: Pulmonary effort is normal. No respiratory distress.      Breath sounds: Normal breath sounds. No wheezing.   Musculoskeletal:         General: Normal range of motion.      Right lower leg: No edema.      Left lower leg: No edema.   Skin:     General: Skin is warm and dry.      Capillary Refill: Capillary refill takes less than 2 seconds.      Coloration: Skin is not jaundiced.      Findings: Lesion (scabbing lesions to upper right thigh into right groin; no drainage, erythema or swelling ) present.   Neurological:      Mental Status: He is alert and oriented to person, place, and time.   Psychiatric:         Mood and Affect: Mood normal.         Behavior: Behavior normal.        Assessment:       1. Herpes zoster without complication        Plan:       Herpes zoster without complication  -     gabapentin (NEURONTIN) 100 MG capsule; Take 1 capsule (100 mg total) by mouth 2 (two) times daily.  Dispense: 60 capsule; Refill: 0   -cont prn Hydrocodone and one 325 mg Tylenol; add Gabapentin at nighttime for a few days then can take one in am if " needed or 2 caps at bedtime; discussed proper use and SE of sleepiness, do not combine with Hydrocodone and he voiced understandning        Follow up if symptoms worsen or fail to improve.      4/12/2022 CHERI Song, FNP    This note was created using [employer] voice recognition software that occasionally misinterprets phrases or words.

## 2022-04-12 NOTE — PROGRESS NOTES
Patient ID:  Tong Ford is a 76 y.o. male who presents for follow-up of Coronary Artery Disease, Congestive Heart Failure, Atrial Fibrillation, and Hypertension      He had hernia surgery on March 11th by Dr. Vines he has not been able to exercise.  He also developed shingles few days ago.  He has been on antiviral medications as well as pain medication.  In general his blood pressures been doing better.  The blood pressure taking by me was 122/60.  He denies any chest pains any shortness of breath.      Past Medical History:   Diagnosis Date    Arthritis     Atrial fibrillation     Carotid artery occlusion     bilateral    Cataract     CKD (chronic kidney disease), stage II     Coronary artery disease     3 Vessel CABG AND 3 STENTS    Encounter for blood transfusion     Hypercholesterolemia     Hypertension     Normocytic anemia     Sleep apnea     CESAR - NOT USING C-PAP    Thyroid disease     TIA (transient ischemic attack)         Past Surgical History:   Procedure Laterality Date    A-V CARDIAC PACEMAKER INSERTION N/A 4/1/2021    Procedure: INSERTION, CARDIAC PACEMAKER, DUAL CHAMBER;  Surgeon: Clifford Sevilla MD;  Location: [facility] CATH/EP LAB;  Service: Cardiology;  Laterality: N/A;  [employer]ON[employer]    CORONARY ANGIOPLASTY WITH STENT PLACEMENT      CORONARY ARTERY BYPASS GRAFT      CORONARY ARTERY BYPASS GRAFT (CABG)      3 vessel    EYE SURGERY      bILATERAL CATARACS    INSERTION OF PACEMAKER      REVISION OF IMPLANTABLE CARDIOVERTER-DEFIBRILLATOR (ICD) ELECTRODE LEAD PLACEMENT Left 7/1/2021    Procedure: REVISION, INSERTION, ELECTRODE LEAD,pacemaker;  Surgeon: Clifford Sevilla MD;  Location: [facility] CATH/EP LAB;  Service: Cardiology;  Laterality: Left;    ROBOT-ASSISTED LAPAROSCOPIC REPAIR OF INGUINAL HERNIA Right 3/11/2022    Procedure: ROBOTIC REPAIR, HERNIA, INGUINAL;  Surgeon: Melo Aguilera III, MD;  Location: [facility];  Service: General;  Laterality: Right;  LB case     TREATMENT OF CARDIAC ARRHYTHMIA N/A 1/29/2021    Procedure: CARDIOVERSION;  Surgeon: Iron Serna MD;  Location: [facility] CATH/EP LAB;  Service: Cardiology;  Laterality: N/A;    VASECTOMY            Current Outpatient Medications   Medication Instructions    amiodarone (PACERONE) 200 mg, Oral, Daily    amLODIPine (NORVASC) 5 mg, Oral, Nightly    apixaban (ELIQUIS) 5 mg, Oral, 2 times daily    ascorbic acid (vitamin C) (VITAMIN C) 1,000 mg, Oral, Daily    atorvastatin (LIPITOR) 40 mg, Oral, Daily    coQ10 (ubiquinol) 150 mg, Oral, Daily    cyanocobalamin 1,000 mcg, Intramuscular, Every 30 days    doxazosin (CARDURA) 8 mg, Oral, Daily    ergocalciferol, vitamin D2, (VITAMIN D ORAL) 500 Units, Oral, Daily    ezetimibe (ZETIA) 10 mg, Oral, Daily    furosemide (LASIX) 60 mg, Oral, Daily    gabapentin (NEURONTIN) 100 mg, Oral, 2 times daily    garlic 1,000 mg Cap 1 capsule, Oral, Daily    HYDROcodone-acetaminophen (NORCO) 5-325 mg per tablet 1 tablet, Oral, Every 6 hours PRN    HYDROcodone-acetaminophen (NORCO) 5-325 mg per tablet 1 tablet, Oral, Every 4 hours PRN    levothyroxine (SYNTHROID) 100 mcg, Oral, Before breakfast    lisinopriL (PRINIVIL,ZESTRIL) 20 mg, Oral, 2 times daily    metoprolol succinate (TOPROL-XL) 50 mg, Oral, Daily    multivitamin Tab 1 tablet, Oral, Daily    nitroGLYCERIN (NITROSTAT) 0.4 mg, Sublingual, Every 5 min PRN, DO NOT CRUSH OR CHEW; DISSOLVE UNDER TONGUE; MAXIMUM OF 3 DOSES IN 15 MINUTES    valACYclovir (VALTREX) 1,000 mg, Oral, 2 times daily        Review of patient's allergies indicates:  No Known Allergies     Review of Systems   HENT: Negative for nosebleeds.    Cardiovascular: Negative for chest pain, dyspnea on exertion, irregular heartbeat, leg swelling and palpitations.   Respiratory: Negative for cough, hemoptysis and shortness of breath.    Hematologic/Lymphatic: Negative for bleeding problem. Does not bruise/bleed easily.   Gastrointestinal: Negative for  "melena.   Genitourinary: Negative for hematuria.        Objective:     Vitals:    04/12/22 1446   BP: (!) 142/80   BP Location: Left arm   Patient Position: Sitting   BP Method: Medium (Manual)   Pulse: 70   SpO2: 99%   Weight: 91.6 kg (202 lb)   Height: 5' 8" (1.727 m)       Physical Exam  Vitals and nursing note reviewed.   Constitutional:       Appearance: He is well-developed.   HENT:      Head: Normocephalic and atraumatic.   Eyes:      Conjunctiva/sclera: Conjunctivae normal.   Cardiovascular:      Rate and Rhythm: Normal rate and regular rhythm.      Heart sounds: Murmur (Grade 2/6 systolic murmur at the base) heard.   Pulmonary:      Effort: Pulmonary effort is normal.      Breath sounds: Normal breath sounds.   Abdominal:      General: Bowel sounds are normal.      Palpations: Abdomen is soft.   Musculoskeletal:         General: Normal range of motion.   Skin:     General: Skin is warm and dry.   Neurological:      Mental Status: He is alert and oriented to person, place, and time.   Psychiatric:         Behavior: Behavior normal.         Thought Content: Thought content normal.         Judgment: Judgment normal.       CMP  Sodium   Date Value Ref Range Status   03/01/2022 140 136 - 145 mmol/L Final   07/25/2019 138 134 - 144 mmol/L      Potassium   Date Value Ref Range Status   03/01/2022 4.0 3.5 - 5.1 mmol/L Final     Chloride   Date Value Ref Range Status   03/01/2022 103 95 - 110 mmol/L Final   07/25/2019 104 98 - 110 mmol/L      CO2   Date Value Ref Range Status   03/01/2022 29 23 - 29 mmol/L Final     Glucose   Date Value Ref Range Status   03/01/2022 94 70 - 110 mg/dL Final   07/25/2019 105 (H) 70 - 99 mg/dL      BUN   Date Value Ref Range Status   03/01/2022 18 8 - 23 mg/dL Final     Creatinine   Date Value Ref Range Status   03/01/2022 1.5 (H) 0.5 - 1.4 mg/dL Final   07/25/2019 1.15 0.60 - 1.40 mg/dL      Calcium   Date Value Ref Range Status   03/01/2022 8.7 8.7 - 10.5 mg/dL Final     Total " Protein   Date Value Ref Range Status   03/01/2022 7.0 6.0 - 8.4 g/dL Final     Albumin   Date Value Ref Range Status   03/01/2022 3.9 3.5 - 5.2 g/dL Final   07/25/2019 3.9 3.1 - 4.7 g/dL      Total Bilirubin   Date Value Ref Range Status   03/01/2022 1.1 (H) 0.1 - 1.0 mg/dL Final     Comment:     For infants and newborns, interpretation of results should be based  on gestational age, weight and in agreement with clinical  observations.    Premature Infant recommended reference ranges:  Up to 24 hours.............<8.0 mg/dL  Up to 48 hours............<12.0 mg/dL  3-5 days..................<15.0 mg/dL  6-29 days.................<15.0 mg/dL       Alkaline Phosphatase   Date Value Ref Range Status   03/01/2022 72 55 - 135 U/L Final     AST   Date Value Ref Range Status   03/01/2022 24 10 - 40 U/L Final     ALT   Date Value Ref Range Status   03/01/2022 27 10 - 44 U/L Final     Anion Gap   Date Value Ref Range Status   03/01/2022 8 8 - 16 mmol/L Final     eGFR if    Date Value Ref Range Status   03/01/2022 50.8 (A) >60 mL/min/1.73 m^2 Final     eGFR if non    Date Value Ref Range Status   03/01/2022 44.0 (A) >60 mL/min/1.73 m^2 Final     Comment:     Calculation used to obtain the estimated glomerular filtration  rate (eGFR) is the CKD-EPI equation.         BMP  Lab Results   Component Value Date     03/01/2022    K 4.0 03/01/2022     03/01/2022    CO2 29 03/01/2022    BUN 18 03/01/2022    CREATININE 1.5 (H) 03/01/2022    CALCIUM 8.7 03/01/2022    ANIONGAP 8 03/01/2022    ESTGFRAFRICA 50.8 (A) 03/01/2022    EGFRNONAA 44.0 (A) 03/01/2022      BNP  @LABRCNTIP(BNP,BNPTRIAGEBLO)@   Lab Results   Component Value Date    CHOL 125 03/01/2022    CHOL 164 08/17/2020    CHOL 274 (H) 01/10/2020     Lab Results   Component Value Date    HDL 45 03/01/2022    HDL 34 (L) 08/17/2020    HDL 45 01/10/2020     Lab Results   Component Value Date    LDLCALC 70.6 03/01/2022    LDLCALC 106.2  08/17/2020    LDLCALC 210.0 (H) 01/10/2020     Lab Results   Component Value Date    TRIG 47 03/01/2022    TRIG 119 08/17/2020    TRIG 95 01/10/2020     Lab Results   Component Value Date    CHOLHDL 36.0 03/01/2022    CHOLHDL 20.7 08/17/2020    CHOLHDL 16.4 (L) 01/10/2020      Lab Results   Component Value Date    TSH 5.300 03/01/2022    FREET4 0.55 (L) 08/05/2021     Lab Results   Component Value Date    HGBA1C 5.6 08/18/2020     Lab Results   Component Value Date    WBC 9.50 03/01/2022    HGB 12.2 (L) 03/01/2022    HCT 37.4 (L) 03/01/2022     (H) 03/01/2022     03/01/2022         Results for orders placed during the hospital encounter of 10/19/20    Echo Color Flow Doppler? Yes; Bubble Contrast? No    Interpretation Summary  · There is mild left ventricular concentric hypertrophy.  · The left ventricle is normal in size with normal systolic function. The estimated ejection fraction is 65%.  · Atrial fibrillation observed with restrictive filling pattern present.  · With normal left atrial pressure.  · Normal right ventricular systolic function.  · Moderate left atrial enlargement.  · Mild right atrial enlargement.  · Moderate aortic valve stenosis.  · Aortic valve area is 1.63 cm2; peak velocity is 1.76 m/s; mean gradient is 7 mmHg.  · Moderate mitral regurgitation.  · No pulmonary hypertension  · Mild tricuspid regurgitation.  · Mild pulmonic regurgitation.  · Normal central venous pressure (3 mmHg).  · The estimated PA systolic pressure is 23 mmHg.    Left ventricle hypertrophy, moderate calcific aortic valve stenosis plainimetered d valve area is 1.6 centimeters squared  Plus two mitral regurgitation  No pulmonary hypertension     Results for orders placed during the hospital encounter of 01/29/21    Intra-Procedure Documentation    Narrative  MACRINA performed in the Invasive Lab  - See Procedure Log link below for nursing documentation  - See MACRINA order on Card Proc Tab for physician findings          Assessment:       (HFpEF) heart failure with preserved ejection fraction  Stable no symptoms of heart failure    Essential hypertension  Controlled on current medical therapy    S/P placement of cardiac pacemaker  A Medtronic unit functioning well    Stage 2 chronic kidney disease  Aware the function is stable       Plan:       Continue current medical therapy return to the office in 6 months.  Blood work will be obtained from his primary care physician Dr. Zavala

## 2022-04-23 NOTE — DISCHARGE INSTRUCTIONS
The discomfort she is experiencing in your right lower abdomen and groin area is more likely than not secondary to the pain that is residual from shingles which is called a post herpetic neuralgia.  The duration of the pain will be uncertain and there is no recommended treatment as of this time.

## 2022-04-23 NOTE — ED PROVIDER NOTES
Encounter Date: 4/23/2022       History     Chief Complaint   Patient presents with    Dysuria     X 1 WEEK OR SO,     GEN WEAKNESS    Generalized Body Aches     76-year-old male has a history of atrial fib, CKD, coronary artery disease, hyperlipidemia, hypertension and who had shingles 3 weeks ago involving his right lower abdomen and inguinal area.  Patient now complains of some difficulty urinating but denies any true dysuria hematuria.  He has no history of any inguinal swelling or history of hernias.  No history of any flank pain history of kidney stones.  He denies any testicular swelling or pain.  The patient denies any history of prostatitis.  He has had some frequency and urgency.  He also has had occasional incontinence.  Bowel habits have been normal.  No chills or fever.        Review of patient's allergies indicates:  No Known Allergies  Past Medical History:   Diagnosis Date    Arthritis     Atrial fibrillation     Carotid artery occlusion     bilateral    Cataract     CKD (chronic kidney disease), stage II     Coronary artery disease     3 Vessel CABG AND 3 STENTS    Encounter for blood transfusion     Hypercholesterolemia     Hypertension     Normocytic anemia     Shingles     X 3 WEEKS AGO    Sleep apnea     CESAR - NOT USING C-PAP    Thyroid disease     TIA (transient ischemic attack)      Past Surgical History:   Procedure Laterality Date    A-V CARDIAC PACEMAKER INSERTION N/A 4/1/2021    Procedure: INSERTION, CARDIAC PACEMAKER, DUAL CHAMBER;  Surgeon: Clifford Sevilla MD;  Location: [facility] CATH/EP LAB;  Service: Cardiology;  Laterality: N/A;  [employer]    CORONARY ANGIOPLASTY WITH STENT PLACEMENT      CORONARY ARTERY BYPASS GRAFT      CORONARY ARTERY BYPASS GRAFT (CABG)      3 vessel    EYE SURGERY      bILATERAL CATARACS    INSERTION OF PACEMAKER      REVISION OF IMPLANTABLE CARDIOVERTER-DEFIBRILLATOR (ICD) ELECTRODE LEAD PLACEMENT Left 7/1/2021    Procedure: REVISION,  INSERTION, ELECTRODE LEAD,pacemaker;  Surgeon: Clifford Sevilla MD;  Location: [facility] CATH/EP LAB;  Service: Cardiology;  Laterality: Left;    ROBOT-ASSISTED LAPAROSCOPIC REPAIR OF INGUINAL HERNIA Right 3/11/2022    Procedure: ROBOTIC REPAIR, HERNIA, INGUINAL;  Surgeon: Melo Aguilera III, MD;  Location: [facility];  Service: General;  Laterality: Right;  LB case    TREATMENT OF CARDIAC ARRHYTHMIA N/A 1/29/2021    Procedure: CARDIOVERSION;  Surgeon: Iron Serna MD;  Location: [facility] CATH/EP LAB;  Service: Cardiology;  Laterality: N/A;    VASECTOMY       Family History   Problem Relation Age of Onset    Cancer Mother     Cancer Father     Hypertension Father     Cancer Maternal Grandmother     COPD Paternal Grandmother      Social History     Tobacco Use    Smoking status: Never Smoker    Smokeless tobacco: Never Used   Substance Use Topics    Alcohol use: Not Currently    Drug use: No     Review of Systems   Constitutional: Negative for chills, diaphoresis and fever.   HENT: Negative for congestion, ear pain, rhinorrhea, sinus pain and sore throat.    Eyes: Negative for pain.   Respiratory: Negative for cough and shortness of breath.    Cardiovascular: Negative for chest pain.   Gastrointestinal: Negative for abdominal pain, constipation, diarrhea, nausea and vomiting.   Genitourinary: Negative for flank pain, frequency and hematuria.   Musculoskeletal: Negative for arthralgias and back pain.   Skin: Positive for rash. Negative for pallor and wound.   Neurological: Negative for headaches.   All other systems reviewed and are negative.      Physical Exam     Initial Vitals [04/23/22 1222]   BP Pulse Resp Temp SpO2   98/71 68 20 98.2 °F (36.8 °C) 98 %      MAP       --         Physical Exam    Vitals reviewed.  Constitutional: He appears well-developed and well-nourished. He is not diaphoretic. No distress.   HENT:   Head: Normocephalic and atraumatic.   Right Ear: External ear normal.   Left Ear:  External ear normal.   Nose: Nose normal.   Mouth/Throat: Oropharynx is clear and moist.   Eyes: Conjunctivae and EOM are normal. Pupils are equal, round, and reactive to light.   Neck: Neck supple. No JVD present.   Normal range of motion.  Cardiovascular: Normal rate, regular rhythm, normal heart sounds and intact distal pulses. Exam reveals no gallop and no friction rub.    No murmur heard.  Pulmonary/Chest: Breath sounds normal. No respiratory distress. He has no wheezes. He has no rhonchi. He has no rales. He exhibits no tenderness.   Abdominal: Abdomen is soft. Bowel sounds are normal. He exhibits no distension. There is no abdominal tenderness. There is no rebound and no guarding.   Genitourinary:    Penis normal.      Genitourinary Comments: Uncircumcised.  Testicles are descended and nontender.  No testicular swelling.  No inguinal hernia.  There is scarring in the right lower abdomen and inguinal area secondary to prior shingles.     Musculoskeletal:         General: No tenderness or edema. Normal range of motion.      Cervical back: Normal range of motion and neck supple.     Lymphadenopathy:     He has no cervical adenopathy.   Neurological: He is alert and oriented to person, place, and time. He has normal strength. GCS score is 15. GCS eye subscore is 4. GCS verbal subscore is 5. GCS motor subscore is 6.   Skin: Skin is warm and dry. Capillary refill takes less than 2 seconds. No rash noted. No erythema. No pallor.   Psychiatric: He has a normal mood and affect. His behavior is normal. Judgment and thought content normal.         ED Course   Procedures  Labs Reviewed   CBC W/ AUTO DIFFERENTIAL - Abnormal; Notable for the following components:       Result Value    RBC 3.60 (*)     Hemoglobin 11.7 (*)     Hematocrit 35.3 (*)     MCH 32.5 (*)     RDW 16.0 (*)     All other components within normal limits   COMPREHENSIVE METABOLIC PANEL - Abnormal; Notable for the following components:    Glucose 111 (*)      BUN 30 (*)     Creatinine 1.7 (*)     Calcium 8.5 (*)     Total Bilirubin 1.3 (*)     Anion Gap 7 (*)     eGFR if  43.7 (*)     eGFR if non  37.8 (*)     All other components within normal limits   B-TYPE NATRIURETIC PEPTIDE - Abnormal; Notable for the following components:     (*)     All other components within normal limits   PROTIME-INR - Abnormal; Notable for the following components:    PT 19.9 (*)     All other components within normal limits   TROPONIN I   INFLUENZA A AND B ANTIGEN    Narrative:     Specimen Source->Nasopharyngeal Swab   SARS-COV-2 RNA AMPLIFICATION, QUAL   URINALYSIS, REFLEX TO URINE CULTURE    Narrative:     Specimen Source->Urine        ECG Results          EKG 12-lead (In process)  Result time 04/23/22 12:41:49    In process by Interface, Lab In [facility] (04/23/22 12:41:49)                 Narrative:    Test Reason : R53.1,    Vent. Rate : 068 BPM     Atrial Rate : 063 BPM     P-R Int : 184 ms          QRS Dur : 196 ms      QT Int : 538 ms       P-R-T Axes : 081 -89 083 degrees     QTc Int : 572 ms    AV dual-paced rhythm  Abnormal ECG  When compared with ECG of 08-MAR-2022 08:41,  Vent. rate has increased BY   5 BPM    Referred By: [email or web address]   SELF           Confirmed By:                             Imaging Results          CT Abdomen Pelvis  Without Contrast (Final result)  Result time 04/23/22 14:56:55   Procedure changed from CT Abdomen Pelvis With Contrast     Final result by Neri Osullivan MD (04/23/22 14:56:55)                 Narrative:    CMS MANDATED QUALITY DATA - CT RADIATION  436    All CT scans at this facility utilize dose modulation, iterative reconstruction, and/or weight based dosing when appropriate to reduce radiation dose to as low as reasonably achievable.    CLINICAL HISTORY:  76 years (1945) Male RLQ abdominal pain (Age >= 14y)    TECHNIQUE:  CT ABDOMEN PELVIS WITHOUT IV CONTRAST. 282 images obtained. Axial CT  images of the abdomen and pelvis were obtained from the dome of the diaphragm to the proximal thigh.    CONTRAST:  No IV contrast was administered    COMPARISON:  None available.    FINDINGS:.  Lower Thorax:  The lungs are essentially clear noting mild bilateral dependent atelectasis versus scarring. There is no pleural or pericardial effusion. There are scattered coronary arterial calcifications.    CT Abdomen:  Liver: The liver is normal in size and imaging appearance.  Gallbladder: The gallbladder is within normal limits.  Biliary Tree: No intra or extrahepatic ductal dilation.  Spleen: Within normal limits.  Pancreas: The pancreas is normal.  Adrenal Glands: The adrenal glands appear within normal limits.  Kidneys: The kidneys are normal in imaging appearance without hydronephrosis or hydroureter.  Vasculature: There are scattered atheromatous mural plaques in the aorta and its major branch vessels with no aneurysm seen.  Lymph nodes: No abdominal lymphadenopathy is seen.  Intraperitoneal structures: There is no ascites.  Bowel: Moderate volume of stool and gas in the colon with a nonobstructive bowel gas pattern. The appendix is not visualized but there are no inflammatory changes in the right lower abdominal quadrant to suggest acute appendicitis.  Abdominal wall: Tiny bilateral fat-containing inguinal hernias. Ventral abdominal wall hernia mesh is seen.  Musculoskeletal: There is degenerative disc disease and facet arthropathy in the lumbar spine with no aggressive appearing lytic or blastic lesions There is diffusely decreased osseous mineralization (suggesting osteoporosis/osteopenia). There is S-shaped scoliotic curvature of the thoracolumbar spine.    CT Pelvis:  Bladder: The bladder is partially decompressed. That being said, there is mild circumferential bladder wall thickening. In the appropriate clinical setting this may relate to chronic outlet obstruction (prostate enlarged) or a mild degree of  infection or inflammation; consider correlation with urinalysis.  Reproductive Organs: Heterogeneous mildly enlarged prostate gland.  Pelvic Lymph nodes: No pelvic lymphadenopathy or pelvic mass is identified.    IMPRESSION:  1. No hydronephrosis/hydroureter or radiopaque renal/collecting system stone.  2. No finding of bowel obstruction, intra-abdominal free air or abscess.  3. Moderate volume of stool and gas in the proximal colon, consider correlation for constipation.  4. Low ventral abdominal wall hernia mesh repair.  5. Heterogeneous mildly enlarged prostate gland.  6. Mild circumferential bladder wall thickening, consider correlation with urinalysis.                    .    Electronically signed by:  Neri Osullivan MD  4/23/2022 2:56 PM CDT Workstation: [id number]                             X-Ray Chest AP Portable (Final result)  Result time 04/23/22 12:57:31    Final result by Neri Osullivan MD (04/23/22 12:57:31)                 Narrative:    CLINICAL HISTORY:  76 years (1945) Male GEN WEAKNESS Dysuria; GEN WEAKNESS; Generalized Body Aches    TECHNIQUE:  Portable AP radiograph the chest.    COMPARISON:  Most recent radiograph from March 8, 2022    FINDINGS:  The lungs are clear. Costophrenic angles are seen without effusion. No pneumothorax is identified. The heart is normal in size. The median sternotomy wires and the left sided pacemaker are unchanged. The mediastinum is within normal limits. Osseous structures appear within normal limits. The visualized upper abdomen is unremarkable.    IMPRESSION:  No acute cardiac or pulmonary process.                  .            Electronically signed by:  Neri Osullivan MD  4/23/2022 12:57 PM CDT Workstation: [id number]                               Medications - No data to display             Attending Attestation:             Attending ED Notes:   A 76-year-old male who present with some discomfort urinating for couple weeks but will also has had  shingles involving the right lower abdomen and inguinal area along with the proximal medial right, and a clear urinalysis.  The patient's screening labs obtained show some chronic kidney disease which is stable with BUN at 32 creatinine 1.7.  The patient's BNP was elevated at 723 with prior records reveal that he has had similar elevation previously.  At this time the patient has normal chest x-ray is at no complaints of any shortness of breath that has clear lung fields to auscultation.  Patient had a CT scan showing no evidence of any stones but there was evidence of ventral wall mesh from the prior hernia surgery.  The prostate was somewhat enlarged.  There was some bladder wall thickening and no but no evidence of a UTI.  Patient did have some moderate stool in the colon.    It was discussed with this patient and more likely than not he has some post herpetic neuralgia common for his discomfort in that right lower quadrant and inguinal area but he has no evidence of any hernia, stones, or adenopathy.  Additionally this no evidence of any obstruction.  No evidence of any dyspnea, hypoxia or CHF               Clinical Impression:   Final diagnoses:  [R53.1] General weakness  [B02.29] Post herpetic neuralgia (Primary)  [N18.9] Chronic kidney disease, unspecified CKD stage          ED Disposition Condition    Discharge Stable        ED Prescriptions     None        Follow-up Information     Follow up With Specialties Details Why Contact Info    Your Primary Care Doctor   As needed            Abhishek Snell Jr., MD  04/23/22 [id number]

## 2022-05-08 NOTE — ED PROVIDER NOTES
Encounter Date: 5/8/2022       History     Chief Complaint   Patient presents with    Fall    Facial Injury      Pt fell at [facility] has injury to forehead and nose.     76-year-old male presents emergency department past medical his history includes AFib on anticoagulants, CKD not on hemodialysis ,  CABG with 3 vessels by pass , hypertension, pacemaker with defibrillator, hypertension, hyperlipidemia.  Patient reports that he was at a local [employer] he was shopping in the outdoor section he states that he was sitting on a bench because it was very hot he reports that when he got up from a seated position he felt dizzy however he thought that he could ambulate inside he states that he continued to feel dizzy and but is hand out to sit on a swing however he was not close enough and fell forward.  Patient has an abrasion to his forehead he denies LOC the patient denies any preceding symptoms of chest pain or shortness of breath.  He reports his last tetanus is less than 5 years        Review of patient's allergies indicates:  No Known Allergies  Past Medical History:   Diagnosis Date    Arthritis     Atrial fibrillation     Carotid artery occlusion     bilateral    Cataract     CKD (chronic kidney disease), stage II     Coronary artery disease     3 Vessel CABG AND 3 STENTS    Encounter for blood transfusion     Hypercholesterolemia     Hypertension     Normocytic anemia     Shingles     X 3 WEEKS AGO    Sleep apnea     CESAR - NOT USING C-PAP    Thyroid disease     TIA (transient ischemic attack)      Past Surgical History:   Procedure Laterality Date    A-V CARDIAC PACEMAKER INSERTION N/A 4/1/2021    Procedure: INSERTION, CARDIAC PACEMAKER, DUAL CHAMBER;  Surgeon: Clifford Sevilla MD;  Location: [facility] CATH/EP LAB;  Service: Cardiology;  Laterality: N/A;  [employer]    CORONARY ANGIOPLASTY WITH STENT PLACEMENT      CORONARY ARTERY BYPASS GRAFT      CORONARY ARTERY BYPASS GRAFT (CABG)      3 vessel     EYE SURGERY      bILATERAL CATARACS    INSERTION OF PACEMAKER      REVISION OF IMPLANTABLE CARDIOVERTER-DEFIBRILLATOR (ICD) ELECTRODE LEAD PLACEMENT Left 7/1/2021    Procedure: REVISION, INSERTION, ELECTRODE LEAD,pacemaker;  Surgeon: Clifford Sevilla MD;  Location: [facility] CATH/EP LAB;  Service: Cardiology;  Laterality: Left;    ROBOT-ASSISTED LAPAROSCOPIC REPAIR OF INGUINAL HERNIA Right 3/11/2022    Procedure: ROBOTIC REPAIR, HERNIA, INGUINAL;  Surgeon: Melo Aguilera III, MD;  Location: [facility] OR;  Service: General;  Laterality: Right;  LB case    TREATMENT OF CARDIAC ARRHYTHMIA N/A 1/29/2021    Procedure: CARDIOVERSION;  Surgeon: Iron Serna MD;  Location: [facility] CATH/EP LAB;  Service: Cardiology;  Laterality: N/A;    VASECTOMY       Family History   Problem Relation Age of Onset    Cancer Mother     Cancer Father     Hypertension Father     Cancer Maternal Grandmother     COPD Paternal Grandmother      Social History     Tobacco Use    Smoking status: Never Smoker    Smokeless tobacco: Never Used   Substance Use Topics    Alcohol use: Not Currently    Drug use: No     Review of Systems   Constitutional: Negative.  Negative for fever.   HENT: Negative.    Respiratory: Negative.    Cardiovascular: Negative.    Gastrointestinal: Negative.    Genitourinary: Negative.    Musculoskeletal: Negative.    Skin:        Abrasion to forehead and nose   Neurological:        Dizzy only when ambulating prior to arrival reports no current symptoms   Hematological: Negative.    Psychiatric/Behavioral: Negative.    All other systems reviewed and are negative.      Physical Exam     Initial Vitals   BP Pulse Resp Temp SpO2   05/08/22 1354 05/08/22 1354 05/08/22 1354 05/08/22 1354 05/08/22 1506   (!) 140/65 70 18 98.2 °F (36.8 °C) 98 %      MAP       --                Physical Exam    Nursing note and vitals reviewed.  Constitutional: He appears well-developed and well-nourished.   HENT:   Head: Head is with  abrasion.       Nose: No septal deviation or nasal septal hematoma. No epistaxis.   Mouth/Throat: Oropharynx is clear and moist. No oropharyngeal exudate.   Abrasion to forehead and bridge of nose   Eyes: Conjunctivae and EOM are normal. Pupils are equal, round, and reactive to light.   Neck: Neck supple.   Normal range of motion.  Cardiovascular: Normal rate and intact distal pulses.   Pulmonary/Chest: Breath sounds normal.   Abdominal: Abdomen is soft. Bowel sounds are normal. He exhibits no distension. There is no abdominal tenderness.   Musculoskeletal:         General: No tenderness or edema. Normal range of motion.      Cervical back: Normal range of motion and neck supple.     Neurological: He is alert and oriented to person, place, and time. He has normal strength and normal reflexes. GCS score is 15. GCS eye subscore is 4. GCS verbal subscore is 5. GCS motor subscore is 6.   Skin:   Skin abrasion to the forehead and bridge of nose   Psychiatric: He has a normal mood and affect.         ED Course   Procedures  Labs Reviewed   CBC W/ AUTO DIFFERENTIAL - Abnormal; Notable for the following components:       Result Value    RBC 3.55 (*)     Hemoglobin 11.7 (*)     Hematocrit 33.8 (*)     MCH 33.0 (*)     RDW 17.5 (*)     Mono # 1.1 (*)     All other components within normal limits   COMPREHENSIVE METABOLIC PANEL - Abnormal; Notable for the following components:    Glucose 118 (*)     BUN 31 (*)     Creatinine 1.8 (*)     Calcium 8.4 (*)     Albumin 3.4 (*)     Total Bilirubin 1.1 (*)     eGFR if  40.8 (*)     eGFR if non  35.3 (*)     All other components within normal limits   B-TYPE NATRIURETIC PEPTIDE - Abnormal; Notable for the following components:     (*)     All other components within normal limits   TROPONIN I   CK   CK        ECG Results          EKG 12-lead (In process)  Result time 05/08/22 14:20:38    In process by Interface, Lab In [facility] (05/08/22 14:20:38)                  Narrative:    Test Reason : R42,    Vent. Rate : 063 BPM     Atrial Rate : 062 BPM     P-R Int : 186 ms          QRS Dur : 194 ms      QT Int : 500 ms       P-R-T Axes : 059 -82 099 degrees     QTc Int : 511 ms    AV dual-paced rhythm  Abnormal ECG  When compared with ECG of 23-APR-2022 12:36,  Vent. rate has decreased BY   5 BPM    Referred By: [email or web address]   SELF           Confirmed By:                             Imaging Results          CT Cervical Spine Without Contrast (Final result)  Result time 05/08/22 15:15:46    Final result by Michele Hill MD (05/08/22 15:15:46)                 Narrative:    CMS MANDATED QUALITY DATA - CT RADIATION  436    All CT scans at this facility utilize dose modulation, iterative reconstruction, and/or weight based dosing when appropriate to reduce radiation dose to as low as reasonably achievable.    CT CERVICAL SPINE WITHOUT IV CONTRAST    CLINICAL HISTORY:  76 years Male Neck trauma, mechanically unstable spine (Age >= 16y)    COMPARISON: None    FINDINGS: Craniocervical junction is intact. Atlantodental degenerative change.    No acute fracture involving the cervical spine. Degenerative disc space loss with osteophyte formation C4-5, C5-6, and C6-7.    Limited images through the lung apices are unremarkable. Evaluation of cervical soft tissues demonstrates carotid artery atherosclerotic calcification bilaterally.    IMPRESSION:    No acute fracture involving the cervical spine.    Cervical spondylosis.    Electronically signed by:  Michele Hill MD  5/8/2022 3:15 PM CDT Workstation: [id number]                             CT Head Without Contrast (Final result)  Result time 05/08/22 15:10:37    Final result by Michele Hill MD (05/08/22 15:10:37)                 Narrative:    CMS MANDATED QUALITY DATA - CT RADIATION  436    All CT scans at this facility utilize dose modulation, iterative reconstruction, and/or weight based dosing when appropriate to  reduce radiation dose to as low as reasonably achievable.    CT HEAD WITHOUT IV CONTRAST    CLINICAL HISTORY:  76 years Male Head trauma, minor (Age >= 65y)    COMPARISON: None    FINDINGS: Negative for acute intracranial hemorrhage, midline shift, or mass effect. Cerebral atrophy with associated ventricular and sulcal enlargement. Periventricular and deep white matter hypoattenuation consistent with microangiopathic change. Rounded foci of hypoattenuation involving bilateral basal ganglia consistent with sequela of remote infarctions. Cerebellar hemispheres and brainstem are unremarkable. Atherosclerotic calcification of the intracranial carotid arteries.    No calvarial lesion or fracture. Mastoid air cells are clear. Ethmoid air cell mucosal thickening.    IMPRESSION:    No CT evidence of acute intracranial pathology.    Senescent changes as described above.    Electronically signed by:  Michele Hill MD  5/8/2022 3:10 PM CDT Workstation: [id number]                             X-Ray Chest AP Portable (Final result)  Result time 05/08/22 14:53:59    Final result by Michele Hill MD (05/08/22 14:53:59)                 Narrative:    XR CHEST 1 VIEW    CLINICAL HISTORY:  76 years Male Chest Pain    COMPARISON: April 23, 2022    FINDINGS: Cardiac silhouette size is enlarged. Status post median sternotomy. Pacing leads are in stable position. No confluent airspace disease. No large pleural effusion or pneumothorax. No acute osseous abnormality.    IMPRESSION:    Cardiomegaly. No acute pulmonary process.    Electronically signed by:  Michele Hill MD  5/8/2022 2:53 PM CDT Workstation: [id number]                               Medications   0.9%  NaCl infusion (0 mLs Intravenous Stopped 5/8/22 4287)     Medical Decision Making:   Initial Assessment:   76-year-old male presents emergency department past medical his history includes AFib on anticoagulants, CKD not on hemodialysis ,  CABG with 3 vessels by pass ,  hypertension, pacemaker with defibrillator, hypertension, hyperlipidemia.  Patient reports that he was at a local [employer] he was shopping in the outdoor section he states that he was sitting on a bench because it was very hot he reports that when he got up from a seated position he felt dizzy however he thought that he could ambulate inside he states that he continued to feel dizzy and but is hand out to sit on a swing however he was not close enough and fell forward.  Patient has an abrasion to his forehead he denies LOC the patient denies any preceding symptoms of chest pain or shortness of breath.  He reports his last tetanus is less than 5 years        Differential Diagnosis:   Dizziness, heat exhaustion, syncope, electrolyte abnormalities, ACS ,orthostatic hypertension, dehydration  ED Management:  76-year-old male presents emergency department for evaluation after a fall.  Patient reports that he was in a local [employer] see dinner and outdoor area he states that it was very hot outside any felt very hot and overheated he states that he got up to ambulate inside when he stood up he felt dizzy he thought that he could ambulate inside without any issues however he became more dizzy he tried to sit on a swing near by however he misjudged the swing and fell forward he sustained an abrasion to his forehead and the bridge of his nose he had no LOC.  the patient came to the emergency department for further evaluation after his injuries secondary to being on a blood thinner.  CT imaging of the head and neck are unremarkable for any acute traumatic injuries.  Electrolytes are within normal limits.  Patient does have elevated creatinine seems a little higher than normal however still somewhat b patient was also orthostatic given these findings with elevated creatinine and orthostasis more than likely dehydrated he was given a L fluids his BNP is 500 however he has no signs of fluid overload on exam he has no peripheral  edema, and no pleural effusion noted on chest x-ray.  CPK also normal and was checked secondary to dizziness, being in the heat for a prolonged time, and being on a statin.  Pacemaker also interrogated while in the emergency department and revealed no abnormalities and no arrhythmias.  Patient's wound was cleaned he will be discharged home instructed follow up with his cardiologist, he and his family were instructed on head injury precautions detailed return precautions were also given.             ED Course as of 05/08/22 1750   Sun May 08, 2022   1651 Patient's blood + dropped with standing position on orthostatics will give a L of fluid.  Pacemaker will be interrogated [MP]   1718 Interrogation performed no arrhythmias noted no episodes and no issues with pacemaker  [MP]      ED Course User Index  [MP] AUDI Crump             Clinical Impression:   Final diagnoses:  [W19.XXXA] Fall  [S09.90XA] Injury of head, initial encounter (Primary)  [S00.83XA] Facial contusion, initial encounter  [E86.0] Dehydration  [I95.1] Orthostatic hypotension          ED Disposition Condition    Discharge Stable        ED Prescriptions     None        Follow-up Information     Follow up With Specialties Details Why Contact Info    Kris Zavala MD Family Medicine Schedule an appointment as soon as possible for a visit in 2 days  [address] [facility]  [address]  [facility] [address]  [phone]             AUDI Crump  05/08/22 1751

## 2022-05-08 NOTE — DISCHARGE INSTRUCTIONS
Please follow-up with your primary care provider as directed for further evaluation, definitive care  Tylenol if needed for headache  Neosporin or bacitracin ointment to abrasion on forehead  Head injury precautions for the next 24 hours please awaken every 3-4 hours for assessment through the night.  Return for any concerns of condition becomes worse

## 2022-05-23 NOTE — TELEPHONE ENCOUNTER
Patient called stating he had shingles about 1 month ago and it appeared that he had finished through the first phase as the rash had disappeared in most spots. This morning he woke up and said it appeared that he had a fresh patch of infection pop up. He wants to know does he need to come in and have it looked at? Please advise.

## 2022-05-24 NOTE — TELEPHONE ENCOUNTER
Tried calling patient to make an appointment, received voice mail. Left message for patient to call  to make an appointment.

## 2022-05-24 NOTE — TELEPHONE ENCOUNTER
Please get him an appointment. I have had 9 messages about this rash that I have never seen and 3 ER visits.

## 2022-05-27 NOTE — TELEPHONE ENCOUNTER
Patient has back pain since his fall. Recommended that the patient make an appointment to come in to be evaluated.

## 2022-06-01 NOTE — PROGRESS NOTES
SUBJECTIVE:      Patient ID: Tong Ford is a 76 y.o. male.    Chief Complaint: Fall and Back Pain    Tong is a patient of Dr. Zavala here for complaints of left sided back pain which started about 2-3 weeks ago after a fall - gradually improving.  Patient was seen in the emergency room after his fall for facial abrasion and possible head injury.  He did have a CT of the head which was negative for acute findings.  Back pain started a few days after his fall and has been to worse when he is trying to stand up from a sitting position.  He denies any trauma to his back as he fell forward during his fall on to his face.  He believes the pain is caused by more muscular issues and wants to be evaluated today.  He is not able to take NSAIDs due to anticoagulation therapy.  He has been taking leftover hydrocodone which does help some.  Denies any headaches, loss of consciousness, dizziness, or unsteadiness since the incident.    Fall  Incident onset: x 3 weeks ago. The fall occurred while walking. He fell from a height of 3 to 5 ft. He landed on hard floor. The volume of blood lost was minimal. The point of impact was the head. The pain is present in the back. The pain is at a severity of 7/10. The pain is moderate. The symptoms are aggravated by movement, standing and rotation. Pertinent negatives include no abdominal pain, bowel incontinence, fever, headaches, hematuria, nausea, numbness, tingling or vomiting. He has tried rest for the symptoms. The treatment provided no relief.   Back Pain  This is a new problem. The current episode started 1 to 4 weeks ago. The problem occurs daily. The problem has been gradually improving since onset. The pain is present in the thoracic spine. The quality of the pain is described as aching (sharp ). The pain does not radiate. The pain is at a severity of 7/10. The pain is moderate. The symptoms are aggravated by bending, twisting and standing. Pertinent negatives include no  abdominal pain, bladder incontinence, bowel incontinence, chest pain, dysuria, fever, headaches, leg pain, numbness, paresthesias, tingling or weakness. Risk factors include recent trauma. He has tried bed rest and analgesics for the symptoms. The treatment provided no relief.       Family History   Problem Relation Age of Onset    Cancer Mother     Cancer Father     Hypertension Father     Cancer Maternal Grandmother     COPD Paternal Grandmother       Social History     Socioeconomic History    Marital status:    Occupational History    Occupation: professor   Tobacco Use    Smoking status: Never Smoker    Smokeless tobacco: Never Used   Substance and Sexual Activity    Alcohol use: Not Currently    Drug use: No     Social Determinants of Health     Physical Activity: Inactive    Days of Exercise per Week: 0 days    Minutes of Exercise per Session: 0 min   Stress: No Stress Concern Present    Feeling of Stress : Not at all     Current Outpatient Medications   Medication Sig Dispense Refill    amiodarone (PACERONE) 200 MG Tab Take 1 tablet (200 mg total) by mouth once daily. 90 tablet 3    amLODIPine (NORVASC) 5 MG tablet Take 1 tablet (5 mg total) by mouth every evening. 90 tablet 2    apixaban (ELIQUIS) 5 mg Tab Take 1 tablet (5 mg total) by mouth 2 (two) times daily. 180 tablet 3    ascorbic acid, vitamin C, (VITAMIN C) 1000 MG tablet Take 1,000 mg by mouth once daily.      atorvastatin (LIPITOR) 40 MG tablet Take 1 tablet (40 mg total) by mouth once daily. 90 tablet 3    coQ10, ubiquinol, 100 mg Cap Take 150 mg by mouth once daily.       cyanocobalamin 1,000 mcg/mL injection Inject 1 mL (1,000 mcg total) into the muscle every 30 days. 1 mL 3    doxazosin (CARDURA) 8 MG Tab Take 1 tablet (8 mg total) by mouth once daily. 90 tablet 0    ergocalciferol, vitamin D2, (VITAMIN D ORAL) Take 500 Units by mouth Daily.      ezetimibe (ZETIA) 10 mg tablet Take 1 tablet (10 mg total) by mouth  once daily. 90 tablet 3    furosemide (LASIX) 40 MG tablet Take 1.5 tablets (60 mg total) by mouth once daily. 135 tablet 2    gabapentin (NEURONTIN) 100 MG capsule Take 1 capsule (100 mg total) by mouth 2 (two) times daily. 60 capsule 0    garlic 1,000 mg Cap Take 1 capsule by mouth once daily.       levothyroxine (SYNTHROID) 100 MCG tablet Take 1 tablet (100 mcg total) by mouth before breakfast. 90 tablet 2    lisinopriL (PRINIVIL,ZESTRIL) 20 MG tablet Take 1 tablet (20 mg total) by mouth 2 (two) times a day. 180 tablet 3    metoprolol succinate (TOPROL-XL) 50 MG 24 hr tablet Take 1 tablet (50 mg total) by mouth once daily. 90 tablet 3    multivitamin Tab Take 1 tablet by mouth once daily.      nitroGLYCERIN (NITROSTAT) 0.4 MG SL tablet Place 1 tablet (0.4 mg total) under the tongue every 5 (five) minutes as needed for Chest pain. DO NOT CRUSH OR CHEW; DISSOLVE UNDER TONGUE; MAXIMUM OF 3 DOSES IN 15 MINUTES 20 tablet 0    valACYclovir (VALTREX) 1000 MG tablet Take 1 tablet (1,000 mg total) by mouth 2 (two) times daily. 14 tablet 0    methocarbamoL (ROBAXIN) 750 MG Tab Take 1 tablet (750 mg total) by mouth 3 (three) times daily as needed (spasms). 30 tablet 0     No current facility-administered medications for this visit.     Review of patient's allergies indicates:  No Known Allergies   Past Medical History:   Diagnosis Date    Arthritis     Atrial fibrillation     Carotid artery occlusion     bilateral    Cataract     CKD (chronic kidney disease), stage II     Coronary artery disease     3 Vessel CABG AND 3 STENTS    Encounter for blood transfusion     Hypercholesterolemia     Hypertension     Normocytic anemia     Shingles     X 3 WEEKS AGO    Sleep apnea     CESAR - NOT USING C-PAP    Thyroid disease     TIA (transient ischemic attack)      Past Surgical History:   Procedure Laterality Date    A-V CARDIAC PACEMAKER INSERTION N/A 4/1/2021    Procedure: INSERTION, CARDIAC PACEMAKER, DUAL  CHAMBER;  Surgeon: Clifford Sevilla MD;  Location: [facility] CATH/EP LAB;  Service: Cardiology;  Laterality: N/A;  [employer]    CORONARY ANGIOPLASTY WITH STENT PLACEMENT      CORONARY ARTERY BYPASS GRAFT      CORONARY ARTERY BYPASS GRAFT (CABG)      3 vessel    EYE SURGERY      bILATERAL CATARACS    INSERTION OF PACEMAKER      REVISION OF IMPLANTABLE CARDIOVERTER-DEFIBRILLATOR (ICD) ELECTRODE LEAD PLACEMENT Left 7/1/2021    Procedure: REVISION, INSERTION, ELECTRODE LEAD,pacemaker;  Surgeon: Clifford Sevilla MD;  Location: [facility] CATH/EP LAB;  Service: Cardiology;  Laterality: Left;    ROBOT-ASSISTED LAPAROSCOPIC REPAIR OF INGUINAL HERNIA Right 3/11/2022    Procedure: ROBOTIC REPAIR, HERNIA, INGUINAL;  Surgeon: Melo Aguilera III, MD;  Location: [facility] OR;  Service: General;  Laterality: Right;  LB case    TREATMENT OF CARDIAC ARRHYTHMIA N/A 1/29/2021    Procedure: CARDIOVERSION;  Surgeon: Iron Serna MD;  Location: [facility] CATH/EP LAB;  Service: Cardiology;  Laterality: N/A;    VASECTOMY         Review of Systems   Constitutional: Negative for chills, fever and unexpected weight change.   Respiratory: Negative for shortness of breath.    Cardiovascular: Negative for chest pain and leg swelling.   Gastrointestinal: Negative for abdominal pain, bowel incontinence, nausea and vomiting.   Genitourinary: Negative for bladder incontinence, dysuria, frequency and hematuria.   Musculoskeletal: Positive for back pain. Negative for arthralgias, joint swelling and myalgias.   Skin: Negative for color change and pallor.   Neurological: Negative for dizziness, tingling, weakness, numbness, headaches and paresthesias.   Hematological: Negative for adenopathy. Bruises/bleeds easily.      OBJECTIVE:      Vitals:    06/01/22 1105   BP: 138/82   BP Location: Right arm   Patient Position: Sitting   BP Method: Medium (Manual)   Pulse: 66   Resp: 18   Temp: 98 °F (36.7 °C)   TempSrc: Oral   SpO2: 98%   Weight: 82.6 kg (182  "lb 3.2 oz)   Height: 5' 8" (1.727 m)     Physical Exam  Vitals and nursing note reviewed.   Constitutional:       General: He is not in acute distress.     Appearance: Normal appearance. He is not ill-appearing.   HENT:      Head: Normocephalic.   Eyes:      General: No scleral icterus.     Pupils: Pupils are equal, round, and reactive to light.   Cardiovascular:      Rate and Rhythm: Normal rate and regular rhythm.   Pulmonary:      Effort: Pulmonary effort is normal.      Breath sounds: Normal breath sounds.   Abdominal:      Palpations: Abdomen is soft.   Musculoskeletal:      Cervical back: Normal range of motion and neck supple.      Thoracic back: Spasms and tenderness (left paraspinal ) present. No swelling, edema, deformity, signs of trauma, lacerations or bony tenderness. Decreased range of motion. No scoliosis.      Lumbar back: Normal.        Back:       Right lower leg: No edema.      Left lower leg: No edema.   Lymphadenopathy:      Cervical: No cervical adenopathy.   Skin:     Findings: Abrasion (healing abrasion to forehead) present. No bruising or erythema.          Neurological:      Mental Status: He is alert and oriented to person, place, and time.   Psychiatric:         Mood and Affect: Mood normal.         Behavior: Behavior normal.        Assessment:       1. Acute left-sided thoracic back pain    2. Muscle spasm of back        Plan:       Acute left-sided thoracic back pain    Muscle spasm of back  -     methocarbamoL (ROBAXIN) 750 MG Tab; Take 1 tablet (750 mg total) by mouth 3 (three) times daily as needed (spasms).  Dispense: 30 tablet; Refill: 0    -advised prn Tylenol and Voltaren gel otc; Robaxin prn for spasms; RTC in 1-2 weeks if not improving       Follow up if symptoms worsen or fail to improve.      6/1/2022 Jennifer West, CHERI, FNP    This note was created using [employer] voice recognition software that occasionally misinterprets phrases or words.       "

## 2022-06-06 NOTE — TELEPHONE ENCOUNTER
Called patient. No answer. Message left for patient to schedule an appt [phone] option #1. Portal message sent as well.

## 2022-06-13 NOTE — PROGRESS NOTES
SUBJECTIVE:      Patient ID: Tong Ford is a 76 y.o. male.    Chief Complaint: Herpes Zoster (Dx in the ER on 4/2/22. Rt upper thigh. Patient states that he continues to have pain.)    76-year-old male presents to the clinic complaints right upper thigh pain.  He continues to have pain in his right upper leg and groin from shingles outbreak in April. He was seen 4-5 days after symptoms began. He was diagnosed with shingles and given Valtrex and Hydrocodone- unable to take NSAIDS d/t anticoagulation with Eliquis. He has finsihed the prescriptions but is still c/o deep pain in his leg. He was seen in clinic a few days later and given a prescription for gabapentin 100 mg bid. The shingles has improved, but still has scaring/ skin discoloration from the shingles.  He has been using otc topical creams, lidocaine, pain pills, and gabapentin with mild relief.  Symptoms are more bothersome at night.        Family History   Problem Relation Age of Onset    Cancer Mother     Cancer Father     Hypertension Father     Cancer Maternal Grandmother     COPD Paternal Grandmother       Social History     Socioeconomic History    Marital status:    Occupational History    Occupation: professor   Tobacco Use    Smoking status: Never Smoker    Smokeless tobacco: Never Used   Substance and Sexual Activity    Alcohol use: Not Currently    Drug use: No     Social Determinants of Health     Physical Activity: Inactive    Days of Exercise per Week: 0 days    Minutes of Exercise per Session: 0 min   Stress: No Stress Concern Present    Feeling of Stress : Not at all     Current Outpatient Medications   Medication Sig Dispense Refill    amiodarone (PACERONE) 200 MG Tab Take 1 tablet (200 mg total) by mouth once daily. 90 tablet 3    amLODIPine (NORVASC) 5 MG tablet Take 1 tablet (5 mg total) by mouth every evening. 90 tablet 2    apixaban (ELIQUIS) 5 mg Tab Take 1 tablet (5 mg total) by mouth 2 (two) times daily.  180 tablet 3    ascorbic acid, vitamin C, (VITAMIN C) 1000 MG tablet Take 1,000 mg by mouth once daily.      atorvastatin (LIPITOR) 40 MG tablet Take 1 tablet (40 mg total) by mouth once daily. 90 tablet 3    coQ10, ubiquinol, 100 mg Cap Take 150 mg by mouth once daily.       cyanocobalamin 1,000 mcg/mL injection Inject 1 mL (1,000 mcg total) into the muscle every 30 days. 1 mL 3    doxazosin (CARDURA) 8 MG Tab Take 1 tablet (8 mg total) by mouth once daily. 90 tablet 0    ergocalciferol, vitamin D2, (VITAMIN D ORAL) Take 500 Units by mouth Daily.      ezetimibe (ZETIA) 10 mg tablet Take 1 tablet (10 mg total) by mouth once daily. 90 tablet 3    furosemide (LASIX) 40 MG tablet Take 1.5 tablets (60 mg total) by mouth once daily. 135 tablet 2    garlic 1,000 mg Cap Take 1 capsule by mouth once daily.       levothyroxine (SYNTHROID) 100 MCG tablet Take 1 tablet (100 mcg total) by mouth before breakfast. 90 tablet 2    lisinopriL (PRINIVIL,ZESTRIL) 20 MG tablet Take 1 tablet (20 mg total) by mouth 2 (two) times a day. 180 tablet 3    metoprolol succinate (TOPROL-XL) 50 MG 24 hr tablet Take 1 tablet (50 mg total) by mouth once daily. 90 tablet 3    multivitamin Tab Take 1 tablet by mouth once daily.      nitroGLYCERIN (NITROSTAT) 0.4 MG SL tablet Place 1 tablet (0.4 mg total) under the tongue every 5 (five) minutes as needed for Chest pain. DO NOT CRUSH OR CHEW; DISSOLVE UNDER TONGUE; MAXIMUM OF 3 DOSES IN 15 MINUTES 20 tablet 0    valACYclovir (VALTREX) 1000 MG tablet Take 1 tablet (1,000 mg total) by mouth 2 (two) times daily. 14 tablet 0    capsaicin (ZOSTRIX) 0.025 % cream Apply topically 2 (two) times daily. 50 g 1    gabapentin (NEURONTIN) 300 MG capsule Take 1 capsule (300 mg total) by mouth 2 (two) times daily. 60 capsule 0     No current facility-administered medications for this visit.     Review of patient's allergies indicates:  No Known Allergies   Past Medical History:   Diagnosis Date     Arthritis     Atrial fibrillation     Carotid artery occlusion     bilateral    Cataract     CKD (chronic kidney disease), stage II     Coronary artery disease     3 Vessel CABG AND 3 STENTS    Encounter for blood transfusion     Hypercholesterolemia     Hypertension     Normocytic anemia     Shingles     X 3 WEEKS AGO    Sleep apnea     CESAR - NOT USING C-PAP    Thyroid disease     TIA (transient ischemic attack)      Past Surgical History:   Procedure Laterality Date    A-V CARDIAC PACEMAKER INSERTION N/A 4/1/2021    Procedure: INSERTION, CARDIAC PACEMAKER, DUAL CHAMBER;  Surgeon: Clifford Sevilla MD;  Location: [facility] CATH/EP LAB;  Service: Cardiology;  Laterality: N/A;  [employer]IC    CORONARY ANGIOPLASTY WITH STENT PLACEMENT      CORONARY ARTERY BYPASS GRAFT      CORONARY ARTERY BYPASS GRAFT (CABG)      3 vessel    EYE SURGERY      bILATERAL CATARACS    INSERTION OF PACEMAKER      REVISION OF IMPLANTABLE CARDIOVERTER-DEFIBRILLATOR (ICD) ELECTRODE LEAD PLACEMENT Left 7/1/2021    Procedure: REVISION, INSERTION, ELECTRODE LEAD,pacemaker;  Surgeon: Clifford Sevilla MD;  Location: [facility] CATH/EP LAB;  Service: Cardiology;  Laterality: Left;    ROBOT-ASSISTED LAPAROSCOPIC REPAIR OF INGUINAL HERNIA Right 3/11/2022    Procedure: ROBOTIC REPAIR, HERNIA, INGUINAL;  Surgeon: Melo Aguilera III, MD;  Location: [facility] OR;  Service: General;  Laterality: Right;  LB case    TREATMENT OF CARDIAC ARRHYTHMIA N/A 1/29/2021    Procedure: CARDIOVERSION;  Surgeon: Iron Serna MD;  Location: [facility] CATH/EP LAB;  Service: Cardiology;  Laterality: N/A;    VASECTOMY         Review of Systems   Constitutional: Negative for activity change, appetite change, chills, diaphoresis, fatigue, fever and unexpected weight change.   HENT: Negative for congestion, ear pain, sinus pressure, sore throat, trouble swallowing and voice change.    Eyes: Negative for pain, discharge and visual disturbance.   Respiratory:  "Negative for cough, chest tightness, shortness of breath and wheezing.    Cardiovascular: Negative for chest pain and palpitations.   Gastrointestinal: Negative for abdominal pain, constipation, diarrhea, nausea and vomiting.   Genitourinary: Negative for difficulty urinating, flank pain, frequency and urgency.   Musculoskeletal: Positive for arthralgias. Negative for back pain, joint swelling and neck pain.   Skin: Negative for color change and rash.        Recent shingles outbreak   Neurological: Negative for dizziness, seizures, syncope, weakness, numbness and headaches.        Post herpetic neuralgia   Hematological: Negative for adenopathy. Bruises/bleeds easily (On Eliquis).   Psychiatric/Behavioral: Negative for dysphoric mood and sleep disturbance. The patient is not nervous/anxious.       OBJECTIVE:      Vitals:    06/13/22 1431   BP: (!) 106/52   BP Location: Left arm   Patient Position: Sitting   BP Method: Medium (Manual)   Pulse: (!) 59   Temp: 98 °F (36.7 °C)   TempSrc: Oral   SpO2: 97%   Weight: 83.6 kg (184 lb 3.2 oz)   Height: 5' 8" (1.727 m)     Physical Exam  Vitals and nursing note reviewed.   Constitutional:       General: He is awake. He is not in acute distress.     Appearance: Normal appearance. He is not ill-appearing, toxic-appearing or diaphoretic.   HENT:      Head: Normocephalic and atraumatic.      Nose: Nose normal.   Eyes:      General: Lids are normal. Gaze aligned appropriately.      Conjunctiva/sclera: Conjunctivae normal.      Right eye: Right conjunctiva is not injected.      Left eye: Left conjunctiva is not injected.      Pupils: Pupils are equal, round, and reactive to light.   Cardiovascular:      Rate and Rhythm: Regular rhythm. Bradycardia present.      Pulses: Normal pulses.      Heart sounds: S1 normal and S2 normal. Murmur heard.     No friction rub. No gallop.   Pulmonary:      Effort: Pulmonary effort is normal. No respiratory distress.      Breath sounds: Normal " breath sounds. No stridor. No decreased breath sounds, wheezing, rhonchi or rales.   Chest:      Chest wall: No tenderness.   Musculoskeletal:      Cervical back: Neck supple.      Right lower leg: No edema.      Left lower leg: No edema.   Lymphadenopathy:      Cervical: No cervical adenopathy.   Skin:     General: Skin is warm and dry.      Capillary Refill: Capillary refill takes less than 2 seconds.      Findings: Rash present. No erythema.             Comments: Darkened skin color changes from previous shingles outbreak.  There are no blister or new lesions noted.   Neurological:      Mental Status: He is alert and oriented to person, place, and time. Mental status is at baseline.   Psychiatric:         Attention and Perception: Attention normal.         Mood and Affect: Mood normal.         Speech: Speech normal.         Behavior: Behavior normal. Behavior is cooperative.         Thought Content: Thought content normal.         Judgment: Judgment normal.        Assessment:       1. Postherpetic neuralgia        Plan:       Postherpetic neuralgia  Gabapentin increased.  Patient to take 300 mg q.h.s. x2 days then bid.  Continues Tylenol as needed.  May use topical capsaicin cream as well.  If no improvement, discussed gradual increase of gabapentin and possibly adding a lidocaine patch.  Patient to follow-up with PCP.   -     gabapentin (NEURONTIN) 300 MG capsule; Take 1 capsule (300 mg total) by mouth 2 (two) times daily.  Dispense: 60 capsule; Refill: 0  -     capsaicin (ZOSTRIX) 0.025 % cream; Apply topically 2 (two) times daily.  Dispense: 50 g; Refill: 1    This note was created using [employer] voice recognition software that occasionally misinterprets phrases or words.     Follow up if symptoms worsen or fail to improve.      6/13/2022 Lane Clifton, CHERI, FNP

## 2022-06-17 PROBLEM — N17.9 ACUTE ON CHRONIC RENAL FAILURE: Status: ACTIVE | Noted: 2020-08-17

## 2022-06-17 PROBLEM — R42 DIZZINESS: Status: ACTIVE | Noted: 2022-01-01

## 2022-06-18 NOTE — H&P
"[facility] Medicine  History & Physical    Patient Name: Tong Ford  MRN: [id number]  Patient Class: OP- Observation  Admission Date: 6/17/2022  Attending Physician: Natividad Dial MD   Primary Care Provider: Kris Zavala MD         Patient information was obtained from patient, relative(s), past medical records and ER records.     Subjective:     Principal Problem:Acute on chronic renal failure    Chief Complaint:   Chief Complaint   Patient presents with    Dizziness     Pt. Reports dizziness and loss of balance x 2 days; Hx of TIA         HPI: Tong Ford is a 77 y.o. y.o. male with a PMHx of HTN, HLD, CAD s/p CABG, a-fib, CKD, pacemaker, and a-fib on Eliquis who presented to the ED with intermittent dizziness onset x 1.5 months, worsening the past 2 days. He reports increased number of fall over the past month and has been seen in the ED for falls. His most recent fall was yesterday where the patient fell out of his car while getting out of it and his granddaughter had to catch him, per relative. Patient has had difficulty walking 2/2 dizziness and normally ambulates well with a cane. He reports fatigue, back pain and GOODWIN. He denies fever, chills, CP, palpitations, cough, abdominal pain, n/v/d, and LOC. He reports increasing his dose of lasix over the past month and states he has been taking " an extra half of a pill most days." ED workup was significant for mild anemia and elevated Cr/BUN. The patient was placed in observation under the care of Hospital Medicine.           Past Medical History:   Diagnosis Date    Arthritis     Atrial fibrillation     Carotid artery occlusion     bilateral    Cataract     CKD (chronic kidney disease), stage II     Coronary artery disease     3 Vessel CABG AND 3 STENTS    Encounter for blood transfusion     Hypercholesterolemia     Hypertension     Normocytic anemia     Shingles     X 3 WEEKS AGO    Sleep apnea     CESAR - NOT USING " C-PAP    Thyroid disease     TIA (transient ischemic attack)        Past Surgical History:   Procedure Laterality Date    A-V CARDIAC PACEMAKER INSERTION N/A 4/1/2021    Procedure: INSERTION, CARDIAC PACEMAKER, DUAL CHAMBER;  Surgeon: Clifford Sevilla MD;  Location: [facility] CATH/EP LAB;  Service: Cardiology;  Laterality: N/A;  [employer]    CORONARY ANGIOPLASTY WITH STENT PLACEMENT      CORONARY ARTERY BYPASS GRAFT      CORONARY ARTERY BYPASS GRAFT (CABG)      3 vessel    EYE SURGERY      bILATERAL CATARACS    INSERTION OF PACEMAKER      REVISION OF IMPLANTABLE CARDIOVERTER-DEFIBRILLATOR (ICD) ELECTRODE LEAD PLACEMENT Left 7/1/2021    Procedure: REVISION, INSERTION, ELECTRODE LEAD,pacemaker;  Surgeon: Clifford Sevilla MD;  Location: [facility] CATH/EP LAB;  Service: Cardiology;  Laterality: Left;    ROBOT-ASSISTED LAPAROSCOPIC REPAIR OF INGUINAL HERNIA Right 3/11/2022    Procedure: ROBOTIC REPAIR, HERNIA, INGUINAL;  Surgeon: Melo Aguilera III, MD;  Location: [facility] OR;  Service: General;  Laterality: Right;  LB case    TREATMENT OF CARDIAC ARRHYTHMIA N/A 1/29/2021    Procedure: CARDIOVERSION;  Surgeon: Iron Serna MD;  Location: [facility] CATH/EP LAB;  Service: Cardiology;  Laterality: N/A;    VASECTOMY         Review of patient's allergies indicates:  No Known Allergies    No current facility-administered medications on file prior to encounter.     Current Outpatient Medications on File Prior to Encounter   Medication Sig    amiodarone (PACERONE) 200 MG Tab Take 1 tablet (200 mg total) by mouth once daily.    amLODIPine (NORVASC) 5 MG tablet Take 1 tablet (5 mg total) by mouth every evening.    apixaban (ELIQUIS) 5 mg Tab Take 1 tablet (5 mg total) by mouth 2 (two) times daily.    ascorbic acid, vitamin C, (VITAMIN C) 1000 MG tablet Take 1,000 mg by mouth once daily.    atorvastatin (LIPITOR) 40 MG tablet Take 1 tablet (40 mg total) by mouth once daily.    capsaicin (ZOSTRIX) 0.025 % cream Apply  topically 2 (two) times daily.    coQ10, ubiquinol, 100 mg Cap Take 150 mg by mouth once daily.     cyanocobalamin 1,000 mcg/mL injection Inject 1 mL (1,000 mcg total) into the muscle every 30 days.    doxazosin (CARDURA) 8 MG Tab TAKE 1 TABLET DAILY    ergocalciferol, vitamin D2, (VITAMIN D ORAL) Take 500 Units by mouth Daily.    ezetimibe (ZETIA) 10 mg tablet TAKE 1 TABLET DAILY    furosemide (LASIX) 40 MG tablet Take 1.5 tablets (60 mg total) by mouth once daily.    gabapentin (NEURONTIN) 300 MG capsule Take 1 capsule (300 mg total) by mouth 2 (two) times daily.    garlic 1,000 mg Cap Take 1 capsule by mouth once daily.     levothyroxine (SYNTHROID) 100 MCG tablet Take 1 tablet (100 mcg total) by mouth before breakfast.    lisinopriL (PRINIVIL,ZESTRIL) 20 MG tablet Take 1 tablet (20 mg total) by mouth 2 (two) times a day.    metoprolol succinate (TOPROL-XL) 50 MG 24 hr tablet Take 1 tablet (50 mg total) by mouth once daily.    multivitamin Tab Take 1 tablet by mouth once daily.    nitroGLYCERIN (NITROSTAT) 0.4 MG SL tablet Place 1 tablet (0.4 mg total) under the tongue every 5 (five) minutes as needed for Chest pain. DO NOT CRUSH OR CHEW; DISSOLVE UNDER TONGUE; MAXIMUM OF 3 DOSES IN 15 MINUTES    valACYclovir (VALTREX) 1000 MG tablet Take 1 tablet (1,000 mg total) by mouth 2 (two) times daily.     Family History       Problem Relation (Age of Onset)    COPD Paternal Grandmother    Cancer Mother, Father, Maternal Grandmother    Hypertension Father          Tobacco Use    Smoking status: Never Smoker    Smokeless tobacco: Never Used   Substance and Sexual Activity    Alcohol use: Not Currently    Drug use: No    Sexual activity: Not on file     Review of Systems   Constitutional:  Positive for fatigue. Negative for chills, diaphoresis and fever.   HENT:  Negative for congestion, sore throat and trouble swallowing.    Eyes:  Negative for visual disturbance.   Respiratory:  Positive for shortness  of breath (GOODWIN). Negative for cough and wheezing.    Cardiovascular:  Positive for leg swelling. Negative for chest pain and palpitations.   Gastrointestinal:  Negative for abdominal pain, diarrhea, nausea and vomiting.   Genitourinary:  Negative for difficulty urinating, dysuria and hematuria.   Musculoskeletal:  Positive for back pain.   Neurological:  Positive for dizziness and light-headedness. Negative for syncope and headaches.   Psychiatric/Behavioral:  Negative for confusion.    Objective:     Vital Signs (Most Recent):  Temp: 97.6 °F (36.4 °C) (06/17/22 2007)  Pulse: 60 (06/17/22 2230)  Resp: 18 (06/17/22 2230)  BP: (!) 113/56 (06/17/22 2230)  SpO2: 96 % (06/17/22 2230)   Vital Signs (24h Range):  Temp:  [97.6 °F (36.4 °C)] 97.6 °F (36.4 °C)  Pulse:  [60-66] 60  Resp:  [18-20] 18  SpO2:  [96 %-99 %] 96 %  BP: (104-117)/(51-56) 113/56     Weight: 81.6 kg (180 lb)  Body mass index is 27.37 kg/m².    Physical Exam  Vitals and nursing note reviewed.   Constitutional:       General: He is not in acute distress.     Appearance: Normal appearance. He is obese. He is not ill-appearing.   HENT:      Head: Normocephalic and atraumatic.      Nose: Nose normal.      Mouth/Throat:      Mouth: Mucous membranes are moist.      Pharynx: Oropharynx is clear.   Eyes:      Extraocular Movements: Extraocular movements intact.      Pupils: Pupils are equal, round, and reactive to light.   Cardiovascular:      Rate and Rhythm: Normal rate and regular rhythm.      Pulses: Normal pulses.      Heart sounds: Normal heart sounds.   Pulmonary:      Effort: Pulmonary effort is normal. No respiratory distress.      Breath sounds: Normal breath sounds. No wheezing, rhonchi or rales.   Abdominal:      General: Abdomen is flat. Bowel sounds are normal. There is no distension.      Palpations: Abdomen is soft.      Tenderness: There is no abdominal tenderness. There is no guarding or rebound.   Musculoskeletal:         General: Normal range  of motion.      Cervical back: Normal range of motion and neck supple.      Right lower leg: Edema present.      Left lower leg: Edema present.   Skin:     General: Skin is warm.      Capillary Refill: Capillary refill takes 2 to 3 seconds.   Neurological:      General: No focal deficit present.      Mental Status: He is alert and oriented to person, place, and time. Mental status is at baseline.   Psychiatric:         Mood and Affect: Mood normal.         Behavior: Behavior normal.         CRANIAL NERVES     CN III, IV, VI   Pupils are equal, round, and reactive to light.     Significant Labs: All pertinent labs within the past 24 hours have been reviewed.  CBC:   Recent Labs   Lab 06/17/22 2109   WBC 7.72   HGB 10.7*   HCT 31.4*        CMP:   Recent Labs   Lab 06/17/22 2109      K 3.9      CO2 28   GLU 99   BUN 56*   CREATININE 2.9*   CALCIUM 8.9   ANIONGAP 11   EGFRNONAA 20*     Troponin:   Recent Labs   Lab 06/17/22 2109   TROPONINI 0.017       Significant Imaging: I have reviewed all pertinent imaging results/findings within the past 24 hours.    CT head:  1. No acute intracranial abnormality.  2. 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  3. Chronic microvascular ischemic changes.    CXR:  No acute cardiopulmonary abnormality.      Assessment/Plan:     * Acute on chronic renal failure  Patient with acute kidney injury likely d/t IVVD/Dehydration and Pre-renal azotemia  caused by dehydration. JAYSON is currently stable. Labs reviewed- Renal function/electrolytes with Estimated Creatinine Clearance: 21.8 mL/min (A) (based on SCr of 2.9 mg/dL (H)). according to latest data. Monitor urine output and serial BMP and adjust therapy as needed. Avoid nephrotoxins and renally dose meds for GFR listed above. Nephrology consulted: appreciate recommendations.         Dizziness  Acute on chronic:  - Neurology consulted: appreciate recommendations  - reports dizziness x 1.5 months worsening past 3  days with frequent falls  - fall precautions  - no focal deficits appreciated on exam          (HFpEF) heart failure with preserved ejection fraction  Patient is identified as having Diastolic (HFpEF) heart failure that is Chronic. CHF is currently controlled. Latest ECHO performed and demonstrates- Results for orders placed during the hospital encounter of 10/19/20    Echo Color Flow Doppler? Yes; Bubble Contrast? No    Interpretation Summary  · There is mild left ventricular concentric hypertrophy.  · The left ventricle is normal in size with normal systolic function. The estimated ejection fraction is 65%.  · Atrial fibrillation observed with restrictive filling pattern present.  · With normal left atrial pressure.  · Normal right ventricular systolic function.  · Moderate left atrial enlargement.  · Mild right atrial enlargement.  · Moderate aortic valve stenosis.  · Aortic valve area is 1.63 cm2; peak velocity is 1.76 m/s; mean gradient is 7 mmHg.  · Moderate mitral regurgitation.  · No pulmonary hypertension  · Mild tricuspid regurgitation.  · Mild pulmonic regurgitation.  · Normal central venous pressure (3 mmHg).  · The estimated PA systolic pressure is 23 mmHg.    Left ventricle hypertrophy, moderate calcific aortic valve stenosis plainimetered d valve area is 1.6 centimeters squared  Plus two mitral regurgitation  No pulmonary hypertension  . Continue Beta Blocker Furosemide and monitor clinical status closely. Monitor on telemetry. Patient is off CHF pathway.  Monitor strict Is&Os and daily weights.  Place on fluid restriction of 2 L. Continue to stress to patient importance of self efficacy and  on diet for CHF. Last BNP reviewed- and noted below No results for input(s): BNP, BNPTRIAGEBLO in the last 168 hours..            S/P placement of cardiac pacemaker  Noted.       Atrial fibrillation  Patient with atrial fibrillation which is controlled currently with Beta Blocker and Amiodarone. Patient is  currently in sinus rhythm.[id number] Score: 3. Anticoagulation indicated. Anticoagulation done with Eliquis.        Hx TIA (transient ischemic attack)  Noted.       Hypercholesteremia   Patient is chronically on statin.will continue for now. Monitor clinically. Last LDL was   Lab Results   Component Value Date    LDLCALC 70.6 03/01/2022            Thyroid disease  Patient has chronic hypothyroidism. TFTs reviewed-   Lab Results   Component Value Date    TSH 5.300 03/01/2022   . Will continue chronic levothyroxine and adjust for and clinical changes.        Essential hypertension  Chronic, controlled.  Latest blood pressure and vitals reviewed-   Temp:  [96.5 °F (35.8 °C)-97.8 °F (36.6 °C)]   Pulse:  [60-66]   Resp:  [18-20]   BP: (104-126)/(51-60)   SpO2:  [95 %-99 %] .   Home meds for hypertension were reviewed and noted below.   Hypertension Medications             amLODIPine (NORVASC) 5 MG tablet Take 1 tablet (5 mg total) by mouth every evening.    doxazosin (CARDURA) 8 MG Tab TAKE 1 TABLET DAILY    furosemide (LASIX) 40 MG tablet Take 1.5 tablets (60 mg total) by mouth once daily.    lisinopriL (PRINIVIL,ZESTRIL) 20 MG tablet Take 1 tablet (20 mg total) by mouth 2 (two) times a day.    metoprolol succinate (TOPROL-XL) 50 MG 24 hr tablet Take 1 tablet (50 mg total) by mouth once daily.    nitroGLYCERIN (NITROSTAT) 0.4 MG SL tablet Place 1 tablet (0.4 mg total) under the tongue every 5 (five) minutes as needed for Chest pain. DO NOT CRUSH OR CHEW; DISSOLVE UNDER TONGUE; MAXIMUM OF 3 DOSES IN 15 MINUTES          While in the hospital, will manage blood pressure as follows; Continue home antihypertensive regimen    Will utilize p.r.n. blood pressure medication only if patient's blood pressure greater than  180/110 and he develops symptoms such as worsening chest pain or shortness of breath.        Obstructive sleep apnea  Chronic:  - does not use CPAP        VTE Risk Mitigation (From admission, onward)          Ordered     apixaban tablet 5 mg  2 times daily         06/18/22 0128     Reason for No Pharmacological VTE Prophylaxis  Once        Question:  Reasons:  Answer:  Already adequately anticoagulated on oral Anticoagulants    06/17/22 2254     IP VTE HIGH RISK PATIENT  Once         06/17/22 2254     Place sequential compression device  Until discontinued         06/17/22 2254                   Torri Acevedo PA-C  Department of Hospital Medicine   [facility]

## 2022-06-18 NOTE — HOSPITAL COURSE
Patient was admitted to medicine telemetry service.  Initiated on IV fluid hydration.  Lasix therapy was discontinued.  Nephrology service and Neurology teams were consulted.  The kidney function improved.  Regarding the patient's neuro symptoms, he underwent ultrasound testing of the carotids, which were negative for major stenosis.  Neurology suspected that the symptoms were due to orthostatic hypotension.  Compression stockings were suggested.  Patient was eventually discharged home in good condition.  He was instructed to continue his meds as prescribed and to avoid taking extra Lasix.    Physical Exam  Vitals reviewed.   Constitutional:       General: He is not in acute distress.     Appearance: He is not diaphoretic.   HENT:      Mouth/Throat:      Mouth: Mucous membranes are moist.   Eyes:      General: No scleral icterus.        Right eye: No discharge.         Left eye: No discharge.   Neck:      Vascular: No JVD.   Cardiovascular:      Rate and Rhythm: Normal rate and regular rhythm.   Pulmonary:      Effort: Pulmonary effort is normal.      Breath sounds: Normal breath sounds.   Abdominal:      General: Bowel sounds are normal. There is no distension.      Palpations: Abdomen is soft.      Tenderness: There is no abdominal tenderness.

## 2022-06-18 NOTE — ASSESSMENT & PLAN NOTE
Patient is identified as having Diastolic (HFpEF) heart failure that is Chronic. CHF is currently controlled. Latest ECHO performed and demonstrates- Results for orders placed during the hospital encounter of 10/19/20    Echo Color Flow Doppler? Yes; Bubble Contrast? No    Interpretation Summary  · There is mild left ventricular concentric hypertrophy.  · The left ventricle is normal in size with normal systolic function. The estimated ejection fraction is 65%.  · Atrial fibrillation observed with restrictive filling pattern present.  · With normal left atrial pressure.  · Normal right ventricular systolic function.  · Moderate left atrial enlargement.  · Mild right atrial enlargement.  · Moderate aortic valve stenosis.  · Aortic valve area is 1.63 cm2; peak velocity is 1.76 m/s; mean gradient is 7 mmHg.  · Moderate mitral regurgitation.  · No pulmonary hypertension  · Mild tricuspid regurgitation.  · Mild pulmonic regurgitation.  · Normal central venous pressure (3 mmHg).  · The estimated PA systolic pressure is 23 mmHg.    Left ventricle hypertrophy, moderate calcific aortic valve stenosis plainimetered d valve area is 1.6 centimeters squared  Plus two mitral regurgitation  No pulmonary hypertension  . Continue Beta Blocker and monitor clinical status closely. Monitor on telemetry. Patient is off CHF pathway.  Monitor strict Is&Os and daily weights.  Place on fluid restriction of 2 L. Continue to stress to patient importance of self efficacy and  on diet for CHF. Last BNP reviewed- and noted below No results for input(s): BNP, BNPTRIAGEBLO in the last 168 hours..

## 2022-06-18 NOTE — ED PROVIDER NOTES
Encounter Date: 6/17/2022    SCRIBE #1 NOTE: I, Carol Avelar, am scribing for, and in the presence of, Carrillo Short MD.       History     Chief Complaint   Patient presents with    Dizziness     Pt. Reports dizziness and loss of balance x 2 days; Hx of TIA      Time seen by provider: 8:21 PM on 06/17/2022    Tong Ford is a 77 y.o. male with a Hx of bilateral carotid artery occlusion and TIA (1 year ago) presents to the ED with an onset of increased dizziness with exertion which began 2 days ago. Yesterday the patient fell out of his car while getting out of it and his granddaughter had to catch him, per relative.Patient presented to [facility] on 06/08/2022 s/p a fall on his forehead where he sprained his back. Patient notes he has not been able to walk erect since the injury which has caused neck pain and fatigue. The patient denies following with PCP for Sx. The patient denies dysuria, syncope, double vision, or any other symptoms at this time. He endorses use of a cane. Relative states [place] had to follow the patient home today because of swerving. PMHx of HTN, thyroid disease, CKD-2, A-Fib, and CAD. PSHx of CABG, coronary angioplasty with stent, A-V cardiac pacemaker insertion, and inguinal hernia repair.       The history is provided by the patient and a relative.     Review of patient's allergies indicates:  No Known Allergies  Past Medical History:   Diagnosis Date    Arthritis     Atrial fibrillation     Carotid artery occlusion     bilateral    Cataract     CKD (chronic kidney disease), stage II     Coronary artery disease     3 Vessel CABG AND 3 STENTS    Encounter for blood transfusion     Hypercholesterolemia     Hypertension     Normocytic anemia     Shingles     X 3 WEEKS AGO    Sleep apnea     CESAR - NOT USING C-PAP    Thyroid disease     TIA (transient ischemic attack)      Past Surgical History:   Procedure Laterality Date    A-V CARDIAC PACEMAKER INSERTION N/A 4/1/2021     Procedure: INSERTION, CARDIAC PACEMAKER, DUAL CHAMBER;  Surgeon: Clifford Sevilla MD;  Location: [facility] CATH/EP LAB;  Service: Cardiology;  Laterality: N/A;  [employer]    CORONARY ANGIOPLASTY WITH STENT PLACEMENT      CORONARY ARTERY BYPASS GRAFT      CORONARY ARTERY BYPASS GRAFT (CABG)      3 vessel    EYE SURGERY      bILATERAL CATARACS    INSERTION OF PACEMAKER      REVISION OF IMPLANTABLE CARDIOVERTER-DEFIBRILLATOR (ICD) ELECTRODE LEAD PLACEMENT Left 7/1/2021    Procedure: REVISION, INSERTION, ELECTRODE LEAD,pacemaker;  Surgeon: Clifford Sevilla MD;  Location: [facility] CATH/EP LAB;  Service: Cardiology;  Laterality: Left;    ROBOT-ASSISTED LAPAROSCOPIC REPAIR OF INGUINAL HERNIA Right 3/11/2022    Procedure: ROBOTIC REPAIR, HERNIA, INGUINAL;  Surgeon: Melo Aguilera III, MD;  Location: [facility] OR;  Service: General;  Laterality: Right;  LB case    TREATMENT OF CARDIAC ARRHYTHMIA N/A 1/29/2021    Procedure: CARDIOVERSION;  Surgeon: Iron Serna MD;  Location: [facility] CATH/EP LAB;  Service: Cardiology;  Laterality: N/A;    VASECTOMY       Family History   Problem Relation Age of Onset    Cancer Mother     Cancer Father     Hypertension Father     Cancer Maternal Grandmother     COPD Paternal Grandmother      Social History     Tobacco Use    Smoking status: Never Smoker    Smokeless tobacco: Never Used   Substance Use Topics    Alcohol use: Not Currently    Drug use: No     Review of Systems   Constitutional: Positive for fatigue. Negative for fever.   HENT: Negative for sore throat.    Eyes: Negative for visual disturbance.   Respiratory: Negative for shortness of breath.    Cardiovascular: Negative for chest pain.   Gastrointestinal: Negative for nausea.   Genitourinary: Negative for dysuria.   Musculoskeletal: Positive for back pain and neck pain.   Skin: Negative for rash.   Neurological: Positive for dizziness. Negative for syncope and weakness.   Hematological: Does not bruise/bleed  easily.       Physical Exam     Initial Vitals   BP Pulse Resp Temp SpO2   06/17/22 2007 06/17/22 2007 06/17/22 2007 06/17/22 2007 06/17/22 2008   (!) 108/53 65 20 97.6 °F (36.4 °C) 97 %      MAP       --                Physical Exam    Nursing note and vitals reviewed.  Constitutional: He appears well-developed and well-nourished. No distress.   HENT:   Head: Normocephalic and atraumatic.   Impacted cerumen noted to the R ear.    Eyes: Conjunctivae and EOM are normal. Pupils are equal, round, and reactive to light.   There is no significant vertical or horizontal nystagmus on exam.    Neck: Neck supple.   Cardiovascular: Normal rate, regular rhythm and normal heart sounds. Exam reveals no gallop and no friction rub.    No murmur heard.  Pulmonary/Chest: Breath sounds normal. No respiratory distress. He has no wheezes. He has no rhonchi. He has no rales.   Implantable device to the L anterior chest wall present. Sternotomy scar noted on exam. Gastric bowel sounds heard at the L lower chest wall.    Abdominal: Abdomen is soft. Bowel sounds are normal. He exhibits no distension. There is no abdominal tenderness.   Musculoskeletal:         General: No tenderness or edema. Normal range of motion.      Cervical back: Neck supple.     Neurological: He is alert and oriented to person, place, and time.   Skin: Skin is warm and dry.   Psychiatric: He has a normal mood and affect.         ED Course   Procedures  Labs Reviewed   CBC W/ AUTO DIFFERENTIAL - Abnormal; Notable for the following components:       Result Value    RBC 3.12 (*)     Hemoglobin 10.7 (*)     Hematocrit 31.4 (*)      (*)     MCH 34.3 (*)     RDW 18.3 (*)     All other components within normal limits   BASIC METABOLIC PANEL - Abnormal; Notable for the following components:    BUN 56 (*)     Creatinine 2.9 (*)     eGFR if  23 (*)     eGFR if non  20 (*)     All other components within normal limits   TROPONIN I           Imaging Results          CT Head Without Contrast (Final result)  Result time 06/17/22 21:09:34    Final result by Kian Lezama DO (06/17/22 21:09:34)                 Impression:      1. No acute intracranial abnormality.  2. 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  3. Chronic microvascular ischemic changes.      Electronically signed by: Kian Lezama  Date:    06/17/2022  Time:    21:09             Narrative:    EXAMINATION:  CT HEAD WITHOUT CONTRAST    CLINICAL HISTORY:  Dizziness, persistent/recurrent, cardiac or vascular cause suspected;    TECHNIQUE:  Low dose axial CT images obtained throughout the head without intravenous contrast. Sagittal and coronal reconstructions were performed.    COMPARISON:  CT head from 05/08/2022.    FINDINGS:  There is age-related brain parenchymal volume loss and prominence of the CSF spaces.  There is no hydrocephalus.    There is hypoattenuation in the supratentorial white matter compatible with chronic microvascular ischemic changes, similar to prior.    There is a hypodensity in the right caudate head/anterior limb internal capsule compatible with a remote lacunar infarction.  Additional small hypodensities in the basal ganglia, similar to prior, likely remote infarctions versus dilated perivascular spaces.    No extra-axial blood or fluid collections.    No parenchymal mass, hemorrhage, edema or acute major vascular distribution infarct.    No calvarial fracture.  The scalp is unremarkable.  Bilateral paranasal sinuses and mastoid air cells are clear.                               X-Ray Chest AP Portable (Final result)  Result time 06/17/22 21:07:15    Final result by Kian Lezama DO (06/17/22 21:07:15)                 Impression:      No acute cardiopulmonary abnormality.      Electronically signed by: Kian Lezama  Date:    06/17/2022  Time:    21:07             Narrative:    EXAMINATION:  XR CHEST AP PORTABLE    CLINICAL HISTORY:  Syncope and  collapse    TECHNIQUE:  Single frontal view of the chest was performed.    COMPARISON:  05/08/2022.    FINDINGS:  There is a cardiac pacer, unchanged in position from prior.    The cardiac silhouette is enlarged.  No focal opacities are seen.  The pleural spaces are clear.    The cardiac silhouette is borderline, unchanged.  There are atherosclerotic calcifications of the aortic arch.  There are multiple surgical clips overlying the cardiac silhouette and mediastinum.    The visualized osseous structures are intact.  There are median sternotomy wires.                                 Medications - No data to display  Medical Decision Making:   History:   Old Medical Records: I decided to obtain old medical records.  Independently Interpreted Test(s):   I have ordered and independently interpreted X-rays - see prior notes.  I have ordered and independently interpreted EKG Reading(s) - see prior notes  Clinical Tests:   Lab Tests: Ordered and Reviewed  Radiological Study: Ordered and Reviewed  Medical Tests: Ordered and Reviewed  Patient has acute kidney insufficiency and is dehydrated.  I do not appreciate any signs of central vertigo here however his CT shows old infarcts and a stroke is possible however he is definitely not in the window for tPA.  He can be further evaluated by Neurology in the hospital.  He needs to be admitted for gentle hydration given that he has a history of CHF.  He had a near syncopal episode yesterday which is likely from orthostasis.  He does not appear to be septic or toxic.  There is no fevers and no leukocytosis.  He is on multiple medications that can be nephrotoxic in these may need to be held in the hospital why he is hydrated.          Scribe Attestation:   Scribe #1: I performed the above scribed service and the documentation accurately describes the services I performed. I attest to the accuracy of the note.               I, Dr. Carrillo Short personally performed the services  described in this documentation. All medical record entries made by the scribe were at my direction and in my presence.  I have reviewed the chart and agree that the record reflects my personal performance and is accurate and complete. Carrillo Short MD.  10:09 PM 06/17/2022    DISCLAIMER: This note was prepared with Dragon NaturallySpeaking voice recognition transcription software. Garbled syntax, mangled pronouns, and other bizarre constructions may be attributed to that software system   Clinical Impression:   Final diagnoses:  [R55] Syncope  [N17.9] JAYSON (acute kidney injury) (Primary)  [R42] Dizziness          ED Disposition Condition    Admit               Carrillo Short MD  06/17/22 6710

## 2022-06-18 NOTE — ASSESSMENT & PLAN NOTE
Patient is chronically on statin.will continue for now. Monitor clinically. Last LDL was   Lab Results   Component Value Date    LDLCALC 70.6 03/01/2022

## 2022-06-18 NOTE — ASSESSMENT & PLAN NOTE
Patient with acute kidney injury likely d/t IVVD/Dehydration and Pre-renal azotemia  caused by dehydration. JAYSON is currently stable. Labs reviewed- Renal function/electrolytes with Estimated Creatinine Clearance: 25.3 mL/min (A) (based on SCr of 2.5 mg/dL (H)). according to latest data. Monitor urine output and serial BMP and adjust therapy as needed. Avoid nephrotoxins and renally dose meds for GFR listed above. Nephrology consulted: appreciate recommendations. Hold Lasix therapy for now.

## 2022-06-18 NOTE — ASSESSMENT & PLAN NOTE
Patient with atrial fibrillation which is controlled currently with Beta Blocker and Amiodarone. Patient is currently in sinus rhythm.[id number] Score: 3. Anticoagulation indicated. Anticoagulation done with Eliquis.

## 2022-06-18 NOTE — ASSESSMENT & PLAN NOTE
Chronic, controlled.  Latest blood pressure and vitals reviewed-   Temp:  [96.5 °F (35.8 °C)-97.8 °F (36.6 °C)]   Pulse:  [60-66]   Resp:  [18-20]   BP: (104-126)/(51-60)   SpO2:  [95 %-99 %] .   Home meds for hypertension were reviewed and noted below.   Hypertension Medications             amLODIPine (NORVASC) 5 MG tablet Take 1 tablet (5 mg total) by mouth every evening.    doxazosin (CARDURA) 8 MG Tab TAKE 1 TABLET DAILY    furosemide (LASIX) 40 MG tablet Take 1.5 tablets (60 mg total) by mouth once daily.    lisinopriL (PRINIVIL,ZESTRIL) 20 MG tablet Take 1 tablet (20 mg total) by mouth 2 (two) times a day.    metoprolol succinate (TOPROL-XL) 50 MG 24 hr tablet Take 1 tablet (50 mg total) by mouth once daily.    nitroGLYCERIN (NITROSTAT) 0.4 MG SL tablet Place 1 tablet (0.4 mg total) under the tongue every 5 (five) minutes as needed for Chest pain. DO NOT CRUSH OR CHEW; DISSOLVE UNDER TONGUE; MAXIMUM OF 3 DOSES IN 15 MINUTES          While in the hospital, will manage blood pressure as follows; Continue home antihypertensive regimen    Will utilize p.r.n. blood pressure medication only if patient's blood pressure greater than  180/110 and he develops symptoms such as worsening chest pain or shortness of breath.

## 2022-06-18 NOTE — CONSULTS
"Consult Note  Nephrology    Consult Requested By: Natividad Dial MD    Reason for Consult: JAYSON    SUBJECTIVE:     History of Present Illness:  78 y/o male patient presented to ER yesterday w/complaint of intermittent dizziness onset x 1.5 months, worsening the past 2 days. He reports increased number of fall over the past month and has been seen in the ED for falls. He reports fatigue, back pain and GOODWIN. He reports increasing his dose of lasix over the past month and states he has been taking " an extra half of a pill most days." ED workup was significant for mild anemia and elevated Cr/BUN.  Nephrology is consulted for co-management.    6/18  5 mos ago, Scr was wnl--has been elevated over past 3 mos, 1.5-1.8.  Unsure of baseline Scr based on this data, but pt does have baseline CKD 3.  Scr 2.9 on this presentation to ER, today down to 2.5.  Pt does say he sometimes feels he does not empty adequately w/voiding.  Denies use of ibuprofen, aleve.  States he increased his dose of lasix 2-3 weeks ago d/t pedal edema.  Also says his MD has told him that he is "watching my kidneys" as some labs have not been normal for a while.    Assessment/plan:    JAYSON  CKD 3  Anemia of chronic dz  HTN    --hold ACEi's, ARBs, and diuretics.  Agree w/gentle hydration.  Ordered UA w/micro, PVR, uric acid level, and renal US.  --Avoid nephrotoxic agents.  No NSAIDs, COXibs, or aminoglycoside antibiotics.  Dose all medications for level of renal function.  --iron studies in am, trend H/H  --VSS w/current meds.  Avoid hypotension, keep MAP >55    Past Medical History:   Diagnosis Date    Arthritis     Atrial fibrillation     Carotid artery occlusion     bilateral    Cataract     CKD (chronic kidney disease), stage II     Coronary artery disease     3 Vessel CABG AND 3 STENTS    Encounter for blood transfusion     Hypercholesterolemia     Hypertension     Normocytic anemia     Shingles     X 3 WEEKS AGO    Sleep apnea     CESAR - NOT " USING C-PAP    Thyroid disease     TIA (transient ischemic attack)      Past Surgical History:   Procedure Laterality Date    A-V CARDIAC PACEMAKER INSERTION N/A 4/1/2021    Procedure: INSERTION, CARDIAC PACEMAKER, DUAL CHAMBER;  Surgeon: Clifford Sevilla MD;  Location: [facility] CATH/EP LAB;  Service: Cardiology;  Laterality: N/A;  [employer]    CORONARY ANGIOPLASTY WITH STENT PLACEMENT      CORONARY ARTERY BYPASS GRAFT      CORONARY ARTERY BYPASS GRAFT (CABG)      3 vessel    EYE SURGERY      bILATERAL CATARACS    INSERTION OF PACEMAKER      REVISION OF IMPLANTABLE CARDIOVERTER-DEFIBRILLATOR (ICD) ELECTRODE LEAD PLACEMENT Left 7/1/2021    Procedure: REVISION, INSERTION, ELECTRODE LEAD,pacemaker;  Surgeon: Clifford Sevilla MD;  Location: [facility] CATH/EP LAB;  Service: Cardiology;  Laterality: Left;    ROBOT-ASSISTED LAPAROSCOPIC REPAIR OF INGUINAL HERNIA Right 3/11/2022    Procedure: ROBOTIC REPAIR, HERNIA, INGUINAL;  Surgeon: Melo Aguilera III, MD;  Location: [facility] OR;  Service: General;  Laterality: Right;  LB case    TREATMENT OF CARDIAC ARRHYTHMIA N/A 1/29/2021    Procedure: CARDIOVERSION;  Surgeon: Iron Serna MD;  Location: [facility] CATH/EP LAB;  Service: Cardiology;  Laterality: N/A;    VASECTOMY       Family History   Problem Relation Age of Onset    Cancer Mother     Cancer Father     Hypertension Father     Cancer Maternal Grandmother     COPD Paternal Grandmother      Social History     Tobacco Use    Smoking status: Never Smoker    Smokeless tobacco: Never Used   Substance Use Topics    Alcohol use: Not Currently    Drug use: No       Review of patient's allergies indicates:  No Known Allergies     Review of Systems:  General ROS: negative for - fever or night sweats  Psychological ROS: negative for - behavioral disorder or depression  ENT ROS: negative for - headaches or visual changes  Hematological and Lymphatic ROS: negative for - bleeding problems or bruising  Endocrine  ROS: negative for - temperature intolerance or unexpected weight changes  Respiratory ROS: no cough, shortness of breath, or wheezing  Cardiovascular ROS: no chest pain, SOB  Gastrointestinal ROS: no abdominal pain, no n/v/c/d  Genito-Urinary ROS: no dysuria or trouble voiding  Musculoskeletal ROS: edema  Neurological ROS: no TIA or stroke symptoms  Dermatological ROS: negative for rash and skin lesion changes    OBJECTIVE:     Vital Signs Range (Last 24H):  Temp:  [96.5 °F (35.8 °C)-97.8 °F (36.6 °C)]   Pulse:  [59-66]   Resp:  [16-20]   BP: (104-136)/(51-65)   SpO2:  [95 %-99 %]     Physical Exam:  General- NAD noted  HEENT- WNL  Neck- supple  CV- Regular rate and rhythm  Resp- Lungs CTA Bilaterally, No increased WOB  GI- Non tender/non-distended, BS normoactive x4 quads  Extrem- +edema.  Derm- skin w/d  Neuro-  No flap.     Body mass index is 28.22 kg/m².    Laboratory:  CBC:   Recent Labs   Lab 06/18/22  0503   WBC 8.65   RBC 3.09*   HGB 10.3*   HCT 31.5*      *   MCH 33.3*   MCHC 32.7     CMP:   Recent Labs   Lab 06/18/22  0502      CALCIUM 8.6*   ALBUMIN 3.0*   PROT 5.6*      K 3.7   CO2 28      BUN 54*   CREATININE 2.5*   ALKPHOS 73   ALT 31   AST 29   BILITOT 0.4       Diagnostic Results:  Labs: Reviewed      ASSESSMENT/PLAN:     Active Hospital Problems    Diagnosis  POA    *Acute on chronic renal failure [N17.9, N18.9]  Yes    Dizziness [R42]  Yes    (HFpEF) heart failure with preserved ejection fraction [I50.30]  Yes     Chronic    S/P placement of cardiac pacemaker [Z95.0]  Yes     Status post pacemaker placement a [employer]tronic unit for sick sinus syndrome      Atrial fibrillation [I48.91]  Yes    Hx TIA (transient ischemic attack) [G45.9]  Yes    Hypercholesteremia [E78.00]  Yes    Essential hypertension [I10]  Yes    Thyroid disease [E07.9]  Yes    Obstructive sleep apnea [G47.33]  Yes      Resolved Hospital Problems   No resolved problems to display.          Thank you for allowing us to participate in the care of your patient. We will follow the patient and provide recommendations as needed.    Yue Alcantara NP    Time spent seeing patient( greater than 1/2 spent in direct contact) :

## 2022-06-18 NOTE — SUBJECTIVE & OBJECTIVE
Past Medical History:   Diagnosis Date    Arthritis     Atrial fibrillation     Carotid artery occlusion     bilateral    Cataract     CKD (chronic kidney disease), stage II     Coronary artery disease     3 Vessel CABG AND 3 STENTS    Encounter for blood transfusion     Hypercholesterolemia     Hypertension     Normocytic anemia     Shingles     X 3 WEEKS AGO    Sleep apnea     CESAR - NOT USING C-PAP    Thyroid disease     TIA (transient ischemic attack)        Past Surgical History:   Procedure Laterality Date    A-V CARDIAC PACEMAKER INSERTION N/A 4/1/2021    Procedure: INSERTION, CARDIAC PACEMAKER, DUAL CHAMBER;  Surgeon: Clifford Sevilla MD;  Location: [facility] CATH/EP LAB;  Service: Cardiology;  Laterality: N/A;  [employer]    CORONARY ANGIOPLASTY WITH STENT PLACEMENT      CORONARY ARTERY BYPASS GRAFT      CORONARY ARTERY BYPASS GRAFT (CABG)      3 vessel    EYE SURGERY      bILATERAL CATARACS    INSERTION OF PACEMAKER      REVISION OF IMPLANTABLE CARDIOVERTER-DEFIBRILLATOR (ICD) ELECTRODE LEAD PLACEMENT Left 7/1/2021    Procedure: REVISION, INSERTION, ELECTRODE LEAD,pacemaker;  Surgeon: Clifford Sevilla MD;  Location: [facility] CATH/EP LAB;  Service: Cardiology;  Laterality: Left;    ROBOT-ASSISTED LAPAROSCOPIC REPAIR OF INGUINAL HERNIA Right 3/11/2022    Procedure: ROBOTIC REPAIR, HERNIA, INGUINAL;  Surgeon: Melo Aguilera III, MD;  Location: [facility] OR;  Service: General;  Laterality: Right;  LB case    TREATMENT OF CARDIAC ARRHYTHMIA N/A 1/29/2021    Procedure: CARDIOVERSION;  Surgeon: Iron Serna MD;  Location: [facility] CATH/EP LAB;  Service: Cardiology;  Laterality: N/A;    VASECTOMY         Review of patient's allergies indicates:  No Known Allergies    No current facility-administered medications on file prior to encounter.     Current Outpatient Medications on File Prior to Encounter   Medication Sig    amiodarone (PACERONE) 200 MG Tab Take 1 tablet (200 mg total) by mouth once daily.    amLODIPine  (NORVASC) 5 MG tablet Take 1 tablet (5 mg total) by mouth every evening.    apixaban (ELIQUIS) 5 mg Tab Take 1 tablet (5 mg total) by mouth 2 (two) times daily.    ascorbic acid, vitamin C, (VITAMIN C) 1000 MG tablet Take 1,000 mg by mouth once daily.    atorvastatin (LIPITOR) 40 MG tablet Take 1 tablet (40 mg total) by mouth once daily.    capsaicin (ZOSTRIX) 0.025 % cream Apply topically 2 (two) times daily.    coQ10, ubiquinol, 100 mg Cap Take 150 mg by mouth once daily.     cyanocobalamin 1,000 mcg/mL injection Inject 1 mL (1,000 mcg total) into the muscle every 30 days.    doxazosin (CARDURA) 8 MG Tab TAKE 1 TABLET DAILY    ergocalciferol, vitamin D2, (VITAMIN D ORAL) Take 500 Units by mouth Daily.    ezetimibe (ZETIA) 10 mg tablet TAKE 1 TABLET DAILY    furosemide (LASIX) 40 MG tablet Take 1.5 tablets (60 mg total) by mouth once daily.    gabapentin (NEURONTIN) 300 MG capsule Take 1 capsule (300 mg total) by mouth 2 (two) times daily.    garlic 1,000 mg Cap Take 1 capsule by mouth once daily.     levothyroxine (SYNTHROID) 100 MCG tablet Take 1 tablet (100 mcg total) by mouth before breakfast.    lisinopriL (PRINIVIL,ZESTRIL) 20 MG tablet Take 1 tablet (20 mg total) by mouth 2 (two) times a day.    metoprolol succinate (TOPROL-XL) 50 MG 24 hr tablet Take 1 tablet (50 mg total) by mouth once daily.    multivitamin Tab Take 1 tablet by mouth once daily.    nitroGLYCERIN (NITROSTAT) 0.4 MG SL tablet Place 1 tablet (0.4 mg total) under the tongue every 5 (five) minutes as needed for Chest pain. DO NOT CRUSH OR CHEW; DISSOLVE UNDER TONGUE; MAXIMUM OF 3 DOSES IN 15 MINUTES    valACYclovir (VALTREX) 1000 MG tablet Take 1 tablet (1,000 mg total) by mouth 2 (two) times daily.     Family History       Problem Relation (Age of Onset)    COPD Paternal Grandmother    Cancer Mother, Father, Maternal Grandmother    Hypertension Father          Tobacco Use    Smoking status: Never Smoker    Smokeless tobacco: Never Used    Substance and Sexual Activity    Alcohol use: Not Currently    Drug use: No    Sexual activity: Not on file     Review of Systems   Constitutional:  Positive for fatigue. Negative for chills, diaphoresis and fever.   HENT:  Negative for congestion, sore throat and trouble swallowing.    Eyes:  Negative for visual disturbance.   Respiratory:  Positive for shortness of breath (GOODWIN). Negative for cough and wheezing.    Cardiovascular:  Positive for leg swelling. Negative for chest pain and palpitations.   Gastrointestinal:  Negative for abdominal pain, diarrhea, nausea and vomiting.   Genitourinary:  Negative for difficulty urinating, dysuria and hematuria.   Musculoskeletal:  Positive for back pain.   Neurological:  Positive for dizziness and light-headedness. Negative for syncope and headaches.   Psychiatric/Behavioral:  Negative for confusion.    Objective:     Vital Signs (Most Recent):  Temp: 97.6 °F (36.4 °C) (06/17/22 2007)  Pulse: 60 (06/17/22 2230)  Resp: 18 (06/17/22 2230)  BP: (!) 113/56 (06/17/22 2230)  SpO2: 96 % (06/17/22 2230)   Vital Signs (24h Range):  Temp:  [97.6 °F (36.4 °C)] 97.6 °F (36.4 °C)  Pulse:  [60-66] 60  Resp:  [18-20] 18  SpO2:  [96 %-99 %] 96 %  BP: (104-117)/(51-56) 113/56     Weight: 81.6 kg (180 lb)  Body mass index is 27.37 kg/m².    Physical Exam  Vitals and nursing note reviewed.   Constitutional:       General: He is not in acute distress.     Appearance: Normal appearance. He is obese. He is not ill-appearing.   HENT:      Head: Normocephalic and atraumatic.      Nose: Nose normal.      Mouth/Throat:      Mouth: Mucous membranes are moist.      Pharynx: Oropharynx is clear.   Eyes:      Extraocular Movements: Extraocular movements intact.      Pupils: Pupils are equal, round, and reactive to light.   Cardiovascular:      Rate and Rhythm: Normal rate and regular rhythm.      Pulses: Normal pulses.      Heart sounds: Normal heart sounds.   Pulmonary:      Effort: Pulmonary effort  is normal. No respiratory distress.      Breath sounds: Normal breath sounds. No wheezing, rhonchi or rales.   Abdominal:      General: Abdomen is flat. Bowel sounds are normal. There is no distension.      Palpations: Abdomen is soft.      Tenderness: There is no abdominal tenderness. There is no guarding or rebound.   Musculoskeletal:         General: Normal range of motion.      Cervical back: Normal range of motion and neck supple.      Right lower leg: Edema present.      Left lower leg: Edema present.   Skin:     General: Skin is warm.      Capillary Refill: Capillary refill takes 2 to 3 seconds.   Neurological:      General: No focal deficit present.      Mental Status: He is alert and oriented to person, place, and time. Mental status is at baseline.   Psychiatric:         Mood and Affect: Mood normal.         Behavior: Behavior normal.         CRANIAL NERVES     CN III, IV, VI   Pupils are equal, round, and reactive to light.     Significant Labs: All pertinent labs within the past 24 hours have been reviewed.  CBC:   Recent Labs   Lab 06/17/22 2109   WBC 7.72   HGB 10.7*   HCT 31.4*        CMP:   Recent Labs   Lab 06/17/22 2109      K 3.9      CO2 28   GLU 99   BUN 56*   CREATININE 2.9*   CALCIUM 8.9   ANIONGAP 11   EGFRNONAA 20*     Troponin:   Recent Labs   Lab 06/17/22 2109   TROPONINI 0.017       Significant Imaging: I have reviewed all pertinent imaging results/findings within the past 24 hours.    CT head:  1. No acute intracranial abnormality.  2. 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  3. Chronic microvascular ischemic changes.    CXR:  No acute cardiopulmonary abnormality.

## 2022-06-18 NOTE — ED NOTES
Pt resting comfortably in bed. Nadn. Pt denies dizziness at this time. Pt voiced no complaint or concerns at this time. Awaiting results and md recheck. Family present at bedside.

## 2022-06-18 NOTE — ASSESSMENT & PLAN NOTE
Chronic, controlled.  Latest blood pressure and vitals reviewed-   Temp:  [96.5 °F (35.8 °C)-97.8 °F (36.6 °C)]   Pulse:  [59-66]   Resp:  [16-20]   BP: (104-136)/(51-65)   SpO2:  [95 %-99 %] .   Home meds for hypertension were reviewed and noted below.   Hypertension Medications             amLODIPine (NORVASC) 5 MG tablet Take 1 tablet (5 mg total) by mouth every evening.    doxazosin (CARDURA) 8 MG Tab TAKE 1 TABLET DAILY    furosemide (LASIX) 40 MG tablet Take 1.5 tablets (60 mg total) by mouth once daily.    lisinopriL (PRINIVIL,ZESTRIL) 20 MG tablet Take 1 tablet (20 mg total) by mouth 2 (two) times a day.    metoprolol succinate (TOPROL-XL) 50 MG 24 hr tablet Take 1 tablet (50 mg total) by mouth once daily.    nitroGLYCERIN (NITROSTAT) 0.4 MG SL tablet Place 1 tablet (0.4 mg total) under the tongue every 5 (five) minutes as needed for Chest pain. DO NOT CRUSH OR CHEW; DISSOLVE UNDER TONGUE; MAXIMUM OF 3 DOSES IN 15 MINUTES          While in the hospital, will manage blood pressure as follows; Continue home antihypertensive regimen    Will utilize p.r.n. blood pressure medication only if patient's blood pressure greater than  180/110 and he develops symptoms such as worsening chest pain or shortness of breath.

## 2022-06-18 NOTE — ASSESSMENT & PLAN NOTE
Patient with atrial fibrillation which is controlled currently with Beta Blocker and Amiodarone. Patient is currently in sinus rhythm.[id number] Score: 3. Anticoagulation indicated. Anticoagulation done with Eliquis.

## 2022-06-18 NOTE — PLAN OF CARE
Plan of care reviewed with patient. Patient verbalized complete understanding. Pt awake, alert, and oriented. Pt not complaining of pain. Pt on tele.   All fall precautions maintained, bed in lowest position, locked, call light within reach. Side rails up times 2. Slip resistant socks maintained.

## 2022-06-18 NOTE — CONSULTS
"  [facility]  Neurology  Consult Note        PATIENT NAME: Tong Ford  MRN: [id number]  CSN: [id number]      TODAY'S DATE: 06/18/2022  ADMIT DATE: 6/17/2022                            CONSULTING PROVIDER: Fazal Moeller MD  CONSULT REQUESTED BY: Natividad Dial MD      Reason for consult: dizziness       History obtained from chart review and the patient.    HPI per EMR: Tong Ford is a 77 y.o. male with a history of HTN, HLD, CAD s/p CABG, a-fib, CKD, pacemaker, and a-fib on Eliquis who presented to the ED with intermittent dizziness onset x 1.5 months, worsening the past 2 days. He reports increased number of fall over the past month and has been seen in the ED for falls. His most recent fall was yesterday where the patient fell out of his car while getting out of it and his granddaughter had to catch him, per relative. Patient has had difficulty walking 2/2 dizziness and normally ambulates well with a cane. He reports fatigue, back pain and GOODWIN. He denies fever, chills, CP, palpitations, cough, abdominal pain, n/v/d, and LOC. He reports increasing his dose of lasix over the past month and states he has been taking " an extra half of a pill most days." ED workup was significant for mild anemia and elevated Cr/BUN    Neurology consult:  Patient was seen examined by me this morning.  He states that he has been having dizziness/lightheadedness for the past 1-2 months which is mostly noticed when he stands up and walks from sitting or lying position.  Denies any room spinning sensation, denies any nausea or vomiting.  He denies any hearing loss or ringing in the ear.    He states that at baseline he ambulates with a cane however he has been having some difficulty walking secondary to lightheadedness.  He denies any vision loss, speech deficit, unilateral weakness or sensory changes.     PREVIOUS MEDICAL HISTORY:  Past Medical History:   Diagnosis Date    Arthritis     Atrial fibrillation     " Carotid artery occlusion     bilateral    Cataract     CKD (chronic kidney disease), stage II     Coronary artery disease     3 Vessel CABG AND 3 STENTS    Encounter for blood transfusion     Hypercholesterolemia     Hypertension     Normocytic anemia     Shingles     X 3 WEEKS AGO    Sleep apnea     CESAR - NOT USING C-PAP    Thyroid disease     TIA (transient ischemic attack)      PREVIOUS SURGICAL HISTORY:  Past Surgical History:   Procedure Laterality Date    A-V CARDIAC PACEMAKER INSERTION N/A 4/1/2021    Procedure: INSERTION, CARDIAC PACEMAKER, DUAL CHAMBER;  Surgeon: Clifford Sevilla MD;  Location: [facility] CATH/EP LAB;  Service: Cardiology;  Laterality: N/A;  [employer]    CORONARY ANGIOPLASTY WITH STENT PLACEMENT      CORONARY ARTERY BYPASS GRAFT      CORONARY ARTERY BYPASS GRAFT (CABG)      3 vessel    EYE SURGERY      bILATERAL CATARACS    INSERTION OF PACEMAKER      REVISION OF IMPLANTABLE CARDIOVERTER-DEFIBRILLATOR (ICD) ELECTRODE LEAD PLACEMENT Left 7/1/2021    Procedure: REVISION, INSERTION, ELECTRODE LEAD,pacemaker;  Surgeon: Clifford Sevilla MD;  Location: [facility] CATH/EP LAB;  Service: Cardiology;  Laterality: Left;    ROBOT-ASSISTED LAPAROSCOPIC REPAIR OF INGUINAL HERNIA Right 3/11/2022    Procedure: ROBOTIC REPAIR, HERNIA, INGUINAL;  Surgeon: Melo Aguilera III, MD;  Location: [facility] OR;  Service: General;  Laterality: Right;  LB case    TREATMENT OF CARDIAC ARRHYTHMIA N/A 1/29/2021    Procedure: CARDIOVERSION;  Surgeon: Iron Serna MD;  Location: [facility] CATH/EP LAB;  Service: Cardiology;  Laterality: N/A;    VASECTOMY       FAMILY MEDICAL HISTORY:  Family History   Problem Relation Age of Onset    Cancer Mother     Cancer Father     Hypertension Father     Cancer Maternal Grandmother     COPD Paternal Grandmother      SOCIAL HISTORY:  Social History     Tobacco Use    Smoking status: Never Smoker    Smokeless tobacco: Never Used   Substance Use Topics    Alcohol  use: Not Currently    Drug use: No     ALLERGIES:  Review of patient's allergies indicates:  No Known Allergies  HOME MEDICATIONS:  Prior to Admission medications    Medication Sig Start Date End Date Taking? Authorizing Provider   amiodarone (PACERONE) 200 MG Tab Take 1 tablet (200 mg total) by mouth once daily. 10/4/21  Yes sEther Roe NP   amLODIPine (NORVASC) 5 MG tablet Take 1 tablet (5 mg total) by mouth every evening. 12/14/21 12/14/22 Yes Jessica Laureano PA-C   apixaban (ELIQUIS) 5 mg Tab Take 1 tablet (5 mg total) by mouth 2 (two) times daily. 10/4/21  Yes Esther Roe NP   ascorbic acid, vitamin C, (VITAMIN C) 1000 MG tablet Take 1,000 mg by mouth once daily.   Yes Historical Provider   atorvastatin (LIPITOR) 40 MG tablet Take 1 tablet (40 mg total) by mouth once daily. 2/28/22  Yes Jessica Laureano PA-C   doxazosin (CARDURA) 8 MG Tab TAKE 1 TABLET DAILY 6/14/22  Yes Clifford Sevilla MD   ezetimibe (ZETIA) 10 mg tablet TAKE 1 TABLET DAILY 6/14/22  Yes Clifford Sevilla MD   furosemide (LASIX) 40 MG tablet Take 1.5 tablets (60 mg total) by mouth once daily. 10/4/21  Yes Esther Roe NP   gabapentin (NEURONTIN) 300 MG capsule Take 1 capsule (300 mg total) by mouth 2 (two) times daily. 6/13/22  Yes Lane Clifton NP   garlic 1,000 mg Cap Take 1 capsule by mouth once daily.    Yes Historical Provider   levothyroxine (SYNTHROID) 100 MCG tablet Take 1 tablet (100 mcg total) by mouth before breakfast. 10/4/21 10/4/22 Yes Esther Roe NP   lisinopriL (PRINIVIL,ZESTRIL) 20 MG tablet Take 1 tablet (20 mg total) by mouth 2 (two) times a day. 9/13/21 9/13/22 Yes Clifford Sevilla MD   metoprolol succinate (TOPROL-XL) 50 MG 24 hr tablet Take 1 tablet (50 mg total) by mouth once daily. 10/29/21 10/29/22 Yes Esther Roe, JOSE D   capsaicin (ZOSTRIX) 0.025 % cream Apply topically 2 (two) times daily. 6/13/22   Lane Clifton NP   coQ10, ubiquinol, 100 mg  Cap Take 150 mg by mouth once daily.     Historical Provider   cyanocobalamin 1,000 mcg/mL injection Inject 1 mL (1,000 mcg total) into the muscle every 30 days. 3/2/22   Kris Zavala MD   ergocalciferol, vitamin D2, (VITAMIN D ORAL) Take 500 Units by mouth Daily.    Historical Provider   multivitamin Tab Take 1 tablet by mouth once daily. 8/20/20   Shira Salguero, AUDI   nitroGLYCERIN (NITROSTAT) 0.4 MG SL tablet Place 1 tablet (0.4 mg total) under the tongue every 5 (five) minutes as needed for Chest pain. DO NOT CRUSH OR CHEW; DISSOLVE UNDER TONGUE; MAXIMUM OF 3 DOSES IN 15 MINUTES 3/2/21   Kris Zavala MD   valACYclovir (VALTREX) 1000 MG tablet Take 1 tablet (1,000 mg total) by mouth 2 (two) times daily. 4/11/22 4/11/23  Kris Zavala MD     CURRENT SCHEDULED MEDICATIONS:   amiodarone  200 mg Oral Daily    [START ON 6/19/2022] amLODIPine  5 mg Oral QHS    apixaban  5 mg Oral BID    ascorbic acid (vitamin C)  1,000 mg Oral Daily    atorvastatin  40 mg Oral Daily    doxazosin  8 mg Oral Daily    ezetimibe  10 mg Oral Daily    furosemide  60 mg Oral Daily    gabapentin  300 mg Oral BID    levothyroxine  100 mcg Oral Before breakfast    metoprolol succinate  50 mg Oral Daily    senna-docusate 8.6-50 mg  1 tablet Oral BID     CURRENT INFUSIONS:    CURRENT PRN MEDICATIONS:  acetaminophen, acetaminophen, dextrose 10%, dextrose 10%, glucagon (human recombinant), glucose, glucose, melatonin, naloxone, ondansetron, sodium chloride 0.9%    REVIEW OF SYSTEMS:  Please refer to the HPI for all pertinent positive and negative findings. A comprehensive review of all other systems was negative.       PHYSICAL EXAM:  Patient Vitals for the past 24 hrs:   BP Temp Temp src Pulse Resp SpO2 Height Weight   06/18/22 0722 136/65 97.5 °F (36.4 °C) -- (!) 59 17 97 % -- --   06/18/22 0600 -- -- -- -- -- -- -- 84.2 kg (185 lb 10 oz)   06/18/22 0440 133/62 96.8 °F (36 °C) -- 62 16 98 % -- --   06/18/22 0115 126/60 96.5  "°F (35.8 °C) Oral 64 18 96 % 5' 8" (1.727 m) 84.1 kg (185 lb 6.5 oz)   06/18/22 0000 (!) 126/59 -- -- 64 18 95 % -- --   06/17/22 2316 -- 97.8 °F (36.6 °C) Oral -- -- -- -- --   06/17/22 2300 (!) 104/51 -- -- 60 18 96 % -- --   06/17/22 2230 (!) 113/56 -- -- 60 18 96 % -- --   06/17/22 2200 (!) 104/51 -- -- 60 18 99 % -- --   06/17/22 2130 (!) 111/55 -- -- 60 18 96 % -- --   06/17/22 2040 (!) 107/56 -- -- 64 -- -- -- --   06/17/22 2035 (!) 108/56 -- -- 64 -- -- -- --   06/17/22 2030 (!) 117/56 -- -- 60 -- -- -- --   06/17/22 2015 (!) 110/55 -- -- 66 18 97 % -- --   06/17/22 2008 -- -- -- -- -- 97 % -- --   06/17/22 2007 (!) 108/53 97.6 °F (36.4 °C) Oral 65 20 -- 5' 8" (1.727 m) 81.6 kg (180 lb)       GENERAL APPEARANCE: Alert, well-developed, well-nourished male in no acute distress.  HEENT: Normocephalic and atraumatic. PERRL. Oropharynx unremarkable.  PULM: Normal respiratory effort. No accessory muscle use.  CV: RRR.  ABDOMEN: Soft, nontender.  EXTREMITIES: No obvious signs of vascular compromise. Pulses present. No cyanosis, clubbing or edema.  SKIN: Clear; no rashes, lesions or skin breaks in exposed areas.    NEURO:  MENTAL STATUS: Patient awake and oriented to time, place, and person, recent/remote memory normal, attention span/concentration normal, speech fluent without paraphasic errors, good comprehension with appropriate thought content and fund of knowledge appropriate for patient's level of education.  Affect euthymic.    CRANIAL NERVES:  CN I: Not tested.  CN II: Fundoscopic exam deferred.  CN III, IV, VI: Pupils equal, round and reactive to light.  Extraocular movements full and intact.  CN V: Facial sensation normal.  CN VII: Facial asymmetry absent.  CN VIII: Hearing grossly normal and equal bilaterally.  No skew deviation or pathologic nystagmus.  CN IX, X: Palate elevates symmetrically. Speech/articulation is clear without dysarthria.  CN XI: Shoulder shrug and chin rotation equal with good " strength.  CN XII: Tongue protrusion midline.    MOTOR:  Bulk normal. Tone normal and symmetric throughout.  Abnormal movements absent.  Tremor: none present.  Strength 5/5 throughout except/unless specified below:.    REFLEXES:  DTRs 2+ throughout.  Plantar response downgoing bilaterally.  SENSATION:grossly intact throughout.  COORDINATION: normal finger-to-nose.  STATION: not tested.  GAIT: not tested.      Labs:  Recent Labs   Lab 06/17/22 2109 06/18/22  0502    144   K 3.9 3.7    106   CO2 28 28   BUN 56* 54*   CREATININE 2.9* 2.5*   GLU 99 108   CALCIUM 8.9 8.6*   PHOS  --  4.0   MG  --  2.4     Recent Labs   Lab 06/17/22 2109 06/18/22  0503   WBC 7.72 8.65   HGB 10.7* 10.3*   HCT 31.4* 31.5*    191     Recent Labs   Lab 06/18/22  0502   ALBUMIN 3.0*   PROT 5.6*   BILITOT 0.4   ALKPHOS 73   ALT 31   AST 29     Lab Results   Component Value Date    INR 1.8 04/23/2022     Lab Results   Component Value Date    TRIG 47 03/01/2022    HDL 45 03/01/2022    CHOLHDL 36.0 03/01/2022     Lab Results   Component Value Date    HGBA1C 5.6 08/18/2020     No results found for: PROTEINCSF, GLUCCSF, WBCCSF    Imaging:  I have reviewed and interpreted the pertinent imaging and lab results.      CT Head Without Contrast  Narrative: EXAMINATION:  CT HEAD WITHOUT CONTRAST    CLINICAL HISTORY:  Dizziness, persistent/recurrent, cardiac or vascular cause suspected;    TECHNIQUE:  Low dose axial CT images obtained throughout the head without intravenous contrast. Sagittal and coronal reconstructions were performed.    COMPARISON:  CT head from 05/08/2022.    FINDINGS:  There is age-related brain parenchymal volume loss and prominence of the CSF spaces.  There is no hydrocephalus.    There is hypoattenuation in the supratentorial white matter compatible with chronic microvascular ischemic changes, similar to prior.    There is a hypodensity in the right caudate head/anterior limb internal capsule compatible with a  remote lacunar infarction.  Additional small hypodensities in the basal ganglia, similar to prior, likely remote infarctions versus dilated perivascular spaces.    No extra-axial blood or fluid collections.    No parenchymal mass, hemorrhage, edema or acute major vascular distribution infarct.    No calvarial fracture.  The scalp is unremarkable.  Bilateral paranasal sinuses and mastoid air cells are clear.  Impression: 1. No acute intracranial abnormality.  2. 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  3. Chronic microvascular ischemic changes.    Electronically signed by: Kian Lezama  Date:    06/17/2022  Time:    21:09  X-Ray Chest AP Portable  Narrative: EXAMINATION:  XR CHEST AP PORTABLE    CLINICAL HISTORY:  Syncope and collapse    TECHNIQUE:  Single frontal view of the chest was performed.    COMPARISON:  05/08/2022.    FINDINGS:  There is a cardiac pacer, unchanged in position from prior.    The cardiac silhouette is enlarged.  No focal opacities are seen.  The pleural spaces are clear.    The cardiac silhouette is borderline, unchanged.  There are atherosclerotic calcifications of the aortic arch.  There are multiple surgical clips overlying the cardiac silhouette and mediastinum.    The visualized osseous structures are intact.  There are median sternotomy wires.  Impression: No acute cardiopulmonary abnormality.    Electronically signed by: Kian Lezama  Date:    06/17/2022  Time:    21:07         ASSESSMENT & PLAN:    Dizziness    Workup:   CTH: no acute abnormality.  Remote infarcts in bilateral basal ganglia  CUS: pending   MRI brain :  Cannot be done due to pacemaker      Plan:  · Etiology of dizziness/lightheadedness is likely orthostatic hypotension as patient mostly has symptoms when he stands up and walks.  He denies any room spinning sensation.  Less likely to be intracranial pathology was a cause of his symptoms.  · Continue with Eliquis and Lipitor for secondary stroke  prevention  · Carotid ultrasound ordered to rule out carotid stenosis a cause of lightheadedness.  · Orthostatic vitals positive.  Recommend compression stockings and if continues to have symptoms, can consider a midodrine  · Physical and occupational therapy evaluate and treat.  · Neuro checks per unit protocol.  · Will follow as needed.  Please call with any questions    Thank you kindly for including us in the care of this patient. Please do not hesitate to contact us with any questions.        44 minutes of care time has been spent evaluating with the patient. Time includes chart review not limited to diagnostic imaging, labs, and vitals, patient assessment, discussion with family and nursing, current order evaluations, and new order entries.       Fazal Moeller MD  Neurology/vascular Neurology  Date of Service: 06/18/2022  8:44 AM        Please note: This note was transcribed using voice recognition software. Because of this technology there are often uinintended grammatical, spelling, and other transcription errors. Please disregard these errors.

## 2022-06-18 NOTE — PLAN OF CARE
[facility]  Initial Discharge Assessment       Primary Care Provider: Kris Zavala MD    Admission Diagnosis: Dizziness [R42]  Syncope [R55]  JAYSON (acute kidney injury) [N17.9]    Admission Date: 6/17/2022  Expected Discharge Date:     Discharge Barriers Identified: None    Payor: MEDICARE / Plan: MEDICARE PART A & B / Product Type: Government /     Extended Emergency Contact Information  Primary Emergency Contact: Daniella Mendoza   [place]  Mobile Phone: [phone]  Relation: Daughter   needed? No    Discharge Plan A: Home Health  Discharge Plan B: Home with family      [id number] - [place], [place] - [address]  [address]  [place] [address]  Phone: [phone] Fax: [phone]    [facility] - [place], [place] - [address]  [address] [address]  Phone: [phone] Fax: [phone]      Completed DC assessment with pt at bedside. Pt confirmed information on facesheet as correct. Reports living at listed address with his daughter and her . Verified PCP and pharm. Denies POA. NOK 1 child. Denies HH/hd. DME- cane (uses [employer]). Reports being independent with activities and normally can drive himself to apts. Reports that his daughter will provide transportation home upon DC. Verified insurance on file. Denies recent inpt stay in last 30 days. DC plan is home    Initial Assessment (most recent)     Adult Discharge Assessment - 06/18/22 2008        Discharge Assessment    Assessment Type Discharge Planning Assessment     Confirmed/corrected address, phone number and insurance Yes     Confirmed Demographics Correct on Facesheet     Source of Information patient     Does patient/caregiver understand observation status Yes     Communicated YUNIEL with patient/caregiver Yes     Reason For Admission dizzy spells     Lives With child(urban), adult     Facility Arrived From: home     Do you expect  to return to your current living situation? Yes     Do you have help at home or someone to help you manage your care at home? Yes     Who are your caregiver(s) and their phone number(s)? daughter     Prior to hospitilization cognitive status: Alert/Oriented     Current cognitive status: Alert/Oriented     Walking or Climbing Stairs Difficulty ambulation difficulty, requires equipment     Dressing/Bathing Difficulty none     Equipment Currently Used at Home cane, straight;grab bar     Readmission within 30 days? No     Patient currently being followed by outpatient case management? No     Do you currently have service(s) that help you manage your care at home? No     Do you take prescription medications? Yes     Do you have prescription coverage? Yes     Do you have any problems affording any of your prescribed medications? No     Is the patient taking medications as prescribed? yes     Who is going to help you get home at discharge? daughter     How do you get to doctors appointments? car, drives self;family or friend will provide     Are you on dialysis? No     Do you take coumadin? No     Discharge Plan A Home Health     Discharge Plan B Home with family     DME Needed Upon Discharge  none     Discharge Plan discussed with: Patient     Discharge Barriers Identified None

## 2022-06-18 NOTE — SUBJECTIVE & OBJECTIVE
Interval History:  Overnight events noted.  Denies any new subjective complaints.  Reports feeling better and stronger.  Currently afebrile.  Awaiting MRI brain to be performed today.  Renal functions improving.  Denies any chest pain or shortness of breath.    Review of Systems   Constitutional:  Positive for fatigue. Negative for chills, diaphoresis and fever.   HENT:  Negative for congestion, sore throat and trouble swallowing.    Eyes:  Negative for visual disturbance.   Respiratory:  Positive for shortness of breath (GOODWIN). Negative for cough and wheezing.    Cardiovascular:  Positive for leg swelling. Negative for chest pain and palpitations.   Gastrointestinal:  Negative for abdominal pain, diarrhea, nausea and vomiting.   Genitourinary:  Negative for difficulty urinating, dysuria and hematuria.   Musculoskeletal:  Positive for back pain.   Neurological:  Positive for dizziness and light-headedness. Negative for syncope and headaches.   Psychiatric/Behavioral:  Negative for confusion.    Objective:     Vital Signs (Most Recent):  Temp: 97.5 °F (36.4 °C) (06/18/22 0722)  Pulse: (!) 59 (06/18/22 0722)  Resp: 17 (06/18/22 0722)  BP: 136/65 (06/18/22 0722)  SpO2: 97 % (06/18/22 0722)   Vital Signs (24h Range):  Temp:  [96.5 °F (35.8 °C)-97.8 °F (36.6 °C)] 97.5 °F (36.4 °C)  Pulse:  [59-66] 59  Resp:  [16-20] 17  SpO2:  [95 %-99 %] 97 %  BP: (104-136)/(51-65) 136/65     Weight: 84.2 kg (185 lb 10 oz)  Body mass index is 28.22 kg/m².    Intake/Output Summary (Last 24 hours) at 6/18/2022 0947  Last data filed at 6/18/2022 0600  Gross per 24 hour   Intake 303.31 ml   Output 200 ml   Net 103.31 ml      Physical Exam  Vitals and nursing note reviewed.   Constitutional:       General: He is not in acute distress.     Appearance: Normal appearance. He is obese. He is not ill-appearing.   HENT:      Head: Normocephalic and atraumatic.      Nose: Nose normal.      Mouth/Throat:      Mouth: Mucous membranes are moist.       Pharynx: Oropharynx is clear.   Eyes:      Extraocular Movements: Extraocular movements intact.      Pupils: Pupils are equal, round, and reactive to light.   Cardiovascular:      Rate and Rhythm: Normal rate and regular rhythm.      Pulses: Normal pulses.      Heart sounds: Normal heart sounds.   Pulmonary:      Effort: Pulmonary effort is normal. No respiratory distress.      Breath sounds: Normal breath sounds. No wheezing, rhonchi or rales.   Abdominal:      General: Abdomen is flat. Bowel sounds are normal. There is no distension.      Palpations: Abdomen is soft.      Tenderness: There is no abdominal tenderness. There is no guarding or rebound.   Musculoskeletal:         General: Normal range of motion.      Cervical back: Normal range of motion and neck supple.      Right lower leg: Edema present.      Left lower leg: Edema present.   Skin:     General: Skin is warm.      Capillary Refill: Capillary refill takes 2 to 3 seconds.   Neurological:      General: No focal deficit present.      Mental Status: He is alert and oriented to person, place, and time. Mental status is at baseline.   Psychiatric:         Mood and Affect: Mood normal.         Behavior: Behavior normal.       Significant Labs: All pertinent labs within the past 24 hours have been reviewed.  CBC:   Recent Labs   Lab 06/17/22 2109 06/18/22  0503   WBC 7.72 8.65   HGB 10.7* 10.3*   HCT 31.4* 31.5*    191     CMP:   Recent Labs   Lab 06/17/22 2109 06/18/22  0502    144   K 3.9 3.7    106   CO2 28 28   GLU 99 108   BUN 56* 54*   CREATININE 2.9* 2.5*   CALCIUM 8.9 8.6*   PROT  --  5.6*   ALBUMIN  --  3.0*   BILITOT  --  0.4   ALKPHOS  --  73   AST  --  29   ALT  --  31   ANIONGAP 11 10   EGFRNONAA 20* 24*     Lactic Acid: No results for input(s): LACTATE in the last 48 hours.  Lipid Panel: No results for input(s): CHOL, HDL, LDLCALC, TRIG, CHOLHDL in the last 48 hours.  Troponin:   Recent Labs   Lab 06/17/22 2109   TROPONINI  0.017     TSH:   Recent Labs   Lab 03/01/22  1210   TSH 5.300     Urine Studies: No results for input(s): COLORU, APPEARANCEUA, PHUR, SPECGRAV, PROTEINUA, GLUCUA, KETONESU, BILIRUBINUA, OCCULTUA, NITRITE, UROBILINOGEN, LEUKOCYTESUR, RBCUA, WBCUA, BACTERIA, SQUAMEPITHEL, HYALINECASTS in the last 48 hours.    Invalid input(s): WRIGHTSUR    Significant Imaging:   CXR: No acute cardiopulmonary abnormality.    CT Head:   1. No acute intracranial abnormality.  2. 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  3. Chronic microvascular ischemic changes.    MRI Brain: Pending.

## 2022-06-18 NOTE — ASSESSMENT & PLAN NOTE
Patient with acute kidney injury likely d/t IVVD/Dehydration and Pre-renal azotemia  caused by dehydration. JAYSON is currently stable. Labs reviewed- Renal function/electrolytes with Estimated Creatinine Clearance: 21.8 mL/min (A) (based on SCr of 2.9 mg/dL (H)). according to latest data. Monitor urine output and serial BMP and adjust therapy as needed. Avoid nephrotoxins and renally dose meds for GFR listed above. Nephrology consulted: appreciate recommendations.

## 2022-06-18 NOTE — HPI
"Tong Ford is a 77 y.o. y.o. male with a PMHx of HTN, HLD, CAD s/p CABG, a-fib, CKD, pacemaker, and a-fib on Eliquis who presented to the ED with intermittent dizziness onset x 1.5 months, worsening the past 2 days. He reports increased number of fall over the past month and has been seen in the ED for falls. His most recent fall was yesterday where the patient fell out of his car while getting out of it and his granddaughter had to catch him, per relative. Patient has had difficulty walking 2/2 dizziness and normally ambulates well with a cane. He reports fatigue, back pain and GOODWIN. He denies fever, chills, CP, palpitations, cough, abdominal pain, n/v/d, and LOC. He reports increasing his dose of lasix over the past month and states he has been taking " an extra half of a pill most days." ED workup was significant for mild anemia and elevated Cr/BUN. The patient was placed in observation under the care of Hospital Medicine.       "

## 2022-06-18 NOTE — ASSESSMENT & PLAN NOTE
Acute on chronic:  - Neurology consulted: appreciate recommendations  - reports dizziness x 1.5 months worsening past 3 days with frequent falls  - fall precautions  - no focal deficits appreciated on exam

## 2022-06-18 NOTE — ASSESSMENT & PLAN NOTE
Patient has chronic hypothyroidism. TFTs reviewed-   Lab Results   Component Value Date    TSH 5.300 03/01/2022   . Will continue chronic levothyroxine and adjust for and clinical changes.

## 2022-06-18 NOTE — ASSESSMENT & PLAN NOTE
Acute on chronic:  - Neurology consulted: appreciate recommendations  - reports dizziness x 1.5 months worsening past 3 days with frequent falls  - fall precautions  - no focal deficits appreciated on exam  - Follow MRI brain results.

## 2022-06-18 NOTE — PROGRESS NOTES
"[facility] Medicine  Progress Note    Patient Name: Tong Ford  MRN: [id number]  Patient Class: OP- Observation   Admission Date: 6/17/2022  Length of Stay: 0 days  Attending Physician: Natividad Dial MD  Primary Care Provider: Kris Zavala MD        Subjective:     Principal Problem:Acute on chronic renal failure        HPI:  Tong Ford is a 77 y.o. y.o. male with a PMHx of HTN, HLD, CAD s/p CABG, a-fib, CKD, pacemaker, and a-fib on Eliquis who presented to the ED with intermittent dizziness onset x 1.5 months, worsening the past 2 days. He reports increased number of fall over the past month and has been seen in the ED for falls. His most recent fall was yesterday where the patient fell out of his car while getting out of it and his granddaughter had to catch him, per relative. Patient has had difficulty walking 2/2 dizziness and normally ambulates well with a cane. He reports fatigue, back pain and GOODWIN. He denies fever, chills, CP, palpitations, cough, abdominal pain, n/v/d, and LOC. He reports increasing his dose of lasix over the past month and states he has been taking " an extra half of a pill most days." ED workup was significant for mild anemia and elevated Cr/BUN. The patient was placed in observation under the care of Hospital Medicine.           Overview/Hospital Course:  No notes on file    Interval History:  Overnight events noted.  Denies any new subjective complaints.  Reports feeling better and stronger.  Currently afebrile.  Awaiting MRI brain to be performed today.  Renal functions improving.  Denies any chest pain or shortness of breath.    Review of Systems   Constitutional:  Positive for fatigue. Negative for chills, diaphoresis and fever.   HENT:  Negative for congestion, sore throat and trouble swallowing.    Eyes:  Negative for visual disturbance.   Respiratory:  Positive for shortness of breath (GOODWIN). Negative for cough and wheezing.    Cardiovascular:  " Positive for leg swelling. Negative for chest pain and palpitations.   Gastrointestinal:  Negative for abdominal pain, diarrhea, nausea and vomiting.   Genitourinary:  Negative for difficulty urinating, dysuria and hematuria.   Musculoskeletal:  Positive for back pain.   Neurological:  Positive for dizziness and light-headedness. Negative for syncope and headaches.   Psychiatric/Behavioral:  Negative for confusion.    Objective:     Vital Signs (Most Recent):  Temp: 97.5 °F (36.4 °C) (06/18/22 0722)  Pulse: (!) 59 (06/18/22 0722)  Resp: 17 (06/18/22 0722)  BP: 136/65 (06/18/22 0722)  SpO2: 97 % (06/18/22 0722)   Vital Signs (24h Range):  Temp:  [96.5 °F (35.8 °C)-97.8 °F (36.6 °C)] 97.5 °F (36.4 °C)  Pulse:  [59-66] 59  Resp:  [16-20] 17  SpO2:  [95 %-99 %] 97 %  BP: (104-136)/(51-65) 136/65     Weight: 84.2 kg (185 lb 10 oz)  Body mass index is 28.22 kg/m².    Intake/Output Summary (Last 24 hours) at 6/18/2022 0947  Last data filed at 6/18/2022 0600  Gross per 24 hour   Intake 303.31 ml   Output 200 ml   Net 103.31 ml      Physical Exam  Vitals and nursing note reviewed.   Constitutional:       General: He is not in acute distress.     Appearance: Normal appearance. He is obese. He is not ill-appearing.   HENT:      Head: Normocephalic and atraumatic.      Nose: Nose normal.      Mouth/Throat:      Mouth: Mucous membranes are moist.      Pharynx: Oropharynx is clear.   Eyes:      Extraocular Movements: Extraocular movements intact.      Pupils: Pupils are equal, round, and reactive to light.   Cardiovascular:      Rate and Rhythm: Normal rate and regular rhythm.      Pulses: Normal pulses.      Heart sounds: Normal heart sounds.   Pulmonary:      Effort: Pulmonary effort is normal. No respiratory distress.      Breath sounds: Normal breath sounds. No wheezing, rhonchi or rales.   Abdominal:      General: Abdomen is flat. Bowel sounds are normal. There is no distension.      Palpations: Abdomen is soft.       Tenderness: There is no abdominal tenderness. There is no guarding or rebound.   Musculoskeletal:         General: Normal range of motion.      Cervical back: Normal range of motion and neck supple.      Right lower leg: Edema present.      Left lower leg: Edema present.   Skin:     General: Skin is warm.      Capillary Refill: Capillary refill takes 2 to 3 seconds.   Neurological:      General: No focal deficit present.      Mental Status: He is alert and oriented to person, place, and time. Mental status is at baseline.   Psychiatric:         Mood and Affect: Mood normal.         Behavior: Behavior normal.       Significant Labs: All pertinent labs within the past 24 hours have been reviewed.  CBC:   Recent Labs   Lab 06/17/22 2109 06/18/22  0503   WBC 7.72 8.65   HGB 10.7* 10.3*   HCT 31.4* 31.5*    191     CMP:   Recent Labs   Lab 06/17/22 2109 06/18/22  0502    144   K 3.9 3.7    106   CO2 28 28   GLU 99 108   BUN 56* 54*   CREATININE 2.9* 2.5*   CALCIUM 8.9 8.6*   PROT  --  5.6*   ALBUMIN  --  3.0*   BILITOT  --  0.4   ALKPHOS  --  73   AST  --  29   ALT  --  31   ANIONGAP 11 10   EGFRNONAA 20* 24*     Lactic Acid: No results for input(s): LACTATE in the last 48 hours.  Lipid Panel: No results for input(s): CHOL, HDL, LDLCALC, TRIG, CHOLHDL in the last 48 hours.  Troponin:   Recent Labs   Lab 06/17/22  2109   TROPONINI 0.017     TSH:   Recent Labs   Lab 03/01/22  1210   TSH 5.300     Urine Studies: No results for input(s): COLORU, APPEARANCEUA, PHUR, SPECGRAV, PROTEINUA, GLUCUA, KETONESU, BILIRUBINUA, OCCULTUA, NITRITE, UROBILINOGEN, LEUKOCYTESUR, RBCUA, WBCUA, BACTERIA, SQUAMEPITHEL, HYALINECASTS in the last 48 hours.    Invalid input(s): WRIGHTSUR    Significant Imaging:   CXR: No acute cardiopulmonary abnormality.    CT Head:   1. No acute intracranial abnormality.  2. 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  3. Chronic microvascular ischemic changes.    MRI Brain:  Pending.        Assessment/Plan:      * Acute on chronic renal failure  Patient with acute kidney injury likely d/t IVVD/Dehydration and Pre-renal azotemia  caused by dehydration. JAYSON is currently stable. Labs reviewed- Renal function/electrolytes with Estimated Creatinine Clearance: 25.3 mL/min (A) (based on SCr of 2.5 mg/dL (H)). according to latest data. Monitor urine output and serial BMP and adjust therapy as needed. Avoid nephrotoxins and renally dose meds for GFR listed above. Nephrology consulted: appreciate recommendations. Hold Lasix therapy for now.         Dizziness  Acute on chronic:  - Neurology consulted: appreciate recommendations  - reports dizziness x 1.5 months worsening past 3 days with frequent falls  - fall precautions  - no focal deficits appreciated on exam  - Follow MRI brain results.         (HFpEF) heart failure with preserved ejection fraction  Patient is identified as having Diastolic (HFpEF) heart failure that is Chronic. CHF is currently controlled. Latest ECHO performed and demonstrates- Results for orders placed during the hospital encounter of 10/19/20    Echo Color Flow Doppler? Yes; Bubble Contrast? No    Interpretation Summary  · There is mild left ventricular concentric hypertrophy.  · The left ventricle is normal in size with normal systolic function. The estimated ejection fraction is 65%.  · Atrial fibrillation observed with restrictive filling pattern present.  · With normal left atrial pressure.  · Normal right ventricular systolic function.  · Moderate left atrial enlargement.  · Mild right atrial enlargement.  · Moderate aortic valve stenosis.  · Aortic valve area is 1.63 cm2; peak velocity is 1.76 m/s; mean gradient is 7 mmHg.  · Moderate mitral regurgitation.  · No pulmonary hypertension  · Mild tricuspid regurgitation.  · Mild pulmonic regurgitation.  · Normal central venous pressure (3 mmHg).  · The estimated PA systolic pressure is 23 mmHg.    Left ventricle  hypertrophy, moderate calcific aortic valve stenosis plainimetered d valve area is 1.6 centimeters squared  Plus two mitral regurgitation  No pulmonary hypertension  . Continue Beta Blocker and monitor clinical status closely. Monitor on telemetry. Patient is off CHF pathway.  Monitor strict Is&Os and daily weights.  Place on fluid restriction of 2 L. Continue to stress to patient importance of self efficacy and  on diet for CHF. Last BNP reviewed- and noted below No results for input(s): BNP, BNPTRIAGEBLO in the last 168 hours..            S/P placement of cardiac pacemaker  Noted.       Atrial fibrillation  Patient with atrial fibrillation which is controlled currently with Beta Blocker and Amiodarone. Patient is currently in sinus rhythm.[id number] Score: 3. Anticoagulation indicated. Anticoagulation done with Eliquis.        Hx TIA (transient ischemic attack)  Noted.       Hypercholesteremia   Patient is chronically on statin.will continue for now. Monitor clinically. Last LDL was   Lab Results   Component Value Date    LDLCALC 70.6 03/01/2022            Thyroid disease  Patient has chronic hypothyroidism. TFTs reviewed-   Lab Results   Component Value Date    TSH 5.300 03/01/2022   . Will continue chronic levothyroxine and adjust for and clinical changes.        Essential hypertension  Chronic, controlled.  Latest blood pressure and vitals reviewed-   Temp:  [96.5 °F (35.8 °C)-97.8 °F (36.6 °C)]   Pulse:  [59-66]   Resp:  [16-20]   BP: (104-136)/(51-65)   SpO2:  [95 %-99 %] .   Home meds for hypertension were reviewed and noted below.   Hypertension Medications             amLODIPine (NORVASC) 5 MG tablet Take 1 tablet (5 mg total) by mouth every evening.    doxazosin (CARDURA) 8 MG Tab TAKE 1 TABLET DAILY    furosemide (LASIX) 40 MG tablet Take 1.5 tablets (60 mg total) by mouth once daily.    lisinopriL (PRINIVIL,ZESTRIL) 20 MG tablet Take 1 tablet (20 mg total) by mouth 2 (two) times a day.     metoprolol succinate (TOPROL-XL) 50 MG 24 hr tablet Take 1 tablet (50 mg total) by mouth once daily.    nitroGLYCERIN (NITROSTAT) 0.4 MG SL tablet Place 1 tablet (0.4 mg total) under the tongue every 5 (five) minutes as needed for Chest pain. DO NOT CRUSH OR CHEW; DISSOLVE UNDER TONGUE; MAXIMUM OF 3 DOSES IN 15 MINUTES          While in the hospital, will manage blood pressure as follows; Continue home antihypertensive regimen    Will utilize p.r.n. blood pressure medication only if patient's blood pressure greater than  180/110 and he develops symptoms such as worsening chest pain or shortness of breath.        Obstructive sleep apnea  Chronic:  - does not use CPAP        VTE Risk Mitigation (From admission, onward)         Ordered     apixaban tablet 5 mg  2 times daily         06/18/22 0128     Reason for No Pharmacological VTE Prophylaxis  Once        Question:  Reasons:  Answer:  Already adequately anticoagulated on oral Anticoagulants    06/17/22 2254     IP VTE HIGH RISK PATIENT  Once         06/17/22 2254     Place sequential compression device  Until discontinued         06/17/22 2254                Discharge Planning   YUNIEL:  6/19/22    Code Status: Full Code   Is the patient medically ready for discharge?:     Reason for patient still in hospital (select all that apply): Patient trending condition and Consult recommendations                     Natividad Dial MD  Department of Hospital Medicine   [facility]

## 2022-06-18 NOTE — ED NOTES
Pt aaox4. Pt ambulatory to room 1 from waiting room. Pt c/o intermittent dizziness x 2 weeks. Pt also states that he has had multiple falls in the past 2 months. Pt states that he fell in [employer] on 5/8 and hit his head.pt states that he was driving on today was was stopped by the police because he was swirving. Pt states that he did notice that he was not driving straight. Pt denies dizziness at this time. Pt states that he is not having cp, but having some mild sob. Ccm/bp/o2 monitoring initiated.

## 2022-06-19 PROBLEM — N18.30 ACUTE RENAL FAILURE SUPERIMPOSED ON STAGE 3 CHRONIC KIDNEY DISEASE: Status: ACTIVE | Noted: 2020-08-17

## 2022-06-19 PROBLEM — I95.1 ORTHOSTATIC HYPOTENSION: Status: ACTIVE | Noted: 2022-01-01

## 2022-06-19 PROBLEM — I48.0 PAROXYSMAL ATRIAL FIBRILLATION: Status: ACTIVE | Noted: 2021-01-26

## 2022-06-19 NOTE — PLAN OF CARE
Problem: Physical Therapy  Goal: Physical Therapy Goal  Description: Goals to be met by: 2022     Patient will increase functional independence with mobility by performin). Supine to sit with Modified [place]  2). Sit to supine with Modified [place]  3). Sit to stand transfer with Modified [place]  4). Gait  x > 200 feet with Modified [place]     Outcome: Ongoing, Progressing

## 2022-06-19 NOTE — PT/OT/SLP EVAL
Physical Therapy Evaluation    Patient Name:  Tong Ford   MRN:  [id number]    Recommendations:     Discharge Recommendations:  home, outpatient PT   Discharge Equipment Recommendations: walker, rolling   Barriers to discharge: None    Assessment:     Tong Ford is a 77 y.o. male admitted with a medical diagnosis of Acute on chronic renal failure.  He presents with the following impairments/functional limitations:  weakness, impaired endurance, impaired balance, gait instability, impaired functional mobilty, decreased lower extremity function  .    Rehab Prognosis: Good; patient would benefit from acute skilled PT services to address these deficits and reach maximum level of function.    Recent Surgery: * No surgery found *      Plan:     During this hospitalization, patient to be seen 6 x/week to address the identified rehab impairments via gait training, therapeutic activities, therapeutic exercises and progress toward the following goals:    · Plan of Care Expires:  06/30/22    Subjective     Patient/Family Comments/goals: agrees to work with PT, feels walker is beneficial    Living Environment:  House with dgtr, 0 steps to enter  Prior to admission, patients level of function was independent without AD until 3-4 falls in last month, now uses cane.  Equipment used at home: cane, straight.  DME owned (not currently used): none.  Upon discharge, patient will have assistance from family.    Objective:     Communicated with nurse (Julian) prior to session.  Patient found supine with telemetry  upon PT entry to room.    General Precautions: Standard, fall   Orthopedic Precautions:N/A   Braces: N/A  Respiratory Status: Room air    Functional Mobility training:  · Bed Mobility:     · Rolling Right: stand by assistance  · Supine to Sit: stand by assistance  · Sit to Supine: stand by assistance  · Transfers:     · Sit to Stand:  stand by assistance and contact guard assistance with rolling walker  · Gait: 150' with RW  with CGA    Therapeutic Activities and Exercises:   mobility training as above with cues for technique  SUPINE: ankle DF/PF, SLR, x 12 reps each    AM-PAC 6 CLICK MOBILITY  Total Score:18     Patient left supine with feet elevated (at patient request) all lines intact and call button in reach.    GOALS:   Multidisciplinary Problems     Physical Therapy Goals        Problem: Physical Therapy    Goal Priority Disciplines Outcome Goal Variances Interventions   Physical Therapy Goal     PT, PT/OT Ongoing, Progressing     Description: Goals to be met by: 2022     Patient will increase functional independence with mobility by performin). Supine to sit with Modified [place]  2). Sit to supine with Modified [place]  3). Sit to stand transfer with Modified [place]  4). Gait  x > 200 feet with Modified [place]                      History:     Past Medical History:   Diagnosis Date    Arthritis     Atrial fibrillation     Carotid artery occlusion     bilateral    Cataract     CKD (chronic kidney disease), stage II     Coronary artery disease     3 Vessel CABG AND 3 STENTS    Encounter for blood transfusion     Hypercholesterolemia     Hypertension     Normocytic anemia     Shingles     X 3 WEEKS AGO    Sleep apnea     CESAR - NOT USING C-PAP    Thyroid disease     TIA (transient ischemic attack)        Past Surgical History:   Procedure Laterality Date    A-V CARDIAC PACEMAKER INSERTION N/A 2021    Procedure: INSERTION, CARDIAC PACEMAKER, DUAL CHAMBER;  Surgeon: Clifford Sevilla MD;  Location: [facility] CATH/EP LAB;  Service: Cardiology;  Laterality: N/A;  [employer]    CORONARY ANGIOPLASTY WITH STENT PLACEMENT      CORONARY ARTERY BYPASS GRAFT      CORONARY ARTERY BYPASS GRAFT (CABG)      3 vessel    EYE SURGERY      bILATERAL CATARACS    INSERTION OF PACEMAKER      REVISION OF IMPLANTABLE CARDIOVERTER-DEFIBRILLATOR (ICD) ELECTRODE LEAD PLACEMENT Left 2021     Procedure: REVISION, INSERTION, ELECTRODE LEAD,pacemaker;  Surgeon: Clifford Sevilla MD;  Location: [facility] CATH/EP LAB;  Service: Cardiology;  Laterality: Left;    ROBOT-ASSISTED LAPAROSCOPIC REPAIR OF INGUINAL HERNIA Right 3/11/2022    Procedure: ROBOTIC REPAIR, HERNIA, INGUINAL;  Surgeon: Melo Aguilera III, MD;  Location: [facility];  Service: General;  Laterality: Right;  LB case    TREATMENT OF CARDIAC ARRHYTHMIA N/A 1/29/2021    Procedure: CARDIOVERSION;  Surgeon: Iron Serna MD;  Location: [facility] CATH/EP LAB;  Service: Cardiology;  Laterality: N/A;    VASECTOMY         Time Tracking:     PT Received On: 06/19/22  PT Start Time: 1055     PT Stop Time: 1117  PT Total Time (min): 22 min     Billable Minutes: Evaluation 12 and Gait Training 10      06/19/2022

## 2022-06-19 NOTE — PLAN OF CARE
Plan of care reviewed with patient. Patient verbalized complete understanding. Pt awake, alert, and oriented. Pt not complaining of pain. Pt on tele.   All fall precautions maintained, bed in lowest position, locked, call light within reach. Side rails up times 2. Slip resistant socks maintained

## 2022-06-19 NOTE — PLAN OF CARE
POC  reviewed with patient verbalizes understanding. AAOX4 VSS afebrile Denies pain this shift Telemetry monitored   fall precautions maintained, bed lowest position, locked, call light within reach. Side rails up times 2.safety maintained

## 2022-06-19 NOTE — PLAN OF CARE
Plan of care reviewed with patient, verbalized understanding. Nephro consulted. Strict I & O's recorded. Cardiac monitor in place. Neuro consulted. MRI ordered unable to complete due to patient having pacemaker. Dr. Moeller notified.  Denies pain. Bed in lowest position and call light within reach. Frequent weight shift encouraged.

## 2022-06-19 NOTE — CONSULTS
"Progress Note  Nephrology    Consult Requested By: Taiwo Rincon MD    Reason for Consult: JAYSON    SUBJECTIVE:     History of Present Illness:  76 y/o male patient presented to ER yesterday w/complaint of intermittent dizziness onset x 1.5 months, worsening the past 2 days. He reports increased number of fall over the past month and has been seen in the ED for falls. He reports fatigue, back pain and GOODWIN. He reports increasing his dose of lasix over the past month and states he has been taking " an extra half of a pill most days." ED workup was significant for mild anemia and elevated Cr/BUN.  Nephrology is consulted for co-management.    6/18  5 mos ago, Scr was wnl--has been elevated over past 3 mos, 1.5-1.8.  Unsure of baseline Scr based on this data, but pt does have baseline CKD 3.  Scr 2.9 on this presentation to ER, today down to 2.5.  Pt does say he sometimes feels he does not empty adequately w/voiding.  Denies use of ibuprofen, aleve.  States he increased his dose of lasix 2-3 weeks ago d/t pedal edema.  Also says his MD has told him that he is "watching my kidneys" as some labs have not been normal for a while.  6/19  Renal function improved.  Renal us results noted.  PVR 15cc.  UOP 1.6L.  Uric acid 9, VSS.  States he feels good.  Some workup still pending.    Assessment/plan:    JAYSON  CKD 3  Anemia of chronic dz  HTN    --hold ACEi's, ARBs, and diuretics.  Agree w/gentle hydration.  Ordered UA w/micro, PVR, uric acid level, and renal US.  --Avoid nephrotoxic agents.  No NSAIDs, COXibs, or aminoglycoside antibiotics.  Dose all medications for level of renal function.  --iron studies in am, trend H/H  --VSS w/current meds.  Avoid hypotension, keep MAP >55    Past Medical History:   Diagnosis Date    Arthritis     Atrial fibrillation     Carotid artery occlusion     bilateral    Cataract     CKD (chronic kidney disease), stage II     Coronary artery disease     3 Vessel CABG AND 3 STENTS    " Encounter for blood transfusion     Hypercholesterolemia     Hypertension     Normocytic anemia     Shingles     X 3 WEEKS AGO    Sleep apnea     CESAR - NOT USING C-PAP    Thyroid disease     TIA (transient ischemic attack)      Past Surgical History:   Procedure Laterality Date    A-V CARDIAC PACEMAKER INSERTION N/A 4/1/2021    Procedure: INSERTION, CARDIAC PACEMAKER, DUAL CHAMBER;  Surgeon: Clifford Sevilla MD;  Location: [facility] CATH/EP LAB;  Service: Cardiology;  Laterality: N/A;  [employer]    CORONARY ANGIOPLASTY WITH STENT PLACEMENT      CORONARY ARTERY BYPASS GRAFT      CORONARY ARTERY BYPASS GRAFT (CABG)      3 vessel    EYE SURGERY      bILATERAL CATARACS    INSERTION OF PACEMAKER      REVISION OF IMPLANTABLE CARDIOVERTER-DEFIBRILLATOR (ICD) ELECTRODE LEAD PLACEMENT Left 7/1/2021    Procedure: REVISION, INSERTION, ELECTRODE LEAD,pacemaker;  Surgeon: Clifford Sevilla MD;  Location: [facility] CATH/EP LAB;  Service: Cardiology;  Laterality: Left;    ROBOT-ASSISTED LAPAROSCOPIC REPAIR OF INGUINAL HERNIA Right 3/11/2022    Procedure: ROBOTIC REPAIR, HERNIA, INGUINAL;  Surgeon: Melo Aguilera III, MD;  Location: [facility] OR;  Service: General;  Laterality: Right;  LB case    TREATMENT OF CARDIAC ARRHYTHMIA N/A 1/29/2021    Procedure: CARDIOVERSION;  Surgeon: Iorn Serna MD;  Location: [facility] CATH/EP LAB;  Service: Cardiology;  Laterality: N/A;    VASECTOMY       Family History   Problem Relation Age of Onset    Cancer Mother     Cancer Father     Hypertension Father     Cancer Maternal Grandmother     COPD Paternal Grandmother      Social History     Tobacco Use    Smoking status: Never Smoker    Smokeless tobacco: Never Used   Substance Use Topics    Alcohol use: Not Currently    Drug use: No       Review of patient's allergies indicates:  No Known Allergies     Review of Systems:  General ROS: negative for - fever or night sweats  Psychological ROS: negative for - behavioral disorder  or depression  ENT ROS: negative for - headaches or visual changes  Hematological and Lymphatic ROS: negative for - bleeding problems or bruising  Endocrine ROS: negative for - temperature intolerance or unexpected weight changes  Respiratory ROS: no cough, shortness of breath, or wheezing  Cardiovascular ROS: no chest pain, SOB  Gastrointestinal ROS: no abdominal pain, no n/v/c/d  Genito-Urinary ROS: no dysuria or trouble voiding  Musculoskeletal ROS: edema  Neurological ROS: no TIA or stroke symptoms  Dermatological ROS: negative for rash and skin lesion changes    OBJECTIVE:     Vital Signs Range (Last 24H):  Temp:  [96.2 °F (35.7 °C)-98.5 °F (36.9 °C)]   Pulse:  [60-79]   Resp:  [16-18]   BP: (119-154)/(60-71)   SpO2:  [94 %-98 %]     Physical Exam:  General- NAD noted  HEENT- WNL  Neck- supple  CV- Regular rate and rhythm  Resp- Lungs CTA Bilaterally, No increased WOB  GI- Non tender/non-distended, BS normoactive x4 quads  Extrem- +edema.  Derm- skin w/d  Neuro-  No flap.     Body mass index is 28.22 kg/m².    Laboratory:  CBC:   Recent Labs   Lab 06/19/22  0349   WBC 9.14   RBC 3.16*   HGB 10.6*   HCT 31.7*      *   MCH 33.5*   MCHC 33.4     CMP:   Recent Labs   Lab 06/19/22  0348 06/19/22  0349   GLU 82  --    CALCIUM 8.6*  --    ALBUMIN 2.9* 3.0*   PROT  --  5.3*     --    K 4.2  --    CO2 27  --      --    BUN 41*  --    CREATININE 1.7*  --    ALKPHOS  --  76   ALT  --  28   AST  --  23   BILITOT  --  0.5       Diagnostic Results:  Labs: Reviewed      ASSESSMENT/PLAN:     Active Hospital Problems    Diagnosis  POA    *Acute on chronic renal failure [N17.9, N18.9]  Yes    Dizziness [R42]  Yes    (HFpEF) heart failure with preserved ejection fraction [I50.30]  Yes     Chronic    S/P placement of cardiac pacemaker [Z95.0]  Yes     Status post pacemaker placement a Medtronic unit for sick sinus syndrome      Atrial fibrillation [I48.91]  Yes    Hx TIA (transient ischemic attack)  [G45.9]  Yes    Hypercholesteremia [E78.00]  Yes    Essential hypertension [I10]  Yes    Thyroid disease [E07.9]  Yes    Obstructive sleep apnea [G47.33]  Yes      Resolved Hospital Problems   No resolved problems to display.         Thank you for allowing us to participate in the care of your patient. We will follow the patient and provide recommendations as needed.    Yue Alcantara NP    Time spent seeing patient( greater than 1/2 spent in direct contact) :

## 2022-06-19 NOTE — PROGRESS NOTES
" [facility]  Progress Note  Date: 2022 4:17 PM            Patient Name: Tong Ford   MRN: [id number]   : 1945    AGE: 77 y.o.    LOS: 0 days Hospital Day: 3  Admit date: 2022  8:09 PM         HPI per EMR: Tong Ford is a 77 y.o. male with a history of HTN, HLD, CAD s/p CABG, a-fib, CKD, pacemaker, and a-fib on Eliquis who presented to the ED with intermittent dizziness onset x 1.5 months, worsening the past 2 days. He reports increased number of fall over the past month and has been seen in the ED for falls. His most recent fall was yesterday where the patient fell out of his car while getting out of it and his granddaughter had to catch him, per relative. Patient has had difficulty walking 2/2 dizziness and normally ambulates well with a cane. He reports fatigue, back pain and GOODWIN. He denies fever, chills, CP, palpitations, cough, abdominal pain, n/v/d, and LOC. He reports increasing his dose of lasix over the past month and states he has been taking " an extra half of a pill most days." ED workup was significant for mild anemia and elevated Cr/BUN     Neurology consult:  Patient was seen examined by me this morning.  He states that he has been having dizziness/lightheadedness for the past 1-2 months which is mostly noticed when he stands up and walks from sitting or lying position.  Denies any room spinning sensation, denies any nausea or vomiting.  He denies any hearing loss or ringing in the ear.     He states that at baseline he ambulates with a cane however he has been having some difficulty walking secondary to lightheadedness.  He denies any vision loss, speech deficit, unilateral weakness or sensory changes.     2022: No acute events overnight. Patient was seen and examined by me this morning. Neuro exam is normal.        Vitals:  Patient Vitals for the past 24 hrs:   BP Temp Temp src Pulse Resp SpO2   22 1500 (!) 150/71 97.9 °F (36.6 °C) -- 60 17 98 % "   06/19/22 1150 (!) 151/72 97.8 °F (36.6 °C) -- 61 17 96 %   06/19/22 0801 (!) 154/71 98.5 °F (36.9 °C) -- 77 17 97 %   06/19/22 0426 (!) 154/68 97 °F (36.1 °C) Oral 60 18 (!) 94 %   06/18/22 2346 135/65 96.2 °F (35.7 °C) Oral 60 16 98 %   06/18/22 1910 137/65 98.1 °F (36.7 °C) Oral 60 18 98 %     PHYSICAL EXAM:     GENERAL APPEARANCE: Well-developed, well-nourished male in no acute distress.  HEENT: Normocephalic and atraumatic. PERRL. Oropharynx unremarkable.  PULM: Comfortable on room air.  CV: RRR.  ABDOMEN: Soft, nontender.  EXTREMITIES: No signs of vascular compromise. Pulses present. No cyanosis, clubbing or edema.  SKIN: Clear; no rashes, lesions or skin breaks in exposed areas.      NEURO:   MENTAL STATUS: Patient awake and oriented to time, place, and person. Affect normal.  CRANIAL NERVES II-XII: Pupils equal, round and reactive to light. Extraocular movements full and intact. No facial asymmetry.  MOTOR: Normal bulk. Tone normal and symmetrical throughout.  No abnormal movements. No tremor.   Strength 5/5 throughout unless specified below.  REFLEXES: DTRs 2+; normal and symmetric throughout.   SENSATION: Sensation grossly intact to fine touch.  COORDINATION: Finger-to-nose normal for age and symmetric.  STATION: Romberg deferred.  GAIT: Deferred.    CURRENT SCHEDULED MEDICATIONS:   amiodarone  200 mg Oral Daily    amLODIPine  5 mg Oral QHS    apixaban  5 mg Oral BID    ascorbic acid (vitamin C)  1,000 mg Oral Daily    atorvastatin  40 mg Oral Daily    doxazosin  8 mg Oral Daily    ezetimibe  10 mg Oral Daily    gabapentin  300 mg Oral BID    levothyroxine  100 mcg Oral Before breakfast    metoprolol succinate  50 mg Oral Daily    senna-docusate 8.6-50 mg  1 tablet Oral BID     CURRENT INFUSIONS:    DATA:  Recent Labs   Lab 06/17/22  2109 06/18/22  0502 06/19/22  0348 06/19/22  0349    144 145  --    K 3.9 3.7 4.2  --     106 108  --    CO2 28 28 27  --    BUN 56* 54* 41*  --     CREATININE 2.9* 2.5* 1.7*  --    GLU 99 108 82  --    CALCIUM 8.9 8.6* 8.6*  --    PHOS  --  4.0 3.8  --    MG  --  2.4  --  2.3   AST  --  29  --  23   ALT  --  31  --  28     Recent Labs   Lab 06/17/22  2109 06/18/22  0503 06/19/22  0349   WBC 7.72 8.65 9.14   HGB 10.7* 10.3* 10.6*   HCT 31.4* 31.5* 31.7*    191 200     No results found for: PROTEINCSF, GLUCCSF, WBCCSF, RBCCSF  Hemoglobin A1C   Date Value Ref Range Status   08/18/2020 5.6 4.0 - 5.6 % Final     Comment:     ADA Screening Guidelines:  5.7-6.4%  Consistent with prediabetes  >or=6.5%  Consistent with diabetes  High levels of fetal hemoglobin interfere with the HbA1C  assay. Heterozygous hemoglobin variants (HbS, HgC, etc)do  not significantly interfere with this assay.   However, presence of multiple variants may affect accuracy.              I have personally reviewed and interpreted the pertinent imaging and lab results.  Imaging Results          CT Head Without Contrast (Final result)  Result time 06/17/22 21:09:34    Final result by Kian Lezama DO (06/17/22 21:09:34)                 Impression:      1. No acute intracranial abnormality.  2. 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  3. Chronic microvascular ischemic changes.      Electronically signed by: Kian Lezama  Date:    06/17/2022  Time:    21:09             Narrative:    EXAMINATION:  CT HEAD WITHOUT CONTRAST    CLINICAL HISTORY:  Dizziness, persistent/recurrent, cardiac or vascular cause suspected;    TECHNIQUE:  Low dose axial CT images obtained throughout the head without intravenous contrast. Sagittal and coronal reconstructions were performed.    COMPARISON:  CT head from 05/08/2022.    FINDINGS:  There is age-related brain parenchymal volume loss and prominence of the CSF spaces.  There is no hydrocephalus.    There is hypoattenuation in the supratentorial white matter compatible with chronic microvascular ischemic changes, similar to prior.    There is a  hypodensity in the right caudate head/anterior limb internal capsule compatible with a remote lacunar infarction.  Additional small hypodensities in the basal ganglia, similar to prior, likely remote infarctions versus dilated perivascular spaces.    No extra-axial blood or fluid collections.    No parenchymal mass, hemorrhage, edema or acute major vascular distribution infarct.    No calvarial fracture.  The scalp is unremarkable.  Bilateral paranasal sinuses and mastoid air cells are clear.                               X-Ray Chest AP Portable (Final result)  Result time 06/17/22 21:07:15    Final result by Kian Lezama DO (06/17/22 21:07:15)                 Impression:      No acute cardiopulmonary abnormality.      Electronically signed by: Kian Lezama  Date:    06/17/2022  Time:    21:07             Narrative:    EXAMINATION:  XR CHEST AP PORTABLE    CLINICAL HISTORY:  Syncope and collapse    TECHNIQUE:  Single frontal view of the chest was performed.    COMPARISON:  05/08/2022.    FINDINGS:  There is a cardiac pacer, unchanged in position from prior.    The cardiac silhouette is enlarged.  No focal opacities are seen.  The pleural spaces are clear.    The cardiac silhouette is borderline, unchanged.  There are atherosclerotic calcifications of the aortic arch.  There are multiple surgical clips overlying the cardiac silhouette and mediastinum.    The visualized osseous structures are intact.  There are median sternotomy wires.                                        ASSESSMENT AND PLAN:    Dizziness     Workup:   CTH: no acute abnormality.  Remote infarcts in bilateral basal ganglia  CUS: no critical carotid stenosis  MRI brain :  Cannot be done due to pacemaker        Plan:  · Etiology of dizziness/lightheadedness is likely orthostatic hypotension as patient mostly has symptoms when he stands up and walks.  He denies any room spinning sensation.  Less likely to be intracranial pathology was a cause of  his symptoms.  · Continue with Eliquis and Lipitor for secondary stroke prevention  · Carotid ultrasound ordered to rule out carotid stenosis a cause of lightheadedness and it was negative for critical stenosis.  · Orthostatic vitals positive.  If symptoms persist, Recommend compression stockings.  · JAYSON treatment per nephrology and primary  · Physical and occupational therapy evaluate and treat.  · Neuro checks per unit protocol.  No further neurological workup needed at this time. Will follow as needed. Please call with any questions         Fazal Moeller MD  Neurology/vascular Neurology  Date of Service: 06/19/2022  4:17 PM    Please note: This note was transcribed using voice recognition software. Because of this technology there are often uinintended grammatical, spelling, and other transcription errors. Please disregard these errors.

## 2022-06-20 NOTE — ASSESSMENT & PLAN NOTE
Monitor labs.  Consulting with nephrologist.  Avoid NSAID's.  Avoid hypotension.  Avoid contrast.    Lab Results   Component Value Date    CREATININE 1.7 (H) 06/19/2022

## 2022-06-20 NOTE — PLAN OF CARE
CM requested follow up apt with PCP from Carolyne Ruff ([facility] Director). Carolyne will contact pt to schedule.

## 2022-06-20 NOTE — PLAN OF CARE
Pt clear for DC from case management standpoint. Discharging to home with outpt therapy ordered.        Med rec needs to completed prior to DC        06/20/22 1251   Final Note   Assessment Type Final Discharge Note   Anticipated Discharge Disposition Home

## 2022-06-20 NOTE — PROGRESS NOTES
"[facility] Hospital Medicine  Progress Note    Patient Name: Tong Ford  MRN: [id number]  Patient Class: OP- Observation   Admission Date: 6/17/2022  Length of Stay: 0 days  Attending Physician: Taiwo Rincon MD  Primary Care Provider: Kris Zavala MD        Subjective:     Principal Problem:Acute renal failure superimposed on stage 3 chronic kidney disease        HPI:  Tong Ford is a 77 y.o. y.o. male with a PMHx of HTN, HLD, CAD s/p CABG, a-fib, CKD, pacemaker, and a-fib on Eliquis who presented to the ED with intermittent dizziness onset x 1.5 months, worsening the past 2 days. He reports increased number of fall over the past month and has been seen in the ED for falls. His most recent fall was yesterday where the patient fell out of his car while getting out of it and his granddaughter had to catch him, per relative. Patient has had difficulty walking 2/2 dizziness and normally ambulates well with a cane. He reports fatigue, back pain and GOODWIN. He denies fever, chills, CP, palpitations, cough, abdominal pain, n/v/d, and LOC. He reports increasing his dose of lasix over the past month and states he has been taking " an extra half of a pill most days." ED workup was significant for mild anemia and elevated Cr/BUN. The patient was placed in observation under the care of Hospital Medicine.           Overview/Hospital Course:  Patient was admitted to medicine telemetry service.  Initiated on IV fluid hydration.  Lasix therapy has been discontinued.  Nephrology service and Neurology teams are being consulted.  For patient's symptom evaluation, patient is undergoing MRI brain.  Renal functions are already improving with IV fluid hydration.      Interval History:  no new complaints.  Kidney function slowly improving.    Review of Systems   Constitutional:  Negative for chills and fever.   Respiratory:  Negative for cough, shortness of breath and wheezing.    Cardiovascular:  Negative for " chest pain and leg swelling.   Gastrointestinal:  Negative for abdominal pain and nausea.   Objective:     Vital Signs (Most Recent):  Temp: 97.8 °F (36.6 °C) (06/19/22 2009)  Pulse: 60 (06/19/22 2009)  Resp: 16 (06/19/22 2009)  BP: 135/64 (06/19/22 2009)  SpO2: 95 % (06/19/22 2009) Vital Signs (24h Range):  Temp:  [96.2 °F (35.7 °C)-98.5 °F (36.9 °C)] 97.8 °F (36.6 °C)  Pulse:  [60-77] 60  Resp:  [16-18] 16  SpO2:  [94 %-98 %] 95 %  BP: (135-154)/(64-72) 135/64     Weight: 84.2 kg (185 lb 10 oz)  Body mass index is 28.22 kg/m².    Intake/Output Summary (Last 24 hours) at 6/19/2022 2028  Last data filed at 6/19/2022 1800  Gross per 24 hour   Intake 1160 ml   Output 1050 ml   Net 110 ml      Physical Exam  Vitals reviewed.   Constitutional:       General: He is not in acute distress.     Appearance: He is not diaphoretic.   HENT:      Mouth/Throat:      Mouth: Mucous membranes are moist.   Eyes:      General: No scleral icterus.        Right eye: No discharge.         Left eye: No discharge.   Neck:      Vascular: No JVD.   Cardiovascular:      Rate and Rhythm: Normal rate and regular rhythm.   Pulmonary:      Effort: Pulmonary effort is normal.      Breath sounds: Normal breath sounds.   Abdominal:      General: Bowel sounds are normal. There is no distension.      Palpations: Abdomen is soft.      Tenderness: There is no abdominal tenderness.   Skin:     General: Skin is warm.      Findings: No rash.   Neurological:      Mental Status: He is alert.       Significant Labs: All pertinent labs within the past 24 hours have been reviewed.    Significant Imaging: I have reviewed all pertinent imaging results/findings within the past 24 hours.      Assessment/Plan:      * Acute renal failure superimposed on stage 3 chronic kidney disease  Monitor labs.  Consulting with nephrologist.  Avoid NSAID's.  Avoid hypotension.  Avoid contrast.    Lab Results   Component Value Date    CREATININE 1.7 (H) 06/19/2022          Orthostatic hypotension  Neurologist feels that the patient's dizziness is likely from orthostatic hypotension.  Compression stockings recommended.  Or might need adjustment of BP treatment.    (HFpEF) heart failure with preserved ejection fraction  Patient is identified as having Diastolic (HFpEF) heart failure that is Chronic. CHF is currently controlled. Latest ECHO performed and demonstrates- Results for orders placed during the hospital encounter of 10/19/20    Echo Color Flow Doppler? Yes; Bubble Contrast? No    Interpretation Summary  · There is mild left ventricular concentric hypertrophy.  · The left ventricle is normal in size with normal systolic function. The estimated ejection fraction is 65%.  · Atrial fibrillation observed with restrictive filling pattern present.  · With normal left atrial pressure.  · Normal right ventricular systolic function.  · Moderate left atrial enlargement.  · Mild right atrial enlargement.  · Moderate aortic valve stenosis.  · Aortic valve area is 1.63 cm2; peak velocity is 1.76 m/s; mean gradient is 7 mmHg.  · Moderate mitral regurgitation.  · No pulmonary hypertension  · Mild tricuspid regurgitation.  · Mild pulmonic regurgitation.  · Normal central venous pressure (3 mmHg).  · The estimated PA systolic pressure is 23 mmHg.    Left ventricle hypertrophy, moderate calcific aortic valve stenosis plainimetered d valve area is 1.6 centimeters squared  Plus two mitral regurgitation  No pulmonary hypertension  . Continue Beta Blocker and monitor clinical status closely.  Holding ACE inhibitor and furosemide.  Monitor on telemetry. Patient is off CHF pathway.  Monitor strict Is&Os and daily weights.  Place on fluid restriction of 2 L. Continue to stress to patient importance of self efficacy and  on diet for CHF. Last BNP reviewed- and noted below   Recent Labs   Lab 06/19/22  0349   *   .            S/P placement of cardiac pacemaker  Noted.   No  issues.    Paroxysmal atrial fibrillation  Patient with atrial fibrillation which is controlled currently with Beta Blocker and Amiodarone. Patient is currently in sinus rhythm.[id number] Score: 3. Anticoagulation indicated. Anticoagulation done with Eliquis.        Hx TIA (transient ischemic attack)  Noted.       Hypercholesteremia   Patient is chronically on statin.will continue for now. Monitor clinically. Last LDL was   Lab Results   Component Value Date    LDLCALC 70.6 03/01/2022            Thyroid disease  Patient has chronic hypothyroidism. TFTs reviewed-   Lab Results   Component Value Date    TSH 5.300 03/01/2022   . Will continue chronic levothyroxine and adjust for and clinical changes.        Essential hypertension  Chronic, controlled.  Latest blood pressure and vitals reviewed-   Temp:  [96.2 °F (35.7 °C)-98.5 °F (36.9 °C)]   Pulse:  [60-77]   Resp:  [16-18]   BP: (135-154)/(64-72)   SpO2:  [94 %-98 %] .   Home meds for hypertension were reviewed and noted below.   Hypertension Medications             amLODIPine (NORVASC) 5 MG tablet Take 1 tablet (5 mg total) by mouth every evening.    doxazosin (CARDURA) 8 MG Tab TAKE 1 TABLET DAILY    furosemide (LASIX) 40 MG tablet Take 1.5 tablets (60 mg total) by mouth once daily.    lisinopriL (PRINIVIL,ZESTRIL) 20 MG tablet Take 1 tablet (20 mg total) by mouth 2 (two) times a day.    metoprolol succinate (TOPROL-XL) 50 MG 24 hr tablet Take 1 tablet (50 mg total) by mouth once daily.          While in the hospital, will manage blood pressure as follows; Continue home antihypertensive regimen, with exception of ACE inhibitor and furosemide.    Will utilize p.r.n. blood pressure medication only if patient's blood pressure greater than  180/110 and he develops symptoms such as worsening chest pain or shortness of breath.        Obstructive sleep apnea  Chronic:  - does not use CPAP        VTE Risk Mitigation (From admission, onward)         Ordered     apixaban  tablet 5 mg  2 times daily         06/18/22 0128     Reason for No Pharmacological VTE Prophylaxis  Once        Question:  Reasons:  Answer:  Already adequately anticoagulated on oral Anticoagulants    06/17/22 2254     IP VTE HIGH RISK PATIENT  Once         06/17/22 2254     Place sequential compression device  Until discontinued         06/17/22 2254                Discharge Planning   YUNIEL:      Code Status: Full Code   Is the patient medically ready for discharge?:     Reason for patient still in hospital (select all that apply): Patient trending condition, Laboratory test and Treatment  Discharge Plan A: Home Health                  Taiwo Rincon MD  Department of Hospital Medicine   [facility]

## 2022-06-20 NOTE — ASSESSMENT & PLAN NOTE
Patient with atrial fibrillation which is controlled currently with Beta Blocker and Amiodarone. Patient is currently in sinus rhythm.[id number] Score: 3. Anticoagulation indicated. Anticoagulation done with Eliquis.

## 2022-06-20 NOTE — SUBJECTIVE & OBJECTIVE
Interval History:  no new complaints.  Kidney function slowly improving.    Review of Systems   Constitutional:  Negative for chills and fever.   Respiratory:  Negative for cough, shortness of breath and wheezing.    Cardiovascular:  Negative for chest pain and leg swelling.   Gastrointestinal:  Negative for abdominal pain and nausea.   Objective:     Vital Signs (Most Recent):  Temp: 97.8 °F (36.6 °C) (06/19/22 2009)  Pulse: 60 (06/19/22 2009)  Resp: 16 (06/19/22 2009)  BP: 135/64 (06/19/22 2009)  SpO2: 95 % (06/19/22 2009) Vital Signs (24h Range):  Temp:  [96.2 °F (35.7 °C)-98.5 °F (36.9 °C)] 97.8 °F (36.6 °C)  Pulse:  [60-77] 60  Resp:  [16-18] 16  SpO2:  [94 %-98 %] 95 %  BP: (135-154)/(64-72) 135/64     Weight: 84.2 kg (185 lb 10 oz)  Body mass index is 28.22 kg/m².    Intake/Output Summary (Last 24 hours) at 6/19/2022 2028  Last data filed at 6/19/2022 1800  Gross per 24 hour   Intake 1160 ml   Output 1050 ml   Net 110 ml      Physical Exam  Vitals reviewed.   Constitutional:       General: He is not in acute distress.     Appearance: He is not diaphoretic.   HENT:      Mouth/Throat:      Mouth: Mucous membranes are moist.   Eyes:      General: No scleral icterus.        Right eye: No discharge.         Left eye: No discharge.   Neck:      Vascular: No JVD.   Cardiovascular:      Rate and Rhythm: Normal rate and regular rhythm.   Pulmonary:      Effort: Pulmonary effort is normal.      Breath sounds: Normal breath sounds.   Abdominal:      General: Bowel sounds are normal. There is no distension.      Palpations: Abdomen is soft.      Tenderness: There is no abdominal tenderness.   Skin:     General: Skin is warm.      Findings: No rash.   Neurological:      Mental Status: He is alert.       Significant Labs: All pertinent labs within the past 24 hours have been reviewed.    Significant Imaging: I have reviewed all pertinent imaging results/findings within the past 24 hours.

## 2022-06-20 NOTE — PROGRESS NOTES
"Progress Note  Nephrology    Consult Requested By: Taiwo Rincon MD    Reason for Consult: JAYSON    SUBJECTIVE:     History of Present Illness:  78 y/o male patient presented to ER yesterday w/complaint of intermittent dizziness onset x 1.5 months, worsening the past 2 days. He reports increased number of fall over the past month and has been seen in the ED for falls. He reports fatigue, back pain and GOODWIN. He reports increasing his dose of lasix over the past month and states he has been taking " an extra half of a pill most days." ED workup was significant for mild anemia and elevated Cr/BUN.  Nephrology is consulted for co-management.    6/18  5 mos ago, Scr was wnl--has been elevated over past 3 mos, 1.5-1.8.  Unsure of baseline Scr based on this data, but pt does have baseline CKD 3.  Scr 2.9 on this presentation to ER, today down to 2.5.  Pt does say he sometimes feels he does not empty adequately w/voiding.  Denies use of ibuprofen, aleve.  States he increased his dose of lasix 2-3 weeks ago d/t pedal edema.  Also says his MD has told him that he is "watching my kidneys" as some labs have not been normal for a while.  6/19  Renal function improved.  Renal us results noted.  PVR 15cc.  UOP 1.6L.  Uric acid 9, VSS.  States he feels good.  Some workup still pending.  6/20 VSS, no new complains. OK to dc home and f/u as outpatient.    Assessment/plan:    JAYSON  CKD 3  Anemia of chronic dz  HTN    --hold ACEi's, ARBs, resume as outpatient. renal US is non-obstructive.  --Avoid nephrotoxic agents.  No NSAIDs, COXibs, or aminoglycoside antibiotics.  Dose all medications for level of renal function.  --iron studies ok, trend H/H  --VSS w/current meds.  Avoid hypotension, keep MAP >55    Past Medical History:   Diagnosis Date    Arthritis     Atrial fibrillation     Carotid artery occlusion     bilateral    Cataract     CKD (chronic kidney disease), stage II     Coronary artery disease     3 Vessel CABG AND 3 " STENTS    Encounter for blood transfusion     Hypercholesterolemia     Hypertension     Normocytic anemia     Shingles     X 3 WEEKS AGO    Sleep apnea     CESAR - NOT USING C-PAP    Thyroid disease     TIA (transient ischemic attack)      Past Surgical History:   Procedure Laterality Date    A-V CARDIAC PACEMAKER INSERTION N/A 4/1/2021    Procedure: INSERTION, CARDIAC PACEMAKER, DUAL CHAMBER;  Surgeon: Clifford Sevilla MD;  Location: [facility] CATH/EP LAB;  Service: Cardiology;  Laterality: N/A;  [employer]ON[employer]    CORONARY ANGIOPLASTY WITH STENT PLACEMENT      CORONARY ARTERY BYPASS GRAFT      CORONARY ARTERY BYPASS GRAFT (CABG)      3 vessel    EYE SURGERY      bILATERAL CATARACS    INSERTION OF PACEMAKER      REVISION OF IMPLANTABLE CARDIOVERTER-DEFIBRILLATOR (ICD) ELECTRODE LEAD PLACEMENT Left 7/1/2021    Procedure: REVISION, INSERTION, ELECTRODE LEAD,pacemaker;  Surgeon: Clifford Sevilla MD;  Location: [facility] CATH/EP LAB;  Service: Cardiology;  Laterality: Left;    ROBOT-ASSISTED LAPAROSCOPIC REPAIR OF INGUINAL HERNIA Right 3/11/2022    Procedure: ROBOTIC REPAIR, HERNIA, INGUINAL;  Surgeon: Melo Aguilera III, MD;  Location: [facility] OR;  Service: General;  Laterality: Right;  LB case    TREATMENT OF CARDIAC ARRHYTHMIA N/A 1/29/2021    Procedure: CARDIOVERSION;  Surgeon: Iron Serna MD;  Location: [facility] CATH/EP LAB;  Service: Cardiology;  Laterality: N/A;    VASECTOMY       Family History   Problem Relation Age of Onset    Cancer Mother     Cancer Father     Hypertension Father     Cancer Maternal Grandmother     COPD Paternal Grandmother      Social History     Tobacco Use    Smoking status: Never Smoker    Smokeless tobacco: Never Used   Substance Use Topics    Alcohol use: Not Currently    Drug use: No       Review of patient's allergies indicates:  No Known Allergies     Review of Systems:  General ROS: negative for - fever or night sweats  Psychological ROS: negative for -  behavioral disorder or depression  ENT ROS: negative for - headaches or visual changes  Hematological and Lymphatic ROS: negative for - bleeding problems or bruising  Endocrine ROS: negative for - temperature intolerance or unexpected weight changes  Respiratory ROS: no cough, shortness of breath, or wheezing  Cardiovascular ROS: no chest pain, SOB  Gastrointestinal ROS: no abdominal pain, no n/v/c/d  Genito-Urinary ROS: no dysuria or trouble voiding  Musculoskeletal ROS: edema  Neurological ROS: no TIA or stroke symptoms  Dermatological ROS: negative for rash and skin lesion changes    OBJECTIVE:     Vital Signs Range (Last 24H):  Temp:  [96.5 °F (35.8 °C)-98.6 °F (37 °C)]   Pulse:  [60-69]   Resp:  [16-18]   BP: (126-153)/(60-85)   SpO2:  [95 %-98 %]     Physical Exam:  General- NAD noted  HEENT- WNL  Neck- supple  CV- Regular rate and rhythm  Resp- Lungs CTA Bilaterally, No increased WOB  GI- Non tender/non-distended, BS normoactive x4 quads  Extrem- +edema.  Derm- skin w/d  Neuro-  No flap.     Body mass index is 28.22 kg/m².    Laboratory:  CBC:   Recent Labs   Lab 06/20/22  0429   WBC 9.13   RBC 3.16*   HGB 10.6*   HCT 31.2*      MCV 99*   MCH 33.5*   MCHC 34.0     CMP:   Recent Labs   Lab 06/20/22  0429   GLU 79   CALCIUM 8.4*   ALBUMIN 2.8*   PROT 5.5*      K 4.1   CO2 25      BUN 35*   CREATININE 1.5*   ALKPHOS 77   ALT 26   AST 22   BILITOT 0.6       Diagnostic Results:  Labs: Reviewed      ASSESSMENT/PLAN:     Active Hospital Problems    Diagnosis  POA    *Acute renal failure superimposed on stage 3 chronic kidney disease [N17.9, N18.30]  Yes    Orthostatic hypotension [I95.1]  Yes    (HFpEF) heart failure with preserved ejection fraction [I50.30]  Yes     Chronic    S/P placement of cardiac pacemaker [Z95.0]  Yes     Status post pacemaker placement a Medtronic unit for sick sinus syndrome      Paroxysmal atrial fibrillation [I48.0]  Yes    Hx TIA (transient ischemic attack)  [G45.9]  Yes    Hypercholesteremia [E78.00]  Yes    Essential hypertension [I10]  Yes    Thyroid disease [E07.9]  Yes    Obstructive sleep apnea [G47.33]  Yes      Resolved Hospital Problems   No resolved problems to display.         Thank you for allowing us to participate in the care of your patient. We will follow the patient and provide recommendations as needed.    Hansel Nunn MD    Time spent seeing patient( greater than 1/2 spent in direct contact) :

## 2022-06-20 NOTE — ASSESSMENT & PLAN NOTE
Neurologist feels that the patient's dizziness is likely from orthostatic hypotension.  Compression stockings recommended.  Or might need adjustment of BP treatment.

## 2022-06-20 NOTE — PT/OT/SLP EVAL
Occupational Therapy   Evaluation and Discharge Note    Name: Tong Ford  MRN: [id number]  Admitting Diagnosis:  Acute renal failure superimposed on stage 3 chronic kidney disease   Recent Surgery: * No surgery found *      Recommendations:     Discharge Recommendations: home, home health OT  Discharge Equipment Recommendations:  walker, rolling  Barriers to discharge:  None    Assessment:     Tong Ford is a 77 y.o. male with a medical diagnosis of Acute renal failure superimposed on stage 3 chronic kidney disease. At this time, patient is Supervision level with ADLs. Patient demonstrated some unsteadiness when ambulating with RW. Noted patient bumping into corners with RW requiring verbal cues to readjust RW to properly maneuver around corners. No further acute OT needs.      Plan:     During this hospitalization, patient does not require further acute OT services.  Please re-consult if situation changes.    · Plan of Care Reviewed with: patient, daughter    Subjective     Chief Complaint: none  Patient/Family Comments/goals: none    Occupational Profile:  Living Environment: Patient lives with daughter and son-in-law.   Previous level of function: Patient was independent with ADLs. Patient was ambulatory using cane.   Equipment Used at home:  cane, straight  Assistance upon Discharge: Patient will receive assistance from family.     Pain/Comfort:  · Pain Rating 1:  (did not rate)  · Location - Side 1: Right  · Location - Orientation 1: upper  · Location 1: leg  · Pain Addressed 1: Distraction, Reposition  · Pain Rating Post-Intervention 1:  (did not rated)    Patients cultural, spiritual, Quaker conflicts given the current situation:      Objective:     Communicated with: nurse Jordan prior to session.  Patient found HOB elevated with telemetry, peripheral IV upon OT entry to room.    General Precautions: Standard, fall   Orthopedic Precautions:N/A   Braces: N/A  Respiratory Status: Room air     Occupational  Performance:    Bed Mobility:    · Patient completed Scooting/Bridging with supervision  · Patient completed Supine to Sit with supervision  · Patient completed Sit to Supine with supervision    Functional Mobility/Transfers:  · Patient completed Sit <> Stand Transfer with stand by assistance  with  rolling walker   · Patient completed Toilet Transfer Stand Pivot technique with stand by assistance with  rolling walker    Activities of Daily Living:  · Grooming: supervision with oral and facial hygiene while standing at sink  · Bathing: supervision   · Upper Body Dressing: supervision   · Lower Body Dressing: supervision to don/doff socks while seated EOB  · Toileting: supervision     Cognitive/Visual Perceptual:  Cognitive/Psychosocial Skills:     -       Oriented to: x4   -       Follows Commands/attention:Follows multistep  commands  -       Communication: clear/fluent  -       Safety awareness/insight to disability: impaired   -       Mood/Affect/Coping skills/emotional control: Appropriate to situation and Cooperative  Visual/Perceptual:      -Intact     Physical Exam:  Postural examination/scapula alignment:    -       Rounded shoulders  -       Forward head  Upper Extremity Range of Motion:     -       Right Upper Extremity: WFL  -       Left Upper Extremity: WFL  Upper Extremity Strength:    -       Right Upper Extremity: WFL  -       Left Upper Extremity: WFL   Strength:    -       Right Upper Extremity: WFL  -       Left Upper Extremity: WFL  Fine Motor Coordination:    -       Intact  Gross motor coordination:   WFL    AMPAC 6 Click ADL:  AMPAC Total Score: 22    Treatment & Education:  OT ed pt on OT role & POC as well as discharge recommendations.  OT ed patient on safety with walker use for functional mobility with cues for hand placement & sequencing.     Education:    Patient left HOB elevated with all lines intact, call button in reach and daughter present    GOALS:   Multidisciplinary Problems      Occupational Therapy Goals        Problem: Occupational Therapy    Goal Priority Disciplines Outcome Interventions   Occupational Therapy Goal     OT, PT/OT Ongoing, Progressing    Description: Goals to be met by: 7/11/2022     Patient will increase functional independence with ADLs by performing:    LE Dressing with Modified [place].  Grooming while standing at sink with Modified [place].  Toileting from toilet with Modified [place] for hygiene and clothing management.   Supine to sit with Modified [place].  Toilet transfer to toilet with Modified [place].                     History:     Past Medical History:   Diagnosis Date    Arthritis     Atrial fibrillation     Carotid artery occlusion     bilateral    Cataract     CKD (chronic kidney disease), stage II     Coronary artery disease     3 Vessel CABG AND 3 STENTS    Encounter for blood transfusion     Hypercholesterolemia     Hypertension     Normocytic anemia     Shingles     X 3 WEEKS AGO    Sleep apnea     CESAR - NOT USING C-PAP    Thyroid disease     TIA (transient ischemic attack)        Past Surgical History:   Procedure Laterality Date    A-V CARDIAC PACEMAKER INSERTION N/A 4/1/2021    Procedure: INSERTION, CARDIAC PACEMAKER, DUAL CHAMBER;  Surgeon: Clifford Sevilla MD;  Location: [facility] CATH/EP LAB;  Service: Cardiology;  Laterality: N/A;  MEDTRONIC    CORONARY ANGIOPLASTY WITH STENT PLACEMENT      CORONARY ARTERY BYPASS GRAFT      CORONARY ARTERY BYPASS GRAFT (CABG)      3 vessel    EYE SURGERY      bILATERAL CATARACS    INSERTION OF PACEMAKER      REVISION OF IMPLANTABLE CARDIOVERTER-DEFIBRILLATOR (ICD) ELECTRODE LEAD PLACEMENT Left 7/1/2021    Procedure: REVISION, INSERTION, ELECTRODE LEAD,pacemaker;  Surgeon: Clifford Sevilla MD;  Location: [facility] CATH/EP LAB;  Service: Cardiology;  Laterality: Left;    ROBOT-ASSISTED LAPAROSCOPIC REPAIR OF INGUINAL HERNIA Right 3/11/2022    Procedure: ROBOTIC  REPAIR, HERNIA, INGUINAL;  Surgeon: Melo Aguilera III, MD;  Location: [facility] OR;  Service: General;  Laterality: Right;  LB case    TREATMENT OF CARDIAC ARRHYTHMIA N/A 1/29/2021    Procedure: CARDIOVERSION;  Surgeon: Iron Serna MD;  Location: [facility] CATH/EP LAB;  Service: Cardiology;  Laterality: N/A;    VASECTOMY         Time Tracking:     OT Date of Treatment: 06/20/22  OT Start Time: 1201  OT Stop Time: 1214  OT Total Time (min): 13 min    Billable Minutes:Evaluation 5  Self Care/Home Management 8    6/20/2022

## 2022-06-20 NOTE — PT/OT/SLP PROGRESS
Physical Therapy Treatment    Patient Name:  Tong Ford   MRN:  [id number]    Recommendations:     Discharge Recommendations:  home, outpatient PT   Discharge Equipment Recommendations: walker, rolling   Barriers to discharge: None    Assessment:     Tong Ford is a 77 y.o. male admitted with a medical diagnosis of Acute renal failure superimposed on stage 3 chronic kidney disease.  He presents with the following impairments/functional limitations:  impaired balance, gait instability, impaired functional mobilty, decreased lower extremity function  .    Rehab Prognosis: Good; patient would benefit from acute skilled PT services to address these deficits and reach maximum level of function.    Recent Surgery: * No surgery found *      Plan:     During this hospitalization, patient to be seen 6 x/week to address the identified rehab impairments via gait training, therapeutic activities, therapeutic exercises and progress toward the following goals:    · Plan of Care Expires:  06/30/22    Subjective     Patient/Family Comments/goals: feels safe ambulating with walker    Objective:     Communicated with nurse (Julian) prior to session.  Patient found supine with telemetry upon PT entry to room.     General Precautions: Standard, fall   Orthopedic Precautions:N/A   Braces: N/A  Respiratory Status: Room air     Functional Mobility training:  · Bed Mobility:     · Rolling Right: modified independence  · Supine to Sit: modified independence  · Transfers:     · Sit to Stand:  modified independence with rolling walker  · Gait: 350' with RW with SBA      AM-PAC 6 CLICK MOBILITY  Turning over in bed (including adjusting bedclothes, sheets and blankets)?: 4  Sitting down on and standing up from a chair with arms (e.g., wheelchair, bedside commode, etc.): 4  Moving from lying on back to sitting on the side of the bed?: 4  Moving to and from a bed to a chair (including a wheelchair)?: 4  Need to walk in hospital room?:  "4  Climbing 3-5 steps with a railing?: 4 (est.)  Basic Mobility Total Score: 24       Therapeutic Activities and Exercises:  mobility training as above with cues for technique  Patient declines further due to feels that he "over did it yesterday".     Patient left sitting edge of bed with call button in reach..    GOALS:   Multidisciplinary Problems     Physical Therapy Goals        Problem: Physical Therapy    Goal Priority Disciplines Outcome Goal Variances Interventions   Physical Therapy Goal     PT, PT/OT Ongoing, Progressing     Description: Goals to be met by: 2022     Patient will increase functional independence with mobility by performin). Supine to sit with Modified [place]  2). Sit to supine with Modified [place]  3). Sit to stand transfer with Modified [place]  4). Gait  x > 200 feet with Modified [place]                      Time Tracking:     PT Received On: 22  PT Start Time: 825     PT Stop Time: 837  PT Total Time (min): 12 min     Billable Minutes: Gait Training 12    Treatment Type: Treatment  PT/PTA: PT     PTA Visit Number: 0     2022  "

## 2022-06-20 NOTE — ASSESSMENT & PLAN NOTE
Patient is identified as having Diastolic (HFpEF) heart failure that is Chronic. CHF is currently controlled. Latest ECHO performed and demonstrates- Results for orders placed during the hospital encounter of 10/19/20    Echo Color Flow Doppler? Yes; Bubble Contrast? No    Interpretation Summary  · There is mild left ventricular concentric hypertrophy.  · The left ventricle is normal in size with normal systolic function. The estimated ejection fraction is 65%.  · Atrial fibrillation observed with restrictive filling pattern present.  · With normal left atrial pressure.  · Normal right ventricular systolic function.  · Moderate left atrial enlargement.  · Mild right atrial enlargement.  · Moderate aortic valve stenosis.  · Aortic valve area is 1.63 cm2; peak velocity is 1.76 m/s; mean gradient is 7 mmHg.  · Moderate mitral regurgitation.  · No pulmonary hypertension  · Mild tricuspid regurgitation.  · Mild pulmonic regurgitation.  · Normal central venous pressure (3 mmHg).  · The estimated PA systolic pressure is 23 mmHg.    Left ventricle hypertrophy, moderate calcific aortic valve stenosis plainimetered d valve area is 1.6 centimeters squared  Plus two mitral regurgitation  No pulmonary hypertension  . Continue Beta Blocker and monitor clinical status closely.  Holding ACE inhibitor and furosemide.  Monitor on telemetry. Patient is off CHF pathway.  Monitor strict Is&Os and daily weights.  Place on fluid restriction of 2 L. Continue to stress to patient importance of self efficacy and  on diet for CHF. Last BNP reviewed- and noted below   Recent Labs   Lab 06/19/22  0349   *   .

## 2022-06-20 NOTE — ASSESSMENT & PLAN NOTE
Chronic, controlled.  Latest blood pressure and vitals reviewed-   Temp:  [96.2 °F (35.7 °C)-98.5 °F (36.9 °C)]   Pulse:  [60-77]   Resp:  [16-18]   BP: (135-154)/(64-72)   SpO2:  [94 %-98 %] .   Home meds for hypertension were reviewed and noted below.   Hypertension Medications             amLODIPine (NORVASC) 5 MG tablet Take 1 tablet (5 mg total) by mouth every evening.    doxazosin (CARDURA) 8 MG Tab TAKE 1 TABLET DAILY    furosemide (LASIX) 40 MG tablet Take 1.5 tablets (60 mg total) by mouth once daily.    lisinopriL (PRINIVIL,ZESTRIL) 20 MG tablet Take 1 tablet (20 mg total) by mouth 2 (two) times a day.    metoprolol succinate (TOPROL-XL) 50 MG 24 hr tablet Take 1 tablet (50 mg total) by mouth once daily.          While in the hospital, will manage blood pressure as follows; Continue home antihypertensive regimen, with exception of ACE inhibitor and furosemide.    Will utilize p.r.n. blood pressure medication only if patient's blood pressure greater than  180/110 and he develops symptoms such as worsening chest pain or shortness of breath.

## 2022-06-20 NOTE — PLAN OF CARE
Plan of care reviewed with patient. Patient verbalized complete understanding. Pt awake, alert, and oriented. Pt not complaining of pain. Pt on tele. Pt able to walk in hospital room, pt not complaining of dizziness. All fall precautions maintained, bed in lowest position, locked, call light within reach. Side rails up times 2. Slip resistant socks maintained

## 2022-06-20 NOTE — DISCHARGE INSTRUCTIONS
Do not take diclofenac topical ointment.  It can adversely affect your kidney function.  Resume previous home medications eat proper meals Stay hydrated monitor heat do not over exert your self follow up with Physician appointments if you have any problems go to nearest emergency room ,

## 2022-06-20 NOTE — PLAN OF CARE
Russ Lezama from [facility] approved RW. CM delivered to pt's bedside. Returned completed delivery ticket to [facility]      06/20/22 1250   Post-Acute Status   Post-Acute Authorization HME   HME Status Set-up Complete/Auth obtained

## 2022-06-20 NOTE — PLAN OF CARE
Problem: Occupational Therapy  Goal: Occupational Therapy Goal  Description: Goals to be met by: 7/11/2022     Patient will increase functional independence with ADLs by performing:    LE Dressing with Modified [place].  Grooming while standing at sink with Modified [place].  Toileting from toilet with Modified [place] for hygiene and clothing management.   Supine to sit with Modified [place].  Toilet transfer to toilet with Modified [place].    Outcome: Ongoing, Progressing

## 2022-06-21 NOTE — PROGRESS NOTES
Outpatient Care Management  Initial Patient Assessment    Patient: Tong Ford  MRN: [id number]  Date of Service: 06/21/2022  Completed by: Mami Almanzar RN  Referral Date: 06/19/2022  Program: High Risk  Status: Ongoing  Effective Dates: 6/30/2022 - present  Responsible Staff: Mami Almanzar RN        Reason for Visit   Patient presents with    OPCM Chart Review    OPCM Enrollment Call    OPCM RN First Assessment Attempt     06/21/22-letter thru [facility]     OPCM RN Second Assessment Attempt     06/28/22-letter mailed     Initial Assessment     06/29/22    Nursing Assessment     06/29/22    Plan Of Care     06/29/22       Brief Summary:  Tong Ford was referred by hospital for JAYSON and dizziness.  Patient qualifies for program based on High Risk Score 76.3%.  Active problem list, medical, surgical and social history reviewed. Active comorbidities include HTN, HLD, CAD s/p CABG, a-fib, CKD, pacemaker, and a-fib on Eliquis  . Areas of need identified by patient include falls/safety.  Complex care plan created with patient/caregiver input. By next encounter, patient agrees to go to hospital follow up appt on 06/30/22.  06/21/22-Attempt assessment  with  patient for outpatient case management. No answer. Left message requesting call back. RN OPCM 1'st follow up attempt. Letter thru [facility].   06/28/22-Attempt assessment  with  patient for outpatient case management. No answer. Left message requesting call back. Letter in [facility] portal has not been read. Will mail letter. GILDA MOLINAM 2'nd assessment attempt.   06/29/22-Patient called back and left voice message.  06/30/22-Attempt assessment with  patient for outpatient case management. No answer. Left message requesting call back. Patient called back and assessment done. He was admitted to the hospital on 06/17/22. The police stopped him because he was swerving on the road. Police followed him home where he lives with his daughter and son in law.  He came to the ER on 06/17/22 with complaints of dizziness and falls. He had increased his lasix himself 1/2 pill most days for the past month. He had mild anemia and elevated creatine/bun in the hospital. He had IV hydration for his orthostatic hypertension. Discharged home from the hospital on 06/20/22. Suggested he wear compression stockings, avoid extra lasix, walker given to him at the hospital and cardiac diet. Declines OPCM SW at this time.     Med Rec done   CM ACTION PLAN:  Follow up in two weeks   Mailed pill box, amandasjennifer on call magnet, preventing falls in the older adult             Assessment Documentation     OPCM Initial Assessment    Involvement of Care  Do I have permission to speak with other family members about your care?: Yes (Comment: Daniella -daughter)  Assessment completed by: Patient  Identified Areas of Need  Advanced Care Planning: Yes  Housing: no  Medication Adherence: No  *Active medication list was reviewed and reconciled with patient and/or caregiver:   Nutrition: yes  Lab Adherence: no  Depression: No  Cognitive/Behavioral Health: no  Communication: no  Health Literacy: no  Fall risk?: Yes  Equipment/Supplies/Services: no          Problem List and History     Problems Addressed This Visit    Atrial Fibrillation: Not identified by patient as current problem  Heart Failure: Not identified by patient as current problem  Hypertension: Not identified by patient as current problem  Stroke: Not identified by patient as current problem  Anemia: Not identified by patient as current problem  Chronic Kidney Disease: Not identified by patient as current problem  Hyperlipidemia: Not identified by patient as current problem  Heart Disease: Not identified by patient as current problem         Reviewed medical and social history with patient and/or caregiver. A complex care plan was discussed and completed today, with input from patient and/or caregiver.    Patient Instructions     Instructions  were provided via the [employer] patient resources and are available for the patient to view on the patient portal, if active.      Follow up in about 14 days (around 7/13/2022) for RN Follow up call.    Todays OPCM Self-Management Care Plan was developed with the patients/caregivers input and was based on identified barriers from todays assessment.  Goals were written today with the patient/caregiver and the patient has agreed to work towards these goals to improve his/her overall well-being. Patient verbalized understanding of the care plan, goals, and all of today's instructions. Encouraged patient/caregiver to communicate with his/her physician and health care team about health conditions and the treatment plan.  Provided my contact information today and encouraged patient/caregiver to call me with any questions as needed.

## 2022-06-21 NOTE — LETTER
June 28, 2022    Tong Ford  [address] [place] [address]             [facility] [address] [place] [address] Dear Mr. Ford:    I work with [facility]'s Outpatient Case Management Department. We received a referral to call you to discuss your medical history.These services are free of charge and are offered to [facility] patients who have recently been discharged from any of our facilities or who have complex medical conditions that may require the skill of a nurse to assist with management.         .      I attempted to reach you by telephone, but I was unsuccessful. Please call our department so that we can go over some questions with you regarding your health.    The Outpatient Case Management Department can be reached at [phone] from 8:00AM to 4:30 PM on Monday thru Friday. [facility] also has a program where a nurse is available 24/7 to answer questions or provide medical advice, their number is [phone].    Thanks,  Mami Almanzar RN [facility]  Outpatient Care Management  [phone]

## 2022-06-21 NOTE — LETTER
June 29, 2022    Tong Ford  [address] [place] [address]             [facility] [address] [place] [address] Dear Yann Logan:    Welcome to [facility]s Complex Care Management Program.  It was a pleasure talking with you today.  My name is Mami Almanzar RN CCM. I look forward to working with you as your Care Manager.  My goal is to help you function at the healthiest and highest level possible.  You can contact me directly at [phone].     As an [facility] patient, some of the services we may be able to provide include:      Development of an individualized care plan with a Registered Nurse    Connection with a    Connection with available resources and services     Coordinate communication among your care team members    Provide coaching and education    Help you understand your doctors treatment plan   Help you obtain information about your insurance coverage.     All services provided by [facility]s Complex Care Managers and other care team members are coordinated with and communicated to your primary care team.    As part of your enrollment, you will be receiving education materials and more information about these services in your [facility] account, by phone or through the mail.  If you do not wish to participate or receive information, please contact our office at [phone].      Sincerely,        Mami Almanzar RN CCM   [facility]   Out-patient RN Complex Care Manager

## 2022-06-22 NOTE — DISCHARGE SUMMARY
"[facility]  Discharge Summary      Patient Name: Tong Ford  MRN: [id number]  Patient Class: OP- Observation  Admission Date: 6/17/2022  Hospital Length of Stay: 0 days  Discharge Date and Time: 6/20/2022  2:55 PM  Attending Physician: No att. providers found   Discharging Provider: Taiwo Rincon MD  Primary Care Provider: Kris Zavala MD      HPI:   Tong Ford is a 77 y.o. y.o. male with a PMHx of HTN, HLD, CAD s/p CABG, a-fib, CKD, pacemaker, and a-fib on Eliquis who presented to the ED with intermittent dizziness onset x 1.5 months, worsening the past 2 days. He reports increased number of fall over the past month and has been seen in the ED for falls. His most recent fall was yesterday where the patient fell out of his car while getting out of it and his granddaughter had to catch him, per relative. Patient has had difficulty walking 2/2 dizziness and normally ambulates well with a cane. He reports fatigue, back pain and GOOD[place]. He denies fever, chills, CP, palpitations, cough, abdominal pain, n/v/d, and LOC. He reports increasing his dose of lasix over the past month and states he has been taking " an extra half of a pill most days." ED workup was significant for mild anemia and elevated Cr/BUN. The patient was placed in observation under the care of Hospital Medicine.           * No surgery found *      Hospital Course:   Patient was admitted to medicine telemetry service.  Initiated on IV fluid hydration.  Lasix therapy was discontinued.  Nephrology service and Neurology teams were consulted.  The kidney function improved.  Regarding the patient's neuro symptoms, he underwent ultrasound testing of the carotids, which were negative for major stenosis.  Neurology suspected that the symptoms were due to orthostatic hypotension.  Compression stockings were suggested.  Patient was eventually discharged home in good condition.  He was instructed to continue his meds as " prescribed and to avoid taking extra Lasix.    Physical Exam  Vitals reviewed.   Constitutional:       General: He is not in acute distress.     Appearance: He is not diaphoretic.   HENT:      Mouth/Throat:      Mouth: Mucous membranes are moist.   Eyes:      General: No scleral icterus.        Right eye: No discharge.         Left eye: No discharge.   Neck:      Vascular: No JVD.   Cardiovascular:      Rate and Rhythm: Normal rate and regular rhythm.   Pulmonary:      Effort: Pulmonary effort is normal.      Breath sounds: Normal breath sounds.   Abdominal:      General: Bowel sounds are normal. There is no distension.      Palpations: Abdomen is soft.      Tenderness: There is no abdominal tenderness.        Goals of Care Treatment Preferences:  Code Status: Full Code      Consults:   Consults (From admission, onward)        Status Ordering Provider     Inpatient consult to Nephrology  Once        Provider:  (Not yet assigned)    MICHELLE Paul     Inpatient consult to Neurology  Once        Provider:  MD Eloy Ramos ANNA C.          No new Assessment & Plan notes have been filed under this hospital service since the last note was generated.  Service: Hospital Medicine    Final Active Diagnoses:    Diagnosis Date Noted POA    PRINCIPAL PROBLEM:  Acute renal failure superimposed on stage 3 chronic kidney disease [N17.9, N18.30] 08/17/2020 Yes    Orthostatic hypotension [I95.1] 06/17/2022 Yes    (HFpEF) heart failure with preserved ejection fraction [I50.30] 05/04/2021 Yes     Chronic    S/P placement of cardiac pacemaker [Z95.0] 04/02/2021 Yes    Paroxysmal atrial fibrillation [I48.0] 01/26/2021 Yes    Hx TIA (transient ischemic attack) [G45.9] 08/19/2020 Yes    Hypercholesteremia [E78.00] 08/17/2020 Yes    Essential hypertension [I10] 01/31/2019 Yes    Thyroid disease [E07.9] 01/31/2019 Yes    Obstructive sleep apnea [G47.33] 10/23/2018 Yes      Problems Resolved  "During this Admission:       Discharged Condition: good    Disposition: Home or Self Care    Follow Up:   Follow-up Information     Kris Zavala MD Follow up.    Specialty: Family Medicine  Contact information:  [address]  [address]  [place] [address]  [phone]             Hansel Nunn MD Follow up.    Specialty: Nephrology  Why: call to schedule after DC  Contact information:  [address] [facility] [place]  [place] [address]  [phone]                       Patient Instructions:      WALKER FOR HOME USE     Order Specific Question Answer Comments   Type of Walker: Adult (5'4"-6'6")    With wheels? Yes    Height: 5' 8" (1.727 m)    Weight: 84.2 kg (185 lb 10 oz)    Length of need (1-99 months): 99    Does patient have medical equipment at home? cane, straight    Please check all that apply: Patient's condition impairs ambulation.    Vendor: Other (use comments)    Expected Date of Delivery: 6/20/2022    Expected Time of Delivery: 12:00 AM      Ambulatory referral/consult to Outpatient Case Management   Referral Priority: Routine Referral Type: Consultation   Referral Reason: Specialty Services Required   Number of Visits Requested: 1     Ambulatory referral/consult to Physical/Occupational Therapy   Standing Status: Future   Referral Priority: Routine Referral Type: Physical Medicine   Referral Reason: Specialty Services Required   Number of Visits Requested: 1     Diet Cardiac     Activity as tolerated       Significant Diagnostic Studies:   BMP  Lab Results   Component Value Date     06/20/2022    K 4.1 06/20/2022     06/20/2022    CO2 25 06/20/2022    BUN 35 (H) 06/20/2022    CREATININE 1.5 (H) 06/20/2022    CALCIUM 8.4 (L) 06/20/2022    ANIONGAP 9 06/20/2022    ESTGFRAFRICA 50 (A) 06/20/2022    EGFRNONAA 44 (A) 06/20/2022     Lab Results   Component Value Date    WBC 9.13 06/20/2022    HGB 10.6 (L) 06/20/2022    HCT 31.2 (L) 06/20/2022    MCV 99 (H) 06/20/2022    "  06/20/2022       Creatinine   Date Value Ref Range Status   06/20/2022 1.5 (H) 0.5 - 1.4 mg/dL Final   06/19/2022 1.7 (H) 0.5 - 1.4 mg/dL Final   06/18/2022 2.5 (H) 0.5 - 1.4 mg/dL Final   06/17/2022 2.9 (H) 0.5 - 1.4 mg/dL Final   ]    Pending Diagnostic Studies:     None         Medications:  Reconciled Home Medications:      Medication List      CHANGE how you take these medications    furosemide 40 MG tablet  Commonly known as: LASIX  Take 1 tablet (40 mg total) by mouth once daily.  What changed: how much to take        CONTINUE taking these medications    amiodarone 200 MG Tab  Commonly known as: PACERONE  Take 1 tablet (200 mg total) by mouth once daily.     amLODIPine 5 MG tablet  Commonly known as: NORVASC  Take 1 tablet (5 mg total) by mouth every evening.     apixaban 5 mg Tab  Commonly known as: ELIQUIS  Take 1 tablet (5 mg total) by mouth 2 (two) times daily.     ascorbic acid (vitamin C) 1000 MG tablet  Commonly known as: VITAMIN C  Take 1,000 mg by mouth once daily.     atorvastatin 40 MG tablet  Commonly known as: LIPITOR  Take 1 tablet (40 mg total) by mouth once daily.     capsaicin 0.025 % cream  Commonly known as: ZOSTRIX  Apply topically 2 (two) times daily.     coQ10 (ubiquinol) 100 mg Cap  Take 150 mg by mouth once daily.     cyanocobalamin 1,000 mcg/mL injection  Inject 1 mL (1,000 mcg total) into the muscle every 30 days.     doxazosin 8 MG Tab  Commonly known as: CARDURA  TAKE 1 TABLET DAILY     ezetimibe 10 mg tablet  Commonly known as: ZETIA  TAKE 1 TABLET DAILY     gabapentin 300 MG capsule  Commonly known as: NEURONTIN  Take 1 capsule (300 mg total) by mouth 2 (two) times daily.     garlic 1,000 mg Cap  Take 1 capsule by mouth once daily.     levothyroxine 100 MCG tablet  Commonly known as: SYNTHROID  Take 1 tablet (100 mcg total) by mouth before breakfast.     lisinopriL 20 MG tablet  Commonly known as: PRINIVIL,ZESTRIL  Take 1 tablet (20 mg total) by mouth 2 (two) times a  day.     metoprolol succinate 50 MG 24 hr tablet  Commonly known as: TOPROL-XL  Take 1 tablet (50 mg total) by mouth once daily.     multivitamin Tab  Take 1 tablet by mouth once daily.     nitroGLYCERIN 0.4 MG SL tablet  Commonly known as: NITROSTAT  Place 1 tablet (0.4 mg total) under the tongue every 5 (five) minutes as needed for Chest pain. DO NOT CRUSH OR CHEW; DISSOLVE UNDER TONGUE; MAXIMUM OF 3 DOSES IN 15 MINUTES     valACYclovir 1000 MG tablet  Commonly known as: VALTREX  Take 1 tablet (1,000 mg total) by mouth 2 (two) times daily.     VITAMIN D ORAL  Take 500 Units by mouth Daily.            Indwelling Lines/Drains at time of discharge:   Lines/Drains/Airways     None                 Time spent on the discharge of patient: 24 minutes         Taiwo Rincon MD  Department of Hospital Medicine  [facility]

## 2022-06-30 NOTE — PROGRESS NOTES
"    SUBJECTIVE:      Patient ID: Tong Ford is a 77 y.o. male.    Chief Complaint: Hospital Follow Up    Tong is a patient of Dr. Zavala here for hospital discharge follow up. PMHx of HTN, HLD, CAD s/p CABG, a-fib, CKD, pacemaker, and a-fib on Eliquis who presented to the ED with intermittent dizziness onset x 1.5 months. Reported increased number of fall over the past month and has been seen in the ED for falls. Patient has had difficulty walking 2/2 dizziness and normally ambulates well with a cane. Reported fatigue, back pain and GOODWIN. He denies fever, chills, CP, palpitations, cough, abdominal pain, n/v/d, and LOC. He reports increasing his dose of lasix over the past month due to leg swelling - taking an extra half of a pill most days." (total 60 mg instead of 40). ED workup was significant for mild anemia and elevated Cr/BUN.        Hospital Course:   Initiated on IV fluid hydration.  Lasix therapy was discontinued.  Nephrology service and Neurology teams were consulted.  The kidney function improved.  Regarding the patient's neuro symptoms, he underwent ultrasound testing of the carotids, which were negative for major stenosis.  Neurology suspected that the symptoms were due to orthostatic hypotension.  Compression stockings were suggested- pt has them at home and does not use them due to discomfort.  Patient was eventually discharged home in good condition to follow up with PCP and nephrology.  He was instructed to continue his meds as prescribed and to avoid taking extra Lasix.    He has been feeling much better since discharge and reports he has a new patient appointment with Nephrology in about a month and half.  He has not contacted his cardiologist yet.  He does report intermittent leg swelling with weeping edema at times.  He is not using his compression stockings as they are not comfortable.  He denies any shortness of breath, chest pain, dizziness, palpitations, or headaches. He stands most of the " day and does not elevated his legs. He has been drinking water at home.       Family History   Problem Relation Age of Onset    Cancer Mother     Cancer Father     Hypertension Father     Cancer Maternal Grandmother     COPD Paternal Grandmother       Social History     Socioeconomic History    Marital status:    Occupational History    Occupation: professor   Tobacco Use    Smoking status: Never Smoker    Smokeless tobacco: Never Used   Substance and Sexual Activity    Alcohol use: Not Currently    Drug use: No     Social Determinants of Health     Physical Activity: Inactive    Days of Exercise per Week: 0 days    Minutes of Exercise per Session: 0 min   Stress: No Stress Concern Present    Feeling of Stress : Not at all     Current Outpatient Medications   Medication Sig Dispense Refill    amiodarone (PACERONE) 200 MG Tab Take 1 tablet (200 mg total) by mouth once daily. 90 tablet 3    amLODIPine (NORVASC) 5 MG tablet Take 1 tablet (5 mg total) by mouth every evening. 90 tablet 2    apixaban (ELIQUIS) 5 mg Tab Take 1 tablet (5 mg total) by mouth 2 (two) times daily. 180 tablet 3    ascorbic acid, vitamin C, (VITAMIN C) 1000 MG tablet Take 1,000 mg by mouth once daily.      atorvastatin (LIPITOR) 40 MG tablet Take 1 tablet (40 mg total) by mouth once daily. 90 tablet 3    coQ10, ubiquinol, 100 mg Cap Take 150 mg by mouth once daily.       cyanocobalamin 1,000 mcg/mL injection Inject 1 mL (1,000 mcg total) into the muscle every 30 days. 1 mL 3    doxazosin (CARDURA) 8 MG Tab TAKE 1 TABLET DAILY 90 tablet 3    ezetimibe (ZETIA) 10 mg tablet TAKE 1 TABLET DAILY 90 tablet 3    furosemide (LASIX) 40 MG tablet Take 1 tablet (40 mg total) by mouth once daily. 135 tablet 2    gabapentin (NEURONTIN) 300 MG capsule Take 1 capsule (300 mg total) by mouth 2 (two) times daily. 60 capsule 0    garlic 1,000 mg Cap Take 1 capsule by mouth once daily.       levothyroxine (SYNTHROID) 100 MCG tablet  Take 1 tablet (100 mcg total) by mouth before breakfast. 90 tablet 2    lisinopriL (PRINIVIL,ZESTRIL) 20 MG tablet Take 1 tablet (20 mg total) by mouth 2 (two) times a day. 180 tablet 3    metoprolol succinate (TOPROL-XL) 50 MG 24 hr tablet Take 1 tablet (50 mg total) by mouth once daily. 90 tablet 3    multivitamin Tab Take 1 tablet by mouth once daily.      nitroGLYCERIN (NITROSTAT) 0.4 MG SL tablet Place 1 tablet (0.4 mg total) under the tongue every 5 (five) minutes as needed for Chest pain. DO NOT CRUSH OR CHEW; DISSOLVE UNDER TONGUE; MAXIMUM OF 3 DOSES IN 15 MINUTES 20 tablet 0    vitamin D (VITAMIN D3) 1000 units Tab Take 1,000 Units by mouth once daily.       No current facility-administered medications for this visit.     Review of patient's allergies indicates:  No Known Allergies   Past Medical History:   Diagnosis Date    Arthritis     Atrial fibrillation     Carotid artery occlusion     bilateral    Cataract     CKD (chronic kidney disease), stage II     Coronary artery disease     3 Vessel CABG AND 3 STENTS    Encounter for blood transfusion     Hypercholesterolemia     Hypertension     Normocytic anemia     Shingles     X 3 WEEKS AGO    Sleep apnea     CESAR - NOT USING C-PAP    Thyroid disease     TIA (transient ischemic attack)      Past Surgical History:   Procedure Laterality Date    A-V CARDIAC PACEMAKER INSERTION N/A 4/1/2021    Procedure: INSERTION, CARDIAC PACEMAKER, DUAL CHAMBER;  Surgeon: Clifford Sevilla MD;  Location: [facility] CATH/EP LAB;  Service: Cardiology;  Laterality: N/A;  [employer]    CORONARY ANGIOPLASTY WITH STENT PLACEMENT      CORONARY ARTERY BYPASS GRAFT      CORONARY ARTERY BYPASS GRAFT (CABG)      3 vessel    EYE SURGERY      bILATERAL CATARACS    INSERTION OF PACEMAKER      REVISION OF IMPLANTABLE CARDIOVERTER-DEFIBRILLATOR (ICD) ELECTRODE LEAD PLACEMENT Left 7/1/2021    Procedure: REVISION, INSERTION, ELECTRODE LEAD,pacemaker;  Surgeon: Clifford RAMOS  "MD Roel;  Location: [facility] CATH/EP LAB;  Service: Cardiology;  Laterality: Left;    ROBOT-ASSISTED LAPAROSCOPIC REPAIR OF INGUINAL HERNIA Right 3/11/2022    Procedure: ROBOTIC REPAIR, HERNIA, INGUINAL;  Surgeon: Melo Aguilera III, MD;  Location: [facility] OR;  Service: General;  Laterality: Right;  LB case    TREATMENT OF CARDIAC ARRHYTHMIA N/A 1/29/2021    Procedure: CARDIOVERSION;  Surgeon: Iron Serna MD;  Location: [facility] CATH/EP LAB;  Service: Cardiology;  Laterality: N/A;    VASECTOMY         Review of Systems   Constitutional: Negative for activity change, chills, fatigue, fever and unexpected weight change.   HENT: Negative for congestion, hearing loss, rhinorrhea, sinus pressure, sinus pain and trouble swallowing.    Eyes: Negative for discharge and visual disturbance.   Respiratory: Negative for cough, chest tightness, shortness of breath and wheezing.    Cardiovascular: Positive for leg swelling. Negative for chest pain and palpitations.   Gastrointestinal: Negative for abdominal pain, blood in stool, constipation, diarrhea and vomiting.   Endocrine: Negative for heat intolerance, polydipsia and polyuria.   Genitourinary: Negative for difficulty urinating, frequency, hematuria and urgency.   Musculoskeletal: Negative for arthralgias, joint swelling and neck pain.   Skin: Negative for color change, pallor, rash and wound.   Neurological: Negative for dizziness, weakness and headaches.   Hematological: Negative for adenopathy. Bruises/bleeds easily.   Psychiatric/Behavioral: Negative for confusion and dysphoric mood. The patient is not nervous/anxious.       OBJECTIVE:      Vitals:    06/30/22 1407 06/30/22 1425   BP: (!) 146/74 138/74   BP Location: Right arm    Patient Position: Sitting    BP Method: Large (Manual)    Pulse: 72    Temp: 98.3 °F (36.8 °C)    TempSrc: Oral    SpO2: 97%    Weight: 88.5 kg (195 lb)    Height: 5' 8" (1.727 m)      Physical Exam  Vitals and nursing note reviewed. Exam " conducted with a chaperone present.   Constitutional:       General: He is not in acute distress.     Appearance: Normal appearance. He is obese.   HENT:      Head: Normocephalic and atraumatic.      Mouth/Throat:      Mouth: Mucous membranes are moist.   Eyes:      General: No scleral icterus.        Right eye: No discharge.         Left eye: No discharge.      Conjunctiva/sclera: Conjunctivae normal.      Pupils: Pupils are equal, round, and reactive to light.   Cardiovascular:      Rate and Rhythm: Normal rate and regular rhythm.      Heart sounds: Murmur heard.   Pulmonary:      Effort: Pulmonary effort is normal. No respiratory distress.      Breath sounds: Normal breath sounds. No wheezing.   Musculoskeletal:      Cervical back: Normal range of motion and neck supple.      Right lower le+ Pitting Edema present.      Left lower le+ Pitting Edema present.   Lymphadenopathy:      Cervical: No cervical adenopathy.   Skin:     General: Skin is warm and dry.      Capillary Refill: Capillary refill takes less than 2 seconds.      Coloration: Skin is not jaundiced or pale.   Neurological:      Mental Status: He is alert and oriented to person, place, and time.   Psychiatric:         Mood and Affect: Mood normal.         Behavior: Behavior normal.         Thought Content: Thought content normal.         Judgment: Judgment normal.        Assessment:       1. Hospital discharge follow-up    2. JAYSON (acute kidney injury)    3. Essential hypertension    4. Edema of both lower legs due to peripheral venous insufficiency        Plan:       Hospital discharge follow-up   -Discharge and current medications have been reviewed and reconciled with the chart.    JAYSON (acute kidney injury)  -     Basic Metabolic Panel; Future; Expected date: 2022  -recheck labs next week; follow up with nephrology as planned; no not increase Lasix without orders     Essential hypertension   -stable    Edema of both lower legs due to  peripheral venous insufficiency  -     Ambulatory referral/consult to Vascular Surgery; Future; Expected date: 07/07/2022   -may be multifactorial, but encouraged follow up for venous evaluation; advised compression stockings and elevated feet prn during the day       Follow up if symptoms worsen or fail to improve.      6/30/2022 CHERI Song, FNP    This note was created using [employer] voice recognition software that occasionally misinterprets phrases or words.

## 2022-07-09 NOTE — DISCHARGE INSTRUCTIONS
You need to keep your legs elevated and you need to keep them wrapped.  Currently I do not recommend increasing your Lasix more than a total of 3 days.  You need to follow-up with a nephrologist.  You can take another half pill of Lasix, 20 mg, in addition to your daily dose for the next 3 days.

## 2022-07-09 NOTE — ED NOTES
BLE wrapped with ACE bandages for some compression value as pt cannot tolerate any form of CALLY Boot or wear his compression socks as he is having a Shingles flair up. Pt verbalized understanding that he needs to wear some type of compression and keep his legs elevated. Pt AAOx4, Abc's intact. NADN. No adverse reaction to medication given. D/C instructions and Rx in pt possession along with belongings. No other needs at this time. Pt ambulatory to ED Lobby without assistance, steady gait.

## 2022-07-09 NOTE — ED PROVIDER NOTES
Encounter Date: 7/9/2022       History     Chief Complaint   Patient presents with    Herpes Zoster     X 2 months      Patient is a 77-year-old male with a past medical history of chronic kidney disease coronary artery disease hypertension hyperlipidemia shingles 3 months ago post herpetic neuralgia TIAs hypothyroidism atrial fibrillation who presents the emergency room with 2 complaints.  First and foremost he has bilateral lower extremity edema that is getting worse.  Patient was seen here in the emergency room and had an observation stay overnight in the hospital a few weeks ago after he decided to take a dose and half of his daily Lasix.  He had mild JAYSON that time and had a syncopal episode.  He went back down to his regular dose of Lasix but now has bilateral lower extremity edema.  He is scheduled to see a nephrologist as an outpatient as well as the vascular surgeon but this is not occurred yet.  He has not been using compression stockings.  He denies any chest pain or shortness of breath.  He has no history of DVT.  He does have some mild erythema to the bilateral lower extremities with several scratches on the legs.    His 2nd complaint is post herpetic neuralgia in the right inguinal region right thigh.  He states his symptoms were improving but they started return when he had increasing bilateral lower extremity edema.  He has no new rashes he just has scars from his prior shingles.        Review of patient's allergies indicates:  No Known Allergies  Past Medical History:   Diagnosis Date    Arthritis     Atrial fibrillation     Carotid artery occlusion     bilateral    Cataract     CKD (chronic kidney disease), stage II     Coronary artery disease     3 Vessel CABG AND 3 STENTS    Encounter for blood transfusion     Hypercholesterolemia     Hypertension     Normocytic anemia     Shingles     X 3 WEEKS AGO    Sleep apnea     CESAR - NOT USING C-PAP    Thyroid disease     TIA (transient  ischemic attack)      Past Surgical History:   Procedure Laterality Date    A-V CARDIAC PACEMAKER INSERTION N/A 4/1/2021    Procedure: INSERTION, CARDIAC PACEMAKER, DUAL CHAMBER;  Surgeon: Clifford Sevilla MD;  Location: [facility] CATH/EP LAB;  Service: Cardiology;  Laterality: N/A;  [employer]IC    CORONARY ANGIOPLASTY WITH STENT PLACEMENT      CORONARY ARTERY BYPASS GRAFT      CORONARY ARTERY BYPASS GRAFT (CABG)      3 vessel    EYE SURGERY      bILATERAL CATARACS    INSERTION OF PACEMAKER      REVISION OF IMPLANTABLE CARDIOVERTER-DEFIBRILLATOR (ICD) ELECTRODE LEAD PLACEMENT Left 7/1/2021    Procedure: REVISION, INSERTION, ELECTRODE LEAD,pacemaker;  Surgeon: Clifford Sevilla MD;  Location: [facility] CATH/EP LAB;  Service: Cardiology;  Laterality: Left;    ROBOT-ASSISTED LAPAROSCOPIC REPAIR OF INGUINAL HERNIA Right 3/11/2022    Procedure: ROBOTIC REPAIR, HERNIA, INGUINAL;  Surgeon: Melo Aguilera III, MD;  Location: [facility] OR;  Service: General;  Laterality: Right;  LB case    TREATMENT OF CARDIAC ARRHYTHMIA N/A 1/29/2021    Procedure: CARDIOVERSION;  Surgeon: Iron Serna MD;  Location: [facility] CATH/EP LAB;  Service: Cardiology;  Laterality: N/A;    VASECTOMY       Family History   Problem Relation Age of Onset    Cancer Mother     Cancer Father     Hypertension Father     Cancer Maternal Grandmother     COPD Paternal Grandmother      Social History     Tobacco Use    Smoking status: Never Smoker    Smokeless tobacco: Never Used   Substance Use Topics    Alcohol use: Not Currently    Drug use: No     Review of Systems   Constitutional: Negative for appetite change, fatigue and fever.   HENT: Negative for congestion, sinus pressure, sinus pain and sore throat.    Respiratory: Negative for cough and shortness of breath.    Cardiovascular: Positive for leg swelling. Negative for chest pain.   Gastrointestinal: Negative for abdominal pain, diarrhea and nausea.   Genitourinary: Negative for dysuria and  flank pain.   Musculoskeletal: Negative for arthralgias, back pain and myalgias.   Skin:        Post herpetic neuralgia rash, rash in the groin.  Several scratches to the bilateral lower legs   Neurological: Negative for weakness, numbness and headaches.       Physical Exam     Initial Vitals [07/09/22 1126]   BP Pulse Resp Temp SpO2   139/68 66 20 97.9 °F (36.6 °C) 96 %      MAP       --         Physical Exam    Nursing note and vitals reviewed.  Constitutional: He appears well-developed and well-nourished. No distress.   HENT:   Head: Normocephalic and atraumatic.   Mouth/Throat: Oropharynx is clear and moist.   Eyes: Conjunctivae are normal. Pupils are equal, round, and reactive to light.   Neck: Neck supple.   Cardiovascular: Normal rate, regular rhythm, normal heart sounds and intact distal pulses.   Pulmonary/Chest: Breath sounds normal. He has no wheezes. He has no rhonchi. He has no rales.   Abdominal: Abdomen is soft. There is no abdominal tenderness.   Musculoskeletal:         General: Edema (2+ edema bilateral legs and feet) present.      Cervical back: Neck supple.     Neurological: He is alert and oriented to person, place, and time. He has normal strength. No sensory deficit.   Skin:   Scar over the right groin w/ hyperpigmented lesions to the right proximal anterior thigh.    Patient also has erythema in the intertriginous regions of the right groin with white cream a over the right.    Erythematous bilateral legs with several small scratches and abrasions.  Only minimal weeping wounds.   Psychiatric: He has a normal mood and affect.         ED Course   Procedures  Labs Reviewed   CBC W/ AUTO DIFFERENTIAL - Abnormal; Notable for the following components:       Result Value    RBC 3.12 (*)     Hemoglobin 10.8 (*)     Hematocrit 31.7 (*)      (*)     MCH 34.6 (*)     RDW 16.7 (*)     All other components within normal limits   COMPREHENSIVE METABOLIC PANEL - Abnormal; Notable for the following  components:    BUN 39 (*)     Creatinine 2.0 (*)     Calcium 8.4 (*)     Albumin 3.2 (*)     eGFR if  36 (*)     eGFR if non  31 (*)     All other components within normal limits          Imaging Results    None          Medications   furosemide tablet 20 mg (20 mg Oral Given 7/9/22 2025)     Medical Decision Making:   Initial Assessment:   Patient appears to have bilateral lower extremity edema that is getting worse.  He is not wearing compression stockings like he was instructed to.  He has likely venous stasis dermatitis with several small abrasions from scratching his legs.  No significant deep venous tenderness to suggest DVT.  I believe this is related to chronic kidney disease and fluid retention.    Patient also has post herpetic neuralgia the right groin and thigh with a secondary fungal infection.  He already has a topical barrier cream.  I will prescribe him a topical anti fungal.  I anticipate increasing his Lasix for few days but he really needs to see his primary care physician who knows him well and can follow-up with him.  He also probably needs to see the nephrologist.  Will get labs to rule out worsening renal function and see if he can tolerate further diuretics.  Vital signs are stable this time.  No signs of heart failure.                      Clinical Impression:   Final diagnoses:  [N18.30] Stage 3 chronic kidney disease, unspecified whether stage 3a or 3b CKD (Primary)  [R60.9] Peripheral edema  [I87.8] Venous stasis  [B02.29] Post herpetic neuralgia          ED Disposition Condition    Discharge Stable        ED Prescriptions     None        Follow-up Information     Follow up With Specialties Details Why Contact Info    Kris Zavala MD Family Medicine Schedule an appointment as soon as possible for a visit   [address]  [address]  [facility] [address]  [phone]      Curt Juares MD Nephrology   [address] [facility]  [place]  [place] [address]  [phone]             Carrillo Short MD  07/13/22 0236

## 2022-07-09 NOTE — ED NOTES
Pt presents with shingles pain and BLE pain. He reports taking an extra Lasix pill to help relieve his fluid build up in his legs but is not wearing prescribed compression stockings and he states they are too hard to get on. He has healing Shingles lesions to his groin that pt reports are still painful. Pt is AAOx4, ABC's intact, NADN. Able to walk on his own without assistance.

## 2022-07-11 NOTE — TELEPHONE ENCOUNTER
Tong called to say he is having leg swelling since he was cut back to 40 mg daily of his lasix, which he takes in two doses rather than all at once.  Swelling got much worse the last 4-5 days.  He said that he was in the hospital recently for JAYSON.  Also has shingles, but states he is not on any medication for this now.  Shingles is on upper right inner thigh, and pain exquisite to that area in particular.  Leg swelling extends to upper thighs and groin on both legs. Extremely painful, 8 of 10.  History of CVA, TIA, HF, PAF, CAD, pacemaker in situ, CKD.  Somewhat SOB with any exertion.  Painful to walk.  [facility], recommend he go to ED now.  He said his family is all working today, so he will call [email or web address] in [place] for this.  Provided him with the number to [email or web address] company in [place].  Message to Kris Zavala MD, pcp, and Dr Clifford Sevilla, his cardiologist.  Please contact caller directly with any additional care advice.

## 2022-07-12 NOTE — TELEPHONE ENCOUNTER
Tong was in the ER yesterday and from the notes it appears that he is still dealing with shingles since May. She has been in the clinic a fw times for it and has seen Brett and Etienne.           Post herpetic neuralgia rash, rash in the groin.  Several scratches to the bilateral lower legs   Neurological: Negative for weakness, numbness and headaches.

## 2022-07-14 NOTE — PROGRESS NOTES
Outpatient Care Management  Plan of Care Follow Up Visit    Patient: Tong Ford  MRN: [id number]  Date of Service: 07/14/2022  Completed by: Mami Almanzar RN  Referral Date: 06/19/2022  Program:     Reason for Visit   Patient presents with    Update Plan Of Care       Brief Summary: Patient called back after message was left. He is going to all his appts.   See care plan.   CM ACTION PLAN   : Follow up in two weeks  Review does he need PT and exercise program     Patient Summary     Involvement of Care:  Do I have permission to speak with other family members about your care?       Patient Reported Labs & Vitals:  1.  Any Patient Reported Labs & Vitals?     2.  Patient Reported Blood Pressure:     3.  Patient Reported Pulse:     4.  Patient Reported Weight (Kg):     5.  Patient Reported Blood Glucose (mg/dl):       Medical and social history was reviewed with patient and/or caregiver.     Clinical Assessment     Reviewed and provided basic information on available community resources for mental health, transportation, wellness resources, and palliative care programs with patient and/or caregiver.     Complex Care Plan     Care plan was discussed and completed today with input from patient and/or caregiver.    Patient Instructions     Instructions were provided via the [employer] patient resources and are available for the patient to view on the patient portal.      Follow up in about 2 weeks (around 7/28/2022) for RN Follow up call.    Todays OPCM Self-Management Care Plan was developed with the patients/caregivers input and was based on identified barriers from todays assessment.  Goals were written today with the patient/caregiver and the patient has agreed to work towards these goals to improve his/her overall well-being. Patient verbalized understanding of the care plan, goals, and all of today's instructions. Encouraged patient/caregiver to communicate with his/her physician and health care team about health  conditions and the treatment plan.  Provided my contact information today and encouraged patient/caregiver to call me with any questions as needed.

## 2022-07-21 NOTE — ED PROVIDER NOTES
Encounter Date: 7/21/2022       History     Chief Complaint   Patient presents with    Leg Swelling     NOT UNUSUAL, BUT THE WEEPING IS     Patient presents complaining of lower leg swelling.  Patient states his legs were weeping clear fluid more than usual.  Patient has a history of congestive heart failure.  He denies any worsening shortness of breath.  No chest pain.  At the worst symptoms are moderate.  Nothing really makes it better or worse.        Review of patient's allergies indicates:  No Known Allergies  Past Medical History:   Diagnosis Date    Arthritis     Atrial fibrillation     Carotid artery occlusion     bilateral    Cataract     CHF (congestive heart failure)     CKD (chronic kidney disease), stage II     Coronary artery disease     3 Vessel CABG AND 3 STENTS    Encounter for blood transfusion     Hypercholesterolemia     Hypertension     Normocytic anemia     Shingles     X 3 WEEKS AGO    Sleep apnea     CESAR - NOT USING C-PAP    Thyroid disease     TIA (transient ischemic attack)      Past Surgical History:   Procedure Laterality Date    A-V CARDIAC PACEMAKER INSERTION N/A 4/1/2021    Procedure: INSERTION, CARDIAC PACEMAKER, DUAL CHAMBER;  Surgeon: Clifford Sevilla MD;  Location: [facility] CATH/EP LAB;  Service: Cardiology;  Laterality: N/A;  [employer]    CORONARY ANGIOPLASTY WITH STENT PLACEMENT      CORONARY ARTERY BYPASS GRAFT      CORONARY ARTERY BYPASS GRAFT (CABG)      3 vessel    EYE SURGERY      bILATERAL CATARACS    INSERTION OF PACEMAKER      REVISION OF IMPLANTABLE CARDIOVERTER-DEFIBRILLATOR (ICD) ELECTRODE LEAD PLACEMENT Left 7/1/2021    Procedure: REVISION, INSERTION, ELECTRODE LEAD,pacemaker;  Surgeon: Clifford Sevilla MD;  Location: [facility] CATH/EP LAB;  Service: Cardiology;  Laterality: Left;    ROBOT-ASSISTED LAPAROSCOPIC REPAIR OF INGUINAL HERNIA Right 3/11/2022    Procedure: ROBOTIC REPAIR, HERNIA, INGUINAL;  Surgeon: Melo Aguilera III, MD;   Location: [facility] OR;  Service: General;  Laterality: Right;  LB case    TREATMENT OF CARDIAC ARRHYTHMIA N/A 1/29/2021    Procedure: CARDIOVERSION;  Surgeon: Iron Serna MD;  [facility]ATH[facility]EP LAB;  Service: Cardiology;  Laterality: N/A;    VASECTOMY       Family History   Problem Relation Age of Onset    Cancer Mother     Cancer Father     Hypertension Father     Cancer Maternal Grandmother     COPD Paternal Grandmother      Social History     Tobacco Use    Smoking status: Never Smoker    Smokeless tobacco: Never Used   Substance Use Topics    Alcohol use: Not Currently    Drug use: No     Review of Systems   All other systems reviewed and are negative.      Physical Exam     Initial Vitals [07/21/22 1342]   BP Pulse Resp Temp SpO2   (!) 152/83 77 20 97.7 °F (36.5 °C) 98 %      MAP       --         Physical Exam    Nursing note and vitals reviewed.  Constitutional: He appears well-developed and well-nourished. He is not diaphoretic. No distress.   Pleasant, polite.   HENT:   Head: Normocephalic and atraumatic.   Eyes: EOM are normal.   Neck: Neck supple.   Normal range of motion.  Cardiovascular: Normal rate, regular rhythm, normal heart sounds and intact distal pulses.   Pulmonary/Chest: Breath sounds normal. No respiratory distress.   Abdominal: Abdomen is soft.   Musculoskeletal:         General: Normal range of motion.      Cervical back: Normal range of motion and neck supple.      Comments: Bilateral lower extremities have 1+ pitting edema.  There is mild amount of clear fluid that is weeping from the right leg more than the left.  There is mild erythema.  There is no warmth.  The erythema is around the proximal ankle to about mid calf     Neurological: He is alert and oriented to person, place, and time. He has normal strength.   Skin: Skin is warm and dry.   Psychiatric: He has a normal mood and affect. His behavior is normal. Judgment and thought content normal.         ED Course    Procedures  Labs Reviewed   COMPREHENSIVE METABOLIC PANEL - Abnormal; Notable for the following components:       Result Value    BUN 30 (*)     Creatinine 1.7 (*)     Calcium 8.6 (*)     Anion Gap 7 (*)     eGFR if  43.4 (*)     eGFR if non  37.5 (*)     All other components within normal limits   CBC W/ AUTO DIFFERENTIAL - Abnormal; Notable for the following components:    RBC 3.18 (*)     Hemoglobin 10.6 (*)     Hematocrit 33.1 (*)      (*)     MCH 33.3 (*)     RDW 15.8 (*)     All other components within normal limits   B-TYPE NATRIURETIC PEPTIDE - Abnormal; Notable for the following components:     (*)     All other components within normal limits   MAGNESIUM   TROPONIN I        ECG Results          EKG 12-LEAD (In process)  Result time 07/21/22 14:17:21    In process by Interface, Lab In [facility] (07/21/22 14:17:21)                 Narrative:    Test Reason : R07.9,    Vent. Rate : 064 BPM     Atrial Rate : 060 BPM     P-R Int : 188 ms          QRS Dur : 180 ms      QT Int : 540 ms       P-R-T Axes : 000 -85 082 degrees     QTc Int : 557 ms    AV dual-paced rhythm with intrinsic complexes  Abnormal ECG  When compared with ECG of 17-JUN-2022 21:00,  Vent. rate has increased BY   4 BPM    Referred By: [email or web address]   SELF           Confirmed By:                             Imaging Results          US Lower Extremity Veins Bilateral (Final result)  Result time 07/21/22 15:55:14    Final result by Michele Hill MD (07/21/22 15:55:14)                 Narrative:    US LOWER EXTREMITY VEINS BILATERAL    CLINICAL HISTORY:  77 years Male edema    FINDINGS: Grayscale, color and spectral Doppler analysis of the bilateral lower extremity deep venous system was performed.    There is normal compressibility, with normal flow by color and spectral Doppler analysis in the bilateral lower extremity deep venous system, with normal augmentation and no evidence of deep venous  thrombosis.    IMPRESSION: Negative for DVT.    Electronically signed by:  Michele Hill MD  7/21/2022 3:55 PM CDT Workstation: [id number]                             X-Ray Chest PA And Lateral (Final result)  Result time 07/21/22 14:36:20   Procedure changed from X-ray Chest AP Portable     Final result by Ted Silva MD (07/21/22 14:36:20)                 Narrative:    Reason: bilateral leg edema    FINDINGS:    PA and lateral chest with comparison chest x-ray May 8, 2022 show normal cardiomediastinal silhouette. Median sternotomy wires and surgical clips noted. Left dual lead cardiac pacemaker.  There is hazy opacification of the right lung base with blunting of the costophrenic angle. Hazy and linear opacities are also noted of the left lung base. Prominent central hilar vascular structures noted. No acute osseous abnormality.    IMPRESSION:    1.  Small right pleural effusion with overlying compressive atelectasis or infiltrate.  2.  Left basilar hazy and linear lung opacities could reflect infiltrate, mild pulmonary edema and/or atelectasis.    Electronically signed by:  Ted Silva DO  7/21/2022 2:36 PM CDT Workstation: [id number]                               Medications   sodium chloride 0.9% flush 10 mL (has no administration in time range)   furosemide injection 40 mg (has no administration in time range)     Medical Decision Making:   Initial Assessment:   No apparent distress  Differential Diagnosis:   Considerations include heart failure, , cellulitis, DVT  Independently Interpreted Test(s):   I have ordered and independently interpreted EKG Reading(s) - see summary below       <> Summary of EKG Reading(s): EKG is paced rhythm  Clinical Tests:   Lab Tests: Reviewed and Ordered  Radiological Study: Ordered and Reviewed  Medical Tests: Reviewed and Ordered  ED Management:  In the emergency department patient has no evidence of florid heart failure.  There is some possible opacities on lung  x-ray.  BNP is elevated but not nearly as much as previous.  Patient's lower extremity edema is mild.  Patient's other blood work was reviewed.  He does not have evidence of DVT on ultrasound.  He will be given prescription for doxycycline secondary to the mild erythema.  He was advised to elevate legs and use compression stockings as well as to be compliant with Lasix therapy.  Patient be discharged stable condition.  Detailed return precautions discussed                      Clinical Impression:   Final diagnoses:  [R60.9] Edema  [R60.9] Peripheral edema (Primary)          ED Disposition Condition    Discharge Stable        ED Prescriptions     Medication Sig Dispense Start Date End Date Auth. Provider    doxycycline (VIBRAMYCIN) 100 MG Cap Take 1 capsule (100 mg total) by mouth 2 (two) times daily. for 10 days 20 capsule 7/21/2022 7/31/2022 Tyron Jones MD        Follow-up Information     Follow up With Specialties Details Why Contact Info    Kris Zavala MD Family Medicine In 3 days  [address]  [address]  [facility] [place] [address]  [phone]             Tyron Jones MD  07/21/22 5401

## 2022-07-21 NOTE — TELEPHONE ENCOUNTER
Patient has oozing from his right leg and foot. Patient advised to go to the emergency room to be evaluated.

## 2022-07-21 NOTE — TELEPHONE ENCOUNTER
----- Message from Kenroy Whitman sent at 7/21/2022 11:04 AM CDT -----  Type: Needs Medical Advice  Who Called:  Pt  Symptoms (please be specific):  fluid build up, coming out of legs and feet/ other issues as well  How long has patient had these symptoms:     Pharmacy name and phone #:    [id number] - [place], [place] - [address]  [place] [address]  Phone: [phone] Fax: [phone]    Best Call Back Number: [phone]   Additional Information: Pt would like advise he's been seeing other drs and wants to get Dr Sevilla in the loop he's thinking about going to the ER but wants to know what he should do would like to talk to someone.  Please advise -- Thank you

## 2022-07-21 NOTE — TELEPHONE ENCOUNTER
Pt was advised to go to [facility] Er. Pt said that is fine and he would get someone to bring him.

## 2022-07-28 NOTE — PROGRESS NOTES
Outpatient Care Management  Plan of Care Follow Up Visit    Patient: Tong Ford  MRN: [id number]  Date of Service: 07/28/2022  Completed by: Mami Almanzar RN  Referral Date: 06/19/2022  Program:     Reason for Visit   Patient presents with    Update Plan Of Care       Brief Summary: 07/28/22-Called and spoke to patient. He went to the ER on 07/21/22 for legs weeping and edema. He was discharged home on doxycycline hyclate 100 mg Oral 2 times daily which he is taking. He would like to get his legs measured for compression stockings but they are weeping too much. He states that he had shingles several months ago. He is elevating his feet and the swelling does go down. Reports no falls. He states that his right leg feels like it weighs 50 pounds. He is taking an extra 1/2 pill of lasix daily. Encouraged him to take his lasix as prescribed, verbalized understanding. He denies having any fever. He did go in the pool and said that he feels like it helped his legs.   Appt on 09/01/22 with Dr. Zavala- He has a message in asking for an earlier appt.     CM ACTION PLAN   : Follow up in two weeks  Message to PCP-Called and spoke to Amy at [phone]. She said to  Place the referral for PT and on comment put evaluate for Lymphedema clinic.     Patient Summary     Involvement of Care:  Do I have permission to speak with other family members about your care?       Patient Reported Labs & Vitals:  1.  Any Patient Reported Labs & Vitals?     2.  Patient Reported Blood Pressure:     3.  Patient Reported Pulse:     4.  Patient Reported Weight (Kg):     5.  Patient Reported Blood Glucose (mg/dl):       Medical and social history was reviewed with patient and/or caregiver.     Clinical Assessment     Reviewed and provided basic information on available community resources for mental health, transportation, wellness resources, and palliative care programs with patient and/or caregiver.     Complex Care Plan     Care plan was  discussed and completed today with input from patient and/or caregiver.    Patient Instructions     Instructions were provided via the [facility] patient resources and are available for the patient to view on the patient portal.      Follow up in about 2 weeks (around 8/11/2022) for RN Follow up call.    Todays OPCM Self-Management Care Plan was developed with the patients/caregivers input and was based on identified barriers from todays assessment.  Goals were written today with the patient/caregiver and the patient has agreed to work towards these goals to improve his/her overall well-being. Patient verbalized understanding of the care plan, goals, and all of today's instructions. Encouraged patient/caregiver to communicate with his/her physician and health care team about health conditions and the treatment plan.  Provided my contact information today and encouraged patient/caregiver to call me with any questions as needed.

## 2022-07-29 NOTE — TELEPHONE ENCOUNTER
I have sent the referral to Lymphedema clinic.    Please call patient, make sure he is wearing his compression stockings, Please advise him to elevated his fett 8 inches above his hear for 30 minutes 3 times a day.

## 2022-08-02 NOTE — PROGRESS NOTES
Outpatient Care Management  Plan of Care Follow Up Visit    Patient: Tong Ford  MRN: [id number]  Date of Service: 08/02/2022  Completed by: Mami Almanzar RN  Referral Date: 06/19/2022  Program:     Reason for Visit   Patient presents with    Update Plan Of Care     08/02/22       Brief Summary: see care plan     Patient Summary     Involvement of Care:  Do I have permission to speak with other family members about your care?       Patient Reported Labs & Vitals:  1.  Any Patient Reported Labs & Vitals?     2.  Patient Reported Blood Pressure:     3.  Patient Reported Pulse:     4.  Patient Reported Weight (Kg):     5.  Patient Reported Blood Glucose (mg/dl):       Medical and social history was reviewed with patient and/or caregiver.     Clinical Assessment     Reviewed and provided basic information on available community resources for mental health, transportation, wellness resources, and palliative care programs with patient and/or caregiver.     Complex Care Plan     Care plan was discussed and completed today with input from patient and/or caregiver.    Patient Instructions     Instructions were provided via the [employer] patient [employer] and are available for the patient to view on the patient portal.      Follow up in about 9 days (around 8/11/2022) for RN Follow up call.    Todays OPCM Self-Management Care Plan was developed with the patients/caregivers input and was based on identified barriers from todays assessment.  Goals were written today with the patient/caregiver and the patient has agreed to work towards these goals to improve his/her overall well-being. Patient verbalized understanding of the care plan, goals, and all of today's instructions. Encouraged patient/caregiver to communicate with his/her physician and health care team about health conditions and the treatment plan.  Provided my contact information today and encouraged patient/caregiver to call me with any questions as needed.

## 2022-08-03 NOTE — TELEPHONE ENCOUNTER
Please call Mr Ford and find out which medications he needs refilled. Let him know it looks like he has follow up with lymphedema clinic on 11 Aug with Orquidea Small he should be able to se this on his portal, this is not who I sent the referral to but she is a lymphedema clinic.

## 2022-08-07 NOTE — TELEPHONE ENCOUNTER
"Has blood blister size of dime to ankle and wants advice on popping it. Unsure what caused blister but also having trouble in that leg with edema. Also having trouble with skin discoloration and scaling to that area. Also has wound to shin that is weeping. Advised against popping. Due to edema needs to be seen within 24 hours. Declines UCC or appt with a provider in the morning. Requesting appt with Arnold in the afternoon. ED precautions given. VU    Reason for Disposition   [1] Blister on foot AND [2] diabetes mellitus or peripheral vascular disease ("poor circulation")    Additional Information   Negative: Sounds like a life-threatening emergency to the triager   Negative: Patient sounds very sick or weak to the triager   Negative: [1] Looks infected (spreading redness, red streak, or pus) AND [2] fever   Negative: [1] Looks infected AND [2] large red area (> 2 in. or 5 cm)   Negative: [1] Foul-smelling odor from foot AND [2] new or increased   Negative: [1] Purple or black skin on foot or toe AND [2] new or increased   Negative: Looks like a boil or deep ulcer    Protocols used: BLISTER - FOOT AND HAND-A-AH      "

## 2022-08-08 NOTE — PROGRESS NOTES
SUBJECTIVE:    Patient ID: Tong Ford is a 77 y.o. male.    Chief Complaint: Leg Swelling  77 male male here  here today complaining of lower extremity edema patient has known congestive heart failure with preserved ejection fraction currently on Lasix 40 mg a day he has bilateral lower extremity edema with weeping that is excessive for minor abrasions is wounds were dressed in clean today.  With gone back and forth through the patient portal for the last several months about how to address the swelling in his legs a referral had been placed to lymphedema clinic patient has an appointment in 3 days after placing dressings on the wounds and wrapping with Ace bandages he was instructed to leave the bandage is on until his lymphedema clinic appointment loosen Ace wraps if he develops any numbness or decrease in circulation of his lower extremities.  He has been told for the last several months that he needs to keep his feet elevated any to be lease 80 in above his heart.  Suspect between his chronic venous stasis and his congestive heart failure he is having hard time managing the swelling in his lower extremities.    Patient also had shingles several months back located on his right thigh he has scarring to the area now areas still sensitive in tender to touch shoots electrical type pain down to his groin, he is currently on gabapentin 300 mg twice a day he says it helped because it makes him sleepy and is able to sleep to the pain.      Last Echo Jan 2021, Estimated EF 60%       SPMHX:  CAD: Nitroglycerine, Last echo Jun/2019 EF 70% normal stress test 2019.  Hyperlipidemia:  Atorvastatin 40 mg, Zetia 10 mg  HTN:Metoprolol XL 50mg, Doxazosin 8mg, Amlodipine 5mg mg Lisinopril 20mg b.i.d., his blood pressure is well controlled today.  Hypothyroidism:  Levothyroxine 100 mcg  Arthritis:  BPH:  Flomax 0.4 mg.  CESAR: On CPAP quit using it  Atrial fib: Amiodarone 200mg  Eliquis 5 mg,  Pace Maker:    Heart failure with  preserved ejection fraction:  Diuretics Lasix 60 mg a day      Specialists:  Cardiology: Dr Jones  Pulmonary: Monserrat Teixeira   Derm: Dr Ryley Villarrealo: Dr Baron     Neurology: Dr Steve Dotson  Urology (pt referred to Dr Kaur has not made appointment.)      Smoke: None  ETOH: None  Exercise: walking most days.     HPI      Past Medical History:   Diagnosis Date    Arthritis     Atrial fibrillation     Carotid artery occlusion     bilateral    Cataract     CHF (congestive heart failure)     CKD (chronic kidney disease), stage II     Coronary artery disease     3 Vessel CABG AND 3 STENTS    Encounter for blood transfusion     Hypercholesterolemia     Hypertension     Normocytic anemia     Shingles     X 3 WEEKS AGO    Sleep apnea     CESAR - NOT USING C-PAP    Thyroid disease     TIA (transient ischemic attack)      Social History     Socioeconomic History    Marital status:    Occupational History    Occupation: professor   Tobacco Use    Smoking status: Never Smoker    Smokeless tobacco: Never Used   Substance and Sexual Activity    Alcohol use: Not Currently    Drug use: No     Social Determinants of Health     Physical Activity: Inactive    Days of Exercise per Week: 0 days    Minutes of Exercise per Session: 0 min   Stress: No Stress Concern Present    Feeling of Stress : Not at all     Past Surgical History:   Procedure Laterality Date    A-V CARDIAC PACEMAKER INSERTION N/A 4/1/2021    Procedure: INSERTION, CARDIAC PACEMAKER, DUAL CHAMBER;  Surgeon: Clifford Sevilla MD;  Location: [facility] CATH/EP LAB;  Service: Cardiology;  Laterality: N/A;  [employer]    CORONARY ANGIOPLASTY WITH STENT PLACEMENT      CORONARY ARTERY BYPASS GRAFT      CORONARY ARTERY BYPASS GRAFT (CABG)      3 vessel    EYE SURGERY      bILATERAL CATARACS    INSERTION OF PACEMAKER      REVISION OF IMPLANTABLE CARDIOVERTER-DEFIBRILLATOR (ICD) ELECTRODE LEAD PLACEMENT Left 7/1/2021    Procedure: REVISION,  INSERTION, ELECTRODE LEAD,pacemaker;  Surgeon: Clifford Sevilla MD;  Location: [facility] CATH/EP LAB;  Service: Cardiology;  Laterality: Left;    ROBOT-ASSISTED LAPAROSCOPIC REPAIR OF INGUINAL HERNIA Right 3/11/2022    Procedure: ROBOTIC REPAIR, HERNIA, INGUINAL;  Surgeon: Melo Aguilera III, MD;  Location: [facility] OR;  Service: General;  Laterality: Right;  LB case    TREATMENT OF CARDIAC ARRHYTHMIA N/A 1/29/2021    Procedure: CARDIOVERSION;  Surgeon: Iron Serna MD;  Location: [facility] CATH/EP LAB;  Service: Cardiology;  Laterality: N/A;    VASECTOMY       Family History   Problem Relation Age of Onset    Cancer Mother     Cancer Father     Hypertension Father     Cancer Maternal Grandmother     COPD Paternal Grandmother      Current Outpatient Medications   Medication Sig Dispense Refill    amiodarone (PACERONE) 200 MG Tab Take 1 tablet (200 mg total) by mouth once daily. 90 tablet 3    amLODIPine (NORVASC) 5 MG tablet Take 1 tablet (5 mg total) by mouth every evening. 90 tablet 2    apixaban (ELIQUIS) 5 mg Tab Take 1 tablet (5 mg total) by mouth 2 (two) times daily. 180 tablet 3    atorvastatin (LIPITOR) 40 MG tablet Take 1 tablet (40 mg total) by mouth once daily. 90 tablet 3    cyanocobalamin 1,000 mcg/mL injection Inject 1 mL (1,000 mcg total) into the muscle every 30 days. 3 mL 1    doxazosin (CARDURA) 8 MG Tab TAKE 1 TABLET DAILY 90 tablet 3    ezetimibe (ZETIA) 10 mg tablet TAKE 1 TABLET DAILY 90 tablet 3    furosemide (LASIX) 40 MG tablet Take 1 tablet (40 mg total) by mouth once daily. 135 tablet 2    levothyroxine (SYNTHROID) 100 MCG tablet Take 1 tablet (100 mcg total) by mouth before breakfast. 90 tablet 2    lisinopriL (PRINIVIL,ZESTRIL) 20 MG tablet Take 1 tablet (20 mg total) by mouth 2 (two) times a day. 180 tablet 3    metoprolol succinate (TOPROL-XL) 50 MG 24 hr tablet Take 1 tablet (50 mg total) by mouth once daily. 90 tablet 3    nitroGLYCERIN (NITROSTAT) 0.4 MG SL tablet  "Place 1 tablet (0.4 mg total) under the tongue every 5 (five) minutes as needed for Chest pain. DO NOT CRUSH OR CHEW; DISSOLVE UNDER TONGUE; MAXIMUM OF 3 DOSES IN 15 MINUTES 20 tablet 0    ascorbic acid, vitamin C, (VITAMIN C) 1000 MG tablet Take 1,000 mg by mouth once daily.      coQ10, ubiquinol, 100 mg Cap Take 150 mg by mouth once daily.       gabapentin (NEURONTIN) 600 MG tablet Take 1 tablet (600 mg total) by mouth 2 (two) times daily. 60 tablet 11    garlic 1,000 mg Cap Take 1 capsule by mouth once daily.       LIDOcaine (LIDODERM) 5 % Place 1 patch onto the skin once daily. Remove & Discard patch within 12 hours or as directed by MD 30 patch 0    multivitamin Tab Take 1 tablet by mouth once daily. (Patient not taking: Reported on 8/8/2022)      spironolactone (ALDACTONE) 25 MG tablet Take 1 tablet (25 mg total) by mouth every other day. 15 tablet 11    vitamin D (VITAMIN D3) 1000 units Tab Take 1,000 Units by mouth once daily.       No current facility-administered medications for this visit.     Review of patient's allergies indicates:  No Known Allergies    Review of Systems   Constitutional: Negative for activity change, diaphoresis, fatigue, fever and unexpected weight change.   HENT: Negative for congestion, postnasal drip, sinus pressure, sinus pain and sore throat.    Respiratory: Negative for cough, chest tightness, shortness of breath and wheezing.    Cardiovascular: Positive for leg swelling (Bilateral lower extremity edema). Negative for chest pain and palpitations.   Genitourinary: Negative for dysuria, frequency, hematuria and urgency.   Musculoskeletal: Positive for myalgias (Pain in the area of his shingles rash).          Blood pressure 130/60, pulse 68, temperature 97.8 °F (36.6 °C), temperature source Oral, height 5' 8" (1.727 m), weight 92.9 kg (204 lb 12.8 oz), SpO2 98 %. Body mass index is 31.14 kg/m².   Objective:      Physical Exam  Vitals reviewed.   Constitutional:       " General: He is not in acute distress.     Appearance: Normal appearance. He is not ill-appearing or toxic-appearing.   HENT:      Head: Normocephalic and atraumatic.      Right Ear: Tympanic membrane normal.      Left Ear: Tympanic membrane normal.      Nose: Nose normal. No congestion or rhinorrhea.   Cardiovascular:      Rate and Rhythm: Normal rate and regular rhythm.      Heart sounds: Normal heart sounds.   Pulmonary:      Effort: Pulmonary effort is normal.   Musculoskeletal:      Right lower leg: Edema present.      Left lower leg: Edema present.      Comments: 3+ pitting edema, with weeping from several abrasions. On both legs.   Skin:     General: Skin is warm and dry.      Capillary Refill: Capillary refill takes less than 2 seconds.   Neurological:      Mental Status: He is alert.             Assessment:       1. Post herpetic neuralgia    2. Lymphedema         Plan:           Post herpetic neuralgia  -     gabapentin (NEURONTIN) 600 MG tablet; Take 1 tablet (600 mg total) by mouth 2 (two) times daily.  Dispense: 60 tablet; Refill: 11  -     LIDOcaine (LIDODERM) 5 %; Place 1 patch onto the skin once daily. Remove & Discard patch within 12 hours or as directed by MD  Dispense: 30 patch; Refill: 0    Lymphedema  -     spironolactone (ALDACTONE) 25 MG tablet; Take 1 tablet (25 mg total) by mouth every other day.  Dispense: 15 tablet; Refill: 11

## 2022-08-11 NOTE — PROGRESS NOTES
Outpatient Care Management  Plan of Care Follow Up Visit    Patient: Tong Ford  MRN: [id number]  Date of Service: 08/11/2022  Completed by: Mami Almanzar RN  Referral Date: 06/19/2022  Program:     Reason for Visit   Patient presents with    OPCM RN First Follow-Up Attempt     08/12/22    OPCM RN Second Follow-Up Attempt     08/17/22-Letter thru my chart        Brief Summary: 08/12/22-Attempt follow up with  patient for outpatient case management. No answer. Left message requesting call back. RN OPCM 1'st follow up attempt.   08/17/22-Attempt follow up with  patient for outpatient case management. No answer. Left message requesting call back. RN OPCM 2'nd follow up attempt. [facility]. Called patient back after receiving a message thru the my chart. He moved in with his daughter and son in law. He is having his legs wrapped for lymphedema two times a week. His daughter wrapped his legs once last week. Daughter went with him to his appointment and legs have less weeping and edema is down. He states that the wrappings are not comfortable. His toe was throbbing the other day after the wrapping plus sleeping at night time is hard, trying to keep his legs elevated. He is taking tylenol and gabapentin for pain. He is not wearing a lidocaine patch today. He has slipped out of the bed four or five times this month. He is asking for hospital bed if possible to elevate feet and for safety.   CM ACTION PLAN   : Follow up next week.  Message to PCP for pain medications and hospital bed     Patient Summary     Involvement of Care:  Do I have permission to speak with other family members about your care?       Patient Reported Labs & Vitals:  1.  Any Patient Reported Labs & Vitals?     2.  Patient Reported Blood Pressure:     3.  Patient Reported Pulse:     4.  Patient Reported Weight (Kg):     5.  Patient Reported Blood Glucose (mg/dl):       Medical and social history was reviewed with patient and/or  caregiver.     Clinical Assessment     Reviewed and provided basic information on available community resources for mental health, transportation, wellness resources, and palliative care programs with patient and/or caregiver.     Complex Care Plan     Care plan was discussed and completed today with input from patient and/or caregiver.    Patient Instructions     Instructions were provided via the [facility] patient resources and are available for the patient to view on the patient portal.    Follow up in about 7 days (around 8/24/2022) for RN Follow up call.    [facility] OPCM Self-Management Care Plan was developed with the patients/caregivers input and was based on identified barriers from [facility] assessment.  Goals were written today with the patient/caregiver and the patient has agreed to work towards these goals to improve his/her overall well-being. Patient verbalized understanding of the care plan, goals, and all of today's instructions. Encouraged patient/caregiver to communicate with his/her physician and health care team about health conditions and the treatment plan.  Provided my contact information today and encouraged patient/caregiver to call me with any questions as needed.

## 2022-08-11 NOTE — PLAN OF CARE
[facility]  Occupational Therapy Initial Evaluation     Name: Tong Ford  Clinic Number: [id number]    Therapy Diagnosis: No diagnosis found.  Physician: Kris Zavala MD    Physician Orders: Evaluate and treat  Medical Diagnosis:  I89.0 (ICD-10-CM) - Lymphedema  Evaluation Date: 8/11/2022  Insurance Authorization period Expiration: 08/11/2022-12/31/2022  Plan of Care Certification Period: 08/11/2022-11/11/2022    Visit # / Visits Authortized: 1 / 1  Time In:1:30  Time Out: 2:30  Total Billable Time: 60 minutes- Evaluation and Manual therapy    Precautions: Standard and Fall    Subjective     Tong rates pain at a 5/10 where 0 = no pain and 10 = maximal pain. Best pain gets 0/10. Worst pain gets 10/10.  Takes Gabapentin.  He describes pain as heavy, taut and aching.   Patient's goals are as follows: to reduce the swelling and weeping to resolve.    History of Present Illness: He reports swelling began approximately 3 weeks ago.  He noticed the swelling and began having weeping on Bilateral lower extremities.  He recently had a fall on concrete and is suffering from back pain from the fall.  He has not been as mobile since the fall which may have attributed to the swelling.  His socks and pants are soaked with serous fluid at his arrival.  He had a makeshift bandage over the legs that was completely soaked.    Previous Lymphedema Treatment: None    Current Level of Function: Independent but labored with Lower body dressing.  It is taking 2 hours to get dressed.  Independent with Driving. Dependent with home management.  Prior Level of Function: Independent with selfcare.  It is taking 2 hours to get dressed.  Independent with Driving.Occupation/Duties: Retired .    Tong Ford  has a past medical history of Arthritis, Atrial fibrillation, Carotid artery occlusion, Cataract, CHF (congestive heart failure), CKD (chronic kidney disease), stage II, Coronary  artery disease, Encounter for blood transfusion, Hypercholesterolemia, Hypertension, Normocytic anemia, Shingles, Sleep apnea, Thyroid disease, and TIA (transient ischemic attack).    Tong Ford  has a past surgical history that includes Coronary artery bypass graft; Vasectomy; Coronary angioplasty with stent; Treatment of cardiac arrhythmia (N/A, 1/29/2021); A-V cardiac pacemaker insertion (N/A, 4/1/2021); Insertion of pacemaker; Revision of implantable cardioverter-defibrillator (ICD) electrode lead placement (Left, 7/1/2021); Coronary Artery Bypass Graft (CABG); Eye surgery; and Robot-assisted laparoscopic repair of inguinal hernia (Right, 3/11/2022).    Tong has a current medication list which includes the following prescription(s): amiodarone, amlodipine, apixaban, ascorbic acid (vitamin c), atorvastatin, coq10 (ubiquinol), cyanocobalamin, doxazosin, ezetimibe, furosemide, gabapentin, garlic, levothyroxine, lidocaine, lisinopril, metoprolol succinate, multivitamin, nitroglycerin, spironolactone, and vitamin d.    Review of patient's allergies indicates:  No Known Allergies       Objective     Amount of Swelling: Moderate  Location of Swelling: toes to the groin.  He is experiencing swelling in the testicles  Skin Integrity: The skin is shiny and taut.  Color is pink.  The legs are column shape.  There are small pores exuding lymph fluid like tears in multiple locations.   Palpation/Texture: smooth and firm  Circulation: warm, well perfused  Posture: Fair    Range of Motion: Within Normal Limits     Strength: Within Normal Limits     Adaptive Device used for ambulation: straight cane      Lower Extremity Girth Measurements (in centimeters)  LANDMARK  Right Lower Extremity  Left Lower Extremity    Superior border of the Patella +10 49.5 50   Knee 46 45.5   40 cm  41.5 41   30 cm 40.5 43   20 cm  30 28.5   10 cm  24 25.5   Malleolus 27 27   Ankle- heel to dorsum of foot 34.2 35.3   Arch 25.5 26   Total Girth  Measurement 318.2 321.8   Total Girth Difference 3.6    Total Girth Reduction       Bilateral Limb Involvement  Moderate- 10-cm-15 cm TGD or < 5 cm GGD  Moderate/Severe- 15.1-20 cm TGD or 5.1 cm -10 cm GGD  Severe => 20 cm TGD or >10 cm GGD    Sensation: abnormal, has some neuropathy    Treatment     Treatment Time In (separate from total time) : 2:15   Treatment Time Out (separate from total time : 2:30      MULTILAYERED BANDAGING:  Issued supplies and bandaged Both Lower Extremities with 6cm, 8cm,10 cm rosidal rolls - cotton stockinette, cotton padding applied from the ankle to the popliteal fold, bandaged from the toes to the popliteal fold in a figure 8 pattern with short stretch compression bandages,  To leave intact 12 -24 hours, discharge with any problems,  Return rolled bandages next session.   The legs were washed with antibacterial soap. The weeping areas were dressed with gentian violet, Xeroform dressing and covered with Hydrolific dressing secured with rolled gauze.    Issued Home Exercise Program and  pt verbally understood the instructions as they were given and demonstrated proper form and technique during therapy. Patient was advise to perform these exercises free of pain, and to stop performing them if pain occurs.    Patient/Family Education: role of Occupational Therapy, goals for Occupational Therapy, scheduling/cancellations - patient verbalized understanding. Discussed insurance limitations with patient. Patient was educated in lymphedema etiology and management plans.  Patient was provided with written  risk reductions and precautions for managing lymphedema.      Assessment     This patient presents with Bilateral lower extremities swelling resulting in: lymphedema of the Bilateral lower extremities , increased pain, increased stiffness in the ankles and knees, as well as difficulty performing lower body dressing , and placing the pt at higher risk of infection. Following medical record  review it is determined that pt will benefit from occupational therapy services in order to maximize pain free and/or functional use of Bilateral lower extremities. The following goals were discussed with the patient and patient is in agreement with them as to be addressed in the treatment plan. The patient's rehab potential is Good.     Anticipated barriers to occupational therapy: none  Pt has no cultural, educational or language barriers to learning provided.    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 His/her main goal for therapy is to reduce swelling and to get the legs to stop weeping.     Comorbidities:   CHF, CKD stage II and HTN    Medical and Therapy History Review:   Brief               Performance Deficits    Physical:  Skin Integrity/Scar Formation  Edema  Postural Control  Pain    Cognitive:  No Deficits    Psychosocial:    No Deficits     Clinical Decision Making:  High    Assessment Process:  Problem-Focused Assessments    Modification/Need for Assistance:  Not Necessary    Intervention Selection:  Several Treatment Options       low  Based on PMHX, co morbidities , data from assessments and functional level of assistance required with task and clinical presentation directly impacting function.       Short Term Goals: (4 weeks)  Patient to be Independent and compliant with Home Exercise Program to increase lymphatic flow.  Decrease girth in Bilateral lower extremities by 6 cm for improved functional use of Bilateral Lower Extremity.  Patient will demonstrate 100% knowledge of lymphedema precautions and signs of infection.   Patient will perform self-bandaging techniques.  Patient will perform  self lymph drainage techniques to increase lymph uptake and direct lymph flow.  Patient will tolerate daily activities with multilayered bandaging or compression garment.  Skin integrity improve to Good, skin will be superficially hydrated, fungal infection will resolve, color will fade to pink and temperature will be room temperature.  Weeping will cease.      Long Term Goals: (8 weeks)  Decrease girth in Bilateral lower extremities by 8 cm for improved functional use of Bilateral Lower Extremity.  Patient will show reduction in girth to mild or less with improved contour of limb.  Patient to xu/doff compression garment with daily compliance.   Patient will demonstrate the ability to independently manage condition by discharge.    Plan     Patient to be seen 1-2 x per week for 90 days for the medically necessary treatments to include: decongestive massage- Manual lymphatic drainage, Kinesio tape, skin care, multi layered bandaging, education in lymphedema precautions, self massage, self bandaging, and assistance in obtaining appropriate compression garment.  Therapeutic exercise, postural correction, and progression of Home Exercise Program.      LONG Moreno, CLT

## 2022-08-16 NOTE — PROGRESS NOTES
OccupationalTherapy Daily Treatment Note     Name: [facility]  Clinic Number: [id number]    Therapy Diagnosis:   Encounter Diagnosis   Name Primary?    Lymphedema Yes     Physician: Kris Zavala MD    Visit Date: 8/16/2022     Physician Orders: Evaluate and treat  Medical Diagnosis:  I89.0 (ICD-10-CM) - Lymphedema  Evaluation Date: 8/11/2022  Insurance Authorization period Expiration: 08/11/2022-12/31/2022  Plan of Care Certification Period: 08/11/2022-11/11/2022     Visit # / Visits Authortized: 1 / 20  Time In:11:30  Time Out: 12:30  Total Billable Time: 60 minutes-  Manual therapy     Precautions: Standard and Fall    Subjective     Pt reports: That he can see that his legs have gone down some.  He did remove the compression bandages once and had his daughter rewrap him.  He was compliant with compression bandage wearing schedule.  Functional change: none    Pain: 4/10  Location: bilateral lower legs    Objective   Response to previous treatment: The legs were visibly smaller.  There was less weeping noted.  He still has one spot that is weeping constantly from the Right lower extremity.  The legs are a more normal shape.  The thighs are inundated with fluid.  Treatment:   Tong received the following manual therapy techniques:- Manual Lymphatic Drainage were applied to the: Bilateral lower extremities for 60 minutes, including: Manual Lymph Drainage and short stretch compression bandaging     Both legs were washed.  The wounds were dressed with gentian violet, Xeroform to the wound bed, covered with hydrolific foam dressing and secured with rolled gauze.     MANUAL LYMPHATIC DRAINAGE:  Drainage of Bilateral lower extremities While seated with the legs in a dependent position using two techniques to encourage the lymph to flow to the groin from the toes.      KINESIO TAPE:  Kinesio tape was applied to the Bilateral lower extremities lateral thighs in a basketweave application with 25% tension to increase  lymph uptake and direct lymph flow.     MULTILAYERED BANDAGING:  Issued supplies and bandaged BLE with elatomul roll to toes and hand, 6cm, 8cm short stretch compression bandages - cotton padding at toes to the popliteal fold  To leave intact 12 -24 hours, dc with any problems,  Return rolled bandages next session.     Home Exercises Provided and Patient Education Provided     Education provided:      PATIENT/FAMILY Education: bandaging wear schedule,  Home Exercise Program,  Beginning of self massage,  Self or assisted bandaging , and Risk reduction    KINESIO TAPE: Tong was educated on kinesio tape wearing schedule and to remove if having ill effects.  Tong verbalized understanding of the above education.      Written Home Exercises Provided:Tong demonstrated good  understanding of the education provided.       Assessment     He arrived to therapy with compression bandages on.  The Right lower extremity wraps were completely saturated with lymph fluid.  The wraps had been removed and the daughter reapplied.  He was agreeable to having the compression bandages reapplied.  The legs were smaller per visual exam.  The thighs are still inundated with fluid.  Kinesio tape applied to the lower extremities as a manual technique to increase lymph uptake and direct lymph flow.    Tong Is progressing well towards his goals.   Pt prognosis is Good.     Pt will continue to benefit from skilled outpatient occupational therapy to address the deficits listed in the problem list box on initial evaluation, provide pt/family education and to maximize pt's level of independence in the home and community environment.     Pt's spiritual, cultural and educational needs considered and pt agreeable to plan of care and goals.     Anticipated barriers to occupational therapy: none    Goals:   Short Term Goals: (4 weeks)  In Progress  Patient to be Independent and compliant with Home Exercise Program to increase lymphatic flow.  In  Progress  Decrease girth in Bilateral lower extremities by 6 cm for improved functional use of Bilateral Lower Extremity.  In Progress  Patient will demonstrate 100% knowledge of lymphedema precautions and signs of infection.   In Progress  Patient will perform self-bandaging techniques.  In Progress  Patient will perform self lymph drainage techniques to increase lymph uptake and direct lymph flow.  In Progress  Patient will tolerate daily activities with multilayered bandaging or compression garment.  In Progress  Skin integrity improve to Good, skin will be superficially hydrated, fungal infection will resolve, color will fade to pink and temperature will be room temperature.  In Progress  Weeping will cease.        Long Term Goals: (8 weeks)  In Progress  Decrease girth in Bilateral lower extremities by 8 cm for improved functional use of Bilateral Lower Extremity.  In Progress  Patient will show reduction in girth to mild or less with improved contour of limb.  In Progress  Patient to xu/doff compression garment with daily compliance.   In Progress  Patient will demonstrate the ability to independently manage condition by discharge.     Plan      Patient to be seen 1-2 x per week for 90 days for the medically necessary treatments to include: decongestive massage- Manual lymphatic drainage, Kinesio tape, skin care, multi layered bandaging, education in lymphedema precautions, self massage, self bandaging, and assistance in obtaining appropriate compression garment.  Therapeutic exercise, postural correction, and progression of Home Exercise Program.       LONG Moreno, CLT

## 2022-08-17 NOTE — TELEPHONE ENCOUNTER
----- Message from Mami Almanzar RN sent at 8/17/2022  1:25 PM CDT -----  Patient  is having his legs wrapped two times a week for lymphedema. Patient is taking gabapentin 600 mg two times a day but still having throbbing pain at times from the wraps. He has slipped off his bed four or five times this month.   He is asking for a simple pain medication suggestion and a hospital bed if he would qualify for one since he is elevating his feet.    Please call and advise patient.     Thank you for your assistance,   Mami Almanzar RN [facility]   Outpatient Complex Care Management  [phone]

## 2022-08-19 NOTE — PLAN OF CARE
06/18/22 1430   VERDE Message   Medicare Outpatient and Observation Notification regarding financial responsibility Given to patient/caregiver;Explained to patient/caregiver;Signed/date by patient/caregiver   Date VERDE was signed 06/18/22   Time VERDE was signed 1436      98

## 2022-08-19 NOTE — PROGRESS NOTES
OccupationalTherapy Daily Treatment Note     Name: Tong [facility]  Clinic Number: [id number]    Therapy Diagnosis:   Encounter Diagnosis   Name Primary?    Lymphedema Yes     Physician: Kris Zavala MD    Visit Date: 8/19/2022     Physician Orders: Evaluate and treat  Medical Diagnosis:  I89.0 (ICD-10-CM) - Lymphedema  Evaluation Date: 8/11/2022  Insurance Authorization period Expiration: 08/11/2022-12/31/2022  Plan of Care Certification Period: 08/11/2022-11/11/2022     Visit # / Visits Authortized: 2 / 20  Time In:1:30  Time Out: 2:30  Total Billable Time: 60 minutes-  Manual therapy     Precautions: Standard and Fall    Subjective     Pt reports: That he can see that his legs have gone down some.  He did remove the compression bandages once and had his daughter rewrap him.  He was compliant with compression bandage wearing schedule.  Functional change: none    Pain: 4/10  Location: bilateral lower legs    Objective   Response to previous treatment: The legs were visibly smaller.  There was less weeping noted.  He still has one spot that is weeping constantly from the Right lower extremity.  The legs are a more normal shape.  The thighs are inundated with fluid.  Treatment:   Tong received the following manual therapy techniques:- Manual Lymphatic Drainage were applied to the: Bilateral lower extremities for 60 minutes, including: Manual Lymph Drainage and short stretch compression bandaging     Both legs were washed.  The wounds were dressed with gentian violet, Xeroform to the wound bed, covered with hydrolific foam dressing and secured with rolled gauze.     MANUAL LYMPHATIC DRAINAGE:  Drainage of Bilateral lower extremities While seated with the legs in a dependent position using two techniques to encourage the lymph to flow to the groin from the toes.      KINESIO TAPE:  Kinesio tape was applied to the Bilateral lower extremities medial surface of the thighs in a basketweave application with 25% tension  to increase lymph uptake and direct lymph flow.     MULTILAYERED BANDAGING:  Issued supplies and bandaged BLE with elatomul roll to toes and hand, 6cm, 8cm short stretch compression bandages - cotton padding at toes to the popliteal fold  To leave intact 12 -24 hours, dc with any problems,  Return rolled bandages next session.     Home Exercises Provided and Patient Education Provided     Education provided:      PATIENT/FAMILY Education: bandaging wear schedule,  Home Exercise Program,  Beginning of self massage,  Self or assisted bandaging , and Risk reduction    KINESIO TAPE: Tong was educated on kinesio tape wearing schedule and to remove if having ill effects.  Tong verbalized understanding of the above education.      Written Home Exercises Provided:Tong demonstrated good  understanding of the education provided.       Assessment     He arrived to therapy with compression bandages on.  The Right lower extremity wraps were completely saturated with lymph fluid.  The wraps had been removed and the daughter reapplied.  He was agreeable to having the compression bandages reapplied.  The legs were smaller per visual exam.  The thighs are still inundated with fluid.  Kinesio tape applied to the lower extremities as a manual technique to increase lymph uptake and direct lymph flow.    Tong Is progressing well towards his goals.   Pt prognosis is Good.     Pt will continue to benefit from skilled outpatient occupational therapy to address the deficits listed in the problem list box on initial evaluation, provide pt/family education and to maximize pt's level of independence in the home and community environment.     Pt's spiritual, cultural and educational needs considered and pt agreeable to plan of care and goals.     Anticipated barriers to occupational therapy: none    Goals:   Short Term Goals: (4 weeks)  In Progress  Patient to be Independent and compliant with Home Exercise Program to increase lymphatic  flow.  In Progress  Decrease girth in Bilateral lower extremities by 6 cm for improved functional use of Bilateral Lower Extremity.  In Progress  Patient will demonstrate 100% knowledge of lymphedema precautions and signs of infection.   In Progress  Patient will perform self-bandaging techniques.  In Progress  Patient will perform self lymph drainage techniques to increase lymph uptake and direct lymph flow.  In Progress  Patient will tolerate daily activities with multilayered bandaging or compression garment.  In Progress  Skin integrity improve to Good, skin will be superficially hydrated, fungal infection will resolve, color will fade to pink and temperature will be room temperature.  In Progress  Weeping will cease.        Long Term Goals: (8 weeks)  In Progress  Decrease girth in Bilateral lower extremities by 8 cm for improved functional use of Bilateral Lower Extremity.  In Progress  Patient will show reduction in girth to mild or less with improved contour of limb.  In Progress  Patient to xu/doff compression garment with daily compliance.   In Progress  Patient will demonstrate the ability to independently manage condition by discharge.     Plan      Patient to be seen 1-2 x per week for 90 days for the medically necessary treatments to include: decongestive massage- Manual lymphatic drainage, Kinesio tape, skin care, multi layered bandaging, education in lymphedema precautions, self massage, self bandaging, and assistance in obtaining appropriate compression garment.  Therapeutic exercise, postural correction, and progression of Home Exercise Program.       LONG Moreno, [place]

## 2022-08-22 NOTE — TELEPHONE ENCOUNTER
Refill request    Spironolactone    Patient also requesting refill of Cyanocobalamin needle and syringes to give injection     Last office visit     08/08/22  Follow up visit     09/01/22  Medication pended

## 2022-08-23 NOTE — PROGRESS NOTES
"  OccupationalTherapy Daily Treatment Note     Name: Tong [facility]  Clinic Number: [id number]    Therapy Diagnosis:   Encounter Diagnosis   Name Primary?    Lymphedema Yes     Physician: Kris Zavala MD    Visit Date: 8/23/2022     Physician Orders: Evaluate and treat  Medical Diagnosis:  I89.0 (ICD-10-CM) - Lymphedema  Evaluation Date: 8/11/2022  Insurance Authorization period Expiration: 08/11/2022-12/31/2022  Plan of Care Certification Period: 08/11/2022-11/11/2022     Visit # / Visits Authortized: 3 / 20  Time In:1:30  Time Out: 2:30  Total Billable Time: 60 minutes-  Manual therapy     Precautions: Standard and Fall    Subjective     Pt reports: That he removed the compression bandages on Monday, thinking it was Tuesday and when his daughter asked if she should rewrap him he said no.  He stated, " I should have let her rewrap them." He reports that his legs looked a lot better and there were no blisters or weeping spots on the legs initially.     He was compliant with compression bandage wearing schedule.  Functional change: none    Pain: 4/10  Location: bilateral lower legs    Objective   Response to previous treatment: The legs had numerous blisters on the Right lower extremity including the feet.  The Left lower extremity had one large on the ankle.    Treatment:   Tong received the following manual therapy techniques:- Manual Lymphatic Drainage were applied to the: Bilateral lower extremities for 60 minutes, including: Manual Lymph Drainage and short stretch compression bandaging     Both legs were washed.  The wounds were dressed with gentian violet, Xeroform to the wound bed, covered with hydrolific foam dressing, Abdominal pad and secured with rolled gauze. There was a lot of drainage on the Right lower extremity.     MANUAL LYMPHATIC DRAINAGE:  Drainage of Bilateral lower extremities While seated with the legs in a dependent position using two techniques to encourage the lymph to flow to the groin " from the toes.      MULTILAYERED BANDAGING:  Issued supplies and bandaged BLE with elatomul roll to toes and hand, 6cm, 8cm short stretch compression bandages - cotton padding at toes to the popliteal fold  To leave intact 12 -24 hours, dc with any problems,  Return rolled bandages next session.     Home Exercises Provided and Patient Education Provided     Education provided:      PATIENT/FAMILY Education: bandaging wear schedule,  Home Exercise Program,  Beginning of self massage,  Self or assisted bandaging , and Risk reduction    KINESIO TAPE: Tong was educated on kinesio tape wearing schedule and to remove if having ill effects.  Tong verbalized understanding of the above education.      Written Home Exercises Provided:Tong demonstrated good  understanding of the education provided.       Assessment     He arrived to therapy with compression bandages off and numerous blisters on the Right lower extremity. The wraps had been removed by the patient a day earlier due to getting his days mixed up.  He was agreeable to having the compression bandages reapplied.  He has a lot of fluid in the abdomen, genitals, and Lower extremities. It was recommended that he see his Cardiologist as soon as possible for a possible medication adjustment.  Tong had a set back this date due to being unwrapped for over 24 hours. He reports that his legs looked a lot better and there were no blisters or weeping spots on the legs initially.     Pt prognosis is Good.     Pt will continue to benefit from skilled outpatient occupational therapy to address the deficits listed in the problem list box on initial evaluation, provide pt/family education and to maximize pt's level of independence in the home and community environment.     Pt's spiritual, cultural and educational needs considered and pt agreeable to plan of care and goals.     Anticipated barriers to occupational therapy: none    Goals:   Short Term Goals: (4 weeks)  In  Progress  Patient to be Independent and compliant with Home Exercise Program to increase lymphatic flow.  In Progress  Decrease girth in Bilateral lower extremities by 6 cm for improved functional use of Bilateral Lower Extremity.  In Progress  Patient will demonstrate 100% knowledge of lymphedema precautions and signs of infection.   In Progress  Patient will perform self-bandaging techniques.  In Progress  Patient will perform self lymph drainage techniques to increase lymph uptake and direct lymph flow.  In Progress  Patient will tolerate daily activities with multilayered bandaging or compression garment.  In Progress  Skin integrity improve to Good, skin will be superficially hydrated, fungal infection will resolve, color will fade to pink and temperature will be room temperature.  In Progress  Weeping will cease.        Long Term Goals: (8 weeks)  In Progress  Decrease girth in Bilateral lower extremities by 8 cm for improved functional use of Bilateral Lower Extremity.  In Progress  Patient will show reduction in girth to mild or less with improved contour of limb.  In Progress  Patient to xu/doff compression garment with daily compliance.   In Progress  Patient will demonstrate the ability to independently manage condition by discharge.     Plan      Patient to be seen 1-2 x per week for 90 days for the medically necessary treatments to include: decongestive massage- Manual lymphatic drainage, Kinesio tape, skin care, multi layered bandaging, education in lymphedema precautions, self massage, self bandaging, and assistance in obtaining appropriate compression garment.  Therapeutic exercise, postural correction, and progression of Home Exercise Program.       LONG Moreno, CLT

## 2022-08-24 NOTE — PROGRESS NOTES
Outpatient Care Management  Plan of Care Follow Up Visit    Patient: Tong Ford  MRN: [id number]  Date of Service: 08/24/2022  Completed by: Mami Almanzar RN  Referral Date: 06/19/2022    Reason for Visit   Patient presents with    Update Plan Of Care       Brief Summary: see care plan     CM ACTION PLAN   : Follow up -Review regular exercise   Message to Dr. Clifford Sevilla-  Per patient Orquidea Small at the lymphedema clinic is concerned about her  wrapping patients legs affecting his heart. Per patient she thinks he needs a sooner appt than 10/11/22 with the cardiologist.  Message to Xin Treadwell LPN about order for hospital bed. Ramila Barraza MA faxed order to [facility] for hospital bed.   Message to Dr. Zavala that patient received his B12 medication but he needs needles and syringes. Requesting order be sent to his pharmacy for 3cc syringes and needles 22 gauge with needle 1 1/2 inches long.     Patient Summary     Involvement of Care:  Do I have permission to speak with other family members about your care?       Patient Reported Labs & Vitals:  1.  Any Patient Reported Labs & Vitals?     2.  Patient Reported Blood Pressure:     3.  Patient Reported Pulse:     4.  Patient Reported Weight (Kg):     5.  Patient Reported Blood Glucose (mg/dl):       Medical and social history was reviewed with patient and/or caregiver.     Clinical Assessment     Reviewed and provided basic information on available community resources for mental health, transportation, wellness resources, and palliative care programs with patient and/or caregiver.     Complex Care Plan     Care plan was discussed and completed today with input from patient and/or caregiver.    Patient Instructions     Instructions were provided via the [facility] patient resources and are available for the patient to view on the patient portal.        Follow up in about 1 week (around 8/31/2022) for RN Follow up call.    Todays OPCM Self-Management  Care Plan was developed with the patients/caregivers input and was based on identified barriers from todays assessment.  Goals were written today with the patient/caregiver and the patient has agreed to work towards these goals to improve his/her overall well-being. Patient verbalized understanding of the care plan, goals, and all of today's instructions. Encouraged patient/caregiver to communicate with his/her physician and health care team about health conditions and the treatment plan.  Provided my contact information today and encouraged patient/caregiver to call me with any questions as needed.

## 2022-08-24 NOTE — TELEPHONE ENCOUNTER
----- Message from Mami Almanzar RN sent at 8/24/2022  2:52 PM CDT -----   Per patient -Orquidea Small at the lymphedema clinic is concerned about wrapping his legs affecting his heart. Per patient she thinks he needs a sooner appt than 10/11/22 with the cardiologist.    Please call and advise patient about an earlier appt.   His phone number is [phone].  Daughter  Daniella [phone].    Thank you for your assistance,   Mami Almanzar RN [facility]   Outpatient Complex Care Management  [phone]

## 2022-08-24 NOTE — TELEPHONE ENCOUNTER
I dont remember him ever having a lot of swelling on his feet. Can we send him for labs CMP BNP. Thanks

## 2022-08-25 NOTE — TELEPHONE ENCOUNTER
----- Message from Mami Almanzar RN sent at 8/25/2022  9:22 AM CDT -----  Patient received his B12 medication but he needs needles and syringes. Requesting order be sent to his pharmacy for 3cc syringes and needles 22 gauge with needle 1 1/2 inches long.     Thank you for your assistance,   Mami Almanzar RN [facility]   [phone]

## 2022-08-26 NOTE — TELEPHONE ENCOUNTER
Left a voicemail letting patient know the syringes were sent. Also left a voicemail for Mami the  to let her know they had been sent.

## 2022-08-26 NOTE — PROGRESS NOTES
OccupationalTherapy Daily Treatment Note     Name: [facility]  Clinic Number: [id number]    Therapy Diagnosis:   Encounter Diagnosis   Name Primary?    Lymphedema Yes     Physician: Kris Zavala MD    Visit Date: 8/26/2022     Physician Orders: Evaluate and treat  Medical Diagnosis:  I89.0 (ICD-10-CM) - Lymphedema  Evaluation Date: 8/11/2022  Insurance Authorization period Expiration: 08/11/2022-12/31/2022  Plan of Care Certification Period: 08/11/2022-11/11/2022     Visit # / Visits Authortized: 4 / 20  Time In:2:30  Time Out: 3:30  Total Billable Time: 60 minutes-  Manual therapy     Precautions: Standard and Fall    Subjective     Pt reports: That he did not remove the compression bandages until this morning when he took a shower and his daughter dressed the wounds. He did get in touch with the Cardiologist and he is ordering some tests.     He was compliant with compression bandage wearing schedule.  Functional change: none    Pain: 4/10  Location: bilateral lower legs    Objective   Response to previous treatment: The legs had numerous blisters on the Right lower extremity including the feet.  The Left lower extremity had one large on the ankle.    Treatment:   Tong received the following manual therapy techniques:- Manual Lymphatic Drainage were applied to the: Bilateral lower extremities for 60 minutes, including: Manual Lymph Drainage and short stretch compression bandaging     Both legs were washed.  The wounds were dressed with gentian violet, Xeroform to the wound bed, covered with hydrolific foam dressing, Abdominal pad and secured with rolled gauze. There was a lot of drainage on the Right lower extremity.     MANUAL LYMPHATIC DRAINAGE:  Drainage of Bilateral lower extremities While seated with the legs in a dependent position using two techniques to encourage the lymph to flow to the groin from the toes.      MULTILAYERED BANDAGING:  Issued supplies and bandaged BLE with elatomul roll to toes  and hand, 6cm, 8cm short stretch compression bandages - cotton padding at toes to the popliteal fold  To leave intact 12 -24 hours, dc with any problems,  Return rolled bandages next session.     Kinesio tape was applied to the abdomen in an I strip application with 25% tension from to the area below the rib cage and above the pubic bone to increase lymph uptake and direct lymph flow.  Home Exercises Provided and Patient Education Provided     Education provided:      PATIENT/FAMILY Education: bandaging wear schedule,  Home Exercise Program,  Beginning of self massage,  Self or assisted bandaging , and Risk reduction    KINESIO TAPE: Tong was educated on kinesio tape wearing schedule and to remove if having ill effects.  Tong verbalized understanding of the above education.      Written Home Exercises Provided:Tong demonstrated good  understanding of the education provided.       Assessment     He arrived to therapy with compression bandages off and numerous sores and blisters on the Right lower extremity.  He was agreeable to having the compression bandages reapplied.  He has a lot of fluid in the abdomen, genitals, and Lower extremities. It was recommended that he see his Cardiologist as soon as possible for a possible medication adjustment.  Kinesio tape is being used as a manual technique.      Tong had a set back this date due to being unwrapped for over 24 hours in which blisters developed on Bilateral lower extremities  He reports that his legs looked a lot better and there were no blisters or weeping spots on the legs initially when the compression bandages were removed.     Pt prognosis is Good.     Pt will continue to benefit from skilled outpatient occupational therapy to address the deficits listed in the problem list box on initial evaluation, provide pt/family education and to maximize pt's level of independence in the home and community environment.     Pt's spiritual, cultural and educational  needs considered and pt agreeable to plan of care and goals.     Anticipated barriers to occupational therapy: none    Goals:   Short Term Goals: (4 weeks)  In Progress  Patient to be Independent and compliant with Home Exercise Program to increase lymphatic flow.  In Progress  Decrease girth in Bilateral lower extremities by 6 cm for improved functional use of Bilateral Lower Extremity.  In Progress  Patient will demonstrate 100% knowledge of lymphedema precautions and signs of infection.   In Progress  Patient will perform self-bandaging techniques.  In Progress  Patient will perform self lymph drainage techniques to increase lymph uptake and direct lymph flow.  In Progress  Patient will tolerate daily activities with multilayered bandaging or compression garment.  In Progress  Skin integrity improve to Good, skin will be superficially hydrated, fungal infection will resolve, color will fade to pink and temperature will be room temperature.  In Progress  Weeping will cease.        Long Term Goals: (8 weeks)  In Progress  Decrease girth in Bilateral lower extremities by 8 cm for improved functional use of Bilateral Lower Extremity.  In Progress  Patient will show reduction in girth to mild or less with improved contour of limb.  In Progress  Patient to xu/doff compression garment with daily compliance.   In Progress  Patient will demonstrate the ability to independently manage condition by discharge.     Plan      Patient to be seen 1-2 x per week for 90 days for the medically necessary treatments to include: decongestive massage- Manual lymphatic drainage, Kinesio tape, skin care, multi layered bandaging, education in lymphedema precautions, self massage, self bandaging, and assistance in obtaining appropriate compression garment.  Therapeutic exercise, postural correction, and progression of Home Exercise Program.       LONG Moreno, CLT

## 2022-08-26 NOTE — PROGRESS NOTES
08/26/22-Called patient after receiving voice mail. He call [facility] and will have the hospital bed delivered on Wednesday. Updated him that needles and syringes have been called into his pharmacy. Dr. Sevilla has ordered a BNP and CMP at [facility] lab, verbalized understanding.

## 2022-08-30 NOTE — ED PROVIDER NOTES
Encounter Date: 8/29/2022       History     Chief Complaint   Patient presents with    Edema     To lower legs with it much worse to right foot     HPI 77-year-old man with a history of CHF, CKD, CAD, lymphedema presents emergency department for evaluation of pain in his right toes and bilateral lower extremity edema.  He goes to lymphedema clinic and last had his legs wrapped on Tuesday.  He states for several days he had had some increased swelling and some mild redness.  He denies any fever.  He does endorse that he has been getting out of bed and moving around when he was not supposed to.  Review of patient's allergies indicates:  No Known Allergies  Past Medical History:   Diagnosis Date    Arthritis     Atrial fibrillation     Carotid artery occlusion     bilateral    Cataract     CHF (congestive heart failure)     CKD (chronic kidney disease), stage II     Coronary artery disease     3 Vessel CABG AND 3 STENTS    Encounter for blood transfusion     Hypercholesterolemia     Hypertension     Normocytic anemia     Shingles     X 3 WEEKS AGO    Sleep apnea     CESAR - NOT USING C-PAP    Thyroid disease     TIA (transient ischemic attack)      Past Surgical History:   Procedure Laterality Date    A-V CARDIAC PACEMAKER INSERTION N/A 4/1/2021    Procedure: INSERTION, CARDIAC PACEMAKER, DUAL CHAMBER;  Surgeon: Clifford eSvilla MD;  Location: [facility] CATH/EP LAB;  Service: Cardiology;  Laterality: N/A;  [employer]    CORONARY ANGIOPLASTY WITH STENT PLACEMENT      CORONARY ARTERY BYPASS GRAFT      CORONARY ARTERY BYPASS GRAFT (CABG)      3 vessel    EYE SURGERY      bILATERAL CATARACS    INSERTION OF PACEMAKER      REVISION OF IMPLANTABLE CARDIOVERTER-DEFIBRILLATOR (ICD) ELECTRODE LEAD PLACEMENT Left 7/1/2021    Procedure: REVISION, INSERTION, ELECTRODE LEAD,pacemaker;  Surgeon: Clifford Sevilla MD;  Location: [facility] CATH/EP LAB;  Service: Cardiology;  Laterality: Left;    ROBOT-ASSISTED LAPAROSCOPIC REPAIR OF INGUINAL  HERNIA Right 3/11/2022    Procedure: ROBOTIC REPAIR, HERNIA, INGUINAL;  Surgeon: Melo Aguilera III, MD;  Location: [facility] OR;  Service: General;  Laterality: Right;  LB case    TREATMENT OF CARDIAC ARRHYTHMIA N/A 1/29/2021    Procedure: CARDIOVERSION;  Surgeon: Iron Serna MD;  Location: [facility] CATH[facility]EP LAB;  Service: Cardiology;  Laterality: N/A;    VASECTOMY       Family History   Problem Relation Age of Onset    Cancer Mother     Cancer Father     Hypertension Father     Cancer Maternal Grandmother     COPD Paternal Grandmother      Social History     Tobacco Use    Smoking status: Never    Smokeless tobacco: Never   Substance Use Topics    Alcohol use: Not Currently    Drug use: No     Review of Systems   Constitutional:  Negative for fever.   HENT:  Negative for sore throat.    Respiratory:  Negative for shortness of breath.    Cardiovascular:  Positive for leg swelling. Negative for chest pain.   Gastrointestinal:  Negative for nausea.   Genitourinary:  Negative for dysuria.   Musculoskeletal:  Negative for back pain.   Skin:  Positive for color change. Negative for rash.   Neurological:  Negative for weakness.   Hematological:  Does not bruise/bleed easily.     Physical Exam     Initial Vitals [08/29/22 2133]   BP Pulse Resp Temp SpO2   134/69 61 18 97.5 °F (36.4 °C) 97 %      MAP       --         Physical Exam    Nursing note and vitals reviewed.  Constitutional: He appears well-developed and well-nourished.   HENT:   Head: Normocephalic and atraumatic.   Eyes: EOM are normal. Pupils are equal, round, and reactive to light.   Neck: Neck supple.   Cardiovascular:  Normal rate and regular rhythm.           Pulmonary/Chest: Breath sounds normal. No respiratory distress. He has no rales.   Musculoskeletal:         General: Edema (Bilateral lower extremity edema.) present. Normal range of motion.      Cervical back: Neck supple.      Comments: There is skin breakdown in the interdigital web spaces underneath  the toes of the 1st through 4th toes on the right foot.  No purulent discharge but there is foul odor.  There is erythema of the right foot extending to the ankle.  There is mild warmth.  Multiple open sores on his lower extremity.     Neurological: He is alert and oriented to person, place, and time.   Skin: Skin is warm and dry.       ED Course   Procedures  Labs Reviewed   CBC W/ AUTO DIFFERENTIAL - Abnormal; Notable for the following components:       Result Value    RBC 3.01 (*)     Hemoglobin 10.1 (*)     Hematocrit 30.8 (*)      (*)     MCH 33.6 (*)     All other components within normal limits   COMPREHENSIVE METABOLIC PANEL - Abnormal; Notable for the following components:    BUN 34 (*)     Creatinine 2.5 (*)     Calcium 8.6 (*)     Albumin 3.1 (*)     eGFR 25 (*)     All other components within normal limits   B-TYPE NATRIURETIC PEPTIDE - Abnormal; Notable for the following components:     (*)     All other components within normal limits   TROPONIN I   HIV 1 / 2 ANTIBODY   HEPATITIS C ANTIBODY     EKG Readings: (Independently Interpreted)   Atrial paced rhythm with frequent AV dual paced complexes, left axis deviation with right bundle-branch block.  Inferior infarct, age undetermined.     Imaging Results              X-Ray Chest AP Portable (In process)                      Medications - No data to display  Medical Decision Making:   History:   Old Medical Records: I decided to obtain old medical records.  Initial Assessment:   77-year-old man with history of CHF and known lymphedema presents emergency department for pain under his right toes and some increased swelling and erythema in his right foot.  Examination he appears to have tinea pedis with possibly some surrounding cellulitis extending into the foot.  He has no evidence of decompensated congestive heart failure.  He is afebrile and has normal white blood cell.  He is not septic.  I will prescribe him antibiotics to cover for  possible early cellulitis and prescribed him ketoconazole cream to apply to the toes.  He is to follow up with his lymphedema clinic and PCP.  Return precautions discussed.  Discharged in no acute distress.                    Clinical Impression:   Final diagnoses:  [R06.02] Shortness of breath  [I49.9] Irregular cardiac rhythm  [B35.3] Tinea pedis of right foot (Primary)  [L03.90] Cellulitis, unspecified cellulitis site        ED Disposition Condition    Discharge Stable          ED Prescriptions       Medication Sig Dispense Start Date End Date Auth. Provider    ketoconazole (NIZORAL) 2 % cream  (Status: Discontinued) Apply topically once daily. 30 g 8/30/2022 8/30/2022 Adolfo Allen MD    doxycycline (VIBRAMYCIN) 100 MG Cap  (Status: Discontinued) Take 1 capsule (100 mg total) by mouth 2 (two) times daily. for 10 days 20 capsule 8/30/2022 8/30/2022 Adolfo Allen MD    doxycycline (VIBRAMYCIN) 100 MG Cap Take 1 capsule (100 mg total) by mouth 2 (two) times daily. for 10 days 20 capsule 8/30/2022 9/9/2022 Adolfo Allen MD    ketoconazole (NIZORAL) 2 % cream Apply topically once daily. 30 g 8/30/2022 10/11/2022 Adolfo Allen MD          Follow-up Information       Follow up With Specialties Details Why Contact Info    Kris Zavala MD Family Medicine Schedule an appointment as soon as possible for a visit in 3 days  [address] [place] [address]  [address] [place] [address]  [phone]      [facility] Emergency Dept Emergency Medicine  As needed [address] [place] [id number] [phone]             Adolfo Allen MD  08/30/22 0099       Adolfo Allen MD  08/30/22 9148

## 2022-08-30 NOTE — ED NOTES
Pt noted to be having irradic heart rate as will jump up on monitor intermittently. History of afib. Has not had evening meds. MD advised.

## 2022-08-30 NOTE — ED NOTES
Pt placed on EKG machine to capture irregularity but no longer irregular. NSR. Pt has pacemaker. Meds discontinued and EKG not done. MD aware.

## 2022-08-30 NOTE — ED NOTES
Pt presents with complaints of increase in swelling to right leg particularily in the foot. Pt keeps legs wrapped due to lymphydema.

## 2022-08-30 NOTE — PROGRESS NOTES
OccupationalTherapy Daily Treatment Note     Name: [facility]  Clinic Number: [id number]    Therapy Diagnosis:   Encounter Diagnosis   Name Primary?    Lymphedema Yes     Physician: Kris Zavala MD    Visit Date: 8/30/2022     Physician Orders: Evaluate and treat  Medical Diagnosis:  I89.0 (ICD-10-CM) - Lymphedema  Evaluation Date: 8/11/2022  Insurance Authorization period Expiration: 08/11/2022-12/31/2022  Plan of Care Certification Period: 08/11/2022-11/11/2022     Visit # / Visits Authortized: 5 / 20  Time In:1:30  Time Out: 2:30  Total Billable Time: 60 minutes-  Manual therapy     Precautions: Standard and Fall    Subjective     Pt reports: That he went to the ER due to his toes were hurting and he was given medication for athletes foot.      He was compliant with compression bandage wearing schedule.  Functional change: none    Pain: 4/10  Location: bilateral lower legs    Objective   Response to previous treatment: The legs had numerous sore on the Right lower extremity including the feet due to blister popping.  The Left lower extremity had one large on the ankle.    Treatment:   Tong received the following manual therapy techniques:- Manual Lymphatic Drainage were applied to the: Bilateral lower extremities for 60 minutes, including: Manual Lymph Drainage and short stretch compression bandaging     Both legs were washed.  The wounds were dressed with gentian violet, Xeroform to the wound bed, covered with hydrolific foam dressing, Abdominal pad and secured with rolled gauze. There was a lot of drainage on the Right lower extremity. Gentian violet was applied between the toes to assist with drying up the area.    MANUAL LYMPHATIC DRAINAGE:  Drainage of Bilateral lower extremities While seated with the legs in a dependent position using two techniques to encourage the lymph to flow to the groin from the toes.      MULTILAYERED BANDAGING:  Issued supplies and bandaged Bilateral lower extremities  with  elatomul roll to toes and hand, 6cm, 8cm short stretch compression bandages - cotton padding at toes to the popliteal fold  To leave intact 12 -24 hours, dc with any problems,  Return rolled bandages next session.     Kinesio tape was applied to the thighs in a basketweave application with 25% tension on the medial surface to increase lymph uptake and direct lymph flow.  Home Exercises Provided and Patient Education Provided     Education provided:      PATIENT/FAMILY Education: bandaging wear schedule,  Home Exercise Program,  Beginning of self massage,  Self or assisted bandaging , and Risk reduction    KINESIO TAPE: Tong was educated on kinesio tape wearing schedule and to remove if having ill effects.  Tong verbalized understanding of the above education.      Written Home Exercises Provided:Tong demonstrated good  understanding of the education provided.       Assessment     He arrived to therapy with compression bandages off and numerous sores and blisters on the Right lower extremity.  He was agreeable to having the compression bandages reapplied.  He has a lot of fluid in the abdomen, genitals, and Lower extremities. It was recommended that he see his Cardiologist as soon as possible for a possible medication adjustment.  Kinesio tape is being used as a manual technique.      Tong had a set back this date due to being unwrapped for over 24 hours in which blisters developed on Bilateral lower extremities  He reports that his legs looked a lot better and there were no blisters or weeping spots on the legs initially when the compression bandages were removed.     Pt prognosis is Good.     Pt will continue to benefit from skilled outpatient occupational therapy to address the deficits listed in the problem list box on initial evaluation, provide pt/family education and to maximize pt's level of independence in the home and community environment.     Pt's spiritual, cultural and educational needs  considered and pt agreeable to plan of care and goals.     Anticipated barriers to occupational therapy: none    Goals:   Short Term Goals: (4 weeks)  In Progress  Patient to be Independent and compliant with Home Exercise Program to increase lymphatic flow.  In Progress  Decrease girth in Bilateral lower extremities by 6 cm for improved functional use of Bilateral Lower Extremity.  In Progress  Patient will demonstrate 100% knowledge of lymphedema precautions and signs of infection.   In Progress  Patient will perform self-bandaging techniques.  In Progress  Patient will perform self lymph drainage techniques to increase lymph uptake and direct lymph flow.  In Progress  Patient will tolerate daily activities with multilayered bandaging or compression garment.  In Progress  Skin integrity improve to Good, skin will be superficially hydrated, fungal infection will resolve, color will fade to pink and temperature will be room temperature.  In Progress  Weeping will cease.        Long Term Goals: (8 weeks)  In Progress  Decrease girth in Bilateral lower extremities by 8 cm for improved functional use of Bilateral Lower Extremity.  In Progress  Patient will show reduction in girth to mild or less with improved contour of limb.  In Progress  Patient to xu/doff compression garment with daily compliance.   In Progress  Patient will demonstrate the ability to independently manage condition by discharge.     Plan      Patient to be seen 1-2 x per week for 90 days for the medically necessary treatments to include: decongestive massage- Manual lymphatic drainage, Kinesio tape, skin care, multi layered bandaging, education in lymphedema precautions, self massage, self bandaging, and assistance in obtaining appropriate compression garment.  Therapeutic exercise, postural correction, and progression of Home Exercise Program.       LONG Moreno, CLT

## 2022-08-31 NOTE — PROGRESS NOTES
Outpatient Care Management  Plan of Care Follow Up Visit    Patient: Tong Ford  MRN: [id number]  Date of Service: 08/31/2022  Completed by: Mami Almanzar RN  Referral Date: 06/19/2022    Reason for Visit   Patient presents with    Update Plan Of Care       Brief Summary: see care plan     Patient Summary     Involvement of Care:  Do I have permission to speak with other family members about your care?       Patient Reported Labs & Vitals:  1.  Any Patient Reported Labs & Vitals?     2.  Patient Reported Blood Pressure:     3.  Patient Reported Pulse:     4.  Patient Reported Weight (Kg):     5.  Patient Reported Blood Glucose (mg/dl):       Medical and social history was reviewed with patient and/or caregiver.     Clinical Assessment     Reviewed and provided basic information on available community resources for mental health, transportation, wellness resources, and palliative care programs with patient and/or caregiver.     Complex Care Plan     Care plan was discussed and completed today with input from patient and/or caregiver.    Patient Instructions     Instructions were provided via the [email or web address] and are available for the patient to view on the patient portal.      Follow up in about 1 week (around 9/7/2022) for RN Follow up call.    Todays OPCM Self-Management Care Plan was developed with the patients/caregivers input and was based on identified barriers from todays assessment.  Goals were written today with the patient/caregiver and the patient has agreed to work towards these goals to improve his/her overall well-being. Patient verbalized understanding of the care plan, goals, and all of today's instructions. Encouraged patient/caregiver to communicate with his/her physician and health care team about health conditions and the treatment plan.  Provided my contact information today and encouraged patient/caregiver to call me with any questions as needed.

## 2022-09-01 NOTE — PROGRESS NOTES
SUBJECTIVE:    Patient ID: Tong Ford is a 77 y.o. male.    Chief Complaint: Follow-up and Hypertension  77 male male here  here today complaining of lower extremity edema patient has known congestive heart failure with preserved ejection fraction currently on Lasix 40 mg a day and spironolactone 25mg  every other day. He has bilateral lower extremity edema he is wearing his lower extremities wrapped. He is accompanied by his daughter. The patient has not been able to get an appointment with his cardiologist. He was recently seen in the ER and diagnosed with bilateral foot fungus. I discussed the possibilities  to going in to rehab facility.       Last Echo Jan 2021, Estimated EF 60%        SPMHX:  CAD: Nitroglycerine, Last echo Jun/2019 EF 70% normal stress test 2019.  Hyperlipidemia:  Atorvastatin 40 mg, Zetia 10 mg  HTN:Metoprolol XL 50mg, Doxazosin 8mg, Amlodipine 5mg mg Lisinopril 20mg b.i.d., his blood pressure is well controlled today.  Hypothyroidism:  Levothyroxine 100 mcg  Arthritis:  BPH:  Flomax 0.4 mg.  CESAR: On CPAP quit using it  Atrial fib: Amiodarone 200mg  Eliquis 5 mg,  Pace Maker:    Heart failure with preserved ejection fraction:  Diuretics Lasix 60 mg a day      Specialists:  Cardiology: Dr Jones  Pulmonary: Monserrat Teixeira   Derm: Dr Ryley Dudley: Dr Baron     Neurology: Dr Steve Dotson  Urology (pt referred to Dr Kaur has not made appointment.)      Smoke: None  ETOH: None  Exercise: walking most days.    Follow-up  Associated symptoms include weakness. Pertinent negatives include no abdominal pain, chest pain, congestion, coughing, diaphoresis, fatigue or numbness.   Hypertension  Pertinent negatives include no chest pain or shortness of breath.       Past Medical History:   Diagnosis Date    Arthritis     Atrial fibrillation     Carotid artery occlusion     bilateral    Cataract     CHF (congestive heart failure)     CKD (chronic kidney disease), stage II     Coronary artery  disease     3 Vessel CABG AND 3 STENTS    Encounter for blood transfusion     Hypercholesterolemia     Hypertension     Normocytic anemia     Shingles     X 3 WEEKS AGO    Sleep apnea     CESAR - NOT USING C-PAP    Thyroid disease     TIA (transient ischemic attack)      Social History     Socioeconomic History    Marital status:    Occupational History    Occupation: professor   Tobacco Use    Smoking status: Never    Smokeless tobacco: Never   Substance and Sexual Activity    Alcohol use: Not Currently    Drug use: No     Social Determinants of Health     Physical Activity: Inactive    Days of Exercise per Week: 0 days    Minutes of Exercise per Session: 0 min   Stress: No Stress Concern Present    Feeling of Stress : Not at all     Past Surgical History:   Procedure Laterality Date    A-V CARDIAC PACEMAKER INSERTION N/A 4/1/2021    Procedure: INSERTION, CARDIAC PACEMAKER, DUAL CHAMBER;  Surgeon: Clifford Sevilla MD;  Location: [facility] CATH/EP LAB;  Service: Cardiology;  Laterality: N/A;  [employer]    CORONARY ANGIOPLASTY WITH STENT PLACEMENT      CORONARY ARTERY BYPASS GRAFT      CORONARY ARTERY BYPASS GRAFT (CABG)      3 vessel    EYE SURGERY      bILATERAL CATARACS    INSERTION OF PACEMAKER      REVISION OF IMPLANTABLE CARDIOVERTER-DEFIBRILLATOR (ICD) ELECTRODE LEAD PLACEMENT Left 7/1/2021    Procedure: REVISION, INSERTION, ELECTRODE LEAD,pacemaker;  Surgeon: Clifford Sevilla MD;  Location: [facility] CATH/EP LAB;  Service: Cardiology;  Laterality: Left;    ROBOT-ASSISTED LAPAROSCOPIC REPAIR OF INGUINAL HERNIA Right 3/11/2022    Procedure: ROBOTIC REPAIR, HERNIA, INGUINAL;  Surgeon: Melo Aguilera III, MD;  Location: [facility];  Service: General;  Laterality: Right;  LB case    TREATMENT OF CARDIAC ARRHYTHMIA N/A 1/29/2021    Procedure: CARDIOVERSION;  Surgeon: Iron Serna MD;  Location: [facility] CATH/EP LAB;  Service: Cardiology;  Laterality: N/A;    VASECTOMY       Family History   Problem Relation Age  of Onset    Cancer Mother     Cancer Father     Hypertension Father     Cancer Maternal Grandmother     COPD Paternal Grandmother      Current Outpatient Medications   Medication Sig Dispense Refill    amiodarone (PACERONE) 200 MG Tab Take 1 tablet (200 mg total) by mouth once daily. 90 tablet 3    amLODIPine (NORVASC) 5 MG tablet Take 1 tablet (5 mg total) by mouth every evening. 90 tablet 2    apixaban (ELIQUIS) 5 mg Tab Take 1 tablet (5 mg total) by mouth 2 (two) times daily. 180 tablet 3    atorvastatin (LIPITOR) 40 MG tablet Take 1 tablet (40 mg total) by mouth once daily. 90 tablet 3    cyanocobalamin 1,000 mcg/mL injection Inject 1 mL (1,000 mcg total) into the muscle every 30 days. 3 mL 1    doxazosin (CARDURA) 8 MG Tab TAKE 1 TABLET DAILY 90 tablet 3    doxycycline (VIBRAMYCIN) 100 MG Cap Take 1 capsule (100 mg total) by mouth 2 (two) times daily. for 10 days 20 capsule 0    ezetimibe (ZETIA) 10 mg tablet TAKE 1 TABLET DAILY 90 tablet 3    furosemide (LASIX) 40 MG tablet Take 1 tablet (40 mg total) by mouth once daily. 135 tablet 2    gabapentin (NEURONTIN) 600 MG tablet Take 1 tablet (600 mg total) by mouth 2 (two) times daily. 60 tablet 11    ketoconazole (NIZORAL) 2 % cream Apply topically once daily. 30 g 1    ascorbic acid, vitamin C, (VITAMIN C) 1000 MG tablet Take 1,000 mg by mouth once daily.      coQ10, ubiquinol, 100 mg Cap Take 150 mg by mouth once daily.       garlic 1,000 mg Cap Take 1 capsule by mouth once daily.       levothyroxine (SYNTHROID) 100 MCG tablet Take 1 tablet (100 mcg total) by mouth before breakfast. 90 tablet 2    LIDOcaine (LIDODERM) 5 % Place 1 patch onto the skin once daily. Remove & Discard patch within 12 hours or as directed by MD 30 patch 0    lisinopriL (PRINIVIL,ZESTRIL) 20 MG tablet Take 1 tablet (20 mg total) by mouth 2 (two) times a day. 180 tablet 3    metoprolol succinate (TOPROL-XL) 50 MG 24 hr tablet Take 1 tablet (50 mg total) by mouth once daily. 90 tablet 3  "   multivitamin Tab Take 1 tablet by mouth once daily. (Patient not taking: Reported on 8/8/2022)      nitroGLYCERIN (NITROSTAT) 0.4 MG SL tablet Place 1 tablet (0.4 mg total) under the tongue every 5 (five) minutes as needed for Chest pain. DO NOT CRUSH OR CHEW; DISSOLVE UNDER TONGUE; MAXIMUM OF 3 DOSES IN 15 MINUTES 20 tablet 0    spironolactone (ALDACTONE) 25 MG tablet Take 1 tablet (25 mg total) by mouth every other day. 15 tablet 11    syringe-needle,safety,disp unt 3 mL 22 gauge x 1 1/2" Syrg Please syringe to administer medication deep IM 4 each 0    traMADoL (ULTRAM) 50 mg tablet Take 1 tablet (50 mg total) by mouth every 6 (six) hours as needed for Pain. 30 each 0    vitamin D (VITAMIN D3) 1000 units Tab Take 1,000 Units by mouth once daily.       No current facility-administered medications for this visit.     Review of patient's allergies indicates:  No Known Allergies    Review of Systems   Constitutional:  Negative for activity change, appetite change, diaphoresis, fatigue and unexpected weight change.   HENT:  Negative for congestion, postnasal drip, rhinorrhea and sinus pain.    Respiratory:  Negative for cough, chest tightness, shortness of breath and wheezing.    Cardiovascular:  Positive for leg swelling. Negative for chest pain.   Gastrointestinal:  Negative for abdominal distention, abdominal pain, anal bleeding and blood in stool.   Genitourinary:  Negative for dysuria, flank pain, frequency, hematuria and urgency.   Musculoskeletal:  Positive for gait problem.   Neurological:  Positive for weakness. Negative for tremors, seizures, syncope, speech difficulty and numbness.        Blood pressure 120/60, pulse 65, temperature 97.5 °F (36.4 °C), temperature source Oral, height 5' 7.5" (1.715 m), weight 94.8 kg (209 lb 1.6 oz), SpO2 95 %. Body mass index is 32.27 kg/m².   Objective:      Physical Exam  Vitals reviewed.   Constitutional:       General: He is not in acute distress.     Appearance: " Normal appearance. He is obese.      Comments: Ambulating slowly, using a walker ( new)    HENT:      Head: Normocephalic and atraumatic.      Nose: Nose normal. No congestion or rhinorrhea.   Cardiovascular:      Rate and Rhythm: Normal rate and regular rhythm.   Musculoskeletal:      Right lower leg: Edema present.      Left lower leg: Edema present.      Comments: Pt wearing bilateral lower extremity wraps.   Skin:     General: Skin is warm and dry.      Capillary Refill: Capillary refill takes less than 2 seconds.   Psychiatric:         Behavior: Behavior is cooperative.           Assessment:       1. Lymphedema    2. Physical deconditioning    3. Chronic heart failure with preserved ejection fraction           Plan:           Lymphedema  -     Ambulatory referral/consult to Physical/Occupational Therapy; Future; Expected date: 09/08/2022    Physical deconditioning  -     Ambulatory referral/consult to Physical/Occupational Therapy; Future; Expected date: 09/08/2022    Chronic heart failure with preserved ejection fraction  -     Ambulatory referral/consult to Physical/Occupational Therapy; Future; Expected date: 09/08/2022

## 2022-09-01 NOTE — TELEPHONE ENCOUNTER
----- Message from Jd Mata MA sent at 9/1/2022 12:49 PM CDT -----  Contact: BEBE BELTRAN [[id number]]  Type: Needs Medical Advice    Who Called: BEBE BELTRAN [[id number]]  Best Call Back Number: [phone]  Inquiry/Question: Please call BEBE BELTRAN [[id number]] regarding sooner appt severe swelling  Thank you~

## 2022-09-02 NOTE — TELEPHONE ENCOUNTER
The patient is complaining of lymphedema.  He was seen in the emergency room.  He also has infection in his legs they been wrapped and treated by the lymphedema clinic.  Looking at his labs his albumin is low at 3.1 will check a urine to rule out he is not having proteinuria.  He needs to have protein in his diet.

## 2022-09-02 NOTE — PROGRESS NOTES
OccupationalTherapy Daily Treatment Note     Name: [facility]  Clinic Number: [id number]    Therapy Diagnosis:   Encounter Diagnosis   Name Primary?    Lymphedema Yes     Physician: Kris Zavala MD    Visit Date: 9/2/2022     Physician Orders: Evaluate and treat  Medical Diagnosis:  I89.0 (ICD-10-CM) - Lymphedema  Evaluation Date: 8/11/2022  Insurance Authorization period Expiration: 08/11/2022-12/31/2022  Plan of Care Certification Period: 08/11/2022-11/11/2022     Visit # / Visits Authortized: 6 / 20  Time In:10:30  Time Out: 11:30  Total Billable Time: 60 minutes-  Manual therapy     Precautions: Standard and Fall    Subjective     Pt reports: That he was pleased that the wounds on the Bilateral lower extremities are looking much better and are healing.      He was compliant with compression bandage wearing schedule.  Functional change: none    Pain: 4/10  Location: bilateral lower legs    Objective   Response to previous treatment: The legs had numerous sore on the Right lower extremity including the feet due to blisters popping.  There are 5 open sores on the Right lower extremity but there were 4 that have healed.  The Left lower extremity had one large on the ankle that is showing signs or healing.  The legs were visibly smaller.  No more weeping areas on the Bilateral lower extremities.     Treatment:   Tong received the following manual therapy techniques:- Manual Lymphatic Drainage were applied to the: Bilateral lower extremities for 60 minutes, including: Manual Lymph Drainage and short stretch compression bandaging     Both legs were washed.  The wounds were dressed with gentian violet, Xeroform to the wound bed, covered with hydrolific foam dressing, Abdominal pad and secured with rolled gauze. There was a lot of drainage on the Right lower extremity. Gentian violet was applied between the toes to assist with drying up the area.    MANUAL LYMPHATIC DRAINAGE:  Drainage of Bilateral lower  extremities While seated with the legs in a dependent position using two techniques to encourage the lymph to flow to the groin from the toes.      MULTILAYERED BANDAGING:  Issued supplies and bandaged Bilateral lower extremities  with elatomul roll to toes and hand, 6cm, 8cm short stretch compression bandages - cotton padding at toes to the popliteal fold  To leave intact 12 -24 hours, dc with any problems,  Return rolled bandages next session.     Home Exercises Provided and Patient Education Provided     Education provided:      PATIENT/FAMILY Education: bandaging wear schedule,  Home Exercise Program,  Beginning of self massage,  Self or assisted bandaging , and Risk reduction    KINESIO TAPE: Tong was educated on kinesio tape wearing schedule and to remove if having ill effects.  Tong verbalized understanding of the above education.      Written Home Exercises Provided:Tong demonstrated good  understanding of the education provided.       Assessment     He arrived to therapy with compression bandages off with 5 wounds on the Right lower extremity and one on the Left lower extremity that are a result of blisters popping.  He was agreeable to having the compression bandages reapplied.  He reports losing 5 pounds.  He continues to have a lot of fluid in the abdomen, genitals, and Lower extremities. It was recommended that he see his Cardiologist as soon as possible for a possible medication adjustment.  Kinesio tape is being used as a manual technique.      Tong had a set back this date due to being unwrapped for over 24 hours in which blisters developed on Bilateral lower extremities  He reports that his legs looked a lot better and there were no blisters or weeping spots on the legs initially when the compression bandages were removed.     Pt prognosis is Good.     Pt will continue to benefit from skilled outpatient occupational therapy to address the deficits listed in the problem list box on initial  evaluation, provide pt/family education and to maximize pt's level of independence in the home and community environment.     Pt's spiritual, cultural and educational needs considered and pt agreeable to plan of care and goals.     Anticipated barriers to occupational therapy: none    Goals:   Short Term Goals: (4 weeks)  In Progress  Patient to be Independent and compliant with Home Exercise Program to increase lymphatic flow.  In Progress  Decrease girth in Bilateral lower extremities by 6 cm for improved functional use of Bilateral Lower Extremity.  In Progress  Patient will demonstrate 100% knowledge of lymphedema precautions and signs of infection.   In Progress  Patient will perform self-bandaging techniques.  In Progress  Patient will perform self lymph drainage techniques to increase lymph uptake and direct lymph flow.  In Progress  Patient will tolerate daily activities with multilayered bandaging or compression garment.  In Progress  Skin integrity improve to Good, skin will be superficially hydrated, fungal infection will resolve, color will fade to pink and temperature will be room temperature.  In Progress  Weeping will cease.        Long Term Goals: (8 weeks)  In Progress  Decrease girth in Bilateral lower extremities by 8 cm for improved functional use of Bilateral Lower Extremity.  In Progress  Patient will show reduction in girth to mild or less with improved contour of limb.  In Progress  Patient to xu/doff compression garment with daily compliance.   In Progress  Patient will demonstrate the ability to independently manage condition by discharge.     Plan      Patient to be seen 1-2 x per week for 90 days for the medically necessary treatments to include: decongestive massage- Manual lymphatic drainage, Kinesio tape, skin care, multi layered bandaging, education in lymphedema precautions, self massage, self bandaging, and assistance in obtaining appropriate compression garment.  Therapeutic  exercise, postural correction, and progression of Home Exercise Program.       LONG Moreno, CLT

## 2022-09-05 NOTE — TELEPHONE ENCOUNTER
Caller states that he has been dx with edema, and is experiencing intermittent pain 8/10 to RLE. Caller states that he is currently out of tramadol and has place a refill request with his PCP's office. Pt denies relief with OTC pain medication at this time time.  Pt advised per protocol and verbalized understanding.     Reason for Disposition   [1] SEVERE pain (e.g., excruciating, unable to do any normal activities) AND [2] not improved after 2 hours of pain medicine    Additional Information   Negative: Looks like a broken bone or dislocated joint (e.g., crooked or deformed)   Negative: Sounds like a life-threatening emergency to the triager   Negative: Chest pain   Negative: Difficulty breathing   Negative: Entire foot is cool or blue in comparison to other side   Negative: Unable to walk   Negative: [1] Red area or streak AND [2] fever   Negative: [1] Swollen joint AND [2] fever   Negative: [1] Cast on leg or ankle AND [2] now increased pain   Negative: Patient sounds very sick or weak to the triager    Protocols used: Leg Pain-A-AH

## 2022-09-07 NOTE — PROGRESS NOTES
OccupationalTherapy Daily Treatment Note     Name: [facility]  Clinic Number: [id number]    Therapy Diagnosis:   Encounter Diagnosis   Name Primary?    Lymphedema Yes     Physician: Kris Zavala MD    Visit Date: 9/6/2022     Physician Orders: Evaluate and treat  Medical Diagnosis:  I89.0 (ICD-10-CM) - Lymphedema  Evaluation Date: 8/11/2022  Insurance Authorization period Expiration: 08/11/2022-12/31/2022  Plan of Care Certification Period: 08/11/2022-11/11/2022     Visit # / Visits Authortized: 7/ 20  Time In:1:30  Time Out: 2:30  Total Billable Time: 60 minutes-  Manual therapy     Precautions: Standard and Fall    Subjective     Pt reports: That he was pleased that the wounds on the Bilateral lower extremities are looking much better and are healing.      He was compliant with compression bandage wearing schedule.  Functional change: none    Pain: 4/10  Location: bilateral lower legs    Objective   Response to previous treatment: The legs had numerous sore on the Right lower extremity including the feet due to blisters popping.  There are 2 open sores on the Right lower extremity.  The Left lower extremity had one large on the ankle that is showing signs or healing.  The legs were visibly smaller.  No more weeping areas on the Bilateral lower extremities.     Treatment:   Tong received the following manual therapy techniques:- Manual Lymphatic Drainage were applied to the: Bilateral lower extremities for 60 minutes, including: Manual Lymph Drainage and short stretch compression bandaging     Both legs were washed.  The wounds were dressed with gentian violet, Xeroform to the wound bed, covered with hydrolific foam dressing, Abdominal pad and secured with rolled gauze. There was a lot of drainage on the Right lower extremity. Gentian violet was applied between the toes to assist with drying up the area.    MANUAL LYMPHATIC DRAINAGE:  Drainage of Bilateral lower extremities While seated with the legs in a  dependent position using two techniques to encourage the lymph to flow to the groin from the toes.      MULTILAYERED BANDAGING:  Issued supplies and bandaged Bilateral lower extremities  with elatomul roll to toes and hand, 6cm, 8cm short stretch compression bandages - cotton padding at toes to the popliteal fold  To leave intact 12 -24 hours, dc with any problems,  Return rolled bandages next session.     Home Exercises Provided and Patient Education Provided     Education provided:      PATIENT/FAMILY Education: bandaging wear schedule,  Home Exercise Program,  Beginning of self massage,  Self or assisted bandaging , and Risk reduction    KINESIO TAPE: Tong was educated on kinesio tape wearing schedule and to remove if having ill effects.  Tong verbalized understanding of the above education.      Written Home Exercises Provided:Tong demonstrated good  understanding of the education provided.       Assessment     He arrived to therapy with compression bandages off with 5 wounds on the Right lower extremity and one on the Left lower extremity that are a result of blisters popping.  He was agreeable to having the compression bandages reapplied.  He reports losing 5 pounds.  He continues to have a lot of fluid in the abdomen, genitals, and Lower extremities. It was recommended that he see his Cardiologist as soon as possible for a possible medication adjustment.  Kinesio tape is being used as a manual technique.      Tong had a set back this date due to being unwrapped for over 24 hours in which blisters developed on Bilateral lower extremities  He reports that his legs looked a lot better and there were no blisters or weeping spots on the legs initially when the compression bandages were removed.     Pt prognosis is Good.     Pt will continue to benefit from skilled outpatient occupational therapy to address the deficits listed in the problem list box on initial evaluation, provide pt/family education and to  maximize pt's level of independence in the home and community environment.     Pt's spiritual, cultural and educational needs considered and pt agreeable to plan of care and goals.     Anticipated barriers to occupational therapy: none    Goals:   Short Term Goals: (4 weeks)  In Progress  Patient to be Independent and compliant with Home Exercise Program to increase lymphatic flow.  In Progress  Decrease girth in Bilateral lower extremities by 6 cm for improved functional use of Bilateral Lower Extremity.  In Progress  Patient will demonstrate 100% knowledge of lymphedema precautions and signs of infection.   In Progress  Patient will perform self-bandaging techniques.  In Progress  Patient will perform self lymph drainage techniques to increase lymph uptake and direct lymph flow.  In Progress  Patient will tolerate daily activities with multilayered bandaging or compression garment.  In Progress  Skin integrity improve to Good, skin will be superficially hydrated, fungal infection will resolve, color will fade to pink and temperature will be room temperature.  In Progress  Weeping will cease.        Long Term Goals: (8 weeks)  In Progress  Decrease girth in Bilateral lower extremities by 8 cm for improved functional use of Bilateral Lower Extremity.  In Progress  Patient will show reduction in girth to mild or less with improved contour of limb.  In Progress  Patient to xu/doff compression garment with daily compliance.   In Progress  Patient will demonstrate the ability to independently manage condition by discharge.     Plan      Patient to be seen 1-2 x per week for 90 days for the medically necessary treatments to include: decongestive massage- Manual lymphatic drainage, Kinesio tape, skin care, multi layered bandaging, education in lymphedema precautions, self massage, self bandaging, and assistance in obtaining appropriate compression garment.  Therapeutic exercise, postural correction, and progression of  Home Exercise Program.       LONG Moreno, [place]

## 2022-09-07 NOTE — PROGRESS NOTES
Outpatient Care Management  Plan of Care Follow Up Visit    Patient: Tong Ford  MRN: [id number]  Date of Service: 09/07/2022  Completed by: Mami Almanzar RN  Referral Date: 06/19/2022    Reason for Visit   Patient presents with    OPCM RN First Follow-Up Attempt     09/07/22    OPCM RN Second Follow-Up Attempt     09/09/22    OPCM RN Third Follow-Up Attempt     09/13/22-letter thru my chart     Update Plan Of Care     09/16/22       Brief Summary:  09/07/22-Attempt follow up with patient for outpatient case management. No answer. Left message requesting call back.  RN OPCM 1'st follow up attempt.   09/09/22-Attempt follow up with patient for outpatient case management. No answer. Left message requesting call back.  RN OPCM 2'nd follow up attempt.   09/13/22-Attempt follow up with  patient for outpatient case management. No answer. Left message requesting call back.  RN OPCM 3'rd follow up attempt. Letter thru [facility] my chart.   09/16/22- He fell out of a chair at the cardiologist office on 09/15/22. He states that was from the scrotal edema. He is short of breath and has edema in lower legs. He received his hospital bed. He continues to go to the lymphedema clinic to have his leg wrapped. He is going to be admitted to the hospital on Monday for an echocardiogram, check his renal function and possible IV diuretics. He has been talking to Carissa at rehab about admitting him.     Patient Summary     Involvement of Care:  Do I have permission to speak with other family members about your care?       Patient Reported Labs & Vitals:  1.  Any Patient Reported Labs & Vitals?     2.  Patient Reported Blood Pressure:     3.  Patient Reported Pulse:     4.  Patient Reported Weight (Kg):     5.  Patient Reported Blood Glucose (mg/dl):       Medical and social history was reviewed with patient and/or caregiver.     Clinical Assessment     Reviewed and provided basic information on available community resources for mental  health, transportation, wellness resources, and palliative care programs with patient and/or caregiver.     Complex Care Plan     Care plan was discussed and completed today with input from patient and/or caregiver.    Patient Instructions     Instructions were provided via the [facility] patient resources and are available for the patient to view on the patient portal.    Follow up in about 7 days (around 9/23/2022) for RN Follow up call.    Todays OPCM Self-Management Care Plan was developed with the patients/caregivers input and was based on identified barriers from todays assessment.  Goals were written today with the patient/caregiver and the patient has agreed to work towards these goals to improve his/her overall well-being. Patient verbalized understanding of the care plan, goals, and all of today's instructions. Encouraged patient/caregiver to communicate with his/her physician and health care team about health conditions and the treatment plan.  Provided my contact information today and encouraged patient/caregiver to call me with any questions as needed.

## 2022-09-08 NOTE — TELEPHONE ENCOUNTER
[facility] stated that they are working on admitting patient but would like to get orders for [facility] evaluate and treat so they can get an assessment on patient.

## 2022-09-09 NOTE — PROGRESS NOTES
OccupationalTherapy Daily Treatment Note     Name: Tong [facility]  Clinic Number: [id number]    Therapy Diagnosis:   Encounter Diagnosis   Name Primary?    Lymphedema Yes     Physician: Kris Zavala MD    Visit Date: 9/9/2022     Physician Orders: Evaluate and treat  Medical Diagnosis:  I89.0 (ICD-10-CM) - Lymphedema  Evaluation Date: 8/11/2022  Insurance Authorization period Expiration: 08/11/2022-12/31/2022  Plan of Care Certification Period: 08/11/2022-11/11/2022     Visit # / Visits Authortized: 8/ 20  Time In:10:30  Time Out: 11:30  Total Billable Time: 60 minutes-  Manual therapy     Precautions: Standard and Fall    Subjective     Pt reports: That he was pleased that the wounds on the Bilateral lower extremities are looking much better and are healing. His testicle are still really swollen.     He was compliant with compression bandage wearing schedule.  Functional change: none    Pain: 4/10  Location: bilateral lower legs    Objective   Response to previous treatment: The legs had numerous sore on the Right lower extremity including the feet due to blisters popping.  There are 2 open sores on the Right lower extremity.  The Left lower extremity had one large on the ankle that is showing signs of healing.  The legs were visibly smaller.  No more weeping areas on the Bilateral lower extremities.     Treatment:   Tong received the following manual therapy techniques:- Manual Lymphatic Drainage were applied to the: Bilateral lower extremities for 60 minutes, including: Manual Lymph Drainage and short stretch compression bandaging     Both legs were washed.  The wounds were dressed with gentian violet, Xeroform to the wound bed, covered with hydrolific foam dressing, Abdominal pad and secured with rolled gauze. There was a lot of drainage on the Right lower extremity. Gentian violet was applied between the toes to assist with drying up the area.    MANUAL LYMPHATIC DRAINAGE:  Drainage of Bilateral lower  extremities While seated with the legs in a dependent position using two techniques to encourage the lymph to flow to the groin from the toes.      MULTILAYERED BANDAGING:  Issued supplies and bandaged Bilateral lower extremities  with elatomul roll to toes and hand, 6cm, 8cm short stretch compression bandages - cotton padding at toes to the popliteal fold  To leave intact 12 -24 hours, dc with any problems,  Return rolled bandages next session.     Home Exercises Provided and Patient Education Provided     Education provided:      PATIENT/FAMILY Education: bandaging wear schedule,  Home Exercise Program,  Beginning of self massage,  Self or assisted bandaging , and Risk reduction    KINESIO TAPE: Tong was educated on kinesio tape wearing schedule and to remove if having ill effects.  Tong verbalized understanding of the above education.      Written Home Exercises Provided:Tong demonstrated good  understanding of the education provided.       Assessment     He arrived to therapy with compression bandages off with 5 wounds on the Right lower extremity and one on the Left lower extremity that are a result of blisters popping.  He was agreeable to having the compression bandages reapplied.  He reports losing 5 pounds.  He continues to have a lot of fluid in the abdomen, genitals, and Lower extremities. It was recommended that he see his Cardiologist as soon as possible for a possible medication adjustment.  Kinesio tape is being used as a manual technique.      Tong had a set back this date due to being unwrapped  in which blisters developed on Bilateral lower extremities  He reports that his legs looked a lot better and there were no blisters or weeping spots on the legs initially when the compression bandages were removed. Discussed getting Reduction kits for the lower legs to assist with providing more consistent compression as they are velcro and would be easier for the family to put them on following a  shower.  They are in agreement with getting the velcro wraps.     Pt prognosis is Good.     Pt will continue to benefit from skilled outpatient occupational therapy to address the deficits listed in the problem list box on initial evaluation, provide pt/family education and to maximize pt's level of independence in the home and community environment.     Pt's spiritual, cultural and educational needs considered and pt agreeable to plan of care and goals.     Anticipated barriers to occupational therapy: none    Goals:   Short Term Goals: (4 weeks)  In Progress  Patient to be Independent and compliant with Home Exercise Program to increase lymphatic flow.  In Progress  Decrease girth in Bilateral lower extremities by 6 cm for improved functional use of Bilateral Lower Extremity.  In Progress  Patient will demonstrate 100% knowledge of lymphedema precautions and signs of infection.   In Progress  Patient will perform self-bandaging techniques.  In Progress  Patient will perform self lymph drainage techniques to increase lymph uptake and direct lymph flow.  In Progress  Patient will tolerate daily activities with multilayered bandaging or compression garment.  In Progress  Skin integrity improve to Good, skin will be superficially hydrated, fungal infection will resolve, color will fade to pink and temperature will be room temperature.  In Progress  Weeping will cease.        Long Term Goals: (8 weeks)  In Progress  Decrease girth in Bilateral lower extremities by 8 cm for improved functional use of Bilateral Lower Extremity.  In Progress  Patient will show reduction in girth to mild or less with improved contour of limb.  In Progress  Patient to xu/doff compression garment with daily compliance.   In Progress  Patient will demonstrate the ability to independently manage condition by discharge.     Plan      Patient to be seen 1-2 x per week for 90 days for the medically necessary treatments to include: decongestive  massage- Manual lymphatic drainage, Kinesio tape, skin care, multi layered bandaging, education in lymphedema precautions, self massage, self bandaging, and assistance in obtaining appropriate compression garment.  Therapeutic exercise, postural correction, and progression of Home Exercise Program.       LONG Moreno, CLT

## 2022-09-14 NOTE — PROGRESS NOTES
OccupationalTherapy Progress Note     Name: Tong [facility] Number: [id number]    Therapy Diagnosis:   Encounter Diagnosis   Name Primary?    Lymphedema Yes     Physician: Kris Zavala MD    Visit Date: 9/13/2022     Physician Orders: Evaluate and treat  Medical Diagnosis:  I89.0 (ICD-10-CM) - Lymphedema  Evaluation Date: 8/11/2022  Insurance Authorization period Expiration: 08/11/2022-12/31/2022  Plan of Care Certification Period: 08/11/2022-11/11/2022     Visit # / Visits Authortized: 9/ 20  Time In: 5:30  Time Out: 6:30  Total Billable Time: 60 minutes-  Manual therapy     Precautions: Standard and Fall    Subjective     Pt reports: That it took him hours to do today what normally takes him until noon to do.  He was seen at 5:30. He reports that his legs are feeling a little better.  His testicle are still really swollen. They are getting the recommended biker shorts so hopefully they will help give him some needed support.   His son in law was present for the session and reports that the patient seems slower and more fatigued today. He reported that 4 months ago he was driving and doing everything for himself and now he is becoming increasingly dependent in everything.      He was compliant with compression bandage wearing schedule.  Functional change: none    Pain: 4/10  Location: bilateral lower legs    Objective   Response to previous treatment: The legs had numerous sore on the Right lower extremity including the feet due to blisters popping.  There are 3 open sores on the Right lower extremity.  The Left lower extremity had one large on the ankle that is showing signs of healing.  There were additional small blisters on the legs as well. The legs were visibly smaller.  No more weeping areas on the Bilateral lower extremities.      Treatment:   Tong received the following manual therapy techniques:- Manual Lymphatic Drainage were applied to the: Bilateral lower extremities for 60 minutes,  including: Manual Lymph Drainage and short stretch compression bandaging     Both legs were washed.  The wounds were dressed with gentian violet, Xeroform to the wound bed, covered with hydrolific foam dressing, Abdominal pad and secured with rolled gauze. There was a lot of drainage on the Right lower extremity. Gentian violet was applied between the toes to assist with drying up the area.    MANUAL LYMPHATIC DRAINAGE:  Drainage of Bilateral lower extremities While seated with the legs in a dependent position using two techniques to encourage the lymph to flow to the groin from the toes.      MULTILAYERED BANDAGING:  Issued supplies and bandaged Bilateral lower extremities  with elatomul roll to toes and hand, 6cm, 8cm short stretch compression bandages - cotton padding at toes to the popliteal fold  To leave intact 12 -24 hours, dc with any problems,  Return rolled bandages next session.     Home Exercises Provided and Patient Education Provided     Education provided:      PATIENT/FAMILY Education: bandaging wear schedule,  Home Exercise Program,  Beginning of self massage,  Self or assisted bandaging , and Risk reduction    KINESIO TAPE: Tong was educated on kinesio tape wearing schedule and to remove if having ill effects.  Tong verbalized understanding of the above education.      Written Home Exercises Provided:Tong demonstrated good  understanding of the education provided.       Assessment     He arrived to therapy with compression bandages off with 3 wounds on the Right lower extremity and one on the Left lower extremity that are a result of blisters popping.  He was agreeable to having the compression bandages reapplied.  He reports losing 5 pounds.  He continues to have a lot of fluid in the abdomen, genitals, and Lower extremities. It was recommended that he see his Cardiologist as soon as possible for a possible medication adjustment.  Kinesio tape is being used as a manual technique.      Tong  had a set back this date due to being unwrapped  in which blisters developed on Bilateral lower extremities  He reports that his legs looked a lot better and there were no blisters or weeping spots on the legs initially when the compression bandages were removed. Discussed getting Reduction kits for the lower legs to assist with providing more consistent compression as they are velcro and would be easier for the family to put them on following a shower.  They are in agreement with getting the velcro wraps.   Son in Law reports that the patient is having a significant decline in all areas of function.  He was able to ambulate to the treatment area but at a slower pace than normal.  He toileted during the session with Stand By Assist for safety.  He soiled his shorts with urine due to the swelling in the groin. He did try the compression stockinet for the testicles but is not sure he did it right.  He reports during a text exchange that he got the biker shorts and they are helping.          Lower Extremity Girth Measurements (in centimeters)  LANDMARK  Right Lower Extremity  Left Lower Extremity    Superior border of the Patella +10 48.5 50   Knee 46 45.5   40 cm  42 41   30 cm 40 39   20 cm  30 28.5   10 cm  23.5 24.5   Malleolus 26 27   Ankle- heel to dorsum of foot 34.2 34.5   Arch 25 25.5   Total Girth Measurement  315.2 315.5   Total Girth Difference 0.3     Total Girth Reduction  3  6.3      Bilateral Limb Involvement  Moderate- 10-cm-15 cm TGD or < 5 cm GGD  Moderate/Severe- 15.1-20 cm TGD or 5.1 cm -10 cm GGD  Severe => 20 cm TGD or >10 cm GGD     Pt prognosis is Good.     Pt will continue to benefit from skilled outpatient occupational therapy to address the deficits listed in the problem list box on initial evaluation, provide pt/family education and to maximize pt's level of independence in the home and community environment.     Pt's spiritual, cultural and educational needs considered and pt agreeable to  plan of care and goals.     Anticipated barriers to occupational therapy: none    Goals:   Short Term Goals: (4 weeks)  In Progress  Patient to be Independent and compliant with Home Exercise Program to increase lymphatic flow.  In Progress  Decrease girth in Bilateral lower extremities by 6 cm for improved functional use of Bilateral Lower Extremity.  In Progress  Patient will demonstrate 100% knowledge of lymphedema precautions and signs of infection.   In Progress  Patient will perform self-bandaging techniques.  In Progress  Patient will perform self lymph drainage techniques to increase lymph uptake and direct lymph flow.  In Progress  Patient will tolerate daily activities with multilayered bandaging or compression garment.  In Progress  Skin integrity improve to Good, skin will be superficially hydrated, fungal infection will resolve, color will fade to pink and temperature will be room temperature.  In Progress  Weeping will cease.  In Progress  Wounds will heal.        Long Term Goals: (8 weeks)  In Progress  Decrease girth in Bilateral lower extremities by 8 cm for improved functional use of Bilateral Lower Extremity.  In Progress  Patient will show reduction in girth to mild or less with improved contour of limb.  In Progress  Patient to xu/doff compression garment with daily compliance.   In Progress  Patient will demonstrate the ability to independently manage condition by discharge.     Plan      Patient to be seen 1-2 x per week for 90 days for the medically necessary treatments to include: decongestive massage- Manual lymphatic drainage, Kinesio tape, skin care, multi layered bandaging, education in lymphedema precautions, self massage, self bandaging, and assistance in obtaining appropriate compression garment.  Therapeutic exercise, postural correction, and progression of Home Exercise Program.       LONG Moreno, [place]

## 2022-09-15 PROBLEM — R77.0 ABNORMAL ALBUMIN: Status: ACTIVE | Noted: 2022-01-01

## 2022-09-15 NOTE — PROGRESS NOTES
Patient ID:  Tong Ford is a 77 y.o. male who presents for follow-up of Hospital Follow Up, lymphedema, Congestive Heart Failure, Atrial Fibrillation, and Coronary Artery Disease      He has been complaining of significant amount of edema including significant scrotal edema.  He is not able to walk.  He has been seen and nephrologist as well      Past Medical History:   Diagnosis Date    Arthritis     Atrial fibrillation     Carotid artery occlusion     bilateral    Cataract     CHF (congestive heart failure)     CKD (chronic kidney disease), stage II     Coronary artery disease     3 Vessel CABG AND 3 STENTS    Encounter for blood transfusion     Hypercholesterolemia     Hypertension     Normocytic anemia     Shingles     X 3 WEEKS AGO    Sleep apnea     CESAR - NOT USING C-PAP    Thyroid disease     TIA (transient ischemic attack)         Past Surgical History:   Procedure Laterality Date    A-V CARDIAC PACEMAKER INSERTION N/A 4/1/2021    Procedure: INSERTION, CARDIAC PACEMAKER, DUAL CHAMBER;  Surgeon: Clifford Sevilla MD;  Location: [facility] CATH/EP LAB;  Service: Cardiology;  Laterality: N/A;  [employer]    CORONARY ANGIOPLASTY WITH STENT PLACEMENT      CORONARY ARTERY BYPASS GRAFT      CORONARY ARTERY BYPASS GRAFT (CABG)      3 vessel    EYE SURGERY      bILATERAL CATARACS    INSERTION OF PACEMAKER      REVISION OF IMPLANTABLE CARDIOVERTER-DEFIBRILLATOR (ICD) ELECTRODE LEAD PLACEMENT Left 7/1/2021    Procedure: REVISION, INSERTION, ELECTRODE LEAD,pacemaker;  Surgeon: Clifford Sevilla MD;  Location: [facility] CATH/EP LAB;  Service: Cardiology;  Laterality: Left;    ROBOT-ASSISTED LAPAROSCOPIC REPAIR OF INGUINAL HERNIA Right 3/11/2022    Procedure: ROBOTIC REPAIR, HERNIA, INGUINAL;  Surgeon: Melo Aguilera III, MD;  Location: [facility];  Service: General;  Laterality: Right;  LB case    TREATMENT OF CARDIAC ARRHYTHMIA N/A 1/29/2021    Procedure: CARDIOVERSION;  Surgeon: Iron Serna MD;  Location: [facility]  "CATH/EP LAB;  Service: Cardiology;  Laterality: N/A;    VASECTOMY            Current Outpatient Medications   Medication Instructions    amiodarone (PACERONE) 200 mg, Oral, Daily    amLODIPine (NORVASC) 5 mg, Oral, Nightly    apixaban (ELIQUIS) 5 mg, Oral, 2 times daily    ascorbic acid (vitamin C) (VITAMIN C) 1,000 mg, Oral, Daily    atorvastatin (LIPITOR) 40 mg, Oral, Daily    coQ10 (ubiquinol) 150 mg, Oral, Daily    cyanocobalamin 1,000 mcg, Intramuscular, Every 30 days    doxazosin (CARDURA) 8 MG Tab TAKE 1 TABLET DAILY    ezetimibe (ZETIA) 10 mg tablet TAKE 1 TABLET DAILY    furosemide (LASIX) 40 mg, Oral, Daily    gabapentin (NEURONTIN) 600 mg, Oral, 2 times daily    garlic 1,000 mg Cap 1 capsule, Oral, Daily    ketoconazole (NIZORAL) 2 % cream Topical (Top), Daily    levothyroxine (SYNTHROID) 100 mcg, Oral, Before breakfast    LIDOcaine (LIDODERM) 5 % 1 patch, Transdermal, Daily, Remove & Discard patch within 12 hours or as directed by MD    lisinopriL (PRINIVIL,ZESTRIL) 20 mg, Oral, 2 times daily    metoprolol succinate (TOPROL-XL) 50 mg, Oral, Daily    multivitamin Tab 1 tablet, Oral, Daily    nitroGLYCERIN (NITROSTAT) 0.4 mg, Sublingual, Every 5 min PRN, DO NOT CRUSH OR CHEW; DISSOLVE UNDER TONGUE; MAXIMUM OF 3 DOSES IN 15 MINUTES    spironolactone (ALDACTONE) 25 mg, Oral, Every other day    syringe-needle,safety,disp unt 3 mL 22 gauge x 1 1/2" Syrg Please syringe to administer medication deep IM    traMADoL (ULTRAM) 50 mg, Oral, Every 6 hours PRN    vitamin D (VITAMIN D3) 1,000 Units, Oral, Daily        Review of patient's allergies indicates:  No Known Allergies     Review of Systems   Cardiovascular:  Positive for dyspnea on exertion and leg swelling. Negative for chest pain.   Respiratory:  Positive for shortness of breath. Negative for cough.       Objective:     Vitals:    09/15/22 1415   BP: 132/70   BP Location: Left arm   Patient Position: Sitting   BP Method: Medium (Manual)   Pulse: 62   SpO2: " "98%   Weight: 93.4 kg (206 lb)   Height: 5' 7.5" (1.715 m)       Physical Exam  Vitals and nursing note reviewed.   Constitutional:       Appearance: He is well-developed.   HENT:      Head: Normocephalic and atraumatic.   Eyes:      Conjunctiva/sclera: Conjunctivae normal.   Cardiovascular:      Rate and Rhythm: Normal rate and regular rhythm.      Heart sounds: Murmur (Grade 2/6 systolic murmur at the base) heard.   Pulmonary:      Effort: Pulmonary effort is normal.      Breath sounds: Rhonchi (basal crackles) present.   Abdominal:      General: Bowel sounds are normal.      Palpations: Abdomen is soft.   Musculoskeletal:         General: Swelling present. Normal range of motion.   Skin:     General: Skin is warm and dry.   Neurological:      Mental Status: He is alert and oriented to person, place, and time.   Psychiatric:         Behavior: Behavior normal.         Thought Content: Thought content normal.         Judgment: Judgment normal.     CMP  Sodium   Date Value Ref Range Status   08/30/2022 142 136 - 145 mmol/L Final   07/25/2019 138 134 - 144 mmol/L      Potassium   Date Value Ref Range Status   08/30/2022 4.8 3.5 - 5.1 mmol/L Final     Chloride   Date Value Ref Range Status   08/30/2022 108 95 - 110 mmol/L Final   07/25/2019 104 98 - 110 mmol/L      CO2   Date Value Ref Range Status   08/30/2022 24 23 - 29 mmol/L Final     Glucose   Date Value Ref Range Status   08/30/2022 100 70 - 110 mg/dL Final   07/25/2019 105 (H) 70 - 99 mg/dL      BUN   Date Value Ref Range Status   08/30/2022 34 (H) 8 - 23 mg/dL Final     Creatinine   Date Value Ref Range Status   08/30/2022 2.5 (H) 0.5 - 1.4 mg/dL Final   07/25/2019 1.15 0.60 - 1.40 mg/dL      Calcium   Date Value Ref Range Status   08/30/2022 8.6 (L) 8.7 - 10.5 mg/dL Final     Total Protein   Date Value Ref Range Status   08/30/2022 6.2 6.0 - 8.4 g/dL Final     Albumin   Date Value Ref Range Status   08/30/2022 3.1 (L) 3.5 - 5.2 g/dL Final   07/25/2019 3.9 3.1 " - 4.7 g/dL      Total Bilirubin   Date Value Ref Range Status   08/30/2022 0.8 0.1 - 1.0 mg/dL Final     Comment:     For infants and newborns, interpretation of results should be based  on gestational age, weight and in agreement with clinical  observations.    Premature Infant recommended reference ranges:  Up to 24 hours.............<8.0 mg/dL  Up to 48 hours............<12.0 mg/dL  3-5 days..................<15.0 mg/dL  6-29 days.................<15.0 mg/dL       Alkaline Phosphatase   Date Value Ref Range Status   08/30/2022 103 55 - 135 U/L Final     AST   Date Value Ref Range Status   08/30/2022 25 10 - 40 U/L Final     ALT   Date Value Ref Range Status   08/30/2022 29 10 - 44 U/L Final     Anion Gap   Date Value Ref Range Status   08/30/2022 10 8 - 16 mmol/L Final     eGFR if    Date Value Ref Range Status   07/21/2022 43.4 (A) >60 mL/min/1.73 m^2 Final     eGFR if non    Date Value Ref Range Status   07/21/2022 37.5 (A) >60 mL/min/1.73 m^2 Final     Comment:     Calculation used to obtain the estimated glomerular filtration  rate (eGFR) is the CKD-EPI equation.         BMP  Lab Results   Component Value Date     08/30/2022    K 4.8 08/30/2022     08/30/2022    CO2 24 08/30/2022    BUN 34 (H) 08/30/2022    CREATININE 2.5 (H) 08/30/2022    CALCIUM 8.6 (L) 08/30/2022    ANIONGAP 10 08/30/2022    ESTGFRAFRICA 43.4 (A) 07/21/2022    EGFRNONAA 37.5 (A) 07/21/2022      BNP  @LABRCNTIP(BNP,BNPTRIAGEBLO)@   Lab Results   Component Value Date    CHOL 125 03/01/2022    CHOL 164 08/17/2020    CHOL 274 (H) 01/10/2020     Lab Results   Component Value Date    HDL 45 03/01/2022    HDL 34 (L) 08/17/2020    HDL 45 01/10/2020     Lab Results   Component Value Date    LDLCALC 70.6 03/01/2022    LDLCALC 106.2 08/17/2020    LDLCALC 210.0 (H) 01/10/2020     Lab Results   Component Value Date    TRIG 47 03/01/2022    TRIG 119 08/17/2020    TRIG 95 01/10/2020     Lab Results   Component  Value Date    CHOLHDL 36.0 03/01/2022    CHOLHDL 20.7 08/17/2020    CHOLHDL 16.4 (L) 01/10/2020      Lab Results   Component Value Date    TSH 5.300 03/01/2022    FREET4 0.55 (L) 08/05/2021     Lab Results   Component Value Date    HGBA1C 5.6 08/18/2020     Lab Results   Component Value Date    WBC 6.96 08/30/2022    HGB 10.1 (L) 08/30/2022    HCT 30.8 (L) 08/30/2022     (H) 08/30/2022     08/30/2022         Results for orders placed during the hospital encounter of 10/19/20    Echo Color Flow Doppler? Yes; Bubble Contrast? No    Interpretation Summary  · There is mild left ventricular concentric hypertrophy.  · The left ventricle is normal in size with normal systolic function. The estimated ejection fraction is 65%.  · Atrial fibrillation observed with restrictive filling pattern present.  · With normal left atrial pressure.  · Normal right ventricular systolic function.  · Moderate left atrial enlargement.  · Mild right atrial enlargement.  · Moderate aortic valve stenosis.  · Aortic valve area is 1.63 cm2; peak velocity is 1.76 m/s; mean gradient is 7 mmHg.  · Moderate mitral regurgitation.  · No pulmonary hypertension  · Mild tricuspid regurgitation.  · Mild pulmonic regurgitation.  · Normal central venous pressure (3 mmHg).  · The estimated PA systolic pressure is 23 mmHg.    Left ventricle hypertrophy, moderate calcific aortic valve stenosis plainimetered d valve area is 1.6 centimeters squared  Plus two mitral regurgitation  No pulmonary hypertension     Results for orders placed during the hospital encounter of 01/29/21    Intra-Procedure Documentation    Narrative  MACRINA performed in the Invasive Lab  - See Procedure Log link below for nursing documentation  - See MACRINA order on Card Proc Tab for physician findings         Assessment:       Essential hypertension  His pressure is controlled although on multiple medications.  He is having significant amount of edema could be secondary to high  doses of amlodipine    (HFpEF) heart failure with preserved ejection fraction  No recent echocardiogram done    New onset atrial fibrillation  Status post cardioversion    Orthostatic hypotension  He has had episodes of orthostatic hypotension    Acute renal failure superimposed on stage 3 chronic kidney disease  Last creatinine was significantly increased.    Obstructive sleep apnea  Non compliant with CPAP machine    Abnormal albumin  Etiology not clear.  It could be secondary to nephrotic syndrome       Plan:       admit him to the hospital on Monday for a repeat echocardiogram check his renal function consider IV diuretics.  Check his albumin.  This has been discussed with the patient and he is in agreement.

## 2022-09-15 NOTE — ASSESSMENT & PLAN NOTE
His pressure is controlled although on multiple medications.  He is having significant amount of edema could be secondary to high doses of amlodipine

## 2022-09-16 NOTE — PLAN OF CARE
Occupational Therapy Discharge Note     Name: [facility] Number: [id number]    Therapy Diagnosis:   Encounter Diagnosis   Name Primary?    Lymphedema Yes     Physician: Kris Zavala MD    Visit Date: 9/16/2022     Physician Orders: Evaluate and treat  Medical Diagnosis:  I89.0 (ICD-10-CM) - Lymphedema  Evaluation Date: 8/11/2022  Insurance Authorization period Expiration: 08/11/2022-12/31/2022  Plan of Care Certification Period: 08/11/2022-11/11/2022     Visit # / Visits Authortized: 10/ 20  Time In: 2:30  Time Out: 3:30  Total Billable Time: 60 minutes-  Manual therapy     Precautions: Standard and Fall    Subjective     Pt reports: That his Cardiologist is going to admit him to the hospital on Monday and then he will be admitted to an Inpatient rehab.      He was compliant with compression bandage wearing schedule.  Functional change: none    Pain: 4/10  Location: bilateral lower legs    Objective   Response to previous treatment: The legs had numerous sore on the Right lower extremity including the feet due to blisters popping.  There are 3 open sores on the Right lower extremity.  The Left lower extremity had one large on the ankle that is showing signs of healing.  There were additional small blisters on the legs as well. The legs were visibly smaller.  No more weeping areas on the Bilateral lower extremities.      Treatment:   Tong received the following manual therapy techniques:- Manual Lymphatic Drainage were applied to the: Bilateral lower extremities for 60 minutes, including: Manual Lymph Drainage and short stretch compression bandaging     Both legs were washed.  The wounds were dressed with gentian violet, Xeroform to the wound bed, covered with hydrolific foam dressing, Abdominal pad and secured with rolled gauze. There was a lot of drainage on the Right lower extremity. Gentian violet was applied between the toes to assist with drying up the area.    MANUAL LYMPHATIC DRAINAGE:   Drainage of Bilateral lower extremities While seated with the legs in a dependent position using two techniques to encourage the lymph to flow to the groin from the toes.      MULTILAYERED BANDAGING:  Issued supplies and bandaged Bilateral lower extremities  with elatomul roll to toes and hand, 6cm, 8cm short stretch compression bandages - cotton padding at toes to the popliteal fold  To leave intact 12 -24 hours, dc with any problems,  Return rolled bandages next session.     Home Exercises Provided and Patient Education Provided     Education provided:      PATIENT/FAMILY Education: bandaging wear schedule,  Home Exercise Program,  Beginning of self massage,  Self or assisted bandaging , and Risk reduction    KINESIO TAPE: Tong was educated on kinesio tape wearing schedule and to remove if having ill effects.  Tong verbalized understanding of the above education.      Written Home Exercises Provided:Tong demonstrated good  understanding of the education provided.       Assessment     He arrived to therapy with compression bandages off with 3 wounds on the Right lower extremity and one on the Left lower extremity that are a result of blisters popping.  He was agreeable to having the compression bandages reapplied.  He reports losing 5 pounds.  He continues to have a lot of fluid in the abdomen, genitals, and Lower extremities. He reports that the saw the Cardiologist and that he is going to admit him to the hospital on Monday for possible IV diuretics and they are planning for the discharge to be to an inpatient facility for intense rehabilitation.     Lower Extremity Girth Measurements (in centimeters)  LANDMARK  Right Lower Extremity  Left Lower Extremity    Superior border of the Patella +10 48.5 50   Knee 46 45.5   40 cm  42 41   30 cm 40 39   20 cm  30 28.5   10 cm  23.5 24.5   Malleolus 26 27   Ankle- heel to dorsum of foot 34.2 34.5   Arch 25 25.5   Total Girth Measurement  315.2 315.5   Total Girth  Difference 0.3     Total Girth Reduction  3  6.3      Bilateral Limb Involvement  Moderate- 10-cm-15 cm TGD or < 5 cm GGD  Moderate/Severe- 15.1-20 cm TGD or 5.1 cm -10 cm GGD  Severe => 20 cm TGD or >10 cm GGD     Pt prognosis is Good.     Pt will continue to benefit from skilled outpatient occupational therapy to address the deficits listed in the problem list box on initial evaluation, provide pt/family education and to maximize pt's level of independence in the home and community environment.     Pt's spiritual, cultural and educational needs considered and pt agreeable to plan of care and goals.     Anticipated barriers to occupational therapy: none    Goals:   Short Term Goals: (4 weeks)  Not Met  Patient to be Independent and compliant with Home Exercise Program to increase lymphatic flow.  Not Met  Decrease girth in Bilateral lower extremities by 6 cm for improved functional use of Bilateral Lower Extremity.  Met   Patient will demonstrate 100% knowledge of lymphedema precautions and signs of infection.   Met   Patient will perform self-bandaging techniques.  Met   Patient will perform self lymph drainage techniques to increase lymph uptake and direct lymph flow.  Met   Patient will tolerate daily activities with multilayered bandaging or compression garment.  Not Met  Skin integrity improve to Good, skin will be superficially hydrated, fungal infection will resolve, color will fade to pink and temperature will be room temperature.  Met   Weeping will cease.  Not Met  Wounds will heal.        Long Term Goals: (8 weeks)  Not Met  Decrease girth in Bilateral lower extremities by 8 cm for improved functional use of Bilateral Lower Extremity.  Not Met  Patient will show reduction in girth to mild or less with improved contour of limb.  Not Met  Patient to xu/doff compression garment with daily compliance.   Not Met  Patient will demonstrate the ability to independently manage condition by discharge.     Plan       Patient is being discharged to a higher level of care.  Lymphedema goals were partially met.     LONG Moreno CLT

## 2022-09-16 NOTE — PROGRESS NOTES
Occupational Therapy Discharge Note     [facility] Number: [id number]    Therapy Diagnosis:   Encounter Diagnosis   Name Primary?    Lymphedema Yes     Physician: Kris Zavala MD    Visit Date: 9/16/2022     Physician Orders: Evaluate and treat  Medical Diagnosis:  I89.0 (ICD-10-CM) - Lymphedema  Evaluation Date: 8/11/2022  Insurance Authorization period Expiration: 08/11/2022-12/31/2022  Plan of Care Certification Period: 08/11/2022-11/11/2022     Visit # / Visits Authortized: 10/ 20  Time In: 2:30  Time Out: 3:30  Total Billable Time: 60 minutes-  Manual therapy     Precautions: Standard and Fall    Subjective     Pt reports: That his Cardiologist is going to admit him to the hospital on Monday and then he will be admitted to an Inpatient rehab.      He was compliant with compression bandage wearing schedule.  Functional change: none    Pain: 4/10  Location: bilateral lower legs    Objective   Response to previous treatment: The legs had numerous sore on the Right lower extremity including the feet due to blisters popping.  There are 3 open sores on the Right lower extremity.  The Left lower extremity had one large on the ankle that is showing signs of healing.  There were additional small blisters on the legs as well. The legs were visibly smaller.  No more weeping areas on the Bilateral lower extremities.      Treatment:   Tong received the following manual therapy techniques:- Manual Lymphatic Drainage were applied to the: Bilateral lower extremities for 60 minutes, including: Manual Lymph Drainage and short stretch compression bandaging     Both legs were washed.  The wounds were dressed with gentian violet, Xeroform to the wound bed, covered with hydrolific foam dressing, Abdominal pad and secured with rolled gauze. There was a lot of drainage on the Right lower extremity. Gentian violet was applied between the toes to assist with drying up the area.    MANUAL LYMPHATIC DRAINAGE:   Drainage of Bilateral lower extremities While seated with the legs in a dependent position using two techniques to encourage the lymph to flow to the groin from the toes.      MULTILAYERED BANDAGING:  Issued supplies and bandaged Bilateral lower extremities  with elatomul roll to toes and hand, 6cm, 8cm short stretch compression bandages - cotton padding at toes to the popliteal fold  To leave intact 12 -24 hours, dc with any problems,  Return rolled bandages next session.     Home Exercises Provided and Patient Education Provided     Education provided:      PATIENT/FAMILY Education: bandaging wear schedule,  Home Exercise Program,  Beginning of self massage,  Self or assisted bandaging , and Risk reduction    KINESIO TAPE: Tong was educated on kinesio tape wearing schedule and to remove if having ill effects.  Tong verbalized understanding of the above education.      Written Home Exercises Provided:Tong demonstrated good  understanding of the education provided.       Assessment     He arrived to therapy with compression bandages off with 3 wounds on the Right lower extremity and one on the Left lower extremity that are a result of blisters popping.  He was agreeable to having the compression bandages reapplied.  He reports losing 5 pounds.  He continues to have a lot of fluid in the abdomen, genitals, and Lower extremities. He reports that the saw the Cardiologist and that he is going to admit him to the hospital on Monday for possible IV diuretics and they are planning for the discharge to be to an inpatient facility for intense rehabilitation.     Lower Extremity Girth Measurements (in centimeters)  LANDMARK  Right Lower Extremity  Left Lower Extremity    Superior border of the Patella +10 48.5 50   Knee 46 45.5   40 cm  42 41   30 cm 40 39   20 cm  30 28.5   10 cm  23.5 24.5   Malleolus 26 27   Ankle- heel to dorsum of foot 34.2 34.5   Arch 25 25.5   Total Girth Measurement  315.2 315.5   Total Girth  Difference 0.3     Total Girth Reduction  3  6.3      Bilateral Limb Involvement  Moderate- 10-cm-15 cm TGD or < 5 cm GGD  Moderate/Severe- 15.1-20 cm TGD or 5.1 cm -10 cm GGD  Severe => 20 cm TGD or >10 cm GGD     Pt prognosis is Good.     Pt will continue to benefit from skilled outpatient occupational therapy to address the deficits listed in the problem list box on initial evaluation, provide pt/family education and to maximize pt's level of independence in the home and community environment.     Pt's spiritual, cultural and educational needs considered and pt agreeable to plan of care and goals.     Anticipated barriers to occupational therapy: none    Goals:   Short Term Goals: (4 weeks)  Not Met  Patient to be Independent and compliant with Home Exercise Program to increase lymphatic flow.  Not Met  Decrease girth in Bilateral lower extremities by 6 cm for improved functional use of Bilateral Lower Extremity.  Met   Patient will demonstrate 100% knowledge of lymphedema precautions and signs of infection.   Met   Patient will perform self-bandaging techniques.  Met   Patient will perform self lymph drainage techniques to increase lymph uptake and direct lymph flow.  Met   Patient will tolerate daily activities with multilayered bandaging or compression garment.  Not Met  Skin integrity improve to Good, skin will be superficially hydrated, fungal infection will resolve, color will fade to pink and temperature will be room temperature.  Met   Weeping will cease.  Not Met  Wounds will heal.        Long Term Goals: (8 weeks)  Not Met  Decrease girth in Bilateral lower extremities by 8 cm for improved functional use of Bilateral Lower Extremity.  Not Met  Patient will show reduction in girth to mild or less with improved contour of limb.  Not Met  Patient to xu/doff compression garment with daily compliance.   Not Met  Patient will demonstrate the ability to independently manage condition by discharge.     Plan       Patient is being discharged to a higher level of care.  Lymphedema goals were partially met.     LONG Moreno CLT

## 2022-09-19 PROBLEM — E87.5 HYPERKALEMIA: Status: ACTIVE | Noted: 2022-01-01

## 2022-09-19 PROBLEM — R79.89 ELEVATED TROPONIN: Status: ACTIVE | Noted: 2022-01-01

## 2022-09-19 PROBLEM — I50.9 ACUTE EXACERBATION OF CHF (CONGESTIVE HEART FAILURE): Status: ACTIVE | Noted: 2022-01-01

## 2022-09-19 NOTE — CONSULTS
[facility] Medicine  Consult Note    Patient Name: Tong Ford  MRN: [id number]  Admission Date: 9/19/2022  Hospital Length of Stay: 0 days  Attending Physician: Cleveland Allred MD   Primary Care Provider: Kris Zavala MD           Patient information was obtained from patient, relative(s), past medical records and ER records.     Consults  Subjective:     Principal Problem: Acute exacerbation of CHF (congestive heart failure)    Chief Complaint:   Chief Complaint   Patient presents with    Shortness of Breath     CHF    Leg Swelling     Sent to ED for admit , Lasix         HPI: Tong Ford is a 77 yr old male with PMHx significant for arthritis, HTN, AFIB, HLD, TIA, CAD, CHF, hypothyroidism, normocytic anemia and PSHx of CABG, coronary angioplasty with stent, and pacemaker presenting today for sob, weakness and swelling. Pt reports over the past several months he has been experiencing increasing swelling to his lower extremities.  He states the swelling has progressively worsened and extended up to his abdomen over the past several weeks.  He reports taking 60 mg of Lasix daily along with spironolactone and he states he is urinating adequately.  He has been attempting to get in with his cardiologist for the past 2 months but has been unsuccessful due to not having available appointments and he was finally able to see his cardiologist today who saw him in the office and recommended admission to the hospital for IV diuresing.  Patient also reports he experienced a fall yesterday evening due to his bilateral lower extremity edema causing him to be severely weak and having unsteady gait and he was on the ground for several hours before his family found him on the floor and was able to assist him up into the bed.  Patient denies chest pain but does endorse severe shortness of breath that is progressively worsening, severe quality, worsens when laying flat or with min exertion, no  alleviating factors. Pt also has chronic bilat LE venous statis ulcers with wrapping intact and he follows with wound care. ED workup significant for elevated troponin, worsening JAYSON, elevated BNP, leukocytosis, hyperkalemia, elevated CPK, hypoxia requiring O2 supplementation, chest x-ray with increased bilateral asymmetric airspace disease consistent with CHF/volume overload and possible LLL pneumonia .  Given patient's acute presentation of worsening heart failure and renal disease patient will require ICU admission.  Discussed with hospital attending who also assessed pt at bedside and is in agreement on plan of care.  No ICU beds available at this facility therefore pt will need to transfer for higher level of care. ED physician updated and transfer center called.      Past Medical History:   Diagnosis Date    Arthritis     Atrial fibrillation     Carotid artery occlusion     bilateral    Cataract     CHF (congestive heart failure)     CKD (chronic kidney disease), stage II     Coronary artery disease     3 Vessel CABG AND 3 STENTS    Encounter for blood transfusion     Hypercholesterolemia     Hypertension     Normocytic anemia     Shingles     X 3 WEEKS AGO    Sleep apnea     CESAR - NOT USING C-PAP    Thyroid disease     TIA (transient ischemic attack)        Past Surgical History:   Procedure Laterality Date    A-V CARDIAC PACEMAKER INSERTION N/A 4/1/2021    Procedure: INSERTION, CARDIAC PACEMAKER, DUAL CHAMBER;  Surgeon: Clifford Sevilla MD;  Location: [facility] CATH/EP LAB;  Service: Cardiology;  Laterality: N/A;  [employer]    CORONARY ANGIOPLASTY WITH STENT PLACEMENT      CORONARY ARTERY BYPASS GRAFT      CORONARY ARTERY BYPASS GRAFT (CABG)      3 vessel    EYE SURGERY      bILATERAL CATARACS    INSERTION OF PACEMAKER      REVISION OF IMPLANTABLE CARDIOVERTER-DEFIBRILLATOR (ICD) ELECTRODE LEAD PLACEMENT Left 7/1/2021    Procedure: REVISION, INSERTION, ELECTRODE LEAD,pacemaker;  Surgeon:  Clifford Sevilla MD;  Location: [facility] CATH/EP LAB;  Service: Cardiology;  Laterality: Left;    ROBOT-ASSISTED LAPAROSCOPIC REPAIR OF INGUINAL HERNIA Right 3/11/2022    Procedure: ROBOTIC REPAIR, HERNIA, INGUINAL;  Surgeon: Melo Aguilera III, MD;  Location: [facility] OR;  Service: General;  Laterality: Right;  LB case    TREATMENT OF CARDIAC ARRHYTHMIA N/A 1/29/2021    Procedure: CARDIOVERSION;  Surgeon: Iron Serna MD;  Location: [facility] CATH/EP LAB;  Service: Cardiology;  Laterality: N/A;    VASECTOMY         Review of patient's allergies indicates:  No Known Allergies    No current facility-administered medications on file prior to encounter.     Current Outpatient Medications on File Prior to Encounter   Medication Sig    amiodarone (PACERONE) 200 MG Tab Take 1 tablet (200 mg total) by mouth once daily.    amLODIPine (NORVASC) 5 MG tablet Take 1 tablet (5 mg total) by mouth every evening.    apixaban (ELIQUIS) 5 mg Tab Take 1 tablet (5 mg total) by mouth 2 (two) times daily.    ascorbic acid, vitamin C, (VITAMIN C) 1000 MG tablet Take 1,000 mg by mouth once daily.    atorvastatin (LIPITOR) 40 MG tablet Take 1 tablet (40 mg total) by mouth once daily.    coQ10, ubiquinol, 100 mg Cap Take 150 mg by mouth once daily.     cyanocobalamin 1,000 mcg/mL injection Inject 1 mL (1,000 mcg total) into the muscle every 30 days.    doxazosin (CARDURA) 8 MG Tab TAKE 1 TABLET DAILY    ezetimibe (ZETIA) 10 mg tablet TAKE 1 TABLET DAILY    furosemide (LASIX) 40 MG tablet Take 1 tablet (40 mg total) by mouth once daily.    gabapentin (NEURONTIN) 600 MG tablet Take 1 tablet (600 mg total) by mouth 2 (two) times daily.    garlic 1,000 mg Cap Take 1 capsule by mouth once daily.     ketoconazole (NIZORAL) 2 % cream Apply topically once daily.    levothyroxine (SYNTHROID) 100 MCG tablet Take 1 tablet (100 mcg total) by mouth before breakfast.    LIDOcaine (LIDODERM) 5 % Place 1 patch onto the skin once daily.  "Remove & Discard patch within 12 hours or as directed by MD    lisinopriL (PRINIVIL,ZESTRIL) 20 MG tablet Take 1 tablet (20 mg total) by mouth 2 (two) times a day.    metoprolol succinate (TOPROL-XL) 50 MG 24 hr tablet Take 1 tablet (50 mg total) by mouth once daily.    multivitamin Tab Take 1 tablet by mouth once daily. (Patient not taking: No sig reported)    nitroGLYCERIN (NITROSTAT) 0.4 MG SL tablet Place 1 tablet (0.4 mg total) under the tongue every 5 (five) minutes as needed for Chest pain. DO NOT CRUSH OR CHEW; DISSOLVE UNDER TONGUE; MAXIMUM OF 3 DOSES IN 15 MINUTES    spironolactone (ALDACTONE) 25 MG tablet Take 1 tablet (25 mg total) by mouth every other day.    syringe-needle,safety,disp unt 3 mL 22 gauge x 1 1/2" Syrg Please syringe to administer medication deep IM    traMADoL (ULTRAM) 50 mg tablet Take 1 tablet (50 mg total) by mouth every 6 (six) hours as needed for Pain.    vitamin D (VITAMIN D3) 1000 units Tab Take 1,000 Units by mouth once daily.     Family History       Problem Relation (Age of Onset)    COPD Paternal Grandmother    Cancer Mother, Father, Maternal Grandmother    Hypertension Father          Tobacco Use    Smoking status: Never    Smokeless tobacco: Never   Substance and Sexual Activity    Alcohol use: Not Currently    Drug use: No    Sexual activity: Not on file     Review of Systems   Constitutional:  Positive for activity change and fatigue. Negative for chills and fever.   HENT:  Negative for congestion, sore throat and trouble swallowing.    Respiratory:  Positive for cough, shortness of breath and wheezing. Negative for chest tightness.    Cardiovascular:  Positive for leg swelling. Negative for chest pain and palpitations.   Gastrointestinal:  Positive for abdominal distention. Negative for abdominal pain, diarrhea, nausea and vomiting.   Genitourinary:  Negative for difficulty urinating, dysuria and urgency.   Musculoskeletal:  Positive for gait problem. " Negative for arthralgias and back pain.   Skin:  Positive for wound (chronic bilat LE venous stasis wounds). Negative for color change, pallor and rash.   Neurological:  Positive for weakness (bilat LE generalized weakness). Negative for dizziness, speech difficulty and light-headedness.   Hematological:  Negative for adenopathy.   Psychiatric/Behavioral:  Negative for agitation, behavioral problems and confusion.    All other systems reviewed and are negative.  Objective:     Vital Signs (Most Recent):  Temp: 97.5 °F (36.4 °C) (09/19/22 1158)  Pulse: 67 (09/19/22 1713)  Resp: 16 (09/19/22 1713)  BP: (!) 114/59 (09/19/22 1702)  SpO2: (!) 93 % (09/19/22 1713)   Vital Signs (24h Range):  Temp:  [97.5 °F (36.4 °C)] 97.5 °F (36.4 °C)  Pulse:  [60-67] 67  Resp:  [16-20] 16  SpO2:  [93 %-97 %] 93 %  BP: (107-126)/(51-59) 114/59     Weight: 95.3 kg (210 lb)  Body mass index is 32.89 kg/m².    Physical Exam  Vitals and nursing note reviewed.   Constitutional:       General: He is not in acute distress.     Appearance: He is well-developed. He is ill-appearing. He is not diaphoretic.   HENT:      Head: Normocephalic and atraumatic.      Right Ear: External ear normal.      Left Ear: External ear normal.      Nose: Nose normal. No congestion or rhinorrhea.      Mouth/Throat:      Mouth: Mucous membranes are moist.      Pharynx: Oropharynx is clear. No oropharyngeal exudate or posterior oropharyngeal erythema.   Eyes:      General: No scleral icterus.     Conjunctiva/sclera: Conjunctivae normal.      Pupils: Pupils are equal, round, and reactive to light.   Neck:      Vascular: No JVD.   Cardiovascular:      Rate and Rhythm: Normal rate. Rhythm irregular.      Pulses: Normal pulses.      Heart sounds: Murmur heard.      Comments: Significant edema to bilat LE extending to groin and abdomen  Pulmonary:      Effort: Pulmonary effort is normal. No respiratory distress.      Breath sounds: No stridor. No wheezing, rhonchi or  rales.      Comments: Crackles to bilat bases with faint exp wheezing bilat ant upper lobes  Abdominal:      General: Bowel sounds are normal. There is distension.      Palpations: Abdomen is soft.      Tenderness: There is no abdominal tenderness.   Musculoskeletal:         General: Swelling present. No tenderness. Normal range of motion.      Cervical back: Normal range of motion and neck supple.      Right lower leg: Edema present.      Left lower leg: Edema present.   Skin:     General: Skin is warm and dry.      Capillary Refill: Capillary refill takes 2 to 3 seconds.      Coloration: Skin is not jaundiced or pale.      Findings: No erythema.      Comments: Dusky skin tone, bilat LE with wrapping in place left undisturbed. Pt has chronic bilat LE venous stasis ulcers and follows with wound care   Neurological:      General: No focal deficit present.      Mental Status: He is alert and oriented to person, place, and time.      Cranial Nerves: No cranial nerve deficit.      Sensory: No sensory deficit.      Motor: Weakness present.      Comments: Generalized weakness to all exts   Psychiatric:         Mood and Affect: Mood normal.         Behavior: Behavior normal.         Thought Content: Thought content normal.       Significant Labs: All pertinent labs within the past 24 hours have been reviewed.  Recent Lab Results  (Last 5 results in the past 24 hours)        09/19/22  1716   09/19/22  1651   09/19/22  1433   09/19/22  1354   09/19/22  1324        Albumin         2.9       Alkaline Phosphatase         88       ALT         46       Anion Gap         12       Appearance, UA   Clear             AST         83       Bands         1.0       Basophil %         0.0       Bilirubin (UA)   Negative             BILIRUBIN TOTAL         1.2  Comment: For infants and newborns, interpretation of results should be based  on gestational age, weight and in agreement with clinical  observations.    Premature Infant  recommended reference ranges:  Up to 24 hours.............<8.0 mg/dL  Up to 48 hours............<12.0 mg/dL  3-5 days..................<15.0 mg/dL  6-29 days.................<15.0 mg/dL         BNP         1,795  Comment: Values of less than 100 pg/ml are consistent with non-CHF populations.       BUN         76       Calcium         9.1       Chloride         107       CO2         21       Color, UA   Yellow             CPK       2607         Creatinine         3.2       Differential Method         Manual  Comment: CORRECTED RESULT; previously reported as Automated on 09/19/2022 at   15:38.    [C]       eGFR         19       Eosinophil %         0.0       Glucose         97       Glucose, UA   Negative             Gran %         81.0       Hematocrit         28.9       Hemoglobin         9.7       Immature Grans (Abs)         CANCELED  Comment: Mild elevation in immature granulocytes is non specific and   can be seen in a variety of conditions including stress response,   acute inflammation, trauma and pregnancy. Correlation with other   laboratory and clinical findings is essential.    Result canceled by the ancillary.         Immature Granulocytes         CANCELED  Comment: Result canceled by the ancillary.       Ketones, UA   Negative             Leukocytes, UA   Negative             Lymph %         11.0       MCH         33.1       MCHC         33.6       MCV         99       Mono %         7.0       MPV         11.4       NITRITE UA   Negative             nRBC         0       Occult Blood UA   Trace             pH, UA   5.0             Platelets         207       POCT Glucose 144               Potassium         5.2       PROTEIN TOTAL         6.2       Protein, UA   Negative  Comment: Recommend a 24 hour urine protein or a urine   protein/creatinine ratio if globulin induced proteinuria is  clinically suspected.               RBC         2.93       RDW         15.5       SARS-CoV-2 RNA, Amplification, Qual      Negative  Comment: This test utilizes isothermal nucleic acid amplification   technology to detect the SARS-CoV-2 RdRp nucleic acid segment.   The analytical sensitivity (limit of detection) is 125 genome   equivalents/mL.     A POSITIVE result implies infection with the SARS-CoV-2 virus;  the patient is presumed to be contagious.    A NEGATIVE result means that SARS-CoV-2 nucleic acids are not  present above the limit of detection. A NEGATIVE result should be   treated as presumptive. It does not rule out the possibility of   COVID-19 and should not be the sole basis for treatment decisions.   If COVID-19 is strongly suspected based on clinical and exposure   history, re-testing using an alternate molecular assay should be   considered.       This test is only for use under the Food and Drug   Administration s Emergency Use Authorization (EUA).   Commercial kits are provided by [employer].   Performance characteristics of the EUA have been independently  verified by [facility] Department of  Pathology and Laboratory Medicine.   _________________________________________________________________  The ID NOW COVID-19 Letter of Authorization, along with the   authorized Fact Sheet for Healthcare Providers, the authorized Fact  Sheet for Patients, and authorized labeling are available on the FDA   website:  www.fda.gov/MedicalDevices/Safety/EmergencySituations/kzi104780.htm             Sodium         140       Specific Nabb, UA   1.015             Specimen UA   Urine, Clean Catch             Troponin I         1.035  Comment: The reference interval for Troponin I represents the 99th percentile   cutoff   for our facility and is consistent with 3rd generation assay   performance.         UROBILINOGEN UA   Negative             WBC         20.71                               [C] - Corrected Result               Significant Imaging: I have reviewed all pertinent imaging results/findings within the past  24 hours. CXR personally interpreted with bilat assymetrical airspace disease and possible LLL pneumonia.     Assessment/Plan:     * Acute exacerbation of CHF (congestive heart failure)  Patient is identified as having unknown  heart failure that is Acute. CHF is currently uncontrolled due to volume overload due to: Continued edema of extremities, >3 pillow orthopnea, Rales/crackles on pulmonary exam and Pulmonary edema/pleural effusion on CXR. Latest ECHO performed and demonstrates- Results for orders placed during the hospital encounter of 10/19/20    Echo Color Flow Doppler? Yes; Bubble Contrast? No    Interpretation Summary  · There is mild left ventricular concentric hypertrophy.  · The left ventricle is normal in size with normal systolic function. The estimated ejection fraction is 65%.  · Atrial fibrillation observed with restrictive filling pattern present.  · With normal left atrial pressure.  · Normal right ventricular systolic function.  · Moderate left atrial enlargement.  · Mild right atrial enlargement.  · Moderate aortic valve stenosis.  · Aortic valve area is 1.63 cm2; peak velocity is 1.76 m/s; mean gradient is 7 mmHg.  · Moderate mitral regurgitation.  · No pulmonary hypertension  · Mild tricuspid regurgitation.  · Mild pulmonic regurgitation.  · Normal central venous pressure (3 mmHg).  · The estimated PA systolic pressure is 23 mmHg.    Left ventricle hypertrophy, moderate calcific aortic valve stenosis plainimetered d valve area is 1.6 centimeters squared  Plus two mitral regurgitation  No pulmonary hypertension  . Continue Furosemide and monitor clinical status closely. Monitor on telemetry.  Monitor strict Is&Os and daily weights. Continue to stress to patient importance of self efficacy and  on diet for CHF. Last BNP reviewed- and noted below   Recent Labs   Lab 09/19/22  1324   BNP 1,795*   .    Repeat echo - last echo on file from 2021 with normal EF at that time  IV lasix  gtt  Consult cardiology  Serial BMP  Hayes cath for strict I/O        Hyperkalemia  lokelma 10g x 1 dose now  IV lasix gtt  Serial BMP for close monitoring      Elevated troponin  Suspect demand ischemia from acute CHF exac  Pt currently cp free  EKG reviewed - no acute ST elevation  Consult cardiology  Trend troponins  Echo pending      Coronary artery disease  Patient with known CAD s/p stent placement and CABG, which is controlled Will continue ASA and Statin and monitor for S/Sx of angina/ACS. Continue to monitor on telemetry.         SSS (sick sinus syndrome)  Pacemaker insitu  Monitor closely on telemetry      Paroxysmal atrial fibrillation  Patient with Paroxysmal (<7 days) atrial fibrillation which is controlled currently with Beta Blocker and Amiodarone. Patient is currently in sinus rhythm.[id number] Score: 3. HASBLED Score: Unable to calculate. Anticoagulation indicated. Anticoagulation done with eliquis.        Acute renal failure superimposed on stage 3 chronic kidney disease  Patient with acute kidney injury likely d/t Acute tubular necrosis  caused by acute CHF exac and hypoperfusion. JAYSON is currently worsening. Labs reviewed- Renal function/electrolytes with Estimated Creatinine Clearance: 20.6 mL/min (A) (based on SCr of 3.2 mg/dL (H)). according to latest data. Monitor urine output and serial BMP and adjust therapy as needed. Avoid nephrotoxins and renally dose meds for GFR listed above.     Consult nephrology        Thyroid disease  Chronic, stable  Cont home meds  Check TSH      Essential hypertension  Chronic, uncontrolled.  Latest blood pressure and vitals reviewed-   Temp:  [97.5 °F (36.4 °C)]   Pulse:  [60-67]   Resp:  [16-20]   BP: (107-126)/(51-59)   SpO2:  [93 %-97 %] .   Home meds for hypertension were reviewed and noted below.   Hypertension Medications             amLODIPine (NORVASC) 5 MG tablet Take 1 tablet (5 mg total) by mouth every evening.    doxazosin (CARDURA) 8 MG Tab TAKE 1  TABLET DAILY    furosemide (LASIX) 40 MG tablet Take 1 tablet (40 mg total) by mouth once daily.    lisinopriL (PRINIVIL,ZESTRIL) 20 MG tablet Take 1 tablet (20 mg total) by mouth 2 (two) times a day.    metoprolol succinate (TOPROL-XL) 50 MG 24 hr tablet Take 1 tablet (50 mg total) by mouth once daily.    nitroGLYCERIN (NITROSTAT) 0.4 MG SL tablet Place 1 tablet (0.4 mg total) under the tongue every 5 (five) minutes as needed for Chest pain. DO NOT CRUSH OR CHEW; DISSOLVE UNDER TONGUE; MAXIMUM OF 3 DOSES IN 15 MINUTES    spironolactone (ALDACTONE) 25 MG tablet Take 1 tablet (25 mg total) by mouth every other day.          While in the hospital, will manage blood pressure as follows; Adjust home antihypertensive regimen as follows- IV lasix gtt started, will hold current bp meds at this time given mild hypotension and aggressive diuresing.Monitor bp closely and resume meds when appropriate     Will utilize p.r.n. blood pressure medication only if patient's blood pressure greater than  180/110 and he develops symptoms such as worsening chest pain or shortness of breath.        Obstructive sleep apnea  Chronic, pt does not wear bipap  Monitor O2 sats  Utilize prn bipap        VTE Risk Mitigation (From admission, onward)    eliquis              Thank you for your consult. I will follow-up with patient. Please contact us if you have any additional questions.    Critical care time spent on the evaluation and treatment of severe organ dysfunction, review of pertinent labs and imaging studies, discussions with consulting providers and discussions with patient/family 63 minutes    Deepa Self NP  Department of Hospital Medicine   [facility] - Emergency Dept

## 2022-09-19 NOTE — HPI
Tong Ford is a 77 yr old male with PMHx significant for arthritis, HTN, AFIB, HLD, TIA, CAD, CHF, hypothyroidism, normocytic anemia and PSHx of CABG, coronary angioplasty with stent, and pacemaker presenting today for sob, weakness and swelling. Pt reports over the past several months he has been experiencing increasing swelling to his lower extremities.  He states the swelling has progressively worsened and extended up to his abdomen over the past several weeks.  He reports taking 60 mg of Lasix daily along with spironolactone and he states he is urinating adequately.  He has been attempting to get in with his cardiologist for the past 2 months but has been unsuccessful due to not having available appointments and he was finally able to see his cardiologist today who saw him in the office and recommended admission to the hospital for IV diuresing.  Patient also reports he experienced a fall yesterday evening due to his bilateral lower extremity edema causing him to be severely weak and having unsteady gait and he was on the ground for several hours before his family found him on the floor and was able to assist him up into the bed.  Patient denies chest pain but does endorse severe shortness of breath that is progressively worsening, severe quality, worsens when laying flat or with min exertion, no alleviating factors. Pt also has chronic bilat LE venous statis ulcers with wrapping intact and he follows with wound care. ED workup significant for elevated troponin, worsening JAYSON, elevated BNP, leukocytosis, hyperkalemia, elevated CPK, hypoxia requiring O2 supplementation, chest x-ray with increased bilateral asymmetric airspace disease consistent with CHF/volume overload and possible LLL pneumonia .  Given patient's acute presentation of worsening heart failure and renal disease patient will require ICU admission.  Discussed with hospital attending who also assessed pt at bedside and is in agreement on plan of  care.  No ICU beds available at this facility therefore pt will need to transfer for higher level of care. ED physician updated and transfer center called.

## 2022-09-19 NOTE — FIRST PROVIDER EVALUATION
Emergency Department TeleTriage Encounter Note      CHIEF COMPLAINT    Chief Complaint   Patient presents with    Shortness of Breath     CHF    Leg Swelling     Sent to ED for admit , Lasix        VITAL SIGNS   Initial Vitals [09/19/22 1158]   BP Pulse Resp Temp SpO2   (!) 110/54 60 20 97.5 °F (36.4 °C) 95 %      MAP       --            ALLERGIES    Review of patient's allergies indicates:  No Known Allergies    PROVIDER TRIAGE NOTE  This is a teletriage evaluation of a 77 y.o. male presenting to the ED with c/o increased SOB and edema.  Dr. Serna told patient to come to the ED for lasix IV.  Pt also had a fall last night.  Pt states that he was walking to the garage last night.  Pt denies hitting head or LOC.      PE: VSS.  Speaking in full sentences.  NAD noted.     Plan: labs, imaging, possible admission    Limited physical exam via telehealth: The patient is awake, alert, answering questions appropriately and is not in respiratory distress. All ED beds are full at present; patient notified of this status.  Patient seen and medically screened by Nurse Practitioner via teletriage.      Initial orders will be placed and care will be transferred to an alternate provider when patient is roomed for a full evaluation. Any additional orders and the final disposition will be determined by that provider.         ORDERS  Labs Reviewed   CBC W/ AUTO DIFFERENTIAL   COMPREHENSIVE METABOLIC PANEL   TROPONIN I   B-TYPE NATRIURETIC PEPTIDE       ED Orders (720h ago, onward)      Start Ordered     Status Ordering Provider    09/19/22 1209 09/19/22 1209  Vital signs  Every 30 min         Ordered JERONIMO ELISE    09/19/22 1209 09/19/22 1209  Cardiac Monitoring - Adult  Continuous        Comments: Notify Physician If:    Ordered JERONIMO ELISE    09/19/22 1209 09/19/22 1209  Pulse Oximetry Continuous  Continuous         Ordered JERONIMO ELISE    09/19/22 1209 09/19/22 1209  Insert peripheral IV  Once         Ordered ARIK  JERONIMO MYERS.    09/19/22 1209 09/19/22 1209  CBC auto differential  STAT         Pending Collection JERONIMO ELISE.    09/19/22 1209 09/19/22 1209  Comprehensive metabolic panel  STAT         Pending Collection JERONIMO ELISE.    09/19/22 1209 09/19/22 1209  Troponin I  STAT         Pending Collection JERONIMO ELISEYann    09/19/22 1209 09/19/22 1209  Brain natriuretic peptide  STAT         Pending Collection JERONIMO ELISEYann    09/19/22 1209 09/19/22 1209  EKG 12-lead  Once         Ordered JERONIMO ELISEYann    09/19/22 1209 09/19/22 1209  X-Ray Chest AP Portable  1 time imaging         Ordered JERONIMO ELISEYann              Virtual Visit Note: The provider triage portion of this emergency department evaluation and documentation was performed via hipix, a HIPAA-compliant telemedicine application, in concert with a tele-presenter in the room. A face to face patient evaluation with one of my colleagues will occur once the patient is placed in an emergency department room.      DISCLAIMER: This note was prepared with [employer] voice recognition transcription software. Garbled syntax, mangled pronouns, and other bizarre constructions may be attributed to that software system.

## 2022-09-19 NOTE — TELEPHONE ENCOUNTER
Called daughter x2. Went straight to . Left a message for daughter that she should take him to the ER to get admitted at [facility] for IV lasix. Advised I LM for her father and sent portal message.  I called nursing supervisor to get a direct admit, but they have to have a dr call and s/w hospital medicine. I called dr vang to do so and he said he would shortly.   I have not heard back from him so im advising the patient go to ER to get it done. Dr vang is on call at [facility]

## 2022-09-19 NOTE — TELEPHONE ENCOUNTER
----- Message from Jd Mata MA sent at 9/19/2022 10:23 AM CDT -----  Contact: BEBE BELTRAN [[id number]]  Type: Needs Medical Advice    Who Called:DAUGHTER ----- BEBE BELTRAN [[id number]]  Best Call Back Number: [phone] Please call this number   Inquiry/Question: please call regarding  BEBE BELTRAN [[id number]] regarding        Thank you~

## 2022-09-19 NOTE — ASSESSMENT & PLAN NOTE
Patient with Paroxysmal (<7 days) atrial fibrillation which is controlled currently with Beta Blocker and Amiodarone. Patient is currently in sinus rhythm.[id number] Score: 3. HASBLED Score: Unable to calculate. Anticoagulation indicated. Anticoagulation done with eliquis.

## 2022-09-19 NOTE — TELEPHONE ENCOUNTER
----- Message from Jd Mata MA sent at 9/19/2022  9:20 AM CDT -----  Contact: BEBE BELTRAN [[id number]]  Type: Needs Medical Advice    Who Called: BEBE BELTRAN [[id number]]  Best Call Back Number:[phone]  Inquiry/Question: Please call BEBE BELTRAN [[id number]] regarding a fall, and appt concerns      Thank you~

## 2022-09-19 NOTE — ASSESSMENT & PLAN NOTE
Patient with acute kidney injury likely d/t Acute tubular necrosis  caused by acute CHF exac and hypoperfusion. JAYSON is currently worsening. Labs reviewed- Renal function/electrolytes with Estimated Creatinine Clearance: 20.6 mL/min (A) (based on SCr of 3.2 mg/dL (H)). according to latest data. Monitor urine output and serial BMP and adjust therapy as needed. Avoid nephrotoxins and renally dose meds for GFR listed above.     Consult nephrology

## 2022-09-19 NOTE — ASSESSMENT & PLAN NOTE
Patient is identified as having unknown  heart failure that is Acute. CHF is currently uncontrolled due to volume overload due to: Continued edema of extremities, >3 pillow orthopnea, Rales/crackles on pulmonary exam and Pulmonary edema/pleural effusion on CXR. Latest ECHO performed and demonstrates- Results for orders placed during the hospital encounter of 10/19/20    Echo Color Flow Doppler? Yes; Bubble Contrast? No    Interpretation Summary  · There is mild left ventricular concentric hypertrophy.  · The left ventricle is normal in size with normal systolic function. The estimated ejection fraction is 65%.  · Atrial fibrillation observed with restrictive filling pattern present.  · With normal left atrial pressure.  · Normal right ventricular systolic function.  · Moderate left atrial enlargement.  · Mild right atrial enlargement.  · Moderate aortic valve stenosis.  · Aortic valve area is 1.63 cm2; peak velocity is 1.76 m/s; mean gradient is 7 mmHg.  · Moderate mitral regurgitation.  · No pulmonary hypertension  · Mild tricuspid regurgitation.  · Mild pulmonic regurgitation.  · Normal central venous pressure (3 mmHg).  · The estimated PA systolic pressure is 23 mmHg.    Left ventricle hypertrophy, moderate calcific aortic valve stenosis plainimetered d valve area is 1.6 centimeters squared  Plus two mitral regurgitation  No pulmonary hypertension  . Continue Furosemide and monitor clinical status closely. Monitor on telemetry.  Monitor strict Is&Os and daily weights. Continue to stress to patient importance of self efficacy and  on diet for CHF. Last BNP reviewed- and noted below   Recent Labs   Lab 09/19/22  1324   BNP 1,795*   .    Repeat echo - last echo on file from 2021 with normal EF at that time  IV lasix gtt  Consult cardiology  Serial BMP  Hayes cath for strict I/O

## 2022-09-19 NOTE — SUBJECTIVE & OBJECTIVE
Past Medical History:   Diagnosis Date    Arthritis     Atrial fibrillation     Carotid artery occlusion     bilateral    Cataract     CHF (congestive heart failure)     CKD (chronic kidney disease), stage II     Coronary artery disease     3 Vessel CABG AND 3 STENTS    Encounter for blood transfusion     Hypercholesterolemia     Hypertension     Normocytic anemia     Shingles     X 3 WEEKS AGO    Sleep apnea     CESAR - NOT USING C-PAP    Thyroid disease     TIA (transient ischemic attack)        Past Surgical History:   Procedure Laterality Date    A-V CARDIAC PACEMAKER INSERTION N/A 4/1/2021    Procedure: INSERTION, CARDIAC PACEMAKER, DUAL CHAMBER;  Surgeon: Clifford Sevilla MD;  Location: [facility] CATH/EP LAB;  Service: Cardiology;  Laterality: N/A;  [employer]    CORONARY ANGIOPLASTY WITH STENT PLACEMENT      CORONARY ARTERY BYPASS GRAFT      CORONARY ARTERY BYPASS GRAFT (CABG)      3 vessel    EYE SURGERY      bILATERAL CATARACS    INSERTION OF PACEMAKER      REVISION OF IMPLANTABLE CARDIOVERTER-DEFIBRILLATOR (ICD) ELECTRODE LEAD PLACEMENT Left 7/1/2021    Procedure: REVISION, INSERTION, ELECTRODE LEAD,pacemaker;  Surgeon: Clifford Sevilla MD;  Location: [facility] CATH/EP LAB;  Service: Cardiology;  Laterality: Left;    ROBOT-ASSISTED LAPAROSCOPIC REPAIR OF INGUINAL HERNIA Right 3/11/2022    Procedure: ROBOTIC REPAIR, HERNIA, INGUINAL;  Surgeon: Melo Aguilera III, MD;  Location: [facility] OR;  Service: General;  Laterality: Right;  LB case    TREATMENT OF CARDIAC ARRHYTHMIA N/A 1/29/2021    Procedure: CARDIOVERSION;  Surgeon: Iron Serna MD;  Location: [facility] CATH/EP LAB;  Service: Cardiology;  Laterality: N/A;    VASECTOMY         Review of patient's allergies indicates:  No Known Allergies    No current facility-administered medications on file prior to encounter.     Current Outpatient Medications on File Prior to Encounter   Medication Sig    amiodarone (PACERONE) 200 MG Tab Take 1 tablet (200 mg total) by  "mouth once daily.    amLODIPine (NORVASC) 5 MG tablet Take 1 tablet (5 mg total) by mouth every evening.    apixaban (ELIQUIS) 5 mg Tab Take 1 tablet (5 mg total) by mouth 2 (two) times daily.    ascorbic acid, vitamin C, (VITAMIN C) 1000 MG tablet Take 1,000 mg by mouth once daily.    atorvastatin (LIPITOR) 40 MG tablet Take 1 tablet (40 mg total) by mouth once daily.    coQ10, ubiquinol, 100 mg Cap Take 150 mg by mouth once daily.     cyanocobalamin 1,000 mcg/mL injection Inject 1 mL (1,000 mcg total) into the muscle every 30 days.    doxazosin (CARDURA) 8 MG Tab TAKE 1 TABLET DAILY    ezetimibe (ZETIA) 10 mg tablet TAKE 1 TABLET DAILY    furosemide (LASIX) 40 MG tablet Take 1 tablet (40 mg total) by mouth once daily.    gabapentin (NEURONTIN) 600 MG tablet Take 1 tablet (600 mg total) by mouth 2 (two) times daily.    garlic 1,000 mg Cap Take 1 capsule by mouth once daily.     ketoconazole (NIZORAL) 2 % cream Apply topically once daily.    levothyroxine (SYNTHROID) 100 MCG tablet Take 1 tablet (100 mcg total) by mouth before breakfast.    LIDOcaine (LIDODERM) 5 % Place 1 patch onto the skin once daily. Remove & Discard patch within 12 hours or as directed by MD    lisinopriL (PRINIVIL,ZESTRIL) 20 MG tablet Take 1 tablet (20 mg total) by mouth 2 (two) times a day.    metoprolol succinate (TOPROL-XL) 50 MG 24 hr tablet Take 1 tablet (50 mg total) by mouth once daily.    multivitamin Tab Take 1 tablet by mouth once daily. (Patient not taking: No sig reported)    nitroGLYCERIN (NITROSTAT) 0.4 MG SL tablet Place 1 tablet (0.4 mg total) under the tongue every 5 (five) minutes as needed for Chest pain. DO NOT CRUSH OR CHEW; DISSOLVE UNDER TONGUE; MAXIMUM OF 3 DOSES IN 15 MINUTES    spironolactone (ALDACTONE) 25 MG tablet Take 1 tablet (25 mg total) by mouth every other day.    syringe-needle,safety,disp unt 3 mL 22 gauge x 1 1/2" Syrg Please syringe to administer medication deep IM    traMADoL (ULTRAM) 50 mg tablet Take " 1 tablet (50 mg total) by mouth every 6 (six) hours as needed for Pain.    vitamin D (VITAMIN D3) 1000 units Tab Take 1,000 Units by mouth once daily.     Family History       Problem Relation (Age of Onset)    COPD Paternal Grandmother    Cancer Mother, Father, Maternal Grandmother    Hypertension Father          Tobacco Use    Smoking status: Never    Smokeless tobacco: Never   Substance and Sexual Activity    Alcohol use: Not Currently    Drug use: No    Sexual activity: Not on file     Review of Systems   Constitutional:  Positive for activity change and fatigue. Negative for chills and fever.   HENT:  Negative for congestion, sore throat and trouble swallowing.    Respiratory:  Positive for cough, shortness of breath and wheezing. Negative for chest tightness.    Cardiovascular:  Positive for leg swelling. Negative for chest pain and palpitations.   Gastrointestinal:  Positive for abdominal distention. Negative for abdominal pain, diarrhea, nausea and vomiting.   Genitourinary:  Negative for difficulty urinating, dysuria and urgency.   Musculoskeletal:  Positive for gait problem. Negative for arthralgias and back pain.   Skin:  Positive for wound (chronic bilat LE venous stasis wounds). Negative for color change, pallor and rash.   Neurological:  Positive for weakness (bilat LE generalized weakness). Negative for dizziness, speech difficulty and light-headedness.   Hematological:  Negative for adenopathy.   Psychiatric/Behavioral:  Negative for agitation, behavioral problems and confusion.    All other systems reviewed and are negative.  Objective:     Vital Signs (Most Recent):  Temp: 97.5 °F (36.4 °C) (09/19/22 1158)  Pulse: 67 (09/19/22 1713)  Resp: 16 (09/19/22 1713)  BP: (!) 114/59 (09/19/22 1702)  SpO2: (!) 93 % (09/19/22 1713)   Vital Signs (24h Range):  Temp:  [97.5 °F (36.4 °C)] 97.5 °F (36.4 °C)  Pulse:  [60-67] 67  Resp:  [16-20] 16  SpO2:  [93 %-97 %] 93 %  BP: (107-126)/(51-59) 114/59     Weight:  95.3 kg (210 lb)  Body mass index is 32.89 kg/m².    Physical Exam  Vitals and nursing note reviewed.   Constitutional:       General: He is not in acute distress.     Appearance: He is well-developed. He is ill-appearing. He is not diaphoretic.   HENT:      Head: Normocephalic and atraumatic.      Right Ear: External ear normal.      Left Ear: External ear normal.      Nose: Nose normal. No congestion or rhinorrhea.      Mouth/Throat:      Mouth: Mucous membranes are moist.      Pharynx: Oropharynx is clear. No oropharyngeal exudate or posterior oropharyngeal erythema.   Eyes:      General: No scleral icterus.     Conjunctiva/sclera: Conjunctivae normal.      Pupils: Pupils are equal, round, and reactive to light.   Neck:      Vascular: No JVD.   Cardiovascular:      Rate and Rhythm: Normal rate. Rhythm irregular.      Pulses: Normal pulses.      Heart sounds: Murmur heard.      Comments: Significant edema to bilat LE extending to groin and abdomen  Pulmonary:      Effort: Pulmonary effort is normal. No respiratory distress.      Breath sounds: No stridor. No wheezing, rhonchi or rales.      Comments: Crackles to bilat bases with faint exp wheezing bilat ant upper lobes  Abdominal:      General: Bowel sounds are normal. There is distension.      Palpations: Abdomen is soft.      Tenderness: There is no abdominal tenderness.   Musculoskeletal:         General: Swelling present. No tenderness. Normal range of motion.      Cervical back: Normal range of motion and neck supple.      Right lower leg: Edema present.      Left lower leg: Edema present.   Skin:     General: Skin is warm and dry.      Capillary Refill: Capillary refill takes 2 to 3 seconds.      Coloration: Skin is not jaundiced or pale.      Findings: No erythema.      Comments: Dusky skin tone, bilat LE with wrapping in place left undisturbed. Pt has chronic bilat LE venous stasis ulcers and follows with wound care   Neurological:      General: No focal  deficit present.      Mental Status: He is alert and oriented to person, place, and time.      Cranial Nerves: No cranial nerve deficit.      Sensory: No sensory deficit.      Motor: Weakness present.      Comments: Generalized weakness to all exts   Psychiatric:         Mood and Affect: Mood normal.         Behavior: Behavior normal.         Thought Content: Thought content normal.       Significant Labs: All pertinent labs within the past 24 hours have been reviewed.  Recent Lab Results  (Last 5 results in the past 24 hours)        09/19/22  1716   09/19/22  1651   09/19/22  1433   09/19/22  1354   09/19/22  1324        Albumin         2.9       Alkaline Phosphatase         88       ALT         46       Anion Gap         12       Appearance, UA   Clear             AST         83       Bands         1.0       Basophil %         0.0       Bilirubin (UA)   Negative             BILIRUBIN TOTAL         1.2  Comment: For infants and newborns, interpretation of results should be based  on gestational age, weight and in agreement with clinical  observations.    Premature Infant recommended reference ranges:  Up to 24 hours.............<8.0 mg/dL  Up to 48 hours............<12.0 mg/dL  3-5 days..................<15.0 mg/dL  6-29 days.................<15.0 mg/dL         BNP         1,795  Comment: Values of less than 100 pg/ml are consistent with non-CHF populations.       BUN         76       Calcium         9.1       Chloride         107       CO2         21       Color, UA   Yellow             CPK       2607         Creatinine         3.2       Differential Method         Manual  Comment: CORRECTED RESULT; previously reported as Automated on 09/19/2022 at   15:38.    [C]       eGFR         19       Eosinophil %         0.0       Glucose         97       Glucose, UA   Negative             Gran %         81.0       Hematocrit         28.9       Hemoglobin         9.7       Immature Grans (Abs)         CANCELED  Comment:  Mild elevation in immature granulocytes is non specific and   can be seen in a variety of conditions including stress response,   acute inflammation, trauma and pregnancy. Correlation with other   laboratory and clinical findings is essential.    Result canceled by the ancillary.         Immature Granulocytes         CANCELED  Comment: Result canceled by the ancillary.       Ketones, UA   Negative             Leukocytes, UA   Negative             Lymph %         11.0       MCH         33.1       MCHC         33.6       MCV         99       Mono %         7.0       MPV         11.4       NITRITE UA   Negative             nRBC         0       Occult Blood UA   Trace             pH, UA   5.0             Platelets         207       POCT Glucose 144               Potassium         5.2       PROTEIN TOTAL         6.2       Protein, UA   Negative  Comment: Recommend a 24 hour urine protein or a urine   protein/creatinine ratio if globulin induced proteinuria is  clinically suspected.               RBC         2.93       RDW         15.5       SARS-CoV-2 RNA, Amplification, Qual     Negative  Comment: This test utilizes isothermal nucleic acid amplification   technology to detect the SARS-CoV-2 RdRp nucleic acid segment.   The analytical sensitivity (limit of detection) is 125 genome   equivalents/mL.     A POSITIVE result implies infection with the SARS-CoV-2 virus;  the patient is presumed to be contagious.    A NEGATIVE result means that SARS-CoV-2 nucleic acids are not  present above the limit of detection. A NEGATIVE result should be   treated as presumptive. It does not rule out the possibility of   COVID-19 and should not be the sole basis for treatment decisions.   If COVID-19 is strongly suspected based on clinical and exposure   history, re-testing using an alternate molecular assay should be   considered.       This test is only for use under the Food and Drug   Administration s Emergency Use Authorization (EUA).    Commercial kits are provided by [employer].   Performance characteristics of the EUA have been independently  verified by [facility] Department of  Pathology and Laboratory Medicine.   _________________________________________________________________  The ID NOW COVID-19 Letter of Authorization, along with the   authorized Fact Sheet for Healthcare Providers, the authorized Fact  Sheet for Patients, and authorized labeling are available on the FDA   website:  www.fda.gov/MedicalDevices/Safety/EmergencySituations/yqy224051.htm             Sodium         140       Specific [place], UA   1.015             Specimen UA   Urine, Clean Catch             Troponin I         1.035  Comment: The reference interval for Troponin I represents the 99th percentile   cutoff   for our facility and is consistent with 3rd generation assay   performance.         UROBILINOGEN UA   Negative             WBC         20.71                               [C] - Corrected Result               Significant Imaging: I have reviewed all pertinent imaging results/findings within the past 24 hours. CXR personally interpreted with bilat assymetrical airspace disease and possible LLL pneumonia.

## 2022-09-19 NOTE — ED PROVIDER NOTES
Encounter Date: 9/19/2022    SCRIBE #1 NOTE: I, Richie Munroe, am scribing for, and in the presence of,  Abhi Khan MD.     History     Chief Complaint   Patient presents with    Shortness of Breath     CHF    Leg Swelling     Sent to ED for admit , Lasix      Time seen by provider: 1:27 PM on 09/19/2022    Tong Ford is a 77 y.o. male who presents to the ED with an onset of SOB and swelling to the legs, groin, and abdominal region. The swelling initially started at the lower extremities then spread upwards to the abdominal region. The patient notes seeing a cardiologist who advised the patient to be put on IV lasix.  The patient takes lasix orally but it does not improve the condition of the swelling.The spouse notes that the patient has been to the ED twice for swelling. She also notes the patient falling last night on concrete. The patient denies chest pain, dysuria, blood in stool, nausea, vomiting, fever, cough, or any other symptoms at this time but confirms congestion and abdominal tenderness. PMHx of arthritis, HTN, AFIB, HCL, TIA, CAD, CHF, normocytic anemia. PSHx of CABG, coronary angioplasty with stent, and treatment of cardiac arrhythmia.      The history is provided by the patient and the spouse.   Review of patient's allergies indicates:  No Known Allergies  Past Medical History:   Diagnosis Date    Arthritis     Atrial fibrillation     Carotid artery occlusion     bilateral    Cataract     CHF (congestive heart failure)     CKD (chronic kidney disease), stage II     Coronary artery disease     3 Vessel CABG AND 3 STENTS    Encounter for blood transfusion     Hypercholesterolemia     Hypertension     Normocytic anemia     Shingles     X 3 WEEKS AGO    Sleep apnea     CESAR - NOT USING C-PAP    Thyroid disease     TIA (transient ischemic attack)      Past Surgical History:   Procedure Laterality Date    A-V CARDIAC PACEMAKER INSERTION N/A 4/1/2021    Procedure: INSERTION, CARDIAC PACEMAKER, DUAL  CHAMBER;  Surgeon: Clifford Sevilla MD;  Location: [facility] CATH/EP LAB;  Service: Cardiology;  Laterality: N/A;  [employer]    CORONARY ANGIOPLASTY WITH STENT PLACEMENT      CORONARY ARTERY BYPASS GRAFT      CORONARY ARTERY BYPASS GRAFT (CABG)      3 vessel    EYE SURGERY      bILATERAL CATARACS    INSERTION OF PACEMAKER      REVISION OF IMPLANTABLE CARDIOVERTER-DEFIBRILLATOR (ICD) ELECTRODE LEAD PLACEMENT Left 7/1/2021    Procedure: REVISION, INSERTION, ELECTRODE LEAD,pacemaker;  Surgeon: Clifford Sevilla MD;  Location: [facility] CATH/EP LAB;  Service: Cardiology;  Laterality: Left;    ROBOT-ASSISTED LAPAROSCOPIC REPAIR OF INGUINAL HERNIA Right 3/11/2022    Procedure: ROBOTIC REPAIR, HERNIA, INGUINAL;  Surgeon: Melo Aguilera III, MD;  Location: [facility] OR;  Service: General;  Laterality: Right;  LB case    TREATMENT OF CARDIAC ARRHYTHMIA N/A 1/29/2021    Procedure: CARDIOVERSION;  Surgeon: Iron Serna MD;  Location: [facility] CATH/EP LAB;  Service: Cardiology;  Laterality: N/A;    VASECTOMY       Family History   Problem Relation Age of Onset    Cancer Mother     Cancer Father     Hypertension Father     Cancer Maternal Grandmother     COPD Paternal Grandmother      Social History     Tobacco Use    Smoking status: Never    Smokeless tobacco: Never   Substance Use Topics    Alcohol use: Not Currently    Drug use: No     Review of Systems   Constitutional:  Negative for fever.   HENT:  Positive for sinus pressure. Negative for sore throat.    Respiratory:  Positive for shortness of breath. Negative for cough.    Cardiovascular:  Positive for leg swelling. Negative for chest pain.   Gastrointestinal:  Positive for abdominal pain. Negative for blood in stool, nausea and vomiting.        Positive Abdominal swelling.   Genitourinary:  Negative for dysuria.   Musculoskeletal:  Negative for back pain.   Skin:  Negative for rash.   Neurological:  Negative for weakness.   Hematological:  Bruises/bleeds easily.     Physical  Exam     Initial Vitals [09/19/22 1158]   BP Pulse Resp Temp SpO2   (!) 110/54 60 20 97.5 °F (36.4 °C) 95 %      MAP       --         Physical Exam    Nursing note and vitals reviewed.  Constitutional: He appears well-developed and well-nourished. He is not diaphoretic. No distress.   HENT:   Head: Normocephalic and atraumatic.   Eyes: EOM are normal. Pupils are equal, round, and reactive to light.   Neck: Neck supple.   Normal range of motion.  Cardiovascular:  Normal rate, regular rhythm, normal heart sounds and intact distal pulses.     Exam reveals no gallop and no friction rub.       No murmur heard.  Pulmonary/Chest: Breath sounds normal. No respiratory distress. He has no wheezes. He has no rhonchi. He has no rales.   Abdominal: Abdomen is soft. Bowel sounds are normal. He exhibits distension (Swelling in lower abdomen.). There is abdominal tenderness. There is no rebound and no guarding.   Musculoskeletal:         General: Normal range of motion.      Cervical back: Normal range of motion and neck supple.      Right upper leg: Swelling and edema present.      Left upper leg: Swelling and edema present.      Right knee: Swelling present.      Left knee: Swelling present.      Right lower leg: Swelling present. 4+ Pitting Edema present.      Left lower leg: Swelling present. 4+ Pitting Edema present.      Right ankle: Swelling present.      Left ankle: Swelling present.      Right foot: Swelling present.      Left foot: Swelling present.      Comments: Pitting edema extending from bilateral lower extremities to lower abdomen.     Neurological: He is alert and oriented to person, place, and time.   Skin: Skin is warm.   Psychiatric: He has a normal mood and affect. His behavior is normal. Judgment and thought content normal.       ED Course   Critical Care    Date/Time: 9/19/2022 3:24 PM  Performed by: Abhi Khan MD  Authorized by: Abhi Khan MD   Direct patient critical care time: 15  minutes  Additional history critical care time: 10 minutes  Ordering / reviewing critical care time: 14 minutes  Documentation critical care time: 13 minutes  Consulting other physicians critical care time: 10 minutes  Total critical care time (exclusive of procedural time) : 62 minutes  Critical care was time spent personally by me on the following activities: development of treatment plan with patient or surrogate, evaluation of patient's response to treatment, examination of patient, obtaining history from patient or surrogate, ordering and performing treatments and interventions, ordering and review of laboratory studies and ordering and review of radiographic studies.      Labs Reviewed   CBC W/ AUTO DIFFERENTIAL - Abnormal; Notable for the following components:       Result Value    WBC 20.71 (*)     RBC 2.93 (*)     Hemoglobin 9.7 (*)     Hematocrit 28.9 (*)     MCV 99 (*)     MCH 33.1 (*)     RDW 15.5 (*)     Gran % 81.0 (*)     Lymph % 11.0 (*)     All other components within normal limits   COMPREHENSIVE METABOLIC PANEL - Abnormal; Notable for the following components:    Potassium 5.2 (*)     CO2 21 (*)     BUN 76 (*)     Creatinine 3.2 (*)     Albumin 2.9 (*)     Total Bilirubin 1.2 (*)     AST 83 (*)     ALT 46 (*)     eGFR 19 (*)     All other components within normal limits   TROPONIN I - Abnormal; Notable for the following components:    Troponin I 1.035 (*)     All other components within normal limits   B-TYPE NATRIURETIC PEPTIDE - Abnormal; Notable for the following components:    BNP 1,795 (*)     All other components within normal limits   URINALYSIS, REFLEX TO URINE CULTURE - Abnormal; Notable for the following components:    Occult Blood UA Trace (*)     All other components within normal limits    Narrative:     Specimen Source->Urine   CK - Abnormal; Notable for the following components:    CPK 2607 (*)     All other components within normal limits   BASIC METABOLIC PANEL - Abnormal; Notable  for the following components:    CO2 19 (*)     Glucose 155 (*)     BUN 77 (*)     Creatinine 3.1 (*)     Calcium 8.6 (*)     eGFR 20 (*)     All other components within normal limits   TROPONIN I - Abnormal; Notable for the following components:    Troponin I 0.970 (*)     All other components within normal limits   POCT GLUCOSE - Abnormal; Notable for the following components:    POCT Glucose 144 (*)     All other components within normal limits   SARS-COV-2 RNA AMPLIFICATION, QUAL     EKG Readings: (Independently Interpreted)   Initial Reading: No STEMI. Rhythm: Paced Rhythm. Heart Rate: 60. ST Segments: Normal ST Segments. T Waves: Normal. Axis: Normal.   Paced rhythm at rate of 60 bpm with normal axis and normal intervals. No acute ST segment changes.        ECG Results              EKG 12-lead (Final result)  Result time 09/21/22 10:11:31      Final result by Interface, Lab In [facility] (09/21/22 10:11:31)                   Narrative:    Test Reason : R06.02,    Vent. Rate : 061 BPM     Atrial Rate : 061 BPM     P-R Int : 172 ms          QRS Dur : 204 ms      QT Int : 562 ms       P-R-T Axes : 105 247 036 degrees     QTc Int : 565 ms    AV dual-paced rhythm  Abnormal ECG  When compared with ECG of 30-AUG-2022 00:02,  No significant change was found  Confirmed by Kareem BAIG, Iron CROUCH ([id number]) on 9/21/2022 10:11:22 AM    Referred By: [email or web address]   SELF           Confirmed By:Iron Serna MD                                  Imaging Results              X-Ray Chest AP Portable (Final result)  Result time 09/19/22 14:44:14      Final result by Harpal Urbina MD (09/19/22 14:44:14)                   Impression:      Worsening bilateral somewhat asymmetric airspace disease most likely related to CHF/volume overload.      Electronically signed by: Harpal Urbina MD  Date:    09/19/2022  Time:    14:44               Narrative:    EXAMINATION:  XR CHEST AP PORTABLE    CLINICAL HISTORY:  CHF;    TECHNIQUE:  Single frontal  view of the chest was performed.    COMPARISON:  August 30, 2022    FINDINGS:  Increased bilateral asymmetric airspace disease is evident most likely related to CHF/volume overload.  The changes are more focal in the left midlung field an area of pneumonia cannot be excluded.  There is evidence of prior median sternotomy.  A left subclavian pacer device is in place.  The heart is enlarged.                                       Medications   furosemide injection 40 mg (40 mg Intravenous Given 9/19/22 1526)   sodium zirconium cyclosilicate packet 10 g (10 g Oral Given 9/19/22 1722)   albuterol sulfate nebulizer solution 10 mg (10 mg Nebulization Given 9/19/22 1713)   acetaminophen tablet 650 mg (650 mg Oral Given 9/19/22 2139)     Medical Decision Making:   History:   Old Medical Records: I decided to obtain old medical records.  Initial Assessment:   77-year-old male presented with multiple complaints.  Differential Diagnosis:   Initial differential diagnosis included but not limited to heart failure exacerbation, pneumonia, and illness.  Independently Interpreted Test(s):   I have ordered and independently interpreted X-rays - see prior notes.  I have ordered and independently interpreted EKG Reading(s) - see prior notes  Clinical Tests:   Lab Tests: Ordered and Reviewed  Radiological Study: Ordered and Reviewed  Medical Tests: Ordered and Reviewed  ED Management:  The patient was emergently evaluated in the emergency department, his evaluation was significant for an elderly male with significant lower extremity edema noted.  The patient's chest x-ray does show fluid overload per Radiology.  The patient's EKG showed no acute abnormalities per my independent interpretation.  The patient's labs were significant for an elevated BNP, elevated troponin, and elevated white blood cell count.  I believe the patient likely has a CHF exacerbation.  The patient was treated with IV Lasix here in the emergency department.  The  patient will require admission for further care.  The case was discussed with the hospitalist service, who believes that the patient needs ICU admission and treatment.  We do not have any ICU beds available here at our facility.  The hospitalist service, Dr. Allred will transfer the patient to another facility for further care.  Other:   I have discussed this case with another health care provider.        Scribe Attestation:   Scribe #1: I performed the above scribed service and the documentation accurately describes the services I performed. I attest to the accuracy of the note.            I, Dr. Abhi Khan, personally performed the services described in this documentation. All medical record entries made by the scribe were at my direction and in my presence.  I have reviewed the chart and agree that the record reflects my personal performance and is accurate and complete. Abhi Khan MD.  6:45 PM 10/08/2022         Clinical Impression:   Final diagnoses:  [R06.02] Shortness of breath  [I50.9] Acute on chronic congestive heart failure, unspecified heart failure type (Primary)  [I21.4] NSTEMI (non-ST elevated myocardial infarction)        ED Disposition Condition    Transfer to Another Facility Stable                Abhi Khan MD  09/19/22 [id number]       Abhi Khan MD  10/08/22 [id number]

## 2022-09-19 NOTE — ASSESSMENT & PLAN NOTE
Patient with known CAD s/p stent placement and CABG, which is controlled Will continue ASA and Statin and monitor for S/Sx of angina/ACS. Continue to monitor on telemetry.

## 2022-09-19 NOTE — ASSESSMENT & PLAN NOTE
Chronic, uncontrolled.  Latest blood pressure and vitals reviewed-   Temp:  [97.5 °F (36.4 °C)]   Pulse:  [60-67]   Resp:  [16-20]   BP: (107-126)/(51-59)   SpO2:  [93 %-97 %] .   Home meds for hypertension were reviewed and noted below.   Hypertension Medications             amLODIPine (NORVASC) 5 MG tablet Take 1 tablet (5 mg total) by mouth every evening.    doxazosin (CARDURA) 8 MG Tab TAKE 1 TABLET DAILY    furosemide (LASIX) 40 MG tablet Take 1 tablet (40 mg total) by mouth once daily.    lisinopriL (PRINIVIL,ZESTRIL) 20 MG tablet Take 1 tablet (20 mg total) by mouth 2 (two) times a day.    metoprolol succinate (TOPROL-XL) 50 MG 24 hr tablet Take 1 tablet (50 mg total) by mouth once daily.    nitroGLYCERIN (NITROSTAT) 0.4 MG SL tablet Place 1 tablet (0.4 mg total) under the tongue every 5 (five) minutes as needed for Chest pain. DO NOT CRUSH OR CHEW; DISSOLVE UNDER TONGUE; MAXIMUM OF 3 DOSES IN 15 MINUTES    spironolactone (ALDACTONE) 25 MG tablet Take 1 tablet (25 mg total) by mouth every other day.          While in the hospital, will manage blood pressure as follows; Adjust home antihypertensive regimen as follows- IV lasix gtt started, will hold current bp meds at this time given mild hypotension and aggressive diuresing.Monitor bp closely and resume meds when appropriate     Will utilize p.r.n. blood pressure medication only if patient's blood pressure greater than  180/110 and he develops symptoms such as worsening chest pain or shortness of breath.

## 2022-09-19 NOTE — ASSESSMENT & PLAN NOTE
Suspect demand ischemia from acute CHF exac  Pt currently cp free  EKG reviewed - no acute ST elevation  Consult cardiology  Trend troponins  Echo pending

## 2022-09-20 PROBLEM — I50.33 ACUTE ON CHRONIC DIASTOLIC CONGESTIVE HEART FAILURE: Status: ACTIVE | Noted: 2022-01-01

## 2022-09-20 PROBLEM — G60.9 NEUROPATHY, PERIPHERAL, IDIOPATHIC: Status: ACTIVE | Noted: 2022-01-01

## 2022-09-20 PROBLEM — R53.81 DEBILITY: Status: ACTIVE | Noted: 2022-01-01

## 2022-09-20 PROBLEM — J96.01 ACUTE HYPOXEMIC RESPIRATORY FAILURE: Status: ACTIVE | Noted: 2022-01-01

## 2022-09-20 PROBLEM — J18.9 PNEUMONIA: Status: ACTIVE | Noted: 2022-01-01

## 2022-09-20 PROBLEM — E87.5 HYPERKALEMIA: Status: RESOLVED | Noted: 2022-01-01 | Resolved: 2022-01-01

## 2022-09-20 PROBLEM — M62.82 RHABDOMYOLYSIS: Status: ACTIVE | Noted: 2022-01-01

## 2022-09-20 PROBLEM — I83.009 VENOUS STASIS ULCER: Status: ACTIVE | Noted: 2022-01-01

## 2022-09-20 PROBLEM — A41.9 SEPSIS: Status: ACTIVE | Noted: 2022-01-01

## 2022-09-20 PROBLEM — E87.79 VOLUME OVERLOAD STATE OF HEART: Status: ACTIVE | Noted: 2022-01-01

## 2022-09-20 PROBLEM — L97.909 VENOUS STASIS ULCER: Status: ACTIVE | Noted: 2022-01-01

## 2022-09-20 NOTE — ED NOTES
Spoke with Dr Hilario and told his the patients penile edema is too significant to insert the van, DR hilario stated we could try the male purewick to ensure the patient stays dry

## 2022-09-20 NOTE — CONSULTS
Consult received for education on fluid and salt restricted diet. Pt was with MDs at visit this morning and working with therapy this afternoon. Will follow up with education.

## 2022-09-20 NOTE — HOSPITAL COURSE
09/20/2022 Per HPI   09/21/2022 TTE  The left ventricle is normal in size with normal systolic function.  The estimated ejection fraction is 55%.  Indeterminate left ventricular diastolic function.  There is abnormal septal wall motion consistent with right ventricular pacemaker.  Normal right ventricular size with normal right ventricular systolic function.  Moderate tricuspid regurgitation.  Mild pulmonic regurgitation.  Moderate mitral regurgitation.  There is mild aortic valve stenosis.  Aortic valve area is 1.55 cm2; peak velocity is 2.60 m/s; mean gradient is 16 mmHg.  Elevated central venous pressure (15 mmHg).  The estimated PA systolic pressure is 71 mmHg.  There is pulmonary hypertension.  Mild left atrial enlargement.    774 cc intake, 2160 cc output, net -2.4L from admission. Cr stable at 3. O2 requirements increased overnight to 9 L NC. Remains SOB with edema.   09/22/2022 825 cc intake and 1500 cc output. Diuresing with Lasix 80 mg IV TID and Metolazone. Cr improving (2.7 today). Remains on 9L NC with intermittent BiPAP. Patient feels SOB is improving.     09/22/2022 Transferred to ICU yesterday due to increased O2 requirments. Placed on IV Lasix drip and Metolazone continued. 5.1L out overnight negative 4.0L in 24 hours and negative 7.9L since admission. Creatinine down to 2.4 this AM. HR and BP stable.  9/24/22: MS improved today. Off BiPAP. I/O -2.8L overnight  9/25/22: ~I/O -1800. Slight increase in Cr. Reports SOB continues to improve.  09/26/2022 Diuresing well, net -14 L from admission and 1.2 L overnight. Remains on 10L HF NC. Cr 2.5.   09/27/2022 Lasix on hold. BUN up to 104, Cr 3. Remains on 10L HF NC. Net -825 cc in past 24 hours, net -14.7 L from admission.

## 2022-09-20 NOTE — ASSESSMENT & PLAN NOTE
In setting of volume overload; likely secondary to demand  -troponin 1.03--0.9  -cardiology consulted  -echo pending

## 2022-09-20 NOTE — CONSULTS
"Consult Note  Pulmonary & Critical Care Medicine    Attending: David  Admit Date: 9/19/2022  Today's Date: 09/20/2022  Reason for Consult:  Acute CHF exacerbation requiring lasix gtt    SUBJECTIVE:     HPI:  77 y.o. male with history of reported HFpEF (TTE 1/2021 showed no systolic or diastolic dysfunction) s/p pacemaker, moderate aortic stenosis, CAD s/p coronary angioplasty with stent and CABG, TIA, Afib, HTN, HLD, anemia, and hypothyroidism presenting with progressively worsening SOB, weakness, and edema over the last 2 months.    Patient initially went to cardiology appointment on day of presentation and cardiologist recommended coming to ED for IV diuresis. Takes Lasix 60mg daily and spironolactone, reports medication adherence and good UOP at home. Reports fall evening prior to admission due to unsteadiness/"losing his footing" after which he was stuck lying on the floor throughout half the night. Endorses orthopnea and GOODWIN. Denies chest pain, palpitations, sweats, nausea, vomiting.    In [facility] ED patient was febrile to 100.2 and found to have elevated troponin 1.03 (since down trended), JAYSON (Cr 3.1), BNP 1795, leukocytosis (WBC 20), hyperkalemia (K 5.2), CPK 2600, hypoxia requiring O2 supplementation (on 2L at transfer), CXR with bilateral asymmetric opacities c/w volume overload with possible overlying LLL pneumonia. Patient was started on Lasix gtt, administered Lokelma and IV ceftriaxone and azithromycin. Transferred for ICU care due to acute presentation and decompensation requiring Lasix gtt.    Reports history of CHF diagnosed after his CABG in 1989. All echocardiograms in EHR (date back to 2021) with normal systolic and diastolic function. Additionally reports fall evening prior to admission due to unsteadiness/"losing his footing" after which he was stuck lying on the floor throughout half the night. States he has chronic significant pain on the bottoms of his feet. Denies back pain, leg " pain, or history of diabetes.    Review of patient's allergies indicates:  No Known Allergies    Past Medical History:   Diagnosis Date    Arthritis     Atrial fibrillation     Carotid artery occlusion     bilateral    Cataract     CHF (congestive heart failure)     CKD (chronic kidney disease), stage II     Coronary artery disease     3 Vessel CABG AND 3 STENTS    Encounter for blood transfusion     Hypercholesterolemia     Hypertension     Normocytic anemia     Shingles     X 3 WEEKS AGO    Sleep apnea     CESAR - NOT USING C-PAP    Thyroid disease     TIA (transient ischemic attack)      Past Surgical History:   Procedure Laterality Date    A-V CARDIAC PACEMAKER INSERTION N/A 4/1/2021    Procedure: INSERTION, CARDIAC PACEMAKER, DUAL CHAMBER;  Surgeon: Clifford Sevilla MD;  Location: [facility] CATH/EP LAB;  Service: Cardiology;  Laterality: N/A;  MEDTRONIC    CORONARY ANGIOPLASTY WITH STENT PLACEMENT      CORONARY ARTERY BYPASS GRAFT      CORONARY ARTERY BYPASS GRAFT (CABG)      3 vessel    EYE SURGERY      bILATERAL CATARACS    INSERTION OF PACEMAKER      REVISION OF IMPLANTABLE CARDIOVERTER-DEFIBRILLATOR (ICD) ELECTRODE LEAD PLACEMENT Left 7/1/2021    Procedure: REVISION, INSERTION, ELECTRODE LEAD,pacemaker;  Surgeon: Clifford Sevilla MD;  Location: [facility] CATH/EP LAB;  Service: Cardiology;  Laterality: Left;    ROBOT-ASSISTED LAPAROSCOPIC REPAIR OF INGUINAL HERNIA Right 3/11/2022    Procedure: ROBOTIC REPAIR, HERNIA, INGUINAL;  Surgeon: Melo Aguilera III, MD;  Location: [facility] OR;  Service: General;  Laterality: Right;  LB case    TREATMENT OF CARDIAC ARRHYTHMIA N/A 1/29/2021    Procedure: CARDIOVERSION;  Surgeon: Iron Serna MD;  Location: [facility] CATH/EP LAB;  Service: Cardiology;  Laterality: N/A;    VASECTOMY       Family History   Problem Relation Age of Onset    Cancer Mother     Cancer Father     Hypertension Father     Cancer Maternal Grandmother     COPD Paternal Grandmother      Social History  "    Tobacco Use    Smoking status: Never    Smokeless tobacco: Never   Substance Use Topics    Alcohol use: Not Currently    Drug use: No       All medications reviewed.    Review of Systems:  Pertinent items noted in HPI. All other systems reviewed and are negative.    OBJECTIVE:     Vital Signs Trends/Hx Reviewed  Vitals:    09/20/22 0500 09/20/22 0530 09/20/22 0554 09/20/22 0600   BP: (!) 118/58 (!) 118/55  (!) 118/57   BP Location:       Patient Position:       Pulse: 60 60  60   Resp: (!) 26 (!) 24  (!) 25   Temp:       TempSrc:       SpO2: 95% 96%  95%   Weight:   96 kg (211 lb 10.3 oz) 96.5 kg (212 lb 11.9 oz)   Height:   5' 8" (1.727 m)      Physical Exam:  General: NAD, cooperative & interactive.  HEENT: AT/NC, PERRL, EOMI, oral and nasal mucosa moist.   Neck: normal ROM  Cardiac: intermittent tachycardia with rapid correction of rate, irregular rhythm, holosystolic murmur, brisk cap refill and symmetric pulses in distal extremities.  Respiratory: Normal inspection. Symmetric chest rise. Auscultation clear bilaterally. No increased work of breathing noted.   Abdomen: Soft, NT/ND. +BS. No hepatosplenomegaly.   Extremities: 3+ pitting LE edema, continuous to torso (with trace pitting)  Neuro: Grossly intact to brief exam. Oriented x3 with appropriate mood/affect to situation.     Laboratory:  No results for input(s): PH, PCO2, PO2, HCO3, POCSATURATED, BE in the last 24 hours.  Recent Labs   Lab 09/20/22 0423   WBC 17.68*   RBC 2.72*   HGB 8.9*   HCT 26.1*      MCV 96   MCH 32.7*   MCHC 34.1     Recent Labs   Lab 09/20/22  0423      K 4.7      CO2 23   BUN 79*   CREATININE 3.2*   MG 2.5       Microbiology Data:   Microbiology Results (last 7 days)       Procedure Component Value Units Date/Time    Culture, Respiratory with Gram Stain [[id number]]     Order Status: No result Specimen: Respiratory     Blood culture [[id number]] Collected: 09/20/22 0013    Order Status: Sent Specimen: Blood " Updated: 09/20/22 0014    Blood culture [[id number]] Collected: 09/20/22 0014    Order Status: Sent Specimen: Blood Updated: 09/20/22 0014             Chest Imaging:   No new imaging.     Infusions:     furosemide (LASIX) 2 mg/mL continuous infusion (titrating) 7.5 mg/hr (09/20/22 0239)       Scheduled Medications:    amiodarone  200 mg Oral Daily    apixaban  5 mg Oral BID    atorvastatin  40 mg Oral Daily    azithromycin  250 mg Oral Daily    cefTRIAXone (ROCEPHIN) IVPB  1 g Intravenous Q24H    doxycycline  100 mg Oral Q12H    famotidine  20 mg Oral BID    levothyroxine  100 mcg Oral Before breakfast    mupirocin   Nasal BID       PRN Medications:   acetaminophen, albuterol sulfate, melatonin, ondansetron, sodium chloride 0.9%, sodium chloride 0.9%, sodium chloride 3%      ASSESSMENT & RECOMMENDATIONS     Neuro:  - AOx4, no sedation  - No acute issues    #Fall  #Peripheral Neuropathy  - reports bilateral numbness and pain on the soles of his feet with peripheral edema  - lying on floor overnight  - CK 2607 with JAYSON and hyperkalemia at admission, patient fluid overloaded  - CK and hyperkalemia improved, continue to monitor  - macrocytic anemia, in the setting of peripheral neuropathy would check B12 and thiamine level; would also check A1c to evaluate neuropathy further    CV:  #Acute Heart Failure Exacerbation  - significant peripheral and pulmonary edema  - reported history of heart failure after CABG in 1989  - Last echo per EHR (Jan 2021) showed normal systolic and diastolic function (LVEF 60%), moderate aortic valve stenosis  - Bedside US showed good global function without wall motion abnormality  - off Lasix gtt now  - UOP 1.06L over 12 hours, on IV Lasix 80mg BID  - recommend formal TTE to evaluate heart function    #Elevated troponin  - 1.035 -> 0.970  - EKG dual paced rhythm  - likely 2/2 increased demand from volume overload    Pulm:  #Acute Hypoxic Respiratory Failure  - put on 2L NC, but cannula  misplaced/dislodged and SpO2 in low 90s  - 2/2 pulmonary edema, continue diuresis and wean O2 as tolerated    #Suspected Pneumonia  - possible LLL pneumonia per CXR, Tmax 100.3, WBC 20 at admission  - on CTX and azithromycin for CAP coverage    Renal:  #Acute Kidney Injury on CKD4  -BL Cr 1.5-2, Cr 3.2 at admission  -suspect cardio-renal, would continue diuresis  -continue to monitor BMP    GI/FEN:  #Hyperkalemia  - K 5.2 at admission  - administered Lokelma at OSH  - improved on Lasix gtt, now off gtt and transitioned to IV Lasix 80mg BID    #Transaminitis  - AST/ALT/Alk Phos 83/46/88  - continue to trend    F: none  E: hyperkalemia as stated above  N: Low sodium, fluid restricted diet    Heme/Onc:  #Macrocytic Anemia  - Hgb 9.7 at admission, baseline ~10.5  - Iron studies June 2022: ferritin 293, iron 65, transferrin 159, TIBC 235, iron sat 28%  - likely 2/2 CKD, but in the setting of peripheral neuropathy would check B12, folate, and thiamine level    ID:  #Leukocytosis  #LLL Pneumonia  - WBC 20 at admisson  - Possible LLL per CXR  - respiratory culture ordered  - started on CTX and azithro  - UA unremarkable  - Blood cultures in process    Endo:  #Hypothyroidism  - continue home levothyroxine  - TSH 4.178 at admission, free T4 0.88      Thank you for allowing us to participate in the care of this patient. Patient is stable for discharge. Please call with questions.    Candice Stephens M.D.  [facility] Internal Medicine, PGY-2  [facility] Pulmonary/Critical Care Service    Pt seen and examined with Pulmonary/Critical Care team and this note reviewed and validated with the following additional comments:    Improving with diuresis. No longer SOB. Should be OK to step down.  Has symptoms of peripheral neuropathy with foot pain. Discussed with Dr. Solo.    Waldo Gonzalez MD  Phone [phone]

## 2022-09-20 NOTE — HPI
Tong Ford is a 77 y.o. male with HTN, AFIB, HLD, TIA, CAD, CHF, hypothyroidism, normocytic anemia and PSHx of CABG, coronary angioplasty with stent, and pacemaker. Patient presented to the ED with SOB, weakness, and edema. He was noted to have ADHF, JAYSON, elevated CPK, elevated troponin and was requiring supplemental O2. Patient denies any chest pain, PND, dizziness, palpitations, syncope, diaphoresis. Diuresing with Lasix gtt. Cr 3.2 (baseline 1.7-2.2). CPK 2600 trending down to 1200 this AM. Troponin peaked at 1 on admission. EKG paced. 17 cc intake, 1038 cc output.   TTE 2020  There is mild left ventricular concentric hypertrophy.  The left ventricle is normal in size with normal systolic function. The estimated ejection fraction is 65%.  Atrial fibrillation observed with restrictive filling pattern present.  With normal left atrial pressure.  Normal right ventricular systolic function.  Moderate left atrial enlargement.  Mild right atrial enlargement.  Moderate aortic valve stenosis.  Aortic valve area is 1.63 cm2; peak velocity is 1.76 m/s; mean gradient is 7 mmHg.  Moderate mitral regurgitation.  No pulmonary hypertension  Mild tricuspid regurgitation.  Mild pulmonic regurgitation.  Normal central venous pressure (3 mmHg).  The estimated PA systolic pressure is 23 mmHg.

## 2022-09-20 NOTE — ASSESSMENT & PLAN NOTE
In setting of volume overload and possible pneumonia  -cont supplemental O2 as needed, wean as tolerated--now on 2LNC

## 2022-09-20 NOTE — ASSESSMENT & PLAN NOTE
-in setting of worsening renal failure, given lokelma in ED  -monitor electrolytes, now on lasix drip

## 2022-09-20 NOTE — EICU
I reviewed the patient with the camera    I reviewed the blood work    Reviewed the radiology    Discussed with the nurse    This is a 77-year-old gentleman who is known to have hypertension, A. Fib, coronary artery disease, congestive heart failure who was admitted to the ICU because of congestive heart failure he is on Lasix drips because of edema.    The patient is comfortable and that he is not in any distress or septic workup for his hemodynamics are stable his math is above 60.    The patient also had elevated white cells but he had blood cultures and he was put on antibiotics.  He    There is no current active issues for the time being.    His chest x-ray is compatible with congestive heart failure

## 2022-09-20 NOTE — PROGRESS NOTES
Pharmacist Renal Dose Adjustment Note    Tong Ford is a 77 y.o. male being treated with the medication famotidine    Patient Data:    Vital Signs (Most Recent):  Temp: 98.1 °F (36.7 °C) (09/20/22 0800)  Pulse: 63 (09/20/22 0741)  Resp: (!) 22 (09/20/22 0741)  BP: 130/88 (09/20/22 0730)  SpO2: (!) 90 % (09/20/22 0741)   Vital Signs (72h Range):  Temp:  [97.5 °F (36.4 °C)-100.7 °F (38.2 °C)]   Pulse:  [60-76]   Resp:  [13-28]   BP: ()/(49-88)   SpO2:  [89 %-97 %]      Recent Labs   Lab 09/19/22  1936 09/20/22  0423 09/20/22  0804   CREATININE 3.1* 3.2* 3.2*     Serum creatinine: 3.2 mg/dL (H) 09/20/22 0804  Estimated creatinine clearance: 21.1 mL/min (A)    Medication:Famotidine dose: 20mg frequency BID will be changed to medication:famotidine dose:20 mg frequency:daily    Pharmacist's Name: Dory Card  Pharmacist's Extension: [phone]

## 2022-09-20 NOTE — SUBJECTIVE & OBJECTIVE
Past Medical History:   Diagnosis Date    Arthritis     Atrial fibrillation     Carotid artery occlusion     bilateral    Cataract     CHF (congestive heart failure)     CKD (chronic kidney disease), stage II     Coronary artery disease     3 Vessel CABG AND 3 STENTS    Encounter for blood transfusion     Hypercholesterolemia     Hypertension     Normocytic anemia     Shingles     X 3 WEEKS AGO    Sleep apnea     CESAR - NOT USING C-PAP    Thyroid disease     TIA (transient ischemic attack)        Past Surgical History:   Procedure Laterality Date    A-V CARDIAC PACEMAKER INSERTION N/A 4/1/2021    Procedure: INSERTION, CARDIAC PACEMAKER, DUAL CHAMBER;  Surgeon: Clifford Sevilla MD;  Location: [facility] CATH/EP LAB;  Service: Cardiology;  Laterality: N/A;  MEDTRONIC    CORONARY ANGIOPLASTY WITH STENT PLACEMENT      CORONARY ARTERY BYPASS GRAFT      CORONARY ARTERY BYPASS GRAFT (CABG)      3 vessel    EYE SURGERY      bILATERAL CATARACS    INSERTION OF PACEMAKER      REVISION OF IMPLANTABLE CARDIOVERTER-DEFIBRILLATOR (ICD) ELECTRODE LEAD PLACEMENT Left 7/1/2021    Procedure: REVISION, INSERTION, ELECTRODE LEAD,pacemaker;  Surgeon: Clifford Sevilla MD;  Location: [facility] CATH/EP LAB;  Service: Cardiology;  Laterality: Left;    ROBOT-ASSISTED LAPAROSCOPIC REPAIR OF INGUINAL HERNIA Right 3/11/2022    Procedure: ROBOTIC REPAIR, HERNIA, INGUINAL;  Surgeon: Melo Aguilera III, MD;  Location: [facility] OR;  Service: General;  Laterality: Right;  LB case    TREATMENT OF CARDIAC ARRHYTHMIA N/A 1/29/2021    Procedure: CARDIOVERSION;  Surgeon: Iron Serna MD;  Location: [facility] CATH/EP LAB;  Service: Cardiology;  Laterality: N/A;    VASECTOMY         Review of patient's allergies indicates:  No Known Allergies    Current Facility-Administered Medications on File Prior to Encounter   Medication    [COMPLETED] acetaminophen tablet 650 mg    [COMPLETED] albuterol sulfate nebulizer solution 10 mg    [COMPLETED] furosemide injection  40 mg    [COMPLETED] sodium zirconium cyclosilicate packet 10 g    [DISCONTINUED] aspirin chewable tablet 81 mg    [DISCONTINUED] atorvastatin tablet 40 mg    [DISCONTINUED] azithromycin 500 mg in dextrose 5 % 250 mL IVPB (ready to mix system)    [DISCONTINUED] cefTRIAXone (ROCEPHIN) 1 g/50 mL D5W IVPB    [DISCONTINUED] furosemide (LASIX) 200 mg in dextrose 5 % 100 mL continuous infusion (conc: 2 mg/mL)     Current Outpatient Medications on File Prior to Encounter   Medication Sig    amiodarone (PACERONE) 200 MG Tab Take 1 tablet (200 mg total) by mouth once daily.    amLODIPine (NORVASC) 5 MG tablet Take 1 tablet (5 mg total) by mouth every evening.    apixaban (ELIQUIS) 5 mg Tab Take 1 tablet (5 mg total) by mouth 2 (two) times daily.    ascorbic acid, vitamin C, (VITAMIN C) 1000 MG tablet Take 1,000 mg by mouth once daily.    atorvastatin (LIPITOR) 40 MG tablet Take 1 tablet (40 mg total) by mouth once daily.    coQ10, ubiquinol, 100 mg Cap Take 150 mg by mouth once daily.     cyanocobalamin 1,000 mcg/mL injection Inject 1 mL (1,000 mcg total) into the muscle every 30 days.    doxazosin (CARDURA) 8 MG Tab TAKE 1 TABLET DAILY    ezetimibe (ZETIA) 10 mg tablet TAKE 1 TABLET DAILY    furosemide (LASIX) 40 MG tablet Take 1 tablet (40 mg total) by mouth once daily.    gabapentin (NEURONTIN) 600 MG tablet Take 1 tablet (600 mg total) by mouth 2 (two) times daily.    garlic 1,000 mg Cap Take 1 capsule by mouth once daily.     ketoconazole (NIZORAL) 2 % cream Apply topically once daily.    levothyroxine (SYNTHROID) 100 MCG tablet Take 1 tablet (100 mcg total) by mouth before breakfast.    LIDOcaine (LIDODERM) 5 % Place 1 patch onto the skin once daily. Remove & Discard patch within 12 hours or as directed by MD    lisinopriL (PRINIVIL,ZESTRIL) 20 MG tablet Take 1 tablet (20 mg total) by mouth 2 (two) times a day.    metoprolol succinate (TOPROL-XL) 50 MG 24 hr tablet Take 1 tablet (50 mg total) by mouth once daily.     "multivitamin Tab Take 1 tablet by mouth once daily. (Patient not taking: No sig reported)    nitroGLYCERIN (NITROSTAT) 0.4 MG SL tablet Place 1 tablet (0.4 mg total) under the tongue every 5 (five) minutes as needed for Chest pain. DO NOT CRUSH OR CHEW; DISSOLVE UNDER TONGUE; MAXIMUM OF 3 DOSES IN 15 MINUTES    spironolactone (ALDACTONE) 25 MG tablet Take 1 tablet (25 mg total) by mouth every other day.    syringe-needle,safety,disp unt 3 mL 22 gauge x 1 1/2" Syrg Please syringe to administer medication deep IM    traMADoL (ULTRAM) 50 mg tablet Take 1 tablet (50 mg total) by mouth every 6 (six) hours as needed for Pain.    vitamin D (VITAMIN D3) 1000 units Tab Take 1,000 Units by mouth once daily.     Family History       Problem Relation (Age of Onset)    COPD Paternal Grandmother    Cancer Mother, Father, Maternal Grandmother    Hypertension Father          Tobacco Use    Smoking status: Never    Smokeless tobacco: Never   Substance and Sexual Activity    Alcohol use: Not Currently    Drug use: No    Sexual activity: Not on file     Review of Systems   Unable to perform ROS: Other (patient sleeping and unable to stay awake to answer any questions)   Objective:     Vital Signs (Most Recent):  Temp: 100.3 °F (37.9 °C) (09/19/22 2245)  Pulse: 60 (09/20/22 0200)  Resp: 13 (09/20/22 0200)  BP: (!) 102/51 (09/20/22 0200)  SpO2: (!) 94 % (09/20/22 0200)   Vital Signs (24h Range):  Temp:  [97.5 °F (36.4 °C)-100.7 °F (38.2 °C)] 100.3 °F (37.9 °C)  Pulse:  [60-76] 60  Resp:  [13-21] 13  SpO2:  [92 %-97 %] 94 %  BP: ()/(49-59) 102/51     Weight: 96 kg (211 lb 10.3 oz)  Body mass index is 32.18 kg/m².    Physical Exam  Vitals and nursing note reviewed.   Constitutional:       General: He is not in acute distress.     Appearance: He is obese.   HENT:      Head: Normocephalic and atraumatic.      Nose: Nose normal.      Mouth/Throat:      Mouth: Mucous membranes are moist.   Eyes:      Pupils: Pupils are equal, round, and " reactive to light.   Cardiovascular:      Rate and Rhythm: Normal rate and regular rhythm.      Pulses: Normal pulses.      Heart sounds: Murmur heard.   Pulmonary:      Effort: Pulmonary effort is normal.      Breath sounds: Rales present.   Abdominal:      General: Bowel sounds are normal.      Palpations: Abdomen is soft.   Musculoskeletal:         General: Swelling present. Normal range of motion.      Cervical back: Normal range of motion.      Right lower leg: Edema present.      Left lower leg: Edema present.   Skin:     General: Skin is warm and dry.      Findings: Erythema and lesion present.   Neurological:      Motor: Weakness present.      Comments: Unable to assess   Psychiatric:      Comments: Unable to assess         CRANIAL NERVES     CN III, IV, VI   Pupils are equal, round, and reactive to light.     Significant Labs: All pertinent labs within the past 24 hours have been reviewed.    Significant Imaging: I have reviewed all pertinent imaging results/findings within the past 24 hours.

## 2022-09-20 NOTE — PT/OT/SLP EVAL
Physical Therapy Evaluation    Patient Name:  Tong Ford   MRN:  [id number]    Recommendations:     Discharge Recommendations:   (post acute placement / therapy)   Discharge Equipment Recommendations:  (Defer to next level of care)   Barriers to discharge: Decreased caregiver support and increased assist needed and increased fall risk    Assessment:     Tong Ford is a 77 y.o. male admitted with a medical diagnosis of Acute on chronic diastolic congestive heart failure.  He presents with the following impairments/functional limitations:  weakness, gait instability, impaired balance, impaired endurance, impaired self care skills, impaired functional mobility, decreased coordination, pain, decreased safety awareness, decreased lower extremity function, decreased upper extremity function, decreased ROM, impaired cardiopulmonary response to activity, edema, impaired skin, impaired coordination . Pt with recent decline in function and hx of multiple falls. Pt requires increased assistance from baseline for all functional mobility as this time. Recommending post acute therapy/placement. Will progress pt as able.     Rehab Prognosis: Good; patient would benefit from acute skilled PT services to address these deficits and reach maximum level of function.    Recent Surgery: * No surgery found *      Plan:     During this hospitalization, patient to be seen 5 x/week to address the identified rehab impairments via gait training, therapeutic activities, therapeutic exercises, neuromuscular re-education and progress toward the following goals:    Plan of Care Expires:  10/21/22    Subjective     Chief Complaint: R foot pain  Patient/Family Comments/goals: Improve function and independence    Pain/Comfort:  Pain Rating 1: 7/10  Location - Side 1: Right  Location - Orientation 1: anterior  Location 1: foot  Pain Addressed 1: Reposition, Distraction, Cessation of Activity  Pain Rating Post-Intervention 1:  (not  rated)    Patients cultural, spiritual, Holiness conflicts given the current situation: no    Living Environment:   pt lives with daughter, PEDRO and 2 teen grandchildren in 2SH with 1 step to enter, bed and bathroom on first floor; WIS with shower chair.  Prior to admission, patients level of function was  pt was Mod I ADLS and ambulation with RW; family did most IADLS; pt was driving up until a few months ago; pt. has had multiple falls at home / community; daughter drove pt to wound care for chronic venous stasis ulcers on legs Equipment used at home: walker, rolling, shower chair, cane, straight, wheelchair.  DME owned (not currently used): single point cane.  Upon discharge, patient will have assistance from family.    Objective:     Communicated with nsg prior to session.  Patient found right sidelying with telemetry, blood pressure cuff, peripheral IV, pulse ox (continuous), oxygen  upon PT entry to room.    General Precautions: Standard, fall, respiratory, aspiration   Orthopedic Precautions:N/A   Braces:    Respiratory Status: Nasal cannula, flow 3 L/min    Exams:  Cognitive Exam:  Patient is oriented to Person, Place, Time, and Situation  Postural Exam:  Patient presented with the following abnormalities:    -       Rounded shoulders  -       Forward head  Skin Integrity/Edema:      -       Skin integrity: BLE venous stasis ulcers bandaged; increased redness R thigh  -       Edema: Moderate LLE and pitting RLE  RLE ROM: WFL  RLE Strength: grossly 3+ to 4-/5  LLE ROM: WFL  LLE Strength: grossly 4- to 4/5  B ankle testing limited and MMT not assessed due to edema and bandaging; decreased ankle mobility noted    Functional Mobility:  Bed Mobility:     Rolling Left:  maximal assistance  Rolling Right: maximal assistance  Scooting: total assistance and of 2 persons  Supine to Sit: maximal assistance and of 2 persons  Sit to Supine: total assistance and of 2 persons  Transfers:     Sit to Stand:  moderate  assistance and of 2 persons with hand-held assist  Gait: Pt performed 4 side steps R toward HOB with ModA x 2 with B HHA; pt demo mild B knee buckling (R>L), forward flexed at B hips and head faced down with gaze to the floor, sliding B feet / limited clearance, and R lateral lean; VC's for upright posture, weight shifting to L side to advance RLE to R, blocking provided at B knees    Therapeutic Activities and Exercises:   Pt educated on role of PT/POC and pt agreeable to participate  Pt requires increased time/effort and cueing for mobility  Pt transitioned sitting EOB and sat ~10 mins with MAxA initially 2/2 R lateral lean then progressed to CGA-SBA with cueing for B UE support on bed and facilitating WB toward L side  Pt scooted EOB with MaxA x 2  Performed sit >stand and side steps as reported above  IV was dislodged during pt's return to supine position; applied pressure and nsg notified immediately and resolved issue  Pt scooted and rolled to L side for pressure relief; pillows and pressure relief boots in place for comfort and pressure relief     AM-PAC 6 CLICK MOBILITY  Total Score:      Patient left left sidelying with all lines intact, call button in reach, nsg notified, and daughter present.    GOALS:   Multidisciplinary Problems       Physical Therapy Goals          Problem: Physical Therapy    Goal Priority Disciplines Outcome Goal Variances Interventions   Physical Therapy Goal     PT, PT/OT Ongoing, Progressing     Description: Goals to be met by: 10/20/22     Patient will increase functional independence with mobility by performin. Supine to sit with Stand-by Assistance  2. Sit to supine with Stand-by Assistance  3. Sit to stand transfer with Stand-by Assistance  4. Bed to chair transfer with Stand-by Assistance using Rolling Walker  5. Gait  x 50 feet with Stand-by Assistance using Rolling Walker.                          History:     Past Medical History:   Diagnosis Date    Arthritis      Atrial fibrillation     Carotid artery occlusion     bilateral    Cataract     CHF (congestive heart failure)     CKD (chronic kidney disease), stage II     Coronary artery disease     3 Vessel CABG AND 3 STENTS    Encounter for blood transfusion     Hypercholesterolemia     Hypertension     Normocytic anemia     Shingles     X 3 WEEKS AGO    Sleep apnea     CESAR - NOT USING C-PAP    Thyroid disease     TIA (transient ischemic attack)        Past Surgical History:   Procedure Laterality Date    A-V CARDIAC PACEMAKER INSERTION N/A 4/1/2021    Procedure: INSERTION, CARDIAC PACEMAKER, DUAL CHAMBER;  Surgeon: Clifford Sevilla MD;  Location: [facility] CATH/EP LAB;  Service: Cardiology;  Laterality: N/A;  [employer]    CORONARY ANGIOPLASTY WITH STENT PLACEMENT      CORONARY ARTERY BYPASS GRAFT      CORONARY ARTERY BYPASS GRAFT (CABG)      3 vessel    EYE SURGERY      bILATERAL CATARACS    INSERTION OF PACEMAKER      REVISION OF IMPLANTABLE CARDIOVERTER-DEFIBRILLATOR (ICD) ELECTRODE LEAD PLACEMENT Left 7/1/2021    Procedure: REVISION, INSERTION, ELECTRODE LEAD,pacemaker;  Surgeon: Clifford Sevilla MD;  Location: [facility] CATH/EP LAB;  Service: Cardiology;  Laterality: Left;    ROBOT-ASSISTED LAPAROSCOPIC REPAIR OF INGUINAL HERNIA Right 3/11/2022    Procedure: ROBOTIC REPAIR, HERNIA, INGUINAL;  Surgeon: Melo Augilera III, MD;  Location: [facility] OR;  Service: General;  Laterality: Right;  LB case    TREATMENT OF CARDIAC ARRHYTHMIA N/A 1/29/2021    Procedure: CARDIOVERSION;  Surgeon: Iron Serna MD;  Location: [facility] CATH/EP LAB;  Service: Cardiology;  Laterality: N/A;    VASECTOMY         Time Tracking:     PT Received On: 09/20/22  PT Start Time: 1317     PT Stop Time: 1357  PT Total Time (min): 40 min     Billable Minutes: Evaluation 10, Gait Training 10, and Therapeutic Activity 20  (cotx with OT)      09/21/2022

## 2022-09-20 NOTE — CONSULTS
[facility] - Intensive Care  Wound Care    Patient Name:  Tong Ford   MRN:  [id number]  Date: 9/20/2022  Diagnosis: Acute on chronic diastolic congestive heart failure    History:     Past Medical History:   Diagnosis Date    Arthritis     Atrial fibrillation     Carotid artery occlusion     bilateral    Cataract     CHF (congestive heart failure)     CKD (chronic kidney disease), stage II     Coronary artery disease     3 Vessel CABG AND 3 STENTS    Encounter for blood transfusion     Hypercholesterolemia     Hypertension     Normocytic anemia     Shingles     X 3 WEEKS AGO    Sleep apnea     CESAR - NOT USING C-PAP    Thyroid disease     TIA (transient ischemic attack)        Social History     Socioeconomic History    Marital status:    Occupational History    Occupation: professor   Tobacco Use    Smoking status: Never    Smokeless tobacco: Never   Substance and Sexual Activity    Alcohol use: Not Currently    Drug use: No     Social Determinants of Health     Physical Activity: Inactive    Days of Exercise per Week: 0 days    Minutes of Exercise per Session: 0 min   Stress: No Stress Concern Present    Feeling of Stress : Not at all       Precautions:     Allergies as of 09/19/2022    (No Known Allergies)       [facility] Assessment Details/Treatment     Pt reports he is established with [facility] wound care for compression therapy 1x/wk.    BLE- scattered venous ulcerations- partial to shallow full thickness  Dark purple and maroon discolored skin to R medial and lateral distal leg, around ankle area.      R lateral foot- shallow full thickness ulceration      R posterior leg- scattered ulcerations with dark purple discoloration  Heel intact      R lateral leg- scattered intact blisters- dark purple/maroon discoloration        L heel- intact      Nurse reports sacrum with blanchable redness    Recommendations discussed with pt, nurse and Dr. Solo-  - Nursing to maintain pressure injury prevention  interventions   - Xeroform dressing to ulcerations daily- covered with abd pad, kerlix, and light ace  - Elevation of BLE for edema control- hold compression therapy at this time to monitor dark purple/maroon discolored skin  - F/u at discharge for continued compression therapy    09/20/2022

## 2022-09-20 NOTE — CONSULTS
[facility] - Intensive Care  Cardiology  Consult Note    Patient Name: Tong Ford  MRN: [id number]  Admission Date: 9/19/2022  Hospital Length of Stay: 1 days  Code Status: Full Code   Attending Provider: Adolfo Solo, *   Consulting Provider: Nathaniel Bal NP  Primary Care Physician: Kris Zavala MD  Principal Problem:Acute exacerbation of CHF (congestive heart failure)    Patient information was obtained from patient, past medical records and ER records.     Inpatient consult to [facility]  Consult performed by: Nathaniel Bal NP  Consult ordered by: Shima Donaldson NP        Subjective:     Chief Complaint:  SOB, edema     HPI:   Tong Ford is a 77 y.o. male with HTN, AFIB, HLD, TIA, CAD, CHF, hypothyroidism, normocytic anemia and PSHx of CABG, coronary angioplasty with stent, and pacemaker. Patient presented to the ED with SOB, weakness, and edema. He was noted to have ADHF, JAYSON, elevated CPK, elevated troponin and was requiring supplemental O2. Patient denies any chest pain, PND, dizziness, palpitations, syncope, diaphoresis. Diuresing with Lasix gtt. Cr 3.2 (baseline 1.7-2.2). CPK 2600 trending down to 1200 this AM. Troponin peaked at 1 on admission. EKG paced. 17 cc intake, 1038 cc output.   TTE 2020   There is mild left ventricular concentric hypertrophy.   The left ventricle is normal in size with normal systolic function. The estimated ejection fraction is 65%.   Atrial fibrillation observed with restrictive filling pattern present.   With normal left atrial pressure.   Normal right ventricular systolic function.   Moderate left atrial enlargement.   Mild right atrial enlargement.   Moderate aortic valve stenosis.   Aortic valve area is 1.63 cm2; peak velocity is 1.76 m/s; mean gradient is 7 mmHg.   Moderate mitral regurgitation.   No pulmonary hypertension   Mild tricuspid regurgitation.   Mild pulmonic regurgitation.   Normal central venous pressure (3  mmHg).   The estimated PA systolic pressure is 23 mmHg.      Past Medical History:   Diagnosis Date    Arthritis     Atrial fibrillation     Carotid artery occlusion     bilateral    Cataract     CHF (congestive heart failure)     CKD (chronic kidney disease), stage II     Coronary artery disease     3 Vessel CABG AND 3 STENTS    Encounter for blood transfusion     Hypercholesterolemia     Hypertension     Normocytic anemia     Shingles     X 3 WEEKS AGO    Sleep apnea     CESAR - NOT USING C-PAP    Thyroid disease     TIA (transient ischemic attack)        Past Surgical History:   Procedure Laterality Date    A-V CARDIAC PACEMAKER INSERTION N/A 4/1/2021    Procedure: INSERTION, CARDIAC PACEMAKER, DUAL CHAMBER;  Surgeon: Clifford Sevilla MD;  Location: [facility] CATH/EP LAB;  Service: Cardiology;  Laterality: N/A;  [employer]    CORONARY ANGIOPLASTY WITH STENT PLACEMENT      CORONARY ARTERY BYPASS GRAFT      CORONARY ARTERY BYPASS GRAFT (CABG)      3 vessel    EYE SURGERY      bILATERAL CATARACS    INSERTION OF PACEMAKER      REVISION OF IMPLANTABLE CARDIOVERTER-DEFIBRILLATOR (ICD) ELECTRODE LEAD PLACEMENT Left 7/1/2021    Procedure: REVISION, INSERTION, ELECTRODE LEAD,pacemaker;  Surgeon: Clifford Sevilla MD;  Location: [facility] CATH/EP LAB;  Service: Cardiology;  Laterality: Left;    ROBOT-ASSISTED LAPAROSCOPIC REPAIR OF INGUINAL HERNIA Right 3/11/2022    Procedure: ROBOTIC REPAIR, HERNIA, INGUINAL;  Surgeon: Melo Aguilera III, MD;  Location: [facility] OR;  Service: General;  Laterality: Right;  LB case    TREATMENT OF CARDIAC ARRHYTHMIA N/A 1/29/2021    Procedure: CARDIOVERSION;  Surgeon: Iron Serna MD;  Location: [facility] CATH/EP LAB;  Service: Cardiology;  Laterality: N/A;    VASECTOMY         Review of patient's allergies indicates:  No Known Allergies    Current Facility-Administered Medications on File Prior to Encounter   Medication    [COMPLETED] acetaminophen tablet 650 mg     [COMPLETED] albuterol sulfate nebulizer solution 10 mg    [COMPLETED] furosemide injection 40 mg    [COMPLETED] sodium zirconium cyclosilicate packet 10 g    [DISCONTINUED] aspirin chewable tablet 81 mg    [DISCONTINUED] atorvastatin tablet 40 mg    [DISCONTINUED] azithromycin 500 mg in dextrose 5 % 250 mL IVPB (ready to mix system)    [DISCONTINUED] cefTRIAXone (ROCEPHIN) 1 g/50 mL D5W IVPB    [DISCONTINUED] furosemide (LASIX) 200 mg in dextrose 5 % 100 mL continuous infusion (conc: 2 mg/mL)     Current Outpatient Medications on File Prior to Encounter   Medication Sig    amiodarone (PACERONE) 200 MG Tab Take 1 tablet (200 mg total) by mouth once daily.    amLODIPine (NORVASC) 5 MG tablet Take 1 tablet (5 mg total) by mouth every evening.    apixaban (ELIQUIS) 5 mg Tab Take 1 tablet (5 mg total) by mouth 2 (two) times daily.    ascorbic acid, vitamin C, (VITAMIN C) 1000 MG tablet Take 1,000 mg by mouth once daily.    atorvastatin (LIPITOR) 40 MG tablet Take 1 tablet (40 mg total) by mouth once daily.    coQ10, ubiquinol, 100 mg Cap Take 150 mg by mouth once daily.     cyanocobalamin 1,000 mcg/mL injection Inject 1 mL (1,000 mcg total) into the muscle every 30 days.    doxazosin (CARDURA) 8 MG Tab TAKE 1 TABLET DAILY    ezetimibe (ZETIA) 10 mg tablet TAKE 1 TABLET DAILY    furosemide (LASIX) 40 MG tablet Take 1 tablet (40 mg total) by mouth once daily.    gabapentin (NEURONTIN) 600 MG tablet Take 1 tablet (600 mg total) by mouth 2 (two) times daily.    garlic 1,000 mg Cap Take 1 capsule by mouth once daily.     ketoconazole (NIZORAL) 2 % cream Apply topically once daily.    levothyroxine (SYNTHROID) 100 MCG tablet Take 1 tablet (100 mcg total) by mouth before breakfast.    LIDOcaine (LIDODERM) 5 % Place 1 patch onto the skin once daily. Remove & Discard patch within 12 hours or as directed by MD    lisinopriL (PRINIVIL,ZESTRIL) 20 MG tablet Take 1 tablet (20 mg total) by mouth 2 (two) times a  "day.    metoprolol succinate (TOPROL-XL) 50 MG 24 hr tablet Take 1 tablet (50 mg total) by mouth once daily.    multivitamin Tab Take 1 tablet by mouth once daily. (Patient not taking: No sig reported)    nitroGLYCERIN (NITROSTAT) 0.4 MG SL tablet Place 1 tablet (0.4 mg total) under the tongue every 5 (five) minutes as needed for Chest pain. DO NOT CRUSH OR CHEW; DISSOLVE UNDER TONGUE; MAXIMUM OF 3 DOSES IN 15 MINUTES    spironolactone (ALDACTONE) 25 MG tablet Take 1 tablet (25 mg total) by mouth every other day.    syringe-needle,safety,disp unt 3 mL 22 gauge x 1 1/2" Syrg Please syringe to administer medication deep IM    traMADoL (ULTRAM) 50 mg tablet Take 1 tablet (50 mg total) by mouth every 6 (six) hours as needed for Pain.    vitamin D (VITAMIN D3) 1000 units Tab Take 1,000 Units by mouth once daily.     Family History       Problem Relation (Age of Onset)    COPD Paternal Grandmother    Cancer Mother, Father, Maternal Grandmother    Hypertension Father          Tobacco Use    Smoking status: Never    Smokeless tobacco: Never   Substance and Sexual Activity    Alcohol use: Not Currently    Drug use: No    Sexual activity: Not on file     Review of Systems   Constitutional: Positive for malaise/fatigue and weight gain.   HENT: Negative.     Eyes: Negative.    Cardiovascular:  Positive for dyspnea on exertion, irregular heartbeat, leg swelling, orthopnea and paroxysmal nocturnal dyspnea. Negative for chest pain, near-syncope, palpitations and syncope.   Respiratory:  Positive for shortness of breath.    Endocrine: Negative.    Hematologic/Lymphatic: Negative.    Gastrointestinal:  Positive for bloating. Negative for nausea and vomiting.   Genitourinary: Negative.    Neurological:  Positive for weakness.   Psychiatric/Behavioral: Negative.     Allergic/Immunologic: Negative.    Objective:     Vital Signs (Most Recent):  Temp: 98.1 °F (36.7 °C) (09/20/22 0800)  Pulse: 61 (09/20/22 1015)  Resp: (!) 23 " (09/20/22 1015)  BP: (!) 118/57 (09/20/22 1000)  SpO2: (!) 90 % (09/20/22 1015)   Vital Signs (24h Range):  Temp:  [97.5 °F (36.4 °C)-100.7 °F (38.2 °C)] 98.1 °F (36.7 °C)  Pulse:  [60-76] 61  Resp:  [13-28] 23  SpO2:  [85 %-97 %] 90 %  BP: ()/(49-88) 118/57     Weight: 96.5 kg (212 lb 11.9 oz)  Body mass index is 32.35 kg/m².    SpO2: (!) 90 %  O2 Device (Oxygen Therapy): nasal cannula      Intake/Output Summary (Last 24 hours) at 9/20/2022 1046  Last data filed at 9/20/2022 1027  Gross per 24 hour   Intake 264.57 ml   Output 1530 ml   Net -1265.43 ml       Lines/Drains/Airways       Drain  Duration                  Urethral Catheter 09/19/22 2300 Non-latex 16 Fr. <1 day              Peripheral Intravenous Line  Duration                  Peripheral IV - Single Lumen 18 G Anterior;Left Forearm -- days         Peripheral IV - Single Lumen 09/19/22 1915 20 G Left Antecubital <1 day                    Physical Exam  Constitutional:       General: He is not in acute distress.     Appearance: He is ill-appearing.   HENT:      Head: Atraumatic.   Eyes:      General:         Right eye: No discharge.         Left eye: No discharge.   Cardiovascular:      Rate and Rhythm: Normal rate and regular rhythm.      Heart sounds: Murmur heard.   Abdominal:      General: Bowel sounds are normal. There is distension.   Musculoskeletal:      Right lower leg: Edema present.      Left lower leg: Edema present.   Skin:     General: Skin is warm and dry.   Neurological:      Mental Status: He is alert. Mental status is at baseline.       Significant Labs: Blood Culture: No results for input(s): LABBLOO in the last 48 hours., BMP:   Recent Labs   Lab 09/19/22  1936 09/19/22  2350 09/20/22  0423 09/20/22  0804   *  --  104 104     --  141 140   K 4.9  --  4.7 4.5     --  108 106   CO2 19*  --  23 24   BUN 77*  --  79* 77*   CREATININE 3.1*  --  3.2* 3.2*   CALCIUM 8.6*  --  8.7 8.7   MG  --  2.3 2.5  --    , CMP    Recent Labs   Lab 09/19/22  1324 09/19/22 1936 09/20/22  0423 09/20/22  0804    138 141 140   K 5.2* 4.9 4.7 4.5    107 108 106   CO2 21* 19* 23 24   GLU 97 155* 104 104   BUN 76* 77* 79* 77*   CREATININE 3.2* 3.1* 3.2* 3.2*   CALCIUM 9.1 8.6* 8.7 8.7   PROT 6.2  --   --  6.0   ALBUMIN 2.9*  --   --  2.6*   BILITOT 1.2*  --   --  0.8   ALKPHOS 88  --   --  71   AST 83*  --   --  81*   ALT 46*  --   --  45*   ANIONGAP 12 12 10 10   , CBC   Recent Labs   Lab 09/19/22 1324 09/20/22 0423   WBC 20.71* 17.68*   HGB 9.7* 8.9*   HCT 28.9* 26.1*    175   , INR No results for input(s): INR, PROTIME in the last 48 hours., Lipid Panel No results for input(s): CHOL, HDL, LDLCALC, TRIG, CHOLHDL in the last 48 hours., Troponin   Recent Labs   Lab 09/19/22 1324 09/19/22 1936   TROPONINI 1.035* 0.970*   , and All pertinent lab results from the last 24 hours have been reviewed.    Significant Imaging: Echocardiogram: Transthoracic echo (TTE) complete ([facility] Only):   Results for orders placed or performed during the hospital encounter of 10/19/20   Echo Color Flow Doppler? Yes; Bubble Contrast? No   Result Value Ref Range    Left Atrium Major Axis 4.80 cm    LA size 5.60 cm    AV regurgitation pressure 1/2 time 602.00 ms    Ao peak joseph 1.76 m/s    AV peak gradient 12 mmHg    AORTIC VALVE CUSP SEPERATION 1.30 cm    AV mean gradient 7 mmHg    Ao VTI 39.60 cm    Ao root annulus 3.30 cm    IVRT 95.00 ms    IVS 1.16 (A) 0.6 - 1.1 cm    LVIDd 4.88 3.5 - 6.0 cm    LVIDs 3.29 2.1 - 4.0 cm    LVOT diameter 2.30 cm    LVOT peak VTI 15.50 cm    LVOT peak joseph 0.69 m/s    Posterior Wall 1.15 (A) 0.6 - 1.1 cm    E wave deceleration time 253.00 ms    MV stenosis pressure 1/2 time 68.00 ms    MV Peak E Joseph 1.30 m/s    RVDD 2.93 cm    Triscuspid Valve Regurgitation Peak Gradient 20 mmHg    BSA 2.11 m2    TDI SEPTAL 0.08 m/s    LV LATERAL E/E' RATIO 13.00 m/s    LV SEPTAL E/E' RATIO 16.25 m/s    TDI LATERAL 0.10 m/s    FS 33  28 - 44 %    LV mass 213.16 g    Left Ventricle Relative Wall Thickness 0.47 cm    AV valve area 1.63 cm2    AV Velocity Ratio 0.39     AV index (prosthetic) 0.39     MV valve area p 1/2 method 3.24 cm2    Mean e' 0.09 m/s    LVOT area 4.2 cm2    LVOT stroke volume 64.37 cm3    E/E' ratio 14.44 m/s    TR Max Joseph 2.24 m/s    LV Mass Index 103 g/m2    Right Atrial Pressure (from IVC) 3 mmHg    TV rest pulmonary artery pressure 23 mmHg    Narrative    · There is mild left ventricular concentric hypertrophy.  · The left ventricle is normal in size with normal systolic function. The   estimated ejection fraction is 65%.  · Atrial fibrillation observed with restrictive filling pattern present.  · With normal left atrial pressure.  · Normal right ventricular systolic function.  · Moderate left atrial enlargement.  · Mild right atrial enlargement.  · Moderate aortic valve stenosis.  · Aortic valve area is 1.63 cm2; peak velocity is 1.76 m/s; mean gradient   is 7 mmHg.  · Moderate mitral regurgitation.  · No pulmonary hypertension  · Mild tricuspid regurgitation.  · Mild pulmonic regurgitation.  · Normal central venous pressure (3 mmHg).  · The estimated PA systolic pressure is 23 mmHg.     Left ventricle hypertrophy, moderate calcific aortic valve stenosis   plainimetered d valve area is 1.6 centimeters squared  Plus two mitral regurgitation  No pulmonary hypertension       Assessment and Plan:     * Acute exacerbation of CHF (congestive heart failure)   10/19/20    Echo Color Flow Doppler? Yes; Bubble Contrast? No    Interpretation Summary  · There is mild left ventricular concentric hypertrophy.  · The left ventricle is normal in size with normal systolic function. The estimated ejection fraction is 65%.  · Atrial fibrillation observed with restrictive filling pattern present.  · With normal left atrial pressure.  · Normal right ventricular systolic function.  · Moderate left atrial enlargement.  · Mild right atrial  enlargement.  · Moderate aortic valve stenosis.  · Aortic valve area is 1.63 cm2; peak velocity is 1.76 m/s; mean gradient is 7 mmHg.  · Moderate mitral regurgitation.  · No pulmonary hypertension  · Mild tricuspid regurgitation.  · Mild pulmonic regurgitation.  · Normal central venous pressure (3 mmHg).  · The estimated PA systolic pressure is 23 mmHg.    Left ventricle hypertrophy, moderate calcific aortic valve stenosis plainimetered d valve area is 1.6 centimeters squared  Plus two mitral regurgitation  No pulmonary hypertension    Recent Labs   Lab 09/19/22  1324   BNP 1,795*   .  Repeat TTE pending   ACEI, Aldactone on hold 2/2 JAYSON  Diuresing with Lasix gtt, net -1 L thus far  Overloaded on exam  Daily weights, accurate intake and output      Rhabdomyolysis  CPK 2600 on admission, down to 1200 this AM    Venous stasis ulcer  WOC on board  Venous wounds     Elevated troponin  Troponin peaked at 1 on admission  No chest pain  Likely demand in setting of ADHF and possible infection   Will proceed with TTE at this time to determine if ischemic evaluation is needed inpatient once patient is euvolemic      Coronary artery disease  S/p CABG in 1989 with subsequent PCI in 2005  No chest pain  EKG paced  Troponin peaked on admission at 1  Repeat TTE  Not on asa or BB per primary cardiologist - will defer initiation to them  Continue statin   Ischemic evaluation TBD    Paroxysmal atrial fibrillation  Continue Amio and Eliquis     Acute renal failure superimposed on stage 3 chronic kidney disease  Cr 3.2 (baseline 1.7-2.2)        VTE Risk Mitigation (From admission, onward)         Ordered     apixaban tablet 5 mg  2 times daily         09/20/22 0224     IP VTE HIGH RISK PATIENT  Once         09/19/22 2343     Place sequential compression device  Until discontinued         09/19/22 2343                Thank you for your consult. I will follow-up with patient. Please contact us if you have any additional  questions.    Nathaniel Bal NP  Cardiology   [facility] - Intensive Care     normal...

## 2022-09-20 NOTE — PROVIDER TRANSFER
(Physician in Lead of Transfers)   Outside Transfer Acceptance Note / Regional Referral Center    Referring facility: [facility]   Referring provider: LYUDMILA KUMAR  Accepting facility: [facility]  Reason for transfer:  Higher level of care  Transfer diagnosis: CHF  Transfer specialty requested: Cardiology  Transfer specialty notified: Yes  Transfer level: 2  Isolation status: No active isolations   Admission class or status:       Narrative     Patient is a 77 y.o. male who has a past medical history of arthritis, HTN, AFIB, HLD, TIA, CAD, CHF, hypothyroidism, normocytic anemia and PSHx of CABG, coronary angioplasty with stent, and pacemaker presenting today for shortness of breath, weakness and swelling. ED workup significant for elevated troponin, worsening JAYSON, elevated BNP, leukocytosis, hyperkalemia, elevated CPK, hypoxia requiring O2 supplementation, chest x-ray with increased bilateral asymmetric airspace disease consistent with CHF/volume overload and possible LLL pneumonia. See below labs and imaging. Patient started on lasix drip, given lokelma, and IV Rocephin/Az. No ICU beds available at this facility therefore pt will need to transfer for higher level of care. Stable for transfer.     Objective     Vitals: Temp: 97.5 °F (36.4 °C) (09/19/22 1158)  Pulse: 76 (09/19/22 1803)  Resp: 16 (09/19/22 1713)  BP: (!) 134/54 (09/19/22 1803)  SpO2: (!) 93 % (09/19/22 1713)    Recent Labs: see EPIC  CBC:  Recent Labs   Lab 09/19/22  1324   WBC 20.71*   HGB 9.7*   HCT 28.9*        CMP:  Recent Labs   Lab 09/19/22  1324   CALCIUM 9.1   ALBUMIN 2.9*   PROT 6.2      K 5.2*   CO2 21*      BUN 76*   CREATININE 3.2*   ALKPHOS 88   ALT 46*   AST 83*   BILITOT 1.2*     No results for input(s): LACTATE in the last 72 hours.  Recent Labs   Lab 09/19/22  1324 09/19/22  1354   CPK  --  2607*   TROPONINI 1.035*  --      BNP  Recent Labs   Lab 09/19/22  1324   BNP 1,795*      ABG  No results for input(s): PH, PO2, PCO2, HCO3, BE in the last 168 hours.   Lab Results   Component Value Date    INR 1.8 04/23/2022    INR 1.1 03/08/2022    INR 1.5 06/29/2021       Recent imaging:   X-Ray Chest AP Portable  Narrative: EXAMINATION:  XR CHEST AP PORTABLE    CLINICAL HISTORY:  CHF;    TECHNIQUE:  Single frontal view of the chest was performed.    COMPARISON:  August 30, 2022    FINDINGS:  Increased bilateral asymmetric airspace disease is evident most likely related to CHF/volume overload.  The changes are more focal in the left midlung field an area of pneumonia cannot be excluded.  There is evidence of prior median sternotomy.  A left subclavian pacer device is in place.  The heart is enlarged.  Impression: Worsening bilateral somewhat asymmetric airspace disease most likely related to CHF/volume overload.    Electronically signed by: Harpal Urbina MD  Date:    09/19/2022  Time:    14:44      Airway:     Vent settings:     IV access:    Allergies: Review of patient's allergies indicates:  No Known Allergies   NPO: No    Anticoagulation:   Anticoagulants       None             Instructions      [facility]  Admit to Hospital Medicine  Upon patient arrival to floor, please contact Hospital Medicine on call.

## 2022-09-20 NOTE — ASSESSMENT & PLAN NOTE
Meets sepsis criteria for WBC, temp, hypoxia  -treating for possible pneumonia and cellulitis  -cont rocephin, azithromycin, and doxy

## 2022-09-20 NOTE — HPI
"Per transfer note:  "Patient is a 77 y.o. male who has a past medical history of arthritis, HTN, AFIB, HLD, TIA, CAD, CHF, hypothyroidism, normocytic anemia and PSHx of CABG, coronary angioplasty with stent, and pacemaker presenting today for shortness of breath, weakness and swelling. ED workup significant for elevated troponin, worsening JAYSON, elevated BNP, leukocytosis, hyperkalemia, elevated CPK, hypoxia requiring O2 supplementation, chest x-ray with increased bilateral asymmetric airspace disease consistent with CHF/volume overload and possible LLL pneumonia. See below labs and imaging. Patient started on lasix drip, given lokelma, and IV Rocephin/Az. No ICU beds available at this facility therefore pt will need to transfer for higher level of care. Stable for transfer."    On arrival to [place], the patient was continued on lasix drip. He is sleeping and unable to answer any questions. Per chart review, he endorsed orthopnea, worsening GOODWIN. He was told by his cardiologist to report to the ED to be admitted for IV lasix. He stated that having the extra fluid on his legs and scrotal area made it difficult to walk and he fell at home last night on concrete, denies hitting head. He was on the floor for several hours before being found. He was taking 60mg of lasix daily and lionel, reported adequate urination. Pt also has chronic bilat LE venous statis ulcers and he follows with wound care. He had BNP 1795, troponin 1.03 with downtrend, creat 3.1, K 5.2, CK 2600. a temperature of 100.2, WBC 20 at sending facility. He was given lokelma, rocephin and azithromycin, and started on lasix drip. Prior echo with normal EF. He is on 2LNC with adequate O2 sat on arrival. Legs with stasis ulcers, right leg with redness noted.       "

## 2022-09-20 NOTE — PLAN OF CARE
Problem: Physical Therapy  Goal: Physical Therapy Goal  Description: Goals to be met by: 10/20/22     Patient will increase functional independence with mobility by performin. Supine to sit with Stand-by Assistance  2. Sit to supine with Stand-by Assistance  3. Sit to stand transfer with Stand-by Assistance  4. Bed to chair transfer with Stand-by Assistance using Rolling Walker  5. Gait  x 50 feet with Stand-by Assistance using Rolling Walker.     Outcome: Ongoing, Progressing     PT Eval completed, note to follow. Pt with recent decline in function and hx of multiple falls. Pt requires increased assistance from baseline for all functional mobility as this time. Recommending post acute therapy/placement. Will progress pt as able.

## 2022-09-20 NOTE — SUBJECTIVE & OBJECTIVE
Interval History:  Reports breathing is feeling much better today overall from previously.  Still reports some pain and discomfort in his feet bilaterally, particularly neuropathic pain.  Reports significant difficulty ambulating recently and will likely require SNF placement.  Stable for step-down from ICU today.    Review of Systems   Constitutional:  Negative for fever.   Respiratory:  Positive for shortness of breath.    Cardiovascular:  Positive for leg swelling. Negative for chest pain.   Musculoskeletal:  Positive for arthralgias.   Skin:  Positive for rash and wound.   Neurological:  Positive for weakness.   Objective:     Vital Signs (Most Recent):  Temp: 97.8 °F (36.6 °C) (09/20/22 1130)  Pulse: 63 (09/20/22 1215)  Resp: (!) 27 (09/20/22 1215)  BP: 134/63 (09/20/22 1130)  SpO2: (!) 92 % (09/20/22 1215)   Vital Signs (24h Range):  Temp:  [97.8 °F (36.6 °C)-100.7 °F (38.2 °C)] 97.8 °F (36.6 °C)  Pulse:  [60-76] 63  Resp:  [13-29] 27  SpO2:  [85 %-97 %] 92 %  BP: ()/(49-88) 134/63     Weight: 96.2 kg (212 lb)  Body mass index is 32.23 kg/m².    Intake/Output Summary (Last 24 hours) at 9/20/2022 1237  Last data filed at 9/20/2022 1130  Gross per 24 hour   Intake 264.57 ml   Output 1905 ml   Net -1640.43 ml      Physical Exam  Vitals and nursing note reviewed.   Constitutional:       General: He is not in acute distress.     Appearance: He is obese.   HENT:      Head: Normocephalic and atraumatic.      Nose: Nose normal.      Mouth/Throat:      Mouth: Mucous membranes are moist.   Eyes:      Pupils: Pupils are equal, round, and reactive to light.   Cardiovascular:      Rate and Rhythm: Normal rate and regular rhythm.      Pulses: Normal pulses.      Heart sounds: Murmur heard.   Pulmonary:      Effort: Pulmonary effort is normal. No respiratory distress.      Breath sounds: No rales.      Comments: On supplemental O2 via nasal cannula  Abdominal:      General: Bowel sounds are normal.      Palpations:  Abdomen is soft.   Musculoskeletal:         General: Swelling present. Normal range of motion.      Cervical back: Normal range of motion.      Right lower leg: Edema present.      Left lower leg: Edema present.   Skin:     General: Skin is warm and dry.      Findings: Erythema and lesion present.   Neurological:      Motor: Weakness present.      Comments: Unable to assess   Psychiatric:      Comments: Unable to assess       Significant Labs: All pertinent labs within the past 24 hours have been reviewed.    Significant Imaging: I have reviewed all pertinent imaging results/findings within the past 24 hours.

## 2022-09-20 NOTE — ASSESSMENT & PLAN NOTE
Troponin peaked at 1 on admission  No chest pain  Likely demand in setting of ADHF and possible infection   Will proceed with TTE at this time to determine if ischemic evaluation is needed inpatient once patient is euvolemic

## 2022-09-20 NOTE — ASSESSMENT & PLAN NOTE
Meets sepsis criteria for WBC, temp, hypoxia  -treating for possible pneumonia and cellulitis  -cont rocephin and doxy

## 2022-09-20 NOTE — ED NOTES
Dr Allred was called and made aware of pt's temp of 100.2. Order received. He was also updated on pt's latest labs

## 2022-09-20 NOTE — ASSESSMENT & PLAN NOTE
S/p CABG in 1989 with subsequent PCI in 2005  No chest pain  EKG paced  Troponin peaked on admission at 1  Repeat TTE  Not on asa or BB per primary cardiologist - will defer initiation to them  Continue statin   Ischemic evaluation TBD

## 2022-09-20 NOTE — ASSESSMENT & PLAN NOTE
Possible LLL pneumonia on imaging  Temp 100.4, WBC 20  -blood cx pending  -cont rocephin and doxy  -sputum sample  -cont supplemental O2 as needed

## 2022-09-20 NOTE — PROGRESS NOTES
"[facility]  [facility]  Progress Note    Patient Name: Tong Ford  MRN: [id number]  Patient Class: IP- Inpatient   Admission Date: 9/19/2022  Length of Stay: 1 days  Attending Physician: Adolfo Solo, *  Primary Care Provider: Kris Zavala MD        Subjective:     Principal Problem:Acute on chronic diastolic congestive heart failure        HPI:  Per transfer note:  "Patient is a 77 y.o. male who has a past medical history of arthritis, HTN, AFIB, HLD, TIA, CAD, CHF, hypothyroidism, normocytic anemia and PSHx of CABG, coronary angioplasty with stent, and pacemaker presenting today for shortness of breath, weakness and swelling. ED workup significant for elevated troponin, worsening JAYSON, elevated BNP, leukocytosis, hyperkalemia, elevated CPK, hypoxia requiring O2 supplementation, chest x-ray with increased bilateral asymmetric airspace disease consistent with CHF/volume overload and possible LLL pneumonia. See below labs and imaging. Patient started on lasix drip, given lokelma, and IV Rocephin/Az. No ICU beds available at this facility therefore pt will need to transfer for higher level of care. Stable for transfer."    On arrival to [place], the patient was continued on lasix drip. He is sleeping and unable to answer any questions. Per chart review, he endorsed orthopnea, worsening GOODWIN. He was told by his cardiologist to report to the ED to be admitted for IV lasix. He stated that having the extra fluid on his legs and scrotal area made it difficult to walk and he fell at home last night on concrete, denies hitting head. He was on the floor for several hours before being found. He was taking 60mg of lasix daily and lionel, reported adequate urination. Pt also has chronic bilat LE venous statis ulcers and he follows with wound care. He had BNP 1795, troponin 1.03 with downtrend, creat 3.1, K 5.2, CK 2600. a temperature of 100.2, WBC 20 at sending facility. He was given lokelma, rocephin " and azithromycin, and started on lasix drip. Prior echo with normal EF. He is on 2LNC with adequate O2 sat on arrival. Legs with stasis ulcers, right leg with redness noted.           Overview/Hospital Course:  No notes on file    Interval History:  Reports breathing is feeling much better today overall from previously.  Still reports some pain and discomfort in his feet bilaterally, particularly neuropathic pain.  Reports significant difficulty ambulating recently and will likely require SNF placement.  Stable for step-down from ICU today.    Review of Systems   Constitutional:  Negative for fever.   Respiratory:  Positive for shortness of breath.    Cardiovascular:  Positive for leg swelling. Negative for chest pain.   Musculoskeletal:  Positive for arthralgias.   Skin:  Positive for rash and wound.   Neurological:  Positive for weakness.   Objective:     Vital Signs (Most Recent):  Temp: 97.8 °F (36.6 °C) (09/20/22 1130)  Pulse: 63 (09/20/22 1215)  Resp: (!) 27 (09/20/22 1215)  BP: 134/63 (09/20/22 1130)  SpO2: (!) 92 % (09/20/22 1215)   Vital Signs (24h Range):  Temp:  [97.8 °F (36.6 °C)-100.7 °F (38.2 °C)] 97.8 °F (36.6 °C)  Pulse:  [60-76] 63  Resp:  [13-29] 27  SpO2:  [85 %-97 %] 92 %  BP: ()/(49-88) 134/63     Weight: 96.2 kg (212 lb)  Body mass index is 32.23 kg/m².    Intake/Output Summary (Last 24 hours) at 9/20/2022 1237  Last data filed at 9/20/2022 1130  Gross per 24 hour   Intake 264.57 ml   Output 1905 ml   Net -1640.43 ml      Physical Exam  Vitals and nursing note reviewed.   Constitutional:       General: He is not in acute distress.     Appearance: He is obese.   HENT:      Head: Normocephalic and atraumatic.      Nose: Nose normal.      Mouth/Throat:      Mouth: Mucous membranes are moist.   Eyes:      Pupils: Pupils are equal, round, and reactive to light.   Cardiovascular:      Rate and Rhythm: Normal rate and regular rhythm.      Pulses: Normal pulses.      Heart sounds: Murmur heard.    Pulmonary:      Effort: Pulmonary effort is normal. No respiratory distress.      Breath sounds: No rales.      Comments: On supplemental O2 via nasal cannula  Abdominal:      General: Bowel sounds are normal.      Palpations: Abdomen is soft.   Musculoskeletal:         General: Swelling present. Normal range of motion.      Cervical back: Normal range of motion.      Right lower leg: Edema present.      Left lower leg: Edema present.   Skin:     General: Skin is warm and dry.      Findings: Erythema and lesion present.   Neurological:      Motor: Weakness present.      Comments: Unable to assess   Psychiatric:      Comments: Unable to assess       Significant Labs: All pertinent labs within the past 24 hours have been reviewed.    Significant Imaging: I have reviewed all pertinent imaging results/findings within the past 24 hours.      Assessment/Plan:      * Acute on chronic diastolic congestive heart failure  -initially on lasix drip; will transition to IV pushes  -monitor electrolytes  -echo pending  -consult cardiology  -fluid restriction  -hold ACE and BB for now given soft BP      Debility  -PT/OT consult; will likely require placement      Acute hypoxemic respiratory failure  In setting of volume overload and possible pneumonia  -cont supplemental O2 as needed, wean as tolerated--now on 2LNC        Sepsis  Meets sepsis criteria for WBC, temp, hypoxia  -treating for possible pneumonia and cellulitis  -cont rocephin and doxy          Rhabdomyolysis  Fell and laid on floor for hours  CPK 2607 with JAYSON  -unable to give fluids due to volume overload  -repeat CK down trending  -consult nephrology as needed      Pneumonia  Possible LLL pneumonia on imaging  Temp 100.4, WBC 20  -blood cx pending  -cont rocephin and doxy  -sputum sample  -cont supplemental O2 as needed        Venous stasis ulcer  BLE with ulcers  -right leg with redness   -on rocephin and doxy  -consult wound care  -ALONDRA      Elevated troponin  In  setting of volume overload; likely secondary to demand  -troponin 1.03--0.9  -cardiology consulted  -echo pending      Coronary artery disease  Hx of CABG and stent  -no antiplatelet on home med list  -cont statin  -hold BB for low BP      Paroxysmal atrial fibrillation  -cont amiodarone and eliquis  -hold BB for now      Acute renal failure superimposed on stage 3 chronic kidney disease  -possibly cardiorenal  -diuresing  -monitor labs  -renally dose medications  -baseline creatinine around 2.5    Thyroid disease  -cont synthroid   -TSH mildly elevated with normal T4      Essential hypertension  BP soft on admission  -hold home meds for now and resume as tolerated      Obstructive sleep apnea  -CPAP at night         VTE Risk Mitigation (From admission, onward)         Ordered     apixaban tablet 5 mg  2 times daily         09/20/22 0224     IP VTE HIGH RISK PATIENT  Once         09/19/22 2343     Place sequential compression device  Until discontinued         09/19/22 2343                Discharge Planning   YUNIEL: 9/22/2022     Code Status: Full Code   Is the patient medically ready for discharge?:     Reason for patient still in hospital (select all that apply): Patient trending condition and Treatment                 Adolfo Solo MD  Department of Hospital Medicine   [facility] - Intensive [facility]

## 2022-09-20 NOTE — PT/OT/SLP EVAL
Occupational Therapy   Evaluation, tx    Name: Tong Ford  MRN: [id number]  Admitting Diagnosis:  Acute on chronic diastolic congestive heart failure  Recent Surgery: * No surgery found *    Diagnoses of CHF (congestive heart failure), Elevated troponin, and PAD (peripheral artery disease) were pertinent to this visit.    Recommendations:     Discharge Recommendations:  (post acutetherapy  placement)  Discharge Equipment Recommendations:   (defer to post acute facility)  Barriers to discharge:   (increase level of assistance, multiple falls at home)    Assessment:     Tong Ford is a 77 y.o. male with a medical diagnosis of Acute on chronic diastolic congestive heart failure.  He presents with debility. Performance deficits affecting function: weakness, impaired endurance, impaired self care skills, impaired functional mobility, gait instability, impaired balance, decreased coordination, decreased upper extremity function, decreased lower extremity function, decreased safety awareness, pain, decreased ROM, impaired coordination, impaired skin, edema, impaired cardiopulmonary response to activity.      Rehab Prognosis: Good; patient would benefit from acute skilled OT services to address these deficits and reach maximum level of function.       Plan:     Patient to be seen 5 x/week to address the above listed problems via self-care/home management, therapeutic activities, therapeutic exercises  Plan of Care Expires: 10/20/22  Plan of Care Reviewed with: patient, daughter    Subjective     Chief Complaint: R foot pain  Patient/Family Comments/goals: I want to walk and take care of myself again    Occupational Profile:  Living Environment: pt lives with daughter, PEDRO and 2 teen grandchildren in 2SH with 1 step to enter, bed and bathroom on first floor; WIS with shower chair.  Previous level of function: pt was Mod I ADLS and ambulation with RW; family did most IADLS; pt was driving up until a few months ago; pt. has  had multiple falls at home /community; daughter drove pt to wound care for chronic venous stasis ulcers on legs  Roles and Routines: active   Equipment Used at Home:  walker, rolling, shower chair, cane, straight, wheelchair  Assistance upon Discharge: family    Pain/Comfort:  Pain Rating 1: 7/10  Location - Side 1: Right  Location - Orientation 1: anterior  Location 1: foot  Pain Addressed 1: Reposition, Distraction, Cessation of Activity  Pain Rating Post-Intervention 1:  (not rated)    Patients cultural, spiritual, Sabianism conflicts given the current situation: no    Objective:     Communicated with: nurse prior to session.  Patient found right sidelying with telemetry, blood pressure cuff, peripheral IV, pulse ox (continuous), oxygen upon OT entry to room.    General Precautions: Standard, fall, respiratory, aspiration   Orthopedic Precautions:N/A   Braces: N/A  Respiratory Status: Nasal cannula, flow 3 L/min    Occupational Performance:    Bed Mobility:    Patient completed Rolling/Turning to Left with  maximal assistance and with side rail  Patient completed Rolling/Turning to Right with maximal assistance and with side rail  Patient completed Scooting/Bridging with total assistance and 2 persons  Patient completed Supine to Sit with maximal assistance and 2 persons  Patient completed Sit to Supine with total assistance and 2 persons    Functional Mobility/Transfers:  Patient completed Sit <> Stand Transfer with moderate assistance and of 2 persons  with  hand-held assist   Functional Mobility: side stepped 4 steps with Mod A x 2 with HHA; poor balance, blocking knees for safety, vc for upright posture, assist for WS and advancement of feet    Activities of Daily Living:  Feeding:  setup HOB elevated  Grooming: stand by assistance simulated in bed  Lower Body Dressing: dependence socks, slits cut in socks to accommodate edematous feet  Toileting: NT but pt reports using urinal      Cognitive/Visual  Perceptual:  Cognitive/Psychosocial Skills:     -       Oriented to: Person, Place, Time, and Situation   -       Follows Commands/attention:Follows one-step commands  -       Communication: clear/fluent  -       Memory: NT  -       Safety awareness/insight to disability: impaired   -       Mood/Affect/Coping skills/emotional control: Cooperative  Visual/Perceptual:      -Intact grossly    Physical Exam:  Balance:    -       sitting: fair   dynamic: poor plus  standing: poor  Postural examination/scapula alignment:    -       Rounded shoulders  -       Forward head  Edema:  Moderate LLE and Pitting RLE  Skin integrity: BLE venous stasis ulcers with ace wrap bandages intact   Dominant hand:    -       right  Upper Extremity Range of Motion:  BUE WFL  Upper Extremity Strength:    -       Right Upper Extremity: WFL  -       Left Upper Extremity: WFL   Strength:    -       Right Upper Extremity: WFL  -       Left Upper Extremity: WFL    AMPAC 6 Click ADL:  AMPAC Total Score: 16    Treatment & Education:  Purpose of OT and POC  Pt. sat 10 min EOB, initially Max A for sit balance and quickly progressed to CGA/SBA with BUE support on bed, feet not supportive on floor initially then scooted anteriorly to EOB with Max A x 2 for safety.   Pt. Sit<>stand with Mod A x 2 with HHA.   Facilitated standing balance  Mod x 2  and vc to lift head/upright posture, shift hips forward,WS to midline due to heavy leaning to right.   Pt side stepped with Mod x 2 with HHA ~4 steps, poor balance, posture, increased right side leaning and not self correcting with vc.   Pt returned to supine with total A x 2 for sit>supine trunk and LE assist - legs very edematous.  Pt's IV dislodged once on bed, nurse notified and applied pressure wrap.  Slits cut in socks to accommodate edematous feet and donned with Dep assist prior to sitting and standing  Pt. provided with Pressure relieving boots for skin protection.  All questions concerns addressed  within scope.      Patient left left sidelying with all lines intact, call button in reach, nurse notified, and daughter present    GOALS:   Multidisciplinary Problems       Occupational Therapy Goals          Problem: Occupational Therapy    Goal Priority Disciplines Outcome Interventions   Occupational Therapy Goal     OT, PT/OT Ongoing, Progressing    Description: Goals to be met by: 10/20/2022     Patient will increase functional independence with ADLs by performing:    UE Dressing with Modified [place].  LE Dressing with Modified [place].  Grooming while standing at sink with Modified [place].  Toileting from toilet with Modified [place] for hygiene and clothing management.   Supine to sit with Modified [place].  Step transfer with Modified [place]  Toilet transfer to toilet with Modified [place].  Increased functional strength to WFL for ADLS.  Upper extremity exercise program x10 reps per handout, with independence.                         History:     Past Medical History:   Diagnosis Date    Arthritis     Atrial fibrillation     Carotid artery occlusion     bilateral    Cataract     CHF (congestive heart failure)     CKD (chronic kidney disease), stage II     Coronary artery disease     3 Vessel CABG AND 3 STENTS    Encounter for blood transfusion     Hypercholesterolemia     Hypertension     Normocytic anemia     Shingles     X 3 WEEKS AGO    Sleep apnea     CESAR - NOT USING C-PAP    Thyroid disease     TIA (transient ischemic attack)          Past Surgical History:   Procedure Laterality Date    A-V CARDIAC PACEMAKER INSERTION N/A 4/1/2021    Procedure: INSERTION, CARDIAC PACEMAKER, DUAL CHAMBER;  Surgeon: Clifford Sevilla MD;  Location: [facility] CATH/EP LAB;  Service: Cardiology;  Laterality: N/A;  [employer]    CORONARY ANGIOPLASTY WITH STENT PLACEMENT      CORONARY ARTERY BYPASS GRAFT      CORONARY ARTERY BYPASS GRAFT (CABG)      3 vessel    EYE SURGERY      bILATERAL  CATARACS    INSERTION OF PACEMAKER      REVISION OF IMPLANTABLE CARDIOVERTER-DEFIBRILLATOR (ICD) ELECTRODE LEAD PLACEMENT Left 7/1/2021    Procedure: REVISION, INSERTION, ELECTRODE LEAD,pacemaker;  Surgeon: Clifford Sevilla MD;  Location: [facility] CATH/EP LAB;  Service: Cardiology;  Laterality: Left;    ROBOT-ASSISTED LAPAROSCOPIC REPAIR OF INGUINAL HERNIA Right 3/11/2022    Procedure: ROBOTIC REPAIR, HERNIA, INGUINAL;  Surgeon: Melo Aguilera III, MD;  Location: [facility];  Service: General;  Laterality: Right;  LB case    TREATMENT OF CARDIAC ARRHYTHMIA N/A 1/29/2021    Procedure: CARDIOVERSION;  Surgeon: Iron Serna MD;  Location: [facility] CATH/EP LAB;  Service: Cardiology;  Laterality: N/A;    VASECTOMY         Time Tracking:     OT Date of Treatment: 09/20/22  OT Start Time: 1317  OT Stop Time: 1357  OT Total Time (min): 40 min cotx with PT    Billable Minutes:Evaluation 10  Therapeutic Activity 30  Total Time 40    9/20/2022

## 2022-09-20 NOTE — PLAN OF CARE
Problem: Adult Inpatient Plan of Care  Goal: Plan of Care Review  Outcome: Adequate for Care Transition     AAOx4. VSS. Afebrile. Paced rhythm. Bp within normal limits. No c/o pain after prn medication. Lasix gtt d/c. Hayes cath d/c. Total UOP ~ 250 ml. 2 L NC sat 90-95%. No c/o SOB. Wound care dressed BLE venous ulcers. No BM. POC discussed with patient and daughter at bedside. Report given to Yann Sullivan RN to assume care.

## 2022-09-20 NOTE — PLAN OF CARE
Problem: Occupational Therapy  Goal: Occupational Therapy Goal  Description: Goals to be met by: 10/20/2022     Patient will increase functional independence with ADLs by performing:    UE Dressing with Modified [place].  LE Dressing with Modified [place].  Grooming while standing at sink with Modified [place].  Toileting from toilet with Modified [place] for hygiene and clothing management.   Supine to sit with Modified [place].  Step transfer with Modified [place]  Toilet transfer to toilet with Modified [place].  Increased functional strength to WFL for ADLS.  Upper extremity exercise program x10 reps per handout, with independence.    Outcome: Ongoing, Progressing

## 2022-09-20 NOTE — ASSESSMENT & PLAN NOTE
Fell and laid on floor for hours  CPK 2607 with JAYSON  -unable to give fluids due to volume overload  -repeat CK down trending  -consult nephrology as needed

## 2022-09-20 NOTE — ASSESSMENT & PLAN NOTE
Possible LLL pneumonia on imaging  Temp 100.4, WBC 20  -blood cx pending  -cont rocephin and azithromycin, add doxy  -sputum sample  -cont supplemental O2 as needed

## 2022-09-20 NOTE — ED NOTES
Report received and care assumed. Pt and family updated on POC, verbalizes understanding. Stretcher is in low, locked position with side rails up x 2 and call bell in reach. Instructed to call for assistance, verbalizes understanding

## 2022-09-20 NOTE — ASSESSMENT & PLAN NOTE
Fell and laid on floor for hours  CPK 2607 with JAYSON  -unable to give fluids due to volume overload  -repeat CK in AM  -consult nephrology as needed

## 2022-09-20 NOTE — SUBJECTIVE & OBJECTIVE
Past Medical History:   Diagnosis Date    Arthritis     Atrial fibrillation     Carotid artery occlusion     bilateral    Cataract     CHF (congestive heart failure)     CKD (chronic kidney disease), stage II     Coronary artery disease     3 Vessel CABG AND 3 STENTS    Encounter for blood transfusion     Hypercholesterolemia     Hypertension     Normocytic anemia     Shingles     X 3 WEEKS AGO    Sleep apnea     CESAR - NOT USING C-PAP    Thyroid disease     TIA (transient ischemic attack)        Past Surgical History:   Procedure Laterality Date    A-V CARDIAC PACEMAKER INSERTION N/A 4/1/2021    Procedure: INSERTION, CARDIAC PACEMAKER, DUAL CHAMBER;  Surgeon: Clifford Sevilla MD;  Location: [facility] CATH/EP LAB;  Service: Cardiology;  Laterality: N/A;  [employer]    CORONARY ANGIOPLASTY WITH STENT PLACEMENT      CORONARY ARTERY BYPASS GRAFT      CORONARY ARTERY BYPASS GRAFT (CABG)      3 vessel    EYE SURGERY      bILATERAL CATARACS    INSERTION OF PACEMAKER      REVISION OF IMPLANTABLE CARDIOVERTER-DEFIBRILLATOR (ICD) ELECTRODE LEAD PLACEMENT Left 7/1/2021    Procedure: REVISION, INSERTION, ELECTRODE LEAD,pacemaker;  Surgeon: Clifford Sevilla MD;  Location: [facility] CATH/EP LAB;  Service: Cardiology;  Laterality: Left;    ROBOT-ASSISTED LAPAROSCOPIC REPAIR OF INGUINAL HERNIA Right 3/11/2022    Procedure: ROBOTIC REPAIR, HERNIA, INGUINAL;  Surgeon: Melo Aguilera III, MD;  Location: [facility] OR;  Service: General;  Laterality: Right;  LB case    TREATMENT OF CARDIAC ARRHYTHMIA N/A 1/29/2021    Procedure: CARDIOVERSION;  Surgeon: Iron Serna MD;  Location: [facility] CATH/EP LAB;  Service: Cardiology;  Laterality: N/A;    VASECTOMY         Review of patient's allergies indicates:  No Known Allergies    Current Facility-Administered Medications on File Prior to Encounter   Medication    [COMPLETED] acetaminophen tablet 650 mg    [COMPLETED] albuterol sulfate nebulizer solution 10 mg    [COMPLETED] furosemide injection  40 mg    [COMPLETED] sodium zirconium cyclosilicate packet 10 g    [DISCONTINUED] aspirin chewable tablet 81 mg    [DISCONTINUED] atorvastatin tablet 40 mg    [DISCONTINUED] azithromycin 500 mg in dextrose 5 % 250 mL IVPB (ready to mix system)    [DISCONTINUED] cefTRIAXone (ROCEPHIN) 1 g/50 mL D5W IVPB    [DISCONTINUED] furosemide (LASIX) 200 mg in dextrose 5 % 100 mL continuous infusion (conc: 2 mg/mL)     Current Outpatient Medications on File Prior to Encounter   Medication Sig    amiodarone (PACERONE) 200 MG Tab Take 1 tablet (200 mg total) by mouth once daily.    amLODIPine (NORVASC) 5 MG tablet Take 1 tablet (5 mg total) by mouth every evening.    apixaban (ELIQUIS) 5 mg Tab Take 1 tablet (5 mg total) by mouth 2 (two) times daily.    ascorbic acid, vitamin C, (VITAMIN C) 1000 MG tablet Take 1,000 mg by mouth once daily.    atorvastatin (LIPITOR) 40 MG tablet Take 1 tablet (40 mg total) by mouth once daily.    coQ10, ubiquinol, 100 mg Cap Take 150 mg by mouth once daily.     cyanocobalamin 1,000 mcg/mL injection Inject 1 mL (1,000 mcg total) into the muscle every 30 days.    doxazosin (CARDURA) 8 MG Tab TAKE 1 TABLET DAILY    ezetimibe (ZETIA) 10 mg tablet TAKE 1 TABLET DAILY    furosemide (LASIX) 40 MG tablet Take 1 tablet (40 mg total) by mouth once daily.    gabapentin (NEURONTIN) 600 MG tablet Take 1 tablet (600 mg total) by mouth 2 (two) times daily.    garlic 1,000 mg Cap Take 1 capsule by mouth once daily.     ketoconazole (NIZORAL) 2 % cream Apply topically once daily.    levothyroxine (SYNTHROID) 100 MCG tablet Take 1 tablet (100 mcg total) by mouth before breakfast.    LIDOcaine (LIDODERM) 5 % Place 1 patch onto the skin once daily. Remove & Discard patch within 12 hours or as directed by MD    lisinopriL (PRINIVIL,ZESTRIL) 20 MG tablet Take 1 tablet (20 mg total) by mouth 2 (two) times a day.    metoprolol succinate (TOPROL-XL) 50 MG 24 hr tablet Take 1 tablet (50 mg total) by mouth once daily.     "multivitamin Tab Take 1 tablet by mouth once daily. (Patient not taking: No sig reported)    nitroGLYCERIN (NITROSTAT) 0.4 MG SL tablet Place 1 tablet (0.4 mg total) under the tongue every 5 (five) minutes as needed for Chest pain. DO NOT CRUSH OR CHEW; DISSOLVE UNDER TONGUE; MAXIMUM OF 3 DOSES IN 15 MINUTES    spironolactone (ALDACTONE) 25 MG tablet Take 1 tablet (25 mg total) by mouth every other day.    syringe-needle,safety,disp unt 3 mL 22 gauge x 1 1/2" Syrg Please syringe to administer medication deep IM    traMADoL (ULTRAM) 50 mg tablet Take 1 tablet (50 mg total) by mouth every 6 (six) hours as needed for Pain.    vitamin D (VITAMIN D3) 1000 units Tab Take 1,000 Units by mouth once daily.     Family History       Problem Relation (Age of Onset)    COPD Paternal Grandmother    Cancer Mother, Father, Maternal Grandmother    Hypertension Father          Tobacco Use    Smoking status: Never    Smokeless tobacco: Never   Substance and Sexual Activity    Alcohol use: Not Currently    Drug use: No    Sexual activity: Not on file     Review of Systems   Constitutional: Positive for malaise/fatigue and weight gain.   HENT: Negative.     Eyes: Negative.    Cardiovascular:  Positive for dyspnea on exertion, irregular heartbeat, leg swelling, orthopnea and paroxysmal nocturnal dyspnea. Negative for chest pain, near-syncope, palpitations and syncope.   Respiratory:  Positive for shortness of breath.    Endocrine: Negative.    Hematologic/Lymphatic: Negative.    Gastrointestinal:  Positive for bloating. Negative for nausea and vomiting.   Genitourinary: Negative.    Neurological:  Positive for weakness.   Psychiatric/Behavioral: Negative.     Allergic/Immunologic: Negative.    Objective:     Vital Signs (Most Recent):  Temp: 98.1 °F (36.7 °C) (09/20/22 0800)  Pulse: 61 (09/20/22 1015)  Resp: (!) 23 (09/20/22 1015)  BP: (!) 118/57 (09/20/22 1000)  SpO2: (!) 90 % (09/20/22 1015)   Vital Signs (24h Range):  Temp:  [97.5 °F " (36.4 °C)-100.7 °F (38.2 °C)] 98.1 °F (36.7 °C)  Pulse:  [60-76] 61  Resp:  [13-28] 23  SpO2:  [85 %-97 %] 90 %  BP: ()/(49-88) 118/57     Weight: 96.5 kg (212 lb 11.9 oz)  Body mass index is 32.35 kg/m².    SpO2: (!) 90 %  O2 Device (Oxygen Therapy): nasal cannula      Intake/Output Summary (Last 24 hours) at 9/20/2022 1046  Last data filed at 9/20/2022 1027  Gross per 24 hour   Intake 264.57 ml   Output 1530 ml   Net -1265.43 ml       Lines/Drains/Airways       Drain  Duration                  Urethral Catheter 09/19/22 2300 Non-latex 16 Fr. <1 day              Peripheral Intravenous Line  Duration                  Peripheral IV - Single Lumen 18 G Anterior;Left Forearm -- days         Peripheral IV - Single Lumen 09/19/22 1915 20 G Left Antecubital <1 day                    Physical Exam  Constitutional:       General: He is not in acute distress.     Appearance: He is ill-appearing.   HENT:      Head: Atraumatic.   Eyes:      General:         Right eye: No discharge.         Left eye: No discharge.   Cardiovascular:      Rate and Rhythm: Normal rate and regular rhythm.      Heart sounds: Murmur heard.   Abdominal:      General: Bowel sounds are normal. There is distension.   Musculoskeletal:      Right lower leg: Edema present.      Left lower leg: Edema present.   Skin:     General: Skin is warm and dry.   Neurological:      Mental Status: He is alert. Mental status is at baseline.       Significant Labs: Blood Culture: No results for input(s): LABBLOO in the last 48 hours., BMP:   Recent Labs   Lab 09/19/22  1936 09/19/22  2350 09/20/22  0423 09/20/22  0804   *  --  104 104     --  141 140   K 4.9  --  4.7 4.5     --  108 106   CO2 19*  --  23 24   BUN 77*  --  79* 77*   CREATININE 3.1*  --  3.2* 3.2*   CALCIUM 8.6*  --  8.7 8.7   MG  --  2.3 2.5  --    , CMP   Recent Labs   Lab 09/19/22  1324 09/19/22  1936 09/20/22  0423 09/20/22  0804    138 141 140   K 5.2* 4.9 4.7 4.5   CL  107 107 108 106   CO2 21* 19* 23 24   GLU 97 155* 104 104   BUN 76* 77* 79* 77*   CREATININE 3.2* 3.1* 3.2* 3.2*   CALCIUM 9.1 8.6* 8.7 8.7   PROT 6.2  --   --  6.0   ALBUMIN 2.9*  --   --  2.6*   BILITOT 1.2*  --   --  0.8   ALKPHOS 88  --   --  71   AST 83*  --   --  81*   ALT 46*  --   --  45*   ANIONGAP 12 12 10 10   , CBC   Recent Labs   Lab 09/19/22  1324 09/20/22  0423   WBC 20.71* 17.68*   HGB 9.7* 8.9*   HCT 28.9* 26.1*    175   , INR No results for input(s): INR, PROTIME in the last 48 hours., Lipid Panel No results for input(s): CHOL, HDL, LDLCALC, TRIG, CHOLHDL in the last 48 hours., Troponin   Recent Labs   Lab 09/19/22  1324 09/19/22  1936   TROPONINI 1.035* 0.970*   , and All pertinent lab results from the last 24 hours have been reviewed.    Significant Imaging: Echocardiogram: Transthoracic echo (TTE) complete (Cupid Only):   Results for orders placed or performed during the hospital encounter of 10/19/20   Echo Color Flow Doppler? Yes; Bubble Contrast? No   Result Value Ref Range    Left Atrium Major Axis 4.80 cm    LA size 5.60 cm    AV regurgitation pressure 1/2 time 602.00 ms    Ao peak joseph 1.76 m/s    AV peak gradient 12 mmHg    AORTIC VALVE CUSP SEPERATION 1.30 cm    AV mean gradient 7 mmHg    Ao VTI 39.60 cm    Ao root annulus 3.30 cm    IVRT 95.00 ms    IVS 1.16 (A) 0.6 - 1.1 cm    LVIDd 4.88 3.5 - 6.0 cm    LVIDs 3.29 2.1 - 4.0 cm    LVOT diameter 2.30 cm    LVOT peak VTI 15.50 cm    LVOT peak joseph 0.69 m/s    Posterior Wall 1.15 (A) 0.6 - 1.1 cm    E wave deceleration time 253.00 ms    MV stenosis pressure 1/2 time 68.00 ms    MV Peak E Joseph 1.30 m/s    RVDD 2.93 cm    Triscuspid Valve Regurgitation Peak Gradient 20 mmHg    BSA 2.11 m2    TDI SEPTAL 0.08 m/s    LV LATERAL E/E' RATIO 13.00 m/s    LV SEPTAL E/E' RATIO 16.25 m/s    TDI LATERAL 0.10 m/s    FS 33 28 - 44 %    LV mass 213.16 g    Left Ventricle Relative Wall Thickness 0.47 cm    AV valve area 1.63 cm2    AV Velocity Ratio  0.39     AV index (prosthetic) 0.39     MV valve area p 1/2 method 3.24 cm2    Mean e' 0.09 m/s    LVOT area 4.2 cm2    LVOT stroke volume 64.37 cm3    E/E' ratio 14.44 m/s    TR Max Joseph 2.24 m/s    LV Mass Index 103 g/m2    Right Atrial Pressure (from IVC) 3 mmHg    TV rest pulmonary artery pressure 23 mmHg    Narrative    · There is mild left ventricular concentric hypertrophy.  · The left ventricle is normal in size with normal systolic function. The   estimated ejection fraction is 65%.  · Atrial fibrillation observed with restrictive filling pattern present.  · With normal left atrial pressure.  · Normal right ventricular systolic function.  · Moderate left atrial enlargement.  · Mild right atrial enlargement.  · Moderate aortic valve stenosis.  · Aortic valve area is 1.63 cm2; peak velocity is 1.76 m/s; mean gradient   is 7 mmHg.  · Moderate mitral regurgitation.  · No pulmonary hypertension  · Mild tricuspid regurgitation.  · Mild pulmonic regurgitation.  · Normal central venous pressure (3 mmHg).  · The estimated PA systolic pressure is 23 mmHg.     Left ventricle hypertrophy, moderate calcific aortic valve stenosis   plainimetered d valve area is 1.6 centimeters squared  Plus two mitral regurgitation  No pulmonary hypertension

## 2022-09-20 NOTE — ASSESSMENT & PLAN NOTE
-cont lasix drip  -monitor electrolytes  -echo pending  -consult cardiology  -fluid restriction  -hold ACE and BB for now given soft BP

## 2022-09-20 NOTE — ASSESSMENT & PLAN NOTE
10/19/20    Echo Color Flow Doppler? Yes; Bubble Contrast? No    Interpretation Summary  · There is mild left ventricular concentric hypertrophy.  · The left ventricle is normal in size with normal systolic function. The estimated ejection fraction is 65%.  · Atrial fibrillation observed with restrictive filling pattern present.  · With normal left atrial pressure.  · Normal right ventricular systolic function.  · Moderate left atrial enlargement.  · Mild right atrial enlargement.  · Moderate aortic valve stenosis.  · Aortic valve area is 1.63 cm2; peak velocity is 1.76 m/s; mean gradient is 7 mmHg.  · Moderate mitral regurgitation.  · No pulmonary hypertension  · Mild tricuspid regurgitation.  · Mild pulmonic regurgitation.  · Normal central venous pressure (3 mmHg).  · The estimated PA systolic pressure is 23 mmHg.    Left ventricle hypertrophy, moderate calcific aortic valve stenosis plainimetered d valve area is 1.6 centimeters squared  Plus two mitral regurgitation  No pulmonary hypertension    Recent Labs   Lab 09/19/22  1324   BNP 1,795*   .  Repeat TTE pending   ACEI, Aldactone on hold 2/2 JAYSON  Diuresing with Lasix gtt, net -1 L thus far  Overloaded on exam  Daily weights, accurate intake and output

## 2022-09-20 NOTE — PLAN OF CARE
Patient admitted to ICU from [facility]. Patient is Aox3, febrile temperature 100.3F, and warm to touch. Patient does not complain of pain at this time but will grimace when legs or abdomen is palpated. PERRL. Patient Hr 59-60's paced, BP /50's, +3 generalized edema with scrotal edema. Lung sounds crackles in bases with inspiratory wheezes in upperlobes. Spo2 95-97% on 2L NC. Hayes is intact with urine output of  cc/hr clear yellow. Goal is >100cc/hr. Patient is on Lasix gtt (see MAR for titrations).

## 2022-09-20 NOTE — ASSESSMENT & PLAN NOTE
-possibly cardiorenal  -diuresing  -monitor labs  -renally dose medications  -baseline creatinine around 2.5

## 2022-09-21 NOTE — SUBJECTIVE & OBJECTIVE
Past Medical History:   Diagnosis Date    Arthritis     Atrial fibrillation     Carotid artery occlusion     bilateral    Cataract     CHF (congestive heart failure)     CKD (chronic kidney disease), stage II     Coronary artery disease     3 Vessel CABG AND 3 STENTS    Encounter for blood transfusion     Hypercholesterolemia     Hypertension     Normocytic anemia     Shingles     X 3 WEEKS AGO    Sleep apnea     CESAR - NOT USING C-PAP    Thyroid disease     TIA (transient ischemic attack)        Past Surgical History:   Procedure Laterality Date    A-V CARDIAC PACEMAKER INSERTION N/A 4/1/2021    Procedure: INSERTION, CARDIAC PACEMAKER, DUAL CHAMBER;  Surgeon: Clifford Sevilla MD;  Location: [facility] CATH/EP LAB;  Service: Cardiology;  Laterality: N/A;  [employer]    CORONARY ANGIOPLASTY WITH STENT PLACEMENT      CORONARY ARTERY BYPASS GRAFT      CORONARY ARTERY BYPASS GRAFT (CABG)      3 vessel    EYE SURGERY      bILATERAL CATARACS    INSERTION OF PACEMAKER      REVISION OF IMPLANTABLE CARDIOVERTER-DEFIBRILLATOR (ICD) ELECTRODE LEAD PLACEMENT Left 7/1/2021    Procedure: REVISION, INSERTION, ELECTRODE LEAD,pacemaker;  Surgeon: Clifford Sevilla MD;  Location: [facility] CATH/EP LAB;  Service: Cardiology;  Laterality: Left;    ROBOT-ASSISTED LAPAROSCOPIC REPAIR OF INGUINAL HERNIA Right 3/11/2022    Procedure: ROBOTIC REPAIR, HERNIA, INGUINAL;  Surgeon: Melo Aguilera III, MD;  Location: [facility] OR;  Service: General;  Laterality: Right;  LB case    TREATMENT OF CARDIAC ARRHYTHMIA N/A 1/29/2021    Procedure: CARDIOVERSION;  Surgeon: Iron Serna MD;  Location: [facility] CATH/EP LAB;  Service: Cardiology;  Laterality: N/A;    VASECTOMY         Review of patient's allergies indicates:  No Known Allergies    No current facility-administered medications on file prior to encounter.     Current Outpatient Medications on File Prior to Encounter   Medication Sig    amiodarone (PACERONE) 200 MG Tab Take 1 tablet (200 mg total) by  "mouth once daily.    amLODIPine (NORVASC) 5 MG tablet Take 1 tablet (5 mg total) by mouth every evening.    apixaban (ELIQUIS) 5 mg Tab Take 1 tablet (5 mg total) by mouth 2 (two) times daily.    ascorbic acid, vitamin C, (VITAMIN C) 1000 MG tablet Take 1,000 mg by mouth once daily.    atorvastatin (LIPITOR) 40 MG tablet Take 1 tablet (40 mg total) by mouth once daily.    coQ10, ubiquinol, 100 mg Cap Take 150 mg by mouth once daily.     cyanocobalamin 1,000 mcg/mL injection Inject 1 mL (1,000 mcg total) into the muscle every 30 days.    doxazosin (CARDURA) 8 MG Tab TAKE 1 TABLET DAILY    ezetimibe (ZETIA) 10 mg tablet TAKE 1 TABLET DAILY    furosemide (LASIX) 40 MG tablet Take 1 tablet (40 mg total) by mouth once daily.    gabapentin (NEURONTIN) 600 MG tablet Take 1 tablet (600 mg total) by mouth 2 (two) times daily.    garlic 1,000 mg Cap Take 1 capsule by mouth once daily.     ketoconazole (NIZORAL) 2 % cream Apply topically once daily.    levothyroxine (SYNTHROID) 100 MCG tablet Take 1 tablet (100 mcg total) by mouth before breakfast.    LIDOcaine (LIDODERM) 5 % Place 1 patch onto the skin once daily. Remove & Discard patch within 12 hours or as directed by MD    lisinopriL (PRINIVIL,ZESTRIL) 20 MG tablet Take 1 tablet (20 mg total) by mouth 2 (two) times a day.    metoprolol succinate (TOPROL-XL) 50 MG 24 hr tablet Take 1 tablet (50 mg total) by mouth once daily.    multivitamin Tab Take 1 tablet by mouth once daily. (Patient not taking: No sig reported)    nitroGLYCERIN (NITROSTAT) 0.4 MG SL tablet Place 1 tablet (0.4 mg total) under the tongue every 5 (five) minutes as needed for Chest pain. DO NOT CRUSH OR CHEW; DISSOLVE UNDER TONGUE; MAXIMUM OF 3 DOSES IN 15 MINUTES    spironolactone (ALDACTONE) 25 MG tablet Take 1 tablet (25 mg total) by mouth every other day.    syringe-needle,safety,disp unt 3 mL 22 gauge x 1 1/2" Syrg Please syringe to administer medication deep IM    traMADoL (ULTRAM) 50 mg tablet Take " 1 tablet (50 mg total) by mouth every 6 (six) hours as needed for Pain.    vitamin D (VITAMIN D3) 1000 units Tab Take 1,000 Units by mouth once daily.     Family History       Problem Relation (Age of Onset)    COPD Paternal Grandmother    Cancer Mother, Father, Maternal Grandmother    Hypertension Father          Tobacco Use    Smoking status: Never    Smokeless tobacco: Never   Substance and Sexual Activity    Alcohol use: Not Currently    Drug use: No    Sexual activity: Not on file     Review of Systems   Constitutional: Positive for malaise/fatigue and weight gain.   HENT: Negative.     Eyes: Negative.    Cardiovascular:  Positive for dyspnea on exertion, irregular heartbeat, leg swelling, orthopnea and paroxysmal nocturnal dyspnea. Negative for chest pain, near-syncope, palpitations and syncope.   Respiratory:  Positive for shortness of breath.    Endocrine: Negative.    Hematologic/Lymphatic: Negative.    Gastrointestinal:  Positive for bloating. Negative for nausea and vomiting.   Genitourinary: Negative.    Neurological:  Positive for weakness.   Psychiatric/Behavioral: Negative.     Allergic/Immunologic: Negative.    Objective:     Vital Signs (Most Recent):  Temp: 97 °F (36.1 °C) (09/21/22 0754)  Pulse: 87 (09/21/22 0824)  Resp: 20 (09/21/22 0824)  BP: 138/63 (09/21/22 0754)  SpO2: (!) 91 % (09/21/22 0824)   Vital Signs (24h Range):  Temp:  [97 °F (36.1 °C)-99.2 °F (37.3 °C)] 97 °F (36.1 °C)  Pulse:  [60-87] 87  Resp:  [12-40] 20  SpO2:  [87 %-95 %] 91 %  BP: (111-140)/(53-68) 138/63     Weight: 94.7 kg (208 lb 12.4 oz)  Body mass index is 31.74 kg/m².    SpO2: (!) 91 %  O2 Device (Oxygen Therapy): High Flow nasal Cannula      Intake/Output Summary (Last 24 hours) at 9/21/2022 1025  Last data filed at 9/21/2022 1005  Gross per 24 hour   Intake 659.67 ml   Output 1685 ml   Net -1025.33 ml         Lines/Drains/Airways       Drain  Duration             Male External Urinary Catheter 09/20/22 1545 <1 day               Peripheral Intravenous Line  Duration                  Peripheral IV - Single Lumen 18 G Anterior;Left Forearm -- days                    Physical Exam  Constitutional:       General: He is not in acute distress.     Appearance: He is ill-appearing.   HENT:      Head: Atraumatic.   Eyes:      General:         Right eye: No discharge.         Left eye: No discharge.   Cardiovascular:      Rate and Rhythm: Normal rate and regular rhythm.      Heart sounds: Murmur heard.   Pulmonary:      Breath sounds: Rales present.   Abdominal:      General: Bowel sounds are normal. There is distension.   Musculoskeletal:      Right lower leg: Edema present.      Left lower leg: Edema present.   Skin:     General: Skin is warm and dry.   Neurological:      Mental Status: He is alert. Mental status is at baseline.       Significant Labs: Blood Culture:   Recent Labs   Lab 09/20/22  0013 09/20/22  0014   LABBLOO No Growth to date No Growth to date     , BMP:   Recent Labs   Lab 09/19/22  2350 09/20/22  0423 09/20/22  0804 09/21/22  0459   GLU  --  104 104 98   NA  --  141 140 140   K  --  4.7 4.5 3.9   CL  --  108 106 106   CO2  --  23 24 25   BUN  --  79* 77* 77*   CREATININE  --  3.2* 3.2* 3.0*   CALCIUM  --  8.7 8.7 8.5*   MG 2.3 2.5  --  2.5     , CMP   Recent Labs   Lab 09/19/22  1324 09/19/22  1936 09/20/22  0423 09/20/22  0804 09/21/22  0459      < > 141 140 140   K 5.2*   < > 4.7 4.5 3.9      < > 108 106 106   CO2 21*   < > 23 24 25   GLU 97   < > 104 104 98   BUN 76*   < > 79* 77* 77*   CREATININE 3.2*   < > 3.2* 3.2* 3.0*   CALCIUM 9.1   < > 8.7 8.7 8.5*   PROT 6.2  --   --  6.0  --    ALBUMIN 2.9*  --   --  2.6*  --    BILITOT 1.2*  --   --  0.8  --    ALKPHOS 88  --   --  71  --    AST 83*  --   --  81*  --    ALT 46*  --   --  45*  --    ANIONGAP 12   < > 10 10 9    < > = values in this interval not displayed.     , CBC   Recent Labs   Lab 09/19/22  1324 09/20/22  0423 09/21/22  0459   WBC 20.71*  17.68* 14.34*   HGB 9.7* 8.9* 9.3*   HCT 28.9* 26.1* 26.9*    175 193     , INR No results for input(s): INR, PROTIME in the last 48 hours., Lipid Panel No results for input(s): CHOL, HDL, LDLCALC, TRIG, CHOLHDL in the last 48 hours., Troponin   Recent Labs   Lab 09/19/22  1324 09/19/22  1936   TROPONINI 1.035* 0.970*     , and All pertinent lab results from the last 24 hours have been reviewed.    Significant Imaging: Echocardiogram: Transthoracic echo (TTE) complete (Cupid Only):   Results for orders placed or performed during the hospital encounter of 09/19/22   Echo   Result Value Ref Range    BSA 2.15 m2    IVC diameter 2.40 cm    Left Ventricular Outflow Tract Mean Velocity 0.69 cm/s    Left Ventricular Outflow Tract Mean Gradient 2.03 mmHg    PV PEAK VELOCITY 0.88 cm/s    LVIDd 5.69 3.5 - 6.0 cm    IVS 0.64 0.6 - 1.1 cm    Posterior Wall 0.84 0.6 - 1.1 cm    LVIDs 3.93 2.1 - 4.0 cm    FS 31 28 - 44 %    LV mass 154.01 g    LA size 5.02 cm    RV S' 0.01 cm/s    Left Ventricle Relative Wall Thickness 0.30 cm    AV mean gradient 16 mmHg    AV valve area 1.55 cm2    AV Velocity Ratio 0.35     AV index (prosthetic) 0.34     MV mean gradient 4 mmHg    MV valve area by continuity eq 1.78 cm2    E/A ratio 1.72     E wave deceleration time 228.37 msec    LVOT diameter 2.40 cm    LVOT area 4.5 cm2    LVOT peak joseph 0.92 m/s    LVOT peak VTI 20.30 cm    Ao peak joseph 2.60 m/s    Ao VTI 59.3 cm    Mr max joseph 5.17 m/s    LVOT stroke volume 91.79 cm3    AV peak gradient 27 mmHg    MV peak gradient 11 mmHg    MV Peak E Joseph 1.65 m/s    AR Max Joseph 3.24 m/s    TR Max Joseph 3.75 m/s    MV VTI 51.5 cm    MV Peak A Joseph 0.96 m/s    LV Systolic Volume 67.09 mL    LV Systolic Volume Index 31.9 mL/m2    LV Diastolic Volume 159.38 mL    LV Diastolic Volume Index 75.90 mL/m2    LV Mass Index 73 g/m2    RA Major Axis 5.98 cm    Left Atrium Minor Axis 6.02 cm    Left Atrium Major Axis 6.28 cm    Triscuspid Valve Regurgitation Peak  Gradient 56 mmHg    LA Volume Index (Mod) 36.1 mL/m2    LA volume (mod) 75.88 cm3    Right Atrial Pressure (from IVC) 15 mmHg    EF 55 %    TV rest pulmonary artery pressure 71 mmHg    Narrative    · The left ventricle is normal in size with normal systolic function.  · The estimated ejection fraction is 55%.  · Indeterminate left ventricular diastolic function.  · There is abnormal septal wall motion consistent with right ventricular   pacemaker.  · Normal right ventricular size with normal right ventricular systolic   function.  · Moderate tricuspid regurgitation.  · Mild pulmonic regurgitation.  · Moderate mitral regurgitation.  · There is mild aortic valve stenosis.  · Aortic valve area is 1.55 cm2; peak velocity is 2.60 m/s; mean gradient   is 16 mmHg.  · Elevated central venous pressure (15 mmHg).  · The estimated PA systolic pressure is 71 mmHg.  · There is pulmonary hypertension.  · Mild left atrial enlargement.

## 2022-09-21 NOTE — PLAN OF CARE
"[facility] - Telemetry  Discharge Reassessment    Primary Care Provider: Kris Zavala MD    Expected Discharge Date: 9/23/2022    DCR done. IMM done. Signed by daughter and copy given to daughter. Copy to chart. Nurse notified of need for pain meds.    Reassessment (most recent)       Discharge Reassessment - 09/21/22 1302          Discharge Reassessment    Assessment Type Discharge Planning Reassessment (P)      Did the patient's condition or plan change since previous assessment? Yes (P)      Discharge Plan discussed with: Adult children;Patient (P)      Discharge Plan A Rehab (P)      DME Needed Upon Discharge  none (P)      Discharge Barriers Identified None (P)      Why the patient remains in the hospital Requires continued medical care (P)         Post-Acute Status    Post-Acute Authorization Placement (P)      Post-Acute Placement Status Pending medical clearance/testing (P)    Pending medical clearance for IPR. No prior auth required. Pt must still meet guideline criteria for IPR.                  Future Appointments   Date Time Provider Department Center   10/11/2022  8:40 AM Clifford Sevilla MD [facility] CARDIO [place]   12/13/2022 10:40 AM Kris Zavala MD [facility] G FA MD [facility] MOB 2     BP (!) 141/64 (BP Location: Right arm, Patient Position: Lying)   Pulse 72   Temp 98 °F (36.7 °C) (Oral)   Resp 18   Ht 5' 8" (1.727 m)   Wt 94.7 kg (208 lb 12.4 oz)   SpO2 96%   BMI 31.74 kg/m²      amiodarone  200 mg Oral Daily    apixaban  5 mg Oral BID    atorvastatin  40 mg Oral Daily    cefTRIAXone (ROCEPHIN) IVPB  1 g Intravenous Q24H    doxycycline  100 mg Oral Q12H    famotidine  20 mg Oral Daily    furosemide (LASIX) injection  80 mg Intravenous TID    gabapentin  300 mg Oral BID    levothyroxine  100 mcg Oral Before breakfast    mupirocin   Nasal BID    psyllium husk (with sugar)  1 packet Oral Daily         "

## 2022-09-21 NOTE — ASSESSMENT & PLAN NOTE
BLE with ulcers  -right leg with redness   -on rocephin and doxy  -consult wound care  -arterial ultrasound

## 2022-09-21 NOTE — NURSING TRANSFER
Nursing Transfer Note      9/21/2022     Reason patient is being transferred:     Down grade orders     Transfer From:     ICU to telemetry unit room 418    Transfer via bed    Transfer with O2, cardiac monitoring    Transported by RN    Medicines sent: with patient. Hand off to new RN shahab    Any special needs or follow-up needed:   Daily wound care    Chart send with patient: Yes    Notified: daughter    Patient reassessed at:    09/21/2022 @ 0300 prior to transfer to telemetry unit    Upon arrival to floor: cardiac monitor applied, patient oriented to room, call bell in reach, and bed in lowest position

## 2022-09-21 NOTE — PLAN OF CARE
09/21/22 1343   Medicare Message   Important Message from Medicare regarding Discharge Appeal Rights Given to patient/caregiver;Explained to patient/caregiver;Signed/date by patient/caregiver   Date IMM was signed 09/21/22   Time IMM was signed [id number]

## 2022-09-21 NOTE — PT/OT/SLP PROGRESS
Occupational Therapy   Treatment    Name: Tong Ford  MRN: [id number]  Admitting Diagnosis:  Acute on chronic diastolic congestive heart failure     The primary encounter diagnosis was Acute on chronic diastolic congestive heart failure. Diagnoses of CHF (congestive heart failure), Elevated troponin, PAD (peripheral artery disease), Volume overload state of heart, and Neuropathy, peripheral, idiopathic were also pertinent to this visit.  Pre-op Diagnosis: * No surgery found * s/p      Recommendations:     Discharge Recommendations:  (post acutetherapy  placement)  Discharge Equipment Recommendations:   (defer to post acute facility)  Barriers to discharge:   (increase level of assistance, multiple falls at home)    Assessment:     Tong Ford is a 77 y.o. male with a medical diagnosis of Acute on chronic diastolic congestive heart failure.  He presents with Performance deficits affecting function are weakness, impaired endurance, impaired self care skills, impaired functional mobility, gait instability, impaired balance, decreased coordination, decreased upper extremity function, decreased lower extremity function, decreased safety awareness, pain, decreased ROM, impaired coordination, impaired skin, edema, impaired cardiopulmonary response to activity.     Pt not progressing due to found on 9LPM O2 HFNC today from 2 LPM yesterday. Pt. desatted to 84% with activity during OT tx and needed rest, PLB tech and increased time to recover to 90% today. OT tx discontinued. Nurse made aware.   Continue with OT POC.      Rehab Prognosis:  Good and Fair; patient would benefit from acute skilled OT services to address these deficits and reach maximum level of function.       Plan:     Patient to be seen 5 x/week to address the above listed problems via self-care/home management, therapeutic activities, therapeutic exercises  Plan of Care Expires: 10/20/22  Plan of Care Reviewed with: patient, daughter    Subjective      Pain/Comfort:  Pain Rating 1: 9/10  Location - Side 1: Right  Location - Orientation 1: anterior  Location 1: foot  Pain Addressed 1: Reposition, Distraction, Nurse notified  Pain Rating Post-Intervention 1:  (not rated)    Objective:     Communicated with: nurse prior to session.  Patient found HOB elevated with telemetry, pressure relief boots, peripheral IV, oxygen upon OT entry to room.    General Precautions: Standard, fall, respiratory, aspiration   Orthopedic Precautions:N/A   Braces: N/A  Respiratory Status: High flow, flow 9 L/min, concentration 100%     Occupational Performance:     Bed Mobility:    Patient completed Rolling/Turning to Left with  maximal assistance, with side rail, and 2 trials for clean up/linen change due to urinary incontinence in bed  Patient completed Rolling/Turning to Right with maximal assistance, with side rail, and 2 trials  Patient completed Scooting/Bridging with total assistance, 2 persons, to HOB and anteriorly while sitting EOB  Patient completed Supine to Sit with maximal assistance, total assistance, 2 persons, and with side rail  Patient completed Sit to Supine with total assistance, 2 persons, and with side rail     Functional Mobility/Transfers:   Unable to perform due to ill appearing and O2 desaturation.    Activities of Daily Living:  Dep x 2 cleanup after incontinent of urine in bed despite external cathter in place      [facility] 6 Click ADL: 15    Treatment & Education:  Purpose of OT and POC  Pt. found in bed on 9 LPM O2 HFNC, resting SpO2 89%.   Pt. Instructed in PLB tech and SpO2 fluctuated between to 90% -89%.  Pt. rolled 2 times to right with Max A x 1 for bed linen, brief change and clean up after urinary incontinence.   Pt. Dep for cleanup up.  Pt. transitioned to EOB with Total A -Max A x 2, pt very weak and fatigued  Pt sat 10 min and needed max vc hold head up head up while seated EOB.  O2 desatted to 84% while in sitting, max vc, demonstration and  visual feedback to perform proper PLB tech for ~30-45 seconds to increase SpO2 to 90%. Pt. desatted to 86% with talking.   Nurse made aware and advised tx be terminated.  Pt. returned to bed total assist x 2 for sit>supine.  Scooted to HOB total a x 2 with draw sheet and bed in trendelenburg.  Pt O2 sats 90% once back in bed on 15 LPM (nurse up titrated)         Patient left HOB elevated with all lines intact, call button in reach, bed alarm on, and nurse notified    GOALS:   Multidisciplinary Problems       Occupational Therapy Goals          Problem: Occupational Therapy    Goal Priority Disciplines Outcome Interventions   Occupational Therapy Goal     OT, PT/OT Ongoing, Not Progressing    Description: Goals to be met by: 10/20/2022     Patient will increase functional independence with ADLs by performing:    UE Dressing with Modified [place].  LE Dressing with Modified [place].  Grooming while standing at sink with Modified [place].  Toileting from toilet with Modified Williams for hygiene and clothing management.   Supine to sit with Modified [place].  Step transfer with Modified Williams  Toilet transfer to toilet with Modified [place].  Increased functional strength to WFL for ADLS.  Upper extremity exercise program x10 reps per handout, with independence.                         Time Tracking:     OT Date of Treatment: 09/21/22  OT Start Time: 1116  OT Stop Time: 1154  OT Total Time (min): 38 min    Billable Minutes:Self Care/Home Management 15  Therapeutic Activity 23  Total Time 38    OT/CLARITZA: OT          9/21/2022

## 2022-09-21 NOTE — NURSING
Post working with PT, pt demand for oxygen increased requiring 15L d/t him sating as low as 84%. WESLEY Solo MD notified. Prn bipap orders placed. RT at bedside and placed him on bipap. Now sating 96%  Rad tech at bedside for chest xray.

## 2022-09-21 NOTE — PT/OT/SLP PROGRESS
Physical Therapy Treatment    Patient Name:  Tong Ford   MRN:  [id number]    Recommendations:     Discharge Recommendations:   (post acute placement / therapy)   Discharge Equipment Recommendations:  (defer to next level of care)   Barriers to discharge:  increased assist, fall risk    Assessment:     Tong Ford is a 77 y.o. male admitted with a medical diagnosis of Acute on chronic diastolic congestive heart failure.  He presents with the following impairments/functional limitations:  weakness, gait instability, impaired balance, impaired endurance, impaired self care skills, impaired functional mobility, decreased coordination, pain, decreased safety awareness, decreased lower extremity function, decreased upper extremity function, impaired cardiopulmonary response to activity, decreased ROM, impaired skin, edema, impaired coordination .Pt regressing this session 2/2 found on 9L O2 HFNC compared to 2 L yesterday. Pt. desatted to 84% with activity and needing increased time and PLB to recover. Nsg notified. Deferred further activity. Unable to attempt sit>stand this date due to decreased activity tolerance. Will progress pt as able.     Rehab Prognosis: Fair; patient would benefit from acute skilled PT services to address these deficits and reach maximum level of function.    Recent Surgery: * No surgery found *      Plan:     During this hospitalization, patient to be seen 5 x/week to address the identified rehab impairments via therapeutic activities, therapeutic exercises, gait training, neuromuscular re-education and progress toward the following goals:    Plan of Care Expires:  10/21/22    Subjective     Chief Complaint: R foot pain  Patient/Family Comments/goals: none stated  Pain/Comfort:  Pain Rating 1: 9/10  Location - Side 1: Right  Location - Orientation 1: anterior  Location 1: foot  Pain Addressed 1: Reposition, Distraction, Nurse notified  Pain Rating Post-Intervention 1:  (not  rated)      Objective:     Communicated with nsg prior to session.  Patient found HOB elevated with telemetry, pressure relief boots, peripheral IV, oxygen upon PT entry to room.     General Precautions: Standard, fall, respiratory, aspiration   Orthopedic Precautions:N/A   Braces: N/A  Respiratory Status: High flow, flow 9 L/min, concentration 100%     Bed Mobility:    Patient completed Rolling/Turning to Left with  maximal assistance, with side rail, X 2 trials   Patient completed Rolling/Turning to Right with maximal assistance, with side rail, X 2 trials  Patient completed Scooting/Bridging with total assistance, 2 persons, to HOB and anteriorly while sitting EOB  Patient completed Supine to Sit with maximal assistance, total assistance, 2 persons, and with side rail  Patient completed Sit to Supine with total assistance, 2 persons, and with side rail      Functional Mobility/Transfers:   Unable to perform due to pt ill appearing and O2 desatting and increased drowsiness this date      AM-PAC 6 CLICK MOBILITY  Turning over in bed (including adjusting bedclothes, sheets and blankets)?: 2  Sitting down on and standing up from a chair with arms (e.g., wheelchair, bedside commode, etc.): 1  Moving from lying on back to sitting on the side of the bed?: 2  Moving to and from a bed to a chair (including a wheelchair)?: 1  Need to walk in hospital room?: 1  Climbing 3-5 steps with a railing?: 1  Basic Mobility Total Score: 8       Therapeutic Activities and Exercises:   Pt found on 9L O2 HFNC compared to 2 L yesterday.   Pt soiled in urine and bed linens and brief changed SpO2 89% at beginning of session at rest  Pt educated on PLB and SpO2 increased to 89-90%  Pt transitioned to sitting EOB  CGA-SBA for sitting EOB with VC's for head in upright position instead of with gaze downward to ground and head flexed  Assist for B hand and foot placement initially for sitting balance  Pt. desatted to 84% with activity and  needing increased time and PLB (max cueing for efficiency) to recover. Pt would improve to 90-91% then desat again once talking. Unable to maintain sats - Nsg notified.   Deferred further activity.   Unable to attempt sit>stand this date due to decreased activity tolerance  Pt returned supine and nsg increased O2 to 15 L; pt improved to 90%  Scooted pt HOB via drawsheet and bed in trend    Patient left HOB elevated with all lines intact, call button in reach, bed alarm on, nsg notified, and family present..    GOALS:   Multidisciplinary Problems       Physical Therapy Goals          Problem: Physical Therapy    Goal Priority Disciplines Outcome Goal Variances Interventions   Physical Therapy Goal     PT, PT/OT Ongoing, Not Progressing     Description: Goals to be met by: 10/20/22     Patient will increase functional independence with mobility by performin. Supine to sit with Stand-by Assistance  2. Sit to supine with Stand-by Assistance  3. Sit to stand transfer with Stand-by Assistance  4. Bed to chair transfer with Stand-by Assistance using Rolling Walker  5. Gait  x 50 feet with Stand-by Assistance using Rolling Walker.                          Time Tracking:     PT Received On: 22  PT Start Time: 1120     PT Stop Time: 1154  PT Total Time (min): 34 min     Billable Minutes: Therapeutic Activity 34 (cotx with OT)    Treatment Type: Treatment  PT/PTA: PT     PTA Visit Number: 0     2022

## 2022-09-21 NOTE — ASSESSMENT & PLAN NOTE
-initially on lasix drip; now transitioned IV pushes, will increase frequency today  -monitor electrolytes  -echo with EF 55%, moderate mitral regurg, elevated CVP 15 mm Hg  -consult cardiology  -fluid restriction  -hold ACE and BB for now given soft BP

## 2022-09-21 NOTE — PLAN OF CARE
Patient on oxygen with documented flow.  Will attempt to wean per O2 order protocol. Patient stated he will attempt cpap tonight.

## 2022-09-21 NOTE — PLAN OF CARE
0413 Pt appeared to be in no distress. Reported no pain.     No accu ck.     Tele: SR,  HR  60,  No alarms.     Bed in lowest position, wheels locked, non skid socks, ID band worn, personal items and call bell with in reach, bed alarm set.

## 2022-09-21 NOTE — PLAN OF CARE
Problem: Occupational Therapy  Goal: Occupational Therapy Goal  Description: Goals to be met by: 10/20/2022     Patient will increase functional independence with ADLs by performing:    UE Dressing with Modified [place].  LE Dressing with Modified [place].  Grooming while standing at sink with Modified [place].  Toileting from toilet with Modified [place] for hygiene and clothing management.   Supine to sit with Modified [place].  Step transfer with Modified [place]  Toilet transfer to toilet with Modified [place].  Increased functional strength to WFL for ADLS.  Upper extremity exercise program x10 reps per handout, with independence.    Outcome: Ongoing, Not Progressing   Pt found on 9LPM O2 HFNC today from 2 LPM yesterday. Pt. desatted to 84% with activity during OT tx and needed rest, PLB tech and increased time to recover to 90% today. OT tx discontinued. Nurse made aware.   Continue with OT POC.

## 2022-09-21 NOTE — ASSESSMENT & PLAN NOTE
TTE   The left ventricle is normal in size with normal systolic function.   The estimated ejection fraction is 55%.   Indeterminate left ventricular diastolic function.   There is abnormal septal wall motion consistent with right ventricular pacemaker.   Normal right ventricular size with normal right ventricular systolic function.   Moderate tricuspid regurgitation.   Mild pulmonic regurgitation.   Moderate mitral regurgitation.   There is mild aortic valve stenosis.   Aortic valve area is 1.55 cm2; peak velocity is 2.60 m/s; mean gradient is 16 mmHg.   Elevated central venous pressure (15 mmHg).   The estimated PA systolic pressure is 71 mmHg.   There is pulmonary hypertension.   Mild left atrial enlargement.        Recent Labs   Lab 09/19/22  1324   BNP 1,795*   .  ACEI, Aldactone on hold 2/2 JAYSON  Diuresing with Lasix gtt, net -2.4 L thus far  Overloaded on exam  Daily weights, accurate intake and output

## 2022-09-21 NOTE — PROGRESS NOTES
DCR done. IMM done. Signed by daughter and copy given to daughter. Copy to chart. Nurse notified of need for pain meds.

## 2022-09-21 NOTE — PLAN OF CARE
The sw faxed the pt's info to [facility] via [employer] and spoke to Carmen at [facility] in reference to this pt.        09/20/22 2008   Post-Acute Status   Post-Acute Authorization Placement   Post-Acute Placement Status Referrals Sent  ([facility])   Discharge Delays None known at this time   Discharge Plan   Discharge Plan A Rehab   Discharge Plan B Skilled Nursing Facility

## 2022-09-21 NOTE — PROGRESS NOTES
"[facility]  Progress Note    Patient Name: Tong Ford  MRN: [id number]  Patient Class: IP- Inpatient   Admission Date: 9/19/2022  Length of Stay: 2 days  Attending Physician: Adolfo Solo, *  Primary Care Provider: Kris Zavala MD        Subjective:     Principal Problem:Acute on chronic diastolic congestive heart failure        HPI:  Per transfer note:  "Patient is a 77 y.o. male who has a past medical history of arthritis, HTN, AFIB, HLD, TIA, CAD, CHF, hypothyroidism, normocytic anemia and PSHx of CABG, coronary angioplasty with stent, and pacemaker presenting today for shortness of breath, weakness and swelling. ED workup significant for elevated troponin, worsening JAYSON, elevated BNP, leukocytosis, hyperkalemia, elevated CPK, hypoxia requiring O2 supplementation, chest x-ray with increased bilateral asymmetric airspace disease consistent with CHF/volume overload and possible LLL pneumonia. See below labs and imaging. Patient started on lasix drip, given lokelma, and IV Rocephin/Az. No ICU beds available at this facility therefore pt will need to transfer for higher level of care. Stable for transfer."    On arrival to [place], the patient was continued on lasix drip. He is sleeping and unable to answer any questions. Per chart review, he endorsed orthopnea, worsening GOODWIN. He was told by his cardiologist to report to the ED to be admitted for IV lasix. He stated that having the extra fluid on his legs and scrotal area made it difficult to walk and he fell at home last night on concrete, denies hitting head. He was on the floor for several hours before being found. He was taking 60mg of lasix daily and lionel, reported adequate urination. Pt also has chronic bilat LE venous statis ulcers and he follows with wound care. He had BNP 1795, troponin 1.03 with downtrend, creat 3.1, K 5.2, CK 2600. a temperature of 100.2, WBC 20 at sending facility. He was given lokelma, rocephin and " azithromycin, and started on lasix drip. Prior echo with normal EF. He is on 2LNC with adequate O2 sat on arrival. Legs with stasis ulcers, right leg with redness noted.           Overview/Hospital Course:  No notes on file    Interval History:  Reports subjective shortness of breath time better today, although he is on a higher L flow of oxygen this morning than he was yesterday in the afternoon.  Reports that he has had substantial urine output overnight with dose of diuretic.  We will increase frequency Lasix dosing.    Review of Systems   Constitutional:  Negative for fever.   Respiratory:  Positive for shortness of breath.    Cardiovascular:  Positive for leg swelling. Negative for chest pain.   Musculoskeletal:  Positive for arthralgias.   Skin:  Positive for rash and wound.   Neurological:  Positive for weakness.   Objective:     Vital Signs (Most Recent):  Temp: 97 °F (36.1 °C) (09/21/22 0754)  Pulse: 87 (09/21/22 0824)  Resp: 20 (09/21/22 0824)  BP: 138/63 (09/21/22 0754)  SpO2: (!) 91 % (09/21/22 0824)   Vital Signs (24h Range):  Temp:  [97 °F (36.1 °C)-99.2 °F (37.3 °C)] 97 °F (36.1 °C)  Pulse:  [60-87] 87  Resp:  [12-40] 20  SpO2:  [87 %-95 %] 91 %  BP: (111-140)/(53-68) 138/63     Weight: 94.7 kg (208 lb 12.4 oz)  Body mass index is 31.74 kg/m².    Intake/Output Summary (Last 24 hours) at 9/21/2022 1039  Last data filed at 9/21/2022 1005  Gross per 24 hour   Intake 652.1 ml   Output 1360 ml   Net -707.9 ml        Physical Exam  Vitals and nursing note reviewed.   Constitutional:       General: He is not in acute distress.     Appearance: He is obese.   HENT:      Head: Normocephalic and atraumatic.      Nose: Nose normal.      Mouth/Throat:      Mouth: Mucous membranes are moist.   Eyes:      Pupils: Pupils are equal, round, and reactive to light.   Cardiovascular:      Rate and Rhythm: Normal rate and regular rhythm.      Pulses: Normal pulses.      Heart sounds: Murmur heard.   Pulmonary:      Effort:  Pulmonary effort is normal. No respiratory distress.      Breath sounds: No rales.      Comments: On supplemental O2 via nasal cannula  Abdominal:      General: Bowel sounds are normal.      Palpations: Abdomen is soft.   Musculoskeletal:         General: Swelling present. Normal range of motion.      Cervical back: Normal range of motion.      Right lower leg: Edema present.      Left lower leg: Edema present.   Skin:     General: Skin is warm and dry.      Findings: Erythema and lesion present.   Neurological:      Motor: Weakness present.      Comments: Unable to assess   Psychiatric:      Comments: Unable to assess       Significant Labs: All pertinent labs within the past 24 hours have been reviewed.    Significant Imaging: I have reviewed all pertinent imaging results/findings within the past 24 hours.      Assessment/Plan:      * Acute on chronic diastolic congestive heart failure  -initially on lasix drip; now transitioned IV pushes, will increase frequency today  -monitor electrolytes  -echo with EF 55%, moderate mitral regurg, elevated CVP 15 mm Hg  -consult cardiology  -fluid restriction  -hold ACE and BB for now given soft BP      Neuropathy, peripheral, idiopathic  -resume gabapentin      Debility  -PT/OT consult; will likely require placement      Acute hypoxemic respiratory failure  In setting of volume overload and possible pneumonia  -cont supplemental O2 as needed, wean as tolerated--now on 9LNC        Sepsis  Meets sepsis criteria for WBC, temp, hypoxia  -treating for possible pneumonia and cellulitis  -cont rocephin and doxy          Rhabdomyolysis  Fell and laid on floor for hours  CPK 2607 with JAYSON  -unable to give fluids due to volume overload  -repeat CK down trending  -consult nephrology as needed      Pneumonia  Possible LLL pneumonia on imaging  Temp 100.4, WBC 20  -blood cx no growth to date  -cont rocephin and doxy  -sputum sample  -cont supplemental O2 as needed        Venous stasis  ulcer  BLE with ulcers  -right leg with redness   -on rocephin and doxy  -consult wound care  -arterial ultrasound      Elevated troponin  In setting of volume overload; likely secondary to demand  -troponin 1.03--0.9  -plan for outpatient PET stress test  -cardiology consulted  -echo as above      Coronary artery disease  Hx of CABG and stent  -no antiplatelet on home med list  -cont statin  -hold BB for low BP      Paroxysmal atrial fibrillation  -cont amiodarone and eliquis  -hold BB for now      Acute renal failure superimposed on stage 3 chronic kidney disease  -possibly cardiorenal  -diuresing  -monitor labs  -renally dose medications  -baseline creatinine around 2.5    Thyroid disease  -cont synthroid   -TSH mildly elevated with normal T4      Essential hypertension  BP soft on admission  -hold home meds for now and resume as tolerated      Obstructive sleep apnea  -CPAP at night         VTE Risk Mitigation (From admission, onward)         Ordered     apixaban tablet 5 mg  2 times daily         09/20/22 0224     IP VTE HIGH RISK PATIENT  Once         09/19/22 2343     Place sequential compression device  Until discontinued         09/19/22 2343                Discharge Planning   YUNIEL: 9/23/2022     Code Status: Full Code   Is the patient medically ready for discharge?:     Reason for patient still in hospital (select all that apply): Treatment  Discharge Plan A: Rehab   Discharge Delays: None known at this time              Adolfo Solo MD  Department of Hospital Medicine   [place] [facility]

## 2022-09-21 NOTE — ASSESSMENT & PLAN NOTE
In setting of volume overload; likely secondary to demand  -troponin 1.03--0.9  -plan for outpatient PET stress test  -cardiology consulted  -echo as above

## 2022-09-21 NOTE — PLAN OF CARE
Problem: Physical Therapy  Goal: Physical Therapy Goal  Description: Goals to be met by: 10/20/22     Patient will increase functional independence with mobility by performin. Supine to sit with Stand-by Assistance  2. Sit to supine with Stand-by Assistance  3. Sit to stand transfer with Stand-by Assistance  4. Bed to chair transfer with Stand-by Assistance using Rolling Walker  5. Gait  x 50 feet with Stand-by Assistance using Rolling Walker.     Outcome: Ongoing, Not Progressing     Pt regressing this session  found on 9L O2 HFNC compared to 2 L yesterday. Pt. desatted to 84% with activity and needing increased time and PLB to recover. Nsg notified. Deferred further activity. Unable to attempt sit>stand this date due to decreased activity tolerance. Will progress pt as able.

## 2022-09-21 NOTE — PROGRESS NOTES
Food & Nutrition  Education    Diet Education: heart failure   Time Spent: 15 minutes  Learners: pt      Nutrition Education provided with handouts:   Heart Failure Nutrition Therapy    Comments:  Educated pt on fluid and salt restricted diet. Discussed foods high in sodium to avoid and cooking without salt. Pt reports daughter does the cooking and she cooks low salt for him. Educated him on 1500ml fluid restriction. Also encouraged him to weigh himself daily to monitor for fluid retention. Pt voiced understanding. Handouts were provided and encouraged him to share them with his daughter.     All questions and concerns answered. Dietitian's contact information provided.       Follow-Up: 9/28/22    Please Re-consult as needed        Thanks!

## 2022-09-21 NOTE — ASSESSMENT & PLAN NOTE
S/p CABG in 1989 with subsequent PCI in 2005  No chest pain  EKG paced  Troponin peaked on admission at 1  Not on asa or BB per primary cardiologist - will defer initiation to them  Continue statin     TTE   The left ventricle is normal in size with normal systolic function.   The estimated ejection fraction is 55%.   Indeterminate left ventricular diastolic function.   There is abnormal septal wall motion consistent with right ventricular pacemaker.   Normal right ventricular size with normal right ventricular systolic function.   Moderate tricuspid regurgitation.   Mild pulmonic regurgitation.   Moderate mitral regurgitation.   There is mild aortic valve stenosis.   Aortic valve area is 1.55 cm2; peak velocity is 2.60 m/s; mean gradient is 16 mmHg.   Elevated central venous pressure (15 mmHg).   The estimated PA systolic pressure is 71 mmHg.   There is pulmonary hypertension.   Mild left atrial enlargement.    PET stress as outpatient once recovers from ADHF

## 2022-09-21 NOTE — PROGRESS NOTES
[facility] - Telemetry  Cardiology  Progress Note    Patient Name: Tong Ford  MRN: [id number]  Admission Date: 9/19/2022  Hospital Length of Stay: 2 days  Code Status: Full Code   Attending Physician: Adolfo Solo, *   Primary Care Physician: Kris Zavala MD  Expected Discharge Date: 9/23/2022  Principal Problem:Acute on chronic diastolic congestive heart failure    Subjective:     Hospital Course:   09/20/2022 Per HPI   09/21/2022 TTE   The left ventricle is normal in size with normal systolic function.   The estimated ejection fraction is 55%.   Indeterminate left ventricular diastolic function.   There is abnormal septal wall motion consistent with right ventricular pacemaker.   Normal right ventricular size with normal right ventricular systolic function.   Moderate tricuspid regurgitation.   Mild pulmonic regurgitation.   Moderate mitral regurgitation.   There is mild aortic valve stenosis.   Aortic valve area is 1.55 cm2; peak velocity is 2.60 m/s; mean gradient is 16 mmHg.   Elevated central venous pressure (15 mmHg).   The estimated PA systolic pressure is 71 mmHg.   There is pulmonary hypertension.   Mild left atrial enlargement.    774 cc intake, 2160 cc output, net -2.4L from admission. Cr stable at 3. O2 requirements increased overnight to 9 L NC. Remains SOB with edema.       Past Medical History:   Diagnosis Date    Arthritis     Atrial fibrillation     Carotid artery occlusion     bilateral    Cataract     CHF (congestive heart failure)     CKD (chronic kidney disease), stage II     Coronary artery disease     3 Vessel CABG AND 3 STENTS    Encounter for blood transfusion     Hypercholesterolemia     Hypertension     Normocytic anemia     Shingles     X 3 WEEKS AGO    Sleep apnea     CESAR - NOT USING C-PAP    Thyroid disease     TIA (transient ischemic attack)        Past Surgical History:   Procedure Laterality Date    A-V CARDIAC PACEMAKER INSERTION N/A 4/1/2021     Procedure: INSERTION, CARDIAC PACEMAKER, DUAL CHAMBER;  Surgeon: Clifford Sevilla MD;  Location: [facility] CATH/EP LAB;  Service: Cardiology;  Laterality: N/A;  [employer]    CORONARY ANGIOPLASTY WITH STENT PLACEMENT      CORONARY ARTERY BYPASS GRAFT      CORONARY ARTERY BYPASS GRAFT (CABG)      3 vessel    EYE SURGERY      bILATERAL CATARACS    INSERTION OF PACEMAKER      REVISION OF IMPLANTABLE CARDIOVERTER-DEFIBRILLATOR (ICD) ELECTRODE LEAD PLACEMENT Left 7/1/2021    Procedure: REVISION, INSERTION, ELECTRODE LEAD,pacemaker;  Surgeon: Clifford Sevilla MD;  Location: [facility] CATH/EP LAB;  Service: Cardiology;  Laterality: Left;    ROBOT-ASSISTED LAPAROSCOPIC REPAIR OF INGUINAL HERNIA Right 3/11/2022    Procedure: ROBOTIC REPAIR, HERNIA, INGUINAL;  Surgeon: Melo Aguilera III, MD;  Location: [facility] OR;  Service: General;  Laterality: Right;  LB case    TREATMENT OF CARDIAC ARRHYTHMIA N/A 1/29/2021    Procedure: CARDIOVERSION;  Surgeon: Iron Serna MD;  Location: [facility] CATH/EP LAB;  Service: Cardiology;  Laterality: N/A;    VASECTOMY         Review of patient's allergies indicates:  No Known Allergies    No current facility-administered medications on file prior to encounter.     Current Outpatient Medications on File Prior to Encounter   Medication Sig    amiodarone (PACERONE) 200 MG Tab Take 1 tablet (200 mg total) by mouth once daily.    amLODIPine (NORVASC) 5 MG tablet Take 1 tablet (5 mg total) by mouth every evening.    apixaban (ELIQUIS) 5 mg Tab Take 1 tablet (5 mg total) by mouth 2 (two) times daily.    ascorbic acid, vitamin C, (VITAMIN C) 1000 MG tablet Take 1,000 mg by mouth once daily.    atorvastatin (LIPITOR) 40 MG tablet Take 1 tablet (40 mg total) by mouth once daily.    coQ10, ubiquinol, 100 mg Cap Take 150 mg by mouth once daily.     cyanocobalamin 1,000 mcg/mL injection Inject 1 mL (1,000 mcg total) into the muscle every 30 days.    doxazosin (CARDURA) 8 MG Tab TAKE 1  "TABLET DAILY    ezetimibe (ZETIA) 10 mg tablet TAKE 1 TABLET DAILY    furosemide (LASIX) 40 MG tablet Take 1 tablet (40 mg total) by mouth once daily.    gabapentin (NEURONTIN) 600 MG tablet Take 1 tablet (600 mg total) by mouth 2 (two) times daily.    garlic 1,000 mg Cap Take 1 capsule by mouth once daily.     ketoconazole (NIZORAL) 2 % cream Apply topically once daily.    levothyroxine (SYNTHROID) 100 MCG tablet Take 1 tablet (100 mcg total) by mouth before breakfast.    LIDOcaine (LIDODERM) 5 % Place 1 patch onto the skin once daily. Remove & Discard patch within 12 hours or as directed by MD    lisinopriL (PRINIVIL,ZESTRIL) 20 MG tablet Take 1 tablet (20 mg total) by mouth 2 (two) times a day.    metoprolol succinate (TOPROL-XL) 50 MG 24 hr tablet Take 1 tablet (50 mg total) by mouth once daily.    multivitamin Tab Take 1 tablet by mouth once daily. (Patient not taking: No sig reported)    nitroGLYCERIN (NITROSTAT) 0.4 MG SL tablet Place 1 tablet (0.4 mg total) under the tongue every 5 (five) minutes as needed for Chest pain. DO NOT CRUSH OR CHEW; DISSOLVE UNDER TONGUE; MAXIMUM OF 3 DOSES IN 15 MINUTES    spironolactone (ALDACTONE) 25 MG tablet Take 1 tablet (25 mg total) by mouth every other day.    syringe-needle,safety,disp unt 3 mL 22 gauge x 1 1/2" Syrg Please syringe to administer medication deep IM    traMADoL (ULTRAM) 50 mg tablet Take 1 tablet (50 mg total) by mouth every 6 (six) hours as needed for Pain.    vitamin D (VITAMIN D3) 1000 units Tab Take 1,000 Units by mouth once daily.     Family History       Problem Relation (Age of Onset)    COPD Paternal Grandmother    Cancer Mother, Father, Maternal Grandmother    Hypertension Father          Tobacco Use    Smoking status: Never    Smokeless tobacco: Never   Substance and Sexual Activity    Alcohol use: Not Currently    Drug use: No    Sexual activity: Not on file     Review of Systems   Constitutional: Positive for malaise/fatigue " and weight gain.   HENT: Negative.     Eyes: Negative.    Cardiovascular:  Positive for dyspnea on exertion, irregular heartbeat, leg swelling, orthopnea and paroxysmal nocturnal dyspnea. Negative for chest pain, near-syncope, palpitations and syncope.   Respiratory:  Positive for shortness of breath.    Endocrine: Negative.    Hematologic/Lymphatic: Negative.    Gastrointestinal:  Positive for bloating. Negative for nausea and vomiting.   Genitourinary: Negative.    Neurological:  Positive for weakness.   Psychiatric/Behavioral: Negative.     Allergic/Immunologic: Negative.    Objective:     Vital Signs (Most Recent):  Temp: 97 °F (36.1 °C) (09/21/22 0754)  Pulse: 87 (09/21/22 0824)  Resp: 20 (09/21/22 0824)  BP: 138/63 (09/21/22 0754)  SpO2: (!) 91 % (09/21/22 0824)   Vital Signs (24h Range):  Temp:  [97 °F (36.1 °C)-99.2 °F (37.3 °C)] 97 °F (36.1 °C)  Pulse:  [60-87] 87  Resp:  [12-40] 20  SpO2:  [87 %-95 %] 91 %  BP: (111-140)/(53-68) 138/63     Weight: 94.7 kg (208 lb 12.4 oz)  Body mass index is 31.74 kg/m².    SpO2: (!) 91 %  O2 Device (Oxygen Therapy): High Flow nasal Cannula      Intake/Output Summary (Last 24 hours) at 9/21/2022 1025  Last data filed at 9/21/2022 1005  Gross per 24 hour   Intake 659.67 ml   Output 1685 ml   Net -1025.33 ml         Lines/Drains/Airways       Drain  Duration             Male External Urinary Catheter 09/20/22 1545 <1 day              Peripheral Intravenous Line  Duration                  Peripheral IV - Single Lumen 18 G Anterior;Left Forearm -- days                    Physical Exam  Constitutional:       General: He is not in acute distress.     Appearance: He is ill-appearing.   HENT:      Head: Atraumatic.   Eyes:      General:         Right eye: No discharge.         Left eye: No discharge.   Cardiovascular:      Rate and Rhythm: Normal rate and regular rhythm.      Heart sounds: Murmur heard.   Pulmonary:      Breath sounds: Rales present.   Abdominal:      General:  Bowel sounds are normal. There is distension.   Musculoskeletal:      Right lower leg: Edema present.      Left lower leg: Edema present.   Skin:     General: Skin is warm and dry.   Neurological:      Mental Status: He is alert. Mental status is at baseline.       Significant Labs: Blood Culture:   Recent Labs   Lab 09/20/22  0013 09/20/22  0014   LABBLOO No Growth to date No Growth to date     , BMP:   Recent Labs   Lab 09/19/22  2350 09/20/22  0423 09/20/22  0804 09/21/22  0459   GLU  --  104 104 98   NA  --  141 140 140   K  --  4.7 4.5 3.9   CL  --  108 106 106   CO2  --  23 24 25   BUN  --  79* 77* 77*   CREATININE  --  3.2* 3.2* 3.0*   CALCIUM  --  8.7 8.7 8.5*   MG 2.3 2.5  --  2.5     , CMP   Recent Labs   Lab 09/19/22  1324 09/19/22  1936 09/20/22 0423 09/20/22  0804 09/21/22  0459      < > 141 140 140   K 5.2*   < > 4.7 4.5 3.9      < > 108 106 106   CO2 21*   < > 23 24 25   GLU 97   < > 104 104 98   BUN 76*   < > 79* 77* 77*   CREATININE 3.2*   < > 3.2* 3.2* 3.0*   CALCIUM 9.1   < > 8.7 8.7 8.5*   PROT 6.2  --   --  6.0  --    ALBUMIN 2.9*  --   --  2.6*  --    BILITOT 1.2*  --   --  0.8  --    ALKPHOS 88  --   --  71  --    AST 83*  --   --  81*  --    ALT 46*  --   --  45*  --    ANIONGAP 12   < > 10 10 9    < > = values in this interval not displayed.     , CBC   Recent Labs   Lab 09/19/22  1324 09/20/22  0423 09/21/22  0459   WBC 20.71* 17.68* 14.34*   HGB 9.7* 8.9* 9.3*   HCT 28.9* 26.1* 26.9*    175 193     , INR No results for input(s): INR, PROTIME in the last 48 hours., Lipid Panel No results for input(s): CHOL, HDL, LDLCALC, TRIG, CHOLHDL in the last 48 hours., Troponin   Recent Labs   Lab 09/19/22  1324 09/19/22  1936   TROPONINI 1.035* 0.970*     , and All pertinent lab results from the last 24 hours have been reviewed.    Significant Imaging: Echocardiogram: Transthoracic echo (TTE) complete ([facility] Only):   Results for orders placed or performed during the hospital  encounter of 09/19/22   Echo   Result Value Ref Range    BSA 2.15 m2    IVC diameter 2.40 cm    Left Ventricular Outflow Tract Mean Velocity 0.69 cm/s    Left Ventricular Outflow Tract Mean Gradient 2.03 mmHg    PV PEAK VELOCITY 0.88 cm/s    LVIDd 5.69 3.5 - 6.0 cm    IVS 0.64 0.6 - 1.1 cm    Posterior Wall 0.84 0.6 - 1.1 cm    LVIDs 3.93 2.1 - 4.0 cm    FS 31 28 - 44 %    LV mass 154.01 g    LA size 5.02 cm    RV S' 0.01 cm/s    Left Ventricle Relative Wall Thickness 0.30 cm    AV mean gradient 16 mmHg    AV valve area 1.55 cm2    AV Velocity Ratio 0.35     AV index (prosthetic) 0.34     MV mean gradient 4 mmHg    MV valve area by continuity eq 1.78 cm2    E/A ratio 1.72     E wave deceleration time 228.37 msec    LVOT diameter 2.40 cm    LVOT area 4.5 cm2    LVOT peak joseph 0.92 m/s    LVOT peak VTI 20.30 cm    Ao peak joseph 2.60 m/s    Ao VTI 59.3 cm    Mr max joseph 5.17 m/s    LVOT stroke volume 91.79 cm3    AV peak gradient 27 mmHg    MV peak gradient 11 mmHg    MV Peak E Joseph 1.65 m/s    AR Max Joseph 3.24 m/s    TR Max Joseph 3.75 m/s    MV VTI 51.5 cm    MV Peak A Joseph 0.96 m/s    LV Systolic Volume 67.09 mL    LV Systolic Volume Index 31.9 mL/m2    LV Diastolic Volume 159.38 mL    LV Diastolic Volume Index 75.90 mL/m2    LV Mass Index 73 g/m2    RA Major Axis 5.98 cm    Left Atrium Minor Axis 6.02 cm    Left Atrium Major Axis 6.28 cm    Triscuspid Valve Regurgitation Peak Gradient 56 mmHg    LA Volume Index (Mod) 36.1 mL/m2    LA volume (mod) 75.88 cm3    Right Atrial Pressure (from IVC) 15 mmHg    EF 55 %    TV rest pulmonary artery pressure 71 mmHg    Narrative    · The left ventricle is normal in size with normal systolic function.  · The estimated ejection fraction is 55%.  · Indeterminate left ventricular diastolic function.  · There is abnormal septal wall motion consistent with right ventricular   pacemaker.  · Normal right ventricular size with normal right ventricular systolic   function.  · Moderate tricuspid  regurgitation.  · Mild pulmonic regurgitation.  · Moderate mitral regurgitation.  · There is mild aortic valve stenosis.  · Aortic valve area is 1.55 cm2; peak velocity is 2.60 m/s; mean gradient   is 16 mmHg.  · Elevated central venous pressure (15 mmHg).  · The estimated PA systolic pressure is 71 mmHg.  · There is pulmonary hypertension.  · Mild left atrial enlargement.        Assessment and Plan:     Brief HPI: Patient seen this morning on rounds with reports of continued SOB and edema. Diuresis continued.     * Acute on chronic diastolic congestive heart failure   TTE   The left ventricle is normal in size with normal systolic function.   The estimated ejection fraction is 55%.   Indeterminate left ventricular diastolic function.   There is abnormal septal wall motion consistent with right ventricular pacemaker.   Normal right ventricular size with normal right ventricular systolic function.   Moderate tricuspid regurgitation.   Mild pulmonic regurgitation.   Moderate mitral regurgitation.   There is mild aortic valve stenosis.   Aortic valve area is 1.55 cm2; peak velocity is 2.60 m/s; mean gradient is 16 mmHg.   Elevated central venous pressure (15 mmHg).   The estimated PA systolic pressure is 71 mmHg.   There is pulmonary hypertension.   Mild left atrial enlargement.        Recent Labs   Lab 09/19/22  1324   BNP 1,795*   .  ACEI, Aldactone on hold 2/2 JAYSON  Diuresing with Lasix gtt, net -2.4 L thus far  Overloaded on exam  Daily weights, accurate intake and output      Rhabdomyolysis  CPK 2600 on admission, down to 1200 yesterday     Venous stasis ulcer  WOC on board  Venous wounds     Elevated troponin  Troponin peaked at 1 on admission  No chest pain  Likely demand in setting of ADHF and possible infection   Normal LVEF   PET stress as outpatient once recovered from ADHF      Coronary artery disease  S/p CABG in 1989 with subsequent PCI in 2005  No chest pain  EKG paced  Troponin peaked on  admission at 1  Not on asa or BB per primary cardiologist - will defer initiation to them  Continue statin     TTE   The left ventricle is normal in size with normal systolic function.   The estimated ejection fraction is 55%.   Indeterminate left ventricular diastolic function.   There is abnormal septal wall motion consistent with right ventricular pacemaker.   Normal right ventricular size with normal right ventricular systolic function.   Moderate tricuspid regurgitation.   Mild pulmonic regurgitation.   Moderate mitral regurgitation.   There is mild aortic valve stenosis.   Aortic valve area is 1.55 cm2; peak velocity is 2.60 m/s; mean gradient is 16 mmHg.   Elevated central venous pressure (15 mmHg).   The estimated PA systolic pressure is 71 mmHg.   There is pulmonary hypertension.   Mild left atrial enlargement.    PET stress as outpatient once recovers from ADHF    Paroxysmal atrial fibrillation  Continue Amio and Eliquis     Acute renal failure superimposed on stage 3 chronic kidney disease  Cr 3.0 (baseline 1.7-2.2)        VTE Risk Mitigation (From admission, onward)         Ordered     apixaban tablet 5 mg  2 times daily         09/20/22 0224     IP VTE HIGH RISK PATIENT  Once         09/19/22 2343     Place sequential compression device  Until discontinued         09/19/22 2343                Nathaniel Bal NP  Cardiology  [facility] - Telemetry

## 2022-09-21 NOTE — ASSESSMENT & PLAN NOTE
Possible LLL pneumonia on imaging  Temp 100.4, WBC 20  -blood cx no growth to date  -cont rocephin and doxy  -sputum sample  -cont supplemental O2 as needed

## 2022-09-21 NOTE — PLAN OF CARE
Problem: Adult Inpatient Plan of Care  Goal: Plan of Care Review  Outcome: Ongoing, Progressing  Goal: Patient-Specific Goal (Individualized)  Outcome: Ongoing, Progressing  Goal: Absence of Hospital-Acquired Illness or Injury  Outcome: Ongoing, Progressing  Goal: Optimal Comfort and Wellbeing  Outcome: Ongoing, Progressing  Goal: Readiness for Transition of Care  Outcome: Ongoing, Progressing     Problem: Fluid and Electrolyte Imbalance (Acute Kidney Injury/Impairment)  Goal: Fluid and Electrolyte Balance  Outcome: Ongoing, Progressing     Problem: Oral Intake Inadequate (Acute Kidney Injury/Impairment)  Goal: Optimal Nutrition Intake  Outcome: Ongoing, Progressing     Problem: Impaired Wound Healing  Goal: Optimal Wound Healing  Outcome: Ongoing, Progressing     Problem: Infection  Goal: Absence of Infection Signs and Symptoms  Outcome: Ongoing, Progressing

## 2022-09-21 NOTE — SUBJECTIVE & OBJECTIVE
Interval History:  Reports subjective shortness of breath time better today, although he is on a higher L flow of oxygen this morning than he was yesterday in the afternoon.  Reports that he has had substantial urine output overnight with dose of diuretic.  We will increase frequency Lasix dosing.    Review of Systems   Constitutional:  Negative for fever.   Respiratory:  Positive for shortness of breath.    Cardiovascular:  Positive for leg swelling. Negative for chest pain.   Musculoskeletal:  Positive for arthralgias.   Skin:  Positive for rash and wound.   Neurological:  Positive for weakness.   Objective:     Vital Signs (Most Recent):  Temp: 97 °F (36.1 °C) (09/21/22 0754)  Pulse: 87 (09/21/22 0824)  Resp: 20 (09/21/22 0824)  BP: 138/63 (09/21/22 0754)  SpO2: (!) 91 % (09/21/22 0824)   Vital Signs (24h Range):  Temp:  [97 °F (36.1 °C)-99.2 °F (37.3 °C)] 97 °F (36.1 °C)  Pulse:  [60-87] 87  Resp:  [12-40] 20  SpO2:  [87 %-95 %] 91 %  BP: (111-140)/(53-68) 138/63     Weight: 94.7 kg (208 lb 12.4 oz)  Body mass index is 31.74 kg/m².    Intake/Output Summary (Last 24 hours) at 9/21/2022 1039  Last data filed at 9/21/2022 1005  Gross per 24 hour   Intake 652.1 ml   Output 1360 ml   Net -707.9 ml        Physical Exam  Vitals and nursing note reviewed.   Constitutional:       General: He is not in acute distress.     Appearance: He is obese.   HENT:      Head: Normocephalic and atraumatic.      Nose: Nose normal.      Mouth/Throat:      Mouth: Mucous membranes are moist.   Eyes:      Pupils: Pupils are equal, round, and reactive to light.   Cardiovascular:      Rate and Rhythm: Normal rate and regular rhythm.      Pulses: Normal pulses.      Heart sounds: Murmur heard.   Pulmonary:      Effort: Pulmonary effort is normal. No respiratory distress.      Breath sounds: No rales.      Comments: On supplemental O2 via nasal cannula  Abdominal:      General: Bowel sounds are normal.      Palpations: Abdomen is soft.    Musculoskeletal:         General: Swelling present. Normal range of motion.      Cervical back: Normal range of motion.      Right lower leg: Edema present.      Left lower leg: Edema present.   Skin:     General: Skin is warm and dry.      Findings: Erythema and lesion present.   Neurological:      Motor: Weakness present.      Comments: Unable to assess   Psychiatric:      Comments: Unable to assess       Significant Labs: All pertinent labs within the past 24 hours have been reviewed.    Significant Imaging: I have reviewed all pertinent imaging results/findings within the past 24 hours.

## 2022-09-21 NOTE — PLAN OF CARE
The sw met with the pt to complete the assessment. The pt is a transfer from [facility]. The pt has been living with Daniella Mendoza(dtr)[phone] for the past month at her home in [place]. The pt is independent with his adl's but it as been getting hard for him lately. The pt has a rollator and shwr chair at home.The pt doesn't drive anymore so she transports the pt to dr baez's and errands. The pt has been in discussions with Carissa Mendez at [facility] prior to this hospital admit via his Lymphedema clinic. The pt was in the process of trying to get into the [facility] located in [place]. The sw spoke to Carmen at [facility] and she is aware of this pt. The sw will fax her the pt's info via [facility] to initiate the referral. The sw completed the white board in the pt's room and gave the pt a d/c brochure with her name and contact info on it. The sw encouraged the pt to call if he has any further questions or concerns. The sw will continue to follow the pt throughout his transitions of care and will assist with any d/c needs.        09/20/22 0800   Discharge Assessment   Assessment Type Discharge Planning Assessment   Confirmed/corrected address, phone number and insurance Yes   Confirmed Demographics Correct on Facesheet   Source of Information patient   When was your last doctors appointment? 09/15/22   Communicated YUNIEL with patient/caregiver Date not available/Unable to determine   Reason For Admission Acute exacerbation of CHF   Lives With child(urban), adult   Do you expect to return to your current living situation? No  ([facility])   Do you have help at home or someone to help you manage your care at home? Yes   Who are your caregiver(s) and their phone number(s)? Daniella Mendoza(dtr)[phone]   Prior to hospitilization cognitive status: Alert/Oriented   Current cognitive status: Alert/Oriented   Walking or Climbing Stairs Difficulty ambulation difficulty, requires equipment;stair  climbing difficulty, requires equipment;transferring difficulty, requires equipment   Dressing/Bathing Difficulty bathing difficulty, requires equipment   Home Accessibility wheelchair accessible   Equipment Currently Used at Home rollator;shower chair   Readmission within 30 days? No   Patient currently being followed by outpatient case management? No   Do you currently have service(s) that help you manage your care at home? No   Do you take prescription medications? Yes   Do you have prescription coverage? Yes   Coverage Medicare A/B & BCBS   Do you have any problems affording any of your prescribed medications? No   Is the patient taking medications as prescribed? yes   Who is going to help you get home at discharge? Daniella Mendoza(dtr)[phone]   How do you get to doctors appointments? family or friend will provide   Are you on dialysis? No   Do you take coumadin? No   Discharge Plan A Rehab   Discharge Plan B Skilled Nursing Facility   DME Needed Upon Discharge  none   Discharge Plan discussed with: Patient   Discharge Barriers Identified None   Physical Activity   On average, how many days per week do you engage in moderate to strenuous exercise (like a brisk walk)? 0 days   On average, how many minutes do you engage in exercise at this level? 0 min   Financial Resource Strain   How hard is it for you to pay for the very basics like food, housing, medical care, and heating? Not hard   Housing Stability   In the last 12 months, was there a time when you were not able to pay the mortgage or rent on time? N   In the last 12 months, how many places have you lived? 2   In the last 12 months, was there a time when you did not have a steady place to sleep or slept in a shelter (including now)? N   Transportation Needs   In the past 12 months, has lack of transportation kept you from medical appointments or from getting medications? no   In the past 12 months, has lack of transportation kept you from meetings,  work, or from getting things needed for daily living? No   Food Insecurity   Within the past 12 months, you worried that your food would run out before you got the money to buy more. Never true   Within the past 12 months, the food you bought just didn't last and you didn't have money to get more. Never true   Stress   Do you feel stress - tense, restless, nervous, or anxious, or unable to sleep at night because your mind is troubled all the time - these days? To some exte   Social Connections   In a typical week, how many times do you talk on the phone with family, friends, or neighbors? More than 3   How often do you get together with friends or relatives? Once   How often do you attend Cheondoism or Roman Catholic services? 1 to 4   Do you belong to any clubs or organizations such as Cheondoism groups, unions, fraternal or athletic groups, or school groups? No   How often do you attend meetings of the clubs or organizations you belong to? Never   Alcohol Use   Q1: How often do you have a drink containing alcohol? Never   Q2: How many drinks containing alcohol do you have on a typical day when you are drinking? None   Q3: How often do you have six or more drinks on one occasion? Never   Relationship/Environment   Name(s) of Who Lives With Patient Daniella Mendoza(dtr)[phone]

## 2022-09-21 NOTE — PROGRESS NOTES
IP Liaison - Initial Visit Note    Patient: Tong Ford  MRN:  [id number]  Date of Service:  9/21/2022  Completed by:  GILLIAN Jones    Reason for Visit   Patient presents with    IP Liaison Initial Visit       RSW met with patient at bedside in order to complete SDOH questionnaire and liaison assessment.  Pt has identified no social barriers to care. Pt daughter and granddaughter provide transportation for pt. Pt lives in the garage at their shared home and daughter helps pt with all social needs. Per pt, pt is not in need of resources at this time.    The following were addressed during this visit:  - Review SDOH Questions   - Complete patient assessment   - Complete initial visit with patient        Patient Summary     IP Liaison Patient Assessment    General  Level of Caregiver support: Member independent and does not need caregiver assistance  Have you had to make a decision between paying for any of the following in the last 2 months?: None  Transportation means: Family  Employment status: Retired and not working  Assessments  Was the PHQ Depression Screening completed this visit?: No  Was the ELLIOTT-7 Screening completed this visit?: No       GILLIAN Jones

## 2022-09-21 NOTE — ASSESSMENT & PLAN NOTE
Troponin peaked at 1 on admission  No chest pain  Likely demand in setting of ADHF and possible infection   Normal LVEF   PET stress as outpatient once recovered from ADHF

## 2022-09-22 NOTE — ASSESSMENT & PLAN NOTE
Possible LLL pneumonia on imaging  Temp 100.4, WBC 20  -blood cx no growth to date  -cont cefepime and vancomycin; sputum cultures pending, deescalate if negative  -sputum sample  -cont supplemental O2 as needed

## 2022-09-22 NOTE — SUBJECTIVE & OBJECTIVE
Past Medical History:   Diagnosis Date    Arthritis     Atrial fibrillation     Carotid artery occlusion     bilateral    Cataract     CHF (congestive heart failure)     CKD (chronic kidney disease), stage II     Coronary artery disease     3 Vessel CABG AND 3 STENTS    Encounter for blood transfusion     Hypercholesterolemia     Hypertension     Normocytic anemia     Shingles     X 3 WEEKS AGO    Sleep apnea     CESAR - NOT USING C-PAP    Thyroid disease     TIA (transient ischemic attack)        Past Surgical History:   Procedure Laterality Date    A-V CARDIAC PACEMAKER INSERTION N/A 4/1/2021    Procedure: INSERTION, CARDIAC PACEMAKER, DUAL CHAMBER;  Surgeon: Clifford Sevilla MD;  Location: [facility] CATH/EP LAB;  Service: Cardiology;  Laterality: N/A;  [employer]TR[employer]    CORONARY ANGIOPLASTY WITH STENT PLACEMENT      CORONARY ARTERY BYPASS GRAFT      CORONARY ARTERY BYPASS GRAFT (CABG)      3 vessel    EYE SURGERY      bILATERAL CATARACS    INSERTION OF PACEMAKER      REVISION OF IMPLANTABLE CARDIOVERTER-DEFIBRILLATOR (ICD) ELECTRODE LEAD PLACEMENT Left 7/1/2021    Procedure: REVISION, INSERTION, ELECTRODE LEAD,pacemaker;  Surgeon: Clifford Sevilla MD;  Location: [facility] CATH/EP LAB;  Service: Cardiology;  Laterality: Left;    ROBOT-ASSISTED LAPAROSCOPIC REPAIR OF INGUINAL HERNIA Right 3/11/2022    Procedure: ROBOTIC REPAIR, HERNIA, INGUINAL;  Surgeon: Melo Aguilera III, MD;  Location: [facility] OR;  Service: General;  Laterality: Right;  LB case    TREATMENT OF CARDIAC ARRHYTHMIA N/A 1/29/2021    Procedure: CARDIOVERSION;  Surgeon: Iron Serna MD;  Location: [facility] CATH/EP LAB;  Service: Cardiology;  Laterality: N/A;    VASECTOMY         Review of patient's allergies indicates:  No Known Allergies    No current facility-administered medications on file prior to encounter.     Current Outpatient Medications on File Prior to Encounter   Medication Sig    amiodarone (PACERONE) 200 MG Tab Take 1 tablet (200 mg total) by  "mouth once daily.    amLODIPine (NORVASC) 5 MG tablet Take 1 tablet (5 mg total) by mouth every evening.    apixaban (ELIQUIS) 5 mg Tab Take 1 tablet (5 mg total) by mouth 2 (two) times daily.    ascorbic acid, vitamin C, (VITAMIN C) 1000 MG tablet Take 1,000 mg by mouth once daily.    atorvastatin (LIPITOR) 40 MG tablet Take 1 tablet (40 mg total) by mouth once daily.    coQ10, ubiquinol, 100 mg Cap Take 150 mg by mouth once daily.     cyanocobalamin 1,000 mcg/mL injection Inject 1 mL (1,000 mcg total) into the muscle every 30 days.    doxazosin (CARDURA) 8 MG Tab TAKE 1 TABLET DAILY    ezetimibe (ZETIA) 10 mg tablet TAKE 1 TABLET DAILY    furosemide (LASIX) 40 MG tablet Take 1 tablet (40 mg total) by mouth once daily.    gabapentin (NEURONTIN) 600 MG tablet Take 1 tablet (600 mg total) by mouth 2 (two) times daily.    garlic 1,000 mg Cap Take 1 capsule by mouth once daily.     ketoconazole (NIZORAL) 2 % cream Apply topically once daily.    levothyroxine (SYNTHROID) 100 MCG tablet Take 1 tablet (100 mcg total) by mouth before breakfast.    LIDOcaine (LIDODERM) 5 % Place 1 patch onto the skin once daily. Remove & Discard patch within 12 hours or as directed by MD    lisinopriL (PRINIVIL,ZESTRIL) 20 MG tablet Take 1 tablet (20 mg total) by mouth 2 (two) times a day.    metoprolol succinate (TOPROL-XL) 50 MG 24 hr tablet Take 1 tablet (50 mg total) by mouth once daily.    multivitamin Tab Take 1 tablet by mouth once daily. (Patient not taking: No sig reported)    nitroGLYCERIN (NITROSTAT) 0.4 MG SL tablet Place 1 tablet (0.4 mg total) under the tongue every 5 (five) minutes as needed for Chest pain. DO NOT CRUSH OR CHEW; DISSOLVE UNDER TONGUE; MAXIMUM OF 3 DOSES IN 15 MINUTES    spironolactone (ALDACTONE) 25 MG tablet Take 1 tablet (25 mg total) by mouth every other day.    syringe-needle,safety,disp unt 3 mL 22 gauge x 1 1/2" Syrg Please syringe to administer medication deep IM    traMADoL (ULTRAM) 50 mg tablet Take " 1 tablet (50 mg total) by mouth every 6 (six) hours as needed for Pain.    vitamin D (VITAMIN D3) 1000 units Tab Take 1,000 Units by mouth once daily.     Family History       Problem Relation (Age of Onset)    COPD Paternal Grandmother    Cancer Mother, Father, Maternal Grandmother    Hypertension Father          Tobacco Use    Smoking status: Never    Smokeless tobacco: Never   Substance and Sexual Activity    Alcohol use: Not Currently    Drug use: No    Sexual activity: Not on file     Review of Systems   Constitutional: Positive for malaise/fatigue.   HENT: Negative.     Eyes: Negative.    Cardiovascular:  Positive for irregular heartbeat and leg swelling. Negative for chest pain, near-syncope, orthopnea, palpitations, paroxysmal nocturnal dyspnea and syncope.   Respiratory:  Positive for shortness of breath.    Endocrine: Negative.    Hematologic/Lymphatic: Negative.    Gastrointestinal:  Positive for bloating. Negative for nausea and vomiting.   Genitourinary: Negative.    Neurological:  Positive for weakness.   Psychiatric/Behavioral: Negative.     Allergic/Immunologic: Negative.    Objective:     Vital Signs (Most Recent):  Temp: 98.6 °F (37 °C) (09/22/22 0755)  Pulse: 67 (09/22/22 0907)  Resp: 18 (09/22/22 0755)  BP: 134/60 (09/22/22 0755)  SpO2: (!) 90 % (09/22/22 0907)   Vital Signs (24h Range):  Temp:  [98 °F (36.7 °C)-99 °F (37.2 °C)] 98.6 °F (37 °C)  Pulse:  [59-82] 67  Resp:  [18-22] 18  SpO2:  [79 %-99 %] 90 %  BP: (127-151)/(58-70) 134/60     Weight: 91.4 kg (201 lb 8 oz)  Body mass index is 30.64 kg/m².    SpO2: (!) 90 %  O2 Device (Oxygen Therapy): High Flow nasal Cannula      Intake/Output Summary (Last 24 hours) at 9/22/2022 0911  Last data filed at 9/22/2022 0654  Gross per 24 hour   Intake 825 ml   Output 1500 ml   Net -675 ml         Lines/Drains/Airways       Drain  Duration             Male External Urinary Catheter 09/20/22 1545 1 day              Peripheral Intravenous Line  Duration                   Peripheral IV - Single Lumen 09/19/22 1500 18 G Anterior;Left Forearm 2 days                    Physical Exam  Constitutional:       General: He is not in acute distress.     Appearance: He is ill-appearing.   HENT:      Head: Atraumatic.   Eyes:      General:         Right eye: No discharge.         Left eye: No discharge.   Cardiovascular:      Rate and Rhythm: Normal rate and regular rhythm.      Heart sounds: Murmur heard.   Pulmonary:      Breath sounds: Rales present.   Abdominal:      General: Bowel sounds are normal. There is distension.   Musculoskeletal:      Right lower leg: Edema present.      Left lower leg: Edema present.   Skin:     General: Skin is warm and dry.   Neurological:      Mental Status: He is alert. Mental status is at baseline.       Significant Labs: Blood Culture: No results for input(s): LABBLOO in the last 48 hours.  , BMP:   Recent Labs   Lab 09/21/22  0459 09/22/22  0430   GLU 98 125*    141   K 3.9 3.3*    105   CO2 25 25   BUN 77* 71*   CREATININE 3.0* 2.7*   CALCIUM 8.5* 8.6*   MG 2.5 2.4     , CMP   Recent Labs   Lab 09/21/22  0459 09/22/22  0430    141   K 3.9 3.3*    105   CO2 25 25   GLU 98 125*   BUN 77* 71*   CREATININE 3.0* 2.7*   CALCIUM 8.5* 8.6*   ANIONGAP 9 11     , CBC   Recent Labs   Lab 09/21/22  0459 09/22/22  0430   WBC 14.34* 13.17*   HGB 9.3* 9.4*   HCT 26.9* 27.9*    228     , INR No results for input(s): INR, PROTIME in the last 48 hours., Lipid Panel No results for input(s): CHOL, HDL, LDLCALC, TRIG, CHOLHDL in the last 48 hours., Troponin   No results for input(s): TROPONINI in the last 48 hours.  , and All pertinent lab results from the last 24 hours have been reviewed.    Significant Imaging: Echocardiogram: Transthoracic echo (TTE) complete (Cupid Only):   Results for orders placed or performed during the hospital encounter of 09/19/22   Echo   Result Value Ref Range    BSA 2.15 m2    IVC diameter 2.40 cm     Left Ventricular Outflow Tract Mean Velocity 0.69 cm/s    Left Ventricular Outflow Tract Mean Gradient 2.03 mmHg    PV PEAK VELOCITY 0.88 cm/s    LVIDd 5.69 3.5 - 6.0 cm    IVS 0.64 0.6 - 1.1 cm    Posterior Wall 0.84 0.6 - 1.1 cm    LVIDs 3.93 2.1 - 4.0 cm    FS 31 28 - 44 %    LV mass 154.01 g    LA size 5.02 cm    RV S' 0.01 cm/s    Left Ventricle Relative Wall Thickness 0.30 cm    AV mean gradient 16 mmHg    AV valve area 1.55 cm2    AV Velocity Ratio 0.35     AV index (prosthetic) 0.34     MV mean gradient 4 mmHg    MV valve area by continuity eq 1.78 cm2    E/A ratio 1.72     E wave deceleration time 228.37 msec    LVOT diameter 2.40 cm    LVOT area 4.5 cm2    LVOT peak joseph 0.92 m/s    LVOT peak VTI 20.30 cm    Ao peak joseph 2.60 m/s    Ao VTI 59.3 cm    Mr max joseph 5.17 m/s    LVOT stroke volume 91.79 cm3    AV peak gradient 27 mmHg    MV peak gradient 11 mmHg    MV Peak E Joseph 1.65 m/s    AR Max Joseph 3.24 m/s    TR Max Joseph 3.75 m/s    MV VTI 51.5 cm    MV Peak A Joseph 0.96 m/s    LV Systolic Volume 67.09 mL    LV Systolic Volume Index 31.9 mL/m2    LV Diastolic Volume 159.38 mL    LV Diastolic Volume Index 75.90 mL/m2    LV Mass Index 73 g/m2    RA Major Axis 5.98 cm    Left Atrium Minor Axis 6.02 cm    Left Atrium Major Axis 6.28 cm    Triscuspid Valve Regurgitation Peak Gradient 56 mmHg    LA Volume Index (Mod) 36.1 mL/m2    LA volume (mod) 75.88 cm3    Right Atrial Pressure (from IVC) 15 mmHg    EF 55 %    TV rest pulmonary artery pressure 71 mmHg    Narrative    · The left ventricle is normal in size with normal systolic function.  · The estimated ejection fraction is 55%.  · Indeterminate left ventricular diastolic function.  · There is abnormal septal wall motion consistent with right ventricular   pacemaker.  · Normal right ventricular size with normal right ventricular systolic   function.  · Moderate tricuspid regurgitation.  · Mild pulmonic regurgitation.  · Moderate mitral regurgitation.  · There is mild  aortic valve stenosis.  · Aortic valve area is 1.55 cm2; peak velocity is 2.60 m/s; mean gradient   is 16 mmHg.  · Elevated central venous pressure (15 mmHg).  · The estimated PA systolic pressure is 71 mmHg.  · There is pulmonary hypertension.  · Mild left atrial enlargement.

## 2022-09-22 NOTE — PLAN OF CARE
While giving pt a bath, pt O2 sats dropped as low as 77%.  RT @ bedside and placed pt on PRN bipap.  Pt now sating @ 95%. Dr. Solo notified.

## 2022-09-22 NOTE — PROGRESS NOTES
Consult Note  Pulmonary & Critical Care Medicine    Attending: David  Admit Date: 9/19/2022  Today's Date: 09/22/2022  Reason for Consult:  Acute CHF exacerbation requiring lasix gtt    SUBJECTIVE:     Interval History/HPI:  77 y.o. male with history of reported HFpEF (TTE 1/2021 showed no systolic or diastolic dysfunction) s/p pacemaker, moderate aortic stenosis, CAD s/p coronary angioplasty with stent and CABG, TIA, Afib, HTN, HLD, anemia, and hypothyroidism presenting on 9/19 with progressively worsening SOB, weakness, and edema over the last 2 months.     Patient was admitted to the ICU and put on a Lasix gtt. Patient was on Lasix gtt at 5mg/hr x 2hrs, then Lasix 7.5mg/hr x 5hrs before being transitioned to IV Lasix 80mg BID (increased to TID on 9/21). Was net -1L by step down and remained stable on 2L NC throughout ICU stay. Started having increased O2 requirements overnight 9/20-9/21 and eventually was put on 9L HFNC with BiPAP at night. This afternoon patient was on 12L HFNC, but was having difficulty maintaining SpO2 in upper 80s-low 90s and having shortness of breath. Was placed on BiPAP with SpO2 ~92-95%. Patient endorses improvement in shortness of breath on BiPAP. Is currently net -2.4L, with 2.2L UOP over the last 24 hours. Patient denies chest pain, palpitations, subjective fever, chills, sweats. Continues to have pain in his lower extremities and has significant edema. Patient was stepped up to ICU for continued NIPPV and aggressive diuresis.    All medications reviewed.    Review of Systems:  Pertinent items noted in HPI. All other systems reviewed and are negative.    OBJECTIVE:     Vital Signs Trends/Hx Reviewed  Vitals:    09/22/22 1157 09/22/22 1526 09/22/22 1552 09/22/22 1631   BP:       BP Location:       Patient Position:       Pulse: 65 77 71    Resp:       Temp:       TempSrc:       SpO2:  96% (!) 93% (!) 93%   Weight:       Height:         Physical Exam:  General: NAD, cooperative &  interactive.  HEENT: AT/NC, PERRL, EOMI, oral and nasal mucosa moist.   Neck: normal ROM  Cardiac: intermittent tachycardia with rapid correction of rate, irregular rhythm, holosystolic murmur, brisk cap refill and symmetric pulses in distal extremities.  Respiratory: Normal inspection. Symmetric chest rise. Crackles auscultated bilaterally. Moderate shortness of breath on BiPAP 10/5/50% but able to speak in full sentences  Abdomen: Soft, NT/ND. +BS. No hepatosplenomegaly.   Extremities: 3+ pitting LE edema, continuous to torso (with trace pitting), BLE with dressing in place over venous status ulcers. Erythema up just below knee  Neuro: Grossly intact to brief exam. Oriented x3 with appropriate mood/affect to situation.     Laboratory:  Recent Labs   Lab 09/22/22  1624   PH 7.459*   PCO2 38.6   PO2 64*   HCO3 27.4   POCSATURATED 93*   BE 4     Recent Labs   Lab 09/22/22  0430   WBC 13.17*   RBC 2.89*   HGB 9.4*   HCT 27.9*      MCV 97   MCH 32.5*   MCHC 33.7     Recent Labs   Lab 09/22/22  0430      K 3.3*      CO2 25   BUN 71*   CREATININE 2.7*   MG 2.4       Microbiology Data:   Microbiology Results (last 7 days)       Procedure Component Value Units Date/Time    Blood culture [[id number]] Collected: 09/20/22 0013    Order Status: Completed Specimen: Blood Updated: 09/22/22 1212     Blood Culture, Routine No Growth to date      No Growth to date      No Growth to date    Blood culture [[id number]] Collected: 09/20/22 0014    Order Status: Completed Specimen: Blood Updated: 09/22/22 1212     Blood Culture, Routine No Growth to date      No Growth to date      No Growth to date    Culture, Respiratory with Gram Stain [[id number]] Collected: 09/21/22 1542    Order Status: Completed Specimen: Respiratory from Sputum Updated: 09/22/22 0909     Respiratory Culture Normal respiratory subha     Gram Stain (Respiratory) <10 epithelial cells per low power field.     Gram Stain (Respiratory) Rare WBC's      Gram Stain (Respiratory) Rare Gram positive cocci     Gram Stain (Respiratory) Rare Gram negative rods             Chest Imaging:   No new imaging.     Infusions:     furosemide (LASIX) 2 mg/mL continuous infusion (titrating)         Scheduled Medications:    amiodarone  200 mg Oral Daily    apixaban  5 mg Oral BID    atorvastatin  40 mg Oral Daily    ceFEPime (MAXIPIME) IVPB  1 g Intravenous Q12H    doxycycline  100 mg Oral Q12H    famotidine  20 mg Oral Daily    gabapentin  400 mg Oral BID    levothyroxine  100 mcg Oral Before breakfast    metOLazone  5 mg Oral Daily    mupirocin   Nasal BID    polyethylene glycol  17 g Oral Daily    psyllium husk (with sugar)  1 packet Oral Daily       PRN Medications:   acetaminophen, albuterol sulfate, melatonin, ondansetron, sodium chloride 0.9%, sodium chloride 0.9%, Pharmacy to dose Vancomycin consult **AND** vancomycin - pharmacy to dose      ASSESSMENT & RECOMMENDATIONS     Neuro:  - AOx4, no sedation  - No acute issues    #Fall  #Peripheral Neuropathy  - reports bilateral numbness and pain on the soles of his feet with peripheral edema  - lying on floor overnight  - CK 2607 with JAYSON and hyperkalemia at admission, patient fluid overloaded  - CK and hyperkalemia improved, continue to monitor  - macrocytic anemia, in the setting of peripheral neuropathy would check B12 and thiamine level; would also check A1c to evaluate neuropathy further    CV:  #Acute Heart Failure Exacerbation  - significant peripheral and pulmonary edema  - reported history of heart failure after CABG in 1989  - Last echo per EHR (Jan 2021) showed normal systolic and diastolic function (LVEF 60%), moderate aortic valve stenosis  - Bedside US showed good global function without wall motion abnormality  - placed on Lasix gtt at admission; was on Lasix gtt at 5mg/hr x 2hrs, then Lasix 7.5mg/hr x 5hrs before being transitioned to IV Lasix 80mg BID (increased to TID on 9/21), metolazone 5mg added 9/22.  -  Currently net -2.4L since admission with 2.2L UOP over the last 24 hours  - TTE 9/20 with normal LV systolic function (LVEF 55%), indeterminate LV diastolic function, abnormal septal wall motion consistent with RV pacemaker, normal RV function, moderate tricuspid and mitral regurgitation, mild pulmonic regurgitation, mild aortic valve stenosis, pulmonary HTN (71mmHg), elevated CVP (15mmHg), mild L atrial enlargement  - CXR x2 with significant pulmonary edema  - stepped up to ICU for aggressive diuresis in the setting of worsening hypoxemia    #Elevated troponin  - 1.035 -> 0.970  - EKG dual paced rhythm  - likely 2/2 increased demand from volume overload    Pulm:  #Acute Hypoxicemic Respiratory Failure  - initially put on 2L NC and stable with SpO2 in 90s, on   - 2/2 pulmonary edema, continue diuresis and wean O2 as tolerated  - initially put on 2L NC and stable with SpO2 in 90s  - increased O2 requirements overnight 9/20-9/21 and eventually was put on 9L HFNC with BiPAP at night   - 9/22 PM patient was on 12L HFNC, but was having difficulty maintaining SpO2 in upper 80s-low 90s and having shortness of breath; was placed on BiPAP with SpO2 ~92-95%  - CXR x2 with significant pulmonary edema  - ABG 7.459/38.6/64/27.4  - stepped up to ICU for continued NIPPV and aggressive diuresis    #Suspected Pneumonia  - possible LLL pneumonia per CXR, Tmax 100.3, WBC 20 at admission  - on CTX and doxycycline for CAP coverage 9/20-9/22; ceftriaxone broadened to cefepime on 9/21, continue    Renal:  #Acute Kidney Injury on CKD4  -BL Cr 1.5-2, Cr 3.2 at admission, improved to 2.7 this morning  -suspect cardio-renal, would continue diuresis  -continue to monitor BMP    GI/FEN:  #Hyperkalemia, now hypokalemia  - K 5.2 at admission  - administered Lokelma at OSH  - improved with diuresis, now hypokalemic  - continue to monitor and supplement PRN    #Transaminitis  - AST/ALT/Alk Phos 83/46/88, down trend on 9/20  - continue to  trend    F: none  E: hyperkalemia/hypokalemia as stated above  N: Low sodium, fluid restricted diet    Heme/Onc:  #Macrocytic Anemia  - Hgb 9.7 at admission, baseline ~10.5  - Iron studies June 2022: ferritin 293, iron 65, transferrin 159, TIBC 235, iron sat 28%  - likely 2/2 CKD, but in the setting of peripheral neuropathy would check B12, folate, and thiamine level    ID:  #Leukocytosis  #LLL Pneumonia  - WBC 20 at admisson  - Possible LLL per CXR  - respiratory culture ordered  - started on CTX and azithro  - UA unremarkable  - Blood cultures NGTD  - respiratory gram stain with normal respiratory subha, rare WBCs, rare GPC, rare GNR; culture pending    Endo:  #Hypothyroidism  - continue home levothyroxine  - TSH 4.178 at admission, free T4 0.88      Thank you for allowing us to participate in the care of this patient. Patient is stable for discharge. Please call with questions.    Candice Stephens M.D.  [facility], PGY-2  [facility]

## 2022-09-22 NOTE — PLAN OF CARE
"   09/22/22 1623   Post-Acute Status   Post-Acute Authorization Placement   Post-Acute Placement Status Pending medical clearance/testing  ([facility] referrals sent)     Future Appointments   Date Time Provider Department Center   10/11/2022  8:40 AM Clifford Sevilla MD [facility] CARDIO [place]   12/13/2022 10:40 AM Kris Zavala MD [facility] G FA MD [facility] MOB 2     /60 (BP Location: Right arm, Patient Position: Lying)   Pulse 71   Temp 98.4 °F (36.9 °C) (Oral)   Resp 18   Ht 5' 8" (1.727 m)   Wt 91.4 kg (201 lb 8 oz)   SpO2 (!) 93%   BMI 30.64 kg/m²      amiodarone  200 mg Oral Daily    apixaban  5 mg Oral BID    atorvastatin  40 mg Oral Daily    ceFEPime (MAXIPIME) IVPB  1 g Intravenous Q12H    doxycycline  100 mg Oral Q12H    famotidine  20 mg Oral Daily    gabapentin  400 mg Oral BID    levothyroxine  100 mcg Oral Before breakfast    metOLazone  5 mg Oral Daily    mupirocin   Nasal BID    polyethylene glycol  17 g Oral Daily    psyllium husk (with sugar)  1 packet Oral Daily       "

## 2022-09-22 NOTE — PT/OT/SLP PROGRESS
Occupational Therapy      Patient Name:  Tong Ford   MRN:  [id number]    Patient not seen today secondary on hold today due to hypoxia. Pt has regressed each day since OT/PT eval. since 2 days ago. Pt went from 2 LPM to 9 LPM yesterday to 12 LPM today. He was able to stand on eval 2 days ago and now unable to tolerate bed mobility without desatting to 70's. Pt only able to maintain sats on BIPAP. Pt. not appropriate for therapy at this time. Secure chart sent to team and Dr. Solo asking if it is warranted that pt should be on continuous BIPAP at this point? And  to please advise. Will hold therapy at this time.     9/22/2022

## 2022-09-22 NOTE — NURSING
Received report from Rosalie VO. Patient arrived to ICU around 1700. Patient received on wet sheets from external catheter leak. Gave patient bath, changed linen/pads, and placed a van catheter. Patient has expiratory wheezes, BP and HR stable, patient reports none-little discomfort. Patients smartphone and call light near him and within reach. Patients legs edematous, ulcers are dressed with ACE wrap. Will continue to monitor patient on continuous BiPAP.

## 2022-09-22 NOTE — PROGRESS NOTES
09/22/22-Message to  inpatient  SW that patient's plan was to discharge to Rehab. SW will talk to him and daughter about discharge plan.   09/23/22-Continues to be admitted to hospital. Will continue to follow for discharge plan.   10/03/22-Patient was discharged home on 09/28/22 with  [facility]. Will close case and dis-enroll from [facility].

## 2022-09-22 NOTE — PLAN OF CARE
Problem: Adult Inpatient Plan of Care  Goal: Plan of Care Review  Outcome: Ongoing, Progressing  Goal: Patient-Specific Goal (Individualized)  Outcome: Ongoing, Progressing  Goal: Absence of Hospital-Acquired Illness or Injury  Outcome: Ongoing, Progressing  Goal: Optimal Comfort and Wellbeing  Outcome: Ongoing, Progressing  Goal: Readiness for Transition of Care  Outcome: Ongoing, Progressing     Problem: Fluid and Electrolyte Imbalance (Acute Kidney Injury/Impairment)  Goal: Fluid and Electrolyte Balance  Outcome: Ongoing, Progressing     Problem: Renal Function Impairment (Acute Kidney Injury/Impairment)  Goal: Effective Renal Function  Outcome: Ongoing, Progressing     Problem: Impaired Wound Healing  Goal: Optimal Wound Healing  Outcome: Ongoing, Progressing     Problem: Infection  Goal: Absence of Infection Signs and Symptoms  Outcome: Ongoing, Progressing     Problem: Fall Injury Risk  Goal: Absence of Fall and Fall-Related Injury  Outcome: Ongoing, Progressing     Problem: Skin Injury Risk Increased  Goal: Skin Health and Integrity  Outcome: Ongoing, Progressing     Problem: Adjustment to Illness (Sepsis/Septic Shock)  Goal: Optimal Coping  Outcome: Ongoing, Progressing     Problem: Bleeding (Sepsis/Septic Shock)  Goal: Absence of Bleeding  Outcome: Ongoing, Progressing     Problem: Glycemic Control Impaired (Sepsis/Septic Shock)  Goal: Blood Glucose Level Within Desired Range  Outcome: Ongoing, Progressing     Problem: Infection Progression (Sepsis/Septic Shock)  Goal: Absence of Infection Signs and Symptoms  Outcome: Ongoing, Progressing     Problem: Fluid Imbalance (Pneumonia)  Goal: Fluid Balance  Outcome: Ongoing, Progressing     Problem: Infection (Pneumonia)  Goal: Resolution of Infection Signs and Symptoms  Outcome: Ongoing, Progressing     Problem: Respiratory Compromise (Pneumonia)  Goal: Effective Oxygenation and Ventilation  Outcome: Ongoing, Progressing     Problem: Oral Intake Inadequate  (Acute Kidney Injury/Impairment)  Goal: Optimal Nutrition Intake  Outcome: Ongoing, Not Progressing     Problem: Nutrition Impaired (Sepsis/Septic Shock)  Goal: Optimal Nutrition Intake  Outcome: Ongoing, Not Progressing     Patient on continuous BiPAP. Placed a van catheter due to strict I&Os (Lasix). Patient in on Lasix drip, started at 1815. Stable VS, safety precautions maintained. Patient updated on POC. Will report to night shift nurse.

## 2022-09-22 NOTE — PROGRESS NOTES
Pharmacokinetic Initial Assessment: IV Vancomycin    Assessment/Plan:    Initiate intravenous vancomycin with loading dose of 2000 mg once with subsequent doses when random concentrations are less than 20 mcg/mL  Desired empiric serum trough concentration is 15 to 20 mcg/mL  Draw vancomycin random level on 9/23 at 0400.  Pharmacy will continue to follow and monitor vancomycin.      Please contact pharmacy at extension [phone] with any questions regarding this assessment.     Thank you for the consult,   Jacob Gunter       Patient brief summary:  Tong Ford is a 77 y.o. male initiated on antimicrobial therapy with IV Vancomycin for treatment of suspected lower respiratory infection    Drug Allergies:   Review of patient's allergies indicates:  No Known Allergies    Actual Body Weight:   94kg    Renal Function:   Estimated Creatinine Clearance: 24.8 mL/min (A) (based on SCr of 2.7 mg/dL (H)).,     Dialysis Method (if applicable):  N/A    CBC (last 72 hours):  Recent Labs   Lab Result Units 09/19/22  1324 09/20/22  0423 09/21/22  0459 09/22/22  0430   WBC K/uL 20.71* 17.68* 14.34* 13.17*   Hemoglobin g/dL 9.7* 8.9* 9.3* 9.4*   Hematocrit % 28.9* 26.1* 26.9* 27.9*   Platelets K/uL 207 175 193 228   Gran % % 81.0* 80.9* 79.5* 81.7*   Lymph % % 11.0* 10.5* 10.7* 5.9*   Mono % % 7.0 7.2 8.2 10.9   Eosinophil % % 0.0 0.1 1.0 0.7   Basophil % % 0.0 0.1 0.1 0.2   Differential Method  Manual Automated Automated Automated       Metabolic Panel (last 72 hours):  Recent Labs   Lab Result Units 09/19/22  1324 09/19/22  1651 09/19/22  1936 09/19/22  2350 09/20/22  0304 09/20/22  0423 09/20/22  0804 09/21/22  0459 09/22/22  0430   Sodium mmol/L 140  --  138  --   --  141 140 140 141   Potassium mmol/L 5.2*  --  4.9  --   --  4.7 4.5 3.9 3.3*   Chloride mmol/L 107  --  107  --   --  108 106 106 105   CO2 mmol/L 21*  --  19*  --   --  23 24 25 25   Glucose mg/dL 97  --  155*  --   --  104 104 98 125*   Glucose, UA   --  Negative  --    --  Negative  --   --   --   --    BUN mg/dL 76*  --  77*  --   --  79* 77* 77* 71*   Creatinine mg/dL 3.2*  --  3.1*  --   --  3.2* 3.2* 3.0* 2.7*   Albumin g/dL 2.9*  --   --   --   --   --  2.6*  --   --    Total Bilirubin mg/dL 1.2*  --   --   --   --   --  0.8  --   --    Alkaline Phosphatase U/L 88  --   --   --   --   --  71  --   --    AST U/L 83*  --   --   --   --   --  81*  --   --    ALT U/L 46*  --   --   --   --   --  45*  --   --    Magnesium mg/dL  --   --   --  2.3  --  2.5  --  2.5 2.4   Phosphorus mg/dL  --   --   --   --   --  4.6*  --  4.1 3.2       Drug levels (last 3 results):  No results for input(s): VANCOMYCINRA, VANCORANDOM, VANCOMYCINPE, VANCOPEAK, VANCOMYCINTR, VANCOTROUGH in the last 72 hours.    Microbiologic Results:  Microbiology Results (last 7 days)       Procedure Component Value Units Date/Time    Culture, Respiratory with Gram Stain [[id number]] Collected: 09/21/22 1542    Order Status: Completed Specimen: Respiratory from Sputum Updated: 09/21/22 2139     Gram Stain (Respiratory) <10 epithelial cells per low power field.     Gram Stain (Respiratory) Rare WBC's     Gram Stain (Respiratory) Rare Gram positive cocci     Gram Stain (Respiratory) Rare Gram negative rods    Blood culture [[id number]] Collected: 09/20/22 0014    Order Status: Completed Specimen: Blood Updated: 09/21/22 1212     Blood Culture, Routine No Growth to date      No Growth to date    Blood culture [[id number]] Collected: 09/20/22 0013    Order Status: Completed Specimen: Blood Updated: 09/21/22 1212     Blood Culture, Routine No Growth to date      No Growth to date

## 2022-09-22 NOTE — TELEPHONE ENCOUNTER
----- Message from Leanna Alejo sent at 9/22/2022  2:38 PM CDT -----  Regarding: HFU  Hello, patient is preparing for discharge from [facility] and is requiring a hospital follow up appointment with Dr. Sevilla within 14 days. I'm unable to schedule an appointment within that time frame. If possible, can you please assist with scheduling this patient and message me back?      DX: Acute on chronic diastolic congestive heart failure      Additional Info: Pt currently has a future appointment scheduled with Dr. Sevilla  on 10/11/22. Per discharge nurse, would it be possible to have this appointment scheduled sooner for a hospital follow up appointment?       Thank you,     Leanna Alejo  Access Navigator/ [facility]

## 2022-09-22 NOTE — PLAN OF CARE
"   09/22/22 1252   Post-Acute Status   Post-Acute Authorization Placement   Post-Acute Placement Status Pending medical clearance/testing     Awaiting medical stability for DC to IPR. Updated clinical sent.      Follow-up Information       [facility]. Go on 9/26/2022.    Why: REHAB  Contact information:  [address], [address] [facility] [address]  [phone]             Kris Zavala MD. Go in 2 week(s).    Specialty: Family Medicine  Why: FOLLOW UP WITH PCP  Contact information:  [address] [facility]  [address]  [facility] [address]  [phone]               [facility]. Go in 2 week(s).    Specialty: Cardiology  Why: FOLLOW UP WITH CARDIOLOGIST AFTER DISCHARGE, FOLLOW UP AFTER FACILITY STAY  Contact information:  [address] [facility] [address]                         Future Appointments   Date Time Provider Department Center   10/11/2022  8:40 AM Clifford Sevilla MD [facility] CARDIO [place]   12/13/2022 10:40 AM Kris Zavala MD [facility] G FA MD [facility] MOB 2     /60 (BP Location: Right arm, Patient Position: Lying)   Pulse 65   Temp 98.4 °F (36.9 °C) (Oral)   Resp 18   Ht 5' 8" (1.727 m)   Wt 91.4 kg (201 lb 8 oz)   SpO2 (!) 90%   BMI 30.64 kg/m²      amiodarone  200 mg Oral Daily    apixaban  5 mg Oral BID    atorvastatin  40 mg Oral Daily    ceFEPime (MAXIPIME) IVPB  1 g Intravenous Q12H    doxycycline  100 mg Oral Q12H    famotidine  20 mg Oral Daily    furosemide (LASIX) injection  80 mg Intravenous TID    gabapentin  400 mg Oral BID    levothyroxine  100 mcg Oral Before breakfast    metOLazone  5 mg Oral Daily    mupirocin   Nasal BID    polyethylene glycol  17 g Oral Daily    psyllium husk (with sugar)  1 packet Oral Daily       "

## 2022-09-22 NOTE — PROGRESS NOTES
RAPID RESPONSE NURSE PROACTIVE ROUNDING NOTE       Time of Visit: 1530    Admit Date: 2022  LOS: 3  Code Status: Full Code   Date of Visit: 2022  : 1945  Age: 77 y.o.  Sex: male  Race: White  Bed: K565/K565 A:   MRN: [id number]  Was the patient discharged from an ICU this admission? Yes   Was the patient discharged from a PACU within last 24 hours? No   Did the patient receive conscious sedation/general anesthesia in last 24 hours? No   Was the patient in the ED within the past 24 hours? No   Was the patient on NIPPV within the past 24 hours? Yes   Attending Physician: Adolfo Solo, *  Primary Service: Hospitalist   Time spent at the bedside: < 15 min    SITUATION    Notified by  Resp therapist  Reason for alert: Increasing O2 requirements    Diagnosis: Acute on chronic diastolic congestive heart failure   has a past medical history of Arthritis, Atrial fibrillation, Carotid artery occlusion, Cataract, CHF (congestive heart failure), CKD (chronic kidney disease), stage II, Coronary artery disease, Encounter for blood transfusion, Hypercholesterolemia, Hypertension, Normocytic anemia, Shingles, Sleep apnea, Thyroid disease, and TIA (transient ischemic attack).    Last Vitals:  Temp: 100.9 °F (38.3 °C) (1700)  Pulse: 83 (1700)  Resp: 31 (1700)  BP: 175/99 (1700)  SpO2: 89 % (1700)    24 Hour Vitals Range:  Temp:  [98 °F (36.7 °C)-100.9 °F (38.3 °C)]   Pulse:  [63-83]   Resp:  [18-31]   BP: (127-175)/(59-99)   SpO2:  [86 %-99 %]     Clinical Issues: Respiratory    ASSESSMENT/INTERVENTIONS    Pt on bipap. Pt states he was SOB which improved with bipap. Per resp therapist pt O2 requirements increased form 2L  nc to 15 L high flow and then to bipap with Fio2 of 60%. VSS. Pulm crit team at bedside.      RECOMMENDATIONS  Transfer to ICU for continuous bipap and closer monitoring.    Discussed plan of care with charge Moriah VO    PROVIDER ESCALATION    Physician  escalation: Yes    Orders received and case discussed with Dr. Stephens .    Disposition:Tx in ICU bed 565    FOLLOW UP    Call back the Rapid Response NurseNimo at [phone] for additional questions or concerns.

## 2022-09-22 NOTE — ASSESSMENT & PLAN NOTE
TTE   The left ventricle is normal in size with normal systolic function.   The estimated ejection fraction is 55%.   Indeterminate left ventricular diastolic function.   There is abnormal septal wall motion consistent with right ventricular pacemaker.   Normal right ventricular size with normal right ventricular systolic function.   Moderate tricuspid regurgitation.   Mild pulmonic regurgitation.   Moderate mitral regurgitation.   There is mild aortic valve stenosis.   Aortic valve area is 1.55 cm2; peak velocity is 2.60 m/s; mean gradient is 16 mmHg.   Elevated central venous pressure (15 mmHg).   The estimated PA systolic pressure is 71 mmHg.   There is pulmonary hypertension.   Mild left atrial enlargement.        Recent Labs   Lab 09/19/22  1324   BNP 1,795*   .  ACEI, Aldactone on hold 2/2 JAYSON  Diuresing with Lasix 80 mg IV TID, net -3 L thus far  Overloaded on exam  Daily weights, accurate intake and output

## 2022-09-22 NOTE — SUBJECTIVE & OBJECTIVE
Interval History:  Worsening shortness of Breath yesterday briefly requiring use of BiPAP; now improving today.  States that he feels his breathing is slowly improving.  Reports good urine output yesterday, although amount recorded is relatively low.  No fevers or chills.    Review of Systems   Constitutional:  Negative for fever.   Respiratory:  Positive for shortness of breath.    Cardiovascular:  Positive for leg swelling. Negative for chest pain.   Musculoskeletal:  Positive for arthralgias.   Skin:  Positive for rash and wound.   Neurological:  Positive for weakness.   Objective:     Vital Signs (Most Recent):  Temp: 98.6 °F (37 °C) (09/22/22 0755)  Pulse: 67 (09/22/22 0907)  Resp: 18 (09/22/22 0755)  BP: 134/60 (09/22/22 0755)  SpO2: (!) 90 % (09/22/22 0907)   Vital Signs (24h Range):  Temp:  [98 °F (36.7 °C)-99 °F (37.2 °C)] 98.6 °F (37 °C)  Pulse:  [59-82] 67  Resp:  [18-22] 18  SpO2:  [79 %-99 %] 90 %  BP: (127-151)/(58-70) 134/60     Weight: 91.4 kg (201 lb 8 oz)  Body mass index is 30.64 kg/m².    Intake/Output Summary (Last 24 hours) at 9/22/2022 1037  Last data filed at 9/22/2022 0654  Gross per 24 hour   Intake 700 ml   Output 1000 ml   Net -300 ml        Physical Exam  Vitals and nursing note reviewed.   Constitutional:       General: He is not in acute distress.     Appearance: He is obese.   HENT:      Head: Normocephalic and atraumatic.      Nose: Nose normal.      Mouth/Throat:      Mouth: Mucous membranes are moist.   Eyes:      Pupils: Pupils are equal, round, and reactive to light.   Cardiovascular:      Rate and Rhythm: Normal rate and regular rhythm.      Pulses: Normal pulses.      Heart sounds: Murmur heard.   Pulmonary:      Effort: Pulmonary effort is normal. No respiratory distress.      Breath sounds: No rales.      Comments: On supplemental O2 via nasal cannula  Abdominal:      General: Bowel sounds are normal.      Palpations: Abdomen is soft.   Musculoskeletal:         General:  Swelling present. Normal range of motion.      Cervical back: Normal range of motion.      Right lower leg: Edema present.      Left lower leg: Edema present.   Skin:     General: Skin is warm and dry.      Findings: Erythema and lesion present.   Neurological:      Motor: Weakness present.      Comments: Unable to assess   Psychiatric:      Comments: Unable to assess       Significant Labs: All pertinent labs within the past 24 hours have been reviewed.    Significant Imaging: I have reviewed all pertinent imaging results/findings within the past 24 hours.

## 2022-09-22 NOTE — PROGRESS NOTES
"[facility] Medicine  Progress Note    Patient Name: Tong Ford  MRN: [id number]  Patient Class: IP- Inpatient   Admission Date: 9/19/2022  Length of Stay: 3 days  Attending Physician: Adolfo Solo, *  Primary Care Provider: Kris Zavala MD        Subjective:     Principal Problem:Acute on chronic diastolic congestive heart failure        HPI:  Per transfer note:  "Patient is a 77 y.o. male who has a past medical history of arthritis, HTN, AFIB, HLD, TIA, CAD, CHF, hypothyroidism, normocytic anemia and PSHx of CABG, coronary angioplasty with stent, and pacemaker presenting today for shortness of breath, weakness and swelling. ED workup significant for elevated troponin, worsening JAYSON, elevated BNP, leukocytosis, hyperkalemia, elevated CPK, hypoxia requiring O2 supplementation, chest x-ray with increased bilateral asymmetric airspace disease consistent with CHF/volume overload and possible LLL pneumonia. See below labs and imaging. Patient started on lasix drip, given lokelma, and IV Rocephin/Az. No ICU beds available at this facility therefore pt will need to transfer for higher level of care. Stable for transfer."    On arrival to [place], the patient was continued on lasix drip. He is sleeping and unable to answer any questions. Per chart review, he endorsed orthopnea, worsening GOODWIN. He was told by his cardiologist to report to the ED to be admitted for IV lasix. He stated that having the extra fluid on his legs and scrotal area made it difficult to walk and he fell at home last night on concrete, denies hitting head. He was on the floor for several hours before being found. He was taking 60mg of lasix daily and lionel, reported adequate urination. Pt also has chronic bilat LE venous statis ulcers and he follows with wound care. He had BNP 1795, troponin 1.03 with downtrend, creat 3.1, K 5.2, CK 2600. a temperature of 100.2, WBC 20 at sending facility. He was given lokelma, rocephin and " azithromycin, and started on lasix drip. Prior echo with normal EF. He is on 2LNC with adequate O2 sat on arrival. Legs with stasis ulcers, right leg with redness noted.           Overview/Hospital Course:  No notes on file    Interval History:  Worsening shortness of Breath yesterday briefly requiring use of BiPAP; now improving today.  States that he feels his breathing is slowly improving.  Reports good urine output yesterday, although amount recorded is relatively low.  No fevers or chills.    Review of Systems   Constitutional:  Negative for fever.   Respiratory:  Positive for shortness of breath.    Cardiovascular:  Positive for leg swelling. Negative for chest pain.   Musculoskeletal:  Positive for arthralgias.   Skin:  Positive for rash and wound.   Neurological:  Positive for weakness.   Objective:     Vital Signs (Most Recent):  Temp: 98.6 °F (37 °C) (09/22/22 0755)  Pulse: 67 (09/22/22 0907)  Resp: 18 (09/22/22 0755)  BP: 134/60 (09/22/22 0755)  SpO2: (!) 90 % (09/22/22 0907)   Vital Signs (24h Range):  Temp:  [98 °F (36.7 °C)-99 °F (37.2 °C)] 98.6 °F (37 °C)  Pulse:  [59-82] 67  Resp:  [18-22] 18  SpO2:  [79 %-99 %] 90 %  BP: (127-151)/(58-70) 134/60     Weight: 91.4 kg (201 lb 8 oz)  Body mass index is 30.64 kg/m².    Intake/Output Summary (Last 24 hours) at 9/22/2022 1037  Last data filed at 9/22/2022 0654  Gross per 24 hour   Intake 700 ml   Output 1000 ml   Net -300 ml        Physical Exam  Vitals and nursing note reviewed.   Constitutional:       General: He is not in acute distress.     Appearance: He is obese.   HENT:      Head: Normocephalic and atraumatic.      Nose: Nose normal.      Mouth/Throat:      Mouth: Mucous membranes are moist.   Eyes:      Pupils: Pupils are equal, round, and reactive to light.   Cardiovascular:      Rate and Rhythm: Normal rate and regular rhythm.      Pulses: Normal pulses.      Heart sounds: Murmur heard.   Pulmonary:      Effort: Pulmonary effort is normal. No  respiratory distress.      Breath sounds: No rales.      Comments: On supplemental O2 via nasal cannula  Abdominal:      General: Bowel sounds are normal.      Palpations: Abdomen is soft.   Musculoskeletal:         General: Swelling present. Normal range of motion.      Cervical back: Normal range of motion.      Right lower leg: Edema present.      Left lower leg: Edema present.   Skin:     General: Skin is warm and dry.      Findings: Erythema and lesion present.   Neurological:      Motor: Weakness present.      Comments: Unable to assess   Psychiatric:      Comments: Unable to assess       Significant Labs: All pertinent labs within the past 24 hours have been reviewed.    Significant Imaging: I have reviewed all pertinent imaging results/findings within the past 24 hours.      Assessment/Plan:      * Acute on chronic diastolic congestive heart failure  -initially on lasix drip; now transitioned IV pushes of 80 mg t.i.d.; add metolazone today  -monitor electrolytes  -echo with EF 55%, moderate mitral regurg, elevated CVP 15 mm Hg  -consult cardiology  -fluid restriction  -hold ACE and BB for now given soft BP      Neuropathy, peripheral, idiopathic  -resume gabapentin      Debility  -PT/OT consult; will likely require placement      Acute hypoxemic respiratory failure  In setting of volume overload and possible pneumonia  -cont supplemental O2 as needed, wean as tolerated--now on 9LNC        Sepsis  Meets sepsis criteria for WBC, temp, hypoxia  -treating for possible pneumonia and cellulitis  -broadened to cefepime and vancomycin; can deescalate if respiratory cultures and blood cultures negative          Rhabdomyolysis  Fell and laid on floor for hours  CPK 2607 with JAYSON  -unable to give fluids due to volume overload  -repeat CK down trending  -consult nephrology as needed      Pneumonia  Possible LLL pneumonia on imaging  Temp 100.4, WBC 20  -blood cx no growth to date  -cont cefepime and vancomycin; sputum  cultures pending, deescalate if negative  -sputum sample  -cont supplemental O2 as needed        Venous stasis ulcer  BLE with ulcers  -right leg with redness   -on rocephin and doxy  -consult wound care  -arterial ultrasound      Elevated troponin  In setting of volume overload; likely secondary to demand  -troponin 1.03--0.9  -plan for outpatient PET stress test  -cardiology consulted  -echo as above      Coronary artery disease  Hx of CABG and stent  -no antiplatelet on home med list  -cont statin  -hold BB for low BP      Paroxysmal atrial fibrillation  -cont amiodarone and eliquis  -hold BB for now      Acute renal failure superimposed on stage 3 chronic kidney disease  -possibly cardiorenal  -diuresing  -monitor labs  -renally dose medications  -baseline creatinine around 2.5    Thyroid disease  -cont synthroid   -TSH mildly elevated with normal T4      Essential hypertension  BP soft on admission  -hold home meds for now and resume as tolerated      Obstructive sleep apnea  -CPAP at night         VTE Risk Mitigation (From admission, onward)         Ordered     apixaban tablet 5 mg  2 times daily         09/20/22 0224     IP VTE HIGH RISK PATIENT  Once         09/19/22 2343     Place sequential compression device  Until discontinued         09/19/22 2343                Discharge Planning   YUNIEL:      Code Status: Full Code   Is the patient medically ready for discharge?:     Reason for patient still in hospital (select all that apply): Patient trending condition and Treatment  Discharge Plan A: Rehab   Discharge Delays: None known at this time      35 minutes of critical care time was spent personally by me on the following activities: development of treatment plan with patient or surrogate and bedside caregivers, discussions with consultants, evaluation of patient's response to treatment, examination of patient, ordering and performing treatments and interventions, ordering and review of laboratory studies,  ordering and review of radiographic studies, pulse oximetry, re-evaluation of patient's condition. This critical care time did not overlap with that of any other provider or involve time for any procedures.          Adolfo Solo MD  Department of Hospital Medicine   [facility]

## 2022-09-22 NOTE — PROGRESS NOTES
[facility] - Telemetry  Cardiology  Progress Note    Patient Name: Tong Ford  MRN: [id number]  Admission Date: 9/19/2022  Hospital Length of Stay: 3 days  Code Status: Full Code   Attending Physician: Adolfo Solo, *   Primary Care Physician: Kris Zavala MD  Expected Discharge Date: 9/23/2022  Principal Problem:Acute on chronic diastolic congestive heart failure    Subjective:     Hospital Course:   09/20/2022 Per HPI   09/21/2022 TTE   The left ventricle is normal in size with normal systolic function.   The estimated ejection fraction is 55%.   Indeterminate left ventricular diastolic function.   There is abnormal septal wall motion consistent with right ventricular pacemaker.   Normal right ventricular size with normal right ventricular systolic function.   Moderate tricuspid regurgitation.   Mild pulmonic regurgitation.   Moderate mitral regurgitation.   There is mild aortic valve stenosis.   Aortic valve area is 1.55 cm2; peak velocity is 2.60 m/s; mean gradient is 16 mmHg.   Elevated central venous pressure (15 mmHg).   The estimated PA systolic pressure is 71 mmHg.   There is pulmonary hypertension.   Mild left atrial enlargement.    774 cc intake, 2160 cc output, net -2.4L from admission. Cr stable at 3. O2 requirements increased overnight to 9 L NC. Remains SOB with edema.   09/22/2022 825 cc intake and 1500 cc output. Diuresing with Lasix 80 mg IV TID and Metolazone. Cr improving (2.7 today). Remains on 9L NC with intermittent BiPAP. Patient feels SOB is improving.       Past Medical History:   Diagnosis Date    Arthritis     Atrial fibrillation     Carotid artery occlusion     bilateral    Cataract     CHF (congestive heart failure)     CKD (chronic kidney disease), stage II     Coronary artery disease     3 Vessel CABG AND 3 STENTS    Encounter for blood transfusion     Hypercholesterolemia     Hypertension     Normocytic anemia     Shingles     X 3 WEEKS AGO    Sleep  apnea     CESAR - NOT USING C-PAP    Thyroid disease     TIA (transient ischemic attack)        Past Surgical History:   Procedure Laterality Date    A-V CARDIAC PACEMAKER INSERTION N/A 4/1/2021    Procedure: INSERTION, CARDIAC PACEMAKER, DUAL CHAMBER;  Surgeon: Clifford Sevilla MD;  Location: [facility] CATH/EP LAB;  Service: Cardiology;  Laterality: N/A;  [employer]    CORONARY ANGIOPLASTY WITH STENT PLACEMENT      CORONARY ARTERY BYPASS GRAFT      CORONARY ARTERY BYPASS GRAFT (CABG)      3 vessel    EYE SURGERY      bILATERAL CATARACS    INSERTION OF PACEMAKER      REVISION OF IMPLANTABLE CARDIOVERTER-DEFIBRILLATOR (ICD) ELECTRODE LEAD PLACEMENT Left 7/1/2021    Procedure: REVISION, INSERTION, ELECTRODE LEAD,pacemaker;  Surgeon: Clifford Sevilla MD;  Location: [facility] CATH/EP LAB;  Service: Cardiology;  Laterality: Left;    ROBOT-ASSISTED LAPAROSCOPIC REPAIR OF INGUINAL HERNIA Right 3/11/2022    Procedure: ROBOTIC REPAIR, HERNIA, INGUINAL;  Surgeon: Melo Aguilera III, MD;  Location: [facility] OR;  Service: General;  Laterality: Right;  LB case    TREATMENT OF CARDIAC ARRHYTHMIA N/A 1/29/2021    Procedure: CARDIOVERSION;  Surgeon: Iron Serna MD;  Location: [facility] CATH/EP LAB;  Service: Cardiology;  Laterality: N/A;    VASECTOMY         Review of patient's allergies indicates:  No Known Allergies    No current facility-administered medications on file prior to encounter.     Current Outpatient Medications on File Prior to Encounter   Medication Sig    amiodarone (PACERONE) 200 MG Tab Take 1 tablet (200 mg total) by mouth once daily.    amLODIPine (NORVASC) 5 MG tablet Take 1 tablet (5 mg total) by mouth every evening.    apixaban (ELIQUIS) 5 mg Tab Take 1 tablet (5 mg total) by mouth 2 (two) times daily.    ascorbic acid, vitamin C, (VITAMIN C) 1000 MG tablet Take 1,000 mg by mouth once daily.    atorvastatin (LIPITOR) 40 MG tablet Take 1 tablet (40 mg total) by mouth once daily.    coQ10,  "ubiquinol, 100 mg Cap Take 150 mg by mouth once daily.     cyanocobalamin 1,000 mcg/mL injection Inject 1 mL (1,000 mcg total) into the muscle every 30 days.    doxazosin (CARDURA) 8 MG Tab TAKE 1 TABLET DAILY    ezetimibe (ZETIA) 10 mg tablet TAKE 1 TABLET DAILY    furosemide (LASIX) 40 MG tablet Take 1 tablet (40 mg total) by mouth once daily.    gabapentin (NEURONTIN) 600 MG tablet Take 1 tablet (600 mg total) by mouth 2 (two) times daily.    garlic 1,000 mg Cap Take 1 capsule by mouth once daily.     ketoconazole (NIZORAL) 2 % cream Apply topically once daily.    levothyroxine (SYNTHROID) 100 MCG tablet Take 1 tablet (100 mcg total) by mouth before breakfast.    LIDOcaine (LIDODERM) 5 % Place 1 patch onto the skin once daily. Remove & Discard patch within 12 hours or as directed by MD    lisinopriL (PRINIVIL,ZESTRIL) 20 MG tablet Take 1 tablet (20 mg total) by mouth 2 (two) times a day.    metoprolol succinate (TOPROL-XL) 50 MG 24 hr tablet Take 1 tablet (50 mg total) by mouth once daily.    multivitamin Tab Take 1 tablet by mouth once daily. (Patient not taking: No sig reported)    nitroGLYCERIN (NITROSTAT) 0.4 MG SL tablet Place 1 tablet (0.4 mg total) under the tongue every 5 (five) minutes as needed for Chest pain. DO NOT CRUSH OR CHEW; DISSOLVE UNDER TONGUE; MAXIMUM OF 3 DOSES IN 15 MINUTES    spironolactone (ALDACTONE) 25 MG tablet Take 1 tablet (25 mg total) by mouth every other day.    syringe-needle,safety,disp unt 3 mL 22 gauge x 1 1/2" Syrg Please syringe to administer medication deep IM    traMADoL (ULTRAM) 50 mg tablet Take 1 tablet (50 mg total) by mouth every 6 (six) hours as needed for Pain.    vitamin D (VITAMIN D3) 1000 units Tab Take 1,000 Units by mouth once daily.     Family History       Problem Relation (Age of Onset)    COPD Paternal Grandmother    Cancer Mother, Father, Maternal Grandmother    Hypertension Father          Tobacco Use    Smoking status: Never    " Smokeless tobacco: Never   Substance and Sexual Activity    Alcohol use: Not Currently    Drug use: No    Sexual activity: Not on file     Review of Systems   Constitutional: Positive for malaise/fatigue.   HENT: Negative.     Eyes: Negative.    Cardiovascular:  Positive for irregular heartbeat and leg swelling. Negative for chest pain, near-syncope, orthopnea, palpitations, paroxysmal nocturnal dyspnea and syncope.   Respiratory:  Positive for shortness of breath.    Endocrine: Negative.    Hematologic/Lymphatic: Negative.    Gastrointestinal:  Positive for bloating. Negative for nausea and vomiting.   Genitourinary: Negative.    Neurological:  Positive for weakness.   Psychiatric/Behavioral: Negative.     Allergic/Immunologic: Negative.    Objective:     Vital Signs (Most Recent):  Temp: 98.6 °F (37 °C) (09/22/22 0755)  Pulse: 67 (09/22/22 0907)  Resp: 18 (09/22/22 0755)  BP: 134/60 (09/22/22 0755)  SpO2: (!) 90 % (09/22/22 0907)   Vital Signs (24h Range):  Temp:  [98 °F (36.7 °C)-99 °F (37.2 °C)] 98.6 °F (37 °C)  Pulse:  [59-82] 67  Resp:  [18-22] 18  SpO2:  [79 %-99 %] 90 %  BP: (127-151)/(58-70) 134/60     Weight: 91.4 kg (201 lb 8 oz)  Body mass index is 30.64 kg/m².    SpO2: (!) 90 %  O2 Device (Oxygen Therapy): High Flow nasal Cannula      Intake/Output Summary (Last 24 hours) at 9/22/2022 0911  Last data filed at 9/22/2022 0654  Gross per 24 hour   Intake 825 ml   Output 1500 ml   Net -675 ml         Lines/Drains/Airways       Drain  Duration             Male External Urinary Catheter 09/20/22 1545 1 day              Peripheral Intravenous Line  Duration                  Peripheral IV - Single Lumen 09/19/22 1500 18 G Anterior;Left Forearm 2 days                    Physical Exam  Constitutional:       General: He is not in acute distress.     Appearance: He is ill-appearing.   HENT:      Head: Atraumatic.   Eyes:      General:         Right eye: No discharge.         Left eye: No discharge.    Cardiovascular:      Rate and Rhythm: Normal rate and regular rhythm.      Heart sounds: Murmur heard.   Pulmonary:      Breath sounds: Rales present.   Abdominal:      General: Bowel sounds are normal. There is distension.   Musculoskeletal:      Right lower leg: Edema present.      Left lower leg: Edema present.   Skin:     General: Skin is warm and dry.   Neurological:      Mental Status: He is alert. Mental status is at baseline.       Significant Labs: Blood Culture: No results for input(s): LABBLOO in the last 48 hours.  , BMP:   Recent Labs   Lab 09/21/22  0459 09/22/22  0430   GLU 98 125*    141   K 3.9 3.3*    105   CO2 25 25   BUN 77* 71*   CREATININE 3.0* 2.7*   CALCIUM 8.5* 8.6*   MG 2.5 2.4     , CMP   Recent Labs   Lab 09/21/22 0459 09/22/22  0430    141   K 3.9 3.3*    105   CO2 25 25   GLU 98 125*   BUN 77* 71*   CREATININE 3.0* 2.7*   CALCIUM 8.5* 8.6*   ANIONGAP 9 11     , CBC   Recent Labs   Lab 09/21/22 0459 09/22/22  0430   WBC 14.34* 13.17*   HGB 9.3* 9.4*   HCT 26.9* 27.9*    228     , INR No results for input(s): INR, PROTIME in the last 48 hours., Lipid Panel No results for input(s): CHOL, HDL, LDLCALC, TRIG, CHOLHDL in the last 48 hours., Troponin   No results for input(s): TROPONINI in the last 48 hours.  , and All pertinent lab results from the last 24 hours have been reviewed.    Significant Imaging: Echocardiogram: Transthoracic echo (TTE) complete (C[facility] Only):   Results for orders placed or performed during the hospital encounter of 09/19/22   Echo   Result Value Ref Range    BSA 2.15 m2    IVC diameter 2.40 cm    Left Ventricular Outflow Tract Mean Velocity 0.69 cm/s    Left Ventricular Outflow Tract Mean Gradient 2.03 mmHg    PV PEAK VELOCITY 0.88 cm/s    LVIDd 5.69 3.5 - 6.0 cm    IVS 0.64 0.6 - 1.1 cm    Posterior Wall 0.84 0.6 - 1.1 cm    LVIDs 3.93 2.1 - 4.0 cm    FS 31 28 - 44 %    LV mass 154.01 g    LA size 5.02 cm    RV S' 0.01 cm/s    Left  Ventricle Relative Wall Thickness 0.30 cm    AV mean gradient 16 mmHg    AV valve area 1.55 cm2    AV Velocity Ratio 0.35     AV index (prosthetic) 0.34     MV mean gradient 4 mmHg    MV valve area by continuity eq 1.78 cm2    E/A ratio 1.72     E wave deceleration time 228.37 msec    LVOT diameter 2.40 cm    LVOT area 4.5 cm2    LVOT peak joseph 0.92 m/s    LVOT peak VTI 20.30 cm    Ao peak joseph 2.60 m/s    Ao VTI 59.3 cm    Mr max joseph 5.17 m/s    LVOT stroke volume 91.79 cm3    AV peak gradient 27 mmHg    MV peak gradient 11 mmHg    MV Peak E Joseph 1.65 m/s    AR Max Joseph 3.24 m/s    TR Max Joseph 3.75 m/s    MV VTI 51.5 cm    MV Peak A Joseph 0.96 m/s    LV Systolic Volume 67.09 mL    LV Systolic Volume Index 31.9 mL/m2    LV Diastolic Volume 159.38 mL    LV Diastolic Volume Index 75.90 mL/m2    LV Mass Index 73 g/m2    RA Major Axis 5.98 cm    Left Atrium Minor Axis 6.02 cm    Left Atrium Major Axis 6.28 cm    Triscuspid Valve Regurgitation Peak Gradient 56 mmHg    LA Volume Index (Mod) 36.1 mL/m2    LA volume (mod) 75.88 cm3    Right Atrial Pressure (from IVC) 15 mmHg    EF 55 %    TV rest pulmonary artery pressure 71 mmHg    Narrative    · The left ventricle is normal in size with normal systolic function.  · The estimated ejection fraction is 55%.  · Indeterminate left ventricular diastolic function.  · There is abnormal septal wall motion consistent with right ventricular   pacemaker.  · Normal right ventricular size with normal right ventricular systolic   function.  · Moderate tricuspid regurgitation.  · Mild pulmonic regurgitation.  · Moderate mitral regurgitation.  · There is mild aortic valve stenosis.  · Aortic valve area is 1.55 cm2; peak velocity is 2.60 m/s; mean gradient   is 16 mmHg.  · Elevated central venous pressure (15 mmHg).  · The estimated PA systolic pressure is 71 mmHg.  · There is pulmonary hypertension.  · Mild left atrial enlargement.        Assessment and Plan:     Brief HPI: Patient seen this  morning on rounds with reports of good UO on IV Lasix, SOB improving but continues to have high O2 requirements.     * Acute on chronic diastolic congestive heart failure   TTE   The left ventricle is normal in size with normal systolic function.   The estimated ejection fraction is 55%.   Indeterminate left ventricular diastolic function.   There is abnormal septal wall motion consistent with right ventricular pacemaker.   Normal right ventricular size with normal right ventricular systolic function.   Moderate tricuspid regurgitation.   Mild pulmonic regurgitation.   Moderate mitral regurgitation.   There is mild aortic valve stenosis.   Aortic valve area is 1.55 cm2; peak velocity is 2.60 m/s; mean gradient is 16 mmHg.   Elevated central venous pressure (15 mmHg).   The estimated PA systolic pressure is 71 mmHg.   There is pulmonary hypertension.   Mild left atrial enlargement.        Recent Labs   Lab 09/19/22  1324   BNP 1,795*   .  ACEI, Aldactone on hold 2/2 JAYSON  Diuresing with Lasix 80 mg IV TID, net -3 L thus far  Overloaded on exam  Daily weights, accurate intake and output      Rhabdomyolysis  CPK 2600 on admission, down to 1200 Tuesday      Venous stasis ulcer  WOC on board  Venous wounds with aspect of PAD per arterial US  Will defer AO to outpatient setting given severity of ADHF +/- PNA  Medical management with statin, no asa given hx of GIB (on Eliquis). No ACEI.ARB 2/2 JAYSON on CKD     Elevated troponin  Troponin peaked at 1 on admission  No chest pain  Likely demand in setting of ADHF and possible infection   Normal LVEF   PET stress as outpatient once recovered from ADHF      Coronary artery disease  S/p CABG in 1989 with subsequent PCI in 2005  No chest pain  EKG paced  Troponin peaked on admission at 1  Not on asa or BB per primary cardiologist - will defer initiation to them  Continue statin     TTE   The left ventricle is normal in size with normal systolic function.   The  estimated ejection fraction is 55%.   Indeterminate left ventricular diastolic function.   There is abnormal septal wall motion consistent with right ventricular pacemaker.   Normal right ventricular size with normal right ventricular systolic function.   Moderate tricuspid regurgitation.   Mild pulmonic regurgitation.   Moderate mitral regurgitation.   There is mild aortic valve stenosis.   Aortic valve area is 1.55 cm2; peak velocity is 2.60 m/s; mean gradient is 16 mmHg.   Elevated central venous pressure (15 mmHg).   The estimated PA systolic pressure is 71 mmHg.   There is pulmonary hypertension.   Mild left atrial enlargement.    PET stress as outpatient once recovers from ADHF    Paroxysmal atrial fibrillation  Continue Amio and Eliquis     Acute renal failure superimposed on stage 3 chronic kidney disease  Cr 2.7- improving (baseline 1.7-2.2)        VTE Risk Mitigation (From admission, onward)         Ordered     apixaban tablet 5 mg  2 times daily         09/20/22 0224     IP VTE HIGH RISK PATIENT  Once         09/19/22 2343     Place sequential compression device  Until discontinued         09/19/22 2343                Nathaniel Bal NP  Cardiology  [facility] - Telemetry

## 2022-09-22 NOTE — ASSESSMENT & PLAN NOTE
Meets sepsis criteria for WBC, temp, hypoxia  -treating for possible pneumonia and cellulitis  -broadened to cefepime and vancomycin; can deescalate if respiratory cultures and blood cultures negative

## 2022-09-22 NOTE — ASSESSMENT & PLAN NOTE
WOC on board  Venous wounds with aspect of PAD per arterial US  Will defer AO to outpatient setting given severity of ADHF +/- PNA  Medical management with statin, no asa given hx of GIB (on Eliquis). No ACEI.ARB 2/2 JAYSON on CKD

## 2022-09-22 NOTE — PT/OT/SLP PROGRESS
Physical Therapy Visit Attempt      Patient Name:  Tong Ford   MRN:  [id number]    Patient not seen today secondary to Therapist assessment, Patient ill (Comment) (hypoxia).  Pt has regressed each day since OT/PT eval that was performed 2 days ago. Pt went from 2 LPM to 9 LPM yesterday to 12 LPM today. Pt was able to stand on initial evaluation and now unable to tolerate bed mobility without desatting to 70's. SpO2 maintaining in low 80s at rest post bed bath and pt unable to recover until RT arrived to place pt on BiPAP. Pt only able to maintain sats in 90s while on BIPAP. Pt is not appropriate for therapy at this time. Secure chat sent to team and Dr. Solo regarding above issues / concerns. Will hold therapy at this time.    9/22/2022

## 2022-09-22 NOTE — PLAN OF CARE
1900 Pt appeared to be in no distress. Reported no pain.     No accu ck.     Tele: SR,  HR  60,  No alarms.     Bed in lowest position, wheels locked, non skid socks, ID band worn, personal items and call bell with in reach, bed alarm set.

## 2022-09-22 NOTE — ASSESSMENT & PLAN NOTE
-initially on lasix drip; now transitioned IV pushes of 80 mg t.i.d.; add metolazone today  -monitor electrolytes  -echo with EF 55%, moderate mitral regurg, elevated CVP 15 mm Hg  -consult cardiology  -fluid restriction  -hold ACE and BB for now given soft BP

## 2022-09-23 PROBLEM — J15.20 STAPHYLOCOCCAL PNEUMONIA: Status: ACTIVE | Noted: 2022-01-01

## 2022-09-23 NOTE — PROGRESS NOTES
"[facility] Care  [facility] Medicine  Progress Note    Patient Name: Tong Ford  MRN: [id number]  Patient Class: IP- Inpatient   Admission Date: 9/19/2022  Length of Stay: 4 days  Attending Physician: Waldemar Dolan MD  Primary Care Provider: Kris Zavala MD        Subjective:     Principal Problem:Acute on chronic diastolic congestive heart failure        HPI:  Per transfer note:  "Patient is a 77 y.o. male who has a past medical history of arthritis, HTN, AFIB, HLD, TIA, CAD, CHF, hypothyroidism, normocytic anemia and PSHx of CABG, coronary angioplasty with stent, and pacemaker presenting today for shortness of breath, weakness and swelling. ED workup significant for elevated troponin, worsening JAYSON, elevated BNP, leukocytosis, hyperkalemia, elevated CPK, hypoxia requiring O2 supplementation, chest x-ray with increased bilateral asymmetric airspace disease consistent with CHF/volume overload and possible LLL pneumonia. See below labs and imaging. Patient started on lasix drip, given lokelma, and IV Rocephin/Az. No ICU beds available at this facility therefore pt will need to transfer for higher level of care. Stable for transfer."    On arrival to [place], the patient was continued on lasix drip. He is sleeping and unable to answer any questions. Per chart review, he endorsed orthopnea, worsening GOODWIN. He was told by his cardiologist to report to the ED to be admitted for IV lasix. He stated that having the extra fluid on his legs and scrotal area made it difficult to walk and he fell at home last night on concrete, denies hitting head. He was on the floor for several hours before being found. He was taking 60mg of lasix daily and lionel, reported adequate urination. Pt also has chronic bilat LE venous statis ulcers and he follows with wound care. He had BNP 1795, troponin 1.03 with downtrend, creat 3.1, K 5.2, CK 2600. a temperature of 100.2, WBC 20 at sending facility. He was given lokelma, rocephin and " azithromycin, and started on lasix drip. Prior echo with normal EF. He is on 2LNC with adequate O2 sat on arrival. Legs with stasis ulcers, right leg with redness noted.           Overview/Hospital Course:  No notes on file    Interval History:  Worsening hypoxia yesterday requiring bipap, transferred back to icu. Abx escalated to vanc/cefepime due to concern for HAP. CXR showing worsening LLL consolidation. Resp cx growing staph. Diuresing well on lasix gtt. Unsuccessful attempt to wean off bipap this morning due to hypoxia.     Review of Systems   Constitutional:  Negative for fever.   Respiratory:  Positive for shortness of breath.    Cardiovascular:  Positive for leg swelling. Negative for chest pain.   Musculoskeletal:  Positive for arthralgias.   Skin:  Positive for rash and wound.   Neurological:  Positive for weakness.   Objective:     Vital Signs (Most Recent):  Temp: 98.4 °F (36.9 °C) (09/23/22 1529)  Pulse: 70 (09/23/22 1600)  Resp: (!) 26 (09/23/22 1600)  BP: 129/60 (09/23/22 1600)  SpO2: (!) 91 % (09/23/22 1600)   Vital Signs (24h Range):  Temp:  [98.4 °F (36.9 °C)-100.9 °F (38.3 °C)] 98.4 °F (36.9 °C)  Pulse:  [65-83] 70  Resp:  [23-55] 26  SpO2:  [84 %-99 %] 91 %  BP: (128-175)/(56-99) 129/60     Weight: 92 kg (202 lb 13.2 oz)  Body mass index is 30.84 kg/m².    Intake/Output Summary (Last 24 hours) at 9/23/2022 1655  Last data filed at 9/23/2022 1600  Gross per 24 hour   Intake 534.57 ml   Output 4695 ml   Net -4160.43 ml        Physical Exam  Vitals and nursing note reviewed.   Constitutional:       General: He is not in acute distress.     Appearance: He is obese.   HENT:      Head: Normocephalic and atraumatic.      Nose: Nose normal.      Mouth/Throat:      Mouth: Mucous membranes are moist.   Eyes:      Pupils: Pupils are equal, round, and reactive to light.   Cardiovascular:      Rate and Rhythm: Normal rate and regular rhythm.      Pulses: Normal pulses.      Heart sounds: Murmur heard.    Pulmonary:      Effort: Pulmonary effort is normal. No respiratory distress.      Breath sounds: No rales.      Comments: On supplemental O2 via nasal cannula  Abdominal:      General: Bowel sounds are normal.      Palpations: Abdomen is soft.   Musculoskeletal:         General: Swelling present. Normal range of motion.      Cervical back: Normal range of motion.      Right lower leg: Edema present.      Left lower leg: Edema present.   Skin:     General: Skin is warm and dry.      Findings: Erythema and lesion present.   Neurological:      Motor: Weakness present.      Comments: Unable to assess   Psychiatric:      Comments: Unable to assess       Significant Labs: All pertinent labs within the past 24 hours have been reviewed.    Significant Imaging: I have reviewed all pertinent imaging results/findings within the past 24 hours.      Assessment/Plan:      * Acute on chronic diastolic congestive heart failure  -initially on lasix drip; now transitioned IV pushes of 80 mg t.i.d.; add metolazone today  -monitor electrolytes  -echo with EF 55%, moderate mitral regurg, elevated CVP 15 mm Hg  -consult cardiology  -fluid restriction  -hold ACE and BB for now given soft BP  -bipap and lasix gtt      Staphylococcal pneumonia        Neuropathy, peripheral, idiopathic  -resume gabapentin      Volume overload state of heart        Debility  -PT/OT consult; will likely require placement      Acute hypoxemic respiratory failure  In setting of volume overload and possible pneumonia  -cont supplemental O2 as needed, wean as tolerated--now on 9LNC  -worsening hypoxia requiring bipap  -cont diuresis with lasix gtt      Sepsis  Meets sepsis criteria for WBC, temp, hypoxia  -treating for possible pneumonia and cellulitis  -broadened to cefepime and vancomycin; can deescalate if respiratory cultures and blood cultures negative          Rhabdomyolysis  Fell and laid on floor for hours  CPK 2607 with JAYSON  -unable to give fluids due  to volume overload  -repeat CK down trending  -consult nephrology as needed      Pneumonia  Possible LLL pneumonia on imaging  Temp 100.4, WBC 20  -blood cx no growth to date  -cont cefepime and vancomycin; sputum cultures pending, deescalate if negative  -sputum sample  -cont supplemental O2 as needed  -resp cx growing staph  -cont vanco      Venous stasis ulcer  BLE with ulcers  -right leg with redness   -on rocephin and doxy  -consult wound care  -arterial ultrasound      Elevated troponin  In setting of volume overload; likely secondary to demand  -troponin 1.03--0.9  -plan for outpatient PET stress test  -cardiology consulted  -echo as above      Coronary artery disease  Hx of CABG and stent  -no antiplatelet on home med list  -cont statin  -hold BB for low BP      Paroxysmal atrial fibrillation  -cont amiodarone and eliquis  -hold BB for now      Acute renal failure superimposed on stage 3 chronic kidney disease  -possibly cardiorenal  -diuresing  -monitor labs  -renally dose medications  -baseline creatinine around 2.5    Thyroid disease  -cont synthroid   -TSH mildly elevated with normal T4      Essential hypertension  BP soft on admission  -hold home meds for now and resume as tolerated      Obstructive sleep apnea  -CPAP at night         VTE Risk Mitigation (From admission, onward)           Ordered     apixaban tablet 5 mg  2 times daily         09/20/22 0224     IP VTE HIGH RISK PATIENT  Once         09/19/22 2343     Place sequential compression device  Until discontinued         09/19/22 2343                    Discharge Planning   YUNIEL:      Code Status: Full Code   Is the patient medically ready for discharge?:     Reason for patient still in hospital (select all that apply): Patient unstable, Patient trending condition, Treatment and Consult recommendations  Discharge Plan A: Rehab   Discharge Delays: None known at this time        Critical care time spent on the evaluation and treatment of severe  organ dysfunction, review of pertinent labs and imaging studies, discussions with consulting providers and discussions with patient/family: 45 minutes.      Waldemar Dolan MD  Department of Hospital Medicine   [facility] - Intensive Care

## 2022-09-23 NOTE — NURSING
Absorbant pad under patient noted to be soaked in clear liquid. Patient also presented with scant dried blood to inner thighs. Penis is swollen. Absorbent pad changed, patient cleaned up, catheter care performed. Dr Brady made aware at bedside. Will continue to monitor.     Addendum: Dr Dolan also made aware via secure chat. Pending response.

## 2022-09-23 NOTE — PROGRESS NOTES
[facility] - Intensive Care  Cardiology  Progress Note    Patient Name: Tong Ford  MRN: [id number]  Admission Date: 9/19/2022  Hospital Length of Stay: 4 days  Code Status: Full Code   Attending Physician: Waldemar Dolan MD   Primary Care Physician: Kris Zavala MD  Expected Discharge Date:   Principal Problem:Acute on chronic diastolic congestive heart failure    Subjective:     Hospital Course:   09/20/2022 Per HPI   09/21/2022 TTE   The left ventricle is normal in size with normal systolic function.   The estimated ejection fraction is 55%.   Indeterminate left ventricular diastolic function.   There is abnormal septal wall motion consistent with right ventricular pacemaker.   Normal right ventricular size with normal right ventricular systolic function.   Moderate tricuspid regurgitation.   Mild pulmonic regurgitation.   Moderate mitral regurgitation.   There is mild aortic valve stenosis.   Aortic valve area is 1.55 cm2; peak velocity is 2.60 m/s; mean gradient is 16 mmHg.   Elevated central venous pressure (15 mmHg).   The estimated PA systolic pressure is 71 mmHg.   There is pulmonary hypertension.   Mild left atrial enlargement.    774 cc intake, 2160 cc output, net -2.4L from admission. Cr stable at 3. O2 requirements increased overnight to 9 L NC. Remains SOB with edema.   09/22/2022 825 cc intake and 1500 cc output. Diuresing with Lasix 80 mg IV TID and Metolazone. Cr improving (2.7 today). Remains on 9L NC with intermittent BiPAP. Patient feels SOB is improving.     09/22/2022 Transferred to ICU yesterday due to increased O2 requirments. Placed on IV Lasix drip and Metolazone continued. 5.1L out overnight negative 4.0L in 24 hours and negative 7.9L since admission. Creatinine down to 2.4 this AM. HR and BP stable.          Review of Systems   Unable to perform ROS: Other   Objective:     Vital Signs (Most Recent):  Temp: 98.4 °F (36.9 °C) (09/23/22 1529)  Pulse: 67 (09/23/22 1500)  Resp:  (!) 26 (09/23/22 1500)  BP: 128/60 (09/23/22 1500)  SpO2: (!) 93 % (09/23/22 1500)   Vital Signs (24h Range):  Temp:  [98.4 °F (36.9 °C)-100.9 °F (38.3 °C)] 98.4 °F (36.9 °C)  Pulse:  [65-83] 67  Resp:  [23-55] 26  SpO2:  [84 %-99 %] 93 %  BP: (128-175)/(56-99) 128/60     Weight: 92 kg (202 lb 13.2 oz)  Body mass index is 30.84 kg/m².     SpO2: (!) 93 %  O2 Device (Oxygen Therapy): High Flow nasal Cannula      Intake/Output Summary (Last 24 hours) at 9/23/2022 1543  Last data filed at 9/23/2022 1500  Gross per 24 hour   Intake 534.57 ml   Output 4495 ml   Net -3960.43 ml       Lines/Drains/Airways       Drain  Duration                  Urethral Catheter 09/22/22 1700 Silicone 16 Fr. <1 day              Peripheral Intravenous Line  Duration                  Peripheral IV - Single Lumen 09/19/22 1500 18 G Anterior;Left Forearm 4 days         Peripheral IV - Single Lumen 09/22/22 2305 20 G Left Hand <1 day                    Physical Exam  Constitutional:       General: He is not in acute distress.     Appearance: He is well-developed. He is ill-appearing and toxic-appearing.   Cardiovascular:      Rate and Rhythm: Normal rate and regular rhythm.      Heart sounds: No murmur heard.    No gallop.   Pulmonary:      Effort: Pulmonary effort is normal. Tachypnea present. No respiratory distress.      Breath sounds: Decreased breath sounds present. No wheezing.   Abdominal:      General: Bowel sounds are normal. There is no distension.      Palpations: Abdomen is soft.      Tenderness: There is no abdominal tenderness.   Skin:     General: Skin is warm and dry.   Neurological:      Mental Status: He is alert and oriented to person, place, and time.       Significant Labs: BMP:   Recent Labs   Lab 09/22/22  0430 09/23/22  0350   * 110    144   K 3.3* 3.4*    103   CO2 25 28   BUN 71* 71*   CREATININE 2.7* 2.4*   CALCIUM 8.6* 8.8   MG 2.4 2.4    and CBC   Recent Labs   Lab 09/22/22  0430 09/23/22  0920    WBC 13.17* 15.58*   HGB 9.4* 9.6*   HCT 27.9* 28.3*    239       Significant Imaging: Echocardiogram: Transthoracic echo (TTE) complete ([facility] Only):   Results for orders placed or performed during the hospital encounter of 09/19/22   Echo   Result Value Ref Range    BSA 2.15 m2    IVC diameter 2.40 cm    Left Ventricular Outflow Tract Mean Velocity 0.69 cm/s    Left Ventricular Outflow Tract Mean Gradient 2.03 mmHg    PV PEAK VELOCITY 0.88 cm/s    LVIDd 5.69 3.5 - 6.0 cm    IVS 0.64 0.6 - 1.1 cm    Posterior Wall 0.84 0.6 - 1.1 cm    LVIDs 3.93 2.1 - 4.0 cm    FS 31 28 - 44 %    LV mass 154.01 g    LA size 5.02 cm    RV S' 0.01 cm/s    Left Ventricle Relative Wall Thickness 0.30 cm    AV mean gradient 16 mmHg    AV valve area 1.55 cm2    AV Velocity Ratio 0.35     AV index (prosthetic) 0.34     MV mean gradient 4 mmHg    MV valve area by continuity eq 1.78 cm2    E/A ratio 1.72     E wave deceleration time 228.37 msec    LVOT diameter 2.40 cm    LVOT area 4.5 cm2    LVOT peak joseph 0.92 m/s    LVOT peak VTI 20.30 cm    Ao peak joseph 2.60 m/s    Ao VTI 59.3 cm    Mr max joseph 5.17 m/s    LVOT stroke volume 91.79 cm3    AV peak gradient 27 mmHg    MV peak gradient 11 mmHg    MV Peak E Joseph 1.65 m/s    AR Max Joseph 3.24 m/s    TR Max Joseph 3.75 m/s    MV VTI 51.5 cm    MV Peak A Joseph 0.96 m/s    LV Systolic Volume 67.09 mL    LV Systolic Volume Index 31.9 mL/m2    LV Diastolic Volume 159.38 mL    LV Diastolic Volume Index 75.90 mL/m2    LV Mass Index 73 g/m2    RA Major Axis 5.98 cm    Left Atrium Minor Axis 6.02 cm    Left Atrium Major Axis 6.28 cm    Triscuspid Valve Regurgitation Peak Gradient 56 mmHg    LA Volume Index (Mod) 36.1 mL/m2    LA volume (mod) 75.88 cm3    Right Atrial Pressure (from IVC) 15 mmHg    EF 55 %    TV rest pulmonary artery pressure 71 mmHg    Narrative    · The left ventricle is normal in size with normal systolic function.  · The estimated ejection fraction is 55%.  · Indeterminate left  ventricular diastolic function.  · There is abnormal septal wall motion consistent with right ventricular   pacemaker.  · Normal right ventricular size with normal right ventricular systolic   function.  · Moderate tricuspid regurgitation.  · Mild pulmonic regurgitation.  · Moderate mitral regurgitation.  · There is mild aortic valve stenosis.  · Aortic valve area is 1.55 cm2; peak velocity is 2.60 m/s; mean gradient   is 16 mmHg.  · Elevated central venous pressure (15 mmHg).  · The estimated PA systolic pressure is 71 mmHg.  · There is pulmonary hypertension.  · Mild left atrial enlargement.        Assessment and Plan:     Brief HPI: Seen on AM rounds with Dr. Mccormick while resting in bed on continuous BiPap. Communicative but still appears SOB. Will continue IV diuresis     * Acute on chronic diastolic congestive heart failure   TTE   The left ventricle is normal in size with normal systolic function.   The estimated ejection fraction is 55%.   Indeterminate left ventricular diastolic function.   There is abnormal septal wall motion consistent with right ventricular pacemaker.   Normal right ventricular size with normal right ventricular systolic function.   Moderate tricuspid regurgitation.   Mild pulmonic regurgitation.   Moderate mitral regurgitation.   There is mild aortic valve stenosis.   Aortic valve area is 1.55 cm2; peak velocity is 2.60 m/s; mean gradient is 16 mmHg.   Elevated central venous pressure (15 mmHg).   The estimated PA systolic pressure is 71 mmHg.   There is pulmonary hypertension.   Mild left atrial enlargement.        Recent Labs   Lab 09/19/22  1324   BNP 1,795*   .  - ACEI, Aldactone on hold 2/2 JAYSON  - IV diuresis prior to transfer to ICU with Lasix 80 mg IV TID and Metolazone added 9/22; increased O2 requirements yesterday requiring transfer to ICU yesterday  -  on IV Lasix drip at 10mg.hr with 5.1L out overnight; negative 4.0L in 24 hours negative 7.9L since admission;  will continue IV Lasix drip as well as Metolazone for continued volume removal; considered other etiology with Dr. Mccormick on rounds but currently feel ADHF with need for continued volume removal   - remains overloaded on exam and on continuous BIPap; attempted to wean to NC with desaturation to mid 80s  - daily weights, accurate intake and output      Rhabdomyolysis  - CPK 2600 on admission, down to 1200 earlier this week     Venous stasis ulcer  WOC on board  Venous wounds with aspect of PAD per arterial US  Will defer AO to outpatient setting given severity of ADHF +/- PNA  Medical management with statin, no asa given hx of GIB (on Eliquis). No ACEI.ARB 2/2 JAYSON on CKD     Elevated troponin  Troponin peaked at 1 on admission  No chest pain  Likely demand in setting of ADHF and possible infection   Normal LVEF   PET stress as outpatient once recovered from ADHF      Coronary artery disease  S/p CABG in 1989 with subsequent PCI in 2005  No chest pain  EKG paced  Troponin peaked on admission at 1  Not on asa or BB per primary cardiologist - will defer initiation to them  Continue statin     TTE   The left ventricle is normal in size with normal systolic function.   The estimated ejection fraction is 55%.   Indeterminate left ventricular diastolic function.   There is abnormal septal wall motion consistent with right ventricular pacemaker.   Normal right ventricular size with normal right ventricular systolic function.   Moderate tricuspid regurgitation.   Mild pulmonic regurgitation.   Moderate mitral regurgitation.   There is mild aortic valve stenosis.   Aortic valve area is 1.55 cm2; peak velocity is 2.60 m/s; mean gradient is 16 mmHg.   Elevated central venous pressure (15 mmHg).   The estimated PA systolic pressure is 71 mmHg.   There is pulmonary hypertension.   Mild left atrial enlargement.    PET stress as outpatient once recovers from ADHF    Paroxysmal atrial fibrillation  - continue Amio and  Eliquis   - HR 60s-70s    Acute renal failure superimposed on stage 3 chronic kidney disease  - cr 2.4 this AM; continue to trend down  - improving (baseline 1.7-2.2)        VTE Risk Mitigation (From admission, onward)         Ordered     apixaban tablet 5 mg  2 times daily         09/20/22 0224     IP VTE HIGH RISK PATIENT  Once         09/19/22 2343     Place sequential compression device  Until discontinued         09/19/22 2343                Maria Pardo APRN, ANP  Cardiology  [facility] - Intensive Care

## 2022-09-23 NOTE — PT/OT/SLP PROGRESS
Occupational Therapy   Treatment    Name: Tong Ford  MRN: [id number]  Admitting Diagnosis:  Acute on chronic diastolic congestive heart failure     The primary encounter diagnosis was Acute on chronic diastolic congestive heart failure. Diagnoses of CHF (congestive heart failure), Elevated troponin, PAD (peripheral artery disease), Volume overload state of heart, Neuropathy, peripheral, idiopathic, and Staphylococcal pneumonia were also pertinent to this visit.  Pre-op Diagnosis: * No surgery found * s/p      Recommendations:     Discharge Recommendations:  (post acute therapy placement)  Discharge Equipment Recommendations:   (defer to post acute facility)  Barriers to discharge:   (increase level of assistance, multiple falls at home)    Assessment:     Tong Ford is a 77 y.o. male with a medical diagnosis of Acute on chronic diastolic congestive heart failure.  He presents with debility, increased O2 demand and transferred to ICU 9/22/2022 due to worsening hypoxemia.  Pt is AAOx4, good mood and attitude. Pt. Seen in bed on 10.5 LPM HFNC O2. Resting SpO2 96%. Pt. agreeable to bed level ex. Pt. performed LE and UE ex without distress, SpO2 fluctuating from 90-95% and RR 20-low 30's with exercises; pt. mildly fatigued with ex. Pt is highly motivated. Continue with OT POC.    Performance deficits affecting function are weakness, impaired endurance, impaired self care skills, impaired functional mobility, gait instability, impaired balance, decreased coordination, decreased upper extremity function, decreased lower extremity function, decreased safety awareness, pain, decreased ROM, impaired coordination, edema, impaired cardiopulmonary response to activity.     Rehab Prognosis:  fair; patient would benefit from acute skilled OT services to address these deficits and reach maximum level of function.       Plan:     Patient to be seen 5 x/week to address the above listed problems via self-care/home management,  therapeutic activities, therapeutic exercises  Plan of Care Expires: 10/20/22  Plan of Care Reviewed with: patient    Subjective     Pain/Comfort:  Pain Rating 1: 10/10  Location - Side 1: Bilateral  Location - Orientation 1: anterior  Location 1: foot  Pain Addressed 1: Reposition, Distraction, Nurse notified  Pain Rating Post-Intervention 1:  (not rated but reports improvment with repositioning)    Objective:     Communicated with: nurse prior to session.  Patient found HOB elevated with telemetry, van catheter, peripheral IV, oxygen, blood pressure cuff upon OT entry to room.    General Precautions: Standard, fall, respiratory, aspiration   Orthopedic Precautions:N/A   Braces: N/A  Respiratory Status: High flow, flow 10.5 L/min,        AMPAC 6 Click ADL: 15    Treatment & Education:  Pt. performed B shld flexor AA stretches x 5 with 10 sec hold to R shld and L shld active stretch (pacemaker site)  Pt. performed 10 x 1 set BUE AROM ex : hor abd/add, int /ext, elbow flex/ext rotation, vc to exhale on exertion-count out loud with exercises to prevent over exertion and increase oxygenation.  SpO2 90-95% on 10.5 HFNC O2 while performing UE/LE ex.  Pt. Performed LE ex with PT in bed while OT assessed vitals and provided verbal cues to pt on PLB tech and above breathing tech.  Mild fatigue after ex., pt not in distress.     Patient left HOB elevated with all lines intact, call button in reach, and nurse notified    GOALS:   Multidisciplinary Problems       Occupational Therapy Goals          Problem: Occupational Therapy    Goal Priority Disciplines Outcome Interventions   Occupational Therapy Goal     OT, PT/OT Ongoing, Progressing    Description: Goals to be met by: 10/20/2022     Patient will increase functional independence with ADLs by performing:    UE Dressing with Modified [place].  LE Dressing with Modified [place].  Grooming while standing at sink with Modified [place].  Toileting from toilet  with Modified [place] for hygiene and clothing management.   Supine to sit with Modified [place].  Step transfer with Modified [place]  Toilet transfer to toilet with Modified [place].  Increased functional strength to WFL for ADLS.  Upper extremity exercise program x10 reps per handout, with independence.                         Time Tracking:     OT Date of Treatment: 09/23/22  OT Start Time: 1347  OT Stop Time: 1414  OT Total Time (min): 27 min cotx with PT    Billable Minutes:Therapeutic Activity 14  Therapeutic Exercise 13  Total Time 27    OT/CLARITZA: OT          9/23/2022

## 2022-09-23 NOTE — NURSING
RRT placed patient on oxygen via NC at 5lpm. Pt SPO2 dropped to 83%, encouraged to take deep breathe through his nose. No improvement in SPO2. Placed back on Bipap at 10/5 w/ 60% FiO2. SPO2 improved to 93%. Will attempt to wean as able.

## 2022-09-23 NOTE — PROGRESS NOTES
Pharmacist Renal Dose Adjustment Note    Tong Ford is a 77 y.o. male being treated with the medication cefepime    Patient Data:    Vital Signs (Most Recent):  Temp: (!) 100.5 °F (38.1 °C) (09/23/22 0800)  Pulse: 73 (09/23/22 0800)  Resp: (!) 28 (09/23/22 0800)  BP: (!) 152/78 (09/23/22 0800)  SpO2: (!) 94 % (09/23/22 0800)   Vital Signs (72h Range):  Temp:  [97 °F (36.1 °C)-100.9 °F (38.3 °C)]   Pulse:  [59-87]   Resp:  [12-55]   BP: (100-175)/(52-99)   SpO2:  [79 %-99 %]      Recent Labs   Lab 09/21/22  0459 09/22/22  0430 09/23/22  0350   CREATININE 3.0* 2.7* 2.4*     Serum creatinine: 2.4 mg/dL (H) 09/23/22 0350  Estimated creatinine clearance: 27.5 mL/min (A)    Medication:Cefepime dose: 1g frequency q 12 hours will be changed to medication:cefepime dose:1g frequency:q 24 hours    Pharmacist's Name: Dory Card  Pharmacist's Extension: [phone]

## 2022-09-23 NOTE — SUBJECTIVE & OBJECTIVE
Interval History:  Worsening hypoxia yesterday requiring bipap, transferred back to icu. Unsuccessful attempt to wean off bipap this morning due to hypoxia. Diuresing well on lasix gtt.     Review of Systems   Constitutional:  Negative for fever.   Respiratory:  Positive for shortness of breath.    Cardiovascular:  Positive for leg swelling. Negative for chest pain.   Musculoskeletal:  Positive for arthralgias.   Skin:  Positive for rash and wound.   Neurological:  Positive for weakness.   Objective:     Vital Signs (Most Recent):  Temp: 98.4 °F (36.9 °C) (09/23/22 1529)  Pulse: 70 (09/23/22 1600)  Resp: (!) 26 (09/23/22 1600)  BP: 129/60 (09/23/22 1600)  SpO2: (!) 91 % (09/23/22 1600)   Vital Signs (24h Range):  Temp:  [98.4 °F (36.9 °C)-100.9 °F (38.3 °C)] 98.4 °F (36.9 °C)  Pulse:  [65-83] 70  Resp:  [23-55] 26  SpO2:  [84 %-99 %] 91 %  BP: (128-175)/(56-99) 129/60     Weight: 92 kg (202 lb 13.2 oz)  Body mass index is 30.84 kg/m².    Intake/Output Summary (Last 24 hours) at 9/23/2022 1655  Last data filed at 9/23/2022 1600  Gross per 24 hour   Intake 534.57 ml   Output 4695 ml   Net -4160.43 ml        Physical Exam  Vitals and nursing note reviewed.   Constitutional:       General: He is not in acute distress.     Appearance: He is obese.   HENT:      Head: Normocephalic and atraumatic.      Nose: Nose normal.      Mouth/Throat:      Mouth: Mucous membranes are moist.   Eyes:      Pupils: Pupils are equal, round, and reactive to light.   Cardiovascular:      Rate and Rhythm: Normal rate and regular rhythm.      Pulses: Normal pulses.      Heart sounds: Murmur heard.   Pulmonary:      Effort: Pulmonary effort is normal. No respiratory distress.      Breath sounds: No rales.      Comments: On supplemental O2 via nasal cannula  Abdominal:      General: Bowel sounds are normal.      Palpations: Abdomen is soft.   Musculoskeletal:         General: Swelling present. Normal range of motion.      Cervical back: Normal  range of motion.      Right lower leg: Edema present.      Left lower leg: Edema present.   Skin:     General: Skin is warm and dry.      Findings: Erythema and lesion present.   Neurological:      Motor: Weakness present.      Comments: Unable to assess   Psychiatric:      Comments: Unable to assess       Significant Labs: All pertinent labs within the past 24 hours have been reviewed.    Significant Imaging: I have reviewed all pertinent imaging results/findings within the past 24 hours.

## 2022-09-23 NOTE — ASSESSMENT & PLAN NOTE
-initially on lasix drip; now transitioned IV pushes of 80 mg t.i.d.; add metolazone today  -monitor electrolytes  -echo with EF 55%, moderate mitral regurg, elevated CVP 15 mm Hg  -consult cardiology  -fluid restriction  -hold ACE and BB for now given soft BP  -bipap and lasix gtt

## 2022-09-23 NOTE — SUBJECTIVE & OBJECTIVE
Review of Systems   Unable to perform ROS: Other   Objective:     Vital Signs (Most Recent):  Temp: 98.4 °F (36.9 °C) (09/23/22 1529)  Pulse: 67 (09/23/22 1500)  Resp: (!) 26 (09/23/22 1500)  BP: 128/60 (09/23/22 1500)  SpO2: (!) 93 % (09/23/22 1500)   Vital Signs (24h Range):  Temp:  [98.4 °F (36.9 °C)-100.9 °F (38.3 °C)] 98.4 °F (36.9 °C)  Pulse:  [65-83] 67  Resp:  [23-55] 26  SpO2:  [84 %-99 %] 93 %  BP: (128-175)/(56-99) 128/60     Weight: 92 kg (202 lb 13.2 oz)  Body mass index is 30.84 kg/m².     SpO2: (!) 93 %  O2 Device (Oxygen Therapy): High Flow nasal Cannula      Intake/Output Summary (Last 24 hours) at 9/23/2022 1543  Last data filed at 9/23/2022 1500  Gross per 24 hour   Intake 534.57 ml   Output 4495 ml   Net -3960.43 ml       Lines/Drains/Airways       Drain  Duration                  Urethral Catheter 09/22/22 1700 Silicone 16 Fr. <1 day              Peripheral Intravenous Line  Duration                  Peripheral IV - Single Lumen 09/19/22 1500 18 G Anterior;Left Forearm 4 days         Peripheral IV - Single Lumen 09/22/22 2305 20 G Left Hand <1 day                    Physical Exam  Constitutional:       General: He is not in acute distress.     Appearance: He is well-developed. He is ill-appearing and toxic-appearing.   Cardiovascular:      Rate and Rhythm: Normal rate and regular rhythm.      Heart sounds: No murmur heard.    No gallop.   Pulmonary:      Effort: Pulmonary effort is normal. Tachypnea present. No respiratory distress.      Breath sounds: Decreased breath sounds present. No wheezing.   Abdominal:      General: Bowel sounds are normal. There is no distension.      Palpations: Abdomen is soft.      Tenderness: There is no abdominal tenderness.   Skin:     General: Skin is warm and dry.   Neurological:      Mental Status: He is alert and oriented to person, place, and time.       Significant Labs: BMP:   Recent Labs   Lab 09/22/22  0430 09/23/22  0350   * 110    144    K 3.3* 3.4*    103   CO2 25 28   BUN 71* 71*   CREATININE 2.7* 2.4*   CALCIUM 8.6* 8.8   MG 2.4 2.4    and CBC   Recent Labs   Lab 09/22/22  0430 09/23/22  0350   WBC 13.17* 15.58*   HGB 9.4* 9.6*   HCT 27.9* 28.3*    239       Significant Imaging: Echocardiogram: Transthoracic echo (TTE) complete (Cupid Only):   Results for orders placed or performed during the hospital encounter of 09/19/22   Echo   Result Value Ref Range    BSA 2.15 m2    IVC diameter 2.40 cm    Left Ventricular Outflow Tract Mean Velocity 0.69 cm/s    Left Ventricular Outflow Tract Mean Gradient 2.03 mmHg    PV PEAK VELOCITY 0.88 cm/s    LVIDd 5.69 3.5 - 6.0 cm    IVS 0.64 0.6 - 1.1 cm    Posterior Wall 0.84 0.6 - 1.1 cm    LVIDs 3.93 2.1 - 4.0 cm    FS 31 28 - 44 %    LV mass 154.01 g    LA size 5.02 cm    RV S' 0.01 cm/s    Left Ventricle Relative Wall Thickness 0.30 cm    AV mean gradient 16 mmHg    AV valve area 1.55 cm2    AV Velocity Ratio 0.35     AV index (prosthetic) 0.34     MV mean gradient 4 mmHg    MV valve area by continuity eq 1.78 cm2    E/A ratio 1.72     E wave deceleration time 228.37 msec    LVOT diameter 2.40 cm    LVOT area 4.5 cm2    LVOT peak joseph 0.92 m/s    LVOT peak VTI 20.30 cm    Ao peak joseph 2.60 m/s    Ao VTI 59.3 cm    Mr max joseph 5.17 m/s    LVOT stroke volume 91.79 cm3    AV peak gradient 27 mmHg    MV peak gradient 11 mmHg    MV Peak E Joseph 1.65 m/s    AR Max Joseph 3.24 m/s    TR Max Joseph 3.75 m/s    MV VTI 51.5 cm    MV Peak A Joseph 0.96 m/s    LV Systolic Volume 67.09 mL    LV Systolic Volume Index 31.9 mL/m2    LV Diastolic Volume 159.38 mL    LV Diastolic Volume Index 75.90 mL/m2    LV Mass Index 73 g/m2    RA Major Axis 5.98 cm    Left Atrium Minor Axis 6.02 cm    Left Atrium Major Axis 6.28 cm    Triscuspid Valve Regurgitation Peak Gradient 56 mmHg    LA Volume Index (Mod) 36.1 mL/m2    LA volume (mod) 75.88 cm3    Right Atrial Pressure (from IVC) 15 mmHg    EF 55 %    TV rest pulmonary artery  pressure 71 mmHg    Narrative    · The left ventricle is normal in size with normal systolic function.  · The estimated ejection fraction is 55%.  · Indeterminate left ventricular diastolic function.  · There is abnormal septal wall motion consistent with right ventricular   pacemaker.  · Normal right ventricular size with normal right ventricular systolic   function.  · Moderate tricuspid regurgitation.  · Mild pulmonic regurgitation.  · Moderate mitral regurgitation.  · There is mild aortic valve stenosis.  · Aortic valve area is 1.55 cm2; peak velocity is 2.60 m/s; mean gradient   is 16 mmHg.  · Elevated central venous pressure (15 mmHg).  · The estimated PA systolic pressure is 71 mmHg.  · There is pulmonary hypertension.  · Mild left atrial enlargement.

## 2022-09-23 NOTE — ASSESSMENT & PLAN NOTE
TTE   The left ventricle is normal in size with normal systolic function.   The estimated ejection fraction is 55%.   Indeterminate left ventricular diastolic function.   There is abnormal septal wall motion consistent with right ventricular pacemaker.   Normal right ventricular size with normal right ventricular systolic function.   Moderate tricuspid regurgitation.   Mild pulmonic regurgitation.   Moderate mitral regurgitation.   There is mild aortic valve stenosis.   Aortic valve area is 1.55 cm2; peak velocity is 2.60 m/s; mean gradient is 16 mmHg.   Elevated central venous pressure (15 mmHg).   The estimated PA systolic pressure is 71 mmHg.   There is pulmonary hypertension.   Mild left atrial enlargement.        Recent Labs   Lab 09/19/22  1324   BNP 1,795*   .  - ACEI, Aldactone on hold 2/2 JAYSON  - IV diuresis prior to transfer to ICU with Lasix 80 mg IV TID and Metolazone added 9/22; increased O2 requirements yesterday requiring transfer to ICU yesterday  -  on IV Lasix drip at 10mg.hr with 5.1L out overnight; negative 4.0L in 24 hours negative 7.9L since admission; will continue IV Lasix drip as well as Metolazone for continued volume removal; considered other etiology with Dr. Mccormick on rounds but currently feel ADHF with need for continued volume removal   - remains overloaded on exam and on continuous BIPap; attempted to wean to NC with desaturation to mid 80s  - daily weights, accurate intake and output

## 2022-09-23 NOTE — PROGRESS NOTES
Consult Note  Pulmonary & Critical Care Medicine    Attending: David  Admit Date: 9/19/2022  Today's Date: 09/23/2022  Reason for Consult:  Acute CHF exacerbation requiring lasix gtt    SUBJECTIVE:     Patient endorses worsening SOB. Attempted 5L NC with drop in SpO2 to low 80s. Patient remains on continuous BiPAP. Had temp up to 100.9 yesterday evening. This morning 100.5. Denies chest pain, chills, palpitations.    All medications reviewed.    Review of Systems:  Pertinent items noted in HPI. All other systems reviewed and are negative.    OBJECTIVE:     Vital Signs Trends/Hx Reviewed  Vitals:    09/23/22 1000 09/23/22 1100 09/23/22 1200 09/23/22 1220   BP: (!) 166/74 (!) 151/68 (!) 155/76 (!) 143/77   BP Location:       Pulse: 77 68 71 74   Resp: (!) 44 (!) 26 (!) 36 (!) 31   Temp: 99.5 °F (37.5 °C)      TempSrc: Axillary      SpO2: (!) 90% (!) 92% 95% 97%   Weight:       Height:         Physical Exam:  General: short of breath on BiPAP, cooperative & interactive.  HEENT: AT/NC, EOMI, oral and nasal mucosa moist.   Neck: normal ROM  Cardiac: regular rate and rhythm, holosystolic murmur, brisk cap refill and symmetric pulses in distal extremities.  Respiratory: Normal inspection. Symmetric chest rise. Crackles auscultated bilaterally. Moderate shortness of breath on BiPAP 10/5/60% but able to speak in full sentences  Abdomen: Soft, NT/ND. +BS. No hepatosplenomegaly.   Extremities: 3+ pitting LE edema, continuous to torso (with trace pitting), BLE with dressing in place over venous status ulcers. Erythema up just below knee  Neuro: Grossly intact to brief exam. Oriented x3 with appropriate mood/affect to situation.     Laboratory:  Recent Labs   Lab 09/22/22  1624   PH 7.459*   PCO2 38.6   PO2 64*   HCO3 27.4   POCSATURATED 93*   BE 4     Recent Labs   Lab 09/23/22  0350   WBC 15.58*   RBC 2.94*   HGB 9.6*   HCT 28.3*      MCV 96   MCH 32.7*   MCHC 33.9     Recent Labs   Lab 09/23/22  0350      K  3.4*      CO2 28   BUN 71*   CREATININE 2.4*   MG 2.4       Microbiology Data:   Microbiology Results (last 7 days)       Procedure Component Value Units Date/Time    Blood culture [[id number]] Collected: 09/20/22 0013    Order Status: Completed Specimen: Blood Updated: 09/23/22 1212     Blood Culture, Routine No Growth to date      No Growth to date      No Growth to date      No Growth to date    Blood culture [[id number]] Collected: 09/20/22 0014    Order Status: Completed Specimen: Blood Updated: 09/23/22 1212     Blood Culture, Routine No Growth to date      No Growth to date      No Growth to date      No Growth to date    Culture, Respiratory with Gram Stain [[id number]]  (Abnormal) Collected: 09/21/22 1542    Order Status: Completed Specimen: Respiratory from Sputum Updated: 09/23/22 0947     Respiratory Culture STAPHYLOCOCCUS AUREUS  Moderate  Susceptibility pending  Normal respiratory subha also present       Gram Stain (Respiratory) <10 epithelial cells per low power field.     Gram Stain (Respiratory) Rare WBC's     Gram Stain (Respiratory) Rare Gram positive cocci     Gram Stain (Respiratory) Rare Gram negative rods             Chest Imaging:   No new imaging.     Infusions:     furosemide (LASIX) 2 mg/mL continuous infusion (titrating) 10 mg/hr (09/23/22 1100)       Scheduled Medications:    amiodarone  200 mg Oral Daily    apixaban  5 mg Oral BID    atorvastatin  40 mg Oral Daily    ceFEPime (MAXIPIME) IVPB  1 g Intravenous Q24H    doxycycline  100 mg Oral Q12H    famotidine  20 mg Oral Daily    levothyroxine  100 mcg Oral Before breakfast    metOLazone  5 mg Oral Daily    mupirocin   Nasal BID    polyethylene glycol  17 g Oral Daily    psyllium husk (with sugar)  1 packet Oral Daily       PRN Medications:   acetaminophen, albuterol sulfate, melatonin, ondansetron, sodium chloride 0.9%, sodium chloride 0.9%, Pharmacy to dose Vancomycin consult **AND** vancomycin - pharmacy to dose      ASSESSMENT &  RECOMMENDATIONS     Neuro:  - AOx4, no sedation  - No acute issues    #Fall  #Peripheral Neuropathy  - reports bilateral numbness and pain on the soles of his feet with peripheral edema  - lying on floor overnight  - CK 2607 with JAYSON and hyperkalemia at admission, patient fluid overloaded  - CK and hyperkalemia improved, continue to monitor  - macrocytic anemia, in the setting of peripheral neuropathy would check B12 and thiamine level; would also check A1c to evaluate neuropathy further    CV:  #Acute Heart Failure Exacerbation  - significant peripheral and pulmonary edema  - reported history of heart failure after CABG in 1989  - Last echo per EHR (Jan 2021) showed normal systolic and diastolic function (LVEF 60%), moderate aortic valve stenosis  - Bedside US showed good global function without wall motion abnormality  - placed on Lasix gtt at admission; was on Lasix gtt at 5mg/hr x 2hrs, then Lasix 7.5mg/hr x 5hrs before being transitioned to IV Lasix 80mg BID (increased to TID on 9/21), metolazone 5mg added 9/22.  - Currently net -2.4L since admission with 2.2L UOP over the last 24 hours  - TTE 9/20 with normal LV systolic function (LVEF 55%), indeterminate LV diastolic function, abnormal septal wall motion consistent with RV pacemaker, normal RV function, moderate tricuspid and mitral regurgitation, mild pulmonic regurgitation, mild aortic valve stenosis, pulmonary HTN (71mmHg), elevated CVP (15mmHg), mild L atrial enlargement  - CXR x2 with significant pulmonary edema  - stepped up to ICU for aggressive diuresis in the setting of worsening hypoxemia    #Elevated troponin  - 1.035 -> 0.970  - EKG dual paced rhythm  - likely 2/2 increased demand from volume overload    Pulm:  #Acute Hypoxicemic Respiratory Failure  - initially put on 2L NC and stable with SpO2 in 90s, on   - 2/2 pulmonary edema, continue diuresis and wean O2 as tolerated  - initially put on 2L NC and stable with SpO2 in 90s  - increased O2  requirements overnight 9/20-9/21 and eventually was put on 9L HFNC with BiPAP at night   - 9/22 PM patient was on 12L HFNC, but was having difficulty maintaining SpO2 in upper 80s-low 90s and having shortness of breath; was placed on BiPAP with SpO2 ~92-95%  - CXR x2 with significant pulmonary edema  - ABG 7.459/38.6/64/27.4  - stepped up to ICU for continued NIPPV and aggressive diuresis on 9/22  - on Lasix gtt 5mg/hr, will continue to wean as patient maintains good UOP  - UOP 5L over last 24 hours; net -8.66L since admission  - continues to desat off BiPAP, will wean O2 as tolerated    #LLL Pneumonia  - possible LLL pneumonia per CXR, Tmax 100.3, WBC 20 at admission  - repeat CXR 9/22 appears more consolidated  - resp cx positive for S.aureus, susceptibilities pending  - on CTX and doxycycline for CAP coverage 9/20-9/22; ceftriaxone broadened to cefepime on 9/21 and vancomycin ordered, continue    Renal:  #Acute Kidney Injury on CKD4  -BL Cr 1.5-2, Cr 3.2 at admission, improved to 2.4  -suspect cardio-renal, would continue diuresis  -continue to monitor BMP    GI/FEN:  #Hyperkalemia, now hypokalemia  - K 5.2 at admission  - administered Lokelma at OSH  - improved with diuresis, now hypokalemic  - continue to monitor and supplement PRN    #Transaminitis  - AST/ALT/Alk Phos 83/46/88, down trend on 9/20  - continue to trend    F: none  E: hyperkalemia/hypokalemia as stated above  N: Low sodium, fluid restricted diet    Heme/Onc:  #Macrocytic Anemia  - Hgb 9.7 at admission, baseline ~10.5  - Iron studies June 2022: ferritin 293, iron 65, transferrin 159, TIBC 235, iron sat 28%  - likely 2/2 CKD, but in the setting of peripheral neuropathy would check B12, folate, and thiamine level    ID:  #LLL Pneumonia  - WBC 20 at admisson  - Possible LLL per CXR at admission, repeat 9/22 appears more consolidated  - respiratory culture with S.aureus, susceptibilities pending  - started on CTX and doxycycline at admission, changed  CTX to cefepime and added vancomycin 9/22  - UA unremarkable  - Blood cultures NGTD    Endo:  #Hypothyroidism  - continue home levothyroxine  - TSH 4.178 at admission, free T4 0.88      Thank you for allowing us to participate in the care of this patient. Patient is stable for discharge. Please call with questions.    Candice Stephens M.D.  [facility] Internal Medicine, PGY-2  U Pulmonary/Critical Care Service    Pt seen and examined with Pulmonary/Critical Care team. Critical Care time was spent validating the history and physical exam, reviewing the lab and imaging results, and discussing the care of the patient with the bedside nurse. The following additional comments are made:    Dr Solo's intuition was correct.  When he stepped Mr. Ford up to the ICU yesterday, he expressed concern over HAP. Indeed, pt has a new pronounced infiltrate on the left with GPCs in the blood. Vancomycin started yesterday. Still volume overloaded. Given his overall johnathan trajectory and the complexity of his medical problems, it would be appropriate to have a goals of care discussion with him and his family.    Critical Care time 45 minutes    Waldo Gonzalez MD  Phone [phone]

## 2022-09-23 NOTE — PT/OT/SLP PROGRESS
Physical Therapy Re-evaluation and Treatment    Patient Name:  Tong Ford   MRN:  [id number]    Recommendations:     Discharge Recommendations:   (post acute therapy / placement)   Discharge Equipment Recommendations:  (TBD)   Barriers to discharge:  increased assist needed    Assessment:     Tong Ford is a 77 y.o. male admitted with a medical diagnosis of Acute on chronic diastolic congestive heart failure.  He presents with the following impairments/functional limitations:  weakness, gait instability, impaired endurance, impaired balance, impaired self care skills, impaired functional mobility, decreased coordination, impaired sensation, decreased lower extremity function, decreased upper extremity function, decreased ROM, impaired joint extensibility, impaired coordination, impaired skin, edema, impaired cardiopulmonary response to activity .Pt with improvement in O2 sats with activity this date. Bed level exercises performed. Pt on 10 L O2  and SpO2 decreased to 90% the lowest with activity. Re-eval performed 2/2 t/f to ICU. Will progress pt as able.     Rehab Prognosis: Good; patient would benefit from acute skilled PT services to address these deficits and reach maximum level of function.    Recent Surgery: * No surgery found *      Plan:     During this hospitalization, patient to be seen 5 x/week to address the identified rehab impairments via gait training, therapeutic activities, therapeutic exercises, neuromuscular re-education and progress toward the following goals:    Plan of Care Expires:  10/21/22    Subjective     Chief Complaint: Pain in feet   Patient/Family Comments/goals: Pt is pleasant and agreeable to participate in therapy  Pain/Comfort:  Pain Rating 1: 10/10  Location - Side 1: Bilateral  Location 1: foot  Pain Addressed 1: Reposition, Distraction, Nurse notified  Pain Rating Post-Intervention 1:  (not rated but reports improvement with positioning)      Objective:     Communicated with  nsg prior to session.  Patient found HOB elevated with telemetry, peripheral IV, oxygen, van catheter, blood pressure cuff upon PT entry to room.     General Precautions: Standard, fall, respiratory, aspiration   Orthopedic Precautions:N/A   Braces: N/A  Respiratory Status: Nasal cannula, flow 10 L/min     Functional Mobility:  Bed Mobility:     Rolling Left:  total assistance  Scooting: total assistance and of 2 persons supine to HOB via draw sheet      AM-PAC 6 CLICK MOBILITY  Turning over in bed (including adjusting bedclothes, sheets and blankets)?: 2  Sitting down on and standing up from a chair with arms (e.g., wheelchair, bedside commode, etc.): 1  Moving from lying on back to sitting on the side of the bed?: 1  Moving to and from a bed to a chair (including a wheelchair)?: 1  Need to walk in hospital room?: 1  Climbing 3-5 steps with a railing?: 1  Basic Mobility Total Score: 7       Therapeutic Activities and Exercises:   Pt with improvement in O2 sats with activity this date.   Bed level exercises performed.   B LE ROM/MMT/skin integrity/edema:   B lower legs/ankles wrapped in dressing; improvement in edema B LE noted - pt with mild-moderate edema (R>L) and redness R thigh/leg now absent   Pt cont to demonstrate RLE weakness (R weaker at baseline) needing AAROM for exercises; pt grossly 2+ to 3-/5 RLE and 4- to 4/5 LLE  Therex X 5-10 reps ea: AP, heel slides, abduction/adduction, and SLR's  PLB encouraged throughout   SpO2 96% at rest at beginning of session, and 92% at end of session; ranging 90-94% during activities   Pt on 10 L O2  and SpO2 decreased to 90% the lowest with activity.   Pillow placed under R hip/buttocks for pressure relief and pt repositioned for comfort  Pressure relief boots donned and B LE elevated on pillow.     Patient left  HOB elevated with R hip/buttocks offloaded with pillow  with all lines intact, call button in reach, and nsg notified..    GOALS:   Multidisciplinary Problems        Physical Therapy Goals          Problem: Physical Therapy    Goal Priority Disciplines Outcome Goal Variances Interventions   Physical Therapy Goal     PT, PT/OT Ongoing, Progressing     Description: Goals to be met by: 10/20/22     Patient will increase functional independence with mobility by performin. Supine to sit with Stand-by Assistance  2. Sit to supine with Stand-by Assistance  3. Sit to stand transfer with Stand-by Assistance  4. Bed to chair transfer with Stand-by Assistance using Rolling Walker  5. Gait  x 50 feet with Stand-by Assistance using Rolling Walker.                          Time Tracking:     PT Received On: 22  PT Start Time: 1345     PT Stop Time: 1415  PT Total Time (min): 30 min     Billable Minutes: Re-eval 10 and Therapeutic Exercise 15 (cotx with OT)    Treatment Type: Treatment  PT/PTA: PT     PTA Visit Number: 0     2022

## 2022-09-23 NOTE — ASSESSMENT & PLAN NOTE
In setting of volume overload and possible pneumonia  -cont supplemental O2 as needed, wean as tolerated--now on 9LNC  -worsening hypoxia requiring bipap  -cont diuresis with lasix gtt

## 2022-09-23 NOTE — PLAN OF CARE
Problem: Occupational Therapy  Goal: Occupational Therapy Goal  Description: Goals to be met by: 10/20/2022     Patient will increase functional independence with ADLs by performing:    UE Dressing with Modified [place].  LE Dressing with Modified [place].  Grooming while standing at sink with Modified [place].  Toileting from toilet with Modified [place] for hygiene and clothing management.   Supine to sit with Modified [place].  Step transfer with Modified [place]  Toilet transfer to toilet with Modified [place].  Increased functional strength to WFL for ADLS.  Upper extremity exercise program x10 reps per handout, with independence.    Outcome: Ongoing, Progressing

## 2022-09-23 NOTE — PLAN OF CARE
Problem: Physical Therapy  Goal: Physical Therapy Goal  Description: Goals to be met by: 10/20/22     Patient will increase functional independence with mobility by performin. Supine to sit with Stand-by Assistance  2. Sit to supine with Stand-by Assistance  3. Sit to stand transfer with Stand-by Assistance  4. Bed to chair transfer with Stand-by Assistance using Rolling Walker  5. Gait  x 50 feet with Stand-by Assistance using Rolling Walker.     Outcome: Ongoing, Progressing     Pt with improvement in O2 sats with activity this date. Bed level exercises performed. Pt on 10 L O2  and SpO2 decreased to 90% the lowest with activity. Will progress pt as able.

## 2022-09-23 NOTE — PROGRESS NOTES
Pharmacokinetic Assessment Follow Up: IV Vancomycin    Vancomycin serum concentration assessment(s):    The random level was drawn correctly and can be used to guide therapy at this time. The measurement is within the desired definitive target range of 15 to 20 mcg/mL.    Vancomycin Regimen Plan:    Give 15 mg/kg (1500 mg) IV Vancomycin x 1 dose.   Re-dose when the random level is less than 20 mcg/mL, next level to be drawn at 0400 on 9/24    Drug levels (last 3 results):  Recent Labs   Lab Result Units 09/23/22  0350   Vancomycin, Random ug/mL 16.1       Pharmacy will continue to follow and monitor vancomycin.    Please contact pharmacy at extension [phone] for questions regarding this assessment.    Thank you for the consult,   Elizabeth Mehta       Patient brief summary:  Tong Ford is a 77 y.o. male initiated on antimicrobial therapy with IV Vancomycin for treatment of lower respiratory infection    The patient's current regimen is pulse dosing    Drug Allergies:   Review of patient's allergies indicates:  No Known Allergies    Actual Body Weight:   92 kg    Renal Function:   Estimated Creatinine Clearance: 27.5 mL/min (A) (based on SCr of 2.4 mg/dL (H)).,     Dialysis Method (if applicable):  N/A    CBC (last 72 hours):  Recent Labs   Lab Result Units 09/21/22  0459 09/22/22  0430   WBC K/uL 14.34* 13.17*   Hemoglobin g/dL 9.3* 9.4*   Hematocrit % 26.9* 27.9*   Platelets K/uL 193 228   Gran % % 79.5* 81.7*   Lymph % % 10.7* 5.9*   Mono % % 8.2 10.9   Eosinophil % % 1.0 0.7   Basophil % % 0.1 0.2   Differential Method  Automated Automated       Metabolic Panel (last 72 hours):  Recent Labs   Lab Result Units 09/20/22  0804 09/21/22  0459 09/22/22  0430 09/23/22  0350   Sodium mmol/L 140 140 141 144   Potassium mmol/L 4.5 3.9 3.3* 3.4*   Chloride mmol/L 106 106 105 103   CO2 mmol/L 24 25 25 28   Glucose mg/dL 104 98 125* 110   BUN mg/dL 77* 77* 71* 71*   Creatinine mg/dL 3.2* 3.0* 2.7* 2.4*   Albumin g/dL 2.6*   --   --   --    Total Bilirubin mg/dL 0.8  --   --   --    Alkaline Phosphatase U/L 71  --   --   --    AST U/L 81*  --   --   --    ALT U/L 45*  --   --   --    Magnesium mg/dL  --  2.5 2.4 2.4   Phosphorus mg/dL  --  4.1 3.2 3.5       Vancomycin Administrations:  vancomycin given in the last 96 hours                     vancomycin (VANCOCIN) 2,000 mg in dextrose 5 % 500 mL IVPB (mg) 2,000 mg New Bag 09/22/22 0903                    Microbiologic Results:  Microbiology Results (last 7 days)       Procedure Component Value Units Date/Time    Blood culture [[id number]] Collected: 09/20/22 0013    Order Status: Completed Specimen: Blood Updated: 09/22/22 1212     Blood Culture, Routine No Growth to date      No Growth to date      No Growth to date    Blood culture [[id number]] Collected: 09/20/22 0014    Order Status: Completed Specimen: Blood Updated: 09/22/22 1212     Blood Culture, Routine No Growth to date      No Growth to date      No Growth to date    Culture, Respiratory with Gram Stain [[id number]] Collected: 09/21/22 1542    Order Status: Completed Specimen: Respiratory from Sputum Updated: 09/22/22 0909     Respiratory Culture Normal respiratory subha     Gram Stain (Respiratory) <10 epithelial cells per low power field.     Gram Stain (Respiratory) Rare WBC's     Gram Stain (Respiratory) Rare Gram positive cocci     Gram Stain (Respiratory) Rare Gram negative rods

## 2022-09-24 NOTE — PROGRESS NOTES
[place] - Intensive Care  Cardiology  Progress Note    Patient Name: Tong Ford  MRN: [id number]  Admission Date: 9/19/2022  Hospital Length of Stay: 5 days  Code Status: Full Code   Attending Physician: Waldemar Dolan MD   Primary Care Physician: Kris Zavala MD  Expected Discharge Date:   Principal Problem:Acute on chronic diastolic congestive heart failure    Subjective:     Hospital Course:   09/20/2022 Per HPI   09/21/2022 TTE   The left ventricle is normal in size with normal systolic function.   The estimated ejection fraction is 55%.   Indeterminate left ventricular diastolic function.   There is abnormal septal wall motion consistent with right ventricular pacemaker.   Normal right ventricular size with normal right ventricular systolic function.   Moderate tricuspid regurgitation.   Mild pulmonic regurgitation.   Moderate mitral regurgitation.   There is mild aortic valve stenosis.   Aortic valve area is 1.55 cm2; peak velocity is 2.60 m/s; mean gradient is 16 mmHg.   Elevated central venous pressure (15 mmHg).   The estimated PA systolic pressure is 71 mmHg.   There is pulmonary hypertension.   Mild left atrial enlargement.    774 cc intake, 2160 cc output, net -2.4L from admission. Cr stable at 3. O2 requirements increased overnight to 9 L NC. Remains SOB with edema.   09/22/2022 825 cc intake and 1500 cc output. Diuresing with Lasix 80 mg IV TID and Metolazone. Cr improving (2.7 today). Remains on 9L NC with intermittent BiPAP. Patient feels SOB is improving.     09/22/2022 Transferred to ICU yesterday due to increased O2 requirments. Placed on IV Lasix drip and Metolazone continued. 5.1L out overnight negative 4.0L in 24 hours and negative 7.9L since admission. Creatinine down to 2.4 this AM. HR and BP stable.  9/24/22: MS improved today. Off BiPAP. I/O -2.8L overnight        Review of Systems   Cardiovascular:  Negative for chest pain.   Respiratory:  Negative for shortness of breath.     Objective:     Vital Signs (Most Recent):  Temp: 99 °F (37.2 °C) (09/24/22 0857)  Pulse: 68 (09/24/22 0922)  Resp: (!) 24 (09/24/22 0922)  BP: (!) 148/66 (09/24/22 0856)  SpO2: 97 % (09/24/22 0922)   Vital Signs (24h Range):  Temp:  [97.9 °F (36.6 °C)-99.6 °F (37.6 °C)] 99 °F (37.2 °C)  Pulse:  [60-70] 68  Resp:  [12-43] 24  SpO2:  [90 %-100 %] 97 %  BP: (116-170)/(56-86) 148/66     Weight: 85.5 kg (188 lb 7.9 oz)  Body mass index is 28.66 kg/m².     SpO2: 97 %  O2 Device (Oxygen Therapy): High Flow nasal Cannula      Intake/Output Summary (Last 24 hours) at 9/24/2022 1255  Last data filed at 9/24/2022 1200  Gross per 24 hour   Intake 1551.03 ml   Output 4460 ml   Net -2908.97 ml       Lines/Drains/Airways       Drain  Duration                  Urethral Catheter 09/22/22 1700 Silicone 16 Fr. 1 day              Peripheral Intravenous Line  Duration                  Peripheral IV - Single Lumen 09/19/22 1500 18 G Anterior;Left Forearm 4 days         Peripheral IV - Single Lumen 09/22/22 2305 20 G Left Hand 1 day                    Physical Exam  Constitutional:       Appearance: Normal appearance.   Cardiovascular:      Rate and Rhythm: Normal rate and regular rhythm.      Heart sounds: No murmur heard.    No friction rub. No gallop.   Pulmonary:      Effort: Pulmonary effort is normal.      Breath sounds: Normal breath sounds.   Abdominal:      General: There is no distension.   Musculoskeletal:         General: No swelling.   Neurological:      Mental Status: He is alert.   Psychiatric:         Mood and Affect: Mood normal.       Significant Labs: CMP   Recent Labs   Lab 09/23/22  0350 09/24/22 0427    145   K 3.4* 3.5    99   CO2 28 33*    101   BUN 71* 72*   CREATININE 2.4* 2.3*   CALCIUM 8.8 8.8   ANIONGAP 13 13   , CBC   Recent Labs   Lab 09/23/22  0350 09/24/22 0427   WBC 15.58* 15.66*   HGB 9.6* 9.7*   HCT 28.3* 28.8*    261     Assessment and Plan:     Acute on chronic diastolic  congestive heart failure   TTE   The left ventricle is normal in size with normal systolic function.   The estimated ejection fraction is 55%.   Indeterminate left ventricular diastolic function.   There is abnormal septal wall motion consistent with right ventricular pacemaker.   Normal right ventricular size with normal right ventricular systolic function.   Moderate tricuspid regurgitation.   Mild pulmonic regurgitation.   Moderate mitral regurgitation.   There is mild aortic valve stenosis.   Aortic valve area is 1.55 cm2; peak velocity is 2.60 m/s; mean gradient is 16 mmHg.   Elevated central venous pressure (15 mmHg).   The estimated PA systolic pressure is 71 mmHg.   There is pulmonary hypertension.   Mild left atrial enlargement.        Recent Labs   Lab 09/19/22  1324   BNP 1,795*     - ACEI, Aldactone on hold 2/2 JAYSON  - IV furosemide gtt  - daily weights, accurate intake and output    Venous stasis ulcer  WOC on board  Venous wounds with aspect of PAD per arterial US  Will defer AO to outpatient setting given severity of ADHF +/- PNA  Medical management with statin, no asa given hx of GIB (on Eliquis). No ACEI.ARB 2/2 JAYSON on CKD     Elevated troponin  Troponin peaked at 1 on admission  No chest pain  Likely demand in setting of ADHF and possible infection   Normal LVEF   PET stress as outpatient once recovered from ADHF    Coronary artery disease  S/p CABG in 1989 with subsequent PCI in 2005  No chest pain  EKG paced  Troponin peaked on admission at 1  Not on asa or BB per primary cardiologist - will defer initiation to them  Continue statin     TTE   The left ventricle is normal in size with normal systolic function.   The estimated ejection fraction is 55%.   Indeterminate left ventricular diastolic function.   There is abnormal septal wall motion consistent with right ventricular pacemaker.   Normal right ventricular size with normal right ventricular systolic function.   Moderate  tricuspid regurgitation.   Mild pulmonic regurgitation.   Moderate mitral regurgitation.   There is mild aortic valve stenosis.   Aortic valve area is 1.55 cm2; peak velocity is 2.60 m/s; mean gradient is 16 mmHg.   Elevated central venous pressure (15 mmHg).   The estimated PA systolic pressure is 71 mmHg.   There is pulmonary hypertension.   Mild left atrial enlargement.    PET stress as outpatient once recovers from ADHF    Paroxysmal atrial fibrillation  - amiodarone, apixaban  - HR 60s-70s      VTE Risk Mitigation (From admission, onward)         Ordered     apixaban tablet 5 mg  2 times daily         09/20/22 0224     IP VTE HIGH RISK PATIENT  Once         09/19/22 2343     Place sequential compression device  Until discontinued         09/19/22 2343                Destiny Mccormick MD  Cardiology  [place] - Intensive Care

## 2022-09-24 NOTE — PROGRESS NOTES
"Progress Note  Pulmonary & Critical Care Medicine    Attending: David  Admit Date: 9/19/2022  Today's Date: 09/24/2022  Reason for Consult:  Acute CHF exacerbation requiring lasix gtt    SUBJECTIVE:     Patient feels that his SOB is about the same and maybe a little improved, especially when coming off of his BiPAP at night. He noted that he does have a mask at home which he hasn't used for months now because the machine "doesn't work".     All medications reviewed.    Review of Systems:  Pertinent items noted in HPI. All other systems reviewed and are negative.    OBJECTIVE:     Vital Signs Trends/Hx Reviewed  Vitals:    09/24/22 1400 09/24/22 1500 09/24/22 1519 09/24/22 1600   BP: (!) 125/59 (!) 112/56     Pulse: 64 64 63    Resp: (!) 31 (!) 27 (!) 22 (!) 28   Temp:       TempSrc:       SpO2: (!) 91% 98% 95%    Weight:       Height:         Physical Exam:  General: alert, on NC at 8LPM, no distress  HEENT: AT/NC, oral and nasal mucosa moist.   Neck: mild cervical kyphosis  Cardiac: regular rate and rhythm, holosystolic murmur, brisk cap refill and symmetric pulses in distal extremities.  Respiratory: Normal inspection. Symmetric chest rise. Crackles auscultated bilaterally. No use of accessory muscles. Speaks in complete sentences.  Abdomen: Soft, NT/ND. +BS. No hepatosplenomegaly.   Extremities: 3+ pitting LE edema, continuous to torso (with trace pitting), BLE with dressing in place over venous status ulcers. Erythema up just below knee  Neuro: Grossly intact to brief exam. Oriented x3 with appropriate mood/affect to situation.     Laboratory:  No results for input(s): PH, PCO2, PO2, HCO3, POCSATURATED, BE in the last 24 hours.    Recent Labs   Lab 09/24/22 0427   WBC 15.66*   RBC 3.00*   HGB 9.7*   HCT 28.8*      MCV 96   MCH 32.3*   MCHC 33.7       Recent Labs   Lab 09/24/22  0427      K 3.5   CL 99   CO2 33*   BUN 72*   CREATININE 2.3*   MG 2.5         Microbiology Data:   Microbiology " Results (last 7 days)       Procedure Component Value Units Date/Time    Urine culture [[id number]] Collected: 09/24/22 1127    Order Status: No result Specimen: Urine Updated: 09/24/22 1234    Blood culture [[id number]] Collected: 09/20/22 0014    Order Status: Completed Specimen: Blood Updated: 09/24/22 1212     Blood Culture, Routine No Growth to date      No Growth to date      No Growth to date      No Growth to date      No Growth to date    Blood culture [[id number]] Collected: 09/20/22 0013    Order Status: Completed Specimen: Blood Updated: 09/24/22 1212     Blood Culture, Routine No Growth to date      No Growth to date      No Growth to date      No Growth to date      No Growth to date    Culture, Respiratory with Gram Stain [[id number]]  (Abnormal) Collected: 09/21/22 1542    Order Status: Completed Specimen: Respiratory from Sputum Updated: 09/24/22 0826     Respiratory Culture STAPHYLOCOCCUS AUREUS  Moderate  Susceptibility pending  Normal respiratory subha also present       Gram Stain (Respiratory) <10 epithelial cells per low power field.     Gram Stain (Respiratory) Rare WBC's     Gram Stain (Respiratory) Rare Gram positive cocci     Gram Stain (Respiratory) Rare Gram negative rods             Chest Imaging:   No new imaging.     Infusions:          Scheduled Medications:    amiodarone  200 mg Oral Daily    apixaban  5 mg Oral BID    atorvastatin  40 mg Oral Daily    famotidine  20 mg Oral Daily    furosemide (LASIX) injection  60 mg Intravenous Q8H    gabapentin  300 mg Oral BID    levothyroxine  100 mcg Oral Before breakfast    metOLazone  5 mg Oral Daily    mupirocin   Nasal BID    polyethylene glycol  17 g Oral Daily    psyllium husk (with sugar)  1 packet Oral Daily       PRN Medications:   acetaminophen, albuterol sulfate, melatonin, ondansetron, oxyCODONE-acetaminophen, sodium chloride 0.9%, sodium chloride 0.9%, Pharmacy to dose Vancomycin consult **AND** vancomycin - pharmacy to  dose      ASSESSMENT & RECOMMENDATIONS     Neuro:  - AOx4, no sedation  - No acute issues    #Fall  #Peripheral Neuropathy  - reports bilateral numbness and pain on the soles of his feet with peripheral edema  - CK 2607 with JAYSON and hyperkalemia at admission, patient fluid overloaded (resolved)  - macrocytic anemia, in the setting of peripheral neuropathy B12 and thiamine pending; A1C pending    CV:  #Acute Heart Failure Exacerbation  - significant peripheral and pulmonary edema  - reported history of heart failure after CABG in 1989  - Last echo per EHR (Jan 2021) showed normal systolic and diastolic function (LVEF 60%), moderate aortic valve stenosis  - TTE 9/20 with normal LV systolic function (LVEF 55%), indeterminate LV diastolic function, abnormal septal wall motion consistent with RV pacemaker, normal RV function, moderate tricuspid and mitral regurgitation, mild pulmonic regurgitation, mild aortic valve stenosis, pulmonary HTN (71mmHg), elevated CVP (15mmHg), mild L atrial enlargement  - CXR x2 with significant pulmonary edema  - IV Lasix drip with net negative for the last few days; transition to IV lasix 80mg q8 with metolazone    #Elevated troponin  - 1.035 -> 0.970  - EKG dual paced rhythm  - likely 2/2 increased demand from volume overload    Pulm:  #Acute Hypoxicemic Respiratory Failure  - initially put on 2L NC and stable with SpO2 in 90s, on   - 2/2 pulmonary edema, continue diuresis and wean O2 as tolerated  - stepped up to ICU for continued NIPPV and aggressive diuresis on 9/22  - on Lasix gtt with good UOP  - Pt now able to tolerate NC during the day and NIV at night while he continues to diurese    #LLL Pneumonia  - possible LLL pneumonia per CXR, Tmax 100.3, WBC 20 at admission  - repeat CXR 9/22 appears more consolidated  - resp cx positive for S.aureus, susceptibilities pending  - on CTX and doxycycline for CAP coverage 9/20-9/22; ceftriaxone broadened to cefepime on 9/21 and vancomycin      Renal:  #Acute Kidney Injury on CKD4  -BL Cr 1.5-2, Cr 3.2 at admission, improved to 2.4  -suspect cardio-renal, would continue diuresis  -continue to monitor BMP    GI/FEN:  #Transaminitis  - AST/ALT/Alk Phos 83/46/88, down trend on 9/20  - continue to trend    F: none  E: hyperkalemia/hypokalemia as stated above  N: Low sodium, fluid restricted diet    Heme/Onc:  #Macrocytic Anemia  - Hgb 9.7 at admission, baseline ~10.5  - Iron studies June 2022: ferritin 293, iron 65, transferrin 159, TIBC 235, iron sat 28%  - likely 2/2 CKD, but in the setting of peripheral neuropathy B12, folate, and thiamine level pending    ID:  #LLL Pneumonia  - WBC 20 at admisson  - Possible LLL per CXR at admission, repeat 9/22 appears more consolidated  - respiratory culture with S.aureus, susceptibilities pending  - started on CTX and doxycycline at admission, changed CTX to cefepime and added vancomycin 9/22  - UA unremarkable  - Blood cultures NGTD    Endo:  #Hypothyroidism  - continue home levothyroxine  - TSH 4.178 at admission, free T4 0.88      Thank you for allowing us to participate in the care of this patient. Patient is stable for discharge for downgrade from the ICU. Recommended continued aggressive diuresis with weaning O2 as tolerated and NIV at night. He would benefit from social work getting with his home health company to trouble shoot his nightly BiPAP machine prior to discharge.    Justin Ellerman, M.D.  [facility]

## 2022-09-24 NOTE — PLAN OF CARE
Problem: Occupational Therapy  Goal: Occupational Therapy Goal  Description: Goals to be met by: 10/20/2022     Patient will increase functional independence with ADLs by performing:    UE Dressing with Modified [place].  LE Dressing with Modified [place].  Grooming while standing at sink with Modified [place].  Toileting from toilet with Modified [place] for hygiene and clothing management.   Supine to sit with Modified [place].  Step transfer with Modified [place]  Toilet transfer to toilet with Modified [place].  Increased functional strength to WFL for ADLS.  Upper extremity exercise program x10 reps per handout, with independence.      9/24/2022 1807 by Letty Nelson OT  Outcome: Ongoing, Progressing   Pt progressing towards OT goals. Cont OT POC

## 2022-09-24 NOTE — NURSING
MD notified UO 200mL/hr last several hours. Currently on 5mg/hr. MD states to decrease gtt to 4mg/hr, will carry out orders.

## 2022-09-24 NOTE — PLAN OF CARE
Dr Ellerman, pulm, here, lasix drip to be discontinued, order per Dr ellison, also told him of transfer orders, told him that I was going from 8-10 liters throughout day and that if his O2 comes off, his sat's will drop to 76%, inc 02 to 10 or more liters, and then wean back down when goes up, takes over 6 min to recover, also informed him of pain med ordered for him, for general body pain, and leg ulcer pain, mary prior to wound care, stated he will possibly dec the frequency.  Wound care given, rt lower calf more affected, larger areas of denuded , red, exposed, skin, not draining, painful to touch, , left calf with smaller areas of exposed red, skin

## 2022-09-24 NOTE — PLAN OF CARE
Dr Lee, here, spoke with pt, told needing to go up on the lasix drip, not getting 150cc hr, stated to try to get the 150cc hr

## 2022-09-24 NOTE — SUBJECTIVE & OBJECTIVE
Review of Systems   Cardiovascular:  Negative for chest pain.   Respiratory:  Negative for shortness of breath.    Objective:     Vital Signs (Most Recent):  Temp: 99 °F (37.2 °C) (09/24/22 0857)  Pulse: 68 (09/24/22 0922)  Resp: (!) 24 (09/24/22 0922)  BP: (!) 148/66 (09/24/22 0856)  SpO2: 97 % (09/24/22 0922)   Vital Signs (24h Range):  Temp:  [97.9 °F (36.6 °C)-99.6 °F (37.6 °C)] 99 °F (37.2 °C)  Pulse:  [60-70] 68  Resp:  [12-43] 24  SpO2:  [90 %-100 %] 97 %  BP: (116-170)/(56-86) 148/66     Weight: 85.5 kg (188 lb 7.9 oz)  Body mass index is 28.66 kg/m².     SpO2: 97 %  O2 Device (Oxygen Therapy): High Flow nasal Cannula      Intake/Output Summary (Last 24 hours) at 9/24/2022 1255  Last data filed at 9/24/2022 1200  Gross per 24 hour   Intake 1551.03 ml   Output 4460 ml   Net -2908.97 ml       Lines/Drains/Airways       Drain  Duration                  Urethral Catheter 09/22/22 1700 Silicone 16 Fr. 1 day              Peripheral Intravenous Line  Duration                  Peripheral IV - Single Lumen 09/19/22 1500 18 G Anterior;Left Forearm 4 days         Peripheral IV - Single Lumen 09/22/22 2305 20 G Left Hand 1 day                    Physical Exam  Constitutional:       Appearance: Normal appearance.   Cardiovascular:      Rate and Rhythm: Normal rate and regular rhythm.      Heart sounds: No murmur heard.    No friction rub. No gallop.   Pulmonary:      Effort: Pulmonary effort is normal.      Breath sounds: Normal breath sounds.   Abdominal:      General: There is no distension.   Musculoskeletal:         General: No swelling.   Neurological:      Mental Status: He is alert.   Psychiatric:         Mood and Affect: Mood normal.       Significant Labs: CMP   Recent Labs   Lab 09/23/22  0350 09/24/22  0427    145   K 3.4* 3.5    99   CO2 28 33*    101   BUN 71* 72*   CREATININE 2.4* 2.3*   CALCIUM 8.8 8.8   ANIONGAP 13 13   , CBC   Recent Labs   Lab 09/23/22  0350 09/24/22  0427   WBC  15.58* 15.66*   HGB 9.6* 9.7*   HCT 28.3* 28.8*    261

## 2022-09-24 NOTE — PT/OT/SLP PROGRESS
Occupational Therapy   Treatment    Name: Tong Ford  MRN: [id number]  Admitting Diagnosis:  Acute on chronic diastolic congestive heart failure       Recommendations:     Discharge Recommendations:  (post acute placement)  Discharge Equipment Recommendations:   (TBD)  Barriers to discharge:   (increase level of assistance, multiple falls at home)    Assessment:     Tong Ford is a 77 y.o. male with a medical diagnosis of Acute on chronic diastolic congestive heart failure.  He presents with deconditioning but improving tolerance for therapeutic activities with BIPAP donned . Performance deficits affecting function are weakness, impaired endurance, impaired self care skills, impaired balance, gait instability, impaired functional mobility, decreased coordination, decreased upper extremity function, decreased lower extremity function, decreased ROM, impaired fine motor, impaired skin, edema, impaired cardiopulmonary response to activity.     Rehab Prognosis:  Good; patient would benefit from acute skilled OT services to address these deficits and reach maximum level of function.       Plan:     Patient to be seen 5 x/week to address the above listed problems via self-care/home management, therapeutic activities, therapeutic exercises  Plan of Care Expires: 10/20/22  Plan of Care Reviewed with: patient    Subjective     Pain/Comfort:  Pain Rating 1:  (did not rate)    Objective:     Communicated with: nsg prior to session.  Patient found HOB elevated with blood pressure cuff, telemetry, peripheral IV, pulse ox (continuous), van catheter, BIPAP upon OT entry to room.    General Precautions: Standard, aspiration, fall, respiratory   Orthopedic Precautions:N/A   Braces: N/A  Respiratory Status:  BIPAP     Occupational Performance:     Bed Mobility:    Patient completed Rolling/Turning to Left with  moderate assistance  Patient completed Rolling/Turning to Right with moderate assistance  Patient completed  Scooting/Bridging with maximal assistance  Patient completed Supine to Sit with maximal assistance  Patient completed Sit to Supine with maximal assistance     Functional Mobility/Transfers:  Patient completed Sit <> Stand Transfer with moderate assistance and maximal assistance  with  rolling walker   Functional Mobility: Pt with fair- to poor+ dynamic seated and standing balance.     Activities of Daily Living:  Upper Body Dressing: moderate assistance to don gown as robe seated EOB  Lower Body Dressing: total assistance to don/doff B socks seated EOB      AMPAC 6 Click ADL: 13    Treatment & Education:  Pt educated on role of OT and POC.   Pt performing skills as listed above.   Pt performed x2 sit<>stands and able to advance RLE ~3 inches laterally to R in attempts to take side steps but unable to advance LLE while in stance. Increased assistance to return to bed 2/2 fatigue/limited endurance    Patient left left sidelying with all lines intact, call button in reach, nsg notified, and nsg present    GOALS:   Multidisciplinary Problems       Occupational Therapy Goals          Problem: Occupational Therapy    Goal Priority Disciplines Outcome Interventions   Occupational Therapy Goal     OT, PT/OT Ongoing, Progressing    Description: Goals to be met by: 10/20/2022     Patient will increase functional independence with ADLs by performing:    UE Dressing with Modified Wirt.  LE Dressing with Modified Wirt.  Grooming while standing at sink with Modified Wirt.  Toileting from toilet with Modified Wirt for hygiene and clothing management.   Supine to sit with Modified Wirt.  Step transfer with Modified Wirt  Toilet transfer to toilet with Modified Wirt.  Increased functional strength to WFL for ADLS.  Upper extremity exercise program x10 reps per handout, with independence.                         Time Tracking:     OT Date of Treatment: 09/24/22  OT Start Time:  1423  OT Stop Time: 1450  OT Total Time (min): 27 min    Billable Minutes:Therapeutic Activity 27    OT/CLARITZA: OT     CLARITZA Visit Number: 0    9/24/2022

## 2022-09-24 NOTE — PROGRESS NOTES
Pharmacokinetic Assessment Follow Up: IV Vancomycin    Vancomycin serum concentration assessment(s):    The random level was drawn correctly and can be used to guide therapy at this time. The measurement is above the desired definitive target range of 15 to 20 mcg/mL.    Vancomycin Regimen Plan:    Re-dose when the random level is less than 20 mcg/mL, next level to be drawn at 0400 on 9/25    Drug levels (last 3 results):  Recent Labs   Lab Result Units 09/23/22  0350 09/24/22  0427   Vancomycin, Random ug/mL 16.1 26.2       Pharmacy will continue to follow and monitor vancomycin.    Please contact pharmacy at extension [phone] for questions regarding this assessment.    Thank you for the consult,   Elizabeth Mehta       Patient brief summary:  Tong Ford is a 77 y.o. male initiated on antimicrobial therapy with IV Vancomycin for treatment of lower respiratory infection    The patient's current regimen is pulse dosing    Drug Allergies:   Review of patient's allergies indicates:  No Known Allergies    Actual Body Weight:   85.5 kg    Renal Function:   Estimated Creatinine Clearance: 26.5 mL/min (A) (based on SCr of 2.4 mg/dL (H)).,     Dialysis Method (if applicable):  N/A    CBC (last 72 hours):  Recent Labs   Lab Result Units 09/22/22  0430 09/23/22  0350 09/24/22  0427   WBC K/uL 13.17* 15.58* 15.66*   Hemoglobin g/dL 9.4* 9.6* 9.7*   Hematocrit % 27.9* 28.3* 28.8*   Platelets K/uL 228 239 261   Gran % % 81.7* 82.0* 79.6*   Lymph % % 5.9* 3.0* 7.9*   Mono % % 10.9 11.0 8.4   Eosinophil % % 0.7 2.0 2.8   Basophil % % 0.2 1.0 0.3   Differential Method  Automated Automated Automated       Metabolic Panel (last 72 hours):  Recent Labs   Lab Result Units 09/22/22  0430 09/23/22  0350   Sodium mmol/L 141 144   Potassium mmol/L 3.3* 3.4*   Chloride mmol/L 105 103   CO2 mmol/L 25 28   Glucose mg/dL 125* 110   BUN mg/dL 71* 71*   Creatinine mg/dL 2.7* 2.4*   Magnesium mg/dL 2.4 2.4   Phosphorus mg/dL 3.2 3.5        Vancomycin Administrations:  vancomycin given in the last 96 hours                     vancomycin 1.5 g in dextrose 5 % 250 mL IVPB (ready to mix) (mg) 1,500 mg New Bag 09/23/22 0538    vancomycin (VANCOCIN) 2,000 mg in dextrose 5 % 500 mL IVPB (mg) 2,000 mg New Bag 09/22/22 0903                    Microbiologic Results:  Microbiology Results (last 7 days)       Procedure Component Value Units Date/Time    Blood culture [[id number]] Collected: 09/20/22 0013    Order Status: Completed Specimen: Blood Updated: 09/23/22 1212     Blood Culture, Routine No Growth to date      No Growth to date      No Growth to date      No Growth to date    Blood culture [[id number]] Collected: 09/20/22 0014    Order Status: Completed Specimen: Blood Updated: 09/23/22 1212     Blood Culture, Routine No Growth to date      No Growth to date      No Growth to date      No Growth to date    Culture, Respiratory with Gram Stain [[id number]]  (Abnormal) Collected: 09/21/22 1542    Order Status: Completed Specimen: Respiratory from Sputum Updated: 09/23/22 0947     Respiratory Culture STAPHYLOCOCCUS AUREUS  Moderate  Susceptibility pending  Normal respiratory subha also present       Gram Stain (Respiratory) <10 epithelial cells per low power field.     Gram Stain (Respiratory) Rare WBC's     Gram Stain (Respiratory) Rare Gram positive cocci     Gram Stain (Respiratory) Rare Gram negative rods

## 2022-09-24 NOTE — PLAN OF CARE
Pt awake and alert, denies pain at present, grimaces though when  he moves, and mary when I move his legs, he can not move them on his own, resp unlabored, 22-26, inc with exertion, course lung fields, crackles post bases, diminished, weaning h 02 as lesli presently at 10l, dec to 8 l, paced rhythm, murmur, no chest pain, edema dependent, and in hips, thighs, legs, states better, remains on lasix drip, tends to keep head down and his hand on his head, asked if he was in pain, said no , not at this time, said he just tends to do this, asked if he felt down and depressed, stated maybe, , does not want tv on, no music at this time, likes to read, but does not want to now, says hard for him to read, may need new readers, let him talk, encouragement given    Has tenderness in his inner groins mary rt , where he had  shingles, states he has nerve pain, and takes the neurontin, toes are sensitive, to touch, dressings to legs, dry and intact, has many bruises, large and small, mary left upper arm, and post rt upper buttock, noted excoriation inner groins, shearing scrotum, and purple area left inner buttock

## 2022-09-24 NOTE — PLAN OF CARE
Pt requested his bipap, states does not feel sob, lungs unchanged, pulm team by and Dr Perez, attending, talked with him about using the bipap even during the day, to help him rest better and open up his lungs, so he wanted to try    OT here, pt was assisted to side of bed, with me and her, and then did some leg exercises, able to move his legs, then stood, for about 5 min, kept bipap on , assisted back to bed, turned on side,

## 2022-09-24 NOTE — PLAN OF CARE
No acute events this shift. Pt AAOx4. Afebrile. Regular paced rhythm on monitor. HR and BP stable.  O2 sats stable on 10L HFNC. BiPAP worn overnight, tolerated well. UO adequate, see I&O. Lasix gtt infusing at 4mg/hr per MD order. UO maintained >150mL/hr. Wound care performed to BLE venous stasis ulcers. Education provided on venous ulcers. Pt repositioned frequently to prevent skin breakdown. POC reviewed with patient. Pt progressing towards goals. WCTM.    Problem: Adult Inpatient Plan of Care  Goal: Plan of Care Review  Outcome: Ongoing, Progressing  Goal: Patient-Specific Goal (Individualized)  Outcome: Ongoing, Progressing  Goal: Absence of Hospital-Acquired Illness or Injury  Outcome: Ongoing, Progressing  Goal: Optimal Comfort and Wellbeing  Outcome: Ongoing, Progressing  Goal: Readiness for Transition of Care  Outcome: Ongoing, Progressing     Problem: Fluid and Electrolyte Imbalance (Acute Kidney Injury/Impairment)  Goal: Fluid and Electrolyte Balance  Outcome: Ongoing, Progressing     Problem: Oral Intake Inadequate (Acute Kidney Injury/Impairment)  Goal: Optimal Nutrition Intake  Outcome: Ongoing, Progressing     Problem: Renal Function Impairment (Acute Kidney Injury/Impairment)  Goal: Effective Renal Function  Outcome: Ongoing, Progressing     Problem: Impaired Wound Healing  Goal: Optimal Wound Healing  Outcome: Ongoing, Progressing     Problem: Infection  Goal: Absence of Infection Signs and Symptoms  Outcome: Ongoing, Progressing     Problem: Fall Injury Risk  Goal: Absence of Fall and Fall-Related Injury  Outcome: Ongoing, Progressing     Problem: Skin Injury Risk Increased  Goal: Skin Health and Integrity  Outcome: Ongoing, Progressing     Problem: Adjustment to Illness (Sepsis/Septic Shock)  Goal: Optimal Coping  Outcome: Ongoing, Progressing     Problem: Bleeding (Sepsis/Septic Shock)  Goal: Absence of Bleeding  Outcome: Ongoing, Progressing     Problem: Glycemic Control Impaired (Sepsis/Septic  Shock)  Goal: Blood Glucose Level Within Desired Range  Outcome: Ongoing, Progressing     Problem: Infection Progression (Sepsis/Septic Shock)  Goal: Absence of Infection Signs and Symptoms  Outcome: Ongoing, Progressing     Problem: Nutrition Impaired (Sepsis/Septic Shock)  Goal: Optimal Nutrition Intake  Outcome: Ongoing, Progressing     Problem: Fluid Imbalance (Pneumonia)  Goal: Fluid Balance  Outcome: Ongoing, Progressing     Problem: Infection (Pneumonia)  Goal: Resolution of Infection Signs and Symptoms  Outcome: Ongoing, Progressing     Problem: Respiratory Compromise (Pneumonia)  Goal: Effective Oxygenation and Ventilation  Outcome: Ongoing, Progressing

## 2022-09-25 NOTE — PLAN OF CARE
Pt/ot here , pt feels he needs to have a bm, pt to side of bed, then up to bsc, weaker on feet than yesterday, co more of pain in rt foot, feels it is burning , tingling, on gabentin, but dose here is less than taking at home, states his dose at home fluctuates from 300-600 mgm bid, depending on kidney function      1235 back to bed ,, did not have bm

## 2022-09-25 NOTE — PROGRESS NOTES
"[facility]  [facility]  Progress Note    Patient Name: Tong Ford  MRN: [id number]  Patient Class: IP- Inpatient   Admission Date: 9/19/2022  Length of Stay: 6 days  Attending Physician: Waldemar Dolan MD  Primary Care Provider: Kris Zavala MD        Subjective:     Principal Problem:Acute on chronic diastolic congestive heart failure        HPI:  Per transfer note:  "Patient is a 77 y.o. male who has a past medical history of arthritis, HTN, AFIB, HLD, TIA, CAD, CHF, hypothyroidism, normocytic anemia and PSHx of CABG, coronary angioplasty with stent, and pacemaker presenting today for shortness of breath, weakness and swelling. ED workup significant for elevated troponin, worsening JAYSON, elevated BNP, leukocytosis, hyperkalemia, elevated CPK, hypoxia requiring O2 supplementation, chest x-ray with increased bilateral asymmetric airspace disease consistent with CHF/volume overload and possible LLL pneumonia. See below labs and imaging. Patient started on lasix drip, given lokelma, and IV Rocephin/Az. No ICU beds available at this facility therefore pt will need to transfer for higher level of care. Stable for transfer."    On arrival to [facility], the patient was continued on lasix drip. He is sleeping and unable to answer any questions. Per chart review, he endorsed orthopnea, worsening GOODWIN. He was told by his cardiologist to report to the ED to be admitted for IV lasix. He stated that having the extra fluid on his legs and scrotal area made it difficult to walk and he fell at home last night on concrete, denies hitting head. He was on the floor for several hours before being found. He was taking 60mg of lasix daily and lionel, reported adequate urination. Pt also has chronic bilat LE venous statis ulcers and he follows with wound care. He had BNP 1795, troponin 1.03 with downtrend, creat 3.1, K 5.2, CK 2600. a temperature of 100.2, WBC 20 at sending facility. He was given lokelma, rocephin and " azithromycin, and started on lasix drip. Prior echo with normal EF. He is on 2LNC with adequate O2 sat on arrival. Legs with stasis ulcers, right leg with redness noted.           Overview/Hospital Course:  No notes on file    Interval History:  Remains on 10L HF. Diuresing well. Slight bump in Cr.     Review of Systems   Constitutional:  Negative for fever.   Respiratory:  Positive for shortness of breath.    Cardiovascular:  Positive for leg swelling. Negative for chest pain.   Musculoskeletal:  Positive for arthralgias.   Skin:  Positive for rash and wound.   Neurological:  Positive for weakness.   Objective:     Vital Signs (Most Recent):  Temp: 98.8 °F (37.1 °C) (09/25/22 1200)  Pulse: 67 (09/25/22 1300)  Resp: (!) 28 (09/25/22 1300)  BP: (!) 144/68 (09/25/22 1300)  SpO2: (!) 89 % (09/25/22 1300)   Vital Signs (24h Range):  Temp:  [98 °F (36.7 °C)-99 °F (37.2 °C)] 98.8 °F (37.1 °C)  Pulse:  [60-71] 67  Resp:  [13-33] 28  SpO2:  [87 %-99 %] 89 %  BP: (102-144)/(51-68) 144/68     Weight: 82.5 kg (181 lb 14.1 oz)  Body mass index is 27.65 kg/m².    Intake/Output Summary (Last 24 hours) at 9/25/2022 1639  Last data filed at 9/25/2022 1553  Gross per 24 hour   Intake 1288.15 ml   Output 2910 ml   Net -1621.85 ml        Physical Exam  Vitals and nursing note reviewed.   Constitutional:       General: He is not in acute distress.     Appearance: He is obese.   HENT:      Head: Normocephalic and atraumatic.      Nose: Nose normal.      Mouth/Throat:      Mouth: Mucous membranes are moist.   Eyes:      Pupils: Pupils are equal, round, and reactive to light.   Cardiovascular:      Rate and Rhythm: Normal rate and regular rhythm.      Pulses: Normal pulses.      Heart sounds: Murmur heard.   Pulmonary:      Effort: Pulmonary effort is normal. No respiratory distress.      Breath sounds: No rales.      Comments: On supplemental O2 via nasal cannula  Abdominal:      General: Bowel sounds are normal.      Palpations: Abdomen  is soft.   Musculoskeletal:         General: Swelling present. Normal range of motion.      Cervical back: Normal range of motion.      Right lower leg: Edema present.      Left lower leg: Edema present.   Skin:     General: Skin is warm and dry.      Findings: Erythema and lesion present.   Neurological:      Motor: Weakness present.      Comments: Unable to assess   Psychiatric:      Comments: Unable to assess       Significant Labs: All pertinent labs within the past 24 hours have been reviewed.    Significant Imaging: I have reviewed all pertinent imaging results/findings within the past 24 hours.      Assessment/Plan:      * Acute on chronic diastolic congestive heart failure  -initially on lasix drip; now transitioned IV pushes of 80 mg t.i.d.; add metolazone today  -monitor electrolytes  -echo with EF 55%, moderate mitral regurg, elevated CVP 15 mm Hg  -consult cardiology  -fluid restriction  -hold ACE and BB for now given soft BP  -bipap transitioned to hfnc  -cont lasix 60mg q8h ivp  -reduce metolazone to 3x/week  -monitor kidney function  -pending downgrade to floor    Staphylococcal pneumonia        Neuropathy, peripheral, idiopathic  -resume gabapentin      Volume overload state of heart        Debility  -PT/OT consult; will likely require placement      Acute hypoxemic respiratory failure  In setting of volume overload and possible pneumonia  -cont supplemental O2 as needed, wean as tolerated--now on 9LNC  -worsening hypoxia requiring bipap  -transitioned to hfnc  -cont diuresis      Sepsis  Meets sepsis criteria for WBC, temp, hypoxia  -treating for possible pneumonia and cellulitis  -resp cx growing staph          Rhabdomyolysis  Fell and laid on floor for hours  CPK 2607 with JAYSON  -unable to give fluids due to volume overload  -repeat CK down trending  -consult nephrology as needed      Pneumonia  Possible LLL pneumonia on imaging  Temp 100.4, WBC 20  -blood cx no growth to date  -sputum cx growing  staph  -de-escalate abx to vancomycin  -cont supplemental O2 as needed        Venous stasis ulcer  BLE with ulcers  -right leg with redness   -on rocephin and doxy  -consult wound care        Elevated troponin  In setting of volume overload; likely secondary to demand  -troponin 1.03--0.9  -plan for outpatient PET stress test  -cardiology consulted  -echo as above      Coronary artery disease  Hx of CABG and stent  -no antiplatelet on home med list  -cont statin  -hold BB for low BP      Paroxysmal atrial fibrillation  -cont amiodarone and eliquis  -hold BB for now      Acute renal failure superimposed on stage 3 chronic kidney disease  -possibly cardiorenal  -diuresing  -monitor labs  -renally dose medications  -baseline creatinine around 2.5    Thyroid disease  -cont synthroid   -TSH mildly elevated with normal T4      Essential hypertension  BP soft on admission  -hold home meds for now and resume as tolerated      CESAR (obstructive sleep apnea)  -CPAP at night         VTE Risk Mitigation (From admission, onward)         Ordered     apixaban tablet 5 mg  2 times daily         09/20/22 0224     IP VTE HIGH RISK PATIENT  Once         09/19/22 2343     Place sequential compression device  Until discontinued         09/19/22 2343                Discharge Planning   YUNIEL:      Code Status: Full Code   Is the patient medically ready for discharge?:     Reason for patient still in hospital (select all that apply): Patient trending condition, Treatment and Consult recommendations  Discharge Plan A: Rehab   Discharge Delays: None known at this time        Critical care time spent on the evaluation and treatment of severe organ dysfunction, review of pertinent labs and imaging studies, discussions with consulting providers and discussions with patient/family: 45 minutes.      Waldemar Dolan MD  Department of Hospital Medicine   [facility] - Intensive [facility]

## 2022-09-25 NOTE — PROGRESS NOTES
[facility] - Intensive Care  Cardiology  Progress Note    Patient Name: Tong Ford  MRN: [id number]  Admission Date: 9/19/2022  Hospital Length of Stay: 6 days  Code Status: Full Code   Attending Physician: Waldemar Dolan MD   Primary Care Physician: Kris Zavala MD  Expected Discharge Date:   Principal Problem:Acute on chronic diastolic congestive heart failure    Subjective:     Hospital Course:   09/20/2022 Per HPI   09/21/2022 TTE   The left ventricle is normal in size with normal systolic function.   The estimated ejection fraction is 55%.   Indeterminate left ventricular diastolic function.   There is abnormal septal wall motion consistent with right ventricular pacemaker.   Normal right ventricular size with normal right ventricular systolic function.   Moderate tricuspid regurgitation.   Mild pulmonic regurgitation.   Moderate mitral regurgitation.   There is mild aortic valve stenosis.   Aortic valve area is 1.55 cm2; peak velocity is 2.60 m/s; mean gradient is 16 mmHg.   Elevated central venous pressure (15 mmHg).   The estimated PA systolic pressure is 71 mmHg.   There is pulmonary hypertension.   Mild left atrial enlargement.    774 cc intake, 2160 cc output, net -2.4L from admission. Cr stable at 3. O2 requirements increased overnight to 9 L NC. Remains SOB with edema.   09/22/2022 825 cc intake and 1500 cc output. Diuresing with Lasix 80 mg IV TID and Metolazone. Cr improving (2.7 today). Remains on 9L NC with intermittent BiPAP. Patient feels SOB is improving.     09/22/2022 Transferred to ICU yesterday due to increased O2 requirments. Placed on IV Lasix drip and Metolazone continued. 5.1L out overnight negative 4.0L in 24 hours and negative 7.9L since admission. Creatinine down to 2.4 this AM. HR and BP stable.  9/24/22: MS improved today. Off BiPAP. I/O -2.8L overnight  9/25/22: ~I/O -1800. Slight increase in Cr. Reports SOB continues to improve.        Review of Systems    Cardiovascular:  Negative for chest pain.   Respiratory:  Negative for shortness of breath.    Objective:     Vital Signs (Most Recent):  Temp: 99 °F (37.2 °C) (09/25/22 0742)  Pulse: 61 (09/25/22 0900)  Resp: (!) 22 (09/25/22 0900)  BP: (!) 130/59 (09/25/22 0900)  SpO2: (!) 92 % (09/25/22 0900)   Vital Signs (24h Range):  Temp:  [98 °F (36.7 °C)-99 °F (37.2 °C)] 99 °F (37.2 °C)  Pulse:  [60-71] 61  Resp:  [13-36] 22  SpO2:  [87 %-99 %] 92 %  BP: (102-144)/(51-65) 130/59     Weight: 82.5 kg (181 lb 14.1 oz)  Body mass index is 27.65 kg/m².     SpO2: (!) 92 %  O2 Device (Oxygen Therapy): High Flow nasal Cannula      Intake/Output Summary (Last 24 hours) at 9/25/2022 1109  Last data filed at 9/25/2022 0900  Gross per 24 hour   Intake 1099.05 ml   Output 3095 ml   Net -1995.95 ml       Lines/Drains/Airways       Drain  Duration                  Urethral Catheter 09/22/22 1700 Silicone 16 Fr. 2 days              Peripheral Intravenous Line  Duration                  Peripheral IV - Single Lumen 09/19/22 1500 18 G Anterior;Left Forearm 5 days         Peripheral IV - Single Lumen 09/22/22 2305 20 G Left Hand 2 days                    Physical Exam  Constitutional:       Appearance: Normal appearance.   Cardiovascular:      Rate and Rhythm: Normal rate and regular rhythm.      Heart sounds: No murmur heard.    No friction rub. No gallop.   Pulmonary:      Effort: Pulmonary effort is normal.      Breath sounds: Normal breath sounds.   Abdominal:      General: There is no distension.   Musculoskeletal:         General: No swelling.   Neurological:      Mental Status: He is alert.   Psychiatric:         Mood and Affect: Mood normal.       Significant Labs: CMP   Recent Labs   Lab 09/24/22  0427 09/25/22  0422    146*   K 3.5 3.3*   CL 99 96   CO2 33* 38*    108   BUN 72* 81*   CREATININE 2.3* 2.5*   CALCIUM 8.8 8.5*   ANIONGAP 13 12    and CBC   Recent Labs   Lab 09/24/22  0427 09/25/22  0422   WBC 15.66* 11.96    HGB 9.7* 9.9*   HCT 28.8* 29.2*    289         Assessment and Plan:     Acute on chronic diastolic congestive heart failure   TTE   The left ventricle is normal in size with normal systolic function.   The estimated ejection fraction is 55%.   Indeterminate left ventricular diastolic function.   There is abnormal septal wall motion consistent with right ventricular pacemaker.   Normal right ventricular size with normal right ventricular systolic function.   Moderate tricuspid regurgitation.   Mild pulmonic regurgitation.   Moderate mitral regurgitation.   There is mild aortic valve stenosis.   Aortic valve area is 1.55 cm2; peak velocity is 2.60 m/s; mean gradient is 16 mmHg.   Elevated central venous pressure (15 mmHg).   The estimated PA systolic pressure is 71 mmHg.   There is pulmonary hypertension.   Mild left atrial enlargement.     - ACEI, Aldactone on hold 2/2 JAYSON  - IV furosemide  - Change metolazone to 3x/week (not daily)  - Watch Cr  - daily weights, accurate intake and output     Venous stasis ulcer  WOC on board  Venous wounds with aspect of PAD per arterial US  Will defer AO to outpatient setting given severity of ADHF +/- PNA  Medical management with statin, no asa given hx of GIB (on Eliquis). No ACEI.ARB 2/2 JAYSON on CKD      Elevated troponin  Troponin peaked at 1 on admission  No chest pain  Likely demand in setting of ADHF and possible infection   Normal LVEF   PET stress as outpatient once recovered from ADHF     Coronary artery disease  S/p CABG in 1989 with subsequent PCI in 2005  No chest pain  EKG paced  Troponin peaked on admission at 1  Not on asa or BB per primary cardiologist - will defer initiation to them  Continue statin      TTE   The left ventricle is normal in size with normal systolic function.   The estimated ejection fraction is 55%.   Indeterminate left ventricular diastolic function.   There is abnormal septal wall motion consistent with right  ventricular pacemaker.   Normal right ventricular size with normal right ventricular systolic function.   Moderate tricuspid regurgitation.   Mild pulmonic regurgitation.   Moderate mitral regurgitation.   There is mild aortic valve stenosis.   Aortic valve area is 1.55 cm2; peak velocity is 2.60 m/s; mean gradient is 16 mmHg.   Elevated central venous pressure (15 mmHg).   The estimated PA systolic pressure is 71 mmHg.   There is pulmonary hypertension.   Mild left atrial enlargement.     PET stress as outpatient once recovers from ADHF     Paroxysmal atrial fibrillation  - amiodarone, apixaban  - HR 60s-70s        VTE Risk Mitigation (From admission, onward)         Ordered     apixaban tablet 5 mg  2 times daily         09/20/22 0224     IP VTE HIGH RISK PATIENT  Once         09/19/22 2343     Place sequential compression device  Until discontinued         09/19/22 2343                Destiny Mccormick MD  Cardiology  [facility] - Intensive Care

## 2022-09-25 NOTE — PT/OT/SLP PROGRESS
Occupational Therapy   Treatment    Name: Tong Ford  MRN: [id number]  Admitting Diagnosis:  Acute on chronic diastolic congestive heart failure       Recommendations:     Discharge Recommendations:  (TBD - post acute placement)  Discharge Equipment Recommendations:   (TBD at next level of care)  Barriers to discharge:  Decreased caregiver support    Assessment:     Tong Ford is a 77 y.o. male with a medical diagnosis of Acute on chronic diastolic congestive heart failure.  He presents with the following performance deficits affecting function are weakness, impaired endurance, impaired sensation, impaired self care skills, impaired functional mobility, gait instability, impaired balance, decreased coordination, decreased lower extremity function, decreased upper extremity function, decreased safety awareness, decreased ROM, impaired coordination, impaired skin, impaired cardiopulmonary response to activity. Pt was agreeable to OT and was noted to make progress towards his goals in therapy.  During today's session, pt worked on func mobility, UE therex, and ADLs.  Pt reported feeling less energetic then yesterday and required max assistance x 2 people for bed mobility and transfers. Pt's goals remain appropriate at this time.  He will continue to benefit from skilled OT services in order to assist him with increasing his safety and level of independence with self care and mobility tasks.        Rehab Prognosis:  Fair; patient would benefit from acute skilled OT services to address these deficits and reach maximum level of function.       Plan:     Patient to be seen 5 x/week to address the above listed problems via self-care/home management, therapeutic activities, therapeutic exercises  Plan of Care Expires: 10/20/22  Plan of Care Reviewed with: patient    Subjective     Pain/Comfort:  Pain Rating 1: 0/10  Pain Rating Post-Intervention 1: 0/10    Objective:     Communicated with: nurse prior to session.  Patient  found HOB elevated with van catheter, blood pressure cuff, peripheral IV, pulse ox (continuous), oxygen upon OT entry to room.    General Precautions: Standard, aspiration, fall, respiratory   Orthopedic Precautions:N/A   Braces: N/A  Respiratory Status: Nasal cannula, flow 6-8 L/min     Occupational Performance:     Bed Mobility:    Patient completed Rolling/Turning to Left with  minimum assistance and with side rail  Patient completed Scooting/Bridging with maximal assistance, 2 persons, and use of draw sheet to scoot to EOB  Patient completed Supine to Sit with maximal assistance and 2 persons  Patient completed Sit to Supine with maximal assistance and 2 persons     Functional Mobility/Transfers:  Patient completed Sit <> Stand Transfer with maximal assistance and of 2 persons  with  hand-held assist   Patient completed Toilet Transfer Step Transfer technique with maximal assistance and of 2 persons   HHA for transfer from bed -> BSC using R arm to reach for arm rest   RW to transfer from BSC to bed    Activities of Daily Living:  Grooming: set up A    Lower Body Dressing: total assistance to xu socks      [facility] 6 Click ADL: 13    Treatment & Education:  Pt completed ADLs and func mobility activities for tx session as noted above  Pt noted with strong lean to L when seated on BSC for toileting - rested arm on arm rest and OT remained at A for safety while attempt at toileting  Pt completed 1 set of 10 reps of B UE AROM xercises in order to work towards increasing his UB strength/endurance to assist with mobility and self care skills.  Pt completed the following exercises: shoulder flex/ext, forward punches, and elbow flex/ext - pt only able to exercise one extremity at a time due to unable to support himself without holding onto arm rest with one hand. .   Pt educated on role of OT and POC      Patient left HOB elevated with all lines intact, call button in reach, and nurse present    GOALS:    Multidisciplinary Problems       Occupational Therapy Goals          Problem: Occupational Therapy    Goal Priority Disciplines Outcome Interventions   Occupational Therapy Goal     OT, PT/OT Ongoing, Progressing    Description: Goals to be met by: 10/20/2022     Patient will increase functional independence with ADLs by performing:    UE Dressing with Modified [place].  LE Dressing with Modified [place].  Grooming while standing at sink with Modified [place].  Toileting from toilet with Modified [place] for hygiene and clothing management.   Supine to sit with Modified [place].  Step transfer with Modified [place]  Toilet transfer to toilet with Modified [place].  Increased functional strength to WFL for ADLS.  Upper extremity exercise program x10 reps per handout, with independence.                         Time Tracking:     OT Date of Treatment: 09/25/22  OT Start Time: 1155  OT Stop Time: 1225  OT Total Time (min): 30 min (23 billable OT minutes - cotx with PT)    Billable Minutes:Self Care/Home Management 15  Therapeutic Exercise 8    OT/CLARITZA: OT     CLARITZA Visit Number: 0    9/25/2022

## 2022-09-25 NOTE — PLAN OF CARE
Pt dozing , woke up easily when name called, resp unlabored, and lungs clearer but still will desat to the 86% at times, past exertion, or when 02 not securely in nose, inc the 02 , sat's will go up, less edematous, in legs, urine output good from last night, reinstructed in fluid restriction, not saying much this am, will answer my questions, co of nerve pain, rt inner groin and lower abd, states due to shingles, rec pain med @0600,     Kdur 40 meq po as ordered for k 3.3

## 2022-09-25 NOTE — ASSESSMENT & PLAN NOTE
-CPAP at night      [FreeTextEntry1] : #1 nonischemic cardiomyopathy. Preserved systolic function. Coronary angiography showed no significant obstructive diseaseContinue lifestyle and risk factor modifications.\par #2 hypertensive heart disease Without any congestive heart failure. Mild renal insufficiency. Nonsmoker. Still borderline systolic blood pressure greater than 1:30. She has gone back to metoprolol 50 mg with sinus bradycardia without any significant other symptoms. She will continue with chlorthalidone 25 mg considering no significant improvement in diarrhea but discontinuing medications.\par She will keep a record of her medications.\par She will take low-salt diet.\par She will increase her exercises.\par If continued elevation of blood pressure.\par We did consider changing metoprolol to labetalol and add amlodipine to the present regimen\par #3 hyperlipidemia. Low dose of cholesterol-lowering agent. Unable to tolerate higher dose of medications. 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 No signs of left to right heart failure. Continue blood pressure, heart rate control. No significant worsening noted. There is moderate left atrial enlargement, which could be related to hypertensive heart disease.\par Compliance with medication and diet have been discussed.\par \par Considering no significant symptomatic or obstructive vascular disease. After reviewing the risks and benefits of aspirin. She has decided to stop aspirin therapy.\par I have reviewed above at length. I answered all the questions. Patient verbalized understanding\par Thank you very much for allowing me to participate in your patient's care. Please feel free to call me for any questions.\par Follow up otherwise in one month or for any change in her symptoms.\par

## 2022-09-25 NOTE — SUBJECTIVE & OBJECTIVE
Review of Systems   Cardiovascular:  Negative for chest pain.   Respiratory:  Negative for shortness of breath.    Objective:     Vital Signs (Most Recent):  Temp: 99 °F (37.2 °C) (09/25/22 0742)  Pulse: 61 (09/25/22 0900)  Resp: (!) 22 (09/25/22 0900)  BP: (!) 130/59 (09/25/22 0900)  SpO2: (!) 92 % (09/25/22 0900)   Vital Signs (24h Range):  Temp:  [98 °F (36.7 °C)-99 °F (37.2 °C)] 99 °F (37.2 °C)  Pulse:  [60-71] 61  Resp:  [13-36] 22  SpO2:  [87 %-99 %] 92 %  BP: (102-144)/(51-65) 130/59     Weight: 82.5 kg (181 lb 14.1 oz)  Body mass index is 27.65 kg/m².     SpO2: (!) 92 %  O2 Device (Oxygen Therapy): High Flow nasal Cannula      Intake/Output Summary (Last 24 hours) at 9/25/2022 1109  Last data filed at 9/25/2022 0900  Gross per 24 hour   Intake 1099.05 ml   Output 3095 ml   Net -1995.95 ml       Lines/Drains/Airways       Drain  Duration                  Urethral Catheter 09/22/22 1700 Silicone 16 Fr. 2 days              Peripheral Intravenous Line  Duration                  Peripheral IV - Single Lumen 09/19/22 1500 18 G Anterior;Left Forearm 5 days         Peripheral IV - Single Lumen 09/22/22 2305 20 G Left Hand 2 days                    Physical Exam  Constitutional:       Appearance: Normal appearance.   Cardiovascular:      Rate and Rhythm: Normal rate and regular rhythm.      Heart sounds: No murmur heard.    No friction rub. No gallop.   Pulmonary:      Effort: Pulmonary effort is normal.      Breath sounds: Normal breath sounds.   Abdominal:      General: There is no distension.   Musculoskeletal:         General: No swelling.   Neurological:      Mental Status: He is alert.   Psychiatric:         Mood and Affect: Mood normal.       Significant Labs: CMP   Recent Labs   Lab 09/24/22  0427 09/25/22  0422    146*   K 3.5 3.3*   CL 99 96   CO2 33* 38*    108   BUN 72* 81*   CREATININE 2.3* 2.5*   CALCIUM 8.8 8.5*   ANIONGAP 13 12    and CBC   Recent Labs   Lab 09/24/22  2303  09/25/22  0422   WBC 15.66* 11.96   HGB 9.7* 9.9*   HCT 28.8* 29.2*    289

## 2022-09-25 NOTE — PLAN OF CARE
Dulcolax supp given, hard to insert, pt resisted, and rectum far in, could feel some  hard stool, in rectum but could not reach to disimpact, will need enema, fleets vs soap suds enema.   blood in urine

## 2022-09-25 NOTE — PLAN OF CARE
Pt on rt side, sat's down to 85%, resp unlabored , denies sob, inc 02 back to 10l, have been going from 8 to 10l, throughout morning for sat's 86 to 90% max 93%  lung fields improved from earlier, no crackles heard in his post bases as earlier, pt will occ remove his 02 or it falls out of his nose and his sat's will go down to 83%, then needs to inc the 02 to 10l or above to get it up, once up weaned back down, have told the pulm team , about this, happened also yesterday, but sat's did not go down as far

## 2022-09-25 NOTE — PLAN OF CARE
Pt in bed, sighing, and making noises, when asked what is the matter, he continues to say his rt foot hurts and then he finally said,he needs to have a bm, offered bedpan, said no, on metamucil and miralax, no bm in a couple of days, will get supp ordered

## 2022-09-25 NOTE — SUBJECTIVE & OBJECTIVE
Interval History:  Remains on 10L HF. Diuresing well. Slight bump in Cr.     Review of Systems   Constitutional:  Negative for fever.   Respiratory:  Positive for shortness of breath.    Cardiovascular:  Positive for leg swelling. Negative for chest pain.   Musculoskeletal:  Positive for arthralgias.   Skin:  Positive for rash and wound.   Neurological:  Positive for weakness.   Objective:     Vital Signs (Most Recent):  Temp: 98.8 °F (37.1 °C) (09/25/22 1200)  Pulse: 67 (09/25/22 1300)  Resp: (!) 28 (09/25/22 1300)  BP: (!) 144/68 (09/25/22 1300)  SpO2: (!) 89 % (09/25/22 1300)   Vital Signs (24h Range):  Temp:  [98 °F (36.7 °C)-99 °F (37.2 °C)] 98.8 °F (37.1 °C)  Pulse:  [60-71] 67  Resp:  [13-33] 28  SpO2:  [87 %-99 %] 89 %  BP: (102-144)/(51-68) 144/68     Weight: 82.5 kg (181 lb 14.1 oz)  Body mass index is 27.65 kg/m².    Intake/Output Summary (Last 24 hours) at 9/25/2022 1639  Last data filed at 9/25/2022 1553  Gross per 24 hour   Intake 1288.15 ml   Output 2910 ml   Net -1621.85 ml        Physical Exam  Vitals and nursing note reviewed.   Constitutional:       General: He is not in acute distress.     Appearance: He is obese.   HENT:      Head: Normocephalic and atraumatic.      Nose: Nose normal.      Mouth/Throat:      Mouth: Mucous membranes are moist.   Eyes:      Pupils: Pupils are equal, round, and reactive to light.   Cardiovascular:      Rate and Rhythm: Normal rate and regular rhythm.      Pulses: Normal pulses.      Heart sounds: Murmur heard.   Pulmonary:      Effort: Pulmonary effort is normal. No respiratory distress.      Breath sounds: No rales.      Comments: On supplemental O2 via nasal cannula  Abdominal:      General: Bowel sounds are normal.      Palpations: Abdomen is soft.   Musculoskeletal:         General: Swelling present. Normal range of motion.      Cervical back: Normal range of motion.      Right lower leg: Edema present.      Left lower leg: Edema present.   Skin:     General:  Skin is warm and dry.      Findings: Erythema and lesion present.   Neurological:      Motor: Weakness present.      Comments: Unable to assess   Psychiatric:      Comments: Unable to assess       Significant Labs: All pertinent labs within the past 24 hours have been reviewed.    Significant Imaging: I have reviewed all pertinent imaging results/findings within the past 24 hours.

## 2022-09-25 NOTE — SUBJECTIVE & OBJECTIVE
Interval History:  Diuresing well on lasix gtt. Transitioned from bipap to 10L HF.     Review of Systems   Constitutional:  Negative for fever.   Respiratory:  Positive for shortness of breath.    Cardiovascular:  Positive for leg swelling. Negative for chest pain.   Musculoskeletal:  Positive for arthralgias.   Skin:  Positive for rash and wound.   Neurological:  Positive for weakness.   Objective:     Vital Signs (Most Recent):  Temp: 98.8 °F (37.1 °C) (09/25/22 1200)  Pulse: 67 (09/25/22 1300)  Resp: (!) 28 (09/25/22 1300)  BP: (!) 144/68 (09/25/22 1300)  SpO2: (!) 89 % (09/25/22 1300)   Vital Signs (24h Range):  Temp:  [98 °F (36.7 °C)-99 °F (37.2 °C)] 98.8 °F (37.1 °C)  Pulse:  [60-71] 67  Resp:  [13-33] 28  SpO2:  [87 %-99 %] 89 %  BP: (102-144)/(51-68) 144/68     Weight: 82.5 kg (181 lb 14.1 oz)  Body mass index is 27.65 kg/m².    Intake/Output Summary (Last 24 hours) at 9/25/2022 1635  Last data filed at 9/25/2022 1553  Gross per 24 hour   Intake 1288.15 ml   Output 2910 ml   Net -1621.85 ml        Physical Exam  Vitals and nursing note reviewed.   Constitutional:       General: He is not in acute distress.     Appearance: He is obese.   HENT:      Head: Normocephalic and atraumatic.      Nose: Nose normal.      Mouth/Throat:      Mouth: Mucous membranes are moist.   Eyes:      Pupils: Pupils are equal, round, and reactive to light.   Cardiovascular:      Rate and Rhythm: Normal rate and regular rhythm.      Pulses: Normal pulses.      Heart sounds: Murmur heard.   Pulmonary:      Effort: Pulmonary effort is normal. No respiratory distress.      Breath sounds: No rales.      Comments: On supplemental O2 via nasal cannula  Abdominal:      General: Bowel sounds are normal.      Palpations: Abdomen is soft.   Musculoskeletal:         General: Swelling present. Normal range of motion.      Cervical back: Normal range of motion.      Right lower leg: Edema present.      Left lower leg: Edema present.   Skin:      General: Skin is warm and dry.      Findings: Erythema and lesion present.   Neurological:      Motor: Weakness present.      Comments: Unable to assess   Psychiatric:      Comments: Unable to assess       Significant Labs: All pertinent labs within the past 24 hours have been reviewed.    Significant Imaging: I have reviewed all pertinent imaging results/findings within the past 24 hours.

## 2022-09-25 NOTE — PLAN OF CARE
Pt to continue to diuresis, now on ivp lasix q 8hrs continue to monitor output, wean 02 as lesli, pt to inc activity, up in chair, turn q 2hrs, bed low position, call bell easy reach, bed alarm on, side rails u; x4, medicate as needed, mary before wound cAre

## 2022-09-25 NOTE — PT/OT/SLP PROGRESS
Physical Therapy Treatment    Patient Name:  Tong Ford   MRN:  [id number]    Recommendations:     Discharge Recommendations:   (TBD)   Discharge Equipment Recommendations:  (TBD at next level of care)   Barriers to discharge: Decreased caregiver support    Assessment:     Tong Ford is a 77 y.o. male admitted with a medical diagnosis of Acute on chronic diastolic congestive heart failure.  He presents with the following impairments/functional limitations:  weakness, gait instability, decreased upper extremity function, impaired cardiopulmonary response to activity, impaired endurance, impaired balance, decreased lower extremity function, decreased safety awareness, impaired self care skills, impaired functional mobility. Patient required min assist to roll left,max assist x 2 for supine to sit.  Pt required max assist of 2 for bed to/from commode transfer.  Pt required Mod/max assist of 2 for sit to supine.  Pt required mod assist of 2 to scoot to HOB.      Rehab Prognosis: Fair; patient would benefit from acute skilled PT services to address these deficits and reach maximum level of function.    Recent Surgery: * No surgery found *      Plan:     During this hospitalization, patient to be seen 5 x/week to address the identified rehab impairments via gait training, therapeutic activities, therapeutic exercises, neuromuscular re-education and progress toward the following goals:    Plan of Care Expires:  10/14/22    Subjective     Chief Complaint: wants to use commode  Patient/Family Comments/goals: use commode  Pain/Comfort:  Pain Rating 1: 0/10  Location - Side 1: Bilateral  Location 1: foot  Pain Addressed 1: Reposition, Distraction  Pain Rating Post-Intervention 1: 0/10      Objective:     Communicated with nurse prior to session.  Patient found HOB elevated with van catheter upon PT entry to room.     General Precautions: Standard, aspiration, fall, respiratory   Orthopedic Precautions:N/A   Braces:     Respiratory Status: Nasal cannula, flow 6-8 L/min     Functional Mobility:  Patient required min assist to roll left,max assist x 2 for supine to sit.  Pt required max assist of 2 for bed to/from commode transfer.  Pt required Mod/max assist of 2 for sit to supine.  Pt required mod assist of 2 to scoot to HOB.        AM-PAC 6 CLICK MOBILITY  Turning over in bed (including adjusting bedclothes, sheets and blankets)?: 2  Sitting down on and standing up from a chair with arms (e.g., wheelchair, bedside commode, etc.): 2  Moving from lying on back to sitting on the side of the bed?: 2  Moving to and from a bed to a chair (including a wheelchair)?: 2  Need to walk in hospital room?: 1  Climbing 3-5 steps with a railing?: 1  Basic Mobility Total Score: 10       Therapeutic Activities and Exercises:   Rx of achilles stretches in sitting and supine.      Patient left HOB elevated with all lines intact, call button in reach, and nurse present..    GOALS:   Multidisciplinary Problems       Physical Therapy Goals          Problem: Physical Therapy    Goal Priority Disciplines Outcome Goal Variances Interventions   Physical Therapy Goal     PT, PT/OT Ongoing, Progressing     Description: Goals to be met by: 10/20/22     Patient will increase functional independence with mobility by performin. Supine to sit with Stand-by Assistance  2. Sit to supine with Stand-by Assistance  3. Sit to stand transfer with Stand-by Assistance  4. Bed to chair transfer with Stand-by Assistance using Rolling Walker  5. Gait  x 50 feet with Stand-by Assistance using Rolling Walker.                          Time Tracking:     PT Received On: 22  PT Start Time: 1157     PT Stop Time: 1215  PT Total Time (min): 18 min     Billable Minutes:  Therapeutic Activity 18    Treatment Type: Evaluation  PT/PTA: PT     PTA Visit Number: 0     2022

## 2022-09-25 NOTE — ASSESSMENT & PLAN NOTE
-initially on lasix drip; now transitioned IV pushes of 80 mg t.i.d.; add metolazone today  -monitor electrolytes  -echo with EF 55%, moderate mitral regurg, elevated CVP 15 mm Hg  -consult cardiology  -fluid restriction  -hold ACE and BB for now given soft BP  -bipap transitioned to hfnc  -cont lasix 60mg q8h ivp  -reduce metolazone to 3x/week  -monitor kidney function  -pending downgrade to floor

## 2022-09-25 NOTE — PROGRESS NOTES
Pharmacokinetic Assessment Follow Up: IV Vancomycin    Vancomycin serum concentration assessment(s):    The random level was drawn correctly and can be used to guide therapy at this time. The measurement is within the desired definitive target range of 15 to 20 mcg/mL.    Vancomycin Regimen Plan:    Give 500 mg IV Vancomycin x 1 dose.  Re-dose when the random level is less than 20 mcg/mL, next level to be drawn at 0400 on 9/26    Drug levels (last 3 results):  Recent Labs   Lab Result Units 09/23/22  0350 09/24/22 0427 09/25/22 0422   Vancomycin, Random ug/mL 16.1 26.2 17.7       Pharmacy will continue to follow and monitor vancomycin.    Please contact pharmacy at extension [phone] for questions regarding this assessment.    Thank you for the consult,   Elizabeth Mehta       Patient brief summary:  Tong Ford is a 77 y.o. male initiated on antimicrobial therapy with IV Vancomycin for treatment of lower respiratory infection    The patient's current regimen is pulse dosing    Drug Allergies:   Review of patient's allergies indicates:  No Known Allergies    Actual Body Weight:   82.5 kg    Renal Function:   Estimated Creatinine Clearance: 25.1 mL/min (A) (based on SCr of 2.5 mg/dL (H)).,     Dialysis Method (if applicable):  N/A    CBC (last 72 hours):  Recent Labs   Lab Result Units 09/23/22 0350 09/24/22 0427 09/25/22 0422   WBC K/uL 15.58* 15.66* 11.96   Hemoglobin g/dL 9.6* 9.7* 9.9*   Hematocrit % 28.3* 28.8* 29.2*   Platelets K/uL 239 261 289   Gran % % 82.0* 79.6* 73.1*   Lymph % % 3.0* 7.9* 13.9*   Mono % % 11.0 8.4 7.6   Eosinophil % % 2.0 2.8 4.2   Basophil % % 1.0 0.3 0.3   Differential Method  Automated Automated Automated       Metabolic Panel (last 72 hours):  Recent Labs   Lab Result Units 09/23/22  0350 09/24/22 0427 09/24/22  1127 09/25/22 0422   Sodium mmol/L 144 145  --  146*   Potassium mmol/L 3.4* 3.5  --  3.3*   Chloride mmol/L 103 99  --  96   CO2 mmol/L 28 33*  --  38*   Glucose  mg/dL 110 101  --  108   Glucose, UA   --   --  Negative  --    BUN mg/dL 71* 72*  --  81*   Creatinine mg/dL 2.4* 2.3*  --  2.5*   Magnesium mg/dL 2.4 2.5  --  2.5   Phosphorus mg/dL 3.5 3.4  --  3.5       Vancomycin Administrations:  vancomycin given in the last 96 hours                     vancomycin 1.5 g in dextrose 5 % 250 mL IVPB (ready to mix) (mg) 1,500 mg New Bag 09/23/22 0538    vancomycin (VANCOCIN) 2,000 mg in dextrose 5 % 500 mL IVPB (mg) 2,000 mg New Bag 09/22/22 0903                    Microbiologic Results:  Microbiology Results (last 7 days)       Procedure Component Value Units Date/Time    Urine culture [[id number]] Collected: 09/24/22 1127    Order Status: No result Specimen: Urine Updated: 09/24/22 1234    Blood culture [[id number]] Collected: 09/20/22 0014    Order Status: Completed Specimen: Blood Updated: 09/24/22 1212     Blood Culture, Routine No Growth to date      No Growth to date      No Growth to date      No Growth to date      No Growth to date    Blood culture [[id number]] Collected: 09/20/22 0013    Order Status: Completed Specimen: Blood Updated: 09/24/22 1212     Blood Culture, Routine No Growth to date      No Growth to date      No Growth to date      No Growth to date      No Growth to date    Culture, Respiratory with Gram Stain [[id number]]  (Abnormal) Collected: 09/21/22 1542    Order Status: Completed Specimen: Respiratory from Sputum Updated: 09/24/22 0826     Respiratory Culture STAPHYLOCOCCUS AUREUS  Moderate  Susceptibility pending  Normal respiratory subha also present       Gram Stain (Respiratory) <10 epithelial cells per low power field.     Gram Stain (Respiratory) Rare WBC's     Gram Stain (Respiratory) Rare Gram positive cocci     Gram Stain (Respiratory) Rare Gram negative rods

## 2022-09-25 NOTE — PROGRESS NOTES
"[facility]  [facility]  Progress Note    Patient Name: Tong Ford  MRN: [id number]  Patient Class: IP- Inpatient   Admission Date: 9/19/2022  Length of Stay: 6 days  Attending Physician: Waldemar Dolan MD  Primary Care Provider: Kris Zavala MD        Subjective:     Principal Problem:Acute on chronic diastolic congestive heart failure        HPI:  Per transfer note:  "Patient is a 77 y.o. male who has a past medical history of arthritis, HTN, AFIB, HLD, TIA, CAD, CHF, hypothyroidism, normocytic anemia and PSHx of CABG, coronary angioplasty with stent, and pacemaker presenting today for shortness of breath, weakness and swelling. ED workup significant for elevated troponin, worsening JAYSON, elevated BNP, leukocytosis, hyperkalemia, elevated CPK, hypoxia requiring O2 supplementation, chest x-ray with increased bilateral asymmetric airspace disease consistent with CHF/volume overload and possible LLL pneumonia. See below labs and imaging. Patient started on lasix drip, given lokelma, and IV Rocephin/Az. No ICU beds available at this facility therefore pt will need to transfer for higher level of care. Stable for transfer."    On arrival to [facility], the patient was continued on lasix drip. He is sleeping and unable to answer any questions. Per chart review, he endorsed orthopnea, worsening GOODWIN. He was told by his cardiologist to report to the ED to be admitted for IV lasix. He stated that having the extra fluid on his legs and scrotal area made it difficult to walk and he fell at home last night on concrete, denies hitting head. He was on the floor for several hours before being found. He was taking 60mg of lasix daily and lionel, reported adequate urination. Pt also has chronic bilat LE venous statis ulcers and he follows with wound care. He had BNP 1795, troponin 1.03 with downtrend, creat 3.1, K 5.2, CK 2600. a temperature of 100.2, WBC 20 at sending facility. He was given lokelma, rocephin and " azithromycin, and started on lasix drip. Prior echo with normal EF. He is on 2LNC with adequate O2 sat on arrival. Legs with stasis ulcers, right leg with redness noted.           Overview/Hospital Course:  No notes on file    Interval History:  Diuresing well on lasix gtt. Transitioned from bipap to 10L HF.     Review of Systems   Constitutional:  Negative for fever.   Respiratory:  Positive for shortness of breath.    Cardiovascular:  Positive for leg swelling. Negative for chest pain.   Musculoskeletal:  Positive for arthralgias.   Skin:  Positive for rash and wound.   Neurological:  Positive for weakness.   Objective:     Vital Signs (Most Recent):  Temp: 98.8 °F (37.1 °C) (09/25/22 1200)  Pulse: 67 (09/25/22 1300)  Resp: (!) 28 (09/25/22 1300)  BP: (!) 144/68 (09/25/22 1300)  SpO2: (!) 89 % (09/25/22 1300)   Vital Signs (24h Range):  Temp:  [98 °F (36.7 °C)-99 °F (37.2 °C)] 98.8 °F (37.1 °C)  Pulse:  [60-71] 67  Resp:  [13-33] 28  SpO2:  [87 %-99 %] 89 %  BP: (102-144)/(51-68) 144/68     Weight: 82.5 kg (181 lb 14.1 oz)  Body mass index is 27.65 kg/m².    Intake/Output Summary (Last 24 hours) at 9/25/2022 1635  Last data filed at 9/25/2022 1553  Gross per 24 hour   Intake 1288.15 ml   Output 2910 ml   Net -1621.85 ml        Physical Exam  Vitals and nursing note reviewed.   Constitutional:       General: He is not in acute distress.     Appearance: He is obese.   HENT:      Head: Normocephalic and atraumatic.      Nose: Nose normal.      Mouth/Throat:      Mouth: Mucous membranes are moist.   Eyes:      Pupils: Pupils are equal, round, and reactive to light.   Cardiovascular:      Rate and Rhythm: Normal rate and regular rhythm.      Pulses: Normal pulses.      Heart sounds: Murmur heard.   Pulmonary:      Effort: Pulmonary effort is normal. No respiratory distress.      Breath sounds: No rales.      Comments: On supplemental O2 via nasal cannula  Abdominal:      General: Bowel sounds are normal.       Palpations: Abdomen is soft.   Musculoskeletal:         General: Swelling present. Normal range of motion.      Cervical back: Normal range of motion.      Right lower leg: Edema present.      Left lower leg: Edema present.   Skin:     General: Skin is warm and dry.      Findings: Erythema and lesion present.   Neurological:      Motor: Weakness present.      Comments: Unable to assess   Psychiatric:      Comments: Unable to assess       Significant Labs: All pertinent labs within the past 24 hours have been reviewed.    Significant Imaging: I have reviewed all pertinent imaging results/findings within the past 24 hours.      Assessment/Plan:      * Acute on chronic diastolic congestive heart failure  -initially on lasix drip; now transitioned IV pushes of 80 mg t.i.d.; add metolazone today  -monitor electrolytes  -echo with EF 55%, moderate mitral regurg, elevated CVP 15 mm Hg  -consult cardiology  -fluid restriction  -hold ACE and BB for now given soft BP  -bipap transitioned to hfnc  -switch lasix gtt to 60mg q8h ivp  -cont metolazone      Staphylococcal pneumonia        Neuropathy, peripheral, idiopathic  -resume gabapentin      Volume overload state of heart        Debility  -PT/OT consult; will likely require placement      Acute hypoxemic respiratory failure  In setting of volume overload and possible pneumonia  -cont supplemental O2 as needed, wean as tolerated--now on 9LNC  -worsening hypoxia requiring bipap  -transitioned to hfnc  -cont diuresis      Sepsis  Meets sepsis criteria for WBC, temp, hypoxia  -treating for possible pneumonia and cellulitis  -resp cx growing staph          Rhabdomyolysis  Fell and laid on floor for hours  CPK 2607 with JAYSON  -unable to give fluids due to volume overload  -repeat CK down trending  -consult nephrology as needed      Pneumonia  Possible LLL pneumonia on imaging  Temp 100.4, WBC 20  -blood cx no growth to date  -sputum cx growing staph  -de-escalate abx to  vancomycin  -cont supplemental O2 as needed        Venous stasis ulcer  BLE with ulcers  -right leg with redness   -on rocephin and doxy  -consult wound care  -arterial ultrasound      Elevated troponin  In setting of volume overload; likely secondary to demand  -troponin 1.03--0.9  -plan for outpatient PET stress test  -cardiology consulted  -echo as above      Coronary artery disease  Hx of CABG and stent  -no antiplatelet on home med list  -cont statin  -hold BB for low BP      Paroxysmal atrial fibrillation  -cont amiodarone and eliquis  -hold BB for now      Acute renal failure superimposed on stage 3 chronic kidney disease  -possibly cardiorenal  -diuresing  -monitor labs  -renally dose medications  -baseline creatinine around 2.5    Thyroid disease  -cont synthroid   -TSH mildly elevated with normal T4      Essential hypertension  BP soft on admission  -hold home meds for now and resume as tolerated      CESAR (obstructive sleep apnea)  -CPAP at night         VTE Risk Mitigation (From admission, onward)         Ordered     apixaban tablet 5 mg  2 times daily         09/20/22 0224     IP VTE HIGH RISK PATIENT  Once         09/19/22 2343     Place sequential compression device  Until discontinued         09/19/22 2343                Discharge Planning   YUNIEL:      Code Status: Full Code   Is the patient medically ready for discharge?:     Reason for patient still in hospital (select all that apply): Patient trending condition and Treatment  Discharge Plan A: Rehab   Discharge Delays: None known at this time        Critical care time spent on the evaluation and treatment of severe organ dysfunction, review of pertinent labs and imaging studies, discussions with consulting providers and discussions with patient/family: 45 minutes.      Waldemar Dolan MD  Department of Hospital Medicine   [facility] - Intensive Care

## 2022-09-25 NOTE — ASSESSMENT & PLAN NOTE
Possible LLL pneumonia on imaging  Temp 100.4, WBC 20  -blood cx no growth to date  -sputum cx growing staph  -de-escalate abx to vancomycin  -cont supplemental O2 as needed

## 2022-09-25 NOTE — ASSESSMENT & PLAN NOTE
In setting of volume overload and possible pneumonia  -cont supplemental O2 as needed, wean as tolerated--now on 9LNC  -worsening hypoxia requiring bipap  -transitioned to hfnc  -cont diuresis

## 2022-09-25 NOTE — PLAN OF CARE
Pt continues on lasix, to diurese further, zaroxolyn changed to every other day, lung fields sound better but will drop sat's at times, and 02 fluctuates today from 8-10 liters, can  not get down further, pt to continue to inc activity, was up to bsc today, taking a few steps, did not want to stay up in chair, felt weaker today, pt has had no bm lAst 2 days, may need something stronger,  turn q 2-3 hrs, call bell easy reach, side rails  up x4, bed alarm on ,bed low position, has poor appetite, wants mainly water does not want juice, just water, explained again about his fluid restriction

## 2022-09-25 NOTE — ASSESSMENT & PLAN NOTE
Meets sepsis criteria for WBC, temp, hypoxia  -treating for possible pneumonia and cellulitis  -resp cx growing staph

## 2022-09-25 NOTE — PLAN OF CARE
Problem: Occupational Therapy  Goal: Occupational Therapy Goal  Description: Goals to be met by: 10/20/2022     Patient will increase functional independence with ADLs by performing:    UE Dressing with Modified [place].  LE Dressing with Modified [place].  Grooming while standing at sink with Modified [place].  Toileting from toilet with Modified [place] for hygiene and clothing management.   Supine to sit with Modified [place].  Step transfer with Modified [place]  Toilet transfer to toilet with Modified [place].  Increased functional strength to WFL for ADLS.  Upper extremity exercise program x10 reps per handout, with independence.    Outcome: Ongoing, Progressing     Pt was agreeable to OT and was noted to make progress towards his goals in therapy.  During today's session, pt worked on func mobility, UE therex, and ADLs.  Pt's goals remain appropriate at this time.  He will continue to benefit from skilled OT services in order to assist him with increasing his safety and level of independence with self care and mobility tasks.     Zoie Briscoe, OT  9/25/2022

## 2022-09-25 NOTE — PLAN OF CARE
Problem: Physical Therapy  Goal: Physical Therapy Goal  Description: Goals to be met by: 10/20/22     Patient will increase functional independence with mobility by performin. Supine to sit with Stand-by Assistance  2. Sit to supine with Stand-by Assistance  3. Sit to stand transfer with Stand-by Assistance  4. Bed to chair transfer with Stand-by Assistance using Rolling Walker  5. Gait  x 50 feet with Stand-by Assistance using Rolling Walker.     Outcome: Ongoing, Progressing

## 2022-09-26 NOTE — CONSULTS
[facility] - Telemetry  Wound Care    Patient Name:  Tong Ford   MRN:  [id number]  Date: 9/26/2022  Diagnosis: Acute on chronic diastolic congestive heart failure    History:     Past Medical History:   Diagnosis Date    Arthritis     Atrial fibrillation     Carotid artery occlusion     bilateral    Cataract     CHF (congestive heart failure)     CKD (chronic kidney disease), stage II     Coronary artery disease     3 Vessel CABG AND 3 STENTS    Encounter for blood transfusion     Hypercholesterolemia     Hypertension     Normocytic anemia     Shingles     X 3 WEEKS AGO    Sleep apnea     CESAR - NOT USING C-PAP    Thyroid disease     TIA (transient ischemic attack)        Social History     Socioeconomic History    Marital status:    Occupational History    Occupation: professor   Tobacco Use    Smoking status: Never    Smokeless tobacco: Never   Substance and Sexual Activity    Alcohol use: Not Currently    Drug use: No     Social Determinants of Health     Financial Resource Strain: Low Risk     Difficulty of Paying Living Expenses: Not hard at all   Food Insecurity: No Food Insecurity    Worried About Running Out of Food in the Last Year: Never true    Ran Out of Food in the Last Year: Never true   Transportation Needs: No Transportation Needs    Lack of Transportation (Medical): No    Lack of Transportation (Non-Medical): No   Physical Activity: Inactive    Days of Exercise per Week: 0 days    Minutes of Exercise per Session: 0 min   Stress: Stress Concern Present    Feeling of Stress : To some extent   Social Connections: Moderately Isolated    Frequency of Communication with Friends and Family: More than three times a week    Frequency of Social Gatherings with Friends and Family: Twice a week    Attends [employer] Services: 1 to 4 times per year    Active Member of Clubs or Organizations: No    Attends Club or Organization Meetings: Never    Marital Status:    Housing Stability: Low Risk     Unable  to Pay for Housing in the Last Year: No    Number of Places Lived in the Last Year: 2    Unstable Housing in the Last Year: No       Precautions:     Allergies as of 09/19/2022    (No Known Allergies)       WOC Assessment Details/Treatment     LLE- scattered ulcerations healing- improving edema      R lateral leg/foot- scattered ulcerations healing- improving edema        R medial leg/foot- scattered ulcerations healing- improving edema        L inner buttocks- non blanchable redness with abraded skin- partial thickness        R lower back- non blanchable redness- protected with mepilex border      Recommendations discussed with pt, nurse and  Hand-  - Nursing to continue with pressure injury prevention interventions to include waffle overlay  - Nursing to continue with daily dressing changes to BLE  - Begin Triad ointment to bilateral buttocks BID  - Mepilex border to R lower back 2x/week    09/26/2022

## 2022-09-26 NOTE — PROGRESS NOTES
Rx Message- Therapy with Vancomycin is complete and/or discontinued by provider. Vancomycin consult and lab orders per protocol are now discontinued. Pharmacy will sign off at this time.Please re-consult as needed. Thanks

## 2022-09-26 NOTE — PT/OT/SLP PROGRESS
Physical Therapy Treatment    Patient Name:  Tong Ford   MRN:  [id number]    Recommendations:     Discharge Recommendations:   (post acute placement)   Discharge Equipment Recommendations:  (defer to next level of care)   Barriers to discharge:  significant assist needed    Assessment:     Tong Ford is a 77 y.o. male admitted with a medical diagnosis of Acute on chronic diastolic congestive heart failure.  He presents with the following impairments/functional limitations:  weakness, gait instability, impaired balance, impaired endurance, impaired self care skills, impaired functional mobility, decreased coordination, impaired cognition, decreased safety awareness, decreased lower extremity function, decreased upper extremity function, impaired coordination, pain, impaired skin, edema, decreased ROM, impaired fine motor, impaired cardiopulmonary response to activity .Pt on 10 L HFNC and SpO2 decreased to 88% the lowest during therapy session. O2 sats with improvement this date, however, BP decreasing upon sitting EOB. Pt grimacing with mobility during session and with difficulty describing the type of pain - reports R hip flexor pain. Nsg notified and aware. Unable to attempt sit>stand this date 2/2 BP decreasing and pt with increased WOB.     Rehab Prognosis: Fair; patient would benefit from acute skilled PT services to address these deficits and reach maximum level of function.    Recent Surgery: * No surgery found *      Plan:     During this hospitalization, patient to be seen 5 x/week to address the identified rehab impairments via gait training, therapeutic activities, therapeutic exercises, neuromuscular re-education and progress toward the following goals:    Plan of Care Expires:  10/14/22    Subjective     Chief Complaint: R hip flexor pain  Patient/Family Comments/goals: pt with difficulty describing and rating pain  Pain/Comfort:  Pain Rating 1: 0/10  Location - Side 1: Right  Location - Orientation  1: anterior  Location 1:  (R hip flexor)  Pain Addressed 1: Reposition, Distraction, Cessation of Activity, Nurse notified  Pain Rating Post-Intervention 1:  (not rated)      Objective:     Communicated with nsg prior to session.  Patient found HOB elevated with van catheter, bed alarm, oxygen, pressure relief boots upon PT entry to room.     General Precautions: Standard, fall, respiratory   Orthopedic Precautions:N/A   Braces: N/A  Respiratory Status: High flow, flow 10 L/min     Functional Mobility:  Bed Mobility:     Rolling Left:  maximal assistance  Rolling Right: maximal assistance  Scooting: total assistance and of 2 persons  Supine to Sit: maximal assistance, total assistance, and of 2 persons  Sit to Supine: maximal assistance, total assistance, and of 2 persons      AM-PAC 6 CLICK MOBILITY  Turning over in bed (including adjusting bedclothes, sheets and blankets)?: 2  Sitting down on and standing up from a chair with arms (e.g., wheelchair, bedside commode, etc.): 1  Moving from lying on back to sitting on the side of the bed?: 2  Moving to and from a bed to a chair (including a wheelchair)?: 1  Need to walk in hospital room?: 1  Climbing 3-5 steps with a railing?: 1  Basic Mobility Total Score: 8       Therapeutic Activities and Exercises:  Pt on 10 L HFNC, spO2 96% at rest, and SpO2 decreased to 88% the lowest during therapy session.  VC's for PLB throughout session and for cervical extension to maintain upright head with forward gaze; pt only able to maintain for ~15-20 secs before returning with head flexed and gaze to floor  Pt requires MaxA initially for sitting balance then progressed to CGA-SBA with B UE on bed rails   O2 sats with improvement this date, however, BP decreasing upon sitting EOB.   Pt grimacing with mobility during session and with difficulty describing the type of pain - reports R hip flexor pain. Nsg notified and aware.   Unable to attempt sit>stand this date 2/2 BP decreasing  and pt with increased WOB.   B rolling performed to adjust bed sheets; pt scooted HOB with bed in trend and use of draw sheet         22 1059 22 1108 22 1116   Vital Signs   BP (!) 104/53 (!) 90/51 (!) 106/56   MAP (mmHg) 75 64 78   BP Location Right arm Right arm Right arm   Patient Position Lying Sitting Lying              Patient left HOB elevated with all lines intact, call button in reach, bed alarm on, nsg notified, and daughter present..    GOALS:   Multidisciplinary Problems       Physical Therapy Goals          Problem: Physical Therapy    Goal Priority Disciplines Outcome Goal Variances Interventions   Physical Therapy Goal     PT, PT/OT Ongoing, Progressing     Description: Goals to be met by: 10/20/22     Patient will increase functional independence with mobility by performin. Supine to sit with Stand-by Assistance  2. Sit to supine with Stand-by Assistance  3. Sit to stand transfer with Stand-by Assistance  4. Bed to chair transfer with Stand-by Assistance using Rolling Walker  5. Gait  x 50 feet with Stand-by Assistance using Rolling Walker.                          Time Tracking:     PT Received On: 22  PT Start Time: 1054     PT Stop Time: 1125  PT Total Time (min): 31 min     Billable Minutes: Therapeutic Activity 31 (cotx with OT)    Treatment Type: Treatment  PT/PTA: PT     PTA Visit Number: 0     2022

## 2022-09-26 NOTE — SUBJECTIVE & OBJECTIVE
Interval History:  Remains on 10L HF. Diuresing well. Cr stable. Pt states his SOB is improving.      Review of Systems   Constitutional:  Negative for fever.   Respiratory:  Positive for shortness of breath.    Cardiovascular:  Positive for leg swelling. Negative for chest pain.   Musculoskeletal:  Positive for arthralgias.   Skin:  Positive for rash and wound.   Neurological:  Positive for weakness.   Objective:     Vital Signs (Most Recent):  Temp: 96.3 °F (35.7 °C) (09/26/22 1059)  Pulse: 61 (09/26/22 1122)  Resp: 18 (09/26/22 1059)  BP: (!) 106/56 (09/26/22 1116)  SpO2: (!) 91 % (09/26/22 1122)   Vital Signs (24h Range):  Temp:  [96.3 °F (35.7 °C)-99.5 °F (37.5 °C)] 96.3 °F (35.7 °C)  Pulse:  [60-89] 61  Resp:  [18-36] 18  SpO2:  [82 %-100 %] 91 %  BP: ()/(50-83) 106/56     Weight: 84.5 kg (186 lb 4.6 oz)  Body mass index is 28.33 kg/m².    Intake/Output Summary (Last 24 hours) at 9/26/2022 1139  Last data filed at 9/26/2022 0633  Gross per 24 hour   Intake 244.2 ml   Output 1635 ml   Net -1390.8 ml        Physical Exam  Vitals and nursing note reviewed.   Constitutional:       General: He is not in acute distress.     Appearance: He is obese.   HENT:      Head: Normocephalic and atraumatic.      Nose: Nose normal.      Mouth/Throat:      Mouth: Mucous membranes are moist.   Eyes:      Pupils: Pupils are equal, round, and reactive to light.   Cardiovascular:      Rate and Rhythm: Normal rate and regular rhythm.      Pulses: Normal pulses.      Heart sounds: Murmur heard.   Pulmonary:      Effort: Pulmonary effort is normal. No respiratory distress.      Breath sounds: No rales.      Comments: On supplemental O2 via nasal cannula  Abdominal:      General: Bowel sounds are normal.      Palpations: Abdomen is soft.   Musculoskeletal:         General: Swelling present. Normal range of motion.      Cervical back: Normal range of motion.      Right lower leg: Edema present.      Left lower leg: Edema present.    Skin:     General: Skin is warm and dry.      Findings: Erythema and lesion present.   Neurological:      Motor: Weakness present.      Comments: Unable to assess   Psychiatric:      Comments: Unable to assess       Significant Labs: All pertinent labs within the past 24 hours have been reviewed.    Significant Imaging: I have reviewed all pertinent imaging results/findings within the past 24 hours.

## 2022-09-26 NOTE — PLAN OF CARE
Report to 4th floor nurse, Dr Vilma Obrien, with belongings, chart, meds sent to room,  419 will send bipap

## 2022-09-26 NOTE — ASSESSMENT & PLAN NOTE
-initially on lasix drip; now transitioned IV pushes of 80 mg t.i.d.; add metolazone today  -monitor electrolytes  -echo with EF 55%, moderate mitral regurg, elevated CVP 15 mm Hg  -consult cardiology  -fluid restriction  -hold ACE and BB for now given soft BP  -bipap transitioned to hfnc  -cont lasix 60mg q8h ivp  -reduce metolazone to 3x/week  -monitor kidney function  -wean off supp o2 as tolerated. May require LTAC  -cxr

## 2022-09-26 NOTE — PROGRESS NOTES
BERNICEW met with patient and pt daughter to discuss discharge and additional patient barriers to care. Pt daughter identified no additional social barriers to care. Per pt and pt daughter, pt is not in need of resources at this time.    The following were addressed during this visit:  -Complete follow-up with patient    GILLIAN Jones

## 2022-09-26 NOTE — PLAN OF CARE
Pt very restless, due to inability to have bm, very uncomfortable, moaning, reaching for his face to rub it, and then takes his 02 off, and when that happens, his sat's go down to 82%, will try to get enema ordered

## 2022-09-26 NOTE — PT/OT/SLP PROGRESS
Occupational Therapy   Treatment    Name: Tong Ford  MRN: [id number]  Admitting Diagnosis:  Acute on chronic diastolic congestive heart failure       Recommendations:     Discharge Recommendations: other (see comments) (post acute placement)  Discharge Equipment Recommendations:  other (see comments) (defer to next level of care)  Barriers to discharge:  Decreased caregiver support    Assessment:     Tong Ford is a 77 y.o. male with a medical diagnosis of Acute on chronic diastolic congestive heart failure.  He presents with The primary encounter diagnosis was Acute on chronic diastolic congestive heart failure. Diagnoses of CHF (congestive heart failure), Elevated troponin, PAD (peripheral artery disease), Volume overload state of heart, Neuropathy, peripheral, idiopathic, Staphylococcal pneumonia, and CESAR (obstructive sleep apnea) were also pertinent to this visit. Performance deficits affecting function are weakness, pain, impaired endurance, gait instability, decreased coordination, decreased upper extremity function, decreased lower extremity function, impaired self care skills, impaired balance, impaired cardiopulmonary response to activity, decreased safety awareness, impaired functional mobility, impaired cognition, decreased ROM, impaired fine motor, impaired joint extensibility.     Pt progressing towards goals this date. Pt on 10L HFNC throughout session with O2 88-96%. See chart for vitals. Pt requires Max/TotalA x2 for sup<>sit. Pt sitting EOB with MaxA & brief bouts of CGA with BUE on bed rails. Pt with c/o pain to R hip flexor at end of session; nsg notified. Recommending post acute placement upon d/c.    Rehab Prognosis:  Fair; patient would benefit from acute skilled OT services to address these deficits and reach maximum level of function.       Plan:     Patient to be seen 5 x/week to address the above listed problems via self-care/home management, therapeutic activities, therapeutic  exercises  Plan of Care Expires: 10/20/22  Plan of Care Reviewed with: patient    Subjective     Pain/Comfort:  Pain Rating 1: 0/10 (at rest)  Location - Side 1: Right  Location 1:  (hip flexor)  Pain Addressed 1: Reposition, Distraction, Cessation of Activity, Nurse notified  Pain Rating Post-Intervention 1: other (see comments) (not rated)    Objective:     Communicated with: nsg prior to session.  Patient found HOB elevated with van catheter, bed alarm, oxygen upon OT entry to room.    General Precautions: Standard, fall, respiratory   Orthopedic Precautions:N/A   Braces: N/A  Respiratory Status: Nasal cannula, flow 10 L/min     Occupational Performance:     Bed Mobility:    Patient completed Rolling/Turning to Left with  maximal assistance  Patient completed Rolling/Turning to Right with maximal assistance  Patient completed Scooting/Bridging with total assistance and 2 persons to scoot to HOB.   Patient completed Supine to Sit with maximal assistance, total assistance, and 2 persons  Patient completed Sit to Supine with maximal assistance, total assistance, and 2 persons     Functional Mobility/Transfers:  NT    [facility] 6 Click ADL: 12    Treatment & Education:  Pt progressing towards goals this date.   Pt stating the day is Sunday.  Pt on 10L HFNC throughout session with O2 88-96%.    Pt requires Max/TotalA x2 for sup<>sit.   Pt sitting EOB with MaxA & brief bouts of CGA with BUE on bed rails.   Pt with c/o pain to R hip flexor at end of session; nsg notified.   Recommending post acute placement upon d/c.    09/26/22 1122  61 -- -- Lying -- 91 %  -- -- -- -- -- -- AM   09/26/22 1116  61 -- 106/56  Lying 78 91 %  -- -- -- -- -- -- AM   09/26/22 1108  61 -- 90/51  Sitting 64 92 %  -- -- -- -- -- -- AM       Patient left HOB elevated with all lines intact, call button in reach, bed alarm on, nsg notified, and daughter present    GOALS:   Multidisciplinary Problems       Occupational Therapy Goals           Problem: Occupational Therapy    Goal Priority Disciplines Outcome Interventions   Occupational Therapy Goal     OT, PT/OT Ongoing, Progressing    Description: Goals to be met by: 10/20/2022     Patient will increase functional independence with ADLs by performing:    UE Dressing with Modified [place].  LE Dressing with Modified [place].  Grooming while standing at sink with Modified [place].  Toileting from toilet with Modified [place] for hygiene and clothing management.   Supine to sit with Modified [place].  Step transfer with Modified [place]  Toilet transfer to toilet with Modified [place].  Increased functional strength to WFL for ADLS.  Upper extremity exercise program x10 reps per handout, with independence.                         Time Tracking:     OT Date of Treatment: 09/26/22  OT Start Time: 1053  OT Stop Time: 1124  OT Total Time (min): 31 min Overlap with PT due to complex nature of patient and for safety with mobility to decrease fall risk for patient and caregiver injury requiring two skilled therapists to provide different interventions.      Billable Minutes:Therapeutic Activity 31 cotx with PT    OT/CLARITZA: OT     CLARITZA Visit Number: 0    9/26/2022

## 2022-09-26 NOTE — PROGRESS NOTES
"[facility]  Progress Note    Patient Name: Tong Ford  MRN: [id number]  Patient Class: IP- Inpatient   Admission Date: 9/19/2022  Length of Stay: 7 days  Attending Physician: Waldemar Dolan MD  Primary Care Provider: Kris Zavala MD        Subjective:     Principal Problem:Acute on chronic diastolic congestive heart failure        HPI:  Per transfer note:  "Patient is a 77 y.o. male who has a past medical history of arthritis, HTN, AFIB, HLD, TIA, CAD, CHF, hypothyroidism, normocytic anemia and PSHx of CABG, coronary angioplasty with stent, and pacemaker presenting today for shortness of breath, weakness and swelling. ED workup significant for elevated troponin, worsening JAYSON, elevated BNP, leukocytosis, hyperkalemia, elevated CPK, hypoxia requiring O2 supplementation, chest x-ray with increased bilateral asymmetric airspace disease consistent with CHF/volume overload and possible LLL pneumonia. See below labs and imaging. Patient started on lasix drip, given lokelma, and IV Rocephin/Az. No ICU beds available at this facility therefore pt will need to transfer for higher level of care. Stable for transfer."    On arrival to [facility], the patient was continued on lasix drip. He is sleeping and unable to answer any questions. Per chart review, he endorsed orthopnea, worsening GOODWIN. He was told by his cardiologist to report to the ED to be admitted for IV lasix. He stated that having the extra fluid on his legs and scrotal area made it difficult to walk and he fell at home last night on concrete, denies hitting head. He was on the floor for several hours before being found. He was taking 60mg of lasix daily and lionel, reported adequate urination. Pt also has chronic bilat LE venous statis ulcers and he follows with wound care. He had BNP 1795, troponin 1.03 with downtrend, creat 3.1, K 5.2, CK 2600. a temperature of 100.2, WBC 20 at sending facility. He was given lokelma, rocephin and " azithromycin, and started on lasix drip. Prior echo with normal EF. He is on 2LNC with adequate O2 sat on arrival. Legs with stasis ulcers, right leg with redness noted.           Overview/Hospital Course:  No notes on file    Interval History:  Remains on 10L HF. Diuresing well. Cr stable. Pt states his SOB is improving.      Review of Systems   Constitutional:  Negative for fever.   Respiratory:  Positive for shortness of breath.    Cardiovascular:  Positive for leg swelling. Negative for chest pain.   Musculoskeletal:  Positive for arthralgias.   Skin:  Positive for rash and wound.   Neurological:  Positive for weakness.   Objective:     Vital Signs (Most Recent):  Temp: 96.3 °F (35.7 °C) (09/26/22 1059)  Pulse: 61 (09/26/22 1122)  Resp: 18 (09/26/22 1059)  BP: (!) 106/56 (09/26/22 1116)  SpO2: (!) 91 % (09/26/22 1122)   Vital Signs (24h Range):  Temp:  [96.3 °F (35.7 °C)-99.5 °F (37.5 °C)] 96.3 °F (35.7 °C)  Pulse:  [60-89] 61  Resp:  [18-36] 18  SpO2:  [82 %-100 %] 91 %  BP: ()/(50-83) 106/56     Weight: 84.5 kg (186 lb 4.6 oz)  Body mass index is 28.33 kg/m².    Intake/Output Summary (Last 24 hours) at 9/26/2022 1139  Last data filed at 9/26/2022 0633  Gross per 24 hour   Intake 244.2 ml   Output 1635 ml   Net -1390.8 ml        Physical Exam  Vitals and nursing note reviewed.   Constitutional:       General: He is not in acute distress.     Appearance: He is obese.   HENT:      Head: Normocephalic and atraumatic.      Nose: Nose normal.      Mouth/Throat:      Mouth: Mucous membranes are moist.   Eyes:      Pupils: Pupils are equal, round, and reactive to light.   Cardiovascular:      Rate and Rhythm: Normal rate and regular rhythm.      Pulses: Normal pulses.      Heart sounds: Murmur heard.   Pulmonary:      Effort: Pulmonary effort is normal. No respiratory distress.      Breath sounds: No rales.      Comments: On supplemental O2 via nasal cannula  Abdominal:      General: Bowel sounds are normal.       Palpations: Abdomen is soft.   Musculoskeletal:         General: Swelling present. Normal range of motion.      Cervical back: Normal range of motion.      Right lower leg: Edema present.      Left lower leg: Edema present.   Skin:     General: Skin is warm and dry.      Findings: Erythema and lesion present.   Neurological:      Motor: Weakness present.      Comments: Unable to assess   Psychiatric:      Comments: Unable to assess       Significant Labs: All pertinent labs within the past 24 hours have been reviewed.    Significant Imaging: I have reviewed all pertinent imaging results/findings within the past 24 hours.      Assessment/Plan:      * Acute on chronic diastolic congestive heart failure  -initially on lasix drip; now transitioned IV pushes of 80 mg t.i.d.; add metolazone today  -monitor electrolytes  -echo with EF 55%, moderate mitral regurg, elevated CVP 15 mm Hg  -consult cardiology  -fluid restriction  -hold ACE and BB for now given soft BP  -bipap transitioned to hfnc  -cont lasix 60mg q8h ivp  -reduce metolazone to 3x/week  -monitor kidney function  -wean off supp o2 as tolerated. May require LTAC  -cxr    Staphylococcal pneumonia        Neuropathy, peripheral, idiopathic  -resume gabapentin      Volume overload state of heart        Debility  -PT/OT consult; will likely require placement      Acute hypoxemic respiratory failure  In setting of volume overload and possible pneumonia  -cont supplemental O2 as needed, wean as tolerated--now on 9LNC  -worsening hypoxia requiring bipap  -transitioned to hfnc  -cont diuresis  -incentive spirometry    Sepsis  Meets sepsis criteria for WBC, temp, hypoxia  -treating for possible pneumonia and cellulitis  -resp cx growing staph          Rhabdomyolysis  Fell and laid on floor for hours  CPK 2607 with JAYSON  -unable to give fluids due to volume overload  -repeat CK down trending  -consult nephrology as needed      Pneumonia  LLL pneumonia on imaging  Temp  100.4, WBC 20  -blood cx no growth to date  -sputum cx growing mssa  -switch vancomycin to cefazolin  -complete 7 day course (end date 9/28)        Venous stasis ulcer  BLE with ulcers  -right leg with redness   -on rocephin and doxy  -consult wound care        Elevated troponin  In setting of volume overload; likely secondary to demand  -troponin 1.03--0.9  -plan for outpatient PET stress test  -cardiology consulted  -echo as above      Coronary artery disease  Hx of CABG and stent  -no antiplatelet on home med list  -cont statin  -hold BB for low BP      Paroxysmal atrial fibrillation  -cont amiodarone and eliquis  -hold BB for now      Acute renal failure superimposed on stage 3 chronic kidney disease  -possibly cardiorenal  -diuresing  -monitor labs  -renally dose medications  -baseline creatinine around 2.5    Thyroid disease  -cont synthroid   -TSH mildly elevated with normal T4      Essential hypertension  BP soft on admission  -hold home meds for now and resume as tolerated      CESAR (obstructive sleep apnea)  -CPAP at night         VTE Risk Mitigation (From admission, onward)           Ordered     apixaban tablet 5 mg  2 times daily         09/20/22 0224     IP VTE HIGH RISK PATIENT  Once         09/19/22 2343     Place sequential compression device  Until discontinued         09/19/22 2343                    Discharge Planning   YUNIEL:      Code Status: Full Code   Is the patient medically ready for discharge?:     Reason for patient still in hospital (select all that apply): Patient trending condition and Treatment  Discharge Plan A: Rehab   Discharge Delays: None known at this time              Waldemar Dolan MD  Department of Hospital Medicine   [place] [facility]

## 2022-09-26 NOTE — PROGRESS NOTES
Pharmacokinetic Assessment Follow Up: IV Vancomycin    Vancomycin serum concentration assessment(s):    The random level was drawn correctly and can be used to guide therapy at this time. The measurement is within the desired definitive target range of 15 to 20 mcg/mL.    Vancomycin Regimen Plan:    Give IV Vancomycin x 1 dose.   Re-dose when the random level is less than 20 mcg/mL, next level to be drawn at 0400 on 9/27    Drug levels (last 3 results):  Recent Labs   Lab Result Units 09/24/22 0427 09/25/22 0422 09/26/22  0301   Vancomycin, Random ug/mL 26.2 17.7 18.9       Pharmacy will continue to follow and monitor vancomycin.    Please contact pharmacy at extension [phone] for questions regarding this assessment.    Thank you for the consult,   Elizabeth Mehta       Patient brief summary:  Tong Ford is a 77 y.o. male initiated on antimicrobial therapy with IV Vancomycin for treatment of lower respiratory infection    The patient's current regimen is pulse dosing    Drug Allergies:   Review of patient's allergies indicates:  No Known Allergies    Actual Body Weight:   82.5 kg    Renal Function:   Estimated Creatinine Clearance: 24.1 mL/min (A) (based on SCr of 2.6 mg/dL (H)).,     Dialysis Method (if applicable):  N/A    CBC (last 72 hours):  Recent Labs   Lab Result Units 09/24/22 0427 09/25/22 0422 09/26/22  0301   WBC K/uL 15.66* 11.96 11.30   Hemoglobin g/dL 9.7* 9.9* 9.5*   Hemoglobin A1C %  --  5.3  --    Hematocrit % 28.8* 29.2* 27.5*   Platelets K/uL 261 289 285   Gran % % 79.6* 73.1* 81.4*   Lymph % % 7.9* 13.9* 9.6*   Mono % % 8.4 7.6 7.3   Eosinophil % % 2.8 4.2 0.7   Basophil % % 0.3 0.3 0.2   Differential Method  Automated Automated Automated       Metabolic Panel (last 72 hours):  Recent Labs   Lab Result Units 09/24/22 0427 09/24/22  1127 09/25/22 0422 09/25/22  1232   Sodium mmol/L 145  --  146* 146*   Potassium mmol/L 3.5  --  3.3* 3.6   Chloride mmol/L 99  --  96 94*   CO2 mmol/L 33*   --  38* 37*   Glucose mg/dL 101  --  108 105   Glucose, UA   --  Negative  --   --    BUN mg/dL 72*  --  81* 82*   Creatinine mg/dL 2.3*  --  2.5* 2.6*   Magnesium mg/dL 2.5  --  2.5 2.5   Phosphorus mg/dL 3.4  --  3.5  --        Vancomycin Administrations:  vancomycin given in the last 96 hours                     vancomycin 500 mg in dextrose 5 % 100 mL IVPB (ready to mix system) (mg) 500 mg New Bag 09/25/22 0859    vancomycin 1.5 g in dextrose 5 % 250 mL IVPB (ready to mix) (mg) 1,500 mg New Bag 09/23/22 0538    vancomycin (VANCOCIN) 2,000 mg in dextrose 5 % 500 mL IVPB (mg) 2,000 mg New Bag 09/22/22 0903                    Microbiologic Results:  Microbiology Results (last 7 days)       Procedure Component Value Units Date/Time    Urine culture [[id number]] Collected: 09/24/22 1127    Order Status: Completed Specimen: Urine Updated: 09/1943     Urine Culture, Routine No growth    Narrative:      Specimen Source->Urine    Blood culture [[id number]] Collected: 09/20/22 0013    Order Status: Completed Specimen: Blood Updated: 09/25/22 1212     Blood Culture, Routine No growth after 5 days.    Blood culture [[id number]] Collected: 09/20/22 0014    Order Status: Completed Specimen: Blood Updated: 09/25/22 1212     Blood Culture, Routine No growth after 5 days.    Culture, Respiratory with Gram Stain [[id number]]  (Abnormal) Collected: 09/21/22 1542    Order Status: Completed Specimen: Respiratory from Sputum Updated: 09/24/22 0826     Respiratory Culture STAPHYLOCOCCUS AUREUS  Moderate  Susceptibility pending  Normal respiratory subha also present       Gram Stain (Respiratory) <10 epithelial cells per low power field.     Gram Stain (Respiratory) Rare WBC's     Gram Stain (Respiratory) Rare Gram positive cocci     Gram Stain (Respiratory) Rare Gram negative rods

## 2022-09-26 NOTE — PLAN OF CARE
Problem: Adult Inpatient Plan of Care  Goal: Plan of Care Review  Outcome: Ongoing, Progressing   Patient is alert, oriented X4. Care plan explained to patient, he verbalized understanding.     On 10L of high flow nasal cannula, O2 sat maintain 97%, but pt has habit to keep taking off oxygen. O2 sat can drop to 67%. Continuous O2 sat monitor on. Taped nasal cannula on face.     Deny nausea/vomiting/diarrhea. Pt just had a big bowel movement before down to our unit from ICU. Deny pain. Due medications given.     Maintain fall risk precaution, bed in lowest position, call light/personal items in reach. Hayes in place. Instructed patient call for help as needed. Will continue to monitor.

## 2022-09-26 NOTE — ASSESSMENT & PLAN NOTE
In setting of volume overload and possible pneumonia  -cont supplemental O2 as needed, wean as tolerated--now on 9LNC  -worsening hypoxia requiring bipap  -transitioned to hfnc  -cont diuresis  -incentive spirometry

## 2022-09-26 NOTE — SUBJECTIVE & OBJECTIVE
Past Medical History:   Diagnosis Date    Arthritis     Atrial fibrillation     Carotid artery occlusion     bilateral    Cataract     CHF (congestive heart failure)     CKD (chronic kidney disease), stage II     Coronary artery disease     3 Vessel CABG AND 3 STENTS    Encounter for blood transfusion     Hypercholesterolemia     Hypertension     Normocytic anemia     Shingles     X 3 WEEKS AGO    Sleep apnea     CESAR - NOT USING C-PAP    Thyroid disease     TIA (transient ischemic attack)        Past Surgical History:   Procedure Laterality Date    A-V CARDIAC PACEMAKER INSERTION N/A 4/1/2021    Procedure: INSERTION, CARDIAC PACEMAKER, DUAL CHAMBER;  Surgeon: Clifford Sevilla MD;  Location: [facility] CATH/EP LAB;  Service: Cardiology;  Laterality: N/A;  [employer]    CORONARY ANGIOPLASTY WITH STENT PLACEMENT      CORONARY ARTERY BYPASS GRAFT      CORONARY ARTERY BYPASS GRAFT (CABG)      3 vessel    EYE SURGERY      bILATERAL CATARACS    INSERTION OF PACEMAKER      REVISION OF IMPLANTABLE CARDIOVERTER-DEFIBRILLATOR (ICD) ELECTRODE LEAD PLACEMENT Left 7/1/2021    Procedure: REVISION, INSERTION, ELECTRODE LEAD,pacemaker;  Surgeon: Clifford Sevilla MD;  Location: [facility] CATH/EP LAB;  Service: Cardiology;  Laterality: Left;    ROBOT-ASSISTED LAPAROSCOPIC REPAIR OF INGUINAL HERNIA Right 3/11/2022    Procedure: ROBOTIC REPAIR, HERNIA, INGUINAL;  Surgeon: Melo Aguilera III, MD;  Location: [facility] OR;  Service: General;  Laterality: Right;  LB case    TREATMENT OF CARDIAC ARRHYTHMIA N/A 1/29/2021    Procedure: CARDIOVERSION;  Surgeon: Iron Serna MD;  Location: [facility] CATH/EP LAB;  Service: Cardiology;  Laterality: N/A;    VASECTOMY         Review of patient's allergies indicates:  No Known Allergies    No current facility-administered medications on file prior to encounter.     Current Outpatient Medications on File Prior to Encounter   Medication Sig    amiodarone (PACERONE) 200 MG Tab Take 1 tablet (200 mg total) by  "mouth once daily.    amLODIPine (NORVASC) 5 MG tablet Take 1 tablet (5 mg total) by mouth every evening.    apixaban (ELIQUIS) 5 mg Tab Take 1 tablet (5 mg total) by mouth 2 (two) times daily.    ascorbic acid, vitamin C, (VITAMIN C) 1000 MG tablet Take 1,000 mg by mouth once daily.    atorvastatin (LIPITOR) 40 MG tablet Take 1 tablet (40 mg total) by mouth once daily.    coQ10, ubiquinol, 100 mg Cap Take 150 mg by mouth once daily.     cyanocobalamin 1,000 mcg/mL injection Inject 1 mL (1,000 mcg total) into the muscle every 30 days.    doxazosin (CARDURA) 8 MG Tab TAKE 1 TABLET DAILY    ezetimibe (ZETIA) 10 mg tablet TAKE 1 TABLET DAILY    furosemide (LASIX) 40 MG tablet Take 1 tablet (40 mg total) by mouth once daily.    gabapentin (NEURONTIN) 600 MG tablet Take 1 tablet (600 mg total) by mouth 2 (two) times daily.    garlic 1,000 mg Cap Take 1 capsule by mouth once daily.     ketoconazole (NIZORAL) 2 % cream Apply topically once daily.    levothyroxine (SYNTHROID) 100 MCG tablet Take 1 tablet (100 mcg total) by mouth before breakfast.    LIDOcaine (LIDODERM) 5 % Place 1 patch onto the skin once daily. Remove & Discard patch within 12 hours or as directed by MD    lisinopriL (PRINIVIL,ZESTRIL) 20 MG tablet Take 1 tablet (20 mg total) by mouth 2 (two) times a day.    metoprolol succinate (TOPROL-XL) 50 MG 24 hr tablet Take 1 tablet (50 mg total) by mouth once daily.    multivitamin Tab Take 1 tablet by mouth once daily. (Patient not taking: No sig reported)    nitroGLYCERIN (NITROSTAT) 0.4 MG SL tablet Place 1 tablet (0.4 mg total) under the tongue every 5 (five) minutes as needed for Chest pain. DO NOT CRUSH OR CHEW; DISSOLVE UNDER TONGUE; MAXIMUM OF 3 DOSES IN 15 MINUTES    spironolactone (ALDACTONE) 25 MG tablet Take 1 tablet (25 mg total) by mouth every other day.    syringe-needle,safety,disp unt 3 mL 22 gauge x 1 1/2" Syrg Please syringe to administer medication deep IM    traMADoL (ULTRAM) 50 mg tablet Take " 1 tablet (50 mg total) by mouth every 6 (six) hours as needed for Pain.    vitamin D (VITAMIN D3) 1000 units Tab Take 1,000 Units by mouth once daily.     Family History       Problem Relation (Age of Onset)    COPD Paternal Grandmother    Cancer Mother, Father, Maternal Grandmother    Hypertension Father          Tobacco Use    Smoking status: Never    Smokeless tobacco: Never   Substance and Sexual Activity    Alcohol use: Not Currently    Drug use: No    Sexual activity: Not on file     Review of Systems   Constitutional: Positive for malaise/fatigue.   HENT: Negative.     Eyes: Negative.    Cardiovascular:  Positive for irregular heartbeat. Negative for chest pain, near-syncope, orthopnea, palpitations, paroxysmal nocturnal dyspnea and syncope.   Respiratory:  Positive for shortness of breath.    Endocrine: Negative.    Hematologic/Lymphatic: Negative.    Gastrointestinal:  Negative for bloating, nausea and vomiting.   Genitourinary: Negative.    Neurological:  Positive for weakness.   Psychiatric/Behavioral: Negative.     Allergic/Immunologic: Negative.    Objective:     Vital Signs (Most Recent):  Temp: 98.6 °F (37 °C) (09/26/22 0727)  Pulse: 70 (09/26/22 0800)  Resp: 19 (09/26/22 0800)  BP: (!) 109/52 (09/26/22 0727)  SpO2: (!) 93 % (09/26/22 0802)   Vital Signs (24h Range):  Temp:  [98.3 °F (36.8 °C)-99.5 °F (37.5 °C)] 98.6 °F (37 °C)  Pulse:  [60-89] 70  Resp:  [18-36] 19  SpO2:  [82 %-100 %] 93 %  BP: ()/(50-83) 109/52     Weight: 84.5 kg (186 lb 4.6 oz)  Body mass index is 28.33 kg/m².    SpO2: (!) 93 %  O2 Device (Oxygen Therapy): High Flow nasal Cannula      Intake/Output Summary (Last 24 hours) at 9/26/2022 1026  Last data filed at 9/26/2022 0633  Gross per 24 hour   Intake 244.2 ml   Output 1715 ml   Net -1470.8 ml         Lines/Drains/Airways       Drain  Duration                  Urethral Catheter 09/22/22 1700 Silicone 16 Fr. 3 days              Peripheral Intravenous Line  Duration                   Peripheral IV - Single Lumen 09/19/22 1500 18 G Anterior;Left Forearm 6 days         Peripheral IV - Single Lumen 09/22/22 2305 20 G Left Hand 3 days                    Physical Exam  Constitutional:       General: He is not in acute distress.  HENT:      Head: Atraumatic.   Eyes:      General:         Right eye: No discharge.         Left eye: No discharge.   Cardiovascular:      Rate and Rhythm: Normal rate and regular rhythm.      Heart sounds: Murmur heard.   Pulmonary:      Breath sounds: Rales present.   Abdominal:      General: Bowel sounds are normal.   Musculoskeletal:      Right lower leg: Edema present.      Left lower leg: Edema present.   Skin:     General: Skin is warm and dry.   Neurological:      Mental Status: He is alert. Mental status is at baseline.       Significant Labs: Blood Culture: No results for input(s): LABBLOO in the last 48 hours.  , BMP:   Recent Labs   Lab 09/25/22  0422 09/25/22  1232 09/26/22  0301    105 115*   * 146* 145   K 3.3* 3.6 3.6   CL 96 94* 95   CO2 38* 37* 38*   BUN 81* 82* 88*   CREATININE 2.5* 2.6* 2.5*   CALCIUM 8.5* 9.1 8.5*   MG 2.5 2.5 2.6     , CMP   Recent Labs   Lab 09/25/22  0422 09/25/22  1232 09/26/22  0301   * 146* 145   K 3.3* 3.6 3.6   CL 96 94* 95   CO2 38* 37* 38*    105 115*   BUN 81* 82* 88*   CREATININE 2.5* 2.6* 2.5*   CALCIUM 8.5* 9.1 8.5*   ANIONGAP 12 15 12     , CBC   Recent Labs   Lab 09/25/22  0422 09/26/22  0301   WBC 11.96 11.30   HGB 9.9* 9.5*   HCT 29.2* 27.5*    285     , INR No results for input(s): INR, PROTIME in the last 48 hours., Lipid Panel No results for input(s): CHOL, HDL, LDLCALC, TRIG, CHOLHDL in the last 48 hours., Troponin   No results for input(s): TROPONINI in the last 48 hours.  , and All pertinent lab results from the last 24 hours have been reviewed.    Significant Imaging: Echocardiogram: Transthoracic echo (TTE) complete (Cupid Only):   Results for orders placed or performed  during the hospital encounter of 09/19/22   Echo   Result Value Ref Range    BSA 2.15 m2    IVC diameter 2.40 cm    Left Ventricular Outflow Tract Mean Velocity 0.69 cm/s    Left Ventricular Outflow Tract Mean Gradient 2.03 mmHg    PV PEAK VELOCITY 0.88 cm/s    LVIDd 5.69 3.5 - 6.0 cm    IVS 0.64 0.6 - 1.1 cm    Posterior Wall 0.84 0.6 - 1.1 cm    LVIDs 3.93 2.1 - 4.0 cm    FS 31 28 - 44 %    LV mass 154.01 g    LA size 5.02 cm    RV S' 0.01 cm/s    Left Ventricle Relative Wall Thickness 0.30 cm    AV mean gradient 16 mmHg    AV valve area 1.55 cm2    AV Velocity Ratio 0.35     AV index (prosthetic) 0.34     MV mean gradient 4 mmHg    MV valve area by continuity eq 1.78 cm2    E/A ratio 1.72     E wave deceleration time 228.37 msec    LVOT diameter 2.40 cm    LVOT area 4.5 cm2    LVOT peak joseph 0.92 m/s    LVOT peak VTI 20.30 cm    Ao peak joseph 2.60 m/s    Ao VTI 59.3 cm    Mr max joseph 5.17 m/s    LVOT stroke volume 91.79 cm3    AV peak gradient 27 mmHg    MV peak gradient 11 mmHg    MV Peak E Joseph 1.65 m/s    AR Max Joseph 3.24 m/s    TR Max Joseph 3.75 m/s    MV VTI 51.5 cm    MV Peak A Joseph 0.96 m/s    LV Systolic Volume 67.09 mL    LV Systolic Volume Index 31.9 mL/m2    LV Diastolic Volume 159.38 mL    LV Diastolic Volume Index 75.90 mL/m2    LV Mass Index 73 g/m2    RA Major Axis 5.98 cm    Left Atrium Minor Axis 6.02 cm    Left Atrium Major Axis 6.28 cm    Triscuspid Valve Regurgitation Peak Gradient 56 mmHg    LA Volume Index (Mod) 36.1 mL/m2    LA volume (mod) 75.88 cm3    Right Atrial Pressure (from IVC) 15 mmHg    EF 55 %    TV rest pulmonary artery pressure 71 mmHg    Narrative    · The left ventricle is normal in size with normal systolic function.  · The estimated ejection fraction is 55%.  · Indeterminate left ventricular diastolic function.  · There is abnormal septal wall motion consistent with right ventricular   pacemaker.  · Normal right ventricular size with normal right ventricular systolic   function.  ·  Moderate tricuspid regurgitation.  · Mild pulmonic regurgitation.  · Moderate mitral regurgitation.  · There is mild aortic valve stenosis.  · Aortic valve area is 1.55 cm2; peak velocity is 2.60 m/s; mean gradient   is 16 mmHg.  · Elevated central venous pressure (15 mmHg).  · The estimated PA systolic pressure is 71 mmHg.  · There is pulmonary hypertension.  · Mild left atrial enlargement.

## 2022-09-26 NOTE — NURSING TRANSFER
Nursing Transfer Note      9/26/2022     Reason patient is being transferred: step down    Transfer To: [id number]    Transfer via bed    Transfer with O2, cardiac monitoring     Transported by Marianne RN; GILDA Connors    Medicines sent: yes    Any special needs or follow-up needed: GILDA Coronel at bedside upon transfer    Chart send with patient: Yes    Notified: daughter    Patient reassessed at: 2110 9/25/22    Upon arrival to floor: cardiac monitor applied, patient oriented to room, call bell in reach, and bed in lowest position

## 2022-09-26 NOTE — PROGRESS NOTES
The sw reached out to [facility] to inquire about the status of this pt's referral b/c the [facility] notes state they're still reviewing the pt.     1:17pm The sw was just notified by Cynthia Rangel via Secure Chat at [facility],they can accept the pt when he's medically stable for d/c. The sw informed Dr. Dolan of this info via Secure Chat.

## 2022-09-26 NOTE — ASSESSMENT & PLAN NOTE
TTE   The left ventricle is normal in size with normal systolic function.   The estimated ejection fraction is 55%.   Indeterminate left ventricular diastolic function.   There is abnormal septal wall motion consistent with right ventricular pacemaker.   Normal right ventricular size with normal right ventricular systolic function.   Moderate tricuspid regurgitation.   Mild pulmonic regurgitation.   Moderate mitral regurgitation.   There is mild aortic valve stenosis.   Aortic valve area is 1.55 cm2; peak velocity is 2.60 m/s; mean gradient is 16 mmHg.   Elevated central venous pressure (15 mmHg).   The estimated PA systolic pressure is 71 mmHg.   There is pulmonary hypertension.   Mild left atrial enlargement.        Recent Labs   Lab 09/19/22  1324   BNP 1,795*   .  - ACEI, Aldactone on hold 2/2 JAYSON  - IV diuresis with Lasix 60 mg IV TID and Metolazone  - net -14 L from admission   -Remains on HF NC at 10L

## 2022-09-26 NOTE — PLAN OF CARE
Soap suds, enema given, passed large , large amt of hard , formed stool, positioned on side to pass more stool, feels better now      Daughter Daniella notified of transfer to floor to [phone]

## 2022-09-26 NOTE — PROGRESS NOTES
Pharmacist Renal Dose Adjustment Note    Tong Ford is a 77 y.o. male being treated with the medication cefazolin    Patient Data:    Vital Signs (Most Recent):  Temp: 96.3 °F (35.7 °C) (09/26/22 1059)  Pulse: (!) 59 (09/26/22 1203)  Resp: 18 (09/26/22 1316)  BP: (!) 106/56 (09/26/22 1116)  SpO2: (!) 92 % (09/26/22 1133)   Vital Signs (72h Range):  Temp:  [96.3 °F (35.7 °C)-99.6 °F (37.6 °C)]   Pulse:  [59-89]   Resp:  [12-43]   BP: ()/(50-86)   SpO2:  [82 %-100 %]      Recent Labs   Lab 09/25/22  0422 09/25/22  1232 09/26/22  0301   CREATININE 2.5* 2.6* 2.5*     Serum creatinine: 2.5 mg/dL (H) 09/26/22 0301  Estimated creatinine clearance: 25.3 mL/min (A)    Medication:cefazolin dose: 1gram frequency q12 will be changed to medication:cefazolin dose:2grams frequency:q12h    Pharmacist's Name: Nikunj Enriquez  Pharmacist's Extension: [phone]

## 2022-09-26 NOTE — PROGRESS NOTES
[place] - Telemetry  Cardiology  Progress Note    Patient Name: Tong Ford  MRN: [id number]  Admission Date: 9/19/2022  Hospital Length of Stay: 7 days  Code Status: Full Code   Attending Physician: Waldemar Dolan MD   Primary Care Physician: Kris Zavala MD  Expected Discharge Date:   Principal Problem:Acute on chronic diastolic congestive heart failure    Subjective:     Hospital Course:   09/20/2022 Per HPI   09/21/2022 TTE   The left ventricle is normal in size with normal systolic function.   The estimated ejection fraction is 55%.   Indeterminate left ventricular diastolic function.   There is abnormal septal wall motion consistent with right ventricular pacemaker.   Normal right ventricular size with normal right ventricular systolic function.   Moderate tricuspid regurgitation.   Mild pulmonic regurgitation.   Moderate mitral regurgitation.   There is mild aortic valve stenosis.   Aortic valve area is 1.55 cm2; peak velocity is 2.60 m/s; mean gradient is 16 mmHg.   Elevated central venous pressure (15 mmHg).   The estimated PA systolic pressure is 71 mmHg.   There is pulmonary hypertension.   Mild left atrial enlargement.    774 cc intake, 2160 cc output, net -2.4L from admission. Cr stable at 3. O2 requirements increased overnight to 9 L NC. Remains SOB with edema.   09/22/2022 825 cc intake and 1500 cc output. Diuresing with Lasix 80 mg IV TID and Metolazone. Cr improving (2.7 today). Remains on 9L NC with intermittent BiPAP. Patient feels SOB is improving.     09/22/2022 Transferred to ICU yesterday due to increased O2 requirments. Placed on IV Lasix drip and Metolazone continued. 5.1L out overnight negative 4.0L in 24 hours and negative 7.9L since admission. Creatinine down to 2.4 this AM. HR and BP stable.  9/24/22: MS improved today. Off BiPAP. I/O -2.8L overnight  9/25/22: ~I/O -1800. Slight increase in Cr. Reports SOB continues to improve.  09/26/2022 Diuresing well, net -14 L from  admission and 1.2 L overnight. Remains on 10L HF NC. Cr 2.5.       Past Medical History:   Diagnosis Date    Arthritis     Atrial fibrillation     Carotid artery occlusion     bilateral    Cataract     CHF (congestive heart failure)     CKD (chronic kidney disease), stage II     Coronary artery disease     3 Vessel CABG AND 3 STENTS    Encounter for blood transfusion     Hypercholesterolemia     Hypertension     Normocytic anemia     Shingles     X 3 WEEKS AGO    Sleep apnea     CESAR - NOT USING C-PAP    Thyroid disease     TIA (transient ischemic attack)        Past Surgical History:   Procedure Laterality Date    A-V CARDIAC PACEMAKER INSERTION N/A 4/1/2021    Procedure: INSERTION, CARDIAC PACEMAKER, DUAL CHAMBER;  Surgeon: Clifford Sevilla MD;  Location: [facility] CATH/EP LAB;  Service: Cardiology;  Laterality: N/A;  [employer]    CORONARY ANGIOPLASTY WITH STENT PLACEMENT      CORONARY ARTERY BYPASS GRAFT      CORONARY ARTERY BYPASS GRAFT (CABG)      3 vessel    EYE SURGERY      bILATERAL CATARACS    INSERTION OF PACEMAKER      REVISION OF IMPLANTABLE CARDIOVERTER-DEFIBRILLATOR (ICD) ELECTRODE LEAD PLACEMENT Left 7/1/2021    Procedure: REVISION, INSERTION, ELECTRODE LEAD,pacemaker;  Surgeon: Clifford Sevilla MD;  Location: [facility] CATH/EP LAB;  Service: Cardiology;  Laterality: Left;    ROBOT-ASSISTED LAPAROSCOPIC REPAIR OF INGUINAL HERNIA Right 3/11/2022    Procedure: ROBOTIC REPAIR, HERNIA, INGUINAL;  Surgeon: Melo Aguilera III, MD;  Location: [facility] OR;  Service: General;  Laterality: Right;  LB case    TREATMENT OF CARDIAC ARRHYTHMIA N/A 1/29/2021    Procedure: CARDIOVERSION;  Surgeon: Iron Serna MD;  Location: [facility] CATH/EP LAB;  Service: Cardiology;  Laterality: N/A;    VASECTOMY         Review of patient's allergies indicates:  No Known Allergies    No current facility-administered medications on file prior to encounter.     Current Outpatient Medications on File Prior to  Encounter   Medication Sig    amiodarone (PACERONE) 200 MG Tab Take 1 tablet (200 mg total) by mouth once daily.    amLODIPine (NORVASC) 5 MG tablet Take 1 tablet (5 mg total) by mouth every evening.    apixaban (ELIQUIS) 5 mg Tab Take 1 tablet (5 mg total) by mouth 2 (two) times daily.    ascorbic acid, vitamin C, (VITAMIN C) 1000 MG tablet Take 1,000 mg by mouth once daily.    atorvastatin (LIPITOR) 40 MG tablet Take 1 tablet (40 mg total) by mouth once daily.    coQ10, ubiquinol, 100 mg Cap Take 150 mg by mouth once daily.     cyanocobalamin 1,000 mcg/mL injection Inject 1 mL (1,000 mcg total) into the muscle every 30 days.    doxazosin (CARDURA) 8 MG Tab TAKE 1 TABLET DAILY    ezetimibe (ZETIA) 10 mg tablet TAKE 1 TABLET DAILY    furosemide (LASIX) 40 MG tablet Take 1 tablet (40 mg total) by mouth once daily.    gabapentin (NEURONTIN) 600 MG tablet Take 1 tablet (600 mg total) by mouth 2 (two) times daily.    garlic 1,000 mg Cap Take 1 capsule by mouth once daily.     ketoconazole (NIZORAL) 2 % cream Apply topically once daily.    levothyroxine (SYNTHROID) 100 MCG tablet Take 1 tablet (100 mcg total) by mouth before breakfast.    LIDOcaine (LIDODERM) 5 % Place 1 patch onto the skin once daily. Remove & Discard patch within 12 hours or as directed by MD    lisinopriL (PRINIVIL,ZESTRIL) 20 MG tablet Take 1 tablet (20 mg total) by mouth 2 (two) times a day.    metoprolol succinate (TOPROL-XL) 50 MG 24 hr tablet Take 1 tablet (50 mg total) by mouth once daily.    multivitamin Tab Take 1 tablet by mouth once daily. (Patient not taking: No sig reported)    nitroGLYCERIN (NITROSTAT) 0.4 MG SL tablet Place 1 tablet (0.4 mg total) under the tongue every 5 (five) minutes as needed for Chest pain. DO NOT CRUSH OR CHEW; DISSOLVE UNDER TONGUE; MAXIMUM OF 3 DOSES IN 15 MINUTES    spironolactone (ALDACTONE) 25 MG tablet Take 1 tablet (25 mg total) by mouth every other day.    syringe-needle,safety,disp unt  "3 mL 22 gauge x 1 1/2" Syrg Please syringe to administer medication deep IM    traMADoL (ULTRAM) 50 mg tablet Take 1 tablet (50 mg total) by mouth every 6 (six) hours as needed for Pain.    vitamin D (VITAMIN D3) 1000 units Tab Take 1,000 Units by mouth once daily.     Family History       Problem Relation (Age of Onset)    COPD Paternal Grandmother    Cancer Mother, Father, Maternal Grandmother    Hypertension Father          Tobacco Use    Smoking status: Never    Smokeless tobacco: Never   Substance and Sexual Activity    Alcohol use: Not Currently    Drug use: No    Sexual activity: Not on file     Review of Systems   Constitutional: Positive for malaise/fatigue.   HENT: Negative.     Eyes: Negative.    Cardiovascular:  Positive for irregular heartbeat. Negative for chest pain, near-syncope, orthopnea, palpitations, paroxysmal nocturnal dyspnea and syncope.   Respiratory:  Positive for shortness of breath.    Endocrine: Negative.    Hematologic/Lymphatic: Negative.    Gastrointestinal:  Negative for bloating, nausea and vomiting.   Genitourinary: Negative.    Neurological:  Positive for weakness.   Psychiatric/Behavioral: Negative.     Allergic/Immunologic: Negative.    Objective:     Vital Signs (Most Recent):  Temp: 98.6 °F (37 °C) (09/26/22 0727)  Pulse: 70 (09/26/22 0800)  Resp: 19 (09/26/22 0800)  BP: (!) 109/52 (09/26/22 0727)  SpO2: (!) 93 % (09/26/22 0802)   Vital Signs (24h Range):  Temp:  [98.3 °F (36.8 °C)-99.5 °F (37.5 °C)] 98.6 °F (37 °C)  Pulse:  [60-89] 70  Resp:  [18-36] 19  SpO2:  [82 %-100 %] 93 %  BP: ()/(50-83) 109/52     Weight: 84.5 kg (186 lb 4.6 oz)  Body mass index is 28.33 kg/m².    SpO2: (!) 93 %  O2 Device (Oxygen Therapy): High Flow nasal Cannula      Intake/Output Summary (Last 24 hours) at 9/26/2022 1026  Last data filed at 9/26/2022 0633  Gross per 24 hour   Intake 244.2 ml   Output 1715 ml   Net -1470.8 ml         Lines/Drains/Airways       Drain  Duration          "         Urethral Catheter 09/22/22 1700 Silicone 16 Fr. 3 days              Peripheral Intravenous Line  Duration                  Peripheral IV - Single Lumen 09/19/22 1500 18 G Anterior;Left Forearm 6 days         Peripheral IV - Single Lumen 09/22/22 2305 20 G Left Hand 3 days                    Physical Exam  Constitutional:       General: He is not in acute distress.  HENT:      Head: Atraumatic.   Eyes:      General:         Right eye: No discharge.         Left eye: No discharge.   Cardiovascular:      Rate and Rhythm: Normal rate and regular rhythm.      Heart sounds: Murmur heard.   Pulmonary:      Breath sounds: Rales present.   Abdominal:      General: Bowel sounds are normal.   Musculoskeletal:      Right lower leg: Edema present.      Left lower leg: Edema present.   Skin:     General: Skin is warm and dry.   Neurological:      Mental Status: He is alert. Mental status is at baseline.       Significant Labs: Blood Culture: No results for input(s): LABBLOO in the last 48 hours.  , BMP:   Recent Labs   Lab 09/25/22  0422 09/25/22  1232 09/26/22  0301    105 115*   * 146* 145   K 3.3* 3.6 3.6   CL 96 94* 95   CO2 38* 37* 38*   BUN 81* 82* 88*   CREATININE 2.5* 2.6* 2.5*   CALCIUM 8.5* 9.1 8.5*   MG 2.5 2.5 2.6     , CMP   Recent Labs   Lab 09/25/22  0422 09/25/22  1232 09/26/22  0301   * 146* 145   K 3.3* 3.6 3.6   CL 96 94* 95   CO2 38* 37* 38*    105 115*   BUN 81* 82* 88*   CREATININE 2.5* 2.6* 2.5*   CALCIUM 8.5* 9.1 8.5*   ANIONGAP 12 15 12     , CBC   Recent Labs   Lab 09/25/22  0422 09/26/22  0301   WBC 11.96 11.30   HGB 9.9* 9.5*   HCT 29.2* 27.5*    285     , INR No results for input(s): INR, PROTIME in the last 48 hours., Lipid Panel No results for input(s): CHOL, HDL, LDLCALC, TRIG, CHOLHDL in the last 48 hours., Troponin   No results for input(s): TROPONINI in the last 48 hours.  , and All pertinent lab results from the last 24 hours have been  reviewed.    Significant Imaging: Echocardiogram: Transthoracic echo (TTE) complete (Cupid Only):   Results for orders placed or performed during the hospital encounter of 09/19/22   Echo   Result Value Ref Range    BSA 2.15 m2    IVC diameter 2.40 cm    Left Ventricular Outflow Tract Mean Velocity 0.69 cm/s    Left Ventricular Outflow Tract Mean Gradient 2.03 mmHg    PV PEAK VELOCITY 0.88 cm/s    LVIDd 5.69 3.5 - 6.0 cm    IVS 0.64 0.6 - 1.1 cm    Posterior Wall 0.84 0.6 - 1.1 cm    LVIDs 3.93 2.1 - 4.0 cm    FS 31 28 - 44 %    LV mass 154.01 g    LA size 5.02 cm    RV S' 0.01 cm/s    Left Ventricle Relative Wall Thickness 0.30 cm    AV mean gradient 16 mmHg    AV valve area 1.55 cm2    AV Velocity Ratio 0.35     AV index (prosthetic) 0.34     MV mean gradient 4 mmHg    MV valve area by continuity eq 1.78 cm2    E/A ratio 1.72     E wave deceleration time 228.37 msec    LVOT diameter 2.40 cm    LVOT area 4.5 cm2    LVOT peak joseph 0.92 m/s    LVOT peak VTI 20.30 cm    Ao peak joseph 2.60 m/s    Ao VTI 59.3 cm    Mr max joseph 5.17 m/s    LVOT stroke volume 91.79 cm3    AV peak gradient 27 mmHg    MV peak gradient 11 mmHg    MV Peak E Josehp 1.65 m/s    AR Max Joseph 3.24 m/s    TR Max Joseph 3.75 m/s    MV VTI 51.5 cm    MV Peak A Joseph 0.96 m/s    LV Systolic Volume 67.09 mL    LV Systolic Volume Index 31.9 mL/m2    LV Diastolic Volume 159.38 mL    LV Diastolic Volume Index 75.90 mL/m2    LV Mass Index 73 g/m2    RA Major Axis 5.98 cm    Left Atrium Minor Axis 6.02 cm    Left Atrium Major Axis 6.28 cm    Triscuspid Valve Regurgitation Peak Gradient 56 mmHg    LA Volume Index (Mod) 36.1 mL/m2    LA volume (mod) 75.88 cm3    Right Atrial Pressure (from IVC) 15 mmHg    EF 55 %    TV rest pulmonary artery pressure 71 mmHg    Narrative    · The left ventricle is normal in size with normal systolic function.  · The estimated ejection fraction is 55%.  · Indeterminate left ventricular diastolic function.  · There is abnormal septal wall  motion consistent with right ventricular   pacemaker.  · Normal right ventricular size with normal right ventricular systolic   function.  · Moderate tricuspid regurgitation.  · Mild pulmonic regurgitation.  · Moderate mitral regurgitation.  · There is mild aortic valve stenosis.  · Aortic valve area is 1.55 cm2; peak velocity is 2.60 m/s; mean gradient   is 16 mmHg.  · Elevated central venous pressure (15 mmHg).  · The estimated PA systolic pressure is 71 mmHg.  · There is pulmonary hypertension.  · Mild left atrial enlargement.        Assessment and Plan:     Brief HPI: Patient seen this morning on rounds with daughter at bedside. Updated on POC for continued diuresis.     * Acute on chronic diastolic congestive heart failure   TTE   The left ventricle is normal in size with normal systolic function.   The estimated ejection fraction is 55%.   Indeterminate left ventricular diastolic function.   There is abnormal septal wall motion consistent with right ventricular pacemaker.   Normal right ventricular size with normal right ventricular systolic function.   Moderate tricuspid regurgitation.   Mild pulmonic regurgitation.   Moderate mitral regurgitation.   There is mild aortic valve stenosis.   Aortic valve area is 1.55 cm2; peak velocity is 2.60 m/s; mean gradient is 16 mmHg.   Elevated central venous pressure (15 mmHg).   The estimated PA systolic pressure is 71 mmHg.   There is pulmonary hypertension.   Mild left atrial enlargement.        Recent Labs   Lab 09/19/22  1324   BNP 1,795*   .  - ACEI, Aldactone on hold 2/2 JAYSON  - IV diuresis with Lasix 60 mg IV TID and Metolazone  - net -14 L from admission   -Remains on HF NC at 10L      Rhabdomyolysis  - CPK 2600 on admission, down to 1200 earlier this week     Venous stasis ulcer  WOC on board  Venous wounds with aspect of PAD per arterial US  Will defer AO to outpatient setting given severity of ADHF +/- PNA  Medical management with statin, no asa  given hx of GIB (on Eliquis). No ACEI.ARB 2/2 JAYSON on CKD     Elevated troponin  Troponin peaked at 1 on admission  No chest pain  Likely demand in setting of ADHF and possible infection   Normal LVEF   PET stress as outpatient once recovered from ADHF      Coronary artery disease  S/p CABG in 1989 with subsequent PCI in 2005  No chest pain  EKG paced  Troponin peaked on admission at 1  Not on asa or BB per primary cardiologist - will defer initiation to them  Continue statin     TTE   The left ventricle is normal in size with normal systolic function.   The estimated ejection fraction is 55%.   Indeterminate left ventricular diastolic function.   There is abnormal septal wall motion consistent with right ventricular pacemaker.   Normal right ventricular size with normal right ventricular systolic function.   Moderate tricuspid regurgitation.   Mild pulmonic regurgitation.   Moderate mitral regurgitation.   There is mild aortic valve stenosis.   Aortic valve area is 1.55 cm2; peak velocity is 2.60 m/s; mean gradient is 16 mmHg.   Elevated central venous pressure (15 mmHg).   The estimated PA systolic pressure is 71 mmHg.   There is pulmonary hypertension.   Mild left atrial enlargement.    PET stress as outpatient once recovers from ADHF    Paroxysmal atrial fibrillation  - continue Amio and Eliquis   - HR 60s-70s    Acute renal failure superimposed on stage 3 chronic kidney disease  - cr 2.5 this AM- stable  -  (baseline 1.7-2.2)        VTE Risk Mitigation (From admission, onward)         Ordered     apixaban tablet 5 mg  2 times daily         09/20/22 0224     IP VTE HIGH RISK PATIENT  Once         09/19/22 2343     Place sequential compression device  Until discontinued         09/19/22 2343                Nathaneil Bal NP  Cardiology  [place] - Telemetry

## 2022-09-26 NOTE — PLAN OF CARE
Pt progressing towards goals this date. Pt on 10L HFNC throughout session with O2 88-96%. See chart for vitals. Pt requires Max/TotalA x2 for sup<>sit. Pt sitting EOB with MaxA & brief bouts of CGA with BUE on bed rails. Pt with c/o pain to R hip flexor at end of session; nsg notified. Recommending post acute placement upon d/c.    Problem: Occupational Therapy  Goal: Occupational Therapy Goal  Description: Goals to be met by: 10/20/2022     Patient will increase functional independence with ADLs by performing:    UE Dressing with Modified Benson.  LE Dressing with Modified Benson.  Grooming while standing at sink with Modified Benson.  Toileting from toilet with Modified Benson for hygiene and clothing management.   Supine to sit with Modified Benson.  Step transfer with Modified Benson  Toilet transfer to toilet with Modified Benson.  Increased functional strength to WFL for ADLS.  Upper extremity exercise program x10 reps per handout, with independence.    Outcome: Ongoing, Progressing

## 2022-09-26 NOTE — PLAN OF CARE
"Dr ellison here , informed of status, informed of need of more 02 today, 8-10l, with sat's going down to mid  80"s  when 02 off  "

## 2022-09-26 NOTE — PLAN OF CARE
Problem: Physical Therapy  Goal: Physical Therapy Goal  Description: Goals to be met by: 10/20/22     Patient will increase functional independence with mobility by performin. Supine to sit with Stand-by Assistance  2. Sit to supine with Stand-by Assistance  3. Sit to stand transfer with Stand-by Assistance  4. Bed to chair transfer with Stand-by Assistance using Rolling Walker  5. Gait  x 50 feet with Stand-by Assistance using Rolling Walker.     Outcome: Ongoing, Progressing     Pt on 10 L HFNC and SpO2 decreased to 88% the lowest during therapy session. O2 sats with improvement this date, however, BP decreasing upon sitting EOB. Pt grimacing with mobility during session and with difficulty describing the type of pain - reports R hip flexor pain. Nsg notified and aware. Unable to attempt sit>stand this date 2/2 BP decreasing and pt with increased WOB.      22 1059 22 1108 22 1116   Vital Signs   BP (!) 104/53 (!) 90/51 (!) 106/56   MAP (mmHg) 75 64 78   BP Location Right arm Right arm Right arm   Patient Position Lying Sitting Lying

## 2022-09-27 NOTE — PT/OT/SLP PROGRESS
Physical Therapy      Patient Name:  Tong Ford   MRN:  [id number]    Patient not seen today secondary to Therapist assessment-Pt increased to 12L HFNC with O2 sats 83% at rest. Respiratory in room. Will follow-up as appropriate.  9/27/2022

## 2022-09-27 NOTE — SUBJECTIVE & OBJECTIVE
Interval History:  On 12 L NC, although not wearing supplemental O2 at the time of my assessment.  Placed oxygen back on the patient.  Reports that he feels okay.   No fevers or chills.  Still with some cough.  Has been diuresing well; labs revealing contraction today, will hold diuresis today.    Review of Systems   Constitutional:  Negative for fever.   Respiratory:  Positive for shortness of breath.    Cardiovascular:  Positive for leg swelling. Negative for chest pain.   Musculoskeletal:  Positive for arthralgias.   Skin:  Positive for rash and wound.   Neurological:  Positive for weakness.   Objective:     Vital Signs (Most Recent):  Temp: 98.4 °F (36.9 °C) (09/27/22 0810)  Pulse: 64 (09/27/22 0810)  Resp: 18 (09/27/22 0810)  BP: 137/62 (09/27/22 0810)  SpO2: (!) 70 % (09/27/22 0810)   Vital Signs (24h Range):  Temp:  [96 °F (35.6 °C)-98.4 °F (36.9 °C)] 98.4 °F (36.9 °C)  Pulse:  [59-64] 64  Resp:  [17-18] 18  SpO2:  [70 %-99 %] 70 %  BP: ()/(51-68) 137/62     Weight: 84.5 kg (186 lb 4.6 oz)  Body mass index is 28.33 kg/m².    Intake/Output Summary (Last 24 hours) at 9/27/2022 1040  Last data filed at 9/27/2022 0600  Gross per 24 hour   Intake 49.33 ml   Output 875 ml   Net -825.67 ml        Physical Exam  Vitals and nursing note reviewed.   Constitutional:       General: He is not in acute distress.     Appearance: He is obese.   HENT:      Head: Normocephalic and atraumatic.      Nose: Nose normal.      Mouth/Throat:      Mouth: Mucous membranes are moist.   Eyes:      Pupils: Pupils are equal, round, and reactive to light.   Cardiovascular:      Rate and Rhythm: Normal rate and regular rhythm.      Pulses: Normal pulses.      Heart sounds: Murmur heard.   Pulmonary:      Effort: Pulmonary effort is normal. No respiratory distress.      Breath sounds: No rales.      Comments: On supplemental O2 via nasal cannula  Abdominal:      General: Bowel sounds are normal.      Palpations: Abdomen is soft.    Musculoskeletal:         General: Swelling present. Normal range of motion.      Cervical back: Normal range of motion.      Right lower leg: Edema present.      Left lower leg: Edema present.   Skin:     General: Skin is warm and dry.      Findings: Erythema and lesion present.   Neurological:      Motor: Weakness present.      Comments: Unable to assess   Psychiatric:      Comments: Unable to assess       Significant Labs: All pertinent labs within the past 24 hours have been reviewed.    Significant Imaging: I have reviewed all pertinent imaging results/findings within the past 24 hours.

## 2022-09-27 NOTE — ASSESSMENT & PLAN NOTE
S/p CABG in 1989 with subsequent PCI in 2005  No chest pain  EKG paced  Troponin peaked on admission at 1  Not on asa or BB per primary cardiologist - will defer initiation to them  Continue statin     TTE   The left ventricle is normal in size with normal systolic function.   The estimated ejection fraction is 55%.   Indeterminate left ventricular diastolic function.   There is abnormal septal wall motion consistent with right ventricular pacemaker.   Normal right ventricular size with normal right ventricular systolic function.   Moderate tricuspid regurgitation.   Mild pulmonic regurgitation.   Moderate mitral regurgitation.   There is mild aortic valve stenosis.   Aortic valve area is 1.55 cm2; peak velocity is 2.60 m/s; mean gradient is 16 mmHg.   Elevated central venous pressure (15 mmHg).   The estimated PA systolic pressure is 71 mmHg.   There is pulmonary hypertension.   Mild left atrial enlargement.    PET stress as outpatient once recovers from acute illness

## 2022-09-27 NOTE — PLAN OF CARE
"[place] - Telemetry  Discharge Reassessment    Primary Care Provider: Kris Zavala MD    Expected Discharge Date:     Reassessment (most recent)       Discharge Reassessment - 09/27/22 1159          Discharge Reassessment    Assessment Type Discharge Planning Reassessment (P)      Did the patient's condition or plan change since previous assessment? Yes (P)      Discharge Plan discussed with: Patient;Adult children (P)      Communicated YUNIEL with patient/caregiver Yes (P)      Discharge Plan A Long-term acute care facility (LTAC) (P)      DME Needed Upon Discharge  none (P)      Discharge Barriers Identified None (P)      Why the patient remains in the hospital Requires continued medical care (P)    Potential for DC to LTAC today. Discussed with MD. Will trend WBC. Plan for DC to LTAC tmrw. High O2 req noted.       Post-Acute Status    Post-Acute Authorization Placement (P)      Post-Acute Placement Status Pending medical clearance/testing (P)      Hospital Resources/Appts/Education Provided Appointments scheduled and added to AVS (P)                    Future Appointments   Date Time Provider Department Center   10/11/2022  8:40 AM Clifford Sevilla MD [facility] [place] CARDIO [place]   12/13/2022 10:40 AM Kris Zavala MD [facility] G FA MD [facility] M[facility] 2     /60 (BP Location: Right arm, Patient Position: Lying)   Pulse 65   Temp 96.7 °F (35.9 °C) (Oral)   Resp 18   Ht 5' 8" (1.727 m)   Wt 84.5 kg (186 lb 4.6 oz)   SpO2 (!) 90%   BMI 28.33 kg/m²      amiodarone  200 mg Oral Daily    apixaban  5 mg Oral BID    atorvastatin  40 mg Oral Daily    ceFAZolin (ANCEF) IVPB  2 g Intravenous Q12H    famotidine  20 mg Oral Daily    [START ON 9/28/2022] furosemide (LASIX) injection  60 mg Intravenous Q8H    gabapentin  300 mg Oral BID    levothyroxine  100 mcg Oral Before breakfast    polyethylene glycol  17 g Oral Daily    psyllium husk (with sugar)  1 packet Oral Daily         "

## 2022-09-27 NOTE — ASSESSMENT & PLAN NOTE
Possible LLL pneumonia on imaging  Temp 100.4, WBC 20  -blood cx no growth to date  -sputum cx growing staph  -de-escalate abx to cefazolin  -cont supplemental O2 as needed

## 2022-09-27 NOTE — PROGRESS NOTES
[facility] - Telemetry  Cardiology  Progress Note    Patient Name: Tong Ford  MRN: [id number]  Admission Date: 9/19/2022  Hospital Length of Stay: 8 days  Code Status: Full Code   Attending Physician: Adolfo Solo, *   Primary Care Physician: Kris Zavala MD  Expected Discharge Date:   Principal Problem:Acute on chronic diastolic congestive heart failure    Subjective:     Hospital Course:   09/20/2022 Per HPI   09/21/2022 TTE   The left ventricle is normal in size with normal systolic function.   The estimated ejection fraction is 55%.   Indeterminate left ventricular diastolic function.   There is abnormal septal wall motion consistent with right ventricular pacemaker.   Normal right ventricular size with normal right ventricular systolic function.   Moderate tricuspid regurgitation.   Mild pulmonic regurgitation.   Moderate mitral regurgitation.   There is mild aortic valve stenosis.   Aortic valve area is 1.55 cm2; peak velocity is 2.60 m/s; mean gradient is 16 mmHg.   Elevated central venous pressure (15 mmHg).   The estimated PA systolic pressure is 71 mmHg.   There is pulmonary hypertension.   Mild left atrial enlargement.    774 cc intake, 2160 cc output, net -2.4L from admission. Cr stable at 3. O2 requirements increased overnight to 9 L NC. Remains SOB with edema.   09/22/2022 825 cc intake and 1500 cc output. Diuresing with Lasix 80 mg IV TID and Metolazone. Cr improving (2.7 today). Remains on 9L NC with intermittent BiPAP. Patient feels SOB is improving.     09/22/2022 Transferred to ICU yesterday due to increased O2 requirments. Placed on IV Lasix drip and Metolazone continued. 5.1L out overnight negative 4.0L in 24 hours and negative 7.9L since admission. Creatinine down to 2.4 this AM. HR and BP stable.  9/24/22: MS improved today. Off BiPAP. I/O -2.8L overnight  9/25/22: ~I/O -1800. Slight increase in Cr. Reports SOB continues to improve.  09/26/2022 Diuresing well, net -14 L  from admission and 1.2 L overnight. Remains on 10L HF NC. Cr 2.5.   09/27/2022 Lasix on hold. BUN up to 104, Cr 3. Remains on 10L HF NC. Net -825 cc in past 24 hours, net -14.7 L from admission.       Past Medical History:   Diagnosis Date    Arthritis     Atrial fibrillation     Carotid artery occlusion     bilateral    Cataract     CHF (congestive heart failure)     CKD (chronic kidney disease), stage II     Coronary artery disease     3 Vessel CABG AND 3 STENTS    Encounter for blood transfusion     Hypercholesterolemia     Hypertension     Normocytic anemia     Shingles     X 3 WEEKS AGO    Sleep apnea     CESAR - NOT USING C-PAP    Thyroid disease     TIA (transient ischemic attack)        Past Surgical History:   Procedure Laterality Date    A-V CARDIAC PACEMAKER INSERTION N/A 4/1/2021    Procedure: INSERTION, CARDIAC PACEMAKER, DUAL CHAMBER;  Surgeon: Clifford Sevilla MD;  Location: [facility] CATH/EP LAB;  Service: Cardiology;  Laterality: N/A;  [employer]    CORONARY ANGIOPLASTY WITH STENT PLACEMENT      CORONARY ARTERY BYPASS GRAFT      CORONARY ARTERY BYPASS GRAFT (CABG)      3 vessel    EYE SURGERY      bILATERAL CATARACS    INSERTION OF PACEMAKER      REVISION OF IMPLANTABLE CARDIOVERTER-DEFIBRILLATOR (ICD) ELECTRODE LEAD PLACEMENT Left 7/1/2021    Procedure: REVISION, INSERTION, ELECTRODE LEAD,pacemaker;  Surgeon: Clifford Sevilla MD;  Location: [facility] CATH/EP LAB;  Service: Cardiology;  Laterality: Left;    ROBOT-ASSISTED LAPAROSCOPIC REPAIR OF INGUINAL HERNIA Right 3/11/2022    Procedure: ROBOTIC REPAIR, HERNIA, INGUINAL;  Surgeon: Melo Aguilera III, MD;  Location: [facility] OR;  Service: General;  Laterality: Right;  LB case    TREATMENT OF CARDIAC ARRHYTHMIA N/A 1/29/2021    Procedure: CARDIOVERSION;  Surgeon: Iron Serna MD;  Location: [facility] CATH/EP LAB;  Service: Cardiology;  Laterality: N/A;    VASECTOMY         Review of patient's allergies indicates:  No Known  Allergies    No current facility-administered medications on file prior to encounter.     Current Outpatient Medications on File Prior to Encounter   Medication Sig    amiodarone (PACERONE) 200 MG Tab Take 1 tablet (200 mg total) by mouth once daily.    amLODIPine (NORVASC) 5 MG tablet Take 1 tablet (5 mg total) by mouth every evening.    apixaban (ELIQUIS) 5 mg Tab Take 1 tablet (5 mg total) by mouth 2 (two) times daily.    ascorbic acid, vitamin C, (VITAMIN C) 1000 MG tablet Take 1,000 mg by mouth once daily.    atorvastatin (LIPITOR) 40 MG tablet Take 1 tablet (40 mg total) by mouth once daily.    coQ10, ubiquinol, 100 mg Cap Take 150 mg by mouth once daily.     cyanocobalamin 1,000 mcg/mL injection Inject 1 mL (1,000 mcg total) into the muscle every 30 days.    doxazosin (CARDURA) 8 MG Tab TAKE 1 TABLET DAILY    ezetimibe (ZETIA) 10 mg tablet TAKE 1 TABLET DAILY    furosemide (LASIX) 40 MG tablet Take 1 tablet (40 mg total) by mouth once daily.    gabapentin (NEURONTIN) 600 MG tablet Take 1 tablet (600 mg total) by mouth 2 (two) times daily.    garlic 1,000 mg Cap Take 1 capsule by mouth once daily.     ketoconazole (NIZORAL) 2 % cream Apply topically once daily.    levothyroxine (SYNTHROID) 100 MCG tablet Take 1 tablet (100 mcg total) by mouth before breakfast.    LIDOcaine (LIDODERM) 5 % Place 1 patch onto the skin once daily. Remove & Discard patch within 12 hours or as directed by MD    lisinopriL (PRINIVIL,ZESTRIL) 20 MG tablet Take 1 tablet (20 mg total) by mouth 2 (two) times a day.    metoprolol succinate (TOPROL-XL) 50 MG 24 hr tablet Take 1 tablet (50 mg total) by mouth once daily.    multivitamin Tab Take 1 tablet by mouth once daily. (Patient not taking: No sig reported)    nitroGLYCERIN (NITROSTAT) 0.4 MG SL tablet Place 1 tablet (0.4 mg total) under the tongue every 5 (five) minutes as needed for Chest pain. DO NOT CRUSH OR CHEW; DISSOLVE UNDER TONGUE; MAXIMUM OF 3 DOSES IN 15  "MINUTES    spironolactone (ALDACTONE) 25 MG tablet Take 1 tablet (25 mg total) by mouth every other day.    syringe-needle,safety,disp unt 3 mL 22 gauge x 1 1/2" Syrg Please syringe to administer medication deep IM    traMADoL (ULTRAM) 50 mg tablet Take 1 tablet (50 mg total) by mouth every 6 (six) hours as needed for Pain.    vitamin D (VITAMIN D3) 1000 units Tab Take 1,000 Units by mouth once daily.     Family History       Problem Relation (Age of Onset)    COPD Paternal Grandmother    Cancer Mother, Father, Maternal Grandmother    Hypertension Father          Tobacco Use    Smoking status: Never    Smokeless tobacco: Never   Substance and Sexual Activity    Alcohol use: Not Currently    Drug use: No    Sexual activity: Not on file     Review of Systems   Constitutional: Positive for malaise/fatigue.   HENT: Negative.     Eyes: Negative.    Cardiovascular:  Positive for irregular heartbeat. Negative for chest pain, near-syncope, orthopnea, palpitations, paroxysmal nocturnal dyspnea and syncope.   Respiratory:  Positive for shortness of breath.    Endocrine: Negative.    Hematologic/Lymphatic: Negative.    Gastrointestinal:  Negative for bloating, nausea and vomiting.   Genitourinary: Negative.    Neurological:  Positive for weakness.   Psychiatric/Behavioral: Negative.     Allergic/Immunologic: Negative.    Objective:     Vital Signs (Most Recent):  Temp: 98.4 °F (36.9 °C) (09/27/22 0810)  Pulse: 64 (09/27/22 0810)  Resp: 18 (09/27/22 0810)  BP: 137/62 (09/27/22 0810)  SpO2: (!) 70 % (09/27/22 0810)   Vital Signs (24h Range):  Temp:  [96 °F (35.6 °C)-98.4 °F (36.9 °C)] 98.4 °F (36.9 °C)  Pulse:  [59-64] 64  Resp:  [17-18] 18  SpO2:  [70 %-99 %] 70 %  BP: ()/(51-68) 137/62     Weight: 84.5 kg (186 lb 4.6 oz)  Body mass index is 28.33 kg/m².    SpO2: (!) 70 %  O2 Device (Oxygen Therapy): nasal cannula w/ humidification      Intake/Output Summary (Last 24 hours) at 9/27/2022 1040  Last data filed at " 9/27/2022 0600  Gross per 24 hour   Intake 49.33 ml   Output 875 ml   Net -825.67 ml         Lines/Drains/Airways       Drain  Duration                  Urethral Catheter 09/22/22 1700 Silicone 16 Fr. 4 days              Peripheral Intravenous Line  Duration                  Peripheral IV - Single Lumen 09/19/22 1500 18 G Anterior;Left Forearm 7 days         Peripheral IV - Single Lumen 09/22/22 2305 20 G Left Hand 4 days                    Physical Exam  Constitutional:       General: He is not in acute distress.  HENT:      Head: Atraumatic.   Eyes:      General:         Right eye: No discharge.         Left eye: No discharge.   Cardiovascular:      Rate and Rhythm: Normal rate and regular rhythm.      Heart sounds: Murmur heard.   Abdominal:      General: Bowel sounds are normal.   Musculoskeletal:      Right lower leg: Edema present.      Left lower leg: Edema present.   Skin:     General: Skin is warm and dry.   Neurological:      Mental Status: He is alert. Mental status is at baseline.       Significant Labs: Blood Culture: No results for input(s): LABBLOO in the last 48 hours.  , BMP:   Recent Labs   Lab 09/25/22  1232 09/26/22  0301 09/27/22  0445    115* 113*   * 145 145   K 3.6 3.6 3.4*   CL 94* 95 94*   CO2 37* 38* 39*   BUN 82* 88* 104*   CREATININE 2.6* 2.5* 3.0*   CALCIUM 9.1 8.5* 8.5*   MG 2.5 2.6 2.8*     , CMP   Recent Labs   Lab 09/25/22  1232 09/26/22  0301 09/27/22  0445   * 145 145   K 3.6 3.6 3.4*   CL 94* 95 94*   CO2 37* 38* 39*    115* 113*   BUN 82* 88* 104*   CREATININE 2.6* 2.5* 3.0*   CALCIUM 9.1 8.5* 8.5*   ANIONGAP 15 12 12     , CBC   Recent Labs   Lab 09/26/22  0301 09/27/22  0445   WBC 11.30 14.06*   HGB 9.5* 9.3*   HCT 27.5* 27.0*    311     , INR No results for input(s): INR, PROTIME in the last 48 hours., Lipid Panel No results for input(s): CHOL, HDL, LDLCALC, TRIG, CHOLHDL in the last 48 hours., Troponin   No results for input(s): TROPONINI in  the last 48 hours.  , and All pertinent lab results from the last 24 hours have been reviewed.    Significant Imaging: Echocardiogram: Transthoracic echo (TTE) complete ([facility] Only):   Results for orders placed or performed during the hospital encounter of 09/19/22   Echo   Result Value Ref Range    BSA 2.15 m2    IVC diameter 2.40 cm    Left Ventricular Outflow Tract Mean Velocity 0.69 cm/s    Left Ventricular Outflow Tract Mean Gradient 2.03 mmHg    PV PEAK VELOCITY 0.88 cm/s    LVIDd 5.69 3.5 - 6.0 cm    IVS 0.64 0.6 - 1.1 cm    Posterior Wall 0.84 0.6 - 1.1 cm    LVIDs 3.93 2.1 - 4.0 cm    FS 31 28 - 44 %    LV mass 154.01 g    LA size 5.02 cm    RV S' 0.01 cm/s    Left Ventricle Relative Wall Thickness 0.30 cm    AV mean gradient 16 mmHg    AV valve area 1.55 cm2    AV Velocity Ratio 0.35     AV index (prosthetic) 0.34     MV mean gradient 4 mmHg    MV valve area by continuity eq 1.78 cm2    E/A ratio 1.72     E wave deceleration time 228.37 msec    LVOT diameter 2.40 cm    LVOT area 4.5 cm2    LVOT peak joseph 0.92 m/s    LVOT peak VTI 20.30 cm    Ao peak joseph 2.60 m/s    Ao VTI 59.3 cm    Mr max joseph 5.17 m/s    LVOT stroke volume 91.79 cm3    AV peak gradient 27 mmHg    MV peak gradient 11 mmHg    MV Peak E Joseph 1.65 m/s    AR Max Joseph 3.24 m/s    TR Max Joseph 3.75 m/s    MV VTI 51.5 cm    MV Peak A Joseph 0.96 m/s    LV Systolic Volume 67.09 mL    LV Systolic Volume Index 31.9 mL/m2    LV Diastolic Volume 159.38 mL    LV Diastolic Volume Index 75.90 mL/m2    LV Mass Index 73 g/m2    RA Major Axis 5.98 cm    Left Atrium Minor Axis 6.02 cm    Left Atrium Major Axis 6.28 cm    Triscuspid Valve Regurgitation Peak Gradient 56 mmHg    LA Volume Index (Mod) 36.1 mL/m2    LA volume (mod) 75.88 cm3    Right Atrial Pressure (from IVC) 15 mmHg    EF 55 %    TV rest pulmonary artery pressure 71 mmHg    Narrative    · The left ventricle is normal in size with normal systolic function.  · The estimated ejection fraction is 55%.  ·  Indeterminate left ventricular diastolic function.  · There is abnormal septal wall motion consistent with right ventricular   pacemaker.  · Normal right ventricular size with normal right ventricular systolic   function.  · Moderate tricuspid regurgitation.  · Mild pulmonic regurgitation.  · Moderate mitral regurgitation.  · There is mild aortic valve stenosis.  · Aortic valve area is 1.55 cm2; peak velocity is 2.60 m/s; mean gradient   is 16 mmHg.  · Elevated central venous pressure (15 mmHg).  · The estimated PA systolic pressure is 71 mmHg.  · There is pulmonary hypertension.  · Mild left atrial enlargement.        Assessment and Plan:     Brief HPI: Patient seen this morning on rounds, respiratory status unchanged. Diuretics held given worsening renal function.     * Acute on chronic diastolic congestive heart failure   TTE   The left ventricle is normal in size with normal systolic function.   The estimated ejection fraction is 55%.   Indeterminate left ventricular diastolic function.   There is abnormal septal wall motion consistent with right ventricular pacemaker.   Normal right ventricular size with normal right ventricular systolic function.   Moderate tricuspid regurgitation.   Mild pulmonic regurgitation.   Moderate mitral regurgitation.   There is mild aortic valve stenosis.   Aortic valve area is 1.55 cm2; peak velocity is 2.60 m/s; mean gradient is 16 mmHg.   Elevated central venous pressure (15 mmHg).   The estimated PA systolic pressure is 71 mmHg.   There is pulmonary hypertension.   Mild left atrial enlargement.        - ACEI, Aldactone on hold 2/2 JAYSON  -holding diuretics today 2/2 worsening renal function. , Cr 3  - net -14 L from admission   -Remains on HF NC at 10L  -CXR essentially unchanged yesterday, ? CT to eval for other causes, not improving with diuresis or abx  - Would not be opposed to nephrology consult and have pulm re-evaluate        Rhabdomyolysis  - CPK 2600  on admission, down to 1200 earlier this week     Venous stasis ulcer  WOC on board  Venous wounds with aspect of PAD per arterial US  Will defer AO to outpatient setting given severity of ADHF +/- PNA  Medical management with statin, no asa given hx of GIB (on Eliquis). No ACEI.ARB 2/2 JAYSON on CKD     Elevated troponin  Troponin peaked at 1 on admission  No chest pain  Likely demand in setting of ADHF and possible infection   Normal LVEF   PET stress as outpatient once recovered from ADHF      Coronary artery disease  S/p CABG in 1989 with subsequent PCI in 2005  No chest pain  EKG paced  Troponin peaked on admission at 1  Not on asa or BB per primary cardiologist - will defer initiation to them  Continue statin     TTE   The left ventricle is normal in size with normal systolic function.   The estimated ejection fraction is 55%.   Indeterminate left ventricular diastolic function.   There is abnormal septal wall motion consistent with right ventricular pacemaker.   Normal right ventricular size with normal right ventricular systolic function.   Moderate tricuspid regurgitation.   Mild pulmonic regurgitation.   Moderate mitral regurgitation.   There is mild aortic valve stenosis.   Aortic valve area is 1.55 cm2; peak velocity is 2.60 m/s; mean gradient is 16 mmHg.   Elevated central venous pressure (15 mmHg).   The estimated PA systolic pressure is 71 mmHg.   There is pulmonary hypertension.   Mild left atrial enlargement.    PET stress as outpatient once recovers from acute illness     Paroxysmal atrial fibrillation  - continue Amio and Eliquis   - HR 60s-70s    Acute renal failure superimposed on stage 3 chronic kidney disease  - cr up to 3,   -  (baseline 1.7-2.2)  -Would not be opposed to nephrology consult         VTE Risk Mitigation (From admission, onward)         Ordered     apixaban tablet 5 mg  2 times daily         09/20/22 3264     IP VTE HIGH RISK PATIENT  Once         09/19/22 6468      Place sequential compression device  Until discontinued         09/19/22 3234                Nathaniel Bal NP  Cardiology  [place] - Telemetry

## 2022-09-27 NOTE — ASSESSMENT & PLAN NOTE
TTE   The left ventricle is normal in size with normal systolic function.   The estimated ejection fraction is 55%.   Indeterminate left ventricular diastolic function.   There is abnormal septal wall motion consistent with right ventricular pacemaker.   Normal right ventricular size with normal right ventricular systolic function.   Moderate tricuspid regurgitation.   Mild pulmonic regurgitation.   Moderate mitral regurgitation.   There is mild aortic valve stenosis.   Aortic valve area is 1.55 cm2; peak velocity is 2.60 m/s; mean gradient is 16 mmHg.   Elevated central venous pressure (15 mmHg).   The estimated PA systolic pressure is 71 mmHg.   There is pulmonary hypertension.   Mild left atrial enlargement.        - ACEI, Aldactone on hold 2/2 JAYSON  -holding diuretics today 2/2 worsening renal function. , Cr 3  - net -14 L from admission   -Remains on HF NC at 10L  -CXR essentially unchanged yesterday, ? CT to eval for other causes, not improving with diuresis or abx  - Would not be opposed to nephrology consult and have pulm re-evaluate

## 2022-09-27 NOTE — PT/OT/SLP PROGRESS
Occupational Therapy      Patient Name:  Tong Ford   MRN:  [id number]    Patient not seen today secondary to Other (Comment), Therapist assessment (Pt increased to 12L HFNC with O2 sats 83% at rest. Respiratory in room.). Will follow-up as appropriate.    9/27/2022

## 2022-09-27 NOTE — ASSESSMENT & PLAN NOTE
-initially on lasix drip; now transitioned IV pushes of 80 mg t.i.d.; add metolazone today  -monitor electrolytes  -echo with EF 55%, moderate mitral regurg, elevated CVP 15 mm Hg  -consult cardiology  -fluid restriction  -hold ACE and BB for now given soft BP  -bipap transitioned to hfnc  -hold lasix 60mg q8h ivp  -discontinue metolazone today  -monitor kidney function  -wean off supp o2 as tolerated. May require LTAC  -cxr

## 2022-09-27 NOTE — ASSESSMENT & PLAN NOTE
-bump in creatinine today with evidence of contraction on labs  -diuresing; will hold today due to likely over-diuresis  -monitor labs  -renally dose medications  -baseline creatinine around 2.5

## 2022-09-27 NOTE — SUBJECTIVE & OBJECTIVE
Past Medical History:   Diagnosis Date    Arthritis     Atrial fibrillation     Carotid artery occlusion     bilateral    Cataract     CHF (congestive heart failure)     CKD (chronic kidney disease), stage II     Coronary artery disease     3 Vessel CABG AND 3 STENTS    Encounter for blood transfusion     Hypercholesterolemia     Hypertension     Normocytic anemia     Shingles     X 3 WEEKS AGO    Sleep apnea     CESAR - NOT USING C-PAP    Thyroid disease     TIA (transient ischemic attack)        Past Surgical History:   Procedure Laterality Date    A-V CARDIAC PACEMAKER INSERTION N/A 4/1/2021    Procedure: INSERTION, CARDIAC PACEMAKER, DUAL CHAMBER;  Surgeon: Clifford Sevilla MD;  Location: [facility] CATH/EP LAB;  Service: Cardiology;  Laterality: N/A;  MEDTRONIC    CORONARY ANGIOPLASTY WITH STENT PLACEMENT      CORONARY ARTERY BYPASS GRAFT      CORONARY ARTERY BYPASS GRAFT (CABG)      3 vessel    EYE SURGERY      bILATERAL CATARACS    INSERTION OF PACEMAKER      REVISION OF IMPLANTABLE CARDIOVERTER-DEFIBRILLATOR (ICD) ELECTRODE LEAD PLACEMENT Left 7/1/2021    Procedure: REVISION, INSERTION, ELECTRODE LEAD,pacemaker;  Surgeon: Clifford Sevilla MD;  Location: [facility] CATH/EP LAB;  Service: Cardiology;  Laterality: Left;    ROBOT-ASSISTED LAPAROSCOPIC REPAIR OF INGUINAL HERNIA Right 3/11/2022    Procedure: ROBOTIC REPAIR, HERNIA, INGUINAL;  Surgeon: Melo Aguilera III, MD;  Location: [facility] OR;  Service: General;  Laterality: Right;  LB case    TREATMENT OF CARDIAC ARRHYTHMIA N/A 1/29/2021    Procedure: CARDIOVERSION;  Surgeon: Iron Serna MD;  Location: [facility] CATH/EP LAB;  Service: Cardiology;  Laterality: N/A;    VASECTOMY         Review of patient's allergies indicates:  No Known Allergies    No current facility-administered medications on file prior to encounter.     Current Outpatient Medications on File Prior to Encounter   Medication Sig    amiodarone (PACERONE) 200 MG Tab Take 1 tablet (200 mg total) by  "mouth once daily.    amLODIPine (NORVASC) 5 MG tablet Take 1 tablet (5 mg total) by mouth every evening.    apixaban (ELIQUIS) 5 mg Tab Take 1 tablet (5 mg total) by mouth 2 (two) times daily.    ascorbic acid, vitamin C, (VITAMIN C) 1000 MG tablet Take 1,000 mg by mouth once daily.    atorvastatin (LIPITOR) 40 MG tablet Take 1 tablet (40 mg total) by mouth once daily.    coQ10, ubiquinol, 100 mg Cap Take 150 mg by mouth once daily.     cyanocobalamin 1,000 mcg/mL injection Inject 1 mL (1,000 mcg total) into the muscle every 30 days.    doxazosin (CARDURA) 8 MG Tab TAKE 1 TABLET DAILY    ezetimibe (ZETIA) 10 mg tablet TAKE 1 TABLET DAILY    furosemide (LASIX) 40 MG tablet Take 1 tablet (40 mg total) by mouth once daily.    gabapentin (NEURONTIN) 600 MG tablet Take 1 tablet (600 mg total) by mouth 2 (two) times daily.    garlic 1,000 mg Cap Take 1 capsule by mouth once daily.     ketoconazole (NIZORAL) 2 % cream Apply topically once daily.    levothyroxine (SYNTHROID) 100 MCG tablet Take 1 tablet (100 mcg total) by mouth before breakfast.    LIDOcaine (LIDODERM) 5 % Place 1 patch onto the skin once daily. Remove & Discard patch within 12 hours or as directed by MD    lisinopriL (PRINIVIL,ZESTRIL) 20 MG tablet Take 1 tablet (20 mg total) by mouth 2 (two) times a day.    metoprolol succinate (TOPROL-XL) 50 MG 24 hr tablet Take 1 tablet (50 mg total) by mouth once daily.    multivitamin Tab Take 1 tablet by mouth once daily. (Patient not taking: No sig reported)    nitroGLYCERIN (NITROSTAT) 0.4 MG SL tablet Place 1 tablet (0.4 mg total) under the tongue every 5 (five) minutes as needed for Chest pain. DO NOT CRUSH OR CHEW; DISSOLVE UNDER TONGUE; MAXIMUM OF 3 DOSES IN 15 MINUTES    spironolactone (ALDACTONE) 25 MG tablet Take 1 tablet (25 mg total) by mouth every other day.    syringe-needle,safety,disp unt 3 mL 22 gauge x 1 1/2" Syrg Please syringe to administer medication deep IM    traMADoL (ULTRAM) 50 mg tablet Take " 1 tablet (50 mg total) by mouth every 6 (six) hours as needed for Pain.    vitamin D (VITAMIN D3) 1000 units Tab Take 1,000 Units by mouth once daily.     Family History       Problem Relation (Age of Onset)    COPD Paternal Grandmother    Cancer Mother, Father, Maternal Grandmother    Hypertension Father          Tobacco Use    Smoking status: Never    Smokeless tobacco: Never   Substance and Sexual Activity    Alcohol use: Not Currently    Drug use: No    Sexual activity: Not on file     Review of Systems   Constitutional: Positive for malaise/fatigue.   HENT: Negative.     Eyes: Negative.    Cardiovascular:  Positive for irregular heartbeat. Negative for chest pain, near-syncope, orthopnea, palpitations, paroxysmal nocturnal dyspnea and syncope.   Respiratory:  Positive for shortness of breath.    Endocrine: Negative.    Hematologic/Lymphatic: Negative.    Gastrointestinal:  Negative for bloating, nausea and vomiting.   Genitourinary: Negative.    Neurological:  Positive for weakness.   Psychiatric/Behavioral: Negative.     Allergic/Immunologic: Negative.    Objective:     Vital Signs (Most Recent):  Temp: 98.4 °F (36.9 °C) (09/27/22 0810)  Pulse: 64 (09/27/22 0810)  Resp: 18 (09/27/22 0810)  BP: 137/62 (09/27/22 0810)  SpO2: (!) 70 % (09/27/22 0810)   Vital Signs (24h Range):  Temp:  [96 °F (35.6 °C)-98.4 °F (36.9 °C)] 98.4 °F (36.9 °C)  Pulse:  [59-64] 64  Resp:  [17-18] 18  SpO2:  [70 %-99 %] 70 %  BP: ()/(51-68) 137/62     Weight: 84.5 kg (186 lb 4.6 oz)  Body mass index is 28.33 kg/m².    SpO2: (!) 70 %  O2 Device (Oxygen Therapy): nasal cannula w/ humidification      Intake/Output Summary (Last 24 hours) at 9/27/2022 1040  Last data filed at 9/27/2022 0600  Gross per 24 hour   Intake 49.33 ml   Output 875 ml   Net -825.67 ml         Lines/Drains/Airways       Drain  Duration                  Urethral Catheter 09/22/22 1700 Silicone 16 Fr. 4 days              Peripheral Intravenous Line  Duration                   Peripheral IV - Single Lumen 09/19/22 1500 18 G Anterior;Left Forearm 7 days         Peripheral IV - Single Lumen 09/22/22 2305 20 G Left Hand 4 days                    Physical Exam  Constitutional:       General: He is not in acute distress.  HENT:      Head: Atraumatic.   Eyes:      General:         Right eye: No discharge.         Left eye: No discharge.   Cardiovascular:      Rate and Rhythm: Normal rate and regular rhythm.      Heart sounds: Murmur heard.   Abdominal:      General: Bowel sounds are normal.   Musculoskeletal:      Right lower leg: Edema present.      Left lower leg: Edema present.   Skin:     General: Skin is warm and dry.   Neurological:      Mental Status: He is alert. Mental status is at baseline.       Significant Labs: Blood Culture: No results for input(s): LABBLOO in the last 48 hours.  , BMP:   Recent Labs   Lab 09/25/22  1232 09/26/22  0301 09/27/22  0445    115* 113*   * 145 145   K 3.6 3.6 3.4*   CL 94* 95 94*   CO2 37* 38* 39*   BUN 82* 88* 104*   CREATININE 2.6* 2.5* 3.0*   CALCIUM 9.1 8.5* 8.5*   MG 2.5 2.6 2.8*     , CMP   Recent Labs   Lab 09/25/22  1232 09/26/22  0301 09/27/22  0445   * 145 145   K 3.6 3.6 3.4*   CL 94* 95 94*   CO2 37* 38* 39*    115* 113*   BUN 82* 88* 104*   CREATININE 2.6* 2.5* 3.0*   CALCIUM 9.1 8.5* 8.5*   ANIONGAP 15 12 12     , CBC   Recent Labs   Lab 09/26/22  0301 09/27/22  0445   WBC 11.30 14.06*   HGB 9.5* 9.3*   HCT 27.5* 27.0*    311     , INR No results for input(s): INR, PROTIME in the last 48 hours., Lipid Panel No results for input(s): CHOL, HDL, LDLCALC, TRIG, CHOLHDL in the last 48 hours., Troponin   No results for input(s): TROPONINI in the last 48 hours.  , and All pertinent lab results from the last 24 hours have been reviewed.    Significant Imaging: Echocardiogram: Transthoracic echo (TTE) complete (Cupid Only):   Results for orders placed or performed during the hospital encounter of 09/19/22    Echo   Result Value Ref Range    BSA 2.15 m2    IVC diameter 2.40 cm    Left Ventricular Outflow Tract Mean Velocity 0.69 cm/s    Left Ventricular Outflow Tract Mean Gradient 2.03 mmHg    PV PEAK VELOCITY 0.88 cm/s    LVIDd 5.69 3.5 - 6.0 cm    IVS 0.64 0.6 - 1.1 cm    Posterior Wall 0.84 0.6 - 1.1 cm    LVIDs 3.93 2.1 - 4.0 cm    FS 31 28 - 44 %    LV mass 154.01 g    LA size 5.02 cm    RV S' 0.01 cm/s    Left Ventricle Relative Wall Thickness 0.30 cm    AV mean gradient 16 mmHg    AV valve area 1.55 cm2    AV Velocity Ratio 0.35     AV index (prosthetic) 0.34     MV mean gradient 4 mmHg    MV valve area by continuity eq 1.78 cm2    E/A ratio 1.72     E wave deceleration time 228.37 msec    LVOT diameter 2.40 cm    LVOT area 4.5 cm2    LVOT peak joseph 0.92 m/s    LVOT peak VTI 20.30 cm    Ao peak joseph 2.60 m/s    Ao VTI 59.3 cm    Mr max joseph 5.17 m/s    LVOT stroke volume 91.79 cm3    AV peak gradient 27 mmHg    MV peak gradient 11 mmHg    MV Peak E Joseph 1.65 m/s    AR Max Joseph 3.24 m/s    TR Max Joseph 3.75 m/s    MV VTI 51.5 cm    MV Peak A Joseph 0.96 m/s    LV Systolic Volume 67.09 mL    LV Systolic Volume Index 31.9 mL/m2    LV Diastolic Volume 159.38 mL    LV Diastolic Volume Index 75.90 mL/m2    LV Mass Index 73 g/m2    RA Major Axis 5.98 cm    Left Atrium Minor Axis 6.02 cm    Left Atrium Major Axis 6.28 cm    Triscuspid Valve Regurgitation Peak Gradient 56 mmHg    LA Volume Index (Mod) 36.1 mL/m2    LA volume (mod) 75.88 cm3    Right Atrial Pressure (from IVC) 15 mmHg    EF 55 %    TV rest pulmonary artery pressure 71 mmHg    Narrative    · The left ventricle is normal in size with normal systolic function.  · The estimated ejection fraction is 55%.  · Indeterminate left ventricular diastolic function.  · There is abnormal septal wall motion consistent with right ventricular   pacemaker.  · Normal right ventricular size with normal right ventricular systolic   function.  · Moderate tricuspid regurgitation.  · Mild  pulmonic regurgitation.  · Moderate mitral regurgitation.  · There is mild aortic valve stenosis.  · Aortic valve area is 1.55 cm2; peak velocity is 2.60 m/s; mean gradient   is 16 mmHg.  · Elevated central venous pressure (15 mmHg).  · The estimated PA systolic pressure is 71 mmHg.  · There is pulmonary hypertension.  · Mild left atrial enlargement.

## 2022-09-27 NOTE — PROGRESS NOTES
"[facility] Medicine  Progress Note    Patient Name: Tong Ford  MRN: [id number]  Patient Class: IP- Inpatient   Admission Date: 9/19/2022  Length of Stay: 8 days  Attending Physician: Adolfo Solo, *  Primary Care Provider: Kris Zavala MD        Subjective:     Principal Problem:Acute on chronic diastolic congestive heart failure        HPI:  Per transfer note:  "Patient is a 77 y.o. male who has a past medical history of arthritis, HTN, AFIB, HLD, TIA, CAD, CHF, hypothyroidism, normocytic anemia and PSHx of CABG, coronary angioplasty with stent, and pacemaker presenting today for shortness of breath, weakness and swelling. ED workup significant for elevated troponin, worsening JAYSON, elevated BNP, leukocytosis, hyperkalemia, elevated CPK, hypoxia requiring O2 supplementation, chest x-ray with increased bilateral asymmetric airspace disease consistent with CHF/volume overload and possible LLL pneumonia. See below labs and imaging. Patient started on lasix drip, given lokelma, and IV Rocephin/Az. No ICU beds available at this facility therefore pt will need to transfer for higher level of care. Stable for transfer."    On arrival to [facility], the patient was continued on lasix drip. He is sleeping and unable to answer any questions. Per chart review, he endorsed orthopnea, worsening GOODWIN. He was told by his cardiologist to report to the ED to be admitted for IV lasix. He stated that having the extra fluid on his legs and scrotal area made it difficult to walk and he fell at home last night on concrete, denies hitting head. He was on the floor for several hours before being found. He was taking 60mg of lasix daily and lionel, reported adequate urination. Pt also has chronic bilat LE venous statis ulcers and he follows with wound care. He had BNP 1795, troponin 1.03 with downtrend, creat 3.1, K 5.2, CK 2600. a temperature of 100.2, WBC 20 at sending facility. He was given lokelma, rocephin and " azithromycin, and started on lasix drip. Prior echo with normal EF. He is on 2LNC with adequate O2 sat on arrival. Legs with stasis ulcers, right leg with redness noted.           Overview/Hospital Course:  No notes on file    Interval History:  On 12 L NC, although not wearing supplemental O2 at the time of my assessment.  Placed oxygen back on the patient.  Reports that he feels okay.   No fevers or chills.  Still with some cough.  Has been diuresing well; labs revealing contraction today, will hold diuresis today.    Review of Systems   Constitutional:  Negative for fever.   Respiratory:  Positive for shortness of breath.    Cardiovascular:  Positive for leg swelling. Negative for chest pain.   Musculoskeletal:  Positive for arthralgias.   Skin:  Positive for rash and wound.   Neurological:  Positive for weakness.   Objective:     Vital Signs (Most Recent):  Temp: 98.4 °F (36.9 °C) (09/27/22 0810)  Pulse: 64 (09/27/22 0810)  Resp: 18 (09/27/22 0810)  BP: 137/62 (09/27/22 0810)  SpO2: (!) 70 % (09/27/22 0810)   Vital Signs (24h Range):  Temp:  [96 °F (35.6 °C)-98.4 °F (36.9 °C)] 98.4 °F (36.9 °C)  Pulse:  [59-64] 64  Resp:  [17-18] 18  SpO2:  [70 %-99 %] 70 %  BP: ()/(51-68) 137/62     Weight: 84.5 kg (186 lb 4.6 oz)  Body mass index is 28.33 kg/m².    Intake/Output Summary (Last 24 hours) at 9/27/2022 1040  Last data filed at 9/27/2022 0600  Gross per 24 hour   Intake 49.33 ml   Output 875 ml   Net -825.67 ml        Physical Exam  Vitals and nursing note reviewed.   Constitutional:       General: He is not in acute distress.     Appearance: He is obese.   HENT:      Head: Normocephalic and atraumatic.      Nose: Nose normal.      Mouth/Throat:      Mouth: Mucous membranes are moist.   Eyes:      Pupils: Pupils are equal, round, and reactive to light.   Cardiovascular:      Rate and Rhythm: Normal rate and regular rhythm.      Pulses: Normal pulses.      Heart sounds: Murmur heard.   Pulmonary:       Effort: Pulmonary effort is normal. No respiratory distress.      Breath sounds: No rales.      Comments: On supplemental O2 via nasal cannula  Abdominal:      General: Bowel sounds are normal.      Palpations: Abdomen is soft.   Musculoskeletal:         General: Swelling present. Normal range of motion.      Cervical back: Normal range of motion.      Right lower leg: Edema present.      Left lower leg: Edema present.   Skin:     General: Skin is warm and dry.      Findings: Erythema and lesion present.   Neurological:      Motor: Weakness present.      Comments: Unable to assess   Psychiatric:      Comments: Unable to assess       Significant Labs: All pertinent labs within the past 24 hours have been reviewed.    Significant Imaging: I have reviewed all pertinent imaging results/findings within the past 24 hours.      Assessment/Plan:      * Acute on chronic diastolic congestive heart failure  -initially on lasix drip; now transitioned IV pushes of 80 mg t.i.d.; add metolazone today  -monitor electrolytes  -echo with EF 55%, moderate mitral regurg, elevated CVP 15 mm Hg  -consult cardiology  -fluid restriction  -hold ACE and BB for now given soft BP  -bipap transitioned to hfnc  -hold lasix 60mg q8h ivp  -discontinue metolazone today  -monitor kidney function  -wean off supp o2 as tolerated. May require LTAC  -cxr    Staphylococcal pneumonia  -continue cefazolin      Neuropathy, peripheral, idiopathic  -continue gabapentin      Volume overload state of heart        Debility  -PT/OT consult; will require placement      Acute hypoxemic respiratory failure  In setting of volume overload and possible pneumonia  -cont supplemental O2 as needed, wean as tolerated--now on 9LNC  -worsening hypoxia requiring bipap  -transitioned to hfnc  -cont diuresis  -incentive spirometry    Sepsis  Meets sepsis criteria for WBC, temp, hypoxia  -treating for possible pneumonia and cellulitis  -resp cx growing  staph          Rhabdomyolysis  Fell and laid on floor for hours  CPK 2607 with JAYSON  -unable to give fluids due to volume overload  -repeat CK down trending  -consult nephrology as needed      Pneumonia  Possible LLL pneumonia on imaging  Temp 100.4, WBC 20  -blood cx no growth to date  -sputum cx growing staph  -de-escalate abx to cefazolin  -cont supplemental O2 as needed        Venous stasis ulcer  BLE with ulcers  -right leg with redness   -on cefazolin  -consult wound care        Elevated troponin  In setting of volume overload; likely secondary to demand  -troponin 1.03--0.9  -plan for outpatient PET stress test  -cardiology consulted  -echo as above      Coronary artery disease  Hx of CABG and stent  -no antiplatelet on home med list  -cont statin  -hold BB for low BP      Paroxysmal atrial fibrillation  -cont amiodarone and eliquis  -hold BB for now      Acute renal failure superimposed on stage 3 chronic kidney disease  -bump in creatinine today with evidence of contraction on labs  -diuresing; will hold today due to likely over-diuresis  -monitor labs  -renally dose medications  -baseline creatinine around 2.5    Thyroid disease  -cont synthroid   -TSH mildly elevated with normal T4      Essential hypertension  BP soft on admission  -hold home meds for now and resume as tolerated      CESAR (obstructive sleep apnea)  -CPAP at night         VTE Risk Mitigation (From admission, onward)         Ordered     apixaban tablet 5 mg  2 times daily         09/20/22 0224     IP VTE HIGH RISK PATIENT  Once         09/19/22 2343     Place sequential compression device  Until discontinued         09/19/22 2343                Discharge Planning   YUNIEL:      Code Status: Full Code   Is the patient medically ready for discharge?:     Reason for patient still in hospital (select all that apply): Treatment  Discharge Plan A: Long-term acute care facility (LTAC)   Discharge Delays: None known at this time              Adolfo BLUNT  MD Edil  Department of Hospital Medicine   [facility]

## 2022-09-27 NOTE — NURSING
Pt remained AAOx4 with VSS. Pt removed nasal cannula throughout day despite education on importance. Hayes catheter removed and patient tolerated well. Purposeful rounding completed throughout shift and frequent weight shifting assistance provided. No new skin breakdown noted and safety precautions maintained. Bed locked in lowest position, side rails raised x3 and call light within reach. Will continue to monitor for any changes. Family and patient updated on POC and verbalized understanding.

## 2022-09-27 NOTE — PLAN OF CARE
Problem: Adult Inpatient Plan of Care  Goal: Plan of Care Review  Outcome: Ongoing, Progressing   Patient is alert, oriented X4. Pt on 10L of high flow nasal cannula, but appears lethargic. The nasal cannula is not in the nose. Pt still has a habit to pick the noses and push the nasal cannula away. O2 sat showed 84%. But pt can be aroused by gentle touch. Pt stated that high flow nasal cannula makes his nose so dry and itchy. He can't tolerate it. Switch high flow nasal cannula to BIPAP, since pt has order for that. Pt tolerated so well. Whole night the O2 sat maintain 95%.     Deny nausea/vomiting/diarrhea. Due medications given. On cardiac monitor, running paced rhythm. Hayes care done.     Maintain fall risk precaution, bed in lowest position, bed alarm on. Call light/personal items in reach. Instructed patient call for help as needed. Will continue to monitor.

## 2022-09-28 PROBLEM — Z51.5 PALLIATIVE CARE ENCOUNTER: Status: ACTIVE | Noted: 2022-01-01

## 2022-09-28 NOTE — HPI
"Per chart, "Per transfer note:  "Patient is a 77 y.o. male who has a past medical history of arthritis, HTN, AFIB, HLD, TIA, CAD, CHF, hypothyroidism, normocytic anemia and PSHx of CABG, coronary angioplasty with stent, and pacemaker presenting today for shortness of breath, weakness and swelling. ED workup significant for elevated troponin, worsening JAYSON, elevated BNP, leukocytosis, hyperkalemia, elevated CPK, hypoxia requiring O2 supplementation, chest x-ray with increased bilateral asymmetric airspace disease consistent with CHF/volume overload and possible LLL pneumonia. See below labs and imaging. Patient started on lasix drip, given lokelma, and IV Rocephin/Az. No ICU beds available at this facility therefore pt will need to transfer for higher level of care. Stable for transfer."     On arrival to [facility], the patient was continued on lasix drip. He is sleeping and unable to answer any questions. Per chart review, he endorsed orthopnea, worsening GOODWIN. He was told by his cardiologist to report to the ED to be admitted for IV lasix. He stated that having the extra fluid on his legs and scrotal area made it difficult to walk and he fell at home last night on concrete, denies hitting head. He was on the floor for several hours before being found. He was taking 60mg of lasix daily and lionel, reported adequate urination. Pt also has chronic bilat LE venous statis ulcers and he follows with wound care. He had BNP 1795, troponin 1.03 with downtrend, creat 3.1, K 5.2, CK 2600. a temperature of 100.2, WBC 20 at sending facility. He was given lokelma, rocephin and azithromycin, and started on lasix drip. Prior echo with normal EF. He is on 2LNC with adequate O2 sat on arrival. Legs with stasis ulcers, right leg with redness noted. "  "

## 2022-09-28 NOTE — CONSULTS
"[facility]  Palliative Medicine  Consult Note    Patient Name: Tong Ford  MRN: [id number]  Admission Date: 9/19/2022  Hospital Length of Stay: 9 days  Code Status: DNR   Attending Provider: Adolfo Solo, *  Consulting Provider: Andria Rose NP  Primary Care Physician: Kris Zavala MD  Principal Problem:Acute on chronic diastolic congestive heart failure    Patient information was obtained from patient, relative(s), past medical records and primary team.      Inpatient consult to Palliative Care  Consult performed by: Andria Rose NP  Consult ordered by: Adolfo Solo MD  Reason for consult: Goals of Care        Assessment/Plan:     Palliative care encounter  At time of initial consult, pt sitting up in bed with nursing student at bedside.  Pt with 12L O2, sats mids 80's.  Pt oriented to year, location, president and medical situation.  Pt receptive to palliative visit at this time.  Pt reports that until about 4 moths ago he was still very independent but has drastically declined in his overall functional status since then.  Pt currently resides with his daughter.    Pt states, "I think its time for me to die.  All of this stuff (guestures to medical equipment around the room and attached to him) is just prolonging it. We cant interfere in Gods plans." Pt also expresses that he "wants to die with his dignity."  Emotional support offered.  Pt was tearful on and off throughout the conversation.  We discussed the pts understanding of his prognosis as well as his understanding of his treatment options.  Pt reports that his main goals are to be comfortable and to "die in peace."  We discussed the pts previous d/c plan of IPR/SNF ( although at this time the pt is no longer medically stable) and how what he is currently expressing is a different goal.  Pt endorses that the "treatments have become a burden."  The topic of hospice was introduced.  Pt is familiar with hospice as his wife " "was enrolled in hospice prior to her death.  We discussed what this would look like for the pt as well.  Pt expresses that he would like to go home with hospice to his daughters house, but was worried about the burden it could place on her.     Pts daughter, Daniella entered the room and was present for the remainder of the visit.  Pts daughter expresses concern as the pt called her this morning as well expressing his wishes to die.  We discussed options at this point as well as different goals of care and what those could look like for the pt.    At this point Dr Solo was also present for the visit and expressed his concern that we need to either move forward with aggressive measures and escalate the pt to bipap and thus the ICU due to his increased O2 needs or the other option would be to shift our focus to comfort care and enroll the pt in home hospice.  Daniella endorses that she supports whatever decision her father makes.  Pts wishes became slightly muddled as he states that " home hospice would be the most comforting decision, but feels that the ICU would be the easiest option."  Pts goals became less clear at this point. Pt was placed on bipap for the time being to allow Daniella and the pt to continue to discuss options as well as increase the pts O2 sats.    Dr Waldo Hernandez also present for a portion of the conversation to discuss realistic goals and treatment options for the patient.    Code status was discussed and per the pt and Daniella, pt wishes to be a DNR and this has been discussed previously between them.  Code status updated in the EMR to reflect this.     Sandy with [facility] to come to the bedside to meet with pt and daughter to further discuss home hospice options.    Will follow up with pt and family after meeting.           Thank you for your consult. I will follow-up with patient. Please contact us if you have any additional questions.    Subjective:     HPI:   Per " "chart, "Per transfer note:  "Patient is a 77 y.o. male who has a past medical history of arthritis, HTN, AFIB, HLD, TIA, CAD, CHF, hypothyroidism, normocytic anemia and PSHx of CABG, coronary angioplasty with stent, and pacemaker presenting today for shortness of breath, weakness and swelling. ED workup significant for elevated troponin, worsening JAYSON, elevated BNP, leukocytosis, hyperkalemia, elevated CPK, hypoxia requiring O2 supplementation, chest x-ray with increased bilateral asymmetric airspace disease consistent with CHF/volume overload and possible LLL pneumonia. See below labs and imaging. Patient started on lasix drip, given lokelma, and IV Rocephin/Az. No ICU beds available at this facility therefore pt will need to transfer for higher level of care. Stable for transfer."     On arrival to [facility], the patient was continued on lasix drip. He is sleeping and unable to answer any questions. Per chart review, he endorsed orthopnea, worsening GOODWIN. He was told by his cardiologist to report to the ED to be admitted for IV lasix. He stated that having the extra fluid on his legs and scrotal area made it difficult to walk and he fell at home last night on concrete, denies hitting head. He was on the floor for several hours before being found. He was taking 60mg of lasix daily and lionel, reported adequate urination. Pt also has chronic bilat LE venous statis ulcers and he follows with wound care. He had BNP 1795, troponin 1.03 with downtrend, creat 3.1, K 5.2, CK 2600. a temperature of 100.2, WBC 20 at sending facility. He was given lokelma, rocephin and azithromycin, and started on lasix drip. Prior echo with normal EF. He is on 2LNC with adequate O2 sat on arrival. Legs with stasis ulcers, right leg with redness noted. "      Interval History: Pt on 12L nc.  No fevers, no chills.  Psychiatry consult placed.    Past Medical History:   Diagnosis Date    Arthritis     Atrial fibrillation     Carotid artery " occlusion     bilateral    Cataract     CHF (congestive heart failure)     CKD (chronic kidney disease), stage II     Coronary artery disease     3 Vessel CABG AND 3 STENTS    Encounter for blood transfusion     Hypercholesterolemia     Hypertension     Normocytic anemia     Shingles     X 3 WEEKS AGO    Sleep apnea     CESAR - NOT USING C-PAP    Thyroid disease     TIA (transient ischemic attack)        Past Surgical History:   Procedure Laterality Date    A-V CARDIAC PACEMAKER INSERTION N/A 4/1/2021    Procedure: INSERTION, CARDIAC PACEMAKER, DUAL CHAMBER;  Surgeon: Clifford Sevilla MD;  Location: [facility] CATH[facility]EP LAB;  Service: Cardiology;  Laterality: N/A;  [employer]    CORONARY ANGIOPLASTY WITH STENT PLACEMENT      CORONARY ARTERY BYPASS GRAFT      CORONARY ARTERY BYPASS GRAFT (CABG)      3 vessel    EYE SURGERY      bILATERAL CATARACS    INSERTION OF PACEMAKER      REVISION OF IMPLANTABLE CARDIOVERTER-DEFIBRILLATOR (ICD) ELECTRODE LEAD PLACEMENT Left 7/1/2021    Procedure: REVISION, INSERTION, ELECTRODE LEAD,pacemaker;  Surgeon: Clifford Sevilla MD;  Location: [facility] CATH/EP LAB;  Service: Cardiology;  Laterality: Left;    ROBOT-ASSISTED LAPAROSCOPIC REPAIR OF INGUINAL HERNIA Right 3/11/2022    Procedure: ROBOTIC REPAIR, HERNIA, INGUINAL;  Surgeon: Melo Aguilera III, MD;  Location: [facility] OR;  Service: General;  Laterality: Right;  LB case    TREATMENT OF CARDIAC ARRHYTHMIA N/A 1/29/2021    Procedure: CARDIOVERSION;  Surgeon: Iron Serna MD;  Location: [facility] CATH/EP LAB;  Service: Cardiology;  Laterality: N/A;    VASECTOMY         Review of patient's allergies indicates:  No Known Allergies    Medications:  Continuous Infusions:  Scheduled Meds:   amiodarone  200 mg Oral Daily    atorvastatin  40 mg Oral Daily    ceFAZolin (ANCEF) IVPB  2 g Intravenous Q12H    famotidine  20 mg Oral Daily    furosemide (LASIX) injection  60 mg Intravenous Q8H    gabapentin  300 mg Oral BID     levothyroxine  100 mcg Oral Before breakfast    metOLazone  5 mg Oral Daily    oxymetazoline  2 spray Each Nostril BID    polyethylene glycol  17 g Oral Daily    psyllium husk (with sugar)  1 packet Oral Daily     PRN Meds:acetaminophen, albuterol sulfate, bisacodyL, melatonin, ondansetron, oxyCODONE-acetaminophen, sodium chloride 0.9%, sodium chloride 0.9%    Family History       Problem Relation (Age of Onset)    COPD Paternal Grandmother    Cancer Mother, Father, Maternal Grandmother    Hypertension Father          Tobacco Use    Smoking status: Never    Smokeless tobacco: Never   Substance and Sexual Activity    Alcohol use: Not Currently    Drug use: No    Sexual activity: Not on file       Review of Systems   Constitutional:  Positive for activity change, appetite change and fatigue. Negative for chills and diaphoresis.   HENT: Negative.     Eyes: Negative.    Respiratory:  Positive for cough and shortness of breath. Negative for wheezing.    Cardiovascular:  Negative for chest pain.   Gastrointestinal: Negative.    Genitourinary: Negative.    Musculoskeletal:  Positive for myalgias.   Skin:  Positive for wound.   Neurological:  Positive for weakness.   Objective:     Vital Signs (Most Recent):  Temp: 97.8 °F (36.6 °C) (09/28/22 1119)  Pulse: 63 (09/28/22 1142)  Resp: 18 (09/28/22 1119)  BP: 130/86 (09/28/22 1119)  SpO2: (!) 83 % (09/28/22 1142)   Vital Signs (24h Range):  Temp:  [96.3 °F (35.7 °C)-98.2 °F (36.8 °C)] 97.8 °F (36.6 °C)  Pulse:  [60-65] 63  Resp:  [18-20] 18  SpO2:  [83 %-100 %] 83 %  BP: (115-141)/(58-86) 130/86     Weight: 82 kg (180 lb 12.4 oz)  Body mass index is 27.49 kg/m².    Physical Exam  Vitals and nursing note reviewed. Exam conducted with a chaperone present.   Constitutional:       General: He is in acute distress.      Appearance: He is ill-appearing. He is not toxic-appearing.   HENT:      Head: Normocephalic.      Mouth/Throat:      Mouth: Mucous membranes are dry.    Cardiovascular:      Rate and Rhythm: Normal rate.      Pulses: Normal pulses.   Pulmonary:      Breath sounds: Decreased breath sounds and rhonchi present.   Abdominal:      Palpations: Abdomen is soft.   Musculoskeletal:      Right lower leg: Edema present.      Left lower leg: Edema present.   Skin:     Coloration: Skin is pale.      Findings: Lesion present.   Neurological:      Mental Status: He is alert.      Motor: Weakness present.   Psychiatric:         Behavior: Behavior normal.         Thought Content: Thought content normal.       Review of Symptoms      Symptom Assessment (ESAS 0-10 Scale)  Pain:  0  Dyspnea:  0  Anxiety:  2  Nausea:  0  Depression:  0  Anorexia:  0  Fatigue:  3  Insomnia:  0  Restlessness:  0  Agitation:  0     CAM / Delirium:  Negative  Constipation:  Negative  Diarrhea:  Negative      Performance Status:  30    Living Arrangements:  Lives with family    Psychosocial/Cultural: Pt lives with his daughter Daniella.  Pt is retired.    Spiritual:  F - Karie and Belief:  Maria Luz   I - Importance:  Moderately   C - Community:  Family and Jew support   A - Address in Care:  Pastoral visits PRN      Time-Based Charting:  Yes  Chart Review: 14 minutes  Face to Face: 26 minutes  Symptom Assessment: 5 minutes  Coordination of Care: 14 minutes  Discharge Plannin minutes  Advance Care Plannin minutes  Goals of Care: 9 minutes    Total Time Spent: 96 minutes      Advance Care Planning   Advance Directives:   Living Will: No    LaPOST: No    Do Not Resuscitate Status: Yes    Medical Power of : No      Decision Making:  Patient answered questions and Family answered questions       Significant Labs: All pertinent labs within the past 24 hours have been reviewed.  CBC:   Recent Labs   Lab 22  0445   WBC 14.06*   HGB 9.3*   HCT 27.0*   MCV 94        BMP:  No results for input(s): GLU, NA, K, CL, CO2, BUN, CREATININE, CALCIUM, MG in the last 24 hours.  LFT:  Lab  Results   Component Value Date    AST 81 (H) 09/20/2022    ALKPHOS 71 09/20/2022    BILITOT 0.8 09/20/2022     Albumin:   Albumin   Date Value Ref Range Status   09/20/2022 2.6 (L) 3.5 - 5.2 g/dL Final   07/25/2019 3.9 3.1 - 4.7 g/dL      Protein:   Total Protein   Date Value Ref Range Status   09/20/2022 6.0 6.0 - 8.4 g/dL Final     Lactic acid:   Lab Results   Component Value Date    LACTATE 1.5 09/19/2022       Significant Imaging: I have reviewed all pertinent imaging results/findings within the past 24 hours.      Andria Rose NP  Palliative Medicine  [facility]    Advance Care Planning     Date: 09/28/2022    [facility]  I engaged the patient and family in a conversation about advance care planning and we specifically addressed what the goals of care would be moving forward, in light of the patient's change in clinical status, specifically CHF.  We did specifically address the patient's likely prognosis, which is poor.  We explored the patient's values and preferences for future care.  The patient and family endorses that what is most important right now is to focus on spending time at home, symptom/pain control and quality of life, even if it means sacrificing a little time    Accordingly, we have decided that the best plan to meet the patient's goals includes continuing with treatment    I did not explain the role for hospice care at this stage of the patient's illness, including its ability to help the patient live with the best quality of life possible.  We will be making a hospice referral.    I spent a total of 19 minutes engaging the patient in this advance care planning discussion.

## 2022-09-28 NOTE — PROGRESS NOTES
[facility]                    Pharmacy       Discharge Medication Education    Patient ACCEPTED medication education. Pharmacy has provided education on the name, indication, and possible side effects of the medication(s) prescribed, using teach-back method.     The following medications have also been discussed, during this admission.        Medication List        START taking these medications      amoxicillin-clavulanate 875-125mg 875-125 mg per tablet  Commonly known as: AUGMENTIN  Take 1 tablet by mouth 2 (two) times daily. for 7 days     metOLazone 5 MG tablet  Commonly known as: ZAROXOLYN  Take 1 tablet (5 mg total) by mouth every Mon, Wed, Fri.            CHANGE how you take these medications      furosemide 40 MG tablet  Commonly known as: LASIX  Take 2 tablets (80 mg total) by mouth 2 (two) times a day.  What changed:   how much to take  when to take this            CONTINUE taking these medications      amiodarone 200 MG Tab  Commonly known as: PACERONE  Take 1 tablet (200 mg total) by mouth once daily.     apixaban 5 mg Tab  Commonly known as: ELIQUIS  Take 1 tablet (5 mg total) by mouth 2 (two) times daily.     ascorbic acid (vitamin C) 1000 MG tablet  Commonly known as: VITAMIN C     atorvastatin 40 MG tablet  Commonly known as: LIPITOR  Take 1 tablet (40 mg total) by mouth once daily.     coQ10 (ubiquinol) 100 mg Cap     cyanocobalamin 1,000 mcg/mL injection  Inject 1 mL (1,000 mcg total) into the muscle every 30 days.     doxazosin 8 MG Tab  Commonly known as: CARDURA  TAKE 1 TABLET DAILY     ezetimibe 10 mg tablet  Commonly known as: ZETIA  TAKE 1 TABLET DAILY     gabapentin 600 MG tablet  Commonly known as: NEURONTIN  Take 1 tablet (600 mg total) by mouth 2 (two) times daily.     garlic 1,000 mg Cap     ketoconazole 2 % cream  Commonly known as: NIZORAL  Apply topically once daily.     levothyroxine 100 MCG tablet  Commonly known as: SYNTHROID  Take 1 tablet (100 mcg total) by  "mouth before breakfast.     LIDOcaine 5 %  Commonly known as: LIDODERM  Place 1 patch onto the skin once daily. Remove & Discard patch within 12 hours or as directed by MD     syringe-needle,safety,disp unt 3 mL 22 gauge x 1 1/2" Syrg  Please syringe to administer medication deep IM     traMADoL 50 mg tablet  Commonly known as: ULTRAM  Take 1 tablet (50 mg total) by mouth every 6 (six) hours as needed for Pain.     vitamin D 1000 units Tab  Commonly known as: VITAMIN D3            STOP taking these medications      amLODIPine 5 MG tablet  Commonly known as: NORVASC     lisinopriL 20 MG tablet  Commonly known as: PRINIVIL,ZESTRIL     metoprolol succinate 50 MG 24 hr tablet  Commonly known as: TOPROL-XL     multivitamin Tab     nitroGLYCERIN 0.4 MG SL tablet  Commonly known as: NITROSTAT     spironolactone 25 MG tablet  Commonly known as: ALDACTONE               Where to Get Your Medications        These medications were sent to [facility]  [address], [place] [address]      Hours: Mon-Fri, 8a-5:30p Phone: [phone]   amoxicillin-clavulanate 875-125mg 875-125 mg per tablet  furosemide 40 MG tablet  metOLazone 5 MG tablet          Thank you  Michael Lindo, PharmD  [phone]    "

## 2022-09-28 NOTE — CONSULTS
"PSYCHIATRY INPATIENT CONSULT NOTE      9/28/2022 10:15 AM   Tong Ford   1945   [id number]           DATE OF ADMISSION: 9/19/2022 10:49 PM    SITE: [facility]    CURRENT LEGAL STATUS: Patient does not currently meet PEC criteria due to not currently being an imminent threat to self/others and not being gravely disabled 2/2 mental illness at this time.     HISTORY    Per Initial History from Primary Team:   "Patient is a 77 y.o. male who has a past medical history of arthritis, HTN, AFIB, HLD, TIA, CAD, CHF, hypothyroidism, normocytic anemia and PSHx of CABG, coronary angioplasty with stent, and pacemaker presenting today for shortness of breath, weakness and swelling. ED workup significant for elevated troponin, worsening JAYSON, elevated BNP, leukocytosis, hyperkalemia, elevated CPK, hypoxia requiring O2 supplementation, chest x-ray with increased bilateral asymmetric airspace disease consistent with CHF/volume overload and possible LLL pneumonia. See below labs and imaging. Patient started on lasix drip, given lokelma, and IV Rocephin/Az. No ICU beds available at this facility therefore pt will need to transfer for higher level of care. Stable for transfer."  On arrival to [place], the patient was continued on lasix drip. He is sleeping and unable to answer any questions. Per chart review, he endorsed orthopnea, worsening GOODWIN. He was told by his cardiologist to report to the ED to be admitted for IV lasix. He stated that having the extra fluid on his legs and scrotal area made it difficult to walk and he fell at home last night on concrete, denies hitting head. He was on the floor for several hours before being found. He was taking 60mg of lasix daily and lionel, reported adequate urination. Pt also has chronic bilat LE venous statis ulcers and he follows with wound care. He had BNP 1795, troponin 1.03 with downtrend, creat 3.1, K 5.2, CK 2600. a temperature of 100.2, WBC 20 at sending facility. He was given " lokelma, rocephin and azithromycin, and started on lasix drip. Prior echo with normal EF. He is on 2LNC with adequate O2 sat on arrival. Legs with stasis ulcers, right leg with redness noted.   Interval History from Primary Team on 09/27/2022:  On 12 L NC, although not wearing supplemental O2 at the time of my assessment.  Placed oxygen back on the patient.  Reports that he feels okay.   No fevers or chills.  Still with some cough.  Has been diuresing well; labs revealing contraction today, will hold diuresis today.  Interval History from Primary Team on early morning on 09/28/2022:  Called to bedside by nursing staff. Patient taking his oxygen off and stating that we are poisoning him with it. He does not believe he needs the oxygen and wants to see if he dies without it. He states that he wants to be treated like a normal person and wants to die. Oxygen on room air mid 70s-80s. Also stated we were poisoning him with the PO meds he was given and was noted by nurse to be hiding the pills. Patient educated on the need for oxygen supplementation. Psych consulted. Nursing to contact the patient's daughter to speak with him in the meantime.       Chief Complaint / Reason for Psychiatry Consult: Depression / Paranoia       HPI:   Tong Ford is a 77 y.o. male with a past medical history of arthritis, HTN, AFIB, HLD, TIA, CAD, CHF, hypothyroidism, normocytic anemia and PSHx of CABG, coronary angioplasty with stent, and pacemaker, and no known or endorsed past psychiatric history, currently being treated by his inpatient primary team for a principle problem of acute on chronic diastolic CHF.  Psychiatry was originally consulted as noted above.  The patient was seen and examined.  The chart was reviewed.  Patient appears to have had an episode of agitated delirium overnight in the context of hypoxia (see primary team note above).  On examination today, the patient had his NC back on, and he was alert and oriented to  "person, place, city, state, month, year, and situation.  He was CAM-ICU negative for delirium.  When asked about possibly recent paranoia, in a logical and linear manner, he stated that he thinks all of the medical staff in the hospital are trying to help him and have the best intentions, but he worries that some of the medical staff training has been tainted by political and big pharmaceutical company motivations (more consistent with societal conspiracy theory related thinking rather than any true psychotic paranoia).  He is in agreement with following the recommendations from his medical teams at this time, including but not limited to leaving his NC oxygen on.  Other than current / recent intermittent low mood related to his health problems as well as anergia, irritability, and reduced cognition / memory ("going on for several months"), he denies any current or prior s/s consistent with depression, anxiety, panic, amna, psychosis, OCD, PTSD, ADHD, eating disorders, or substance abuse / dependence.  He denies any current or prior passive/active SI/HI.  He denies any AH, VH, TH, or delusions (no RIS observed).  He endorses sleeping 6-8 hours nightly and endorses a good appetite.  He denies any adverse effects to his current medication regimen.  Regarding current medical/physical complaints, he endorses fatigue / generalized weakness, SOB, and diffuse mild arthralgias.  He denies any other medical complaints at this time.  NAD was observed during the examination.  Psychotherapy was implemented as noted below with a focus on improving mood, confusion, and behavior.  See detailed psych ROS below.  See A/P below.        Psychiatric Review Of Systems - Currently, the patient is endorsing and/or denying the following:  (patient's endorsements are BOLDED below; if not BOLDED, then patient denied):    Endorses intermittent Symptoms of Depression: diminished mood, low motivation, loss of interest/anhedonia, irritability, " diminished energy, change in sleep, change in appetite, diminished concentration or cognition or indecisiveness, PMA/R, excessive guilt or hopelessness or worthlessness, suicidal ideations    Denies issues with Sleep: initiation, maintenance, early morning awakening with inability to return to sleep    Denies Suicidal/Homicidal ideations: active/passive ideations, organized plans, future intentions    Denies Symptoms of psychosis: hallucinations, delusions, disorganized thinking, disorganized behavior or abnormal motor behavior, or negative symptoms (diminshed emotional expression, avolition, anhedonia, alogia, asociality     Denies Symptoms of amna or hypomania: elevated, expansive, or irritable mood with increased energy or activity; with inflated self-esteem or grandiosity, decreased need for sleep, increased rate of speech, FOI or racing thoughts, distractibility, increased goal directed activity or PMA, risky/disinhibited behavior    Denies Symptoms of Anxiety: excessive anxiety/worry/fear, more days than not, about numerous issues, difficult to control, with restlessness, fatigue, poor concentration, irritability, muscle tension, sleep disturbance; causes functionally impairing distress     Denies Symptoms of Panic Disorder: recurrent panic attacks, precipitated or un-precipitated, source of worry and/or behavioral changes secondary; with or without agoraphobia    Denies Symptoms of PTSD: h/o trauma; re-experiencing/intrusive symptoms, avoidant behavior, negative alterations in cognition or mood, or hyperarousal symptoms; with or without dissociative symptoms     Denies Symptoms of OCD: obsessions or compulsions     Denies Symptoms of Eating Disorders: anorexia, bulimia or binging    Denies Substance Use: intoxication, withdrawal, tolerance, used in larger amounts or duration than intended, unsuccessful attempts to limit or quit, increased time engaging in or seeking out, cravings or strong desire to use,  failure to fulfill obligations, negative consequences in social/interpersonal/occupational,/recreational areas, use in dangerous situations, medical or psychological consequences       [facility] ASQ Suicide Screening Tool:  In the past few weeks, have you wished you were dead? Denies  In the past few weeks, have you felt that you or your family would be better off if you were dead? Denies  In the past week, have you been having thoughts about killing yourself? Denies  Have you ever tried to kill yourself? Denies  Are you having thoughts of killing yourself right now? Denies      PSYCHOTHERAPY ADD-ON [id number]   30 (16-37*) minutes    Time: 19 minutes  Participants: Met with patient    Therapeutic Intervention Type: behavior modifying psychotherapy, supportive psychotherapy  Why chosen therapy is appropriate versus another modality: relevant to diagnosis, patient responds to this modality, evidence based practice    Target symptoms: mood, confusion, and behavior   Primary focus: improving mood, confusion, and behavior  Psychotherapeutic techniques: supportive and psychodynamic techniques; psycho-education; CBT; reality / insight orientation; problem solving techniques and managing life/medical stressors    Outcome monitoring methods: self-report, observation    Patient's response to intervention:  The patient's response to intervention is accepting.    Progress toward goals:  The patient's progress toward goals is fair.      ROS:  General ROS: negative for - chills, fever or night sweats; positive for fatigue  Ophthalmic ROS: negative for - blurry vision, double vision or eye pain  ENT ROS: negative for - sinus pain, headaches, sore throat or visual changes  Allergy and Immunology ROS: negative for - hives, itchy/watery eyes or nasal congestion  Hematological and Lymphatic ROS: negative for - bleeding problems, bruising, jaundice or pallor  Endocrine ROS: negative for - galactorrhea, hot flashes, mood swings,  palpitations or temperature intolerance  Respiratory ROS: negative for - cough, hemoptysis, tachypnea or wheezing; positive for shortness of breath  Cardiovascular ROS: negative for - chest pain, dyspnea on exertion, loss of consciousness, palpitations, rapid heart rate or shortness of breath  Gastrointestinal ROS: negative for - appetite loss, nausea, abdominal pain, blood in stools, change in bowel habits, constipation or diarrhea  Genito-Urinary ROS: negative for - incontinence, nocturia or pelvic pain  Musculoskeletal ROS: negative for - joint stiffness, joint swelling, or muscle pain; positive for diffuse mild arthralgias    Neurological ROS: negative for - dizziness, numbness/tingling or seizures; positive for behavioral changes (improving), confusion (improving), & memory loss  Dermatological ROS: negative for dry skin, hair changes, pruritus or rash; positive for wound  Psychiatric ROS: see detailed psychiatric ROS above in history section       Past Psychiatric History:  Previous Medication Trials: denies   Previous Psychiatric Hospitalizations: denies   Previous Suicide Attempts: denies   History of Violence: denies   Outpatient Psychiatrist: denies   Hx of Depression: denies   Hx of Anxiety: denies   Hx of Alejandra: denies   Hx of Psychosis: denies   Hx of PTSD: denies     Social History:  Marital Status:   Children: 2 adult children   Employment Status/Info: retired  Education: post college graduate work or degree  Special Ed: denies   : denies   Spiritism: Druze   Housing Status: lives with daughter and son-in-law in [place], [place]  Hobbies/Leisure time: time with family and Oriental orthodox members   History of phys/sexual abuse: denies   Access to gun: son-in-law has firearms in the home that are kept locked up with ammo  ; counseled patient on risk reduction methods     Family Psychiatric History: denies     Substance Abuse History:  Recreational Drugs: denies   Use of Alcohol:  occasional, social use and minimal use  Rehab History: denies   Tobacco Use: denies   Legal consequences of chemical use: denies     Legal History:  Past Charges/Incarcerations: denies   Pending charges: denies      Psychosocial Stressors: health   Functioning Relationships: good support system in family  Strengths AND Liabilities:  Strength: Patient accepts guidance/feedback, Strength: Patient is expressive/articulate., Strength: Patient has positive support network., Liability: Patient has poor health., Liability: Patient has possible cognitive impairment., Liability: Patient lacks coping skills.      PAST MEDICAL & SURGICAL HISTORY   Past Medical History:   Diagnosis Date    Arthritis     Atrial fibrillation     Carotid artery occlusion     bilateral    Cataract     CHF (congestive heart failure)     CKD (chronic kidney disease), stage II     Coronary artery disease     3 Vessel CABG AND 3 STENTS    Encounter for blood transfusion     Hypercholesterolemia     Hypertension     Normocytic anemia     Shingles     X 3 WEEKS AGO    Sleep apnea     CESAR - NOT USING C-PAP    Thyroid disease     TIA (transient ischemic attack)      Past Surgical History:   Procedure Laterality Date    A-V CARDIAC PACEMAKER INSERTION N/A 4/1/2021    Procedure: INSERTION, CARDIAC PACEMAKER, DUAL CHAMBER;  Surgeon: Clifford Sevilla MD;  Location: [facility] CATH/EP LAB;  Service: Cardiology;  Laterality: N/A;  [employer]TRONIC    CORONARY ANGIOPLASTY WITH STENT PLACEMENT      CORONARY ARTERY BYPASS GRAFT      CORONARY ARTERY BYPASS GRAFT (CABG)      3 vessel    EYE SURGERY      bILATERAL CATARACS    INSERTION OF PACEMAKER      REVISION OF IMPLANTABLE CARDIOVERTER-DEFIBRILLATOR (ICD) ELECTRODE LEAD PLACEMENT Left 7/1/2021    Procedure: REVISION, INSERTION, ELECTRODE LEAD,pacemaker;  Surgeon: Clifford Sevilla MD;  Location: [facility] CATH/EP LAB;  Service: Cardiology;  Laterality: Left;    ROBOT-ASSISTED LAPAROSCOPIC REPAIR OF INGUINAL HERNIA Right  3/11/2022    Procedure: ROBOTIC REPAIR, HERNIA, INGUINAL;  Surgeon: Melo Aguilera III, MD;  Location: [facility];  Service: General;  Laterality: Right;  LB case    TREATMENT OF CARDIAC ARRHYTHMIA N/A 1/29/2021    Procedure: CARDIOVERSION;  Surgeon: Iron Serna MD;  Location: [facility] CATH/EP LAB;  Service: Cardiology;  Laterality: N/A;    VASECTOMY         NEUROLOGIC HISTORY  Seizures: denies   Head trauma: denies  CVA: yes      FAMILY HISTORY   Family History   Problem Relation Age of Onset    Cancer Mother     Cancer Father     Hypertension Father     Cancer Maternal Grandmother     COPD Paternal Grandmother        ALLERGIES   Review of patient's allergies indicates:  No Known Allergies    CURRENT MEDICATION REGIMEN   Home Meds:   Prior to Admission medications    Medication Sig Start Date End Date Taking? Authorizing Provider   amiodarone (PACERONE) 200 MG Tab Take 1 tablet (200 mg total) by mouth once daily. 8/22/22   Jessica Laureano PA-C   amLODIPine (NORVASC) 5 MG tablet Take 1 tablet (5 mg total) by mouth every evening. 12/14/21 12/14/22  Jessica Laureano PA-C   apixaban (ELIQUIS) 5 mg Tab Take 1 tablet (5 mg total) by mouth 2 (two) times daily. 10/4/21   Esther Roe NP   ascorbic acid, vitamin C, (VITAMIN C) 1000 MG tablet Take 1,000 mg by mouth once daily.    Historical Provider   atorvastatin (LIPITOR) 40 MG tablet Take 1 tablet (40 mg total) by mouth once daily. 2/28/22   Jessica Laureano PA-C   coQ10, ubiquinol, 100 mg Cap Take 150 mg by mouth once daily.     Historical Provider   cyanocobalamin 1,000 mcg/mL injection Inject 1 mL (1,000 mcg total) into the muscle every 30 days. 8/23/22   Kris Zavala MD   doxazosin (CARDURA) 8 MG Tab TAKE 1 TABLET DAILY 6/14/22   Clifford Sevilla MD   ezetimibe (ZETIA) 10 mg tablet TAKE 1 TABLET DAILY 6/14/22   Clifford Sevilla MD   furosemide (LASIX) 40 MG tablet Take 1 tablet (40 mg total) by mouth once daily. 6/20/22   Taiwo Rincon MD  "  gabapentin (NEURONTIN) 600 MG tablet Take 1 tablet (600 mg total) by mouth 2 (two) times daily. 8/8/22 8/8/23  Kris Zavala MD   garlic 1,000 mg Cap Take 1 capsule by mouth once daily.     Historical Provider   ketoconazole (NIZORAL) 2 % cream Apply topically once daily. 8/30/22 10/11/22  Adolfo Allen MD   levothyroxine (SYNTHROID) 100 MCG tablet Take 1 tablet (100 mcg total) by mouth before breakfast. 8/7/22 8/7/23  Clifford Sevilla MD   LIDOcaine (LIDODERM) 5 % Place 1 patch onto the skin once daily. Remove & Discard patch within 12 hours or as directed by MD 8/8/22   Kris Zavala MD   lisinopriL (PRINIVIL,ZESTRIL) 20 MG tablet Take 1 tablet (20 mg total) by mouth 2 (two) times a day. 9/13/21 9/15/22  Clifford Sevilla MD   metoprolol succinate (TOPROL-XL) 50 MG 24 hr tablet Take 1 tablet (50 mg total) by mouth once daily. 10/29/21 10/29/22  Esther Roe, JOSE D   multivitamin Tab Take 1 tablet by mouth once daily.  Patient not taking: No sig reported 8/20/20   AUDI Elias   nitroGLYCERIN (NITROSTAT) 0.4 MG SL tablet Place 1 tablet (0.4 mg total) under the tongue every 5 (five) minutes as needed for Chest pain. DO NOT CRUSH OR CHEW; DISSOLVE UNDER TONGUE; MAXIMUM OF 3 DOSES IN 15 MINUTES 3/2/21   Kris Zavala MD   spironolactone (ALDACTONE) 25 MG tablet Take 1 tablet (25 mg total) by mouth every other day. 8/23/22 8/23/23  Kris Zavala MD   syringe-needle,safety,disp unt 3 mL 22 gauge x 1 1/2" Syrg Please syringe to administer medication deep IM 8/25/22   Kris Zavala MD   traMADoL (ULTRAM) 50 mg tablet Take 1 tablet (50 mg total) by mouth every 6 (six) hours as needed for Pain. 9/7/22   Kris Zavala MD   vitamin D (VITAMIN D3) 1000 units Tab Take 1,000 Units by mouth once daily.    Historical Provider       OTC Meds: denies     Scheduled Meds:    amiodarone  200 mg Oral Daily    atorvastatin  40 mg Oral Daily    ceFAZolin (ANCEF) IVPB  2 g Intravenous Q12H    " famotidine  20 mg Oral Daily    furosemide (LASIX) injection  60 mg Intravenous Q8H    gabapentin  300 mg Oral BID    levothyroxine  100 mcg Oral Before breakfast    metOLazone  5 mg Oral Daily    oxymetazoline  2 spray Each Nostril BID    polyethylene glycol  17 g Oral Daily    psyllium husk (with sugar)  1 packet Oral Daily      PRN Meds: acetaminophen, albuterol sulfate, bisacodyL, melatonin, ondansetron, oxyCODONE-acetaminophen, sodium chloride 0.9%, sodium chloride 0.9%   Psychotherapeutics (From admission, onward)      None            LABORATORY DATA   Recent Results (from the past 72 hour(s))   Basic metabolic panel    Collection Time: 09/25/22 12:32 PM   Result Value Ref Range    Sodium 146 (H) 136 - 145 mmol/L    Potassium 3.6 3.5 - 5.1 mmol/L    Chloride 94 (L) 95 - 110 mmol/L    CO2 37 (H) 23 - 29 mmol/L    Glucose 105 70 - 110 mg/dL    BUN 82 (H) 8 - 23 mg/dL    Creatinine 2.6 (H) 0.5 - 1.4 mg/dL    Calcium 9.1 8.7 - 10.5 mg/dL    Anion Gap 15 8 - 16 mmol/L    eGFR 24 (A) >60 mL/min/1.73 m^2   Magnesium    Collection Time: 09/25/22 12:32 PM   Result Value Ref Range    Magnesium 2.5 1.6 - 2.6 mg/dL   Basic metabolic panel    Collection Time: 09/26/22  3:01 AM   Result Value Ref Range    Sodium 145 136 - 145 mmol/L    Potassium 3.6 3.5 - 5.1 mmol/L    Chloride 95 95 - 110 mmol/L    CO2 38 (H) 23 - 29 mmol/L    Glucose 115 (H) 70 - 110 mg/dL    BUN 88 (H) 8 - 23 mg/dL    Creatinine 2.5 (H) 0.5 - 1.4 mg/dL    Calcium 8.5 (L) 8.7 - 10.5 mg/dL    Anion Gap 12 8 - 16 mmol/L    eGFR 25 (A) >60 mL/min/1.73 m^2   Magnesium    Collection Time: 09/26/22  3:01 AM   Result Value Ref Range    Magnesium 2.6 1.6 - 2.6 mg/dL   Phosphorus    Collection Time: 09/26/22  3:01 AM   Result Value Ref Range    Phosphorus 3.9 2.7 - 4.5 mg/dL   CBC auto differential    Collection Time: 09/26/22  3:01 AM   Result Value Ref Range    WBC 11.30 3.90 - 12.70 K/uL    RBC 2.94 (L) 4.60 - 6.20 M/uL    Hemoglobin 9.5 (L) 14.0 - 18.0 g/dL     Hematocrit 27.5 (L) 40.0 - 54.0 %    MCV 94 82 - 98 fL    MCH 32.3 (H) 27.0 - 31.0 pg    MCHC 34.5 32.0 - 36.0 g/dL    RDW 15.4 (H) 11.5 - 14.5 %    Platelets 285 150 - 450 K/uL    MPV 10.6 9.2 - 12.9 fL    Immature Granulocytes 0.8 (H) 0.0 - 0.5 %    Gran # (ANC) 9.2 (H) 1.8 - 7.7 K/uL    Immature Grans (Abs) 0.09 (H) 0.00 - 0.04 K/uL    Lymph # 1.1 1.0 - 4.8 K/uL    Mono # 0.8 0.3 - 1.0 K/uL    Eos # 0.1 0.0 - 0.5 K/uL    Baso # 0.02 0.00 - 0.20 K/uL    nRBC 0 0 /100 WBC    Gran % 81.4 (H) 38.0 - 73.0 %    Lymph % 9.6 (L) 18.0 - 48.0 %    Mono % 7.3 4.0 - 15.0 %    Eosinophil % 0.7 0.0 - 8.0 %    Basophil % 0.2 0.0 - 1.9 %    Differential Method Automated    Vancomycin, random    Collection Time: 09/26/22  3:01 AM   Result Value Ref Range    Vancomycin, Random 18.9 Not established ug/mL   Basic metabolic panel    Collection Time: 09/27/22  4:45 AM   Result Value Ref Range    Sodium 145 136 - 145 mmol/L    Potassium 3.4 (L) 3.5 - 5.1 mmol/L    Chloride 94 (L) 95 - 110 mmol/L    CO2 39 (H) 23 - 29 mmol/L    Glucose 113 (H) 70 - 110 mg/dL     (H) 8 - 23 mg/dL    Creatinine 3.0 (H) 0.5 - 1.4 mg/dL    Calcium 8.5 (L) 8.7 - 10.5 mg/dL    Anion Gap 12 8 - 16 mmol/L    eGFR 20 (A) >60 mL/min/1.73 m^2   Magnesium    Collection Time: 09/27/22  4:45 AM   Result Value Ref Range    Magnesium 2.8 (H) 1.6 - 2.6 mg/dL   Phosphorus    Collection Time: 09/27/22  4:45 AM   Result Value Ref Range    Phosphorus 4.6 (H) 2.7 - 4.5 mg/dL   CBC auto differential    Collection Time: 09/27/22  4:45 AM   Result Value Ref Range    WBC 14.06 (H) 3.90 - 12.70 K/uL    RBC 2.86 (L) 4.60 - 6.20 M/uL    Hemoglobin 9.3 (L) 14.0 - 18.0 g/dL    Hematocrit 27.0 (L) 40.0 - 54.0 %    MCV 94 82 - 98 fL    MCH 32.5 (H) 27.0 - 31.0 pg    MCHC 34.4 32.0 - 36.0 g/dL    RDW 15.2 (H) 11.5 - 14.5 %    Platelets 311 150 - 450 K/uL    MPV 10.5 9.2 - 12.9 fL    Immature Granulocytes 0.4 0.0 - 0.5 %    Gran # (ANC) 11.1 (H) 1.8 - 7.7 K/uL    Immature Grans  "(Abs) 0.06 (H) 0.00 - 0.04 K/uL    Lymph # 1.8 1.0 - 4.8 K/uL    Mono # 0.8 0.3 - 1.0 K/uL    Eos # 0.2 0.0 - 0.5 K/uL    Baso # 0.04 0.00 - 0.20 K/uL    nRBC 0 0 /100 WBC    Gran % 78.9 (H) 38.0 - 73.0 %    Lymph % 12.9 (L) 18.0 - 48.0 %    Mono % 5.9 4.0 - 15.0 %    Eosinophil % 1.6 0.0 - 8.0 %    Basophil % 0.3 0.0 - 1.9 %    Differential Method Automated       No results found for: PHENYTOIN, PHENOBARB, VALPROATE, CBMZ      EXAMINATION    VITALS   Vitals:    09/28/22 0728 09/28/22 0738 09/28/22 1000 09/28/22 1119   BP:  (!) 124/59  130/86   BP Location:  Right arm  Right arm   Patient Position:  Lying  Lying   Pulse: 63 60  65   Resp:  18  18   Temp:  96.3 °F (35.7 °C)  97.8 °F (36.6 °C)   TempSrc:  Axillary  Oral   SpO2: (!) 90% (!) 89% (!) 90% (!) 83%   Weight:       Height:            CONSTITUTIONAL  General Appearance: NAD, unremarkable, age appropriate, lying in bed, overweight, calm    MUSCULOSKELETAL  Muscle Strength and Tone: Generalized weakness noted in BUE and BLE   Abnormal Involuntary Movements: none observed   Gait and Station: Attempted but unable to assess due to medical acuity     PSYCHIATRIC   Behavior/Cooperation:  friendly and cooperative, eye contact intermittent, calm   Speech:  normal tone, normal rate, normal pitch, soft  Language: grossly intact, able to name, able to repeat with spontaneous speech  Mood: "OK"  Affect:  Constricted   Associations: intact; no BRAD  Thought Process: Linear with intermittent mild confusion   Thought Content: denies SI, HI, AH, VH, TH, delusions, or paranoia (no RIS observed)  Sensorium: Awake  Alert and Oriented: to person, place, city, state, month, year, and situation   Memory: 3/3 immediate, 0/3 at 5 minutes    Recent: Limited ; able to report intermittent recent events   Remote:  Impaired / Limited ; Named 1/4 past presidents   Attention/concentration: Fair. Able to spell [id number] but NOT [id number].   Similarities: Intact (difference between apple and " "orange?)  Abstract reasoning: Intact  Fund of Knowledge: Named 1/4 past presidents   Insight: Intact  Judgment: Intact    CAM ICU Delirium Assessment - NEGATIVE    Is the patient aware of the biomedical complications associated with substance abuse and mental illness? yes      MEDICAL DECISION MAKING    ASSESSMENT        Delirium due to Medical Condition with Behavioral Disturbance (likely 2/2 hypoxia while NC was off ; now resolved with consistent oxygen supplementation)   Adjustment Disorder with Mixed Disturbance of Emotions and Conduct   Memory Deficits   (Rule out Dementia Dx)       RECOMMENDATIONS       - Patient does not currently meet PEC criteria due to not being an imminent threat to self/others and not being gravely disabled 2/2 mental illness at this time.      - No scheduled psychiatric medications are indicated at this time (patient also denies any desire for psychiatric pharmacotherapy).      Implement the below DELIRIUM BEHAVIOR MANAGEMENT:  - Minimize use of restraints; if physical restraints necessary, please utilize medical/chemical prns for agitation.  - Keep shades open and room lit during day and room dim at night in order to promote healthy circadian rhythms.  - Encourage family at bedside.  - Keep whiteboard in patient's room current with the date and name of the members of patient's team for easy patient self re-orientation.  - Avoid benzodiazepines, antihistamines, anticholinergics, hypnotics, and minimize opiates while controlling for pain as these medications may exacerbate delirium.      - Patient's most recent labs, imaging, and EKG were reviewed today ; QTc on EKG from 9/20/2022 was prolonged at 564 (would avoid PRN neuroleptics if possible due to prolonged QTc ; can repeat EKG to assess for improvement in neuroleptic medication is needed) ; CT Head from 09/20/2022: "There is generalized cerebral volume loss with compensatory sulcal widening and ventricular enlargement.  There is " "patchy periventricular white matter hypoattenuation suggesting sequelae of chronic microvascular ischemic change.  There are small remote lacunar-type infarcts of the bilateral basal ganglia."      - Psychotherapy was performed with patient as noted above with a focus on improving mood, confusion, and behavior.     - Palliative Care Team is also involved ; please see their assessment and recommendations.      - Patient was instructed to call 911 and/or [phone] and return to the nearest ED if he begins feeling suicidal, homicidal, or gravely disabled (for s/p this hospitalization).       - Thank you for this consult         Total time spent with patient and/or managing/coordinating patient's care today (excluding the time spent on psychotherapy): 72 minutes   Time spent on psychotherapy today (as noted above): 19 minutes   Total time for encounter today including psychotherapy: 91 minutes      More than 50% of the time was spent counseling/coordinating care.     Consulting clinician was informed of the encounter and consult note.       STAFF:  Neno Hernandez MD  [facility]   9/28/2022          "

## 2022-09-28 NOTE — SUBJECTIVE & OBJECTIVE
Interval History: Pt on 12L nc.  No fevers, no chills.  Psychiatry consult placed.    Past Medical History:   Diagnosis Date    Arthritis     Atrial fibrillation     Carotid artery occlusion     bilateral    Cataract     CHF (congestive heart failure)     CKD (chronic kidney disease), stage II     Coronary artery disease     3 Vessel CABG AND 3 STENTS    Encounter for blood transfusion     Hypercholesterolemia     Hypertension     Normocytic anemia     Shingles     X 3 WEEKS AGO    Sleep apnea     CESAR - NOT USING C-PAP    Thyroid disease     TIA (transient ischemic attack)        Past Surgical History:   Procedure Laterality Date    A-V CARDIAC PACEMAKER INSERTION N/A 4/1/2021    Procedure: INSERTION, CARDIAC PACEMAKER, DUAL CHAMBER;  Surgeon: Clifford Sevilla MD;  Location: [facility] CATH/EP LAB;  Service: Cardiology;  Laterality: N/A;  [employer]    CORONARY ANGIOPLASTY WITH STENT PLACEMENT      CORONARY ARTERY BYPASS GRAFT      CORONARY ARTERY BYPASS GRAFT (CABG)      3 vessel    EYE SURGERY      bILATERAL CATARACS    INSERTION OF PACEMAKER      REVISION OF IMPLANTABLE CARDIOVERTER-DEFIBRILLATOR (ICD) ELECTRODE LEAD PLACEMENT Left 7/1/2021    Procedure: REVISION, INSERTION, ELECTRODE LEAD,pacemaker;  Surgeon: Clifford Sevilla MD;  Location: [facility] CATH/EP LAB;  Service: Cardiology;  Laterality: Left;    ROBOT-ASSISTED LAPAROSCOPIC REPAIR OF INGUINAL HERNIA Right 3/11/2022    Procedure: ROBOTIC REPAIR, HERNIA, INGUINAL;  Surgeon: Melo Aguilera III, MD;  Location: [facility] OR;  Service: General;  Laterality: Right;  LB case    TREATMENT OF CARDIAC ARRHYTHMIA N/A 1/29/2021    Procedure: CARDIOVERSION;  Surgeon: Iron Serna MD;  Location: [facility] CATH/EP LAB;  Service: Cardiology;  Laterality: N/A;    VASECTOMY         Review of patient's allergies indicates:  No Known Allergies    Medications:  Continuous Infusions:  Scheduled Meds:   amiodarone  200 mg Oral Daily    atorvastatin  40 mg Oral Daily    ceFAZolin  (ANCEF) IVPB  2 g Intravenous Q12H    famotidine  20 mg Oral Daily    furosemide (LASIX) injection  60 mg Intravenous Q8H    gabapentin  300 mg Oral BID    levothyroxine  100 mcg Oral Before breakfast    metOLazone  5 mg Oral Daily    oxymetazoline  2 spray Each Nostril BID    polyethylene glycol  17 g Oral Daily    psyllium husk (with sugar)  1 packet Oral Daily     PRN Meds:acetaminophen, albuterol sulfate, bisacodyL, melatonin, ondansetron, oxyCODONE-acetaminophen, sodium chloride 0.9%, sodium chloride 0.9%    Family History       Problem Relation (Age of Onset)    COPD Paternal Grandmother    Cancer Mother, Father, Maternal Grandmother    Hypertension Father          Tobacco Use    Smoking status: Never    Smokeless tobacco: Never   Substance and Sexual Activity    Alcohol use: Not Currently    Drug use: No    Sexual activity: Not on file       Review of Systems   Constitutional:  Positive for activity change, appetite change and fatigue. Negative for chills and diaphoresis.   HENT: Negative.     Eyes: Negative.    Respiratory:  Positive for cough and shortness of breath. Negative for wheezing.    Cardiovascular:  Negative for chest pain.   Gastrointestinal: Negative.    Genitourinary: Negative.    Musculoskeletal:  Positive for myalgias.   Skin:  Positive for wound.   Neurological:  Positive for weakness.   Objective:     Vital Signs (Most Recent):  Temp: 97.8 °F (36.6 °C) (09/28/22 1119)  Pulse: 63 (09/28/22 1142)  Resp: 18 (09/28/22 1119)  BP: 130/86 (09/28/22 1119)  SpO2: (!) 83 % (09/28/22 1142)   Vital Signs (24h Range):  Temp:  [96.3 °F (35.7 °C)-98.2 °F (36.8 °C)] 97.8 °F (36.6 °C)  Pulse:  [60-65] 63  Resp:  [18-20] 18  SpO2:  [83 %-100 %] 83 %  BP: (115-141)/(58-86) 130/86     Weight: 82 kg (180 lb 12.4 oz)  Body mass index is 27.49 kg/m².    Physical Exam  Vitals and nursing note reviewed. Exam conducted with a chaperone present.   Constitutional:       General: He is in acute distress.       Appearance: He is ill-appearing. He is not toxic-appearing.   HENT:      Head: Normocephalic.      Mouth/Throat:      Mouth: Mucous membranes are dry.   Cardiovascular:      Rate and Rhythm: Normal rate.      Pulses: Normal pulses.   Pulmonary:      Breath sounds: Decreased breath sounds and rhonchi present.   Abdominal:      Palpations: Abdomen is soft.   Musculoskeletal:      Right lower leg: Edema present.      Left lower leg: Edema present.   Skin:     Coloration: Skin is pale.      Findings: Lesion present.   Neurological:      Mental Status: He is alert.      Motor: Weakness present.   Psychiatric:         Behavior: Behavior normal.         Thought Content: Thought content normal.       Review of Symptoms      Symptom Assessment (ESAS 0-10 Scale)  Pain:  0  Dyspnea:  0  Anxiety:  2  Nausea:  0  Depression:  0  Anorexia:  0  Fatigue:  3  Insomnia:  0  Restlessness:  0  Agitation:  0     CAM / Delirium:  Negative  Constipation:  Negative  Diarrhea:  Negative      Performance Status:  30    Living Arrangements:  Lives with family    Psychosocial/Cultural: Pt lives with his daughter Daniella.  Pt is retired.    Spiritual:  F - Karie and Belief:  Maria Luz   I - Importance:  Moderately   C - Community:  Family and Jain support   A - Address in Care:  Pastoral visits PRN      Time-Based Charting:  Yes  Chart Review: 14 minutes  Face to Face: 26 minutes  Symptom Assessment: 5 minutes  Coordination of Care: 14 minutes  Discharge Plannin minutes  Advance Care Plannin minutes  Goals of Care: 9 minutes    Total Time Spent: 96 minutes      Advance Care Planning   Advance Directives:   Living Will: No    LaPOST: No    Do Not Resuscitate Status: Yes    Medical Power of : No      Decision Making:  Patient answered questions and Family answered questions       Significant Labs: All pertinent labs within the past 24 hours have been reviewed.  CBC:   Recent Labs   Lab 22  0445   WBC 14.06*   HGB 9.3*    HCT 27.0*   MCV 94        BMP:  No results for input(s): GLU, NA, K, CL, CO2, BUN, CREATININE, CALCIUM, MG in the last 24 hours.  LFT:  Lab Results   Component Value Date    AST 81 (H) 09/20/2022    ALKPHOS 71 09/20/2022    BILITOT 0.8 09/20/2022     Albumin:   Albumin   Date Value Ref Range Status   09/20/2022 2.6 (L) 3.5 - 5.2 g/dL Final   07/25/2019 3.9 3.1 - 4.7 g/dL      Protein:   Total Protein   Date Value Ref Range Status   09/20/2022 6.0 6.0 - 8.4 g/dL Final     Lactic acid:   Lab Results   Component Value Date    LACTATE 1.5 09/19/2022       Significant Imaging: I have reviewed all pertinent imaging results/findings within the past 24 hours.

## 2022-09-28 NOTE — PLAN OF CARE
"  Problem: Adult Inpatient Plan of Care  Goal: Plan of Care Review  Outcome: Ongoing, Progressing   Patient is alert, oriented X4. Care plan explained to patient, he appears hopeless. Pt still push high flow nasal cannula away, nose bleeding noted. switch to BIPAP. Pt tolerated well for about 2 hours. Then he took off and told me, " I am ready to go.its time for me to go." And refused to put BIPAP or nasal cannula back on. And O2 sat dropped down to 79%, shortness of breath noted. Educated pt, and put BIPAP back on patient, O2 sat back to 97%.     No nausea/vomiting/diarrhea noted. Complaint foot pain, rate 10/10, PRN percocet given. Due medications given.     This morning, pt decided to give up his life. He is alert, oriented X4. He pulled off his BIPAP and refused to put his nasal cannula back on. Notified NP. Chastity. But pt still willing to take off the oxygen, even he knows the consequences that he going to die without oxygen. Notified  and palliative care consult.  Daughter notified as well.     Maintain fall risk precaution, bed in lowest position, bed alarm on. Call light/personal items in reach. Instructed patient call for help as needed. Will continue to monitor.   "

## 2022-09-28 NOTE — PLAN OF CARE
[facility]  Department of Hospital Medicine  [address] [place] [address]  [phone] after hours  [phone] fax    HOSPICE  ORDERS    09/28/2022    Admit to Hospice:  Home Service     Diagnoses:   Active Hospital Problems    Diagnosis  POA    *Acute on chronic diastolic congestive heart failure [I50.33]  Yes    Palliative care encounter [Z51.5]  Not Applicable    Staphylococcal pneumonia [J15.20]  No    Venous stasis ulcer [I83.009, L97.909]  Yes    Pneumonia [J18.9]  Yes    Rhabdomyolysis [M62.82]  Yes    Sepsis [A41.9]  Yes    Acute hypoxemic respiratory failure [J96.01]  Yes    Debility [R53.81]  Yes    Volume overload state of heart [E87.79]  Yes    Neuropathy, peripheral, idiopathic [G60.9]  Yes    Elevated troponin [R77.8]  Yes    Coronary artery disease [I25.10]  Yes    Paroxysmal atrial fibrillation [I48.0]  Yes    Acute renal failure superimposed on stage 3 chronic kidney disease [N17.9, N18.30]  Yes    Essential hypertension [I10]  Yes    Thyroid disease [E07.9]  Yes    CESAR (obstructive sleep apnea) [G47.33]  Yes      Resolved Hospital Problems    Diagnosis Date Resolved POA    Hyperkalemia [E87.5] 09/20/2022 Yes       Hospice Qualifying Diagnoses:        Patient has a life expectancy < 6 months due to:  Primary Hospice Diagnosis:  end stage heart failure  Comorbid Conditions Contributing to Decline:  CKD4, MSSA PNA, frailty    Vital Signs: Routine per Hospice Protocol.    Code Status: DNR    Allergies: Review of patient's allergies indicates:  No Known Allergies    Diet: cardiac     Activities: As tolerated    Goals of Care Treatment Preferences:  Code Status: DNR          What is most important right now is to focus on spending time at home, symptom/pain control, quality of life, even if it means sacrificing a little time.  Accordingly, we have decided that the best plan to meet the patient's goals includes continuing with treatment.      Nursing: Per  Hospice Routine.      Routine Skin for Bedridden Patients: Apply moisture barrier cream to all skin folds and   wet areas in perineal area daily and after baths and all bowel movements.      Oxygen: High flow O2    Other Miscellaneous Care:     Medications:      Medication List        START taking these medications      amoxicillin-clavulanate 875-125mg 875-125 mg per tablet  Commonly known as: AUGMENTIN  Take 1 tablet by mouth 2 (two) times daily. for 7 days     metOLazone 5 MG tablet  Commonly known as: ZAROXOLYN  Take 1 tablet (5 mg total) by mouth every Mon, Wed, Fri.            CHANGE how you take these medications      furosemide 40 MG tablet  Commonly known as: LASIX  Take 2 tablets (80 mg total) by mouth 2 (two) times a day.  What changed:   how much to take  when to take this            CONTINUE taking these medications      amiodarone 200 MG Tab  Commonly known as: PACERONE  Take 1 tablet (200 mg total) by mouth once daily.     apixaban 5 mg Tab  Commonly known as: ELIQUIS  Take 1 tablet (5 mg total) by mouth 2 (two) times daily.     ascorbic acid (vitamin C) 1000 MG tablet  Commonly known as: VITAMIN C  Take 1,000 mg by mouth once daily.     atorvastatin 40 MG tablet  Commonly known as: LIPITOR  Take 1 tablet (40 mg total) by mouth once daily.     coQ10 (ubiquinol) 100 mg Cap  Take 150 mg by mouth once daily.     cyanocobalamin 1,000 mcg/mL injection  Inject 1 mL (1,000 mcg total) into the muscle every 30 days.     doxazosin 8 MG Tab  Commonly known as: CARDURA  TAKE 1 TABLET DAILY     ezetimibe 10 mg tablet  Commonly known as: ZETIA  TAKE 1 TABLET DAILY     gabapentin 600 MG tablet  Commonly known as: NEURONTIN  Take 1 tablet (600 mg total) by mouth 2 (two) times daily.     garlic 1,000 mg Cap  Take 1 capsule by mouth once daily.     ketoconazole 2 % cream  Commonly known as: NIZORAL  Apply topically once daily.     levothyroxine 100 MCG tablet  Commonly known as: SYNTHROID  Take 1 tablet (100 mcg total)  "by mouth before breakfast.     LIDOcaine 5 %  Commonly known as: LIDODERM  Place 1 patch onto the skin once daily. Remove & Discard patch within 12 hours or as directed by MD     syringe-needle,safety,disp unt 3 mL 22 gauge x 1 1/2" Syrg  Please syringe to administer medication deep IM     traMADoL 50 mg tablet  Commonly known as: ULTRAM  Take 1 tablet (50 mg total) by mouth every 6 (six) hours as needed for Pain.     vitamin D 1000 units Tab  Commonly known as: VITAMIN D3  Take 1,000 Units by mouth once daily.            STOP taking these medications      amLODIPine 5 MG tablet  Commonly known as: NORVASC     lisinopriL 20 MG tablet  Commonly known as: PRINIVIL,ZESTRIL     metoprolol succinate 50 MG 24 hr tablet  Commonly known as: TOPROL-XL     multivitamin Tab     nitroGLYCERIN 0.4 MG SL tablet  Commonly known as: NITROSTAT     spironolactone 25 MG tablet  Commonly known as: ALDACTONE                  Future Orders:  Hospice Medical Director may dictate new orders for comfortable care measures & sign death certificate.        _________________________________  Adolfo Solo MD  09/28/2022     "

## 2022-09-28 NOTE — SIGNIFICANT EVENT
Called to bedside by nursing staff. Patient taking his oxygen off and stating that we are poisoning him with it. He does not believe he needs the oxygen and wants to see if he dies without it. He states that he wants to be treated like a normal person and wants to die. Oxygen on room air mid 70s-80s. Also stated we were poisoning him with the PO meds he was given and was noted by nurse to be hiding the pills. Patient educated on the need for oxygen supplementation. Psych consulted. Nursing to contact the patient's daughter to speak with him in the meantime.

## 2022-09-28 NOTE — HOSPITAL COURSE
Mr. Ford presented with acute hypoxic respiratory failure in the setting of acute on chronic diastolic congestive heart failure exacerbation.  Initially admitted to the ICU where he was placed on BiPAP and initiated on IV Lasix for diuresis.  He initially began to improve with this regimen was step-down to the floor; however, his oxygenation began to worsen again.  Found to have MSSA pneumonia.  Initially placed on broad-spectrum antibiotics, which were then deescalated to cefazolin following return of sensitivities.  His breathing improved again and he has been net 15 L negative over the course of his stay.  Again, he developed worsening hypoxia on the floor.  CT scan obtained which still shows significant pulmonary edema.  Patient expressed his exhaustion with ongoing hospitalization and diuresis and wishes to transition towards palliative care at this point.  Extensive goals of care discussion had with the patient, his daughter, and palliative care today.  We transition to DNR and will arrange for home hospice.    Advance Care Planning     Date: 09/28/2022    Code Status  In light of the patients advanced and life limiting illness,I engaged the the patient and family in a conversation about the patient's preferences for care  at the very end of life. The patient wishes to have a natural, peaceful death.  Along those lines, the patient does not wish to have CPR or other invasive treatments performed when his heart and/or breathing stops. I communicated to the patient and family that a DNR order would be placed in his medical record to reflect this preference.  I spent a total of 15 minutes engaging the patient in this advance care planning discussion.       [facility]  I engaged the patient and family in a conversation about advance care planning and we specifically addressed what the goals of care would be moving forward, in light of the patient's change in clinical status, specifically worsening hypoxic respiratory  "failure.  We did specifically address the patient's likely prognosis, which is poor.  We explored the patient's values and preferences for future care.  The patient and family endorses that what is most important right now is to focus on spending time at home and quality of life, even if it means sacrificing a little time    Accordingly, we have decided that the best plan to meet the patient's goals includes enrolling in hospice care    I did explain the role for hospice care at this stage of the patient's illness, including its ability to help the patient live with the best quality of life possible.  We will be making a hospice referral.    I spent a total of 20 minutes engaging the patient in this advance care planning discussion.              /86 (BP Location: Right arm, Patient Position: Lying)   Pulse 63   Temp 97.8 °F (36.6 °C) (Oral)   Resp 18   Ht 5' 8" (1.727 m)   Wt 82 kg (180 lb 12.4 oz)   SpO2 (!) 83%   BMI 27.49 kg/m²   Physical Exam  Vitals and nursing note reviewed.   Constitutional:       General: He is not in acute distress.     Appearance: He is obese.   HENT:      Head: Normocephalic and atraumatic.      Nose: Nose normal.      Mouth/Throat:      Mouth: Mucous membranes are moist.   Eyes:      Pupils: Pupils are equal, round, and reactive to light.   Cardiovascular:      Rate and Rhythm: Normal rate and regular rhythm.      Pulses: Normal pulses.      Heart sounds: Murmur heard.   Pulmonary:      Effort: Pulmonary effort is normal. No respiratory distress.      Breath sounds: No rales.      Comments: On supplemental O2 via nasal cannula  Abdominal:      General: Bowel sounds are normal.      Palpations: Abdomen is soft.   Musculoskeletal:         General: Swelling present. Normal range of motion.      Cervical back: Normal range of motion.      Right lower leg: Edema present.      Left lower leg: Edema present.   Skin:     General: Skin is warm and dry.      Findings: Erythema and " lesion present.   Neurological:      Motor: Weakness present.      Comments: Unable to assess   Psychiatric:      Comments: Unable to assess

## 2022-09-28 NOTE — PLAN OF CARE
Pt with limited progression towards goals this date. Pt requires TotalA x2 for all bed mobility & TotalA to maintain static sitting balance EOB. Daughter stating that pt is to d/c home with hospital bed & all further DME from hospice.     Problem: Occupational Therapy  Goal: Occupational Therapy Goal  Description: Goals to be met by: 10/20/2022     Patient will increase functional independence with ADLs by performing:    UE Dressing with Modified [place].  LE Dressing with Modified [place].  Grooming while standing at sink with Modified [place].  Toileting from toilet with Modified [place] for hygiene and clothing management.   Supine to sit with Modified [place].  Step transfer with Modified [place]  Toilet transfer to toilet with Modified [place].  Increased functional strength to WFL for ADLS.  Upper extremity exercise program x10 reps per handout, with independence.    Outcome: Ongoing, Progressing

## 2022-09-28 NOTE — DISCHARGE SUMMARY
"[facility] Medicine  Discharge Summary      Patient Name: Tong Ford  MRN: [id number]  Patient Class: IP- Inpatient  Admission Date: 9/19/2022  Hospital Length of Stay: 9 days  Discharge Date and Time:  09/28/2022 1:31 PM  Attending Physician: Adolfo Solo, *   Discharging Provider: Adolfo Solo MD  Primary Care Provider: Kris Zavala MD      HPI:   Per transfer note:  "Patient is a 77 y.o. male who has a past medical history of arthritis, HTN, AFIB, HLD, TIA, CAD, CHF, hypothyroidism, normocytic anemia and PSHx of CABG, coronary angioplasty with stent, and pacemaker presenting today for shortness of breath, weakness and swelling. ED workup significant for elevated troponin, worsening JAYSON, elevated BNP, leukocytosis, hyperkalemia, elevated CPK, hypoxia requiring O2 supplementation, chest x-ray with increased bilateral asymmetric airspace disease consistent with CHF/volume overload and possible LLL pneumonia. See below labs and imaging. Patient started on lasix drip, given lokelma, and IV Rocephin/Az. No ICU beds available at this facility therefore pt will need to transfer for higher level of care. Stable for transfer."    On arrival to [facility], the patient was continued on lasix drip. He is sleeping and unable to answer any questions. Per chart review, he endorsed orthopnea, worsening GOODWIN. He was told by his cardiologist to report to the ED to be admitted for IV lasix. He stated that having the extra fluid on his legs and scrotal area made it difficult to walk and he fell at home last night on concrete, denies hitting head. He was on the floor for several hours before being found. He was taking 60mg of lasix daily and lionel, reported adequate urination. Pt also has chronic bilat LE venous statis ulcers and he follows with wound care. He had BNP 1795, troponin 1.03 with downtrend, creat 3.1, K 5.2, CK 2600. a temperature of 100.2, WBC 20 at sending facility. He was given " lokelma, rocephin and azithromycin, and started on lasix drip. Prior echo with normal EF. He is on 2LNC with adequate O2 sat on arrival. Legs with stasis ulcers, right leg with redness noted.           * No surgery found *      Hospital Course:   Mr. Ford presented with acute hypoxic respiratory failure in the setting of acute on chronic diastolic congestive heart failure exacerbation.  Initially admitted to the ICU where he was placed on BiPAP and initiated on IV Lasix for diuresis.  He initially began to improve with this regimen was step-down to the floor; however, his oxygenation began to worsen again.  Found to have MSSA pneumonia.  Initially placed on broad-spectrum antibiotics, which were then deescalated to cefazolin following return of sensitivities.  His breathing improved again and he has been net 15 L negative over the course of his stay.  Again, he developed worsening hypoxia on the floor.  CT scan obtained which still shows significant pulmonary edema.  Patient expressed his exhaustion with ongoing hospitalization and diuresis and wishes to transition towards palliative care at this point.  Extensive goals of care discussion had with the patient, his daughter, and palliative care today.  We transition to DNR and will arrange for home hospice.    Advance Care Planning     Date: 09/28/2022    Code Status  In light of the patients advanced and life limiting illness,I engaged the the patient and family in a conversation about the patient's preferences for care  at the very end of life. The patient wishes to have a natural, peaceful death.  Along those lines, the patient does not wish to have CPR or other invasive treatments performed when his heart and/or breathing stops. I communicated to the patient and family that a DNR order would be placed in his medical record to reflect this preference.  I spent a total of 15 minutes engaging the patient in this advance care planning discussion.       [facility]  I  "engaged the patient and family in a conversation about advance care planning and we specifically addressed what the goals of care would be moving forward, in light of the patient's change in clinical status, specifically worsening hypoxic respiratory failure.  We did specifically address the patient's likely prognosis, which is poor.  We explored the patient's values and preferences for future care.  The patient and family endorses that what is most important right now is to focus on spending time at home and quality of life, even if it means sacrificing a little time    Accordingly, we have decided that the best plan to meet the patient's goals includes enrolling in hospice care    I did explain the role for hospice care at this stage of the patient's illness, including its ability to help the patient live with the best quality of life possible.  We will be making a hospice referral.    I spent a total of 20 minutes engaging the patient in this advance care planning discussion.              /86 (BP Location: Right arm, Patient Position: Lying)   Pulse 63   Temp 97.8 °F (36.6 °C) (Oral)   Resp 18   Ht 5' 8" (1.727 m)   Wt 82 kg (180 lb 12.4 oz)   SpO2 (!) 83%   BMI 27.49 kg/m²   Physical Exam  Vitals and nursing note reviewed.   Constitutional:       General: He is not in acute distress.     Appearance: He is obese.   HENT:      Head: Normocephalic and atraumatic.      Nose: Nose normal.      Mouth/Throat:      Mouth: Mucous membranes are moist.   Eyes:      Pupils: Pupils are equal, round, and reactive to light.   Cardiovascular:      Rate and Rhythm: Normal rate and regular rhythm.      Pulses: Normal pulses.      Heart sounds: Murmur heard.   Pulmonary:      Effort: Pulmonary effort is normal. No respiratory distress.      Breath sounds: No rales.      Comments: On supplemental O2 via nasal cannula  Abdominal:      General: Bowel sounds are normal.      Palpations: Abdomen is soft.   Musculoskeletal: "         General: Swelling present. Normal range of motion.      Cervical back: Normal range of motion.      Right lower leg: Edema present.      Left lower leg: Edema present.   Skin:     General: Skin is warm and dry.      Findings: Erythema and lesion present.   Neurological:      Motor: Weakness present.      Comments: Unable to assess   Psychiatric:      Comments: Unable to assess        Goals of Care Treatment Preferences:  Code Status: DNR          What is most important right now is to focus on spending time at home, quality of life, even if it means sacrificing a little time.  Accordingly, we have decided that the best plan to meet the patient's goals includes enrolling in hospice care.      Consults:   Consults (From admission, onward)        Status Ordering Provider     Inpatient consult to Palliative Care  Once        Provider:  (Not yet assigned)    Completed SHER WOODS     Inpatient consult to Psychiatry  Once        Provider:  (Not yet assigned)    Completed CARRINGTON HALEY     Inpatient consult to Social Work  Once        Provider:  (Not yet assigned)    Acknowledged MANUEL CHESTER     Inpatient consult to Registered Dietitian/Nutritionist  Once        Provider:  (Not yet assigned)    Completed MANUEL CHESTER     Inpatient consult to Social Work/Case Management  Once        Provider:  (Not yet assigned)    Acknowledged CARRINGTON HALEY     Inpatient consult to Registered Dietitian/Nutritionist  Once        Provider:  (Not yet assigned)    Completed CARRINGTON HALEY     Inpatient consult to Cardiology-[facility]  Once        Provider:  (Not yet assigned)    Completed CARRINGTON HALEY          No new Assessment & Plan notes have been filed under this hospital service since the last note was generated.  Service: Hospital Medicine    Final Active Diagnoses:    Diagnosis Date Noted POA    PRINCIPAL PROBLEM:  Acute on chronic diastolic congestive heart failure  [I50.33] 09/19/2022 Yes    Palliative care encounter [Z51.5] 09/28/2022 Not Applicable    Staphylococcal pneumonia [J15.20] 09/23/2022 No    Venous stasis ulcer [I83.009, L97.909] 09/20/2022 Yes    Pneumonia [J18.9] 09/20/2022 Yes    Rhabdomyolysis [M62.82] 09/20/2022 Yes    Sepsis [A41.9] 09/20/2022 Yes    Acute hypoxemic respiratory failure [J96.01] 09/20/2022 Yes    Debility [R53.81] 09/20/2022 Yes    Volume overload state of heart [E87.79] 09/20/2022 Yes    Neuropathy, peripheral, idiopathic [G60.9] 09/20/2022 Yes    Elevated troponin [R77.8] 09/19/2022 Yes    Coronary artery disease [I25.10] 09/01/2021 Yes    Paroxysmal atrial fibrillation [I48.0] 01/26/2021 Yes    Acute renal failure superimposed on stage 3 chronic kidney disease [N17.9, N18.30] 08/17/2020 Yes    Essential hypertension [I10] 01/31/2019 Yes    Thyroid disease [E07.9] 01/31/2019 Yes    CESAR (obstructive sleep apnea) [G47.33] 10/23/2018 Yes      Problems Resolved During this Admission:    Diagnosis Date Noted Date Resolved POA    Hyperkalemia [E87.5] 09/19/2022 09/20/2022 Yes       Discharged Condition: poor    Disposition: Hospice/Home    Follow Up:   Follow-up Information     [facility]. Go on 9/26/2022.    Why: REHAB  Contact information:  [address] [facility] [address]  [phone]           Kris Zavala MD. Go in 2 week(s).    Specialty: Family Medicine  Why: FOLLOW UP WITH PCP  Contact information:  [address] [facility]  [address]  [facility] [address]  [phone]             [facility] week([facility]).    Specialty: Cardiology  Why: FOLLOW UP WITH CARDIOLOGIST AFTER DISCHARGE, FOLLOW UP AFTER FACILITY STAY  Contact information:  [address] [facility] [facility] [address]                     Patient Instructions:      Call MD for:  temperature >100.4     Call MD for:  persistent nausea and vomiting or diarrhea     Call MD for:  severe uncontrolled pain      Call MD for:  redness, tenderness, or signs of infection (pain, swelling, redness, odor or green/yellow discharge around incision site)     Call MD for:  difficulty breathing or increased cough     Call MD for:  severe persistent headache     Call MD for:  worsening rash     Call MD for:  persistent dizziness, light-headedness, or visual disturbances     Call MD for:  increased confusion or weakness       Significant Diagnostic Studies:  See above    Pending Diagnostic Studies:     Procedure Component Value Units Date/Time    Basic metabolic panel [[id number]] Collected: 09/28/22 1326    Order Status: Sent Lab Status: In process Updated: 09/28/22 1326    Specimen: Blood     Narrative:      Collection has been rescheduled by AMK2 at 09/28/2022 07:58 Reason:   Notified by Nurse Shuhan not to draw labs    CBC auto differential [[id number]] Collected: 09/28/22 1326    Order Status: Sent Lab Status: In process Updated: 09/28/22 1326    Specimen: Blood     Narrative:      Collection has been rescheduled by AMK2 at 09/28/2022 07:58 Reason:   Notified by Nurse Shuhan not to draw labs    Magnesium [[id number]] Collected: 09/28/22 1326    Order Status: Sent Lab Status: In process Updated: 09/28/22 1326    Specimen: Blood     Narrative:      Collection has been rescheduled by AMK2 at 09/28/2022 07:58 Reason:   Notified by Nurse Shuhan not to draw labs    Phosphorus [[id number]] Collected: 09/28/22 1326    Order Status: Sent Lab Status: In process Updated: 09/28/22 1326    Specimen: Blood     Narrative:      Collection has been rescheduled by AMK2 at 09/28/2022 07:58 Reason:   Notified by Nurse Shuhan not to draw labs    Vitamin B1 [[id number]] Collected: 09/25/22 0422    Order Status: Sent Lab Status: In process Updated: 09/25/22 0909    Specimen: Blood          Medications:  Reconciled Home Medications:      Medication List      START taking these medications    amoxicillin-clavulanate 875-125mg 875-125 mg per tablet  Commonly  "known as: AUGMENTIN  Take 1 tablet by mouth 2 (two) times daily. for 7 days     metOLazone 5 MG tablet  Commonly known as: ZAROXOLYN  Take 1 tablet (5 mg total) by mouth every Mon, Wed, Fri.        CHANGE how you take these medications    furosemide 40 MG tablet  Commonly known as: LASIX  Take 2 tablets (80 mg total) by mouth 2 (two) times a day.  What changed:   · how much to take  · when to take this        CONTINUE taking these medications    amiodarone 200 MG Tab  Commonly known as: PACERONE  Take 1 tablet (200 mg total) by mouth once daily.     apixaban 5 mg Tab  Commonly known as: ELIQUIS  Take 1 tablet (5 mg total) by mouth 2 (two) times daily.     ascorbic acid (vitamin C) 1000 MG tablet  Commonly known as: VITAMIN C  Take 1,000 mg by mouth once daily.     atorvastatin 40 MG tablet  Commonly known as: LIPITOR  Take 1 tablet (40 mg total) by mouth once daily.     coQ10 (ubiquinol) 100 mg Cap  Take 150 mg by mouth once daily.     cyanocobalamin 1,000 mcg/mL injection  Inject 1 mL (1,000 mcg total) into the muscle every 30 days.     doxazosin 8 MG Tab  Commonly known as: CARDURA  TAKE 1 TABLET DAILY     ezetimibe 10 mg tablet  Commonly known as: ZETIA  TAKE 1 TABLET DAILY     gabapentin 600 MG tablet  Commonly known as: NEURONTIN  Take 1 tablet (600 mg total) by mouth 2 (two) times daily.     garlic 1,000 mg Cap  Take 1 capsule by mouth once daily.     ketoconazole 2 % cream  Commonly known as: NIZORAL  Apply topically once daily.     levothyroxine 100 MCG tablet  Commonly known as: SYNTHROID  Take 1 tablet (100 mcg total) by mouth before breakfast.     LIDOcaine 5 %  Commonly known as: LIDODERM  Place 1 patch onto the skin once daily. Remove & Discard patch within 12 hours or as directed by MD     syringe-needle,safety,disp unt 3 mL 22 gauge x 1 1/2" Syrg  Please syringe to administer medication deep IM     traMADoL 50 mg tablet  Commonly known as: ULTRAM  Take 1 tablet (50 mg total) by mouth every 6 (six) " hours as needed for Pain.     vitamin D 1000 units Tab  Commonly known as: VITAMIN D3  Take 1,000 Units by mouth once daily.        STOP taking these medications    amLODIPine 5 MG tablet  Commonly known as: NORVASC     lisinopriL 20 MG tablet  Commonly known as: PRINIVIL,ZESTRIL     metoprolol succinate 50 MG 24 hr tablet  Commonly known as: TOPROL-XL     multivitamin Tab     nitroGLYCERIN 0.4 MG SL tablet  Commonly known as: NITROSTAT     spironolactone 25 MG tablet  Commonly known as: ALDACTONE            Indwelling Lines/Drains at time of discharge:   Lines/Drains/Airways     None                 Time spent on the discharge of patient: 50 minutes         Adolfo Solo MD  Department of Hospital Medicine  [place] [facility]

## 2022-09-28 NOTE — ASSESSMENT & PLAN NOTE
"At time of initial consult, pt sitting up in bed with nursing student at bedside.  Pt with 12L O2, sats mids 80's.  Pt oriented to year, location, president and medical situation.  Pt receptive to palliative visit at this time.  Pt reports that until about 4 moths ago he was still very independent but has drastically declined in his overall functional status since then.  Pt currently resides with his daughter.    Pt states, "I think its time for me to die.  All of this stuff (guestures to medical equipment around the room and attached to him) is just prolonging it. We cant interfere in Gods plans." Pt also expresses that he "wants to die with his dignity."  Emotional support offered.  Pt was tearful on and off throughout the conversation.  We discussed the pts understanding of his prognosis as well as his understanding of his treatment options.  Pt reports that his main goals are to be comfortable and to "die in peace."  We discussed the pts previous d/c plan of IPR/SNF ( although at this time the pt is no longer medically stable) and how what he is currently expressing is a different goal.  Pt endorses that the "treatments have become a burden."  The topic of hospice was introduced.  Pt is familiar with hospice as his wife was enrolled in hospice prior to her death.  We discussed what this would look like for the pt as well.  Pt expresses that he would like to go home with hospice to his daughters house, but was worried about the burden it could place on her.     Pts daughter, Daniella entered the room and was present for the remainder of the visit.  Pts daughter expresses concern as the pt called her this morning as well expressing his wishes to die.  We discussed options at this point as well as different goals of care and what those could look like for the pt.    At this point Dr Solo was also present for the visit and expressed his concern that we need to either move forward with aggressive measures and " "escalate the pt to bipap and thus the ICU due to his increased O2 needs or the other option would be to shift our focus to comfort care and enroll the pt in home hospice.  Daniella endorses that she supports whatever decision her father makes.  Pts wishes became slightly muddled as he states that " home hospice would be the most comforting decision, but feels that the ICU would be the easiest option."  Pts goals became less clear at this point. Pt was placed on bipap for the time being to allow Daniella and the pt to continue to discuss options as well as increase the pts O2 sats.    Dr Waldo Hernandez also present for a portion of the conversation to discuss realistic goals and treatment options for the patient.    Code status was discussed and per the pt and Daniella, pt wishes to be a DNR and this has been discussed previously between them.  Code status updated in the EMR to reflect this.     Sandy with [facility] to come to the bedside to meet with pt and daughter to further discuss home hospice options.    Will follow up with pt and family after meeting.       "

## 2022-09-28 NOTE — PLAN OF CARE
Problem: Physical Therapy  Goal: Physical Therapy Goal  Description: Goals to be met by: 10/20/22     Patient will increase functional independence with mobility by performin. Supine to sit with Stand-by Assistance  2. Sit to supine with Stand-by Assistance  3. Sit to stand transfer with Stand-by Assistance  4. Bed to chair transfer with Stand-by Assistance using Rolling Walker  5. Gait  x 50 feet with Stand-by Assistance using Rolling Walker.     Outcome: Ongoing, Progressing   Patient's progress limited; requires total A x 2 for transitioning to EOB and total A to maintain static sitting balance at EOB; dtr reports that the pt is discharging home with hospital bed and all other equipment needed will be provided by hospice.

## 2022-09-28 NOTE — PLAN OF CARE
[facility] - Telemetry  Discharge Final Note    Primary Care Provider: Kris Zavala MD    Expected Discharge Date: 9/28/2022    Pharmacist will go over home medications and reasons for medications. VN and bedside nurse to reiterate final discharge instructions.     Cleared from CM . Bedside Nurse and VN notified.      Final Discharge Note (most recent)       Final Note - 09/28/22 1341          Final Note    Assessment Type Final Discharge Note (P)      Anticipated Discharge Disposition Hospice/Home (P)      Hospital Resources/Appts/Education Provided Appointments scheduled and added to AVS;Post-Acute resouces added to AVS (P)         Post-Acute Status    Post-Acute Authorization Placement;Hospice (P)      Post-Acute Placement Status Discharge Plan Changed (P)      Hospice Status Pending equipment/medication delivery (P)    Discussed with daughter. Consents have been signed with [facility]. Orders faxed to agency. Pending bed delivery for DC today.    Discharge Delays Home Medical Equipment (Insurance, Delivery) (P)    Pending hospice equipment delivery. Transportation request placed for 6 pm pickup pending delivery.                    Important Message from Medicare  Important Message from Medicare regarding Discharge Appeal Rights: Given to patient/caregiver, Explained to patient/caregiver, Signed/date by patient/caregiver     Date IMM was signed: 09/26/22  Time IMM was signed: 1152    Contact Info       [facility]   Specialty: Hospice Services    [address] [place] [address]  [address]  [facility] [address]   Phone: [phone]       Next Steps: Call    Instructions: As needed, HOSPICE, Call as needed for any issue or to get equipment             Medication List        START taking these medications      amoxicillin-clavulanate 875-125mg 875-125 mg per tablet  Commonly known as: AUGMENTIN  Take 1 tablet by mouth 2 (two) times daily. for 7 days     metOLazone 5 MG tablet  Commonly known as:  "ZAROXOLYN  Take 1 tablet (5 mg total) by mouth every Mon, Wed, Fri.            CHANGE how you take these medications      furosemide 40 MG tablet  Commonly known as: LASIX  Take 2 tablets (80 mg total) by mouth 2 (two) times a day.  What changed:   how much to take  when to take this            CONTINUE taking these medications      amiodarone 200 MG Tab  Commonly known as: PACERONE  Take 1 tablet (200 mg total) by mouth once daily.     apixaban 5 mg Tab  Commonly known as: ELIQUIS  Take 1 tablet (5 mg total) by mouth 2 (two) times daily.     ascorbic acid (vitamin C) 1000 MG tablet  Commonly known as: VITAMIN C     atorvastatin 40 MG tablet  Commonly known as: LIPITOR  Take 1 tablet (40 mg total) by mouth once daily.     coQ10 (ubiquinol) 100 mg Cap     cyanocobalamin 1,000 mcg/mL injection  Inject 1 mL (1,000 mcg total) into the muscle every 30 days.     doxazosin 8 MG Tab  Commonly known as: CARDURA  TAKE 1 TABLET DAILY     ezetimibe 10 mg tablet  Commonly known as: ZETIA  TAKE 1 TABLET DAILY     gabapentin 600 MG tablet  Commonly known as: NEURONTIN  Take 1 tablet (600 mg total) by mouth 2 (two) times daily.     garlic 1,000 mg Cap     ketoconazole 2 % cream  Commonly known as: NIZORAL  Apply topically once daily.     levothyroxine 100 MCG tablet  Commonly known as: SYNTHROID  Take 1 tablet (100 mcg total) by mouth before breakfast.     LIDOcaine 5 %  Commonly known as: LIDODERM  Place 1 patch onto the skin once daily. Remove & Discard patch within 12 hours or as directed by MD     syringe-needle,safety,disp unt 3 mL 22 gauge x 1 1/2" Syrg  Please syringe to administer medication deep IM     traMADoL 50 mg tablet  Commonly known as: ULTRAM  Take 1 tablet (50 mg total) by mouth every 6 (six) hours as needed for Pain.     vitamin D 1000 units Tab  Commonly known as: VITAMIN D3            STOP taking these medications      amLODIPine 5 MG tablet  Commonly known as: NORVASC     lisinopriL 20 MG tablet  Commonly " known as: PRINIVIL,ZESTRIL     metoprolol succinate 50 MG 24 hr tablet  Commonly known as: TOPROL-XL     multivitamin Tab     nitroGLYCERIN 0.4 MG SL tablet  Commonly known as: NITROSTAT     spironolactone 25 MG tablet  Commonly known as: ALDACTONE               Where to Get Your Medications        These medications were sent to [facility] [address], [place] [address]      Hours: Mon-Fri, 8a-5:30p Phone: [phone]   amoxicillin-clavulanate 875-125mg 875-125 mg per tablet  furosemide 40 MG tablet  metOLazone 5 MG tablet

## 2022-09-28 NOTE — MEDICAL/APP STUDENT
"PSYCHIATRY INPATIENT CONSULT NOTE      9/28/2022 8:42 AM   Tong Ford   1945   [id number]           DATE OF ADMISSION: 9/19/2022 10:49 PM    S[facility]: [facility]    CURRENT LEGAL STATUS: Patient does not currently meet PEC/CEC criteria due to not currently being an imminent threat to self/others and not being gravely disabled 2/2 mental illness at this time.       HISTORY    Chief Complaint / Reason for Psychiatry Consult: "paranoia and depression"      HPI   Tong Ford is a 77 y.o. male with a past medical history of CHF, CAD, HTN, Afib, HLD, TIA, CKD3, hypothyroidism, arthritis and normocytic anemia s/p CABG, coronary angioplasty w/ stent and pacemaker and no past psychiatric history currently being treated by their inpatient primary treatment team for a principal problem of acute on chronic diastolic congestive heart failure.  Psychiatry was originally consulted as noted above. he patient was seen and examined.  The chart was reviewed.  On examination today, the patient was oriented to person, place, time and situation. Per primary team, this AM pt was refusing to keep his oxygen on (Bipap/HFNC) and stated that he doesn't want it even though he knows he could die without it. Upon speaking with patient this AM, he said he couldn't remember specific details about the events of this morning. He endorses having recent problems with memory and periods of confusion prior to hospitalization. Patient stated that he felt like the oxygen wasn't helping his situation, which was why he did not want it. He felt like the hospital's resources were wasted on him and that they should focus on just trying to get him to feel better instead of trying to "fix" him. He also expressed frustration that he was told that if he "didn't put on his oxygen he will die" and thought that this statement was oversimplifying his situation. Patient stated he didn't refuse his oxygen because he wanted to die as his goal was to move on from " "this hospital and be able to get back to normal life. Prior to hospitalization, pt denies any psychiatric history, use of psychiatric meds and psychiatric hospitalizations. Today, he states that his mood is fine. Endorses some irritability and fatigue related to health condition, but states that he does not feel like he is depressed. Pt denies changes in sleep and appetite. Denies any feelings of excessive guilt, hopelessness or worthlessness. Patient denies any current/recent passive/active SI/HI. Pt stated that a couple weeks ago he had a short period of unexplained anxiety and uneasiness, but could not identify the cause. He describes himself as a generally "laid-back" person and normally doesn't have feelings of anxiety. Patient denies any AH, VH, TH and symptoms of amna/hypomania. Denies any use of recreational drugs. Consumed alcohol socially/occasionally. Patient denies any adverse effects to their current medication regimen. NAD was observed during the examination.      Collateral:  n/a      Psychiatric Review Of Systems - Currently, the patient is endorsing and/or denying the following:    Symptoms of Depression: diminished mood or loss of interest/anhedonia; irritability, diminished energy, change in sleep, change in appetite, diminished concentration or cognition or indecisiveness, PMA/R, excessive guilt or hopelessness or worthlessness, suicidal ideations    Sleep: initiation, maintenance, early morning awakening with inability to return to sleep    Suicidal/Homicidal ideations: active/passive ideations, organized plans, future intentions    Symptoms of psychosis: hallucinations, delusions, disorganized thinking, disorganized behavior or abnormal motor behavior, or negative symptoms (diminshed emotional expression, avolition, anhedonia, alogia, asociality     Symptoms of amna or hypomania: elevated, expansive, or irritable mood with increased energy or activity; with inflated self-esteem or " grandiosity, decreased need for sleep, increased rate of speech, FOI or racing thoughts, distractibility, increased goal directed activity or PMA, risky/disinhibited behavior    Symptoms of ELLIOTT: excessive anxiety/worry/fear (see HPI), more days than not, about numerous issues, difficult to control, with restlessness, fatigue, poor concentration, irritability, muscle tension, sleep disturbance; causes functionally impairing distress     Symptoms of Panic Disorder: recurrent panic attacks, precipitated or un-precipitated, source of worry and/or behavioral changes secondary; with or without agoraphobia    Symptoms of PTSD: h/o trauma; re-experiencing/intrusive symptoms, avoidant behavior, negative alterations in cognition or mood, or hyperarousal symptoms; with or without dissociative symptoms     Symptoms of OCD: obsessions or compulsions     Symptoms of Eating Disorders: anorexia, bulimia or binging    Substance Use: intoxication, withdrawal, tolerance, used in larger amounts or duration than intended, unsuccessful attempts to limit or quit, increased time engaging in or seeking out, cravings or strong desire to use, failure to fulfill obligations, negative consequences in social/interpersonal/occupational,/recreational areas, use in dangerous situations, medical or psychological consequences       Psychiatric Review Of Systems - Is patient experiencing or having changes in:  sleep: no  appetite: no  weight: no  energy/anergy: no  interest/pleasure/anhedonia: no  somatic symptoms: no  libido: no  guilty/hopelessness: no  concentration: no  S.I.B.s/risky behavior: no  SI/SA:  no    anxiety/panic: no  Agoraphobia:  no  Social phobia:  no  Recurrent nightmares:  no  hyper startle response:  no  Avoidance: no  Recurrent thoughts:  no  Recurrent behaviors:  no    Irritability: no  Racing thoughts: no  Impulsive behaviors: no  Pressured speech:  no    Paranoia:no  Delusions: no  AVH:no      PSYCHOTHERAPY ADD-ON +[id number]   30  (16-37*) minutes    Time: *** minutes  Participants: Met with patient    Therapeutic Intervention Type: behavior modifying psychotherapy, supportive psychotherapy  Why chosen therapy is appropriate versus another modality: relevant to diagnosis, patient responds to this modality, evidence based practice    Target symptoms: {PSY TARGET SYMPTOMS:79732}  Primary focus: ***  Psychotherapeutic techniques: supportive and psychodynamic techniques; psycho-education; deep breathing exercises; CBT; problem solving techniques and managing life stressors    Outcome monitoring methods: self-report, observation    Patient's response to intervention:  The patient's response to intervention is {response:96726}.    Progress toward goals:  The patient's progress toward goals is {progress:39376}.      ROS  General ROS: negative for - chills, fatigue, fever or night sweats  Ophthalmic ROS: negative for - blurry vision, double vision or eye pain  ENT ROS: negative for - sinus pain, headaches, sore throat or visual changes  Allergy and Immunology ROS: negative for - hives, itchy/watery eyes or nasal congestion  Hematological and Lymphatic ROS: negative for - bleeding problems, bruising, jaundice or pallor  Endocrine ROS: negative for - galactorrhea, hot flashes, mood swings, palpitations or temperature intolerance  Respiratory ROS: negative for - cough, hemoptysis, shortness of breath, tachypnea or wheezing  Cardiovascular ROS: negative for - chest pain, dyspnea on exertion, loss of consciousness, palpitations, rapid heart rate or shortness of breath  Gastrointestinal ROS: negative for - appetite loss, nausea, abdominal pain, blood in stools, change in bowel habits, constipation or diarrhea  Genito-Urinary ROS: negative for - incontinence, nocturia or pelvic pain  Musculoskeletal ROS: negative for - joint stiffness, joint swelling, joint pain or muscle pain   Neurological ROS: negative for - behavioral changes, confusion, dizziness,  memory loss, numbness/tingling or seizures  Dermatological ROS: negative for dry skin, hair changes, pruritus or rash  Psychiatric ROS: see detailed psychiatric ROS above in history section       Past Psychiatric History:  Previous Medication Trials: denies  Previous Psychiatric Hospitalizations: denies  Previous Suicide Attempts: denies  History of Violence: denies  Outpatient Psychiatrist: denies    Social History:  Marital Status:   Children: 2   Employment Status/Info: retired  Education: PhD in [employer] science  Special Ed: no  : no  Confucianism: yes  Housing Status: yes, lives in [place] with daughter and her  and children  Hobbies/Leisure time: yes, reading and watching Christian TV shows  History of phys/sexual abuse: no  Access to gun: yes,    Family Psychiatric History: unknown    Substance Abuse History:  Recreational Drugs: denies  Use of Alcohol: occasional, social use  Rehab History:denies  Tobacco Use:denies  Use of Caffeine: unknown  Use of OTC: denies  Legal consequences of chemical use: no    Legal History:  Past Charges/Incarcerations: denies   Pending charges: denies      Psychosocial Stressors: health.   Functioning Relationships: good support system through family and through his Taoist  Strengths AND Liabilities  Strength: Patient is intelligent., Liability: Patient has poor health.      PAST MEDICAL & SURGICAL HISTORY   Past Medical History:   Diagnosis Date    Arthritis     Atrial fibrillation     Carotid artery occlusion     bilateral    Cataract     CHF (congestive heart failure)     CKD (chronic kidney disease), stage II     Coronary artery disease     3 Vessel CABG AND 3 STENTS    Encounter for blood transfusion     Hypercholesterolemia     Hypertension     Normocytic anemia     Shingles     X 3 WEEKS AGO    Sleep apnea     CESAR - NOT USING C-PAP    Thyroid disease     TIA (transient ischemic attack)      Past Surgical History:   Procedure Laterality Date    A-V  CARDIAC PACEMAKER INSERTION N/A 4/1/2021    Procedure: INSERTION, CARDIAC PACEMAKER, DUAL CHAMBER;  Surgeon: Clifford Sevilla MD;  Location: [facility] CATH/EP LAB;  Service: Cardiology;  Laterality: N/A;  MEDTRONIC    CORONARY ANGIOPLASTY WITH STENT PLACEMENT      CORONARY ARTERY BYPASS GRAFT      CORONARY ARTERY BYPASS GRAFT (CABG)      3 vessel    EYE SURGERY      bILATERAL CATARACS    INSERTION OF PACEMAKER      REVISION OF IMPLANTABLE CARDIOVERTER-DEFIBRILLATOR (ICD) ELECTRODE LEAD PLACEMENT Left 7/1/2021    Procedure: REVISION, INSERTION, ELECTRODE LEAD,pacemaker;  Surgeon: Clifford Sevilla MD;  Location: [facility] CATH/EP LAB;  Service: Cardiology;  Laterality: Left;    ROBOT-ASSISTED LAPAROSCOPIC REPAIR OF INGUINAL HERNIA Right 3/11/2022    Procedure: ROBOTIC REPAIR, HERNIA, INGUINAL;  Surgeon: Melo Aguilera III, MD;  Location: [facility] OR;  Service: General;  Laterality: Right;  LB case    TREATMENT OF CARDIAC ARRHYTHMIA N/A 1/29/2021    Procedure: CARDIOVERSION;  Surgeon: Iron Serna MD;  Location: [facility] CATH/EP LAB;  Service: Cardiology;  Laterality: N/A;    VASECTOMY         NEUROLOGIC HISTORY  Seizures: denies  Head trauma: denies    FAMILY HISTORY   Family History   Problem Relation Age of Onset    Cancer Mother     Cancer Father     Hypertension Father     Cancer Maternal Grandmother     COPD Paternal Grandmother        ALLERGIES   Review of patient's allergies indicates:  No Known Allergies    CURRENT MEDICATION REGIMEN   Home Meds:   Prior to Admission medications    Medication Sig Start Date End Date Taking? Authorizing Provider   amiodarone (PACERONE) 200 MG Tab Take 1 tablet (200 mg total) by mouth once daily. 8/22/22   Jessica Laureano PA-C   amLODIPine (NORVASC) 5 MG tablet Take 1 tablet (5 mg total) by mouth every evening. 12/14/21 12/14/22  Jessica Laureano PA-C   apixaban (ELIQUIS) 5 mg Tab Take 1 tablet (5 mg total) by mouth 2 (two) times daily. 10/4/21   Esther Roe NP   ascorbic  acid, vitamin C, (VITAMIN C) 1000 MG tablet Take 1,000 mg by mouth once daily.    Historical Provider   atorvastatin (LIPITOR) 40 MG tablet Take 1 tablet (40 mg total) by mouth once daily. 2/28/22   Jessica Laureano PA-C   coQ10, ubiquinol, 100 mg Cap Take 150 mg by mouth once daily.     Historical Provider   cyanocobalamin 1,000 mcg/mL injection Inject 1 mL (1,000 mcg total) into the muscle every 30 days. 8/23/22   Kris Zavala MD   doxazosin (CARDURA) 8 MG Tab TAKE 1 TABLET DAILY 6/14/22   Clifford Sevilla MD   ezetimibe (ZETIA) 10 mg tablet TAKE 1 TABLET DAILY 6/14/22   Clifford Sevilla MD   furosemide (LASIX) 40 MG tablet Take 1 tablet (40 mg total) by mouth once daily. 6/20/22   Taiwo Rincon MD   gabapentin (NEURONTIN) 600 MG tablet Take 1 tablet (600 mg total) by mouth 2 (two) times daily. 8/8/22 8/8/23  Kris Zavala MD   garlic 1,000 mg Cap Take 1 capsule by mouth once daily.     Historical Provider   ketoconazole (NIZORAL) 2 % cream Apply topically once daily. 8/30/22 10/11/22  Adolfo Allen MD   levothyroxine (SYNTHROID) 100 MCG tablet Take 1 tablet (100 mcg total) by mouth before breakfast. 8/7/22 8/7/23  Clifford Sevilla MD   LIDOcaine (LIDODERM) 5 % Place 1 patch onto the skin once daily. Remove & Discard patch within 12 hours or as directed by MD 8/8/22   Kris Zavala MD   lisinopriL (PRINIVIL,ZESTRIL) 20 MG tablet Take 1 tablet (20 mg total) by mouth 2 (two) times a day. 9/13/21 9/15/22  Clifford Sevilla MD   metoprolol succinate (TOPROL-XL) 50 MG 24 hr tablet Take 1 tablet (50 mg total) by mouth once daily. 10/29/21 10/29/22  Esther Roe, JOSE D   multivitamin Tab Take 1 tablet by mouth once daily.  Patient not taking: No sig reported 8/20/20   Shira Salguero, AUDI   nitroGLYCERIN (NITROSTAT) 0.4 MG SL tablet Place 1 tablet (0.4 mg total) under the tongue every 5 (five) minutes as needed for Chest pain. DO NOT CRUSH OR CHEW; DISSOLVE UNDER TONGUE; MAXIMUM OF 3  "DOSES IN 15 MINUTES 3/2/21   Kris Zavala MD   spironolactone (ALDACTONE) 25 MG tablet Take 1 tablet (25 mg total) by mouth every other day. 8/23/22 8/23/23  Kris Zavala MD   syringe-needle,safety,disp unt 3 mL 22 gauge x 1 1/2" Syrg Please syringe to administer medication deep IM 8/25/22   Kris Zavala MD   traMADoL (ULTRAM) 50 mg tablet Take 1 tablet (50 mg total) by mouth every 6 (six) hours as needed for Pain. 9/7/22   Kris Zavala MD   vitamin D (VITAMIN D3) 1000 units Tab Take 1,000 Units by mouth once daily.    Historical Provider       OTC Meds: none    Scheduled Meds:    amiodarone  200 mg Oral Daily    atorvastatin  40 mg Oral Daily    ceFAZolin (ANCEF) IVPB  2 g Intravenous Q12H    famotidine  20 mg Oral Daily    furosemide (LASIX) injection  60 mg Intravenous Q8H    gabapentin  300 mg Oral BID    levothyroxine  100 mcg Oral Before breakfast    oxymetazoline  2 spray Each Nostril BID    polyethylene glycol  17 g Oral Daily    psyllium husk (with sugar)  1 packet Oral Daily      PRN Meds: acetaminophen, albuterol sulfate, bisacodyL, melatonin, ondansetron, oxyCODONE-acetaminophen, sodium chloride 0.9%, sodium chloride 0.9%   Psychotherapeutics (From admission, onward)      None            LABORATORY DATA   Recent Results (from the past 72 hour(s))   Basic metabolic panel    Collection Time: 09/25/22 12:32 PM   Result Value Ref Range    Sodium 146 (H) 136 - 145 mmol/L    Potassium 3.6 3.5 - 5.1 mmol/L    Chloride 94 (L) 95 - 110 mmol/L    CO2 37 (H) 23 - 29 mmol/L    Glucose 105 70 - 110 mg/dL    BUN 82 (H) 8 - 23 mg/dL    Creatinine 2.6 (H) 0.5 - 1.4 mg/dL    Calcium 9.1 8.7 - 10.5 mg/dL    Anion Gap 15 8 - 16 mmol/L    eGFR 24 (A) >60 mL/min/1.73 m^2   Magnesium    Collection Time: 09/25/22 12:32 PM   Result Value Ref Range    Magnesium 2.5 1.6 - 2.6 mg/dL   Basic metabolic panel    Collection Time: 09/26/22  3:01 AM   Result Value Ref Range    Sodium 145 136 - 145 mmol/L    Potassium 3.6 3.5 " - 5.1 mmol/L    Chloride 95 95 - 110 mmol/L    CO2 38 (H) 23 - 29 mmol/L    Glucose 115 (H) 70 - 110 mg/dL    BUN 88 (H) 8 - 23 mg/dL    Creatinine 2.5 (H) 0.5 - 1.4 mg/dL    Calcium 8.5 (L) 8.7 - 10.5 mg/dL    Anion Gap 12 8 - 16 mmol/L    eGFR 25 (A) >60 mL/min/1.73 m^2   Magnesium    Collection Time: 09/26/22  3:01 AM   Result Value Ref Range    Magnesium 2.6 1.6 - 2.6 mg/dL   Phosphorus    Collection Time: 09/26/22  3:01 AM   Result Value Ref Range    Phosphorus 3.9 2.7 - 4.5 mg/dL   CBC auto differential    Collection Time: 09/26/22  3:01 AM   Result Value Ref Range    WBC 11.30 3.90 - 12.70 K/uL    RBC 2.94 (L) 4.60 - 6.20 M/uL    Hemoglobin 9.5 (L) 14.0 - 18.0 g/dL    Hematocrit 27.5 (L) 40.0 - 54.0 %    MCV 94 82 - 98 fL    MCH 32.3 (H) 27.0 - 31.0 pg    MCHC 34.5 32.0 - 36.0 g/dL    RDW 15.4 (H) 11.5 - 14.5 %    Platelets 285 150 - 450 K/uL    MPV 10.6 9.2 - 12.9 fL    Immature Granulocytes 0.8 (H) 0.0 - 0.5 %    Gran # (ANC) 9.2 (H) 1.8 - 7.7 K/uL    Immature Grans (Abs) 0.09 (H) 0.00 - 0.04 K/uL    Lymph # 1.1 1.0 - 4.8 K/uL    Mono # 0.8 0.3 - 1.0 K/uL    Eos # 0.1 0.0 - 0.5 K/uL    Baso # 0.02 0.00 - 0.20 K/uL    nRBC 0 0 /100 WBC    Gran % 81.4 (H) 38.0 - 73.0 %    Lymph % 9.6 (L) 18.0 - 48.0 %    Mono % 7.3 4.0 - 15.0 %    Eosinophil % 0.7 0.0 - 8.0 %    Basophil % 0.2 0.0 - 1.9 %    Differential Method Automated    Vancomycin, random    Collection Time: 09/26/22  3:01 AM   Result Value Ref Range    Vancomycin, Random 18.9 Not established ug/mL   Basic metabolic panel    Collection Time: 09/27/22  4:45 AM   Result Value Ref Range    Sodium 145 136 - 145 mmol/L    Potassium 3.4 (L) 3.5 - 5.1 mmol/L    Chloride 94 (L) 95 - 110 mmol/L    CO2 39 (H) 23 - 29 mmol/L    Glucose 113 (H) 70 - 110 mg/dL     (H) 8 - 23 mg/dL    Creatinine 3.0 (H) 0.5 - 1.4 mg/dL    Calcium 8.5 (L) 8.7 - 10.5 mg/dL    Anion Gap 12 8 - 16 mmol/L    eGFR 20 (A) >60 mL/min/1.73 m^2   Magnesium    Collection Time: 09/27/22   "4:45 AM   Result Value Ref Range    Magnesium 2.8 (H) 1.6 - 2.6 mg/dL   Phosphorus    Collection Time: 09/27/22  4:45 AM   Result Value Ref Range    Phosphorus 4.6 (H) 2.7 - 4.5 mg/dL   CBC auto differential    Collection Time: 09/27/22  4:45 AM   Result Value Ref Range    WBC 14.06 (H) 3.90 - 12.70 K/uL    RBC 2.86 (L) 4.60 - 6.20 M/uL    Hemoglobin 9.3 (L) 14.0 - 18.0 g/dL    Hematocrit 27.0 (L) 40.0 - 54.0 %    MCV 94 82 - 98 fL    MCH 32.5 (H) 27.0 - 31.0 pg    MCHC 34.4 32.0 - 36.0 g/dL    RDW 15.2 (H) 11.5 - 14.5 %    Platelets 311 150 - 450 K/uL    MPV 10.5 9.2 - 12.9 fL    Immature Granulocytes 0.4 0.0 - 0.5 %    Gran # (ANC) 11.1 (H) 1.8 - 7.7 K/uL    Immature Grans (Abs) 0.06 (H) 0.00 - 0.04 K/uL    Lymph # 1.8 1.0 - 4.8 K/uL    Mono # 0.8 0.3 - 1.0 K/uL    Eos # 0.2 0.0 - 0.5 K/uL    Baso # 0.04 0.00 - 0.20 K/uL    nRBC 0 0 /100 WBC    Gran % 78.9 (H) 38.0 - 73.0 %    Lymph % 12.9 (L) 18.0 - 48.0 %    Mono % 5.9 4.0 - 15.0 %    Eosinophil % 1.6 0.0 - 8.0 %    Basophil % 0.3 0.0 - 1.9 %    Differential Method Automated       No results found for: PHENYTOIN, PHENOBARB, VALPROATE, CBMZ      EXAMINATION    VITALS   Vitals:    09/28/22 0458 09/28/22 0504 09/28/22 0728 09/28/22 0738   BP: (!) 141/63   (!) 124/59   BP Location:    Right arm   Patient Position: Lying   Lying   Pulse: 65  63 60   Resp: 20   18   Temp: 96.5 °F (35.8 °C)   96.3 °F (35.7 °C)   TempSrc: Oral   Axillary   SpO2: 97%  (!) 90% (!) 89%   Weight:  82 kg (180 lb 12.4 oz)     Height:            CONSTITUTIONAL  General Appearance: NAD, lying in bed    MUSCULOSKELETAL  Muscle Strength and Tone: decreased strength in bilateral lower extremities  Abnormal Involuntary Movements: none observed   Gait and Station: unable to assess    PSYCHIATRIC   Behavior/Cooperation:  normal, cooperative  Speech:  normal tone, normal pitch, normal volume, slowed rate of speech  Language: grossly intact with spontaneous speech  Mood: pt said he was "fine"  Affect:  " congruent with mood and appropriate to situation/content Normal  Associations:  intact; no BRAD  Thought Process: normal and logical  Thought Content: normal, no suicidality, no homicidality, delusions, or paranoia  Sensorium:  Awake  Alert and Oriented: to person, place, situation, time/date, month of year, year  Memory: 3/3 immediate, 0/3 at 5 minutes    Recent:   Intact; able to report recent events   Remote:  Impaired ; Named 1/4 past presidents   Attention/concentration: appropriate for age/education. Able to spell [id number], unable to spell backwards [id number]  Similarities: Intact; (difference between apple and orange?)  Abstract reasoning: Intact  Insight:   Intact  Judgment: Intact    CAM ICU Delirium Assessment -  NEGATIVE      Is the patient aware of the biomedical complications associated with substance abuse and mental illness? yes    Does the patient have an Advance Directive for Mental Health treatment? no  (If yes, inform patient to bring copy.)      MEDICAL DECISION MAKING    ASSESSMENT      ***     RECOMMENDATIONS       - patient does not currently meet PEC/CEC criteria due to not being an imminent threat to self/others and not being gravely disabled 2/2 mental illness.      - Begin *** (discussed risks/benefits/alt vs no treatment with patient)     - Psychotherapy was performed with patient as noted above      - Please have CM/SW assist patient with outpatient mental health f/u for s/p discharge from this facility      - Patient was instructed to call 911 and return to the nearest ED if he/she begins feeling suicidal, homicidal, or gravely disabled      - Thank you for this consult ; will continue to follow patient         Time spent with patient and/or on unit managing/coordinating patient's care (excluding the time spent on psychotherapy): 70 minutes      More than 50% of the time was spent counseling/coordinating care      STAFF:  Gardenia Gar, MS3  [employer]  9/28/2022

## 2022-09-28 NOTE — NURSING
Pt remained AAOx4 with VSS and safety precautions maintained. Oxygen therapy with high flow nasal cannula tolerated well. Pt and family educated on POC to d/c home with hospice. Pt and family verbalized understanding. Pt c/o toe pain that is controlled by oral analgesics. Oral intake was moderate with no acute distress noted. Will continue with POC.

## 2022-09-28 NOTE — PT/OT/SLP PROGRESS
Occupational Therapy   Treatment    Name: Tong Ford  MRN: [id number]  Admitting Diagnosis:  Acute on chronic diastolic congestive heart failure       Recommendations:     Discharge Recommendations: other (see comments) (per chart home hospice)  Discharge Equipment Recommendations:  other (see comments) (per hospice)  Barriers to discharge:  Other (Comment) (Pt requires increased level of assistance)    Assessment:     Tong Ford is a 77 y.o. male with a medical diagnosis of Acute on chronic diastolic congestive heart failure.  He presents with The primary encounter diagnosis was Acute on chronic diastolic congestive heart failure. Diagnoses of CHF (congestive heart failure), Elevated troponin, PAD (peripheral artery disease), Volume overload state of heart, Neuropathy, peripheral, idiopathic, Staphylococcal pneumonia, and CESAR (obstructive sleep apnea) were also pertinent to this visit. Performance deficits affecting function are weakness, impaired functional mobility, gait instability, impaired endurance, decreased coordination, decreased upper extremity function, decreased lower extremity function, impaired self care skills, impaired balance, decreased ROM, impaired joint extensibility, impaired cognition, decreased safety awareness, impaired coordination, impaired cardiopulmonary response to activity, impaired fine motor, impaired muscle length.     Pt with limited progression towards goals this date. Pt requires TotalA x2 for all bed mobility & TotalA to maintain static sitting balance EOB. Daughter stating that pt is to d/c home with hospital bed & all further DME from hospice.     Rehab Prognosis:  Poor; patient would benefit from acute skilled OT services to address these deficits and reach maximum level of function.       Plan:     Patient to be seen 3 x/week to address the above listed problems via therapeutic exercises, therapeutic activities, self-care/home management  Plan of Care Expires:  10/20/22  Plan of Care Reviewed with: patient, daughter    Subjective     Pain/Comfort:  Pain Rating 1: 0/10    Objective:     Communicated with: nsg prior to session.  Patient found HOB elevated with bed alarm, oxygen, pressure relief boots upon OT entry to room.    General Precautions: Standard, fall, respiratory   Orthopedic Precautions:N/A   Braces: N/A  Respiratory Status: High flow NC     Occupational Performance:     Bed Mobility:    Patient completed Scooting/Bridging with total assistance and 2 persons  Patient completed Supine to Sit with total assistance and 2 persons  Patient completed Sit to Supine with total assistance and 2 persons     Functional Mobility/Transfers:  NT    [facility] 6 Click ADL: 9    Treatment & Education:  Pt with limited progression towards goals this date.   Pt requires TotalA x2 for all bed mobility.  O2 89-90% per telemetry bunker.   Pt requires TotalA to maintain static sitting balance EOB as well as to support head/neck. Pt keeping neck in forward flexed position.   Daughter stating that pt is to d/c home with hospital bed & all further DME from hospice.     Patient left HOB elevated with all lines intact, call button in reach, bed alarm on, nsg notified, family present, and Pressure relief boots on    GOALS:   Multidisciplinary Problems       Occupational Therapy Goals          Problem: Occupational Therapy    Goal Priority Disciplines Outcome Interventions   Occupational Therapy Goal     OT, PT/OT Ongoing, Progressing    Description: Goals to be met by: 10/20/2022     Patient will increase functional independence with ADLs by performing:    UE Dressing with Modified [place].  LE Dressing with Modified [place].  Grooming while standing at sink with Modified [place].  Toileting from toilet with Modified [place] for hygiene and clothing management.   Supine to sit with Modified [place].  Step transfer with Modified [place]  Toilet transfer to toilet  with Modified [place].  Increased functional strength to WFL for ADLS.  Upper extremity exercise program x10 reps per handout, with independence.                         Time Tracking:     OT Date of Treatment: 09/28/22  OT Start Time: 1310  OT Stop Time: 1342  OT Total Time (min): 32 min    Billable Minutes:Therapeutic Activity 32 with PT    OT/CLARITZA: OT     CLARITZA Visit Number: 0    9/28/2022

## 2022-09-29 NOTE — PROGRESS NOTES
IP Liaison - Final Visit Note    Patient: Tong Ford  MRN:  [id number]  Date of Service:  9/29/2022  Completed by:  GILLIAN Jones    Reason for Visit   Patient presents with    IP Liaison Chart Review     Pt discharged home with [facility].    Patient Summary     Discharge Date: 9/28/2022  Discharge telephone number/address: [phone] / [address] [place] [address]  Follow up provider: Clifford Sevilla MD  Follow up appointments: 10/11/2022 @ 8:40am  Home Health agency & telephone number: n/a  DME ordered &  name: n/a  Assigned OPCM RN/SW: n/a  Report sent to follow up team (PCP/OPCM) via in basket message: n/a  Community Resources arranged including agency name & contact info: n/a      GILLIAN Jones

## 2022-09-29 NOTE — PT/OT/SLP PROGRESS
Physical Therapy Treatment/Discharge    Patient Name:  Tong Ford   MRN:  [id number]    Recommendations:     Discharge Recommendations:   (per chart home hospice)   Discharge Equipment Recommendations:  (per hospice)   Barriers to discharge:  pt requires increased level of assistance    Assessment:     Tong Ford is a 77 y.o. male admitted with a medical diagnosis of Acute on chronic diastolic congestive heart failure.  He presents with the following impairments/functional limitations:  weakness, impaired endurance, impaired self care skills, impaired functional mobility, gait instability, impaired balance, decreased coordination, decreased upper extremity function, decreased lower extremity function, impaired fine motor, impaired muscle length, impaired cognition, decreased safety awareness, impaired coordination, impaired cardiopulmonary response to activity Patient's progress limited; requires total A x 2 for transitioning to EOB and total A to maintain static sitting balance at EOB; dtr reports that the pt is discharging home with hospital bed and all other equipment needed will be provided by hospice..    Rehab Prognosis: Poor; patient would benefit from acute skilled PT services to address these deficits and reach maximum level of function.    Recent Surgery: * No surgery found *      Plan:     During this hospitalization, patient to be seen 5 x/week to address the identified rehab impairments via gait training, therapeutic activities, therapeutic exercises, neuromuscular re-education and progress toward the following goals:    Plan of Care Expires:  10/14/22    Subjective     Pain/Comfort:         Objective:     Communicated with nurse prior to session.  Patient found HOB elevated with bed alarm, oxygen, pressure relief boots upon PT entry to room.     General Precautions: Standard, fall, respiratory   Orthopedic Precautions:N/A   Braces:  n/a  Respiratory Status: Hi flow NC   Functional Mobility:  Bed  Mobility:     Scooting: total assistance and of 2 persons  Supine to Sit: total assistance and of 2 persons  Sit to Supine: total assistance and of 2 persons      AM-PAC 6 CLICK MOBILITY   Total Score: 8       Therapeutic Activities and Exercises:   Patient required total A x 2 for all bed mobility; O2 sats 89-90% per telemetry bunker; pt required totalA to maintain midline static sitting balance EOB including support for head/neck; pt kept head down and neck flexed; assisted pt with holding head erect and performed limited number of LE stretches for LEs while sitting- hamstrings/gastroc to pt's comfort; returned to supine with HOB elevated and positioned with pillows for skin protection; dtr stated that pt is to d/c home with hospital bed and all further DME to be provided by hospice.    Patient left HOB elevated with all lines intact, call button in reach, bed alarm on, nurse notified, and family present; pressure relief boots on.    GOALS:   Multidisciplinary Problems       Physical Therapy Goals          Problem: Physical Therapy    Goal Priority Disciplines Outcome Goal Variances Interventions   Physical Therapy Goal     PT, PT/OT Ongoing, Progressing     Description: Goals to be met by: 10/20/22     Patient will increase functional independence with mobility by performin. Supine to sit with Stand-by Assistance  2. Sit to supine with Stand-by Assistance  3. Sit to stand transfer with Stand-by Assistance  4. Bed to chair transfer with Stand-by Assistance using Rolling Walker  5. Gait  x 50 feet with Stand-by Assistance using Rolling Walker.                          Time Tracking:     PT Received On: 22  PT Start Time: 1310     PT Stop Time: 1342  PT Total Time (min): 32 min   Overlap with OTdue to complex nature of patient and for safety with mobility to decrease fall risk for patient and caregiver injury requiring two skilled therapists to provide different interventions.    Billable Minutes:  Therapeutic Activity 32 with OT       PT/PTA: PT     PTA Visit Number: 0     09/29/2022

## 2022-10-06 ENCOUNTER — TELEPHONE (OUTPATIENT)
Dept: CARDIOLOGY | Facility: CLINIC | Age: 77
End: 2022-10-06
Payer: MEDICARE

## 2022-10-06 ENCOUNTER — OUTPATIENT CASE MANAGEMENT (OUTPATIENT)
Dept: ADMINISTRATIVE | Facility: OTHER | Age: 77
End: 2022-10-06
Payer: MEDICARE

## 2022-10-06 NOTE — TELEPHONE ENCOUNTER
----- Message from Mami Almanzar RN sent at 10/6/2022  2:37 PM CDT -----  Just wanted to update you-  I called and spoke to Rebeca at [facility] at [phone]. Patient was admitted to their services on 09/28/22. He passed away on 09/29/22.     Mami Almanzar RN [facility]   Outpatient Complex Care Management   [phone]

## 2022-10-06 NOTE — PROGRESS NOTES
10/06/22-Called and spoke to Rebeca at [facility] at [phone]. Patient was admitted to their services on 09/28/22. He passed away on 09/29/22. Will update his physicians Dr. Kris Zavala and Dr. Clifford Sevilla and Orquidea Small, OT at [facility].

## 2022-10-18 NOTE — PLAN OF CARE
09/28/22 1124   Post-Acute Status   Post-Acute Authorization Placement   Post-Acute Placement Status Pending medical clearance/testing  (Pt to be transferred to ICU for continous BIPAP. Not medically ready for DC to LTAC. NS LTAC following for stability if needed at DC.)      Left voicemail for patient to call us. We received paper work for pre op clearance but patient has voiced that she sees Shasta Mora in the past.  Left message for patient to verify. Did fax to [facility] office due to the time constraint.

## 2022-10-31 NOTE — TELEPHONE ENCOUNTER
Reason for Disposition   [1] Difficulty breathing with exertion (e.g., walking) AND [2] new-onset or worsening    Additional Information   Negative: SEVERE difficulty breathing (e.g., struggling for each breath, speaks in single words)   Negative: Looks like a broken bone or dislocated joint (e.g., crooked or deformed)   Negative: Sounds like a life-threatening emergency to the triager   Negative: Chest pain   Negative: Difficulty breathing at rest   Negative: Entire foot is cool or blue in comparison to other side   Negative: [1] Can't walk or can barely walk AND [2] new-onset    Protocols used: LEG SWELLING AND EDEMA-A-PAUL    Tong called to say he is having leg swelling since he was cut back to 40 mg daily of his lasix, which he takes in two doses rather than all at once.  Swelling got much worse the last 4-5 days.  He said that he was in the hospital recently for JAYSON.  Also has shingles, but states he is not on any medication for this now.  Shingles is on upper right inner thigh, and pain exquisite to that area in particular.  Leg swelling extends to upper thighs and groin on both legs. Extremely painful, 8 of 10.  History of CVA, TIA, HF, PAF, CAD, pacemaker in situ, CKD.  Somewhat SOB with any exertion.  Painful to walk.  Per [facility] triage protocol, recommend he go to ED now.  He said his family is all working today, so he will call [employer] in [place] for this.  Provided him with the number to [employer] company in [place].  Message to Kris Zavala MD, pcp, and Dr Clifford Sevilla, his cardiologist.  Please contact caller directly with any additional care advice.     [Verbal] : Verbal [Patient] : Patient [Good - alert, interested, motivated] : Good - alert, interested, motivated [Verbalizes knowledge/Understanding] : Verbalizes knowledge/understanding [Dressing changes] : dressing changes [Signs and symptoms of infection] : sign and symptoms of infection [How and When to Call] : how and when to call [Patient responsibility to plan of care] : patient responsibility to plan of care [Stable] : stable [Home] : Home [Ambulatory] : Ambulatory [Not Applicable - Long Term Care/Home Health Agency] : Long Term Care/Home Health Agency: Not Applicable [] : No [FreeTextEntry2] : infection prevention\par glucose control \par maintain pain free  [FreeTextEntry4] : Sutures remain CDI\par no s/s infectio \par PICC continues \par Angio slated 11/1/22\par f/u one week

## 2022-12-15 NOTE — PLAN OF CARE
Plan of care reviewed with patient. Patient verbalized complete understanding. Pt awake, alert, and oriented. Pt not complaining of pain. Pt on tele. Pt able to walk in hospital room, pt not complaining of dizziness. All fall precautions maintained, bed in lowest position, locked, call light within reach. Side rails up times 2. Slip resistant socks maintained Discharge instructions explained verbalizes understanding   Yes...

## 2023-08-16 NOTE — H&P
"[facility] Medicine  History & Physical    Patient Name: Tong Ford  MRN: [id number]  Patient Class: IP- Inpatient  Admission Date: 9/19/2022  Attending Physician: Adolfo Solo, *   Primary Care Provider: Kris Zavala MD         Patient information was obtained from past medical records and ER records.     Subjective:     Principal Problem:Acute exacerbation of CHF (congestive heart failure)    Chief Complaint: No chief complaint on file.       HPI: Per transfer note:  "Patient is a 77 y.o. male who has a past medical history of arthritis, HTN, AFIB, HLD, TIA, CAD, CHF, hypothyroidism, normocytic anemia and PSHx of CABG, coronary angioplasty with stent, and pacemaker presenting today for shortness of breath, weakness and swelling. ED workup significant for elevated troponin, worsening JAYSON, elevated BNP, leukocytosis, hyperkalemia, elevated CPK, hypoxia requiring O2 supplementation, chest x-ray with increased bilateral asymmetric airspace disease consistent with CHF/volume overload and possible LLL pneumonia. See below labs and imaging. Patient started on lasix drip, given lokelma, and IV Rocephin/Az. No ICU beds available at this facility therefore pt will need to transfer for higher level of care. Stable for transfer."    On arrival to [place], the patient was continued on lasix drip. He is sleeping and unable to answer any questions. Per chart review, he endorsed orthopnea, worsening GOODWIN. He was told by his cardiologist to report to the ED to be admitted for IV lasix. He stated that having the extra fluid on his legs and scrotal area made it difficult to walk and he fell at home last night on concrete, denies hitting head. He was on the floor for several hours before being found. He was taking 60mg of lasix daily and lionel, reported adequate urination. Pt also has chronic bilat LE venous statis ulcers and he follows with wound care. He had BNP 1795, troponin 1.03 with downtrend, " Duplicate   creat 3.1, K 5.2, CK 2600. a temperature of 100.2, WBC 20 at sending facility. He was given lokelma, rocephin and azithromycin, and started on lasix drip. Prior echo with normal EF. He is on 2LNC with adequate O2 sat on arrival. Legs with stasis ulcers, right leg with redness noted.           Past Medical History:   Diagnosis Date    Arthritis     Atrial fibrillation     Carotid artery occlusion     bilateral    Cataract     CHF (congestive heart failure)     CKD (chronic kidney disease), stage II     Coronary artery disease     3 Vessel CABG AND 3 STENTS    Encounter for blood transfusion     Hypercholesterolemia     Hypertension     Normocytic anemia     Shingles     X 3 WEEKS AGO    Sleep apnea     CESAR - NOT USING C-PAP    Thyroid disease     TIA (transient ischemic attack)        Past Surgical History:   Procedure Laterality Date    A-V CARDIAC PACEMAKER INSERTION N/A 4/1/2021    Procedure: INSERTION, CARDIAC PACEMAKER, DUAL CHAMBER;  Surgeon: Clifford Sevilla MD;  Location: [facility] CATH/EP LAB;  Service: Cardiology;  Laterality: N/A;  [employer]    CORONARY ANGIOPLASTY WITH STENT PLACEMENT      CORONARY ARTERY BYPASS GRAFT      CORONARY ARTERY BYPASS GRAFT (CABG)      3 vessel    EYE SURGERY      bILATERAL CATARACS    INSERTION OF PACEMAKER      REVISION OF IMPLANTABLE CARDIOVERTER-DEFIBRILLATOR (ICD) ELECTRODE LEAD PLACEMENT Left 7/1/2021    Procedure: REVISION, INSERTION, ELECTRODE LEAD,pacemaker;  Surgeon: Clifofrd Sevilla MD;  Location: [facility] CATH/EP LAB;  Service: Cardiology;  Laterality: Left;    ROBOT-ASSISTED LAPAROSCOPIC REPAIR OF INGUINAL HERNIA Right 3/11/2022    Procedure: ROBOTIC REPAIR, HERNIA, INGUINAL;  Surgeon: Melo Aguilera III, MD;  Location[facility];  Service: General;  Laterality: Right;  LB case    TREATMENT OF CARDIAC ARRHYTHMIA N/A 1/29/2021    Procedure: CARDIOVERSION;  Surgeon: Iron Serna MD;  Location: [facility] CATH/EP LAB;  Service: Cardiology;   Laterality: N/A;    VASECTOMY         Review of patient's allergies indicates:  No Known Allergies    Current Facility-Administered Medications on File Prior to Encounter   Medication    [COMPLETED] acetaminophen tablet 650 mg    [COMPLETED] albuterol sulfate nebulizer solution 10 mg    [COMPLETED] furosemide injection 40 mg    [COMPLETED] sodium zirconium cyclosilicate packet 10 g    [DISCONTINUED] aspirin chewable tablet 81 mg    [DISCONTINUED] atorvastatin tablet 40 mg    [DISCONTINUED] azithromycin 500 mg in dextrose 5 % 250 mL IVPB (ready to mix system)    [DISCONTINUED] cefTRIAXone (ROCEPHIN) 1 g/50 mL D5W IVPB    [DISCONTINUED] furosemide (LASIX) 200 mg in dextrose 5 % 100 mL continuous infusion (conc: 2 mg/mL)     Current Outpatient Medications on File Prior to Encounter   Medication Sig    amiodarone (PACERONE) 200 MG Tab Take 1 tablet (200 mg total) by mouth once daily.    amLODIPine (NORVASC) 5 MG tablet Take 1 tablet (5 mg total) by mouth every evening.    apixaban (ELIQUIS) 5 mg Tab Take 1 tablet (5 mg total) by mouth 2 (two) times daily.    ascorbic acid, vitamin C, (VITAMIN C) 1000 MG tablet Take 1,000 mg by mouth once daily.    atorvastatin (LIPITOR) 40 MG tablet Take 1 tablet (40 mg total) by mouth once daily.    coQ10, ubiquinol, 100 mg Cap Take 150 mg by mouth once daily.     cyanocobalamin 1,000 mcg/mL injection Inject 1 mL (1,000 mcg total) into the muscle every 30 days.    doxazosin (CARDURA) 8 MG Tab TAKE 1 TABLET DAILY    ezetimibe (ZETIA) 10 mg tablet TAKE 1 TABLET DAILY    furosemide (LASIX) 40 MG tablet Take 1 tablet (40 mg total) by mouth once daily.    gabapentin (NEURONTIN) 600 MG tablet Take 1 tablet (600 mg total) by mouth 2 (two) times daily.    garlic 1,000 mg Cap Take 1 capsule by mouth once daily.     ketoconazole (NIZORAL) 2 % cream Apply topically once daily.    levothyroxine (SYNTHROID) 100 MCG tablet Take 1 tablet (100 mcg total) by mouth before  "breakfast.    LIDOcaine (LIDODERM) 5 % Place 1 patch onto the skin once daily. Remove & Discard patch within 12 hours or as directed by MD    lisinopriL (PRINIVIL,ZESTRIL) 20 MG tablet Take 1 tablet (20 mg total) by mouth 2 (two) times a day.    metoprolol succinate (TOPROL-XL) 50 MG 24 hr tablet Take 1 tablet (50 mg total) by mouth once daily.    multivitamin Tab Take 1 tablet by mouth once daily. (Patient not taking: No sig reported)    nitroGLYCERIN (NITROSTAT) 0.4 MG SL tablet Place 1 tablet (0.4 mg total) under the tongue every 5 (five) minutes as needed for Chest pain. DO NOT CRUSH OR CHEW; DISSOLVE UNDER TONGUE; MAXIMUM OF 3 DOSES IN 15 MINUTES    spironolactone (ALDACTONE) 25 MG tablet Take 1 tablet (25 mg total) by mouth every other day.    syringe-needle,safety,disp unt 3 mL 22 gauge x 1 1/2" Syrg Please syringe to administer medication deep IM    traMADoL (ULTRAM) 50 mg tablet Take 1 tablet (50 mg total) by mouth every 6 (six) hours as needed for Pain.    vitamin D (VITAMIN D3) 1000 units Tab Take 1,000 Units by mouth once daily.     Family History       Problem Relation (Age of Onset)    COPD Paternal Grandmother    Cancer Mother, Father, Maternal Grandmother    Hypertension Father          Tobacco Use    Smoking status: Never    Smokeless tobacco: Never   Substance and Sexual Activity    Alcohol use: Not Currently    Drug use: No    Sexual activity: Not on file     Review of Systems   Unable to perform ROS: Other (patient sleeping and unable to stay awake to answer any questions)   Objective:     Vital Signs (Most Recent):  Temp: 100.3 °F (37.9 °C) (09/19/22 2245)  Pulse: 60 (09/20/22 0200)  Resp: 13 (09/20/22 0200)  BP: (!) 102/51 (09/20/22 0200)  SpO2: (!) 94 % (09/20/22 0200)   Vital Signs (24h Range):  Temp:  [97.5 °F (36.4 °C)-100.7 °F (38.2 °C)] 100.3 °F (37.9 °C)  Pulse:  [60-76] 60  Resp:  [13-21] 13  SpO2:  [92 %-97 %] 94 %  BP: ()/(49-59) 102/51     Weight: 96 kg (211 lb " 10.3 oz)  Body mass index is 32.18 kg/m².    Physical Exam  Vitals and nursing note reviewed.   Constitutional:       General: He is not in acute distress.     Appearance: He is obese.   HENT:      Head: Normocephalic and atraumatic.      Nose: Nose normal.      Mouth/Throat:      Mouth: Mucous membranes are moist.   Eyes:      Pupils: Pupils are equal, round, and reactive to light.   Cardiovascular:      Rate and Rhythm: Normal rate and regular rhythm.      Pulses: Normal pulses.      Heart sounds: Murmur heard.   Pulmonary:      Effort: Pulmonary effort is normal.      Breath sounds: Rales present.   Abdominal:      General: Bowel sounds are normal.      Palpations: Abdomen is soft.   Musculoskeletal:         General: Swelling present. Normal range of motion.      Cervical back: Normal range of motion.      Right lower leg: Edema present.      Left lower leg: Edema present.   Skin:     General: Skin is warm and dry.      Findings: Erythema and lesion present.   Neurological:      Motor: Weakness present.      Comments: Unable to assess   Psychiatric:      Comments: Unable to assess         CRANIAL NERVES     CN III, IV, VI   Pupils are equal, round, and reactive to light.     Significant Labs: All pertinent labs within the past 24 hours have been reviewed.    Significant Imaging: I have reviewed all pertinent imaging results/findings within the past 24 hours.    Assessment/Plan:     * Acute exacerbation of CHF (congestive heart failure)  -cont lasix drip  -monitor electrolytes  -echo pending  -consult cardiology  -fluid restriction  -hold ACE and BB for now given soft BP      Acute hypoxemic respiratory failure  In setting of volume overload and possible pneumonia  -cont supplemental O2 as needed, wean as tolerated--now on 2LNC        Sepsis  Meets sepsis criteria for WBC, temp, hypoxia  -treating for possible pneumonia and cellulitis  -cont rocephin, azithromycin, and doxy          Rhabdomyolysis  Fell and laid  on floor for hours  CPK 2607 with JAYSON  -unable to give fluids due to volume overload  -repeat CK in AM  -consult nephrology as needed      Pneumonia  Possible LLL pneumonia on imaging  Temp 100.4, WBC 20  -blood cx pending  -cont rocephin and azithromycin, add doxy  -sputum sample  -cont supplemental O2 as needed        Venous stasis ulcer  BLE with ulcers  -right leg with redness   -on rocephin, add doxy  -consult wound care      Hyperkalemia  -in setting of worsening renal failure, given lokelma in ED  -monitor electrolytes, now on lasix drip      Elevated troponin  In setting of volume overload  -troponin 1.03--0.9  -cardiology consulted  -echo pending      Coronary artery disease  Hx of CABG and stent  -no antiplatelet on home med list  -cont statin  -hold BB for low BP      Paroxysmal atrial fibrillation  -cont amiodarone and eliquis  -hold BB for now      Acute renal failure superimposed on stage 3 chronic kidney disease  -possibly cardiorenal  -diuresing  -monitor labs  -renally dose medications      Thyroid disease  -cont synthroid   -check TSH      Essential hypertension  BP soft on admission  -hold home meds for now      Obstructive sleep apnea  -CPAP at night         VTE Risk Mitigation (From admission, onward)         Ordered     apixaban tablet 5 mg  2 times daily         09/20/22 0224     IP VTE HIGH RISK PATIENT  Once         09/19/22 2343     Place sequential compression device  Until discontinued         09/19/22 2343              Critical care time spent on the evaluation and treatment of severe organ dysfunction, review of pertinent labs and imaging studies, discussions with consulting providers and discussions with patient/family: 60 minutes.     Shima Donaldson NP  Department of Hospital Medicine   [place] - Intensive Care

## 2024-01-10 NOTE — ASSESSMENT & PLAN NOTE
-initially on lasix drip; now transitioned IV pushes of 80 mg t.i.d.; add metolazone today  -monitor electrolytes  -echo with EF 55%, moderate mitral regurg, elevated CVP 15 mm Hg  -consult cardiology  -fluid restriction  -hold ACE and BB for now given soft BP  -bipap transitioned to hfnc  -switch lasix gtt to 60mg q8h ivp  -cont metolazone     No

## 2024-02-21 NOTE — ASSESSMENT & PLAN NOTE
He has elected to defer the procedure for now    The PA catheter was repositioned to the main pulmonary artery. Hemodynamics were performed. PRE

## 2024-09-12 NOTE — OP NOTE
Surgery Date: 3/11/2022     Surgeon(s) and Role:     * Melo Aguilera III, MD - Primary    Assist Yenni Machado     Pre-op Diagnosis:  Right inguinal hernia [K40.90]    Post-op Diagnosis:  Post-Op Diagnosis Codes:     * Right inguinal hernia [K40.90]    Procedure(s) (LRB):  ROBOTIC REPAIR, HERNIA, INGUINAL (Right) - rTAPP    Anesthesia: General    Operative Findings: Patient brought to the OR and transferred to the OR table in supine position. He was given general anesthesia and intubated. Abdomen was prepped and draped.  I 1st entered the abdomen using a 8 mm robotic Visiport.  A small incision was made in the left upper quadrant. Abdomen was insufflated with a Veress needle. Visiport was visualized going through the layers of the abdominal wall into the abdominal cavity. The camera port was placed 20 cm from the pubic symphysis in the upper midline.  Arm 1 was placed in the right lateral abdomen 10 cm lateral to the camera port. The robot was docked to our ports.  I then scrubbed out and went to the surgeon's console. Patient had a large right indirect inguinal hernia.  There were no incarcerated bowel within the hernia.  I started the procedure with fenestrated bipolar forceps in my left arm and scissors hooked to monopolar cord in my right arm.  I turned my attention to the right inguinal hernia.  I created a peritoneal flap 8 cm superior to the hernia defect going medial to lateral.  The medial border of the flap was the medial umbilical ligament in the lateral border was about 8 cm superior to the anterior superior iliac spine.  The monopolar scissors were used to create the flap.  I then dissected the peritoneum off the underlying transversalis muscle.  I turned my attention to the medial aspect of my dissection.  The peritoneum was dissected off the rectus.  This was carried down inferiorly The pubic symphysis was located and visualized. Deepak's ligament was identified.  Dissection was carried several  cm inferior to the pubic symphysis and then little past midline. That completed this aspect of the dissection.  I then turned my attention to the lateral aspect of my peritoneal flap.  The peritoneum was taken off the transversalis muscle inferiorly down to the plane of my medial flap dissection.  I then turned my attention to the hernia defect and cord structures at the center of the dissection. I dissected the peritoneum inferiorly toward the hernia sac. The sac was very elongated. It was slowly dissected away from the cord structures and reduced out of the hernia defect. Dissection was carried inferiorly over the cord structures and the triangle of doom staying close to the peritoneum until I met the plane of dissection of the medial and lateral plane. There was good hemostasis.  The preperitoneal flap was completed at this time.  The 16 x 12 cm Pro  mesh was placed into the abdomen.  It was deployed over the myopectineal orifice covering the hernia defect.  The medial aspect of the mesh was at the pubic symphysis.  The peritoneum was approximated with a running V lock to cover the mesh completely.  The needles were removed.   After this procedure was finished.  Instruments were removed.  The robot was undocked. Skin sites were closed with 4-0 Monocryl.  Patient was extubated and brought to recovery room stable condition.  All instrument counts were correct.  All laparotomy pad counts were correct.    complications none   Estimated Blood Loss: 1 mL    Estimated Blood Loss has been documented.         Specimens:   Specimen (24h ago, onward)            None          [id number]     FAMILY HISTORY:  Mother  Still living? No  FH: dementia, Age at diagnosis: Age Unknown

## (undated) DEVICE — DRAPE ARM DAVINCI XI

## (undated) DEVICE — SUT V-LOC 180 2-0 GS-22 9IN

## (undated) DEVICE — SEE MEDLINE ITEM 157117

## (undated) DEVICE — CLOSURE SKIN STERI STRIP 1/2X4

## (undated) DEVICE — SOL ELECTROLUBE ANTI-STIC

## (undated) DEVICE — APPLICATOR CHLORAPREP ORN 26ML

## (undated) DEVICE — UNDERGLOVE BIOGEL PI SZ 6.5 LF

## (undated) DEVICE — GLOVE SURG ULTRA TOUCH 6

## (undated) DEVICE — UNDERGLOVES BIOGEL PI SIZE 7.5

## (undated) DEVICE — GLOVE SURG ULTRA TOUCH 8

## (undated) DEVICE — SYR 50ML CATH TIP

## (undated) DEVICE — DRAPE COLUMN DAVINCI XI

## (undated) DEVICE — STYLET KIT

## (undated) DEVICE — Device

## (undated) DEVICE — COVER TIP CURVED SCISSORS XI

## (undated) DEVICE — HEMOSTAT FLOWABLE DRY 4000

## (undated) DEVICE — SOL CLEARIFY VISUALIZATION LAP

## (undated) DEVICE — NDL PNEUMO INSUFFLATI 120MM

## (undated) DEVICE — BLADE SURG CARBON STEEL SZ11

## (undated) DEVICE — OBTURATOR BLADELESS 8MM XI CLR

## (undated) DEVICE — SUT 4/0 18IN COATED VICRYL

## (undated) DEVICE — STRAP OR TABLE 5IN X 72IN

## (undated) DEVICE — PAD RADIOLUCENT STAT ADULT

## (undated) DEVICE — PACK CUSTOM UNIV BASIN SLI

## (undated) DEVICE — SEAL UNIVERSAL 5MM-8MM XI

## (undated) DEVICE — PACK BASIC

## (undated) DEVICE — SYR 10CC LUER LOCK

## (undated) DEVICE — PENCIL ROCKER SWITCH 10FT CORD

## (undated) DEVICE — TOWEL OR DISP STRL BLUE 4/PK

## (undated) DEVICE — NDL SAFETY 21G X 1 1/2 ECLPSE

## (undated) DEVICE — INTRODUCER SHEATH 7FR 808700

## (undated) DEVICE — SYR ONLY LUER LOCK 20CC

## (undated) DEVICE — TROCAR ENDO Z THREAD KII 5X100

## (undated) DEVICE — SET TUBE PNEUMOCLEAR SE HI FLO

## (undated) DEVICE — SOL WATER STRL IRR 1000ML

## (undated) DEVICE — SLEEVE SCD EXPRESS KNEE MEDIUM

## (undated) DEVICE — UNDERGLOVES BIOGEL PI SZ 7 LF

## (undated) DEVICE — DECANTER 6 VIAL

## (undated) DEVICE — GLOVE SURG ULTRA TOUCH 7

## (undated) DEVICE — DRAPE ABDOMINAL TIBURON 14X11

## (undated) DEVICE — SEE MEDLINE ITEM 146292

## (undated) DEVICE — SUT MONOCRYL 4-0 PS-2

## (undated) DEVICE — SUT 3/0 18IN COATED VICRYLP

## (undated) DEVICE — ELECTRODE REM PLYHSV RETURN 9